# Patient Record
Sex: MALE | Race: WHITE | NOT HISPANIC OR LATINO | Employment: UNEMPLOYED | URBAN - METROPOLITAN AREA
[De-identification: names, ages, dates, MRNs, and addresses within clinical notes are randomized per-mention and may not be internally consistent; named-entity substitution may affect disease eponyms.]

---

## 2017-01-11 ENCOUNTER — TELEPHONE (OUTPATIENT)
Dept: PHARMACY | Facility: CLINIC | Age: 57
End: 2017-01-11

## 2017-01-17 ENCOUNTER — EPISODE CHANGES (OUTPATIENT)
Dept: HEPATOLOGY | Facility: CLINIC | Age: 57
End: 2017-01-17

## 2017-01-18 ENCOUNTER — TELEPHONE (OUTPATIENT)
Dept: HEPATOLOGY | Facility: CLINIC | Age: 57
End: 2017-01-18

## 2017-01-18 NOTE — TELEPHONE ENCOUNTER
Carlos Cifuentes was a no show for their 01/18/2017 appointment. Please attempt to reschedule visit. Let me know if unable to reschedule visit.     Thanks,   You

## 2017-01-18 NOTE — TELEPHONE ENCOUNTER
I spoke with patient.  He states that he will call us next week to reschedule f/u with PHILLIP Reilly.  Lab draw from today moved to 1/19/17 at [facility].

## 2017-01-19 ENCOUNTER — EPISODE CHANGES (OUTPATIENT)
Dept: HEPATOLOGY | Facility: CLINIC | Age: 57
End: 2017-01-19

## 2017-01-20 ENCOUNTER — EPISODE CHANGES (OUTPATIENT)
Dept: HEPATOLOGY | Facility: CLINIC | Age: 57
End: 2017-01-20

## 2017-01-20 ENCOUNTER — TELEPHONE (OUTPATIENT)
Dept: HEPATOLOGY | Facility: CLINIC | Age: 57
End: 2017-01-20

## 2017-01-20 ENCOUNTER — LAB VISIT (OUTPATIENT)
Dept: LAB | Facility: HOSPITAL | Age: 57
End: 2017-01-20
Attending: INTERNAL MEDICINE
Payer: MEDICAID

## 2017-01-20 DIAGNOSIS — K74.69 OTHER CIRRHOSIS OF LIVER: ICD-10-CM

## 2017-01-20 DIAGNOSIS — B18.2 CHRONIC HEPATITIS C WITHOUT HEPATIC COMA: ICD-10-CM

## 2017-01-20 LAB
ALBUMIN SERPL BCP-MCNC: 3.3 G/DL
ALP SERPL-CCNC: 89 U/L
ALT SERPL W/O P-5'-P-CCNC: 27 U/L
AST SERPL-CCNC: 20 U/L
BASOPHILS # BLD AUTO: 0.02 K/UL
BASOPHILS NFR BLD: 0.4 %
BILIRUB DIRECT SERPL-MCNC: 0.1 MG/DL
BILIRUB SERPL-MCNC: 0.2 MG/DL
DIFFERENTIAL METHOD: ABNORMAL
EOSINOPHIL # BLD AUTO: 0.1 K/UL
EOSINOPHIL NFR BLD: 2.1 %
ERYTHROCYTE [DISTWIDTH] IN BLOOD BY AUTOMATED COUNT: 14.2 %
HCT VFR BLD AUTO: 42.2 %
HGB BLD-MCNC: 13.7 G/DL
INR PPP: 1
LYMPHOCYTES # BLD AUTO: 1.3 K/UL
LYMPHOCYTES NFR BLD: 25.5 %
MCH RBC QN AUTO: 27.9 PG
MCHC RBC AUTO-ENTMCNC: 32.5 %
MCV RBC AUTO: 86 FL
MONOCYTES # BLD AUTO: 0.3 K/UL
MONOCYTES NFR BLD: 6.5 %
NEUTROPHILS # BLD AUTO: 3.4 K/UL
NEUTROPHILS NFR BLD: 65.5 %
PLATELET # BLD AUTO: 121 K/UL
PMV BLD AUTO: 11.1 FL
PROT SERPL-MCNC: 7.2 G/DL
PROTHROMBIN TIME: 10.7 SEC
RBC # BLD AUTO: 4.91 M/UL
WBC # BLD AUTO: 5.25 K/UL

## 2017-01-20 PROCEDURE — 36415 COLL VENOUS BLD VENIPUNCTURE: CPT

## 2017-01-20 PROCEDURE — 85025 COMPLETE CBC W/AUTO DIFF WBC: CPT

## 2017-01-20 PROCEDURE — 80076 HEPATIC FUNCTION PANEL: CPT

## 2017-01-20 PROCEDURE — 85610 PROTHROMBIN TIME: CPT

## 2017-01-20 NOTE — TELEPHONE ENCOUNTER
HCV LAB REVIEW  Week 8 of Harvoni, planning on 24 weeks treatment    Pertinent labs:  LFT: stable     pls call pt:  Labs look good. Liver enzymes are stable.   - Continue Harvoni - 1 pill daily - don't miss any doses.    Next labs due / pls schedule:  - LFT at week 12 - 02/14/17  - LFT at week 16 - 03/14/17  - LFT at week 20 - 04/11/17  - LFT , HCV RNA at week 24 - end of treatment - 05/09/17

## 2017-01-20 NOTE — TELEPHONE ENCOUNTER
Left message from provider. Given number to call with any concerns.  Lab dates have been adjusted as noted below. New reminders mailed.

## 2017-01-25 ENCOUNTER — TELEPHONE (OUTPATIENT)
Dept: PHARMACY | Facility: CLINIC | Age: 57
End: 2017-01-25

## 2017-01-27 ENCOUNTER — TELEPHONE (OUTPATIENT)
Dept: PHARMACY | Facility: CLINIC | Age: 57
End: 2017-01-27

## 2017-02-01 ENCOUNTER — TELEPHONE (OUTPATIENT)
Dept: PHARMACY | Facility: CLINIC | Age: 57
End: 2017-02-01

## 2017-02-02 ENCOUNTER — TELEPHONE (OUTPATIENT)
Dept: PHARMACY | Facility: CLINIC | Age: 57
End: 2017-02-02

## 2017-02-02 ENCOUNTER — TELEPHONE (OUTPATIENT)
Dept: HEPATOLOGY | Facility: CLINIC | Age: 57
End: 2017-02-02

## 2017-02-02 NOTE — TELEPHONE ENCOUNTER
Flynn confirmed refill readiness of Batool. Name/ confirmed. no missed doses; no new medication; no side effects noted; patient has enough medication through --he is advised he will need to  medication 2/3 or  as he has missed the shipment deadline and will exhaust supply. he acknwoledges understanding.    He was reeducated on our refill policies and the importance of remaining in contact w pharmacy.      Jose Moe, PharmD, [facility]  [facility]  [phone]

## 2017-02-02 NOTE — TELEPHONE ENCOUNTER
----- Message from Monica Sandy sent at 2/1/2017  4:11 PM CST -----  Contact: Adriana Brooks  [phone]  Called regarding filling Batool.

## 2017-02-02 NOTE — TELEPHONE ENCOUNTER
Attempt made to reach [facility], closed.  Patient should be requesting Harvoni refills from [facility].  Attempt made to reach patient.  LVM asking that he contact [facility] for shipment; contact info provided.

## 2017-02-14 ENCOUNTER — EPISODE CHANGES (OUTPATIENT)
Dept: HEPATOLOGY | Facility: CLINIC | Age: 57
End: 2017-02-14

## 2017-02-24 ENCOUNTER — TELEPHONE (OUTPATIENT)
Dept: PHARMACY | Facility: CLINIC | Age: 57
End: 2017-02-24

## 2017-02-27 ENCOUNTER — TELEPHONE (OUTPATIENT)
Dept: PHARMACY | Facility: CLINIC | Age: 57
End: 2017-02-27

## 2017-02-27 NOTE — TELEPHONE ENCOUNTER
"patient confirmed shipping of harvoni 3/1/17, he is taking it every morning between 8 and 10, i told him to try to get it as close to the same time everyday as possible and suggested to set a repeating alarm to remind him.   he does report heartburn and feels that he needs to take something, it is not tolerable, he says he feels like hes gonna "puke".  he would like to take tums, i told him that this is fine, it just needs to be taken at least 4 hours after the harvoni dose.  he understood, but was told to call back if he had any other questions regarding the timing of the tums.  he had no other side effects to report.  he was reminded to make his lab appointments and to call with any questions or concerns.  he was not home with the bottle to do an exact pill count, but says he has not missed any doses and harvoni will be shipped to him asap,  "

## 2017-03-14 ENCOUNTER — TELEPHONE (OUTPATIENT)
Dept: HEPATOLOGY | Facility: CLINIC | Age: 57
End: 2017-03-14

## 2017-03-14 NOTE — TELEPHONE ENCOUNTER
----- Message from You Reilly PA-C sent at 3/6/2017  9:10 AM CST -----  Please remind him of labs today.

## 2017-03-17 ENCOUNTER — TELEPHONE (OUTPATIENT)
Dept: HEPATOLOGY | Facility: CLINIC | Age: 57
End: 2017-03-17

## 2017-03-17 ENCOUNTER — LAB VISIT (OUTPATIENT)
Dept: LAB | Facility: HOSPITAL | Age: 57
End: 2017-03-17
Attending: PHYSICIAN ASSISTANT
Payer: MEDICAID

## 2017-03-17 DIAGNOSIS — B18.2 CHRONIC HEPATITIS C WITHOUT HEPATIC COMA: ICD-10-CM

## 2017-03-17 DIAGNOSIS — K74.69 OTHER CIRRHOSIS OF LIVER: ICD-10-CM

## 2017-03-17 LAB
ALBUMIN SERPL BCP-MCNC: 3.5 G/DL
ALP SERPL-CCNC: 81 U/L
ALT SERPL W/O P-5'-P-CCNC: 24 U/L
AST SERPL-CCNC: 15 U/L
BILIRUB DIRECT SERPL-MCNC: 0.1 MG/DL
BILIRUB SERPL-MCNC: 0.3 MG/DL
PROT SERPL-MCNC: 7.8 G/DL

## 2017-03-17 PROCEDURE — 80076 HEPATIC FUNCTION PANEL: CPT

## 2017-03-17 PROCEDURE — 36415 COLL VENOUS BLD VENIPUNCTURE: CPT

## 2017-03-17 NOTE — TELEPHONE ENCOUNTER
HCV LAB REVIEW  Week 16 of Harvoni, planning on 24 weeks treatment  Pt no show for week 12    Pertinent labs:  LFT: stable     pls call pt:  Labs look good. Liver enzymes are stable.   - Continue Harvoni - 1 pill daily - don't miss any doses.    Next labs due / pls schedule:  - LFT at week 20 - 04/11/17  - LFT , HCV RNA at week 24 - end of treatment - 05/09/17

## 2017-03-20 ENCOUNTER — EPISODE CHANGES (OUTPATIENT)
Dept: HEPATOLOGY | Facility: CLINIC | Age: 57
End: 2017-03-20

## 2017-03-29 NOTE — TELEPHONE ENCOUNTER
Attempted to call patient to see how may doses of medication he has in current bottle. Left message for call back

## 2017-03-30 ENCOUNTER — TELEPHONE (OUTPATIENT)
Dept: PHARMACY | Facility: CLINIC | Age: 57
End: 2017-03-30

## 2017-04-05 ENCOUNTER — HOSPITAL ENCOUNTER (OUTPATIENT)
Dept: RADIOLOGY | Facility: HOSPITAL | Age: 57
Discharge: HOME | End: 2017-04-05
Attending: INTERNAL MEDICINE
Payer: MEDICAID

## 2017-04-05 DIAGNOSIS — E11.621 DIABETIC ULCER OF TOE OF LEFT FOOT ASSOCIATED WITH TYPE 2 DIABETES MELLITUS, UNSPECIFIED ULCER STAGE: ICD-10-CM

## 2017-04-05 DIAGNOSIS — L97.529 DIABETIC ULCER OF TOE OF LEFT FOOT ASSOCIATED WITH TYPE 2 DIABETES MELLITUS, UNSPECIFIED ULCER STAGE: ICD-10-CM

## 2017-04-05 PROCEDURE — 73630 X-RAY EXAM OF FOOT: CPT | Mod: 26,RT,, | Performed by: RADIOLOGY

## 2017-04-05 PROCEDURE — 73630 X-RAY EXAM OF FOOT: CPT | Mod: TC,RT

## 2017-04-07 ENCOUNTER — HOSPITAL ENCOUNTER (INPATIENT)
Facility: HOSPITAL | Age: 57
LOS: 8 days | Discharge: HOME | DRG: 617 | End: 2017-04-15
Attending: FAMILY MEDICINE | Admitting: INTERNAL MEDICINE
Payer: MEDICAID

## 2017-04-07 DIAGNOSIS — M62.838 NECK MUSCLE SPASM: ICD-10-CM

## 2017-04-07 DIAGNOSIS — D63.8 ANEMIA OF OTHER CHRONIC DISEASE: ICD-10-CM

## 2017-04-07 DIAGNOSIS — I73.9 PERIPHERAL ARTERIAL DISEASE: ICD-10-CM

## 2017-04-07 DIAGNOSIS — E87.1 HYPONATREMIA: ICD-10-CM

## 2017-04-07 DIAGNOSIS — F51.01 PRIMARY INSOMNIA: ICD-10-CM

## 2017-04-07 DIAGNOSIS — L97.512 DIABETIC ULCER OF TOE OF RIGHT FOOT ASSOCIATED WITH TYPE 2 DIABETES MELLITUS, WITH FAT LAYER EXPOSED: Primary | ICD-10-CM

## 2017-04-07 DIAGNOSIS — M86.8X7 OTHER OSTEOMYELITIS OF RIGHT FOOT: ICD-10-CM

## 2017-04-07 DIAGNOSIS — B35.6 TINEA CRURIS: ICD-10-CM

## 2017-04-07 DIAGNOSIS — R60.0 BILATERAL LEG EDEMA: ICD-10-CM

## 2017-04-07 DIAGNOSIS — B18.2 CHRONIC HEPATITIS C WITHOUT HEPATIC COMA: ICD-10-CM

## 2017-04-07 DIAGNOSIS — E11.621 DIABETIC ULCER OF TOE OF RIGHT FOOT ASSOCIATED WITH TYPE 2 DIABETES MELLITUS, WITH FAT LAYER EXPOSED: Primary | ICD-10-CM

## 2017-04-07 DIAGNOSIS — M86.171 OTHER ACUTE OSTEOMYELITIS OF RIGHT FOOT: ICD-10-CM

## 2017-04-07 DIAGNOSIS — I65.23 BILATERAL CAROTID ARTERY STENOSIS: ICD-10-CM

## 2017-04-07 DIAGNOSIS — I73.9 PERIPHERAL VASCULAR DISEASE: ICD-10-CM

## 2017-04-07 DIAGNOSIS — R16.2 HEPATOSPLENOMEGALY: Chronic | ICD-10-CM

## 2017-04-07 DIAGNOSIS — L03.115 CELLULITIS OF RIGHT FOOT: ICD-10-CM

## 2017-04-07 DIAGNOSIS — M51.26 LUMBAR HERNIATED DISC: ICD-10-CM

## 2017-04-07 DIAGNOSIS — E11.42 DIABETIC POLYNEUROPATHY ASSOCIATED WITH TYPE 2 DIABETES MELLITUS: ICD-10-CM

## 2017-04-07 DIAGNOSIS — I99.8 ISCHEMIC TOE: ICD-10-CM

## 2017-04-07 DIAGNOSIS — G95.20 CORD COMPRESSION: ICD-10-CM

## 2017-04-07 DIAGNOSIS — F06.4 ANXIETY DISORDER DUE TO GENERAL MEDICAL CONDITION: ICD-10-CM

## 2017-04-07 DIAGNOSIS — L08.9 INFECTION OF SKIN DUE TO METHICILLIN RESISTANT STAPHYLOCOCCUS AUREUS (MRSA): ICD-10-CM

## 2017-04-07 DIAGNOSIS — G89.29 CHRONIC BILATERAL LOW BACK PAIN WITH RIGHT-SIDED SCIATICA: Chronic | ICD-10-CM

## 2017-04-07 DIAGNOSIS — M86.9 OSTEOMYELITIS OF RIGHT FOOT: ICD-10-CM

## 2017-04-07 DIAGNOSIS — M79.2 NEUROPATHIC PAIN: ICD-10-CM

## 2017-04-07 DIAGNOSIS — B95.62 INFECTION OF SKIN DUE TO METHICILLIN RESISTANT STAPHYLOCOCCUS AUREUS (MRSA): ICD-10-CM

## 2017-04-07 DIAGNOSIS — Z72.0 TOBACCO ABUSE: ICD-10-CM

## 2017-04-07 DIAGNOSIS — A49.01 MSSA (METHICILLIN SUSCEPTIBLE STAPHYLOCOCCUS AUREUS) INFECTION: ICD-10-CM

## 2017-04-07 DIAGNOSIS — M79.651 PAIN OF RIGHT THIGH: ICD-10-CM

## 2017-04-07 DIAGNOSIS — M54.41 CHRONIC BILATERAL LOW BACK PAIN WITH RIGHT-SIDED SCIATICA: Chronic | ICD-10-CM

## 2017-04-07 DIAGNOSIS — F41.1 GENERALIZED ANXIETY DISORDER: ICD-10-CM

## 2017-04-07 LAB
ALBUMIN SERPL BCP-MCNC: 2.9 G/DL
ALP SERPL-CCNC: 86 U/L
ALT SERPL W/O P-5'-P-CCNC: 17 U/L
ANION GAP SERPL CALC-SCNC: 10 MMOL/L
AST SERPL-CCNC: 15 U/L
BASOPHILS # BLD AUTO: 0.02 K/UL
BASOPHILS NFR BLD: 0.2 %
BILIRUB SERPL-MCNC: 0.4 MG/DL
BUN SERPL-MCNC: 27 MG/DL
CALCIUM SERPL-MCNC: 9.1 MG/DL
CHLORIDE SERPL-SCNC: 97 MMOL/L
CO2 SERPL-SCNC: 22 MMOL/L
CREAT SERPL-MCNC: 1.2 MG/DL
DIFFERENTIAL METHOD: ABNORMAL
EOSINOPHIL # BLD AUTO: 0.1 K/UL
EOSINOPHIL NFR BLD: 1.1 %
ERYTHROCYTE [DISTWIDTH] IN BLOOD BY AUTOMATED COUNT: 14.2 %
EST. GFR  (AFRICAN AMERICAN): >60 ML/MIN/1.73 M^2
EST. GFR  (NON AFRICAN AMERICAN): >60 ML/MIN/1.73 M^2
GLUCOSE SERPL-MCNC: 289 MG/DL
HCT VFR BLD AUTO: 36.8 %
HGB BLD-MCNC: 12.6 G/DL
INR PPP: 1
LACTATE SERPL-SCNC: 1.1 MMOL/L
LYMPHOCYTES # BLD AUTO: 1.3 K/UL
LYMPHOCYTES NFR BLD: 14.9 %
MCH RBC QN AUTO: 28.4 PG
MCHC RBC AUTO-ENTMCNC: 34.2 %
MCV RBC AUTO: 83 FL
MONOCYTES # BLD AUTO: 0.6 K/UL
MONOCYTES NFR BLD: 6.4 %
NEUTROPHILS # BLD AUTO: 6.7 K/UL
NEUTROPHILS NFR BLD: 77.1 %
PLATELET # BLD AUTO: 176 K/UL
PMV BLD AUTO: 10.6 FL
POTASSIUM SERPL-SCNC: 4.9 MMOL/L
PROT SERPL-MCNC: 7.7 G/DL
PROTHROMBIN TIME: 10.8 SEC
RBC # BLD AUTO: 4.43 M/UL
SODIUM SERPL-SCNC: 129 MMOL/L
WBC # BLD AUTO: 8.73 K/UL

## 2017-04-07 PROCEDURE — 96365 THER/PROPH/DIAG IV INF INIT: CPT

## 2017-04-07 PROCEDURE — 94761 N-INVAS EAR/PLS OXIMETRY MLT: CPT

## 2017-04-07 PROCEDURE — 80053 COMPREHEN METABOLIC PANEL: CPT | Mod: 91

## 2017-04-07 PROCEDURE — 87040 BLOOD CULTURE FOR BACTERIA: CPT

## 2017-04-07 PROCEDURE — 93005 ELECTROCARDIOGRAM TRACING: CPT

## 2017-04-07 PROCEDURE — 96366 THER/PROPH/DIAG IV INF ADDON: CPT

## 2017-04-07 PROCEDURE — 25000003 PHARM REV CODE 250: Performed by: FAMILY MEDICINE

## 2017-04-07 PROCEDURE — 83605 ASSAY OF LACTIC ACID: CPT

## 2017-04-07 PROCEDURE — 99285 EMERGENCY DEPT VISIT HI MDM: CPT | Mod: 25,27

## 2017-04-07 PROCEDURE — 85025 COMPLETE CBC W/AUTO DIFF WBC: CPT | Mod: 91

## 2017-04-07 PROCEDURE — 96361 HYDRATE IV INFUSION ADD-ON: CPT

## 2017-04-07 PROCEDURE — 85610 PROTHROMBIN TIME: CPT | Mod: 91

## 2017-04-07 PROCEDURE — 93010 ELECTROCARDIOGRAM REPORT: CPT | Mod: ,,, | Performed by: INTERNAL MEDICINE

## 2017-04-07 PROCEDURE — 12000002 HC ACUTE/MED SURGE SEMI-PRIVATE ROOM

## 2017-04-07 PROCEDURE — 99223 1ST HOSP IP/OBS HIGH 75: CPT | Mod: ,,, | Performed by: HOSPITALIST

## 2017-04-07 PROCEDURE — 99285 EMERGENCY DEPT VISIT HI MDM: CPT | Mod: ,,, | Performed by: EMERGENCY MEDICINE

## 2017-04-07 PROCEDURE — 63600175 PHARM REV CODE 636 W HCPCS: Performed by: FAMILY MEDICINE

## 2017-04-07 RX ORDER — OXYCODONE HCL 10 MG/1
10 TABLET, FILM COATED, EXTENDED RELEASE ORAL EVERY 12 HOURS PRN
Status: DISCONTINUED | OUTPATIENT
Start: 2017-04-08 | End: 2017-04-10

## 2017-04-07 RX ORDER — GABAPENTIN 300 MG/1
300 CAPSULE ORAL 3 TIMES DAILY
Status: DISCONTINUED | OUTPATIENT
Start: 2017-04-08 | End: 2017-04-15 | Stop reason: HOSPADM

## 2017-04-07 RX ORDER — GLUCAGON 1 MG
1 KIT INJECTION
Status: DISCONTINUED | OUTPATIENT
Start: 2017-04-08 | End: 2017-04-15 | Stop reason: HOSPADM

## 2017-04-07 RX ORDER — TRAZODONE HYDROCHLORIDE 50 MG/1
100 TABLET ORAL NIGHTLY
Status: DISCONTINUED | OUTPATIENT
Start: 2017-04-08 | End: 2017-04-15 | Stop reason: HOSPADM

## 2017-04-07 RX ORDER — IBUPROFEN 200 MG
16 TABLET ORAL
Status: DISCONTINUED | OUTPATIENT
Start: 2017-04-08 | End: 2017-04-15 | Stop reason: HOSPADM

## 2017-04-07 RX ORDER — IBUPROFEN 200 MG
24 TABLET ORAL
Status: DISCONTINUED | OUTPATIENT
Start: 2017-04-08 | End: 2017-04-15 | Stop reason: HOSPADM

## 2017-04-07 RX ORDER — SIMVASTATIN 20 MG/1
20 TABLET, FILM COATED ORAL NIGHTLY
Status: DISCONTINUED | OUTPATIENT
Start: 2017-04-08 | End: 2017-04-15 | Stop reason: HOSPADM

## 2017-04-07 RX ORDER — PROMETHAZINE HYDROCHLORIDE 12.5 MG/1
12.5 TABLET ORAL EVERY 4 HOURS PRN
Status: DISCONTINUED | OUTPATIENT
Start: 2017-04-08 | End: 2017-04-15 | Stop reason: HOSPADM

## 2017-04-07 RX ADMIN — VANCOMYCIN HYDROCHLORIDE 1750 MG: 1 INJECTION, POWDER, LYOPHILIZED, FOR SOLUTION INTRAVENOUS at 10:04

## 2017-04-07 RX ADMIN — SODIUM CHLORIDE 1000 ML: 0.9 INJECTION, SOLUTION INTRAVENOUS at 08:04

## 2017-04-07 NOTE — IP AVS SNAPSHOT
[facility]  [address]  [facility] [address]  Phone: [phone]           Patient Discharge Instructions   Our goal is to set you up for success. This packet includes information on your condition, medications, and your home care.  It will help you care for yourself to prevent having to return to the hospital.     Please ask your nurse if you have any questions.      There are many details to remember when preparing to leave the hospital. Here is what you will need to do:    1. Take your medicine. If you are prescribed medications, review your Medication List on the following pages. You may have new medications to  at the pharmacy and others that you'll need to stop taking. Review the instructions for how and when to take your medications. Talk with your doctor or nurses if you are unsure of what to do.     2. Go to your follow-up appointments. Specific follow-up information is listed in the following pages. Your may be contacted by a nurse or clinical provider about future appointments. Be sure we have all of the phone numbers to reach you. Please contact your provider's office if you are unable to make an appointment.     3. Watch for warning signs. Your doctor or nurse will give you detailed warning signs to watch for and when to call for assistance. These instructions may also include educational information about your condition. If you experience any of warning signs to your health, call your doctor.           [facility]  Unless otherwise directed by your provider, please   contact [facility], our nurse care line   that is available for 24/7 assistance.     [phone] (toll-free)     Registered nurses in the [facility]   provide: appointment scheduling, clinical advisement, health education, and other advisory services.                  ** Verify the list of medication(s) below is accurate and up to date. Carry this with you in case of  emergency. If your medications have changed, please notify your healthcare provider.             Medication List      START taking these medications        Additional Info    Begin Date AM Noon PM Bedtime    aspirin 81 MG EC tablet   Commonly known as:  ECOTRIN   Refills:  0   Dose:  81 mg    Last time this was given:  81 mg on 4/15/2017  9:27 AM   Instructions:  Take 1 tablet (81 mg total) by mouth once daily.         9 am 4/16                     atorvastatin 80 MG tablet   Commonly known as:  LIPITOR   Quantity:  90 tablet   Refills:  3   Dose:  80 mg   Notes to Patient:  Cholesterol    Instructions:  Take 1 tablet (80 mg total) by mouth once daily.         9 am 4/16                   ciprofloxacin HCl 500 MG tablet   Commonly known as:  CIPRO   Quantity:  10 tablet   Refills:  0   Dose:  500 mg   Indications:  Skin & soft tissue   Notes to Patient:  Antibiotic    Last time this was given:  500 mg on 4/15/2017  9:27 AM   Instructions:  Take 1 tablet (500 mg total) by mouth every 12 (twelve) hours.                 9 pm tonight           clopidogrel 75 mg tablet   Commonly known as:  PLAVIX   Quantity:  30 tablet   Refills:  3   Dose:  75 mg   Notes to Patient:  Blood thinner.     Last time this was given:  75 mg on 4/15/2017  9:27 AM   Instructions:  Take 1 tablet (75 mg total) by mouth once daily.         9 am 4/16                     dextrose 5 % SolP 500 mL with vancomycin 1,000 mg SolR 2,000 mg   Refills:  0   Dose:  2000 mg   Indications:  Bone/Joint   Notes to Patient:  IV antibiotic    Last time this was given:  2,000 mg on 4/15/2017  5:40 AM   Instructions:  Inject 2,000 mg into the vein every 12 (twelve) hours.                            glipiZIDE 5 MG tablet   Commonly known as:  GLUCOTROL   Quantity:  60 tablet   Refills:  1   Dose:  5 mg   Notes to Patient:  Oral anti diabetic    Instructions:  Take 1 tablet (5 mg total) by mouth 2 (two) times daily before meals.                 Before meals              CHANGE how you take these medications        Additional Info    Begin Date AM Noon PM Bedtime    blood sugar diagnostic Strp   Commonly known as:  BLOOD GLUCOSE TEST   Quantity:  100 each   Refills:  6   What changed:  additional instructions   Comments:  True Test    Instructions:  Twice daily blood sugar checks                            metformin 1000 MG tablet   Commonly known as:  GLUCOPHAGE   Quantity:  60 tablet   Refills:  2   Dose:  1500 mg   What changed:  See the new instructions.   Notes to Patient:  Anti diabetic    Instructions:  Take 1.5 tablets (1,500 mg total) by mouth 2 (two) times daily with meals.                 With meals           * oxycodone 10 mg Tab immediate release tablet   Commonly known as:  ROXICODONE   Quantity:  120 tablet   Refills:  0   What changed:  See the new instructions.    Last time this was given:  10 mg on 4/15/2017  2:02 PM   Instructions:  Take 1 tablet (10 mg total) by mouth every 6 (six) hours as needed.                            * oxycodone 10 mg 12 hr tablet   Commonly known as:  OXYCONTIN   Quantity:  14 tablet   Refills:  0   Dose:  10 mg   What changed:  You were already taking a medication with the same name, and this prescription was added. Make sure you understand how and when to take each.   Notes to Patient:  Pain     Last time this was given:  10 mg on 4/15/2017  9:27 AM   Instructions:  Take 1 tablet (10 mg total) by mouth every 12 (twelve) hours.                            promethazine 25 MG tablet   Commonly known as:  PHENERGAN   Quantity:  15 tablet   Refills:  0   Dose:  25 mg   What changed:    - when to take this  - reasons to take this   Notes to Patient:  nausea    Last time this was given:  12.5 mg on 4/11/2017  5:40 AM   Instructions:  Take 1 tablet (25 mg total) by mouth every 4 (four) hours.                            * Notice:  This list has 2 medication(s) that are the same as other medications prescribed for you. Read the directions  carefully, and ask your doctor or other care provider to review them with you.      CONTINUE taking these medications        Additional Info    Begin Date AM Noon PM Bedtime    diazePAM 2 MG tablet   Commonly known as:  VALIUM   Quantity:  60 tablet   Refills:  1    Instructions:  TAKE ONE TABLET BY MOUTH EVERY 8 HOURS AS NEEDED FOR ANXIETY **THANK YOU**                            docusate sodium 100 MG capsule   Commonly known as:  COLACE   Refills:  0   Dose:  100 mg    Instructions:  Take 100 mg by mouth as needed for Constipation.                            fentaNYL 50 mcg/hr   Commonly known as:  DURAGESIC   Quantity:  10 patch   Refills:  0    Instructions:  Place 1 patch onto the skin every 72 hours.                            gabapentin 300 MG capsule   Commonly known as:  NEURONTIN   Quantity:  180 capsule   Refills:  4    Last time this was given:  300 mg on 4/15/2017  2:49 PM   Instructions:  Take 1 capsule (300 mg total) by mouth 3 (three) times daily.                            insulin glargine 100 unit/mL (3 mL) Inpn pen   Commonly known as:  LANTUS SOLOSTAR   Quantity:  13.5 mL   Refills:  3   Dose:  15 Units    Instructions:  Inject 15 Units into the skin every evening.                            ledipasvir-sofosbuvir  mg Tab   Commonly known as:  HARVONI   Quantity:  28 tablet   Refills:  5   Dose:  1 tablet    Last time this was given:  1 tablet on 4/15/2017  9:34 AM   Instructions:  Take 1 tablet by mouth once daily.                            mupirocin calcium 2% 2 % cream   Commonly known as:  BACTROBAN   Quantity:  60 g   Refills:  1    Instructions:  Apply to affected area 3 times daily                            potassium chloride SA 10 MEQ tablet   Commonly known as:  K-DUR,KLOR-CON   Refills:  1    Instructions:  Take 1 tablet (10 mEq total) by mouth once daily as needed when experiencing leg/muscle cramps.                            silver sulfADIAZINE 1% 1 % cream   Commonly known  as:  SILVADENE   Quantity:  50 g   Refills:  2   Dose:  1 application    Instructions:  Apply 1 application topically once daily. Apply to affected area                            trazodone 100 MG tablet   Commonly known as:  DESYREL   Quantity:  90 tablet   Refills:  1   Comments:  This prescription was filled today. Any refills authorized will be placed on file.    Last time this was given:  100 mg on 4/14/2017  9:01 PM   Instructions:  Take 1 tablet (100 mg total) by mouth every evening.                            TRUE METRIX GLUCOSE METER Misc   Refills:  0   Generic drug:  blood-glucose meter    Instructions:  AS DIRECTED                              STOP taking these medications     pravastatin 20 MG tablet   Commonly known as:  PRAVACHOL       simvastatin 20 MG tablet   Commonly known as:  ZOCOR            Where to Get Your Medications      These medications were sent to [place] [address]  [address] [facility] [address]     Phone:  [phone]     atorvastatin 80 MG tablet    clopidogrel 75 mg tablet    glipiZIDE 5 MG tablet    metformin 1000 MG tablet         You can get these medications from any pharmacy     Bring a paper prescription for each of these medications     oxycodone 10 mg 12 hr tablet       You don't need a prescription for these medications     aspirin 81 MG EC tablet         Information about where to get these medications is not yet available     ! Ask your nurse or doctor about these medications     ciprofloxacin HCl 500 MG tablet    dextrose 5 % SolP 500 mL with vancomycin 1,000 mg SolR 2,000 mg                  Please bring to all follow up appointments:    1. A copy of your discharge instructions.  2. All medicines you are currently taking in their original bottles.  3. Identification and insurance card.    Please arrive 15 minutes ahead of scheduled appointment time.    Please call 24 hours in advance if you must reschedule your appointment  and/or time.        Your Scheduled Appointments     Apr 20, 2017  8:30 AM CDT   Established Patient Visit with PHILLIP Nair - Infectious Diseases ([facility] )    [address]  [facility] [address] [phone]            Apr 27, 2017  7:45 AM CDT   Post OP with SPRING Araujo - Podiatry ([facility] )    [address]  [facility] [address] [phone]            May 09, 2017  9:30 AM CDT   Non-Fasting Lab with [facility] ([facility])    [address] [facility] [address]   [phone]            Jun 06, 2017 11:00 AM CDT   Established Patient Endocrinology with Aviva Lux MD   [facility] ([facility])    [address]  [facility] [address] [phone]              Follow-up Information     Follow up with Miguel Lux MD. Schedule an appointment as soon as possible for a visit in 4 weeks.    Specialties:  Internal Medicine, Oncology, Hematology and Oncology    Why:  Primary care hospital follow-up    Contact information:    [address]  [address]  [place] [address] [phone]          Follow up with J[facility] - Infectious Diseases. Go in 5 days.    Specialty:  Infectious Diseases    Why:  Infectious diseases hospital follow-up    Contact information:    [address]  [facility] [id number] [phone]    Additional information:    1st Floor - [address]        Follow up with Emil Edmonds MD In 2 weeks.    Specialty:  Vascular Surgery    Why:  with ABIs and Vasc US of the [facility]     Contact information:    [address]  [facility] [id number] [phone]          Schedule an appointment as soon as possible for a visit with Jasmeet [facility] - Endo/Diab/Metab.    Specialty:  Endocrinology    Why:  for appointment if not called in 7-10 days    Contact information:    [address]  [facility]  [id number] [phone]    Additional information:    9th Floor      Referrals     Future Orders    Ambulatory consult to Infectious Disease     Ambulatory Referral to Cardiology     Ambulatory Referral to Endocrinology     Ambulatory Referral to [facility]     Ambulatory Referral to Podiatry     Ambulatory referral to Smoking Cessation Program         Discharge Instructions     Future Orders    Call MD for:  difficulty breathing or increased cough     Call MD for:  increased confusion or weakness     Call MD for:  persistent dizziness, light-headedness, or visual disturbances     Call MD for:  persistent nausea and vomiting or diarrhea     Call MD for:  redness, tenderness, or signs of infection (pain, swelling, redness, odor or green/yellow discharge around incision site)     Call MD for:  severe persistent headache     Call MD for:  severe uncontrolled pain     Call MD for:  temperature >100.4     Call MD for:  worsening rash     Diet general     Questions:    Total calories:      Fat restriction, if any:      Protein restriction, if any:      Na restriction, if any:      Fluid restriction:      Additional restrictions:      Leave dressing on - Keep it clean, dry, and intact until clinic visit     Weight bearing restrictions (specify)     WHEELCHAIR FOR HOME USE     Questions:    Hours in W/C per day:  8    Type of Wheelchair:  Standard    Size(Width):  18"(STD adult)    Leg Support:  Elevating leg rests    Arm Height:      Desired seat depth:      Back height:      Lower leg length:      Actual seat depth:      Lap Belt:  Velcro    Accessories:      Cushion:  Basic    Justification for cushion:      Height:  5' 9" (1.753 m)    Weight:  109.5 kg (241 lb 6.5 oz)    Does patient have medical equipment at home?:  cane, straight    Length of need (1-99 months):  99    Please check all that apply:  Patient's upper body strength is sufficient for propulsion.    The patient requires the use of a w/c for " "activities of daily living within the Home.    The patient has a cast, brace or muscloskeletal condition which prevents 90 degree flexion of the knee.        Primary Diagnosis     Your primary diagnosis was:  Bone Infection      Admission Information     Date & Time Provider Department CSN    4/7/2017  8:13 PM Alfreda Almanza MD [facility] [id number]      Care Providers     Provider Role Specialty Primary office phone    Alfreda Almanza MD Attending Provider Hospitalist [phone]    Alfreda Almanza MD Team Attending  Hospitalist [phone]    Alexia Li DPM Surgeon  Podiatry [phone]    [employer] Provider  Pharmacist [phone]    Han Veronica DPM Surgeon  Podiatry [phone]      Your Vitals Were     BP Pulse Temp Resp Height Weight    130/64 (BP Location: Left arm, Patient Position: Lying, BP Method: Automatic) 68 98.1 °F (36.7 °C) (Oral) 18 5' 9" (1.753 m) 109.5 kg (241 lb 6.5 oz)    SpO2 BMI             95% 35.65 kg/m2         Recent Lab Values        9/11/2008 9/13/2008 5/12/2011 3/13/2013 9/27/2014 1/25/2016 1/12/2017 4/4/2017      5:03 AM  5:25 AM 10:46 AM  8:49 AM  4:31 AM 10:00 AM  2:03 PM 10:53 AM    A1C 10.7 (H) 10.7 (H) 14.0 (H) 11.7 (H) 11.4 (H) 9.7 (H) 10.9 10.2 (H)    Comment for A1C at 10:53 AM on 4/4/2017:           Pre-diabetes: 5.7 - 6.4           Diabetes: >6.4           Glycemic control for adults with diabetes: <7.0        Pending Labs     Order Current Status    Culture, Anaerobe In process    Specimen to Pathology - Surgery In process    Specimen to Pathology - Surgery In process      Allergies as of 4/15/2017        Reactions    Codeine Rash    Other reaction(s): Unknown      Advance Directives     An advance directive is a document which, in the event you are no longer able to make decisions for yourself, tells your healthcare team what kind of treatment you do or do not want to receive, or who you would like to make those " decisions for you.  If you do not currently have an advance directive, [facility] encourages you to create one.  For more information call:  [phone]), [phone] ([phone]),  or log on to [email or web address].        Smoking Cessation     If you would like to quit smoking:   You may be eligible for free services if you are a [place] resident and started smoking cigarettes before September 1, 1988.  Call the Smoking Cessation Trust (SCT) toll free at (379) 683-8056 or (860) 629-0441.   Call -456-QUIT-NOW if you do not meet the above criteria.   Contact us via email: [email or web address]   View our website for more information: [email or web address]        Language Assistance Services     ATTENTION: Language assistance services are available, free of charge. Please call [phone].      ATENCIÓN: Si habla español, tiene a castellano disposición servicios gratuitos de asistencia lingüística. Llame al [phone].     CHÚ Ý: N?u b?n nói Ti?ng Vi?t, có các d?ch v? h? tr? ngôn ng? mi?n phí dành cho b?n. G?i s? [phone].        Stroke Education              Diabetes Discharge Instructions                                    [facility] complies with applicable Federal civil rights laws and does not discriminate on the basis of race, color, national origin, age, disability, or sex.

## 2017-04-08 ENCOUNTER — ANESTHESIA EVENT (OUTPATIENT)
Dept: SURGERY | Facility: HOSPITAL | Age: 57
DRG: 617 | End: 2017-04-08
Payer: MEDICAID

## 2017-04-08 PROBLEM — I73.9 PERIPHERAL ARTERIAL DISEASE: Status: ACTIVE | Noted: 2017-04-08

## 2017-04-08 PROBLEM — L03.115 CELLULITIS OF RIGHT FOOT: Status: ACTIVE | Noted: 2017-04-08

## 2017-04-08 PROBLEM — Z72.0 TOBACCO ABUSE: Status: ACTIVE | Noted: 2017-04-08

## 2017-04-08 PROBLEM — M86.271 SUBACUTE OSTEOMYELITIS OF RIGHT FOOT: Status: ACTIVE | Noted: 2017-04-08

## 2017-04-08 LAB
CHOLEST/HDLC SERPL: 4.3 {RATIO}
ERYTHROCYTE [SEDIMENTATION RATE] IN BLOOD BY WESTERGREN METHOD: 90 MM/HR
GRAM STN SPEC: NORMAL
HDL/CHOLESTEROL RATIO: 23.3 %
HDLC SERPL-MCNC: 116 MG/DL
HDLC SERPL-MCNC: 27 MG/DL
LDLC SERPL CALC-MCNC: 65.6 MG/DL
NONHDLC SERPL-MCNC: 89 MG/DL
POCT GLUCOSE: 186 MG/DL (ref 70–110)
POCT GLUCOSE: 211 MG/DL (ref 70–110)
POCT GLUCOSE: 217 MG/DL (ref 70–110)
POCT GLUCOSE: 239 MG/DL (ref 70–110)
TRIGL SERPL-MCNC: 117 MG/DL
VANCOMYCIN SERPL-MCNC: 6.7 UG/ML

## 2017-04-08 PROCEDURE — 87076 CULTURE ANAEROBE IDENT EACH: CPT

## 2017-04-08 PROCEDURE — 85651 RBC SED RATE NONAUTOMATED: CPT

## 2017-04-08 PROCEDURE — A9585 GADOBUTROL INJECTION: HCPCS | Performed by: HOSPITALIST

## 2017-04-08 PROCEDURE — 25500020 PHARM REV CODE 255: Performed by: HOSPITALIST

## 2017-04-08 PROCEDURE — 80202 ASSAY OF VANCOMYCIN: CPT

## 2017-04-08 PROCEDURE — 63600175 PHARM REV CODE 636 W HCPCS: Performed by: STUDENT IN AN ORGANIZED HEALTH CARE EDUCATION/TRAINING PROGRAM

## 2017-04-08 PROCEDURE — 99233 SBSQ HOSP IP/OBS HIGH 50: CPT | Mod: ,,, | Performed by: SURGERY

## 2017-04-08 PROCEDURE — 25000003 PHARM REV CODE 250: Performed by: STUDENT IN AN ORGANIZED HEALTH CARE EDUCATION/TRAINING PROGRAM

## 2017-04-08 PROCEDURE — 87077 CULTURE AEROBIC IDENTIFY: CPT

## 2017-04-08 PROCEDURE — 11000001 HC ACUTE MED/SURG PRIVATE ROOM

## 2017-04-08 PROCEDURE — 87070 CULTURE OTHR SPECIMN AEROBIC: CPT

## 2017-04-08 PROCEDURE — 87075 CULTR BACTERIA EXCEPT BLOOD: CPT

## 2017-04-08 PROCEDURE — 87205 SMEAR GRAM STAIN: CPT

## 2017-04-08 PROCEDURE — 99233 SBSQ HOSP IP/OBS HIGH 50: CPT | Mod: ,,, | Performed by: HOSPITALIST

## 2017-04-08 PROCEDURE — 63600175 PHARM REV CODE 636 W HCPCS: Performed by: HOSPITALIST

## 2017-04-08 PROCEDURE — 99233 SBSQ HOSP IP/OBS HIGH 50: CPT | Mod: ,,, | Performed by: PODIATRIST

## 2017-04-08 PROCEDURE — 87186 SC STD MICRODIL/AGAR DIL: CPT

## 2017-04-08 PROCEDURE — 25000003 PHARM REV CODE 250: Performed by: INTERNAL MEDICINE

## 2017-04-08 PROCEDURE — 80061 LIPID PANEL: CPT

## 2017-04-08 RX ORDER — OXYCODONE HYDROCHLORIDE 5 MG/1
10 TABLET ORAL EVERY 8 HOURS PRN
Status: DISCONTINUED | OUTPATIENT
Start: 2017-04-08 | End: 2017-04-10

## 2017-04-08 RX ORDER — ASPIRIN 81 MG/1
81 TABLET ORAL DAILY
Status: DISCONTINUED | OUTPATIENT
Start: 2017-04-08 | End: 2017-04-15 | Stop reason: HOSPADM

## 2017-04-08 RX ORDER — ENOXAPARIN SODIUM 100 MG/ML
40 INJECTION SUBCUTANEOUS
Status: DISCONTINUED | OUTPATIENT
Start: 2017-04-08 | End: 2017-04-08

## 2017-04-08 RX ORDER — GLUCAGON 1 MG
1 KIT INJECTION
Status: DISCONTINUED | OUTPATIENT
Start: 2017-04-08 | End: 2017-04-08

## 2017-04-08 RX ORDER — IBUPROFEN 200 MG
1 TABLET ORAL DAILY
Status: DISCONTINUED | OUTPATIENT
Start: 2017-04-08 | End: 2017-04-15 | Stop reason: HOSPADM

## 2017-04-08 RX ORDER — CIPROFLOXACIN 500 MG/1
500 TABLET ORAL EVERY 12 HOURS
Status: DISCONTINUED | OUTPATIENT
Start: 2017-04-08 | End: 2017-04-15 | Stop reason: HOSPADM

## 2017-04-08 RX ORDER — GADOBUTROL 604.72 MG/ML
10 INJECTION INTRAVENOUS
Status: COMPLETED | OUTPATIENT
Start: 2017-04-08 | End: 2017-04-08

## 2017-04-08 RX ORDER — INSULIN ASPART 100 [IU]/ML
0-5 INJECTION, SOLUTION INTRAVENOUS; SUBCUTANEOUS
Status: DISCONTINUED | OUTPATIENT
Start: 2017-04-08 | End: 2017-04-15 | Stop reason: HOSPADM

## 2017-04-08 RX ORDER — DOCUSATE SODIUM 100 MG/1
100 CAPSULE, LIQUID FILLED ORAL
COMMUNITY
End: 2018-03-06 | Stop reason: SDUPTHER

## 2017-04-08 RX ORDER — LEDIPASVIR AND SOFOSBUVIR 90; 400 MG/1; MG/1
1 TABLET, FILM COATED ORAL DAILY
Status: DISCONTINUED | OUTPATIENT
Start: 2017-04-08 | End: 2017-04-15 | Stop reason: HOSPADM

## 2017-04-08 RX ORDER — INSULIN ASPART 100 [IU]/ML
1-10 INJECTION, SOLUTION INTRAVENOUS; SUBCUTANEOUS
Status: DISCONTINUED | OUTPATIENT
Start: 2017-04-08 | End: 2017-04-08

## 2017-04-08 RX ADMIN — ENOXAPARIN SODIUM 40 MG: 100 INJECTION SUBCUTANEOUS at 11:04

## 2017-04-08 RX ADMIN — LEDIPASVIR AND SOFOSBUVIR 1 TABLET: 90; 400 TABLET, FILM COATED ORAL at 11:04

## 2017-04-08 RX ADMIN — GADOBUTROL 10 ML: 604.72 INJECTION INTRAVENOUS at 03:04

## 2017-04-08 RX ADMIN — CIPROFLOXACIN HYDROCHLORIDE 500 MG: 500 TABLET, FILM COATED ORAL at 11:04

## 2017-04-08 RX ADMIN — VANCOMYCIN HYDROCHLORIDE 1750 MG: 1 INJECTION, POWDER, LYOPHILIZED, FOR SOLUTION INTRAVENOUS at 10:04

## 2017-04-08 RX ADMIN — OXYCODONE HYDROCHLORIDE 10 MG: 10 TABLET, FILM COATED, EXTENDED RELEASE ORAL at 02:04

## 2017-04-08 RX ADMIN — INSULIN DETEMIR 17 UNITS: 100 INJECTION, SOLUTION SUBCUTANEOUS at 02:04

## 2017-04-08 RX ADMIN — GABAPENTIN 300 MG: 300 CAPSULE ORAL at 08:04

## 2017-04-08 RX ADMIN — ASPIRIN 81 MG: 81 TABLET, COATED ORAL at 10:04

## 2017-04-08 RX ADMIN — TRAZODONE HYDROCHLORIDE 100 MG: 50 TABLET ORAL at 08:04

## 2017-04-08 RX ADMIN — ASPIRIN 81 MG: 81 TABLET, COATED ORAL at 08:04

## 2017-04-08 RX ADMIN — CIPROFLOXACIN HYDROCHLORIDE 500 MG: 500 TABLET, FILM COATED ORAL at 08:04

## 2017-04-08 RX ADMIN — OXYCODONE HYDROCHLORIDE 10 MG: 5 TABLET ORAL at 01:04

## 2017-04-08 RX ADMIN — SIMVASTATIN 20 MG: 20 TABLET, FILM COATED ORAL at 09:04

## 2017-04-08 RX ADMIN — OXYCODONE HYDROCHLORIDE 10 MG: 10 TABLET, FILM COATED, EXTENDED RELEASE ORAL at 08:04

## 2017-04-08 RX ADMIN — GABAPENTIN 300 MG: 300 CAPSULE ORAL at 10:04

## 2017-04-08 RX ADMIN — GABAPENTIN 300 MG: 300 CAPSULE ORAL at 01:04

## 2017-04-08 NOTE — PHARMACY MED REC
[facility] Medication Reconciliation  Template    Patient was admitted on 4/7/2017 for <principal problem not specified>.      Patient's prior to admission medication regimen was as follows:  Prescriptions Prior to Admission   Medication Sig Dispense Refill Last Dose    blood sugar diagnostic (BLOOD GLUCOSE TEST) Strp Twice daily blood sugar checks (Patient taking differently: Test  blood sugar four times daily) 100 each 6 Taking    diazePAM (VALIUM) 2 MG tablet TAKE ONE TABLET BY MOUTH EVERY 8 HOURS AS NEEDED FOR ANXIETY **THANK YOU** 60 tablet 1 Taking    docusate sodium (COLACE) 100 MG capsule Take 100 mg by mouth as needed for Constipation.       fentaNYL (DURAGESIC) 50 mcg/hr Place 1 patch onto the skin every 72 hours. 10 patch 0 Taking    gabapentin (NEURONTIN) 300 MG capsule Take 1 capsule (300 mg total) by mouth 3 (three) times daily. 180 capsule 4 Taking    ledipasvir-sofosbuvir (HARVONI)  mg Tab Take 1 tablet by mouth once daily. 28 tablet 5 Taking    metformin (GLUCOPHAGE) 1000 MG tablet Take 1 tablet (1,000 mg total) by mouth 2 (two) times daily.  4 Taking    mupirocin calcium 2% (BACTROBAN) 2 % cream Apply to affected area 3 times daily 60 g 1 Taking    oxycodone (ROXICODONE) 10 mg Tab immediate release tablet Take 1 tablet (10 mg total) by mouth every 6 (six) hours as needed. (Patient taking differently: take one tablet every 3 hours as needed for pain) 120 tablet 0 Taking    potassium chloride SA (K-DUR,KLOR-CON) 10 MEQ tablet Take 1 tablet (10 mEq total) by mouth once daily as needed when experiencing leg/muscle cramps.  1 Taking    promethazine (PHENERGAN) 25 MG tablet Take 1 tablet (25 mg total) by mouth every 4 (four) hours. (Patient taking differently: Take 25 mg by mouth every 4 (four) hours as needed for Nausea. ) 15 tablet 0 Taking    silver sulfADIAZINE 1% (SILVADENE) 1 % cream Apply 1 application topically once daily. Apply to affected area 50 g 2 Taking    simvastatin  "(ZOCOR) 20 MG tablet Take 1 tablet (20 mg total) by mouth every evening. 90 tablet 3 Taking    trazodone (DESYREL) 100 MG tablet Take 1 tablet (100 mg total) by mouth every evening. 90 tablet 1 Taking    TRUE METRIX GLUCOSE METER Misc AS DIRECTED  0 Taking    insulin glargine (LANTUS SOLOSTAR) 100 unit/mL (3 mL) InPn pen Inject 15 Units into the skin every evening. 13.5 mL 3 Taking         Please add appropriate    SmartPhrase below:      Admission Medication Reconciliation - Pharmacy Consult Note    The home medication history was taken by manuela Cornejo tech.  Based on information gathered and subsequent review by the clinical pharmacist, the items below may need attention.    You may go to "Admission" then "Reconcile Home Medications" tabs to review and/or act upon these items.        No issues noted with the medication reconciliation.          Please address this information as you see fit.  Feel free to contact us if you have any questions or require assistance.    Tom Mcneal PharmAGUSTIN  [id number]  "

## 2017-04-08 NOTE — ASSESSMENT & PLAN NOTE
-Continue with detemir 17 U qhs  -Reduce detemir to 10 U for tonight 2/2 to NPO status for tomorrow's operation & prevent hypoglycemia while NPO  -Resume 17 U tomorrow night  -LD SSI  -POCT glucose checks qac & qhs

## 2017-04-08 NOTE — CONSULTS
Consult Note  Podiatry    Consult Requested By: Alfreda Almanza MD  Reason for Consult: right great toe foot ulcer; HBA1C 11.8%    SUBJECTIVE     History of Present Illness:  Carlos Cifuentes is a 56 y.o. male who  has a past medical history of Arthritis; Bulging lumbar disc; C. difficile diarrhea; Chronic back pain; Diabetes mellitus type II; DM (diabetes mellitus), type 2, uncontrolled; Hepatitis C; MSSA (methicillin susceptible Staphylococcus aureus) septicemia; Neuromuscular disorder; Neuropathy; and Staph aureus infection.     Patient was consulted to podiatry for a right great toe ulcer with ischemic changes to the toe. Patient reports the ulcer started about 2 weeks ago when he was filing a callus down and picked at some skin causing some bleeding. The ulcer progressively worsened and over the last 2-3 days he has noticed the toe changing colors. Denies f/c/n/v. There is moderate reported pain to the area especially with pressure.    Scheduled Meds:   aspirin  81 mg Oral Daily    ciprofloxacin HCl  500 mg Oral Q12H    enoxparin  40 mg Subcutaneous Q24H    gabapentin  300 mg Oral TID    insulin detemir  17 Units Subcutaneous QHS    nicotine  1 patch Transdermal Daily    simvastatin  20 mg Oral QHS    trazodone  100 mg Oral QHS    vancomycin (VANCOCIN) IVPB  15 mg/kg Intravenous Q12H     Continuous Infusions:   PRN Meds:dextrose 50%, dextrose 50%, glucagon (human recombinant), glucose, glucose, insulin aspart, oxycodone, promethazine    Review of patient's allergies indicates:   Allergen Reactions    Codeine      Other reaction(s): Unknown        Past Medical History:   Diagnosis Date    Arthritis     Bulging lumbar disc     C. difficile diarrhea     Chronic back pain 10/14/2014    Diabetes mellitus type II     DM (diabetes mellitus), type 2, uncontrolled 3/12/2013    Hepatitis C 7/3/2013    MSSA (methicillin susceptible Staphylococcus aureus) septicemia     Neuromuscular disorder      Neuropathy     Staph aureus infection      Past Surgical History:   Procedure Laterality Date    APPENDECTOMY      left leg surgery      broken bone    LEG SURGERY  right      Family History   Problem Relation Age of Onset    Arthritis Mother     Arthritis Sister     Diabetes Sister     Arthritis Brother      Social History   Substance Use Topics    Smoking status: Current Some Day Smoker     Packs/day: 1.00     Types: Cigarettes    Smokeless tobacco: Never Used    Alcohol use No       Review of Systems:  Constitutional: no fever or chills  Respiratory: no cough or shortness of breath  Cardiovascular: no chest pain or palpitations  Gastrointestinal: no nausea or vomiting, tolerating diet  Musculoskeletal: no arthralgias or myalgias  Neurological: history of numbness or paresthesias in the feet    OBJECTIVE     Vital Signs (Most Recent):  Temp: 98 °F (36.7 °C) (04/08/17 0908)  Pulse: 64 (04/08/17 0908)  Resp: 18 (04/08/17 0908)  BP: (!) 96/50 (04/08/17 0908)  SpO2: 95 % (04/08/17 0400)    Vital Signs Range (Last 24H):  Temp:  [97.7 °F (36.5 °C)-98.6 °F (37 °C)]   Pulse:  [64-96]   Resp:  [17-20]   BP: ()/(50-75)   SpO2:  [95 %-99 %]     Physical Exam:  General: Oriented to person, place, time, and situation. No acute distress.  Vascular: DP and PT pulses are 1+ bilaterally with biphasic signals on doppler. CRT<3 seconds to digits 2-5 right and 1-5 left, right hallux CRT absent. Moderate diffuse pedal edema to the right foot.  Musculoskeletal: Equinus noted b/l ankles with < 10 deg DF noted.   Neuro: Protective sensation absent bilateral feet.   Derm: No open lesions, lacerations or wounds noted left foot.     Right hallux is cyanotic to the MTPJ    Ulcer Location: right plantar hallux  Measurements: 1.0x0.6x0.3 with proximal tracking about 5-7 o'clock about 3 cm  Periwound: Intact  Drainage: serosanguinous.  Malodor: Present  Base:  Necrotic base, probes to bone  Signs of infection: Malodorous  drainage, necrosis, erythema, edema.                Laboratory:  CBC:   Recent Labs  Lab 04/07/17 2139   WBC 8.73   RBC 4.43*   HGB 12.6*   HCT 36.8*      MCV 83   MCH 28.4   MCHC 34.2     CMP:   Recent Labs  Lab 04/07/17 2139   *   CALCIUM 9.1   ALBUMIN 2.9*   PROT 7.7   *   K 4.9   CO2 22*   CL 97   BUN 27*   CREATININE 1.2   ALKPHOS 86   ALT 17   AST 15   BILITOT 0.4     ESR:   Recent Labs  Lab 04/04/17  1052   SEDRATE 33*     CRP: No results for input(s): CRP in the last 168 hours.  Microbiology Results (last 7 days)     Procedure Component Value Units Date/Time    Gram stain [[id number]] Collected:  04/08/17 0854    Order Status:  Sent Specimen:  Wound from Toe, Right Foot Updated:  04/08/17 0934    Aerobic culture [[id number]] Collected:  04/08/17 0854    Order Status:  Sent Specimen:  Wound from Toe, Right Foot Updated:  04/08/17 0934    Culture, Anaerobe [[id number]] Collected:  04/08/17 0854    Order Status:  Sent Specimen:  Wound from Toe, Right Foot Updated:  04/08/17 0933    Blood culture #1 **CANNOT BE ORDERED STAT** [[id number]] Collected:  04/07/17 2053    Order Status:  Completed Specimen:  Blood from Peripheral, Antecubital, Right Updated:  04/08/17 0715     Blood Culture, Routine No Growth to date    Blood culture #2 **CANNOT BE ORDERED STAT** [[id number]] Collected:  04/07/17 2116    Order Status:  Completed Specimen:  Blood from Peripheral, Antecubital, Left Updated:  04/08/17 0515     Blood Culture, Routine No Growth to date          Diagnostic Results:  X-Ray: reviewed  MRI: pending      ASSESSMENT/PLAN     Assessment:  -DM II with neuropathy  -Diabetic ulcer right foot with associated ischemia and cyanosis to the right hallux  -Cellulitis/abscess    Plan:  -Long discussion with patient about the need for a right first ray amputation for infection control and the hallux is cyanotic (ischemic appearing - early signs of gangrene) and likely not to recover. Upon reviewing the  risks/benefits,  the planned procedure, the surgical goal, and the postoperative course for the right first ray amputation, the written consent was signed, timed, and dated by the patient with a witness present.    -Vascular surgery on board to optimize circulation through intervention if needed, plan to run a few more vascular studies, appreciate their help.  -Cultures taken at bedside, more cultures will be taken intraoperatively.   -Case set for tomorrow 8:15 AM, he will need to be NPO at midnight.  Appreciate the consult, please call on call podiatry for questions or concerns.    Kanu Kevin DPM PGY-3  Pager [phone]      Resident Supervision:  I have personally taken the history and examined this patient and agree with the resident's note as stated above.   Alexia Li DPM     Plan for RIGHT FOOT partial 1st Ray amputation in the OR.   Will need Regional block.

## 2017-04-08 NOTE — PROVIDER PROGRESS NOTES - EMERGENCY DEPT.
Encounter Date: 4/7/2017    ED Physician Progress Notes        Physician Note:   Patient's 56-year-old male who presented to [facility] with an ischemic right great toe.  He is been evaluated with the x-rays, ultrasound and lab work.  Discussed with vascular surgery and sent to [facility] for IV antibiotics, vascular consultation and initial management by hospital medicine.  We will start we will draw his blood cultures, replace his IV and start his initial vancomycin final admission through the main emergency room.

## 2017-04-08 NOTE — PLAN OF CARE
PT AAOx4, remained calm and cooperative throughout shift. Received PRN pain meds as ordered. Remained free from falls, injury or distress. Bed in lowest position and locked, call light within reach.

## 2017-04-08 NOTE — ED PROVIDER NOTES
"Encounter Date: 4/7/2017    SCRIBE #1 NOTE: I, Rosalinda Jones, am scribing for, and in the presence of,  Dr. Dow. I have scribed the following portions of the note - the Resident attestation.       History     Chief Complaint   Patient presents with    Toe Pain     Pt sent from [facility] for evaluation of infection on big toe. Pt denies f/c.      Review of patient's allergies indicates:   Allergen Reactions    Codeine      Other reaction(s): Unknown     HPI Comments: 55 y/o M h/o hep C, DM2, poorly controlled neuropathy presents with right great toe pain. Patient seen earlier today at [facility] by Dr. Zazueta who recommended admission and surgical management. Patient left AMA because he "had to take care of his dogs," now presents for further management.  Briefly, patient has baseline numbness/paralysis of right foot, noticed callus 10d ago, picked off, had ulceration and great pain, followed by wound management with which he is not compliant.  Maintains toe was normal this morning, but took off shoe discovered discoloration, he blames compression and color of sock.  Washed foot, remained discolored.   Pain is currently well controlled, says numbness 2/2 "sugar getting high." Denies fever/chills, N/V, changes in appetite. Has fentanyl patch on bottom of foot "because it hurts."     The history is provided by the patient and a relative.     Past Medical History:   Diagnosis Date    Arthritis     Bulging lumbar disc     C. difficile diarrhea     Chronic back pain 10/14/2014    Diabetes mellitus type II     DM (diabetes mellitus), type 2, uncontrolled 3/12/2013    Hepatitis C 7/3/2013    MSSA (methicillin susceptible Staphylococcus aureus) septicemia     Neuromuscular disorder     Neuropathy     Staph aureus infection      Past Surgical History:   Procedure Laterality Date    APPENDECTOMY      left leg surgery      broken bone    LEG SURGERY  right      Family History   Problem " Relation Age of Onset    Arthritis Mother     Arthritis Sister     Diabetes Sister     Arthritis Brother      Social History   Substance Use Topics    Smoking status: Current Some Day Smoker     Packs/day: 1.00     Types: Cigarettes    Smokeless tobacco: Never Used    Alcohol use No     Review of Systems   Constitutional: Negative for activity change, appetite change, chills, fatigue and fever.   HENT: Negative for trouble swallowing and voice change.    Respiratory: Negative for shortness of breath.    Cardiovascular: Negative for chest pain, palpitations and leg swelling.   Gastrointestinal: Negative for diarrhea, nausea and vomiting.   Genitourinary: Negative for decreased urine volume.   Musculoskeletal: Positive for back pain, joint swelling and myalgias. Negative for arthralgias, gait problem and neck pain.   Skin: Positive for color change and wound. Negative for pallor and rash.   Neurological: Negative for dizziness, tremors, seizures, weakness and numbness.       Physical Exam   Initial Vitals   BP Pulse Resp Temp SpO2   04/07/17 1945 04/07/17 1945 04/07/17 1945 04/07/17 1945 04/07/17 1945   129/75 96 18 98.6 °F (37 °C) 96 %     Physical Exam    Nursing note and vitals reviewed.  Constitutional: He appears well-developed and well-nourished. He is not diaphoretic. No distress.   HENT:   Head: Normocephalic and atraumatic.   Eyes: EOM are normal. Pupils are equal, round, and reactive to light.   Neck: Normal range of motion. Neck supple.   Cardiovascular: Normal rate, regular rhythm and normal heart sounds. Exam reveals no gallop and no friction rub.    No murmur heard.  Pulmonary/Chest: Breath sounds normal. No stridor. No respiratory distress. He has no wheezes. He has no rhonchi. He has no rales. He exhibits no tenderness.   Abdominal: Soft. He exhibits no distension. Bowel sounds are increased. There is no tenderness. There is no rebound and no guarding.   Musculoskeletal:        Right ankle: He  exhibits laceration. He exhibits normal range of motion and no deformity. No tenderness.        Feet:    Great right toe: pale, ulcer on plantar surface apr 1cm x 1cm.  Doppler DP pulsus. Fentanyl patch noted on plantar surface. Swelling noted up to mid-calf.          ED Course   Procedures  Labs Reviewed   CBC W/ AUTO DIFFERENTIAL - Abnormal; Notable for the following:        Result Value    RBC 4.43 (*)     Hemoglobin 12.6 (*)     Hematocrit 36.8 (*)     Gran% 77.1 (*)     Lymph% 14.9 (*)     All other components within normal limits   COMPREHENSIVE METABOLIC PANEL - Abnormal; Notable for the following:     Sodium 129 (*)     CO2 22 (*)     Glucose 289 (*)     BUN, Bld 27 (*)     Albumin 2.9 (*)     All other components within normal limits   LACTIC ACID, PLASMA   PROTIME-INR   POCT GLUCOSE MONITORING CONTINUOUS             Medical Decision Making:   History:   Old Medical Records: I decided to obtain old medical records.  Initial Assessment:   57 y/o M presents with right toe pain, previously seen in  By Dr. Zazueta, consulted Dr. Edmonds who recommended admission to medicine for antibiotics with vasc consult, left AMA, presenting again for further management.  No systemtic symptoms. Toe ulcerated, discolored, pale, cool on exam. DP palpable.  Ordered basic labs + lactate, PT, likely admit.   Clinical Tests:   Lab Tests: Reviewed and Ordered  Medical Tests: Reviewed and Ordered            Scribe Attestation:   Scribe #1: I performed the above scribed service and the documentation accurately describes the services I performed. I attest to the accuracy of the note.    Attending Attestation:   Physician Attestation Statement for Resident:  As the supervising MD   Physician Attestation Statement: I have personally seen and examined this patient.   I agree with the above history. -: This is an emergent evaluation of a 56 y.o. male presenting with toe infection. Pt seen at  earlier with concern about infected  ulcer on his toe. Bilateral DP signals via doppler. No palpable pulse. Seems chronic PVD. Staff at  discussed with vascular surgery who recommended hospital medicine admission.    Vascular Surgery contacted, they evaluated patient in ED.  No need for emergent surgical intervention at this time.  Pt will be admitted to medicine     As the supervising MD I agree with the above PE.    As the supervising MD I agree with the above treatment, course, plan, and disposition.          Physician Attestation for Scribe:  Physician Attestation Statement for Scribe #1: I, Dr. Dow, reviewed documentation, as scribed by Rosalinda Jones in my presence, and it is both accurate and complete.                 ED Course     Clinical Impression:   The primary encounter diagnosis was Diabetic ulcer of toe of right foot associated with type 2 diabetes mellitus, with fat layer exposed. Diagnoses of Ischemic toe, Cellulitis of right foot, Chronic bilateral low back pain with right-sided sciatica, Chronic hepatitis C without hepatic coma, DM type 2, uncontrolled, with neuropathy, Hyponatremia, Tobacco abuse, and Peripheral vascular disease were also pertinent to this visit.                   Randell Longoria MD  Resident  04/08/17 0126       Giana Dow MD  04/10/17 0641

## 2017-04-08 NOTE — SUBJECTIVE & OBJECTIVE
Interval History:  No acute events since admission overnight.  Per podiatry consult, he will have an amputation of his R great toe 2/2 to osteomyelitis confirmed with MRI this afternoon - lovenox held & NPO at midnight.  Touched base with vacular surgery who is planning on an angiogram w/ possible intervention this upcoming Tuesday for PAD (LAILA = 1.57).  Also spoke with ID, so long as the margins of tomorrow's amputation are clear for infection with no SSTI, he will only need Abx coverage for 48 hours, otherwise it will be up to 14 days.    Review of Systems   Constitutional: Negative for chills and fever.   Respiratory: Negative for cough and shortness of breath.    Cardiovascular: Negative for chest pain and palpitations.   Musculoskeletal: Positive for back pain.     Objective:     Vital Signs (Most Recent):  Temp: 98.1 °F (36.7 °C) (04/08/17 1131)  Pulse: 67 (04/08/17 1131)  Resp: 18 (04/08/17 1131)  BP: 104/63 (04/08/17 1131)  SpO2: 96 % (04/08/17 1131) Vital Signs (24h Range):  Temp:  [98 °F (36.7 °C)-98.6 °F (37 °C)] 98.1 °F (36.7 °C)  Pulse:  [64-96] 67  Resp:  [18-20] 18  SpO2:  [95 %-97 %] 96 %  BP: ()/(50-75) 104/63     Weight: 109.5 kg (241 lb 6.5 oz)  Body mass index is 35.65 kg/(m^2).    Intake/Output Summary (Last 24 hours) at 04/08/17 1639  Last data filed at 04/08/17 0213   Gross per 24 hour   Intake               60 ml   Output                0 ml   Net               60 ml      Physical Exam   Constitutional: He is oriented to person, place, and time. No distress.   Cardiovascular: Normal rate and regular rhythm.  Exam reveals decreased pulses (Bilaterally). Exam reveals no gallop and no friction rub.    No murmur heard.  Pulmonary/Chest: Breath sounds normal. No respiratory distress. He has no wheezes. He has no rhonchi. He has no rales.   Abdominal: Soft. Bowel sounds are normal. He exhibits no distension. There is no tenderness.   Neurological: He is alert and oriented to person, place,  and time.       Significant Labs:   Blood Culture:   Recent Labs  Lab 04/07/17 2053 04/07/17 2116   LABBLOO No Growth to date No Growth to date     CBC:   Recent Labs  Lab 04/07/17 1258 04/07/17 2139   WBC 9.29 8.73   HGB 12.8* 12.6*   HCT 37.6* 36.8*    176     CMP:   Recent Labs  Lab 04/07/17 1258 04/07/17 2139   * 129*   K 5.3* 4.9   CL 99 97   CO2 21* 22*   * 289*   BUN 29* 27*   CREATININE 1.3 1.2   CALCIUM 9.1 9.1   PROT 7.5 7.7   ALBUMIN 3.2* 2.9*   BILITOT 0.4 0.4   ALKPHOS 89 86   AST 16 15   ALT 14 17   ANIONGAP 11 10   EGFRNONAA >60 >60.0     Lactic Acid:   Recent Labs  Lab 04/07/17  2201   LACTATE 1.1     POCT Glucose:   Recent Labs  Lab 04/08/17  0228 04/08/17  1036 04/08/17  1401   POCTGLUCOSE 239* 211* 217*       Significant Imaging: MRI shows evidence of osteomyelitis in R great toe

## 2017-04-08 NOTE — CONSULTS
"  Consult Note  Vascular Surgery    Consult Requested By: ED  Reason for Consult: right great toe ischemia    SUBJECTIVE:     History of Present Illness:  Patient is a 56 y.o. male with h/o DM2 (Hgb A1c 10.2), HTN, HLD, hep C, long term tobacco use (1 ppd x44 years) presents with c/o "blue right great toe."  Reports that he had a callus on his right great toe that he shaved with scissors afterwards had a nonhealing ulceration.  Today noticed his toe to be blue in color thus went to [facility] there he had arterial ultrasound showing R LAILA 1.57, L 1.70 with no evidence of occlusion, left AMA and presented to [facility] with same complaints.  He reports no other history of nonhealing ulcers.  Reports claudication in bilateral lower extremities at 100 feet.  Reports throbbing pain in his feet right worse than left that improves when lying in bed and worsens when feet are hanging.    Denies h/o MI, stroke.    Scheduled Meds:   [START ON 4/8/2017] gabapentin  300 mg Oral TID    [START ON 4/8/2017] simvastatin  20 mg Oral QHS    [START ON 4/8/2017] trazodone  100 mg Oral QHS    vancomycin (VANCOCIN) IVPB  15 mg/kg Intravenous ED 1 Time     Continuous Infusions:   PRN Meds:[START ON 4/8/2017] dextrose 50%, [START ON 4/8/2017] dextrose 50%, [START ON 4/8/2017] glucagon (human recombinant), [START ON 4/8/2017] glucose, [START ON 4/8/2017] glucose, [START ON 4/8/2017] oxycodone, [START ON 4/8/2017] promethazine    Review of patient's allergies indicates:   Allergen Reactions    Codeine      Other reaction(s): Unknown       Past Medical History:   Diagnosis Date    Arthritis     Bulging lumbar disc     C. difficile diarrhea     Chronic back pain 10/14/2014    Diabetes mellitus type II     DM (diabetes mellitus), type 2, uncontrolled 3/12/2013    Hepatitis C 7/3/2013    Leukocytosis     MSSA (methicillin susceptible Staphylococcus aureus) septicemia     Neuromuscular disorder     Neuropathy     " Staph aureus infection      Past Surgical History:   Procedure Laterality Date    APPENDECTOMY      left leg surgery      broken bone    LEG SURGERY  right      Family History   Problem Relation Age of Onset    Arthritis Mother     Arthritis Sister     Diabetes Sister     Arthritis Brother      Social History   Substance Use Topics    Smoking status: Current Some Day Smoker     Packs/day: 1.00     Types: Cigarettes    Smokeless tobacco: Never Used    Alcohol use No        Review of Systems:  Peripheral Vascular: Leg Claudication: bilateral: Location: bilateral calves , Frequency: 100  Constitutional: no fever or chills  Eyes: no visual changes  Respiratory: no cough or shortness of breath  Cardiovascular: no chest pain or palpitations  Gastrointestinal: no nausea or vomiting, tolerating diet  Genitourinary: no hematuria or dysuria  Musculoskeletal: positive for myalgias  Neurological: no seizures or tremors    OBJECTIVE:     Vital Signs (Most Recent)  Temp: 98.4 °F (36.9 °C) (04/07/17 2328)  Pulse: 69 (04/07/17 2328)  Resp: 20 (04/07/17 2328)  BP: (!) 109/59 (04/07/17 2328)  SpO2: 95 % (04/07/17 2328)    Vital Signs Range (Last 24H):  Temp:  [97.7 °F (36.5 °C)-98.6 °F (37 °C)]   Pulse:  [69-96]   Resp:  [17-20]   BP: (109-130)/(59-75)   SpO2:  [95 %-99 %]     Physical Exam:  General: well developed, well nourished, no distress  Head: normocephalic, atraumatic  Eyes:  conjunctivae/corneas clear.  Lungs:  clear to auscultation bilaterally and normal respiratory effort  Heart: regular rate and rhythm, S1, S2 normal, no murmur, rub or gallop  Abdomen: soft, non-tender non-distented; bowel sounds normal; no masses,  no organomegaly and well healed midline incision, aorta not palpable  Extremities: right foot with edema; right great toe blue to MCP, ulcer on plantar aspect of great toe, no purulence, no flucutance; cool to touch  Pulses: 2+ right femoral pulse, 1+ left femoral pulse; nonpalpable popliteal  pulses, biphasic signals; biphasic DP and PT bilaterally  Neurologic:  No focal numbness or weakness; intact motor right foot; intact sensation right foot    Laboratory:  CBC:   Recent Labs  Lab 04/07/17 2139   WBC 8.73   RBC 4.43*   HGB 12.6*   HCT 36.8*        CMP:   Recent Labs  Lab 04/07/17 2139   *   CALCIUM 9.1   ALBUMIN 2.9*   PROT 7.7   *   K 4.9   CO2 22*   CL 97   BUN 27*   CREATININE 1.2   ALKPHOS 86   ALT 17   AST 15   BILITOT 0.4     Coagulation:   Recent Labs  Lab 04/07/17 2139   INR 1.0       Diagnostic Results:  right lower extremity  right common femoral 94 cm/sec triphasic  right SFA (prox)        144 cm/sec triphasic   right SFA (mid)        97 cm/sec triphasic  right SFA (dist)        162 cm/sec triphasic  right popliteal 68 cm/sec triphasic  right posterior tibial 15 cm/sec biphasic  right dorsalis pedis 51 cm/sec biphasic  right anterior tibial 23 cm/sec biphasic  right peroneal art 142 cm/sec biphasic      left lower extremity  Left common femoral 111 cm/sec triphasic  Left SFA (prox)        98 cm/sec triphasic   Left SFA (mid)        99 cm/sec triphasic  Left SFA (dist)        65 cm/sec triphasic  Left popliteal 54 cm/sec biphasic  Left posterior tibial 30 cm/sec biphasic  Left dorsalis pedis 40 cm/sec biphasic  Left anterior tibial 80 cm/sec biphasic  Left peroneal art 43 cm/sec biphasic   Impression     #1. Right LAILA: 1.57. Left LAILA:1.70.  #2. Elevated ABIs bilaterally which could be secondary to calcified vessels in this diabetic patient.  #3. No doubling of velocities to suggest high-grade stenosis. No evidence for occlusion.     ASSESSMENT/PLAN:     Patient is a 56 y.o. male with h/o DM2 (Hgb A1c 10.2), HTN, HLD, hep C, long term tobacco use (1 ppd x44 years) presents with right great toe infected ulceration and ischemia of right great toe.    This does not present as acute limb ischemia, appears to be infected right great toe ulceration; likely has tibial disease  leading to nonhealing right great toe ulcer  Recommend ASA 81 mg po daily, high dose statin  Recommend local wound care  Recommend r/o osteomyelitis  Will obtain LAILA with PVR  Vascular surgery to follow, please call with questions or concerns    Anusha Vera, DO  PGY-6, Vascular fellow   [phone]    Vascular Attending  Agree with above  55 yo man with poorly-controlled DM, HTN, active tobacco use with R 1st toe plantar DM ulcer   Podiatry has evaluated R 1st toe plantar ulcer; will debride further  2+ R femoral pulse; non-palpable pedal pulses.  Will need evaluation to establish in-line flow to R foot  Start DAPT: ASA 81 / plavix 75 po qd in anticipation of R leg angio  Cont statin  IVFs staring on Monday prior to angio  IV Abx  Check PVRs and R leg arterial us in our vascular lab to further map the R leg arterial tree (no lesions noted on WB u/s but clearly there must be lesions as patient has a non-palpable pedal pulse)  Plan for R leg angio, L CFA approach on Tue April 11th    Emil Edmonds MD FACS

## 2017-04-08 NOTE — ASSESSMENT & PLAN NOTE
-LAILA positive at 1.57  -Currently holding lovenox in anticipation of amputation tomorrow, but will start DAPT after tomorrow's procedure  -Per vascuar surgery, planning on R leg angio w/ revascularization on Tuesday  -IVF w/ LR on Monday in anticipation of angiogram  -Continue asprin 81 mg & simvastatin 20 mg po qhs for the moment w/ plan to increase dose to 40 at discharge for risk factor modification

## 2017-04-08 NOTE — H&P
[facility] Hospital Medicine  History & Physical    Patient Name: Carlos Cifuentes  MRN: [id number]  Admission Date: 4/7/2017  Attending Physician: Alfreda Amlanza MD   Primary Care Provider: Miguel Lux MD    [facility] Medicine Team: [facility] HOSP MED 2 Arturo Nj MD     Patient information was obtained from patient and ER records.     Subjective:     Principal Problem:<principal problem not specified>    Chief Complaint:   Chief Complaint   Patient presents with    Toe Pain     Pt sent from [facility] POV for evaluation of infection on big toe. Pt denies f/c.         HPI:   Mr. Cifuentes is a 56 yr-old mal with hx of Hep C (currently on Harvoni), Uncontrolled DM2 with peripheral neuropathy, Tobacco Abuse (47 pack years), Hx of Right Leg MRSA Infection (2014) who presents to [facility] ED for evaluation of the R Great Toe. Pt was seen earlier in the day at [facility] for great toe discoloration and coldness. It was recommended the pt be admitted for vascular surgery evaluation but pt left AMA. He now returns to [facility] for further management. Right great toe was normal yesterday morning (4/7/17) but began to become discolored in the afternoon. Even after washing his foot, it remained discolored. Pt belives the discoloration is due to his shoes and dye from black socks. He has no hx of PVD or stents placed in his legs. He denies claudication. He reports peripheral neuropathy and poor sensation in his feet. He has an ulcer of his right great toe on the plantar surface that started one week ago after he tried to sand a callous.    In the ED, pt afebrile with no leukocytosis. LAILA 1.6 right and 1.7 left. Large arteries of the foot with good doppler flow. DP/PT pulses barely palpable. Xray negative. Great toe is cold to touch. Swelling and redness present in right shin. Fentanyl patch present     Past Medical History:   Diagnosis Date    Arthritis     Bulging lumbar disc     C. difficile  "diarrhea     Diabetes mellitus type II     DM (diabetes mellitus), type 2, uncontrolled 3/12/2013    Hepatitis C 7/3/2013    Leukocytosis     MSSA (methicillin susceptible Staphylococcus aureus) septicemia     Neuromuscular disorder     Neuropathy     Staph aureus infection        Past Surgical History:   Procedure Laterality Date    APPENDECTOMY      left leg surgery      broken bone    LEG SURGERY  right        Review of patient's allergies indicates:   Allergen Reactions    Codeine      Other reaction(s): Unknown       Current Facility-Administered Medications on File Prior to Encounter   Medication    [DISCONTINUED] sodium chloride 0.9% bolus 1,000 mL     Current Outpatient Prescriptions on File Prior to Encounter   Medication Sig    blood sugar diagnostic (BLOOD GLUCOSE TEST) Strp Twice daily blood sugar checks    diazePAM (VALIUM) 2 MG tablet TAKE ONE TABLET BY MOUTH EVERY 8 HOURS AS NEEDED FOR ANXIETY **THANK YOU**    fentaNYL (DURAGESIC) 50 mcg/hr Place 1 patch onto the skin every 72 hours.    gabapentin (NEURONTIN) 300 MG capsule Take 1 capsule (300 mg total) by mouth 3 (three) times daily.    insulin needles, disposable, (BD INSULIN PEN NEEDLE UF MINI) 31 x 3/16 " Ndle Inject 1 Units into the skin 3 (three) times daily with meals.    ledipasvir-sofosbuvir (HARVONI)  mg Tab Take 1 tablet by mouth once daily.    metformin (GLUCOPHAGE) 1000 MG tablet Take 1 tablet (1,000 mg total) by mouth 2 (two) times daily.    mupirocin calcium 2% (BACTROBAN) 2 % cream Apply to affected area 3 times daily    oxycodone (ROXICODONE) 10 mg Tab immediate release tablet Take 1 tablet (10 mg total) by mouth every 6 (six) hours as needed. (Patient taking differently: take one tablet every 3 hours as needed for pain)    potassium chloride SA (K-DUR,KLOR-CON) 10 MEQ tablet Take 1 tablet (10 mEq total) by mouth once daily as needed when experiencing leg/muscle cramps.    promethazine (PHENERGAN) 25 MG " tablet Take 1 tablet (25 mg total) by mouth every 4 (four) hours.    silver sulfADIAZINE 1% (SILVADENE) 1 % cream Apply 1 application topically once daily. Apply to affected area    simvastatin (ZOCOR) 20 MG tablet Take 1 tablet (20 mg total) by mouth every evening.    trazodone (DESYREL) 100 MG tablet Take 1 tablet (100 mg total) by mouth every evening.    TRUE METRIX GLUCOSE METER Misc AS DIRECTED    insulin glargine (LANTUS SOLOSTAR) 100 unit/mL (3 mL) InPn pen Inject 15 Units into the skin every evening.    [DISCONTINUED] lactulose (CHRONULAC) 10 gram/15 mL solution Take 30 mLs (20 g total) by mouth once daily.    [DISCONTINUED] pravastatin (PRAVACHOL) 20 MG tablet Take 1 tablet (20 mg total) by mouth every evening.     Family History     Problem Relation (Age of Onset)    Arthritis Mother, Sister, Brother    Diabetes Sister        Social History Main Topics    Smoking status: Current Some Day Smoker     Packs/day: 1.00     Types: Cigarettes    Smokeless tobacco: Never Used    Alcohol use No    Drug use: No    Sexual activity: Not on file     Review of Systems   Constitutional: Negative for chills, fatigue and fever.   HENT: Negative for facial swelling.    Respiratory: Negative for cough, shortness of breath and wheezing.    Cardiovascular: Positive for leg swelling. Negative for chest pain.   Gastrointestinal: Negative for abdominal pain.   Genitourinary: Negative for dysuria and urgency.   Musculoskeletal: Positive for back pain. Negative for joint swelling.   Skin: Positive for rash.        Rt great toe color change   Neurological: Positive for numbness.   Hematological: Negative for adenopathy. Does not bruise/bleed easily.   Psychiatric/Behavioral: Negative for agitation.     Objective:     Vital Signs (Most Recent):  Temp: 98.6 °F (37 °C) (04/07/17 1945)  Pulse: 96 (04/07/17 1945)  Resp: 18 (04/07/17 1945)  BP: 129/75 (04/07/17 1945)  SpO2: 97 % (04/07/17 2246) Vital Signs (24h Range):  Temp:   [97.7 °F (36.5 °C)-98.6 °F (37 °C)] 98.6 °F (37 °C)  Pulse:  [80-96] 96  Resp:  [17-18] 18  SpO2:  [96 %-99 %] 97 %  BP: (128-130)/(72-75) 129/75     Weight: 109.5 kg (241 lb 6.5 oz)  Body mass index is 35.65 kg/(m^2).    Physical Exam   Constitutional: He is oriented to person, place, and time. He appears well-developed and well-nourished.   HENT:   Head: Normocephalic and atraumatic.   Eyes: EOM are normal. Pupils are equal, round, and reactive to light.   Neck: Normal range of motion. Neck supple.   Cardiovascular: Normal rate and regular rhythm.    Pulmonary/Chest: Effort normal and breath sounds normal.   Abdominal: Soft. Bowel sounds are normal.   Musculoskeletal: Normal range of motion.   Neurological: He is alert and oriented to person, place, and time.   Decreased sensation feet bilaterally  DP/PT 1+   Skin: Skin is warm and dry.   Rt great toe color change  Skin breakdown plantar surface       Assessment/Plan:       Diabetic ulcer of toe of right foot associated with type 2 diabetes mellitus, with fat layer exposed  DM type 2, uncontrolled, with neuropathy  Neuropathic pain  Right shin cellulitis  -- Discoloration of right great toe that is cold to touch; Vascular Surg consult pending  -- 1 week right foot swelling and ulceration (from sanded callous of right great toe)  -- Calcification of arteries in right foot noted on Xray and ECHO; possible that small vessel calcification contributing to reduced blood flow.   -- DP/PT pulses barely palpable; picked up on doppler  -- LAILA 1.57 right, LAILA 1.70 left; calcification noted  -- Xray negative for underlying osteo, fracture  -- HBA1C 11.8; Lantus 15u QHS and Metformin; continue basal insulin QHS  -- Continue Gabapentin for diabetic neuropathic pain  -- Continue Vanc 15 mg/kg BID (for skin cellulitis and foot ulceration); Hx of right leg MRSA (2014)  -- Will need podiatry outpatient appt    Chronic back pain  -- Oxycodone 12hr for severe pain    Chronic  hepatitis C without hepatic coma  -- Continue Harvoni     Tobacco Abuse  -- Nicotine patch    Hyponatremia  -- Chronic; will follow-up on AM BMP  -- Could be related to meds (opiates)      VTE Risk Mitigation         Ordered     Medium Risk of VTE  Once      04/07/17 2228     Place DELVIS hose  Until discontinued      04/07/17 2228        Arturo Nj MD  Department of Hospital Medicine   [facility]

## 2017-04-08 NOTE — ANESTHESIA PREPROCEDURE EVALUATION
04/08/2017  Carlos Cifuentes is a 56 y.o., male who  has a past medical history of Arthritis; Bulging lumbar disc; C. difficile diarrhea; Chronic back pain; Diabetes mellitus type II; DM (diabetes mellitus), type 2, uncontrolled; Hepatitis C; MSSA (methicillin susceptible Staphylococcus aureus) septicemia; Neuromuscular disorder; Neuropathy; and Staph aureus infection who presents for:    Pre-operative evaluation for Procedure(s) (LRB):  AMPUTATION-TOE (Right)    Previous Anesthesia:  Placement Date: 12/18/14; Placement Time: 1216; Method of Intubation: Direct laryngoscopy; Inserted by: Anesthesia MD; Airway Device: Endotracheal Tube; Intubated: Postinduction; Blade: Moya #2; Airway Device Size: 7.5; Style: Cuffed; Cuff Inflation: Minimal occlusive pressure; Placement Verified By: Auscultation, Capnometry; Grade: Grade I; Complicating Factors: None; Intubation Findings: Positive EtCO2, Bilateral breath sounds, Atraumatic/Condition of teeth unchanged;  Depth of Insertion: 19; Securment: Lips; Complications: None; Insertion Attempts: 1; Removal Date: 12/18/14;  Removal Time: 1408    Diagnostic Studies:  4/7/17 Toe xray  Results:There is no significant change from prior.  There is continued scattered vascular calcifications.  No evidence for acute fracture line or dislocation right toes.  No focal bone erosion.  Clinical correlation and followup as warranted..    1/9/17 CXR  FINDINGS: AP portable view of the chest. Patient is slightly rotated.  Right-sided PICC line has been removed interval placement of left-sided PICC line with tip projected over the distal SVC.  Bibasal scattered linear opacities consistent with   subsegmental scarring versus atelectasis.  No large consolidation, new focal opacity or large pneumothorax.  Cardiomediastinal silhouette is stable.  No acute osseous process seen.    12/15/14  EKG:  Sinus tachycardia  Possible Left atrial enlargement  143BPM    12/18/14 2D Echo:  CONCLUSIONS     1 - Hyperdynamic left ventricular systolic function (EF 60-65%).     2 - Cannot fully r/o but no obvious vegetation    12/24/14 ADALBERTO  CONCLUSIONS     1 - Normal left ventricular systolic function (EF 55-60%).     2 - Trivial mitral regurgitation.     3 - Trivial to mild tricuspid regurgitation.     4 - No vegetations seen.     Access:         18G R AC       Patient Active Problem List   Diagnosis    Hepatosplenomegaly    Lumbar herniated disc - Large central disk extrusion at the L4-L5 level with significant central canal and bilateral neuroforaminal narrowing    Obesity    MSSA (methicillin susceptible Staphylococcus aureus) infection    DM type 2, uncontrolled, with neuropathy    Chronic back pain    Tinea cruris    Bilateral leg edema    Back pain    Cord compression    Pain of right thigh    Anemia of other chronic disease    Neuropathic pain    Primary insomnia    Generalized anxiety disorder    Anxiety disorder due to general medical condition    Diabetic polyneuropathy associated with type 2 diabetes mellitus    Infection of skin due to methicillin resistant Staphylococcus aureus (MRSA)    Neck muscle spasm    Cirrhosis of liver without ascites    Right knee pain    Chronic hepatitis C without hepatic coma    Diabetic ulcer of toe of right foot associated with type 2 diabetes mellitus, with fat layer exposed    Tobacco abuse    Cellulitis of right foot       Review of patient's allergies indicates:   Allergen Reactions    Codeine      Other reaction(s): Unknown        No current facility-administered medications on file prior to encounter.      Current Outpatient Prescriptions on File Prior to Encounter   Medication Sig Dispense Refill    blood sugar diagnostic (BLOOD GLUCOSE TEST) Strp Twice daily blood sugar checks 100 each 6    diazePAM (VALIUM) 2 MG tablet TAKE ONE TABLET BY  "MOUTH EVERY 8 HOURS AS NEEDED FOR ANXIETY **THANK YOU** 60 tablet 1    fentaNYL (DURAGESIC) 50 mcg/hr Place 1 patch onto the skin every 72 hours. 10 patch 0    gabapentin (NEURONTIN) 300 MG capsule Take 1 capsule (300 mg total) by mouth 3 (three) times daily. 180 capsule 4    insulin needles, disposable, (BD INSULIN PEN NEEDLE UF MINI) 31 x 3/16 " Ndle Inject 1 Units into the skin 3 (three) times daily with meals. 90 each 1    ledipasvir-sofosbuvir (HARVONI)  mg Tab Take 1 tablet by mouth once daily. 28 tablet 5    metformin (GLUCOPHAGE) 1000 MG tablet Take 1 tablet (1,000 mg total) by mouth 2 (two) times daily.  4    mupirocin calcium 2% (BACTROBAN) 2 % cream Apply to affected area 3 times daily 60 g 1    oxycodone (ROXICODONE) 10 mg Tab immediate release tablet Take 1 tablet (10 mg total) by mouth every 6 (six) hours as needed. (Patient taking differently: take one tablet every 3 hours as needed for pain) 120 tablet 0    potassium chloride SA (K-DUR,KLOR-CON) 10 MEQ tablet Take 1 tablet (10 mEq total) by mouth once daily as needed when experiencing leg/muscle cramps.  1    promethazine (PHENERGAN) 25 MG tablet Take 1 tablet (25 mg total) by mouth every 4 (four) hours. 15 tablet 0    silver sulfADIAZINE 1% (SILVADENE) 1 % cream Apply 1 application topically once daily. Apply to affected area 50 g 2    simvastatin (ZOCOR) 20 MG tablet Take 1 tablet (20 mg total) by mouth every evening. 90 tablet 3    trazodone (DESYREL) 100 MG tablet Take 1 tablet (100 mg total) by mouth every evening. 90 tablet 1    TRUE METRIX GLUCOSE METER Misc AS DIRECTED  0    insulin glargine (LANTUS SOLOSTAR) 100 unit/mL (3 mL) InPn pen Inject 15 Units into the skin every evening. 13.5 mL 3       Past Surgical History:   Procedure Laterality Date    APPENDECTOMY      left leg surgery      broken bone    LEG SURGERY  right        Social History     Social History    Marital status: Single     Spouse name: N/A    Number " of children: N/A    Years of education: N/A     Occupational History    Not on file.     Social History Main Topics    Smoking status: Current Some Day Smoker     Packs/day: 1.00     Types: Cigarettes    Smokeless tobacco: Never Used    Alcohol use No    Drug use: No    Sexual activity: Not on file     Other Topics Concern    Not on file     Social History Narrative         Vital Signs Range (Last 24H):  Temp:  [36.7 °C (98 °F)-37 °C (98.6 °F)]   Pulse:  [64-96]   Resp:  [18-20]   BP: ()/(50-75)   SpO2:  [95 %-97 %]       CBC:   Recent Labs      04/07/17   1258  04/07/17 2139   WBC  9.29  8.73   RBC  4.61  4.43*   HGB  12.8*  12.6*   HCT  37.6*  36.8*   PLT  215  176   MCV  82  83   MCH  27.8  28.4   MCHC  34.0  34.2       CMP:   Recent Labs      04/07/17 1258  04/07/17 2139   NA  131*  129*   K  5.3*  4.9   CL  99  97   CO2  21*  22*   BUN  29*  27*   CREATININE  1.3  1.2   GLU  295*  289*   CALCIUM  9.1  9.1   ALBUMIN  3.2*  2.9*   PROT  7.5  7.7   ALKPHOS  89  86   ALT  14  17   AST  16  15   BILITOT  0.4  0.4       INR  Recent Labs      04/07/17   1258  04/07/17 2139   INR  1.1  1.0           [facility] Anesthesia Evaluation    I have reviewed the Patient Summary Reports.     I have reviewed the Medications.     Review of Systems  Anesthesia Hx:  History of prior surgery of interest to airway management or planning:  Denies Personal Hx of Anesthesia complications.   Social:  Smoker, No Alcohol Use Chronic pain   Chronic opioids     Hematology/Oncology:     Oncology Normal    -- Anemia:   EENT/Dental:EENT/Dental Normal   Cardiovascular:   ECG has been reviewed.    Pulmonary:  Pulmonary Normal    Hepatic/GI:   Liver Disease, Hepatitis, C Hx of C. difficile diarrhea   Musculoskeletal:   Arthritis  Chronic pain back and thigh   Lumbar herniated disc - Large central disk extrusion at the L4-L5 level with significant central canal and bilateral neuroforaminal narrowing  Obesity  Cord  compression    Ischemic R great toe       Neurological:   Neuromuscular Disease,    Endocrine:   Diabetes, poorly controlled, type 2    Dermatological:   Hx of Staph aureus infection   Psych:  Psychiatric Normal           Physical Exam  General:  Well nourished    Airway/Jaw/Neck:  Airway Findings: Mouth Opening: Normal Tongue: Normal  General Airway Assessment: Adult  Mallampati: III  Improves to II with phonation.  TM Distance: Normal, at least 6 cm  Jaw/Neck Findings:  Neck ROM: Normal ROM      Dental:  Dental Findings: In tact, Periodontal disease, Mild    Chest/Lungs:  Chest/Lungs Findings: Normal Respiratory Rate     Heart/Vascular:  Heart Findings: Rate: Normal        Mental Status:  Mental Status Findings:  Cooperative, Alert and Oriented         Anesthesia Plan  Type of Anesthesia, risks & benefits discussed:  Anesthesia Type:  general  Patient's Preference:   Intra-op Monitoring Plan: standard ASA monitors  Intra-op Monitoring Plan Comments:   Post Op Pain Control Plan:   Post Op Pain Control Plan Comments:   Induction:   IV  Beta Blocker:  Patient is not currently on a Beta-Blocker (No further documentation required).       Informed Consent: Patient understands risks and agrees with Anesthesia plan.  Questions answered. Anesthesia consent signed with patient.  ASA Score: 4     Day of Surgery Review of History & Physical:    H&P update referred to the surgeon.     Anesthesia Plan Notes: Elevated K of 5.3        Ready For Surgery From Anesthesia Perspective.     Patient was seen and examined with the residents.  I agree with the assessment and plan outlined with the following changes/modifications    Anesthesia plan  Mac/ankle block     Darron Urbina II, MD  Department of anesthesiology   [facility]  Ext [phone]

## 2017-04-08 NOTE — ASSESSMENT & PLAN NOTE
MRI today shows evidence of early osteo in the R great toe.  -Amputation tomorrow per podiatry's assistance  -Gram stain from ulcer shows polymicrobial infection  -Continue vanc 15 mg/kg pending amputation w/ clear margins will continue for 48 hours postop  -Continue cipro 500 po BID  -BCx pending

## 2017-04-08 NOTE — SUBJECTIVE & OBJECTIVE
"Past Medical History:   Diagnosis Date    Arthritis     Bulging lumbar disc     C. difficile diarrhea     Diabetes mellitus type II     DM (diabetes mellitus), type 2, uncontrolled 3/12/2013    Hepatitis C 7/3/2013    Leukocytosis     MSSA (methicillin susceptible Staphylococcus aureus) septicemia     Neuromuscular disorder     Neuropathy     Staph aureus infection        Past Surgical History:   Procedure Laterality Date    APPENDECTOMY      left leg surgery      broken bone    LEG SURGERY  right        Review of patient's allergies indicates:   Allergen Reactions    Codeine      Other reaction(s): Unknown       Current Facility-Administered Medications on File Prior to Encounter   Medication    [DISCONTINUED] sodium chloride 0.9% bolus 1,000 mL     Current Outpatient Prescriptions on File Prior to Encounter   Medication Sig    blood sugar diagnostic (BLOOD GLUCOSE TEST) Strp Twice daily blood sugar checks    diazePAM (VALIUM) 2 MG tablet TAKE ONE TABLET BY MOUTH EVERY 8 HOURS AS NEEDED FOR ANXIETY **THANK YOU**    fentaNYL (DURAGESIC) 50 mcg/hr Place 1 patch onto the skin every 72 hours.    gabapentin (NEURONTIN) 300 MG capsule Take 1 capsule (300 mg total) by mouth 3 (three) times daily.    insulin needles, disposable, (BD INSULIN PEN NEEDLE UF MINI) 31 x 3/16 " Ndle Inject 1 Units into the skin 3 (three) times daily with meals.    ledipasvir-sofosbuvir (HARVONI)  mg Tab Take 1 tablet by mouth once daily.    metformin (GLUCOPHAGE) 1000 MG tablet Take 1 tablet (1,000 mg total) by mouth 2 (two) times daily.    mupirocin calcium 2% (BACTROBAN) 2 % cream Apply to affected area 3 times daily    oxycodone (ROXICODONE) 10 mg Tab immediate release tablet Take 1 tablet (10 mg total) by mouth every 6 (six) hours as needed. (Patient taking differently: take one tablet every 3 hours as needed for pain)    potassium chloride SA (K-DUR,KLOR-CON) 10 MEQ tablet Take 1 tablet (10 mEq total) by " mouth once daily as needed when experiencing leg/muscle cramps.    promethazine (PHENERGAN) 25 MG tablet Take 1 tablet (25 mg total) by mouth every 4 (four) hours.    silver sulfADIAZINE 1% (SILVADENE) 1 % cream Apply 1 application topically once daily. Apply to affected area    simvastatin (ZOCOR) 20 MG tablet Take 1 tablet (20 mg total) by mouth every evening.    trazodone (DESYREL) 100 MG tablet Take 1 tablet (100 mg total) by mouth every evening.    TRUE METRIX GLUCOSE METER Misc AS DIRECTED    insulin glargine (LANTUS SOLOSTAR) 100 unit/mL (3 mL) InPn pen Inject 15 Units into the skin every evening.    [DISCONTINUED] lactulose (CHRONULAC) 10 gram/15 mL solution Take 30 mLs (20 g total) by mouth once daily.    [DISCONTINUED] pravastatin (PRAVACHOL) 20 MG tablet Take 1 tablet (20 mg total) by mouth every evening.     Family History     Problem Relation (Age of Onset)    Arthritis Mother, Sister, Brother    Diabetes Sister        Social History Main Topics    Smoking status: Current Some Day Smoker     Packs/day: 1.00     Types: Cigarettes    Smokeless tobacco: Never Used    Alcohol use No    Drug use: No    Sexual activity: Not on file     Review of Systems   Constitutional: Negative for chills, fatigue and fever.   HENT: Negative for facial swelling.    Respiratory: Negative for cough, shortness of breath and wheezing.    Cardiovascular: Positive for leg swelling. Negative for chest pain.   Gastrointestinal: Negative for abdominal pain.   Genitourinary: Negative for dysuria and urgency.   Musculoskeletal: Positive for back pain. Negative for joint swelling.   Skin: Positive for rash.        Rt great toe color change   Neurological: Positive for numbness.   Hematological: Negative for adenopathy. Does not bruise/bleed easily.   Psychiatric/Behavioral: Negative for agitation.     Objective:     Vital Signs (Most Recent):  Temp: 98.6 °F (37 °C) (04/07/17 1945)  Pulse: 96 (04/07/17 1945)  Resp: 18  (04/07/17 1945)  BP: 129/75 (04/07/17 1945)  SpO2: 97 % (04/07/17 2246) Vital Signs (24h Range):  Temp:  [97.7 °F (36.5 °C)-98.6 °F (37 °C)] 98.6 °F (37 °C)  Pulse:  [80-96] 96  Resp:  [17-18] 18  SpO2:  [96 %-99 %] 97 %  BP: (128-130)/(72-75) 129/75     Weight: 109.5 kg (241 lb 6.5 oz)  Body mass index is 35.65 kg/(m^2).    Physical Exam   Constitutional: He is oriented to person, place, and time. He appears well-developed and well-nourished.   HENT:   Head: Normocephalic and atraumatic.   Eyes: EOM are normal. Pupils are equal, round, and reactive to light.   Neck: Normal range of motion. Neck supple.   Cardiovascular: Normal rate and regular rhythm.    Pulmonary/Chest: Effort normal and breath sounds normal.   Abdominal: Soft. Bowel sounds are normal.   Musculoskeletal: Normal range of motion.   Neurological: He is alert and oriented to person, place, and time.   Decreased sensation feet bilaterally  DP/PT 1+   Skin: Skin is warm and dry.   Rt great toe color change  Skin breakdown plantar surface

## 2017-04-08 NOTE — ED NOTES
LOC: The patient is awake, alert, and oriented to place, time, situation. Affect is appropriate.  Speech is appropriate and clear.     APPEARANCE: Patient resting comfortably in no acute distress.  Patient is clean and well groomed.    SKIN: The skin is warm and dry; color consistent with ethnicity.  Patient has normal skin turgor and moist mucus membranes.  Skin intact; no breakdown or bruising noted. Bluish discoloration with pallor, swelling noted to the left great toe.  Unable to obtain capillary refill; unable to obtain pedal pulse.  Wound noted to the palmar aspect of the right foot.    MUSCULOSKELETAL: Patient moving upper and lower extremities without difficulty.  Denies weakness.     RESPIRATORY: Airway is open and patent. Respirations spontaneous, even, easy, and non-labored.  Patient has a normal effort and rate.  No accessory muscle use noted. Denies cough.     CARDIAC:  Normal rhythm and rate noted.  No peripheral edema noted. No complaints of chest pain.      ABDOMEN: Soft and non tender to palpation.  No distention noted.     NEUROLOGIC: Eyes open spontaneously.  Behavior appropriate to situation.  Follows commands; facial expression symmetrical.  Purposeful motor response noted; normal sensation in all extremities.

## 2017-04-09 ENCOUNTER — ANESTHESIA (OUTPATIENT)
Dept: SURGERY | Facility: HOSPITAL | Age: 57
DRG: 617 | End: 2017-04-09
Payer: MEDICAID

## 2017-04-09 PROBLEM — M86.9 OSTEOMYELITIS OF RIGHT FOOT: Status: ACTIVE | Noted: 2017-04-08

## 2017-04-09 LAB
ANION GAP SERPL CALC-SCNC: 11 MMOL/L
BASOPHILS # BLD AUTO: 0.02 K/UL
BASOPHILS NFR BLD: 0.3 %
BUN SERPL-MCNC: 18 MG/DL
CALCIUM SERPL-MCNC: 8.7 MG/DL
CHLORIDE SERPL-SCNC: 98 MMOL/L
CO2 SERPL-SCNC: 20 MMOL/L
CREAT SERPL-MCNC: 1 MG/DL
DIFFERENTIAL METHOD: ABNORMAL
EOSINOPHIL # BLD AUTO: 0.1 K/UL
EOSINOPHIL NFR BLD: 1 %
ERYTHROCYTE [DISTWIDTH] IN BLOOD BY AUTOMATED COUNT: 14 %
EST. GFR  (AFRICAN AMERICAN): >60 ML/MIN/1.73 M^2
EST. GFR  (NON AFRICAN AMERICAN): >60 ML/MIN/1.73 M^2
GLUCOSE SERPL-MCNC: 248 MG/DL
HCT VFR BLD AUTO: 33.1 %
HGB BLD-MCNC: 11.3 G/DL
LYMPHOCYTES # BLD AUTO: 1.5 K/UL
LYMPHOCYTES NFR BLD: 19.5 %
MAGNESIUM SERPL-MCNC: 1.3 MG/DL
MCH RBC QN AUTO: 28.3 PG
MCHC RBC AUTO-ENTMCNC: 34.1 %
MCV RBC AUTO: 83 FL
MONOCYTES # BLD AUTO: 0.6 K/UL
MONOCYTES NFR BLD: 7.6 %
NEUTROPHILS # BLD AUTO: 5.6 K/UL
NEUTROPHILS NFR BLD: 71.3 %
PLATELET # BLD AUTO: 183 K/UL
PMV BLD AUTO: 10.8 FL
POCT GLUCOSE: 243 MG/DL (ref 70–110)
POCT GLUCOSE: 253 MG/DL (ref 70–110)
POCT GLUCOSE: 260 MG/DL (ref 70–110)
POCT GLUCOSE: 345 MG/DL (ref 70–110)
POCT GLUCOSE: 484 MG/DL (ref 70–110)
POTASSIUM SERPL-SCNC: 4.4 MMOL/L
RBC # BLD AUTO: 4 M/UL
SODIUM SERPL-SCNC: 129 MMOL/L
WBC # BLD AUTO: 7.8 K/UL

## 2017-04-09 PROCEDURE — 25000003 PHARM REV CODE 250: Performed by: STUDENT IN AN ORGANIZED HEALTH CARE EDUCATION/TRAINING PROGRAM

## 2017-04-09 PROCEDURE — 63600175 PHARM REV CODE 636 W HCPCS: Performed by: INTERNAL MEDICINE

## 2017-04-09 PROCEDURE — 25000003 PHARM REV CODE 250: Performed by: INTERNAL MEDICINE

## 2017-04-09 PROCEDURE — 99233 SBSQ HOSP IP/OBS HIGH 50: CPT | Mod: ,,, | Performed by: INTERNAL MEDICINE

## 2017-04-09 PROCEDURE — 11000001 HC ACUTE MED/SURG PRIVATE ROOM

## 2017-04-09 PROCEDURE — 80048 BASIC METABOLIC PNL TOTAL CA: CPT

## 2017-04-09 PROCEDURE — 99233 SBSQ HOSP IP/OBS HIGH 50: CPT | Mod: 57,,, | Performed by: PODIATRIST

## 2017-04-09 PROCEDURE — 99232 SBSQ HOSP IP/OBS MODERATE 35: CPT | Mod: ,,, | Performed by: HOSPITALIST

## 2017-04-09 PROCEDURE — 63600175 PHARM REV CODE 636 W HCPCS: Performed by: STUDENT IN AN ORGANIZED HEALTH CARE EDUCATION/TRAINING PROGRAM

## 2017-04-09 PROCEDURE — 85025 COMPLETE CBC W/AUTO DIFF WBC: CPT

## 2017-04-09 PROCEDURE — 83735 ASSAY OF MAGNESIUM: CPT

## 2017-04-09 PROCEDURE — 36415 COLL VENOUS BLD VENIPUNCTURE: CPT

## 2017-04-09 RX ORDER — CLOPIDOGREL BISULFATE 75 MG/1
75 TABLET ORAL DAILY
Status: DISCONTINUED | OUTPATIENT
Start: 2017-04-09 | End: 2017-04-15 | Stop reason: HOSPADM

## 2017-04-09 RX ORDER — MAGNESIUM SULFATE HEPTAHYDRATE 40 MG/ML
2 INJECTION, SOLUTION INTRAVENOUS ONCE
Status: COMPLETED | OUTPATIENT
Start: 2017-04-09 | End: 2017-04-09

## 2017-04-09 RX ADMIN — OXYCODONE HYDROCHLORIDE 10 MG: 10 TABLET, FILM COATED, EXTENDED RELEASE ORAL at 08:04

## 2017-04-09 RX ADMIN — SIMVASTATIN 20 MG: 20 TABLET, FILM COATED ORAL at 08:04

## 2017-04-09 RX ADMIN — INSULIN ASPART 1 UNITS: 100 INJECTION, SOLUTION INTRAVENOUS; SUBCUTANEOUS at 08:04

## 2017-04-09 RX ADMIN — TRAZODONE HYDROCHLORIDE 100 MG: 50 TABLET ORAL at 08:04

## 2017-04-09 RX ADMIN — VANCOMYCIN HYDROCHLORIDE 1750 MG: 1 INJECTION, POWDER, LYOPHILIZED, FOR SOLUTION INTRAVENOUS at 09:04

## 2017-04-09 RX ADMIN — LEDIPASVIR AND SOFOSBUVIR 1 TABLET: 90; 400 TABLET, FILM COATED ORAL at 09:04

## 2017-04-09 RX ADMIN — GABAPENTIN 300 MG: 300 CAPSULE ORAL at 08:04

## 2017-04-09 RX ADMIN — VANCOMYCIN HYDROCHLORIDE 1750 MG: 1 INJECTION, POWDER, LYOPHILIZED, FOR SOLUTION INTRAVENOUS at 11:04

## 2017-04-09 RX ADMIN — MAGNESIUM SULFATE IN WATER 2 G: 40 INJECTION, SOLUTION INTRAVENOUS at 01:04

## 2017-04-09 RX ADMIN — CIPROFLOXACIN HYDROCHLORIDE 500 MG: 500 TABLET, FILM COATED ORAL at 08:04

## 2017-04-09 RX ADMIN — INSULIN ASPART 2 UNITS: 100 INJECTION, SOLUTION INTRAVENOUS; SUBCUTANEOUS at 09:04

## 2017-04-09 RX ADMIN — CLOPIDOGREL 75 MG: 75 TABLET, FILM COATED ORAL at 03:04

## 2017-04-09 RX ADMIN — INSULIN ASPART 4 UNITS: 100 INJECTION, SOLUTION INTRAVENOUS; SUBCUTANEOUS at 06:04

## 2017-04-09 RX ADMIN — ASPIRIN 81 MG: 81 TABLET, COATED ORAL at 08:04

## 2017-04-09 RX ADMIN — GABAPENTIN 300 MG: 300 CAPSULE ORAL at 03:04

## 2017-04-09 RX ADMIN — OXYCODONE HYDROCHLORIDE 10 MG: 5 TABLET ORAL at 01:04

## 2017-04-09 RX ADMIN — GABAPENTIN 300 MG: 300 CAPSULE ORAL at 05:04

## 2017-04-09 RX ADMIN — OXYCODONE HYDROCHLORIDE 10 MG: 5 TABLET ORAL at 03:04

## 2017-04-09 RX ADMIN — INSULIN ASPART 3 UNITS: 100 INJECTION, SOLUTION INTRAVENOUS; SUBCUTANEOUS at 12:04

## 2017-04-09 NOTE — PROGRESS NOTES
Progress Note  Podiatry      SUBJECTIVE     History of Present Illness:  Carlos Cifuentes is a 56 y.o. male who  has a past medical history of Arthritis; Bulging lumbar disc; C. difficile diarrhea; Chronic back pain; Diabetes mellitus type II; DM (diabetes mellitus), type 2, uncontrolled; Hepatitis C; MSSA (methicillin susceptible Staphylococcus aureus) septicemia; Neuromuscular disorder; Neuropathy; and Staph aureus infection.     Patient was consulted to podiatry for a right great toe ulcer with ischemic changes to the toe. Patient reports the ulcer started about 2 weeks ago when he was filing a callus down and picked at some skin causing some bleeding. The ulcer progressively worsened and over the last 2-3 days he has noticed the toe changing colors. Denies f/c/n/v. There is moderate reported pain to the area especially with pressure.    Interval History  4/9/17: Patient was seen bedside in [facility]. Reports no pain the right foot. IV came out overnight so he has not gotten his IV antibiotics since last night. No other acute changes.  Also went outside to smoke.  States he was locked out of the hospital.      Scheduled Meds:   aspirin  81 mg Oral Daily    ciprofloxacin HCl  500 mg Oral Q12H    gabapentin  300 mg Oral TID    insulin detemir  10 Units Subcutaneous QHS    ledipasvir-sofosbuvir  1 tablet Oral Daily    magnesium sulfate IVPB  2 g Intravenous Once    nicotine  1 patch Transdermal Daily    simvastatin  20 mg Oral QHS    trazodone  100 mg Oral QHS    vancomycin (VANCOCIN) IVPB  15 mg/kg Intravenous Q12H     Continuous Infusions:   PRN Meds:dextrose 50%, dextrose 50%, glucagon (human recombinant), glucose, glucose, insulin aspart, oxycodone, oxycodone, promethazine    Review of patient's allergies indicates:   Allergen Reactions    Codeine      Other reaction(s): Unknown        Past Medical History:   Diagnosis Date    Arthritis     Bulging lumbar disc     C. difficile diarrhea     Chronic  back pain 10/14/2014    Diabetes mellitus type II     DM (diabetes mellitus), type 2, uncontrolled 3/12/2013    Hepatitis C 7/3/2013    MSSA (methicillin susceptible Staphylococcus aureus) septicemia     Neuromuscular disorder     Neuropathy     Staph aureus infection      Past Surgical History:   Procedure Laterality Date    APPENDECTOMY      left leg surgery      broken bone    LEG SURGERY  right      Family History   Problem Relation Age of Onset    Arthritis Mother     Arthritis Sister     Diabetes Sister     Arthritis Brother      Social History   Substance Use Topics    Smoking status: Current Some Day Smoker     Packs/day: 1.00     Types: Cigarettes    Smokeless tobacco: Never Used    Alcohol use No       Review of Systems:  Constitutional: no fever or chills  Respiratory: no cough or shortness of breath  Cardiovascular: no chest pain or palpitations  Gastrointestinal: no nausea or vomiting, tolerating diet  Musculoskeletal: no arthralgias or myalgias  Neurological: history of numbness or paresthesias in the feet    OBJECTIVE     Vital Signs (Most Recent):  Temp: 97.6 °F (36.4 °C) (04/09/17 0900)  Pulse: 65 (04/09/17 0900)  Resp: 16 (04/09/17 0900)  BP: (!) 114/57 (04/09/17 0900)  SpO2: 97 % (04/09/17 0900)    Vital Signs Range (Last 24H):  Temp:  [97.6 °F (36.4 °C)-98.1 °F (36.7 °C)]   Pulse:  [65-78]   Resp:  [16-18]   BP: (104-147)/(57-67)   SpO2:  [96 %-97 %]     Physical Exam:  General: Oriented to person, place, time, and situation. No acute distress.  Vascular: DP and PT pulses are 1+ bilaterally with biphasic signals on doppler. CRT<3 seconds to digits 2-5 right and 1-5 left, right hallux CRT absent. Moderate diffuse pedal edema to the right foot.  Musculoskeletal: Equinus noted b/l ankles with < 10 deg DF noted.   Neuro: Protective sensation absent bilateral feet.   Derm: No open lesions, lacerations or wounds noted left foot.     Right hallux is cyanotic to the MTPJ    Ulcer  Location: right plantar hallux  Measurements: 1.0x0.6x0.3 with proximal tracking about 5-7 o'clock about 3 cm  Periwound: Intact  Drainage: serosanguinous.  Malodor: Present  Base:  Necrotic base, probes to bone  Signs of infection: Malodorous drainage, necrosis, erythema, edema.                Laboratory:  CBC:     Recent Labs  Lab 04/09/17  0359   WBC 7.80   RBC 4.00*   HGB 11.3*   HCT 33.1*      MCV 83   MCH 28.3   MCHC 34.1     CMP:     Recent Labs  Lab 04/07/17  2139 04/09/17  0359   * 248*   CALCIUM 9.1 8.7   ALBUMIN 2.9*  --    PROT 7.7  --    * 129*   K 4.9 4.4   CO2 22* 20*   CL 97 98   BUN 27* 18   CREATININE 1.2 1.0   ALKPHOS 86  --    ALT 17  --    AST 15  --    BILITOT 0.4  --      ESR:     Recent Labs  Lab 04/08/17  0513   SEDRATE 90*     CRP: No results for input(s): CRP in the last 168 hours.  Microbiology Results (last 7 days)     Procedure Component Value Units Date/Time    Aerobic culture [[id number]] Collected:  04/08/17 0854    Order Status:  Completed Specimen:  Wound from Toe, Right Foot Updated:  04/09/17 0948     Aerobic Bacterial Culture --     GRAM NEGATIVE AHMET  Moderate  Identification and susceptibility pending       Aerobic Bacterial Culture --     DIPHTHEROIDS  Moderate      Blood culture #1 **CANNOT BE ORDERED STAT** [[id number]] Collected:  04/07/17 2053    Order Status:  Completed Specimen:  Blood from Peripheral, Antecubital, Right Updated:  04/09/17 0613     Blood Culture, Routine No Growth to date     Blood Culture, Routine No Growth to date    Blood culture #2 **CANNOT BE ORDERED STAT** [[id number]] Collected:  04/07/17 2116    Order Status:  Completed Specimen:  Blood from Peripheral, Antecubital, Left Updated:  04/08/17 2222     Blood Culture, Routine No Growth to date     Blood Culture, Routine No Growth to date    Gram stain [[id number]] Collected:  04/08/17 0854    Order Status:  Completed Specimen:  Wound from Toe, Right Foot Updated:  04/08/17 1107      Gram Stain Result No WBC's      Moderate Gram positive cocci      Few Gram negative rods    Culture, Anaerobe [[id number]] Collected:  04/08/17 0854    Order Status:  Sent Specimen:  Wound from Toe, Right Foot Updated:  04/08/17 0933          Diagnostic Results:  X-Ray: reviewed  MRI: pending      ASSESSMENT/PLAN     Assessment:  -DM II with neuropathy  -Diabetic ulcer right foot with associated ischemia and cyanosis to the right hallux  -Cellulitis/abscess    Plan:  -Plan was for amputation this morning, case was pushed back several times, we rescheduled the case for tomorrow morning as an add on as early as possible.   Patient also has no iv access.  Missed two doses of Vancomycin  -Consent in patient chart  -NPO at midnight   -Vascular surgery on board to optimize circulation through intervention if needed, plan for angiogram on Tuesday, appreciate their help.  -Cultures taken at bedside growing Diptheroids and gram negative amaya with susceptibility pending, more cultures will be taken intraoperatively. Appreciate ID recommendations, needs to get IV access again asap to continue IV antibiotics. ID to discuss this with primary team.  -Case set for tomorrow as add on as early as possible, he will need to be NPO at midnight.  Appreciate the consult, please call on call podiatry for questions or concerns.    Kanu Kevin DPM PGY-3  Pager [phone]    Resident Supervision:  I have personally taken the history and examined this patient and agree with the resident's note as stated above.   Alexia Li DPM

## 2017-04-09 NOTE — PROGRESS NOTES
Progress Note  Vascular Surgery    Admit Date: 4/7/2017   Hospital Day: 3    SUBJECTIVE:     Follow-up For:  Procedure(s) (LRB):  AMPUTATION-TOE (Right)    No acute issues overnight; surgery with podiatry postponed until tomorrow AM.    Scheduled Meds:   aspirin  81 mg Oral Daily    ciprofloxacin HCl  500 mg Oral Q12H    gabapentin  300 mg Oral TID    insulin detemir  10 Units Subcutaneous QHS    ledipasvir-sofosbuvir  1 tablet Oral Daily    magnesium sulfate IVPB  2 g Intravenous Once    nicotine  1 patch Transdermal Daily    simvastatin  20 mg Oral QHS    trazodone  100 mg Oral QHS    vancomycin (VANCOCIN) IVPB  15 mg/kg Intravenous Q12H     Continuous Infusions:   custom IV infusion builder       PRN Meds:dextrose 50%, dextrose 50%, glucagon (human recombinant), glucose, glucose, insulin aspart, oxycodone, oxycodone, promethazine    Review of patient's allergies indicates:   Allergen Reactions    Codeine      Other reaction(s): Unknown           OBJECTIVE:     Vital Signs (Most Recent)  Temp: 98.3 °F (36.8 °C) (04/09/17 1212)  Pulse: 88 (04/09/17 1212)  Resp: 18 (04/09/17 1212)  BP: 122/69 (04/09/17 1212)  SpO2: 95 % (04/09/17 1212)    Vital Signs Range (Last 24H):  Temp:  [97.6 °F (36.4 °C)-98.3 °F (36.8 °C)]   Pulse:  [65-88]   Resp:  [16-18]   BP: (114-147)/(57-69)   SpO2:  [95 %-97 %]     I & O (Last 24H):  Intake/Output Summary (Last 24 hours) at 04/09/17 1224  Last data filed at 04/08/17 1828   Gross per 24 hour   Intake              800 ml   Output                0 ml   Net              800 ml     Physical Exam:  General: well developed, no distress, morbidly obese  Head: normocephalic  Extremities: right great toe with ischemia; 1+ edema right lower extremity  Pulses: right femoral 1+ pulse; left femoral very weak 1+ pulse; nonpalpable pedal pulses bilaterally  Neurologic: Alert and oriented. Thought content appropriate  No focal numbness or weakness      Laboratory:  CBC:   Recent Labs  Lab  04/09/17  0359   WBC 7.80   RBC 4.00*   HGB 11.3*   HCT 33.1*      MCV 83   MCH 28.3   MCHC 34.1     CMP:   Recent Labs  Lab 04/07/17 2139 04/09/17 0359   * 248*   CALCIUM 9.1 8.7   ALBUMIN 2.9*  --    PROT 7.7  --    * 129*   K 4.9 4.4   CO2 22* 20*   CL 97 98   BUN 27* 18   CREATININE 1.2 1.0   ALKPHOS 86  --    ALT 17  --    AST 15  --    BILITOT 0.4  --      Coagulation:   Recent Labs  Lab 04/07/17 2139   INR 1.0     Labs within the past 24 hours have been reviewed.    Diagnostic Results:      ASSESSMENT/PLAN:     Assessment: Patient is a 56 y.o. male with h/o DM2 (Hgb A1c 10.2), HTN, HLD, hep C, long term tobacco use (1 ppd x44 years) presents with right great toe infected ulceration and ischemia of right great toe.    Plan:   Will obtain CTA abd/pelvis with run off to assess iliac system secondary to weakly palpable femoral pulses (left worse than right); sodium bicarb gtt ordered for renal protection creatinine 1.0 today  Recommend ECHO to assess cardiac function; ECHO from 2014 with normal EF  Will order US AAA, carotid and LAILA with PVR  Plan for RLE angiogram Tuesday; please continue ASA daily and start plavix 75 mg po daily prior to procedure  Continue diabetes optimization  Continue renal optimization  Vascular surgery to follow    Anusha Vera DO  PGY-6, Vascular fellow   [phone]    Vascular Attending  Agree with above  Has a weak L femoral pulse  Long d/w pt and family re- need for tobacco cessation, glycemic control, compliance w meds  Dietician consult  DAPT, statin rx  Fu CTA after ivfs  Echo  Emil Edmonds MD FACS

## 2017-04-09 NOTE — SUBJECTIVE & OBJECTIVE
Past Medical History:   Diagnosis Date    Arthritis     Bulging lumbar disc     C. difficile diarrhea     Chronic back pain 10/14/2014    Diabetes mellitus type II     DM (diabetes mellitus), type 2, uncontrolled 3/12/2013    Hepatitis C 7/3/2013    MSSA (methicillin susceptible Staphylococcus aureus) septicemia     Neuromuscular disorder     Neuropathy     Staph aureus infection        Past Surgical History:   Procedure Laterality Date    APPENDECTOMY      left leg surgery      broken bone    LEG SURGERY  right        Review of patient's allergies indicates:   Allergen Reactions    Codeine      Other reaction(s): Unknown       Medications:  Prescriptions Prior to Admission   Medication Sig    blood sugar diagnostic (BLOOD GLUCOSE TEST) Strp Twice daily blood sugar checks (Patient taking differently: Test  blood sugar four times daily)    diazePAM (VALIUM) 2 MG tablet TAKE ONE TABLET BY MOUTH EVERY 8 HOURS AS NEEDED FOR ANXIETY **THANK YOU**    docusate sodium (COLACE) 100 MG capsule Take 100 mg by mouth as needed for Constipation.    fentaNYL (DURAGESIC) 50 mcg/hr Place 1 patch onto the skin every 72 hours.    gabapentin (NEURONTIN) 300 MG capsule Take 1 capsule (300 mg total) by mouth 3 (three) times daily.    ledipasvir-sofosbuvir (HARVONI)  mg Tab Take 1 tablet by mouth once daily.    metformin (GLUCOPHAGE) 1000 MG tablet Take 1 tablet (1,000 mg total) by mouth 2 (two) times daily.    mupirocin calcium 2% (BACTROBAN) 2 % cream Apply to affected area 3 times daily    oxycodone (ROXICODONE) 10 mg Tab immediate release tablet Take 1 tablet (10 mg total) by mouth every 6 (six) hours as needed. (Patient taking differently: take one tablet every 3 hours as needed for pain)    potassium chloride SA (K-DUR,KLOR-CON) 10 MEQ tablet Take 1 tablet (10 mEq total) by mouth once daily as needed when experiencing leg/muscle cramps.    promethazine (PHENERGAN) 25 MG tablet Take 1 tablet (25 mg  total) by mouth every 4 (four) hours. (Patient taking differently: Take 25 mg by mouth every 4 (four) hours as needed for Nausea. )    silver sulfADIAZINE 1% (SILVADENE) 1 % cream Apply 1 application topically once daily. Apply to affected area    simvastatin (ZOCOR) 20 MG tablet Take 1 tablet (20 mg total) by mouth every evening.    trazodone (DESYREL) 100 MG tablet Take 1 tablet (100 mg total) by mouth every evening.    TRUE METRIX GLUCOSE METER Misc AS DIRECTED    insulin glargine (LANTUS SOLOSTAR) 100 unit/mL (3 mL) InPn pen Inject 15 Units into the skin every evening.     Antibiotics     Start     Stop Route Frequency Ordered    04/08/17 1000  vancomycin (VANCOCIN) 1,750 mg in dextrose 5 % 500 mL IVPB      -- IV Every 12 hours (non-standard times) 04/08/17 0126    04/08/17 1115  ciprofloxacin HCl tablet 500 mg      -- Oral Every 12 hours 04/08/17 1003        Antifungals     None        Antivirals         Stop Route Frequency     ledipasvir-sofosbuvir      -- Oral Daily           Immunization History   Administered Date(s) Administered    Pneumococcal Polysaccharide - 23 Valent 08/21/2013       Family History     Problem Relation (Age of Onset)    Arthritis Mother, Sister, Brother    Diabetes Sister        Social History     Social History    Marital status: Single     Spouse name: N/A    Number of children: N/A    Years of education: N/A     Social History Main Topics    Smoking status: Current Some Day Smoker     Packs/day: 1.00     Types: Cigarettes    Smokeless tobacco: Never Used    Alcohol use No    Drug use: No    Sexual activity: Not Asked     Other Topics Concern    None     Social History Narrative     Review of Systems   Constitutional: Negative for chills and fever.   Respiratory: Negative for cough and shortness of breath.    Cardiovascular: Positive for leg swelling. Negative for chest pain.   Gastrointestinal: Negative for abdominal pain, constipation, diarrhea, nausea and vomiting.    Genitourinary: Negative for dysuria, frequency and hematuria.   Musculoskeletal: Positive for arthralgias and joint swelling. Negative for back pain and myalgias.   Skin: Positive for color change and wound. Negative for rash.   Neurological: Negative for dizziness, weakness, light-headedness, numbness and headaches.   Psychiatric/Behavioral: Negative for agitation and behavioral problems. The patient is not nervous/anxious.      Objective:     Vital Signs (Most Recent):  Temp: 97.6 °F (36.4 °C) (04/09/17 0900)  Pulse: 65 (04/09/17 0900)  Resp: 16 (04/09/17 0900)  BP: (!) 114/57 (04/09/17 0900)  SpO2: 97 % (04/09/17 0900) Vital Signs (24h Range):  Temp:  [97.6 °F (36.4 °C)-97.9 °F (36.6 °C)] 97.6 °F (36.4 °C)  Pulse:  [65-78] 65  Resp:  [16-18] 16  SpO2:  [96 %-97 %] 97 %  BP: (114-147)/(57-67) 114/57     Weight: 109.5 kg (241 lb 6.5 oz)  Body mass index is 35.65 kg/(m^2).    Estimated Creatinine Clearance: 101.8 mL/min (based on Cr of 1).    Physical Exam   Constitutional: He is oriented to person, place, and time. He appears well-developed and well-nourished. No distress.   Cardiovascular: Normal rate and regular rhythm.    No murmur heard.  Pulmonary/Chest: Effort normal and breath sounds normal. No respiratory distress.   Abdominal: Soft. Bowel sounds are normal. He exhibits no distension.   Musculoskeletal: Normal range of motion. He exhibits no edema.   Neurological: He is alert and oriented to person, place, and time.   Skin: Skin is warm and dry.   Right foot wound dressed; erythema, swelling noted to the right lower extremity   Psychiatric: He has a normal mood and affect. His behavior is normal.       Significant Labs:   Blood Culture:   Recent Labs  Lab 04/07/17 2053 04/07/17  2116   LABBLOO No Growth to date  No Growth to date No Growth to date  No Growth to date     CBC:   Recent Labs  Lab 04/07/17  1258 04/07/17  2139 04/09/17  0359   WBC 9.29 8.73 7.80   HGB 12.8* 12.6* 11.3*   HCT 37.6* 36.8*  33.1*    176 183     CMP:   Recent Labs  Lab 04/07/17  1258 04/07/17  2139 04/09/17  0359   * 129* 129*   K 5.3* 4.9 4.4   CL 99 97 98   CO2 21* 22* 20*   * 289* 248*   BUN 29* 27* 18   CREATININE 1.3 1.2 1.0   CALCIUM 9.1 9.1 8.7   PROT 7.5 7.7  --    ALBUMIN 3.2* 2.9*  --    BILITOT 0.4 0.4  --    ALKPHOS 89 86  --    AST 16 15  --    ALT 14 17  --    ANIONGAP 11 10 11   EGFRNONAA >60 >60.0 >60.0       Significant Imaging: I have reviewed all pertinent imaging results/findings within the past 24 hours.

## 2017-04-09 NOTE — SUBJECTIVE & OBJECTIVE
Interval History:  OR w/ podiatry deferred today to tomorrow morning.  Touched base with vascular surgery who is still planning on LE angiogram w/ revascularization this Tuesday.  Spoke with the patient at some length this afternoon re: why revascualrization is necessary; he expressed strong reservations 2/2 to prior experience w/ surgery during prior hospitalization.  After our discussion, he stated he understood why he needed the operation and stated he had no further questions.    Review of Systems   Constitutional: Negative for chills and fever.   Respiratory: Negative for cough and shortness of breath.    Cardiovascular: Negative for chest pain and palpitations.   Gastrointestinal: Negative for constipation, diarrhea, nausea and vomiting.     Objective:     Vital Signs (Most Recent):  Temp: 99 °F (37.2 °C) (04/09/17 1548)  Pulse: 82 (04/09/17 1548)  Resp: 16 (04/09/17 1548)  BP: 134/67 (04/09/17 1548)  SpO2: 96 % (04/09/17 1548) Vital Signs (24h Range):  Temp:  [97.6 °F (36.4 °C)-99 °F (37.2 °C)] 99 °F (37.2 °C)  Pulse:  [65-88] 82  Resp:  [16-18] 16  SpO2:  [95 %-97 %] 96 %  BP: (114-147)/(57-69) 134/67     Weight: 109.5 kg (241 lb 6.5 oz)  Body mass index is 35.65 kg/(m^2).    Intake/Output Summary (Last 24 hours) at 04/09/17 1731  Last data filed at 04/08/17 1828   Gross per 24 hour   Intake              800 ml   Output                0 ml   Net              800 ml      Physical Exam   Constitutional: He is oriented to person, place, and time. No distress.   Obese M sitting up in bed in NAD   Cardiovascular: Normal rate, regular rhythm and intact distal pulses.  Exam reveals no gallop and no friction rub.    No murmur heard.  Pulmonary/Chest: Breath sounds normal. No respiratory distress. He has no wheezes. He has no rhonchi. He has no rales.   Abdominal: Soft. Bowel sounds are normal. He exhibits no distension. There is no tenderness.   Neurological: He is alert and oriented to person, place, and time.        Significant Labs:   CBC:   Recent Labs  Lab 04/07/17 2139 04/09/17  0359   WBC 8.73 7.80   HGB 12.6* 11.3*   HCT 36.8* 33.1*    183     CMP:   Recent Labs  Lab 04/07/17 2139 04/09/17 0359   * 129*   K 4.9 4.4   CL 97 98   CO2 22* 20*   * 248*   BUN 27* 18   CREATININE 1.2 1.0   CALCIUM 9.1 8.7   PROT 7.7  --    ALBUMIN 2.9*  --    BILITOT 0.4  --    ALKPHOS 86  --    AST 15  --    ALT 17  --    ANIONGAP 10 11   EGFRNONAA >60.0 >60.0     POCT Glucose:   Recent Labs  Lab 04/08/17  1915 04/09/17  0734 04/09/17  1219   POCTGLUCOSE 186* 243* 260*

## 2017-04-09 NOTE — ANESTHESIA PREPROCEDURE EVALUATION
04/08/2017  Pre-operative evaluation for Procedure(s) (LRB):  ANGIOGRAM-EXTREMITY-LOWER (Right)    Carlos Cifuentes is a 56 y.o. male hx of DMII (on insulin), PAD, being treated for subacute osteomyelitis of right foot (amputation pending), chronic hep c presenting for above procedure.      Airway: Direct laryngoscopy; Inserted by: CRNA; Airway Device: Endotracheal Tube; Mask Ventilation: Easy; Intubated: Postinduction; Blade: Moya #2; Airway Device Size: 8.0; Style: Cuffed; Cuff Inflation: Minimal occlusive pressure; Placement Verified By: Auscultation, Capnometry; Grade: Grade II; Complicating Factors: None; Intubation Findings: Positive EtCO2, Bilateral breath sounds, Atraumatic/Condition of teeth unchanged;  Depth of Insertion: 22; Securment: Lips; Complications: None;    Patient Active Problem List   Diagnosis    Hepatosplenomegaly    Lumbar herniated disc - Large central disk extrusion at the L4-L5 level with significant central canal and bilateral neuroforaminal narrowing    Obesity    MSSA (methicillin susceptible Staphylococcus aureus) infection    DM type 2, uncontrolled, with neuropathy    Chronic back pain    Tinea cruris    Bilateral leg edema    Back pain    Cord compression    Pain of right thigh    Anemia of other chronic disease    Neuropathic pain    Primary insomnia    Generalized anxiety disorder    Anxiety disorder due to general medical condition    Diabetic polyneuropathy associated with type 2 diabetes mellitus    Infection of skin due to methicillin resistant Staphylococcus aureus (MRSA)    Neck muscle spasm    Cirrhosis of liver without ascites    Right knee pain    Chronic hepatitis C without hepatic coma    Diabetic ulcer of toe of right foot associated with type 2 diabetes mellitus, with fat layer exposed    Tobacco abuse    Cellulitis of right foot     Subacute osteomyelitis of right foot 2/2 to DM    Peripheral arterial disease       Review of patient's allergies indicates:   Allergen Reactions    Codeine      Other reaction(s): Unknown       No current facility-administered medications on file prior to encounter.      Current Outpatient Prescriptions on File Prior to Encounter   Medication Sig Dispense Refill    blood sugar diagnostic (BLOOD GLUCOSE TEST) Strp Twice daily blood sugar checks (Patient taking differently: Test  blood sugar four times daily) 100 each 6    diazePAM (VALIUM) 2 MG tablet TAKE ONE TABLET BY MOUTH EVERY 8 HOURS AS NEEDED FOR ANXIETY **THANK YOU** 60 tablet 1    fentaNYL (DURAGESIC) 50 mcg/hr Place 1 patch onto the skin every 72 hours. 10 patch 0    gabapentin (NEURONTIN) 300 MG capsule Take 1 capsule (300 mg total) by mouth 3 (three) times daily. 180 capsule 4    ledipasvir-sofosbuvir (HARVONI)  mg Tab Take 1 tablet by mouth once daily. 28 tablet 5    metformin (GLUCOPHAGE) 1000 MG tablet Take 1 tablet (1,000 mg total) by mouth 2 (two) times daily.  4    mupirocin calcium 2% (BACTROBAN) 2 % cream Apply to affected area 3 times daily 60 g 1    oxycodone (ROXICODONE) 10 mg Tab immediate release tablet Take 1 tablet (10 mg total) by mouth every 6 (six) hours as needed. (Patient taking differently: take one tablet every 3 hours as needed for pain) 120 tablet 0    potassium chloride SA (K-DUR,KLOR-CON) 10 MEQ tablet Take 1 tablet (10 mEq total) by mouth once daily as needed when experiencing leg/muscle cramps.  1    promethazine (PHENERGAN) 25 MG tablet Take 1 tablet (25 mg total) by mouth every 4 (four) hours. (Patient taking differently: Take 25 mg by mouth every 4 (four) hours as needed for Nausea. ) 15 tablet 0    silver sulfADIAZINE 1% (SILVADENE) 1 % cream Apply 1 application topically once daily. Apply to affected area 50 g 2    simvastatin (ZOCOR) 20 MG tablet Take 1 tablet (20 mg total) by mouth every evening.  90 tablet 3    trazodone (DESYREL) 100 MG tablet Take 1 tablet (100 mg total) by mouth every evening. 90 tablet 1    TRUE METRIX GLUCOSE METER Misc AS DIRECTED  0    insulin glargine (LANTUS SOLOSTAR) 100 unit/mL (3 mL) InPn pen Inject 15 Units into the skin every evening. 13.5 mL 3       Past Surgical History:   Procedure Laterality Date    APPENDECTOMY      left leg surgery      broken bone    LEG SURGERY  right        Social History     Social History    Marital status: Single     Spouse name: N/A    Number of children: N/A    Years of education: N/A     Occupational History    Not on file.     Social History Main Topics    Smoking status: Current Some Day Smoker     Packs/day: 1.00     Types: Cigarettes    Smokeless tobacco: Never Used    Alcohol use No    Drug use: No    Sexual activity: Not on file     Other Topics Concern    Not on file     Social History Narrative         Vital Signs Range (Last 24H):  Temp:  [36.4 °C (97.6 °F)-36.9 °C (98.4 °F)]   Pulse:  [64-78]   Resp:  [16-20]   BP: ()/(50-73)   SpO2:  [95 %-97 %]       CBC:   Recent Labs      04/07/17   1258  04/07/17 2139   WBC  9.29  8.73   RBC  4.61  4.43*   HGB  12.8*  12.6*   HCT  37.6*  36.8*   PLT  215  176   MCV  82  83   MCH  27.8  28.4   MCHC  34.0  34.2       CMP:   Recent Labs      04/07/17   1258  04/07/17   2139   NA  131*  129*   K  5.3*  4.9   CL  99  97   CO2  21*  22*   BUN  29*  27*   CREATININE  1.3  1.2   GLU  295*  289*   CALCIUM  9.1  9.1   ALBUMIN  3.2*  2.9*   PROT  7.5  7.7   ALKPHOS  89  86   ALT  14  17   AST  16  15   BILITOT  0.4  0.4       INR  Recent Labs      04/07/17   1258  04/07/17   2139   INR  1.1  1.0     Diagnostic Studies:  EKG:  Normal sinus rhythm  Normal ECG  When compared with ECG of 15-DEC-2014 18:26,  Vent. rate has decreased BY  67 BPM  Confirmed by STEPHANIE SARMIENTO, KASEY ([id number]) on 4/8/2017 5:32:30 PM    2D Echo:   1 - Normal left ventricular systolic function (EF 55-60%).     2 -  Trivial mitral regurgitation.     3 - Trivial to mild tricuspid regurgitation.     4 - No vegetations seen      [facility] Anesthesia Evaluation    I have reviewed the Patient Summary Reports.    I have reviewed the Nursing Notes.   I have reviewed the Medications.     Review of Systems  Anesthesia Hx:  History of prior surgery of interest to airway management or planning:  Denies Personal Hx of Anesthesia complications.   Social:  Smoker, No Alcohol Use Chronic pain   Chronic opioids     Hematology/Oncology:     Oncology Normal    -- Anemia:   EENT/Dental:EENT/Dental Normal   Cardiovascular:   ECG has been reviewed.    Pulmonary:  Pulmonary Normal    Hepatic/GI:   Liver Disease, Hepatitis, C Hx of C. difficile diarrhea   Musculoskeletal:   Arthritis  Chronic pain back and thigh   Lumbar herniated disc - Large central disk extrusion at the L4-L5 level with significant central canal and bilateral neuroforaminal narrowing  Obesity  Cord compression    Ischemic R great toe       Neurological:   Neuromuscular Disease,    Endocrine:   Diabetes, poorly controlled, type 2    Dermatological:   Hx of Staph aureus infection   Psych:  Psychiatric Normal           Physical Exam  General:  Well nourished    Airway/Jaw/Neck:  Airway Findings: Mouth Opening: Normal Tongue: Normal  General Airway Assessment: Adult  Mallampati: III  Improves to II with phonation.  TM Distance: Normal, at least 6 cm  Jaw/Neck Findings:  Neck ROM: Normal ROM      Dental:  Dental Findings: In tact, Periodontal disease, Mild    Chest/Lungs:  Chest/Lungs Findings: Normal Respiratory Rate     Heart/Vascular:  Heart Findings: Rate: Normal        Mental Status:  Mental Status Findings:  Cooperative, Alert and Oriented         Anesthesia Plan  Type of Anesthesia, risks & benefits discussed:  Anesthesia Type:  general  Patient's Preference:   Intra-op Monitoring Plan: standard ASA monitors  Intra-op Monitoring Plan Comments:   Post Op Pain Control Plan:   Post Op  Pain Control Plan Comments:   Induction:   IV  Beta Blocker:  Patient is not currently on a Beta-Blocker (No further documentation required).       Informed Consent: Patient understands risks and agrees with Anesthesia plan.  Questions answered. Anesthesia consent signed with patient.  ASA Score: 3     Day of Surgery Review of History & Physical:    H&P update referred to the surgeon.         Ready For Surgery From Anesthesia Perspective.

## 2017-04-09 NOTE — ASSESSMENT & PLAN NOTE
- plans for partial first ray amputation tomorrow and revascularization on Tuesday  - blood cultures NGTD  - wound culture with GNR and diptheroids  - will await speciation and intraoperative cultures  - for now, continue IV vancomycin and oral cipro  - if unable to obtain IV access, would give oral doxy and cipro empirically  - will follow with you

## 2017-04-09 NOTE — PROGRESS NOTES
Pt loss IV access. Tried 3 times to get new IV access, but unsuccessful. Another nurse  tried twice and was unsuccessful. Called charge nurse who tried once and was unsuccessful. Patient refuses to be stuck again, saying he is a hard stick and this happens all the time.

## 2017-04-09 NOTE — PROGRESS NOTES
IM2, Dr. Moore notified about 7 attempts trying to get IV access and pt missing two doses of vancomycin. MD stated that ICU nurse will be consulted to try and attempt.

## 2017-04-09 NOTE — CONSULTS
"[facility]  Infectious Disease  Consult Note    Patient Name: Carlos Cifuentes  MRN: [id number]  Admission Date: 4/7/2017  Hospital Length of Stay: 2 days  Attending Physician: Alfreda Almanza MD  Primary Care Provider: Miguel Lux MD     Isolation Status: No active isolations    Patient information was obtained from patient and past medical records.      Inpatient consult to Infectious Diseases  Consult performed by: ANABEL ENG  Consult ordered by: POORNIMA MATOS AUGUST        Assessment/Plan:     * Osteomyelitis of right foot  - plans for partial first ray amputation tomorrow and revascularization on Tuesday  - blood cultures NGTD  - wound culture with GNR and diptheroids  - will await speciation and intraoperative cultures  - for now, continue IV vancomycin and oral cipro  - if unable to obtain IV access, would give oral doxy and cipro empirically  - will follow with you    Cellulitis of right foot  - continue vancomycin and cipro  - will determine length of therapy after amputation    Thank you for your consult. I will follow-up with patient. Please contact us if you have any additional questions.  PHILLIP Noble, pager: [phone]  Infectious Disease  [facility]    Subjective:     Principal Problem: Osteomyelitis of right foot    HPI: This is a 56 year old male with DM, HTN, HLD, Hep C, smoker who came in with complaints of a "blue right great toe." He had a callous to his toe that he shaved off and it developed a nonhealing ulceration. He now has erythema to the leg. Arterial ultrasound was done and Right LAILA was 1.57, left 1.70 and no occlusion. Patient denies fevers or chills. Podiatry and vascular have been consulted. Podiatry is planning a partial first ray amputation tomorrow and there are plans for revascularization Tuesday. Patient is currently on IV vancomycin and cipro, however, he lost IV access last night and has missed his vancomycin doses.     Past " Medical History:   Diagnosis Date    Arthritis     Bulging lumbar disc     C. difficile diarrhea     Chronic back pain 10/14/2014    Diabetes mellitus type II     DM (diabetes mellitus), type 2, uncontrolled 3/12/2013    Hepatitis C 7/3/2013    MSSA (methicillin susceptible Staphylococcus aureus) septicemia     Neuromuscular disorder     Neuropathy     Staph aureus infection        Past Surgical History:   Procedure Laterality Date    APPENDECTOMY      left leg surgery      broken bone    LEG SURGERY  right        Review of patient's allergies indicates:   Allergen Reactions    Codeine      Other reaction(s): Unknown       Medications:  Prescriptions Prior to Admission   Medication Sig    blood sugar diagnostic (BLOOD GLUCOSE TEST) Strp Twice daily blood sugar checks (Patient taking differently: Test  blood sugar four times daily)    diazePAM (VALIUM) 2 MG tablet TAKE ONE TABLET BY MOUTH EVERY 8 HOURS AS NEEDED FOR ANXIETY **THANK YOU**    docusate sodium (COLACE) 100 MG capsule Take 100 mg by mouth as needed for Constipation.    fentaNYL (DURAGESIC) 50 mcg/hr Place 1 patch onto the skin every 72 hours.    gabapentin (NEURONTIN) 300 MG capsule Take 1 capsule (300 mg total) by mouth 3 (three) times daily.    ledipasvir-sofosbuvir (HARVONI)  mg Tab Take 1 tablet by mouth once daily.    metformin (GLUCOPHAGE) 1000 MG tablet Take 1 tablet (1,000 mg total) by mouth 2 (two) times daily.    mupirocin calcium 2% (BACTROBAN) 2 % cream Apply to affected area 3 times daily    oxycodone (ROXICODONE) 10 mg Tab immediate release tablet Take 1 tablet (10 mg total) by mouth every 6 (six) hours as needed. (Patient taking differently: take one tablet every 3 hours as needed for pain)    potassium chloride SA (K-DUR,KLOR-CON) 10 MEQ tablet Take 1 tablet (10 mEq total) by mouth once daily as needed when experiencing leg/muscle cramps.    promethazine (PHENERGAN) 25 MG tablet Take 1 tablet (25 mg total)  by mouth every 4 (four) hours. (Patient taking differently: Take 25 mg by mouth every 4 (four) hours as needed for Nausea. )    silver sulfADIAZINE 1% (SILVADENE) 1 % cream Apply 1 application topically once daily. Apply to affected area    simvastatin (ZOCOR) 20 MG tablet Take 1 tablet (20 mg total) by mouth every evening.    trazodone (DESYREL) 100 MG tablet Take 1 tablet (100 mg total) by mouth every evening.    TRUE METRIX GLUCOSE METER Misc AS DIRECTED    insulin glargine (LANTUS SOLOSTAR) 100 unit/mL (3 mL) InPn pen Inject 15 Units into the skin every evening.     Antibiotics     Start     Stop Route Frequency Ordered    04/08/17 1000  vancomycin (VANCOCIN) 1,750 mg in dextrose 5 % 500 mL IVPB      -- IV Every 12 hours (non-standard times) 04/08/17 0126    04/08/17 1115  ciprofloxacin HCl tablet 500 mg      -- Oral Every 12 hours 04/08/17 1003        Antifungals     None        Antivirals         Stop Route Frequency     ledipasvir-sofosbuvir      -- Oral Daily           Immunization History   Administered Date(s) Administered    Pneumococcal Polysaccharide - 23 Valent 08/21/2013       Family History     Problem Relation (Age of Onset)    Arthritis Mother, Sister, Brother    Diabetes Sister        Social History     Social History    Marital status: Single     Spouse name: N/A    Number of children: N/A    Years of education: N/A     Social History Main Topics    Smoking status: Current Some Day Smoker     Packs/day: 1.00     Types: Cigarettes    Smokeless tobacco: Never Used    Alcohol use No    Drug use: No    Sexual activity: Not Asked     Other Topics Concern    None     Social History Narrative     Review of Systems   Constitutional: Negative for chills and fever.   Respiratory: Negative for cough and shortness of breath.    Cardiovascular: Positive for leg swelling. Negative for chest pain.   Gastrointestinal: Negative for abdominal pain, constipation, diarrhea, nausea and vomiting.    Genitourinary: Negative for dysuria, frequency and hematuria.   Musculoskeletal: Positive for arthralgias and joint swelling. Negative for back pain and myalgias.   Skin: Positive for color change and wound. Negative for rash.   Neurological: Negative for dizziness, weakness, light-headedness, numbness and headaches.   Psychiatric/Behavioral: Negative for agitation and behavioral problems. The patient is not nervous/anxious.      Objective:     Vital Signs (Most Recent):  Temp: 97.6 °F (36.4 °C) (04/09/17 0900)  Pulse: 65 (04/09/17 0900)  Resp: 16 (04/09/17 0900)  BP: (!) 114/57 (04/09/17 0900)  SpO2: 97 % (04/09/17 0900) Vital Signs (24h Range):  Temp:  [97.6 °F (36.4 °C)-97.9 °F (36.6 °C)] 97.6 °F (36.4 °C)  Pulse:  [65-78] 65  Resp:  [16-18] 16  SpO2:  [96 %-97 %] 97 %  BP: (114-147)/(57-67) 114/57     Weight: 109.5 kg (241 lb 6.5 oz)  Body mass index is 35.65 kg/(m^2).    Estimated Creatinine Clearance: 101.8 mL/min (based on Cr of 1).    Physical Exam   Constitutional: He is oriented to person, place, and time. He appears well-developed and well-nourished. No distress.   Cardiovascular: Normal rate and regular rhythm.    No murmur heard.  Pulmonary/Chest: Effort normal and breath sounds normal. No respiratory distress.   Abdominal: Soft. Bowel sounds are normal. He exhibits no distension.   Musculoskeletal: Normal range of motion. He exhibits no edema.   Neurological: He is alert and oriented to person, place, and time.   Skin: Skin is warm and dry.   Right foot wound dressed; erythema, swelling noted to the right lower extremity   Psychiatric: He has a normal mood and affect. His behavior is normal.       Significant Labs:   Blood Culture:   Recent Labs  Lab 04/07/17 2053 04/07/17  2116   LABBLOO No Growth to date  No Growth to date No Growth to date  No Growth to date     CBC:   Recent Labs  Lab 04/07/17  1258 04/07/17  2139 04/09/17  0359   WBC 9.29 8.73 7.80   HGB 12.8* 12.6* 11.3*   HCT 37.6* 36.8*  33.1*    176 183     CMP:   Recent Labs  Lab 04/07/17  1258 04/07/17  2139 04/09/17  0359   * 129* 129*   K 5.3* 4.9 4.4   CL 99 97 98   CO2 21* 22* 20*   * 289* 248*   BUN 29* 27* 18   CREATININE 1.3 1.2 1.0   CALCIUM 9.1 9.1 8.7   PROT 7.5 7.7  --    ALBUMIN 3.2* 2.9*  --    BILITOT 0.4 0.4  --    ALKPHOS 89 86  --    AST 16 15  --    ALT 14 17  --    ANIONGAP 11 10 11   EGFRNONAA >60 >60.0 >60.0       Significant Imaging: I have reviewed all pertinent imaging results/findings within the past 24 hours.

## 2017-04-10 ENCOUNTER — ANESTHESIA (OUTPATIENT)
Dept: SURGERY | Facility: HOSPITAL | Age: 57
DRG: 617 | End: 2017-04-10
Payer: MEDICAID

## 2017-04-10 ENCOUNTER — SURGERY (OUTPATIENT)
Age: 57
End: 2017-04-10

## 2017-04-10 LAB
ANION GAP SERPL CALC-SCNC: 9 MMOL/L
BACTERIA SPEC AEROBE CULT: NORMAL
BACTERIA SPEC AEROBE CULT: NORMAL
BASOPHILS # BLD AUTO: 0.01 K/UL
BASOPHILS NFR BLD: 0.1 %
BUN SERPL-MCNC: 17 MG/DL
CALCIUM SERPL-MCNC: 8.6 MG/DL
CHLORIDE SERPL-SCNC: 98 MMOL/L
CO2 SERPL-SCNC: 22 MMOL/L
CREAT SERPL-MCNC: 1.1 MG/DL
DIFFERENTIAL METHOD: ABNORMAL
EOSINOPHIL # BLD AUTO: 0.1 K/UL
EOSINOPHIL NFR BLD: 1.1 %
ERYTHROCYTE [DISTWIDTH] IN BLOOD BY AUTOMATED COUNT: 14 %
EST. GFR  (AFRICAN AMERICAN): >60 ML/MIN/1.73 M^2
EST. GFR  (NON AFRICAN AMERICAN): >60 ML/MIN/1.73 M^2
GLUCOSE SERPL-MCNC: 262 MG/DL
HCT VFR BLD AUTO: 32.1 %
HGB BLD-MCNC: 11.1 G/DL
LYMPHOCYTES # BLD AUTO: 1.7 K/UL
LYMPHOCYTES NFR BLD: 19.4 %
MAGNESIUM SERPL-MCNC: 1.6 MG/DL
MCH RBC QN AUTO: 28.6 PG
MCHC RBC AUTO-ENTMCNC: 34.6 %
MCV RBC AUTO: 83 FL
MONOCYTES # BLD AUTO: 0.5 K/UL
MONOCYTES NFR BLD: 6.1 %
NEUTROPHILS # BLD AUTO: 6.2 K/UL
NEUTROPHILS NFR BLD: 72.7 %
PLATELET # BLD AUTO: 181 K/UL
PMV BLD AUTO: 10.3 FL
POCT GLUCOSE: 214 MG/DL (ref 70–110)
POCT GLUCOSE: 223 MG/DL (ref 70–110)
POCT GLUCOSE: 224 MG/DL (ref 70–110)
POCT GLUCOSE: 273 MG/DL (ref 70–110)
POTASSIUM SERPL-SCNC: 4.2 MMOL/L
RBC # BLD AUTO: 3.88 M/UL
SODIUM SERPL-SCNC: 129 MMOL/L
VANCOMYCIN TROUGH SERPL-MCNC: 8.9 UG/ML
WBC # BLD AUTO: 8.56 K/UL

## 2017-04-10 PROCEDURE — 28820 AMPUTATION OF TOE: CPT | Mod: T5,,, | Performed by: PODIATRIST

## 2017-04-10 PROCEDURE — 71000044 HC DOSC ROUTINE RECOVERY FIRST HOUR: Performed by: PODIATRIST

## 2017-04-10 PROCEDURE — 99232 SBSQ HOSP IP/OBS MODERATE 35: CPT | Mod: ,,, | Performed by: HOSPITALIST

## 2017-04-10 PROCEDURE — 37000009 HC ANESTHESIA EA ADD 15 MINS: Performed by: PODIATRIST

## 2017-04-10 PROCEDURE — 25000003 PHARM REV CODE 250: Performed by: STUDENT IN AN ORGANIZED HEALTH CARE EDUCATION/TRAINING PROGRAM

## 2017-04-10 PROCEDURE — 83735 ASSAY OF MAGNESIUM: CPT

## 2017-04-10 PROCEDURE — 99233 SBSQ HOSP IP/OBS HIGH 50: CPT | Mod: ,,, | Performed by: PHYSICIAN ASSISTANT

## 2017-04-10 PROCEDURE — 36000707: Performed by: PODIATRIST

## 2017-04-10 PROCEDURE — 71000015 HC POSTOP RECOV 1ST HR: Performed by: PODIATRIST

## 2017-04-10 PROCEDURE — 85025 COMPLETE CBC W/AUTO DIFF WBC: CPT

## 2017-04-10 PROCEDURE — 88305 TISSUE EXAM BY PATHOLOGIST: CPT | Performed by: PATHOLOGY

## 2017-04-10 PROCEDURE — 25000003 PHARM REV CODE 250: Performed by: SURGERY

## 2017-04-10 PROCEDURE — 37000008 HC ANESTHESIA 1ST 15 MINUTES: Performed by: PODIATRIST

## 2017-04-10 PROCEDURE — 82962 GLUCOSE BLOOD TEST: CPT | Performed by: PODIATRIST

## 2017-04-10 PROCEDURE — 25000003 PHARM REV CODE 250: Performed by: PODIATRIST

## 2017-04-10 PROCEDURE — 63600175 PHARM REV CODE 636 W HCPCS: Performed by: NURSE ANESTHETIST, CERTIFIED REGISTERED

## 2017-04-10 PROCEDURE — 25000003 PHARM REV CODE 250: Performed by: PHYSICIAN ASSISTANT

## 2017-04-10 PROCEDURE — 88311 DECALCIFY TISSUE: CPT | Mod: 26,,, | Performed by: PATHOLOGY

## 2017-04-10 PROCEDURE — 88305 TISSUE EXAM BY PATHOLOGIST: CPT | Mod: 26,,, | Performed by: PATHOLOGY

## 2017-04-10 PROCEDURE — 36415 COLL VENOUS BLD VENIPUNCTURE: CPT

## 2017-04-10 PROCEDURE — 36000706: Performed by: PODIATRIST

## 2017-04-10 PROCEDURE — 80048 BASIC METABOLIC PNL TOTAL CA: CPT

## 2017-04-10 PROCEDURE — D9220A PRA ANESTHESIA: Mod: CRNA,,, | Performed by: NURSE ANESTHETIST, CERTIFIED REGISTERED

## 2017-04-10 PROCEDURE — 11000001 HC ACUTE MED/SURG PRIVATE ROOM

## 2017-04-10 PROCEDURE — 25000003 PHARM REV CODE 250: Performed by: NURSE ANESTHETIST, CERTIFIED REGISTERED

## 2017-04-10 PROCEDURE — 27201423 OPTIME MED/SURG SUP & DEVICES STERILE SUPPLY: Performed by: PODIATRIST

## 2017-04-10 PROCEDURE — 99232 SBSQ HOSP IP/OBS MODERATE 35: CPT | Mod: ,,, | Performed by: SURGERY

## 2017-04-10 PROCEDURE — 25000003 PHARM REV CODE 250: Performed by: INTERNAL MEDICINE

## 2017-04-10 PROCEDURE — 71000045 HC DOSC ROUTINE RECOVERY EA ADD'L HR: Performed by: PODIATRIST

## 2017-04-10 PROCEDURE — 0Y6P0Z0 DETACHMENT AT RIGHT 1ST TOE, COMPLETE, OPEN APPROACH: ICD-10-PCS | Performed by: PODIATRIST

## 2017-04-10 PROCEDURE — 80202 ASSAY OF VANCOMYCIN: CPT

## 2017-04-10 PROCEDURE — 63600175 PHARM REV CODE 636 W HCPCS: Performed by: PHYSICIAN ASSISTANT

## 2017-04-10 PROCEDURE — D9220A PRA ANESTHESIA: Mod: ANES,,, | Performed by: ANESTHESIOLOGY

## 2017-04-10 RX ORDER — OXYCODONE HCL 10 MG/1
10 TABLET, FILM COATED, EXTENDED RELEASE ORAL EVERY 12 HOURS
Status: DISCONTINUED | OUTPATIENT
Start: 2017-04-10 | End: 2017-04-15 | Stop reason: HOSPADM

## 2017-04-10 RX ORDER — PROPOFOL 10 MG/ML
VIAL (ML) INTRAVENOUS
Status: DISCONTINUED | OUTPATIENT
Start: 2017-04-10 | End: 2017-04-10

## 2017-04-10 RX ORDER — BUPIVACAINE HYDROCHLORIDE 2.5 MG/ML
INJECTION, SOLUTION EPIDURAL; INFILTRATION; INTRACAUDAL
Status: DISCONTINUED | OUTPATIENT
Start: 2017-04-10 | End: 2017-04-10 | Stop reason: HOSPADM

## 2017-04-10 RX ORDER — ACETAMINOPHEN 10 MG/ML
INJECTION, SOLUTION INTRAVENOUS
Status: DISCONTINUED | OUTPATIENT
Start: 2017-04-10 | End: 2017-04-10

## 2017-04-10 RX ORDER — LIDOCAINE HYDROCHLORIDE 10 MG/ML
1 INJECTION, SOLUTION EPIDURAL; INFILTRATION; INTRACAUDAL; PERINEURAL ONCE
Status: DISCONTINUED | OUTPATIENT
Start: 2017-04-10 | End: 2017-04-10 | Stop reason: HOSPADM

## 2017-04-10 RX ORDER — SODIUM CHLORIDE 9 MG/ML
INJECTION, SOLUTION INTRAVENOUS CONTINUOUS
Status: DISCONTINUED | OUTPATIENT
Start: 2017-04-10 | End: 2017-04-12

## 2017-04-10 RX ORDER — LIDOCAINE HCL/PF 100 MG/5ML
SYRINGE (ML) INTRAVENOUS
Status: DISCONTINUED | OUTPATIENT
Start: 2017-04-10 | End: 2017-04-10

## 2017-04-10 RX ORDER — MIDAZOLAM HYDROCHLORIDE 1 MG/ML
INJECTION, SOLUTION INTRAMUSCULAR; INTRAVENOUS
Status: DISCONTINUED | OUTPATIENT
Start: 2017-04-10 | End: 2017-04-10

## 2017-04-10 RX ORDER — PROPOFOL 10 MG/ML
VIAL (ML) INTRAVENOUS CONTINUOUS PRN
Status: DISCONTINUED | OUTPATIENT
Start: 2017-04-10 | End: 2017-04-10

## 2017-04-10 RX ORDER — OXYCODONE HYDROCHLORIDE 5 MG/1
10 TABLET ORAL EVERY 4 HOURS PRN
Status: DISCONTINUED | OUTPATIENT
Start: 2017-04-10 | End: 2017-04-15 | Stop reason: HOSPADM

## 2017-04-10 RX ORDER — FENTANYL CITRATE 50 UG/ML
INJECTION, SOLUTION INTRAMUSCULAR; INTRAVENOUS
Status: DISCONTINUED | OUTPATIENT
Start: 2017-04-10 | End: 2017-04-10

## 2017-04-10 RX ORDER — GLYCOPYRROLATE 0.2 MG/ML
INJECTION INTRAMUSCULAR; INTRAVENOUS
Status: DISCONTINUED | OUTPATIENT
Start: 2017-04-10 | End: 2017-04-10

## 2017-04-10 RX ORDER — FENTANYL CITRATE 50 UG/ML
25 INJECTION, SOLUTION INTRAMUSCULAR; INTRAVENOUS EVERY 5 MIN PRN
Status: CANCELLED | OUTPATIENT
Start: 2017-04-10

## 2017-04-10 RX ADMIN — PROPOFOL 100 MCG/KG/MIN: 10 INJECTION, EMULSION INTRAVENOUS at 10:04

## 2017-04-10 RX ADMIN — PROMETHAZINE HYDROCHLORIDE 12.5 MG: 12.5 TABLET ORAL at 10:04

## 2017-04-10 RX ADMIN — GABAPENTIN 300 MG: 300 CAPSULE ORAL at 03:04

## 2017-04-10 RX ADMIN — LIDOCAINE HYDROCHLORIDE 50 MG: 20 INJECTION, SOLUTION INTRAVENOUS at 10:04

## 2017-04-10 RX ADMIN — INSULIN ASPART 2 UNITS: 100 INJECTION, SOLUTION INTRAVENOUS; SUBCUTANEOUS at 08:04

## 2017-04-10 RX ADMIN — ASPIRIN 81 MG: 81 TABLET, COATED ORAL at 08:04

## 2017-04-10 RX ADMIN — LEDIPASVIR AND SOFOSBUVIR 1 TABLET: 90; 400 TABLET, FILM COATED ORAL at 08:04

## 2017-04-10 RX ADMIN — SODIUM BICARBONATE: 84 INJECTION, SOLUTION INTRAVENOUS at 06:04

## 2017-04-10 RX ADMIN — OXYCODONE HYDROCHLORIDE 10 MG: 10 TABLET, FILM COATED, EXTENDED RELEASE ORAL at 08:04

## 2017-04-10 RX ADMIN — FENTANYL CITRATE 25 MCG: 50 INJECTION, SOLUTION INTRAMUSCULAR; INTRAVENOUS at 10:04

## 2017-04-10 RX ADMIN — TRAZODONE HYDROCHLORIDE 100 MG: 50 TABLET ORAL at 08:04

## 2017-04-10 RX ADMIN — INSULIN ASPART 3 UNITS: 100 INJECTION, SOLUTION INTRAVENOUS; SUBCUTANEOUS at 09:04

## 2017-04-10 RX ADMIN — ACETAMINOPHEN 1000 MG: 10 INJECTION, SOLUTION INTRAVENOUS at 10:04

## 2017-04-10 RX ADMIN — VANCOMYCIN HYDROCHLORIDE 1750 MG: 1 INJECTION, POWDER, LYOPHILIZED, FOR SOLUTION INTRAVENOUS at 06:04

## 2017-04-10 RX ADMIN — SODIUM CHLORIDE: 0.9 INJECTION, SOLUTION INTRAVENOUS at 10:04

## 2017-04-10 RX ADMIN — GABAPENTIN 300 MG: 300 CAPSULE ORAL at 08:04

## 2017-04-10 RX ADMIN — OXYCODONE HYDROCHLORIDE 10 MG: 5 TABLET ORAL at 02:04

## 2017-04-10 RX ADMIN — SIMVASTATIN 20 MG: 20 TABLET, FILM COATED ORAL at 08:04

## 2017-04-10 RX ADMIN — PROPOFOL 75 MG: 10 INJECTION, EMULSION INTRAVENOUS at 10:04

## 2017-04-10 RX ADMIN — GABAPENTIN 300 MG: 300 CAPSULE ORAL at 05:04

## 2017-04-10 RX ADMIN — GLYCOPYRROLATE 0.2 MG: 0.2 INJECTION, SOLUTION INTRAMUSCULAR; INTRAVENOUS at 10:04

## 2017-04-10 RX ADMIN — OXYCODONE HYDROCHLORIDE 10 MG: 5 TABLET ORAL at 10:04

## 2017-04-10 RX ADMIN — BUPIVACAINE HYDROCHLORIDE 30 ML: 2.5 INJECTION, SOLUTION EPIDURAL; INFILTRATION; INTRACAUDAL; PERINEURAL at 11:04

## 2017-04-10 RX ADMIN — CIPROFLOXACIN HYDROCHLORIDE 500 MG: 500 TABLET, FILM COATED ORAL at 07:04

## 2017-04-10 RX ADMIN — NICOTINE 1 PATCH: 14 PATCH, EXTENDED RELEASE TRANSDERMAL at 08:04

## 2017-04-10 RX ADMIN — CIPROFLOXACIN HYDROCHLORIDE 500 MG: 500 TABLET, FILM COATED ORAL at 08:04

## 2017-04-10 RX ADMIN — OXYCODONE HYDROCHLORIDE 10 MG: 5 TABLET ORAL at 03:04

## 2017-04-10 RX ADMIN — MIDAZOLAM HYDROCHLORIDE 2 MG: 1 INJECTION, SOLUTION INTRAMUSCULAR; INTRAVENOUS at 10:04

## 2017-04-10 NOTE — ASSESSMENT & PLAN NOTE
- plans for partial first ray amputation today and revascularization on Tuesday  - blood cultures NGTD  - wound culture with Klebsiella Oxytoca (Cipro sensitive) and diptheroids  - will fu intraoperative cultures  - for now, continue IV vancomycin and oral cipro  - if unable to obtain IV access, would give oral doxy and cipro empirically  - will follow with you

## 2017-04-10 NOTE — PROGRESS NOTES
"[facility]  Infectious Disease  Progress Note    Patient Name: Carlos Cifuentes  MRN: [id number]  Admission Date: 4/7/2017  Length of Stay: 3 days  Attending Physician: Alfreda Almanza MD  Primary Care Provider: Miguel Lux MD    Isolation Status: No active isolations  Assessment/Plan:      * Osteomyelitis of right foot  - plans for partial first ray amputation today and revascularization on Tuesday  - blood cultures NGTD  - wound culture with Klebsiella Oxytoca (Cipro sensitive) and diptheroids  - will intraoperative cultures  - for now, continue IV vancomycin and oral cipro  - if unable to obtain IV access, would give oral doxy and cipro empirically  - will follow with you    Cellulitis of right foot  - continue vancomycin and cipro  - will determine length of therapy after amputation      Anticipated Disposition: tbd    Thank you for your consult. I will follow-up with patient. Please contact us if you have any additional questions.    PHILLIP Somers  Infectious Disease  [facility]    Subjective:     Principal Problem:Osteomyelitis of right foot    HPI: This is a 56 year old male with DM, HTN, HLD, Hep C, smoker who came in with complaints of a "blue right great toe." He had a callous to his toe that he shaved off and it developed a nonhealing ulceration. He now has erythema to the leg. Arterial ultrasound was done and Right LAILA was 1.57, left 1.70 and no occlusion. Patient denies fevers or chills. Podiatry and vascular have been consulted. Podiatry is planning a partial first ray amputation tomorrow and there are plans for revascularization Tuesday. Patient is currently on IV vancomycin and cipro, however, he lost IV access last night and has missed his vancomycin doses.   Interval History: No AEON.  Toe wound culture growing moderate diptheroids and Klebsiella Oxytoca sensitive to Cipro.  The patient denies any recent fever, chills, or sweats.      Review of " Systems   Constitutional: Negative for chills, diaphoresis and fever.   Respiratory: Negative for shortness of breath.    Cardiovascular: Negative for chest pain.   Gastrointestinal: Negative for abdominal pain, diarrhea, nausea and vomiting.   Genitourinary: Negative for dysuria and hematuria.     Objective:     Vital Signs (Most Recent):  Temp: 97.7 °F (36.5 °C) (04/10/17 0848)  Pulse: 79 (04/10/17 0848)  Resp: 18 (04/10/17 0848)  BP: 119/64 (04/10/17 0848)  SpO2: 97 % (04/10/17 0848) Vital Signs (24h Range):  Temp:  [97.7 °F (36.5 °C)-99 °F (37.2 °C)] 97.7 °F (36.5 °C)  Pulse:  [63-88] 79  Resp:  [16-18] 18  SpO2:  [95 %-98 %] 97 %  BP: (119-150)/(63-71) 119/64     Weight: 109.5 kg (241 lb 6.5 oz)  Body mass index is 35.65 kg/(m^2).    Estimated Creatinine Clearance: 92.5 mL/min (based on Cr of 1.1).    Physical Exam   Constitutional: He is oriented to person, place, and time. He appears well-developed and well-nourished. No distress.   Cardiovascular: Normal rate and regular rhythm.    No murmur heard.  Pulmonary/Chest: Effort normal and breath sounds normal. No respiratory distress.   Abdominal: Soft. Bowel sounds are normal. He exhibits no distension.   Musculoskeletal: Normal range of motion. He exhibits no edema.   Neurological: He is alert and oriented to person, place, and time.   Skin: Skin is warm and dry.   Right foot wound dressed; erythema, swelling noted to the right lower extremity   Psychiatric: He has a normal mood and affect. His behavior is normal.       Significant Labs:   Blood Culture:   Recent Labs  Lab 04/07/17 2053 04/07/17 2116   LABBLOO No Growth to date  No Growth to date  No Growth to date No Growth to date  No Growth to date  No Growth to date     CBC:   Recent Labs  Lab 04/09/17  0359 04/10/17  0429   WBC 7.80 8.56   HGB 11.3* 11.1*   HCT 33.1* 32.1*    181     CMP:   Recent Labs  Lab 04/09/17  0359 04/10/17  0429   * 129*   K 4.4 4.2   CL 98 98   CO2 20* 22*   GLU  248* 262*   BUN 18 17   CREATININE 1.0 1.1   CALCIUM 8.7 8.6*   ANIONGAP 11 9   EGFRNONAA >60.0 >60.0     Wound Culture:   Recent Labs  Lab 04/08/17  0854   LABAERO KLEBSIELLA OXYTOCAModerate  DIPHTHEROIDSModerate       Significant Imaging: I have reviewed all pertinent imaging results/findings within the past 24 hours.     MRI Lower Extremity Without Contrast Right [[id number]] Resulted: 04/08/17 1631       Order Status: Completed Updated: 04/08/17 1631       Narrative:         Technique: Multiplanar, multisequence MR imaging of the right forefoot with the use of intravenous gadolinium IV contrast (10 cc Gadovist).    Comparison: Plain film examination 4/7/17    Results:  Muscular edema identified, likely neural vascular mediated relating to patient's diabetes.  There is diffuse edema identified subcutaneous soft tissues predominantly dorsum of foot. The predominant finding is that of sandra ulceration plantar aspect of the distal distal interphalangeal joint of the RIGHT great toe.  At the site of ulceration, plantar aspect, there is some loss of cortical margin at the middle and distal phalanx.  Additionally, geographically marginated bone marrow edema is identified predominantly in the distal phalanx with a small amount of joint fluid.  Postcontrast images demonstrate minimal diffuse enhancement of the distal phalanx and distal aspect of the middle phalanx.  While nonspecific, these findings are suspicious for very early osteomyelitis distal phalanx, and possibly the distal aspect of the middle phalanx.       Impression:             Ulcer plantar aspect RIGHT great toe, level of the distal interphalangeal joint, with abnormal marrow/cortical signal distal phalanx greater than the distal aspect of the middle phalanx, suspicious for early osteomyelitis.      Electronically signed by: Dr. Kevin Sanchez MD  Date: 04/08/17  Time: 16:31

## 2017-04-10 NOTE — BRIEF OP NOTE
[facility]  Brief Operative Note    SUMMARY     Surgery Date: 4/10/2017     Surgeon(s) and Role:     * Alexia Li DPM - Primary     * Kanu Kevin DPM - Resident - Assisting    Pre-op Diagnosis:  Ischemic toe [I99.8]  Diabetic ulcer of toe of right foot associated with type 2 diabetes mellitus, with fat layer exposed [E11.621, L97.512]    Post-op Diagnosis:  Post-Op Diagnosis Codes:     * Ischemic toe [I99.8]     * Diabetic ulcer of toe of right foot associated with type 2 diabetes mellitus, with fat layer exposed [E11.621, L97.512]    Procedure(s) (LRB):  AMPUTATION-TOE (Right)    Anesthesia: Local MAC    Description of Procedure: Right hallux amputation at MTPJ left open and packed with plans to return for delayed primary closure at the end of the week.    Description of the findings of the procedure: Necrotic malodorous tissue present throughout. Excision of necrotic tissue to viable bleeding tissue.    Estimated Blood Loss: 5 mL, minimal bleeding         Specimens: Right Great toe    Kanu Kevin DPM PGY-3  Pager [phone]

## 2017-04-10 NOTE — NURSING TRANSFER
Nursing Transfer Note      4/10/2017     Transfer 1142    Transfer via stretcher    Transfer with PCT  Transported by PCT  Medicines sent: none  Chart send with patient: yes  Notified: report called to Sylvester Prater RN 4/10/2017 @134  Patient reassessed at: 4/10/2017 @ 1333  Upon arrival to floor:

## 2017-04-10 NOTE — ASSESSMENT & PLAN NOTE
-LAILA positive at 1.57  -Patient started on Plavix per vascular surgery's request - spoke with podiatry who gave their ascent as well  -Per vascuar surgery, still planning on R leg angio w/ revascularization on Tuesday  -Per vascular surgery, he with need a TTE w/ color flow, BL carotid US, US AAA screen, & CTA Abd, plevis, w/ LE runoff prior to Tuesday  -IVF per vascular surgery for renal protection prior to angiogram   -Continue asprin 81 mg & simvastatin 20 mg po qhs for the moment w/ plan to increase dose to 40 at discharge for risk factor modification

## 2017-04-10 NOTE — ASSESSMENT & PLAN NOTE
-Continue home gabapentin 300 TID po   - if patient continues to report shooting leg pain, will consider increasing this dose

## 2017-04-10 NOTE — TRANSFER OF CARE
"Anesthesia Transfer of Care Note    Patient: Carlos Cifuentes    Procedure(s) Performed: Procedure(s) (LRB):  AMPUTATION-TOE (Right)    Patient location: PACU    Anesthesia Type: general    Transport from OR: Transported from OR on 6-10 L/min O2 by face mask with adequate spontaneous ventilation    Post pain: adequate analgesia    Post assessment: no apparent anesthetic complications    Post vital signs: stable    Level of consciousness: responds to stimulation    Nausea/Vomiting: no nausea/vomiting    Complications: none          Last vitals:   Visit Vitals    /69 (BP Location: Left arm, Patient Position: Lying, BP Method: Automatic)    Pulse (!) 53    Temp 37 °C (98.6 °F) (Oral)    Resp 18    Ht 5' 9" (1.753 m)    Wt 109.5 kg (241 lb 6.5 oz)    SpO2 99%    BMI 35.65 kg/m2     "

## 2017-04-10 NOTE — ASSESSMENT & PLAN NOTE
-pt takes 10 mg lortab q3 hours at home   - we started oxycontin 10 mg q12h PRN here, switch long acting pain medication to scheduled for now, given pending surgery  -Continue IR Oxy 10 po, increase frequency to q4h PRN   - continue to monitor symptoms

## 2017-04-10 NOTE — ASSESSMENT & PLAN NOTE
-Continue w/ detemir 10 U again tonight 2/2 to am NPO status & adjust pending procedure  -Will probably need to be kept lower than home dose tomorrow as well 2/2 to patient being NPO again tomorrow night  -LD SSI  -POCT glucose checks qac & qhs

## 2017-04-10 NOTE — ANESTHESIA RELEASE NOTE
Post Anesthetic Evaluation    Patient: Carlos Cifuentes    Procedure(s) Performed: Procedure(s) (LRB):  AMPUTATION-TOE (Right)    Anesthesia type: GA    Patient location: PACU    Post pain: Adequate analgesia    Post assessment: no apparent anesthetic complications    Last Vitals:   Vitals:    04/10/17 1315   BP: (!) 95/53   Pulse: (!) 56   Resp: 17   Temp:        Post vital signs: stable    Level of consciousness: awake    Complications: none    Follow-up Needed: No

## 2017-04-10 NOTE — ASSESSMENT & PLAN NOTE
MRI today shows evidence of early osteo in the R great toe.  -Amputation tomorrow per podiatry's assistance  -Gram stain from ulcer shows polymicrobial infection  -Continue vanc 15 mg/kg pending amputation w/ clear margins will continue for 48 hours postop  -Continue cipro 500 po BID  -BCx with diphtheroid sp. And klebsiella   - continue abx. F/u operative cultures too   - f/u ID recs for any further adjustments

## 2017-04-10 NOTE — ASSESSMENT & PLAN NOTE
-pt started on detemir 10 U qHS   - glucose consistently high   - increasing to 13 units qHS, continue LDSSI   - continue to monitor glucose, adjust dose as needed

## 2017-04-10 NOTE — PROGRESS NOTES
Progress Note  Vascular Surgery    Admit Date: 4/7/2017   Hospital Day: 4    SUBJECTIVE:     Follow-up For:  Procedure(s) (LRB):  AMPUTATION-TOE (Right)    No acute issues overnight; surgery with podiatry today.    Scheduled Meds:   aspirin  81 mg Oral Daily    ciprofloxacin HCl  500 mg Oral Q12H    clopidogrel  75 mg Oral Daily    gabapentin  300 mg Oral TID    insulin detemir  10 Units Subcutaneous QHS    ledipasvir-sofosbuvir  1 tablet Oral Daily    nicotine  1 patch Transdermal Daily    simvastatin  20 mg Oral QHS    trazodone  100 mg Oral QHS    vancomycin (VANCOCIN) IVPB  15 mg/kg Intravenous Q12H     Continuous Infusions:   custom IV infusion builder Stopped (04/09/17 1315)     PRN Meds:dextrose 50%, dextrose 50%, glucagon (human recombinant), glucose, glucose, insulin aspart, oxycodone, oxycodone, promethazine    Review of patient's allergies indicates:   Allergen Reactions    Codeine      Other reaction(s): Unknown           OBJECTIVE:     Vital Signs (Most Recent)  Temp: 98.3 °F (36.8 °C) (04/10/17 0323)  Pulse: 63 (04/10/17 0323)  Resp: 16 (04/10/17 0323)  BP: 121/63 (04/10/17 0323)  SpO2: 98 % (04/10/17 0323)    Vital Signs Range (Last 24H):  Temp:  [97.6 °F (36.4 °C)-99 °F (37.2 °C)]   Pulse:  [63-88]   Resp:  [16-18]   BP: (114-150)/(57-71)   SpO2:  [95 %-98 %]     I & O (Last 24H):    Intake/Output Summary (Last 24 hours) at 04/10/17 0810  Last data filed at 04/09/17 1941   Gross per 24 hour   Intake              750 ml   Output                2 ml   Net              748 ml     Physical Exam:  General: well developed, no distress, morbidly obese  Head: normocephalic  Extremities: right great toe with ischemia; 1+ edema right lower extremity  Pulses: right femoral 1+ pulse; left femoral very weak 1+ pulse; nonpalpable pedal pulses bilaterally  Neurologic: Alert and oriented. Thought content appropriate  No focal numbness or weakness      Laboratory:  CBC:     Recent Labs  Lab 04/10/17  4917    WBC 8.56   RBC 3.88*   HGB 11.1*   HCT 32.1*      MCV 83   MCH 28.6   MCHC 34.6     CMP:   Recent Labs  Lab 04/07/17  2139  04/10/17  0429   *  < > 262*   CALCIUM 9.1  < > 8.6*   ALBUMIN 2.9*  --   --    PROT 7.7  --   --    *  < > 129*   K 4.9  < > 4.2   CO2 22*  < > 22*   CL 97  < > 98   BUN 27*  < > 17   CREATININE 1.2  < > 1.1   ALKPHOS 86  --   --    ALT 17  --   --    AST 15  --   --    BILITOT 0.4  --   --    < > = values in this interval not displayed.  Coagulation:     Recent Labs  Lab 04/07/17 2139   INR 1.0     Labs within the past 24 hours have been reviewed.    Diagnostic Results:      ASSESSMENT/PLAN:     Assessment: Patient is a 56 y.o. male with h/o DM2 (Hgb A1c 10.2), HTN, HLD, hep C, long term tobacco use (1 ppd x44 years) presents with right great toe infected ulceration and ischemia of right great toe.    Plan:   Will obtain CTA abd/pelvis with run off to assess iliac system secondary to weakly palpable femoral pulses (left worse than right); sodium bicarb gtt ordered for renal protection   Plan for RLE angiogram Tuesday; please continue ASA daily and start plavix 75 mg po daily prior to procedure  Recommend ECHO to assess cardiac function; ECHO from 2014 with normal EF  Will order US, carotid and LAILA with PVR  Continue diabetes optimization  Continue renal optimization  Vascular surgery to follow    Elton Paul MD  General Surgery, PGY-3  [phone]

## 2017-04-10 NOTE — SUBJECTIVE & OBJECTIVE
Interval History:  NAEON. This morning, pt reports chronic back pain, for which he usually takes 10 of lortab q3 hours, is still hurting him today. As well as some right leg pain. Requesting increased pain medication frequency. Otherwise, no fevers/chills, no CP/SOB, no nausea/vomit. Pt does report feeling thirsty, 2/2 NPO for procedure with podiatry this morning. Otherwise no other new complaints.    Review of Systems   Constitutional: Negative for chills and fever.   Respiratory: Negative for cough and shortness of breath.    Cardiovascular: Negative for chest pain and palpitations.   Gastrointestinal: Negative for constipation, diarrhea, nausea and vomiting.   Musculoskeletal: Positive for back pain (chronic).        Right foot and back pain.     Objective:     Vital Signs (Most Recent):  Temp: 98.4 °F (36.9 °C) (04/10/17 1420)  Pulse: (!) 55 (04/10/17 1420)  Resp: 16 (04/10/17 1420)  BP: (!) 109/56 (04/10/17 1420)  SpO2: 97 % (04/10/17 1420) Vital Signs (24h Range):  Temp:  [97.4 °F (36.3 °C)-99 °F (37.2 °C)] 98.4 °F (36.9 °C)  Pulse:  [53-82] 55  Resp:  [15-18] 16  SpO2:  [95 %-100 %] 97 %  BP: ()/(53-77) 109/56     Weight: 109.5 kg (241 lb 6.5 oz)  Body mass index is 35.65 kg/(m^2).    Intake/Output Summary (Last 24 hours) at 04/10/17 1508  Last data filed at 04/10/17 1126   Gross per 24 hour   Intake             1050 ml   Output                2 ml   Net             1048 ml      Physical Exam   Constitutional: He is oriented to person, place, and time. No distress.   Obese M sitting up in bed in NAD   Cardiovascular: Normal rate, regular rhythm and intact distal pulses.  Exam reveals no gallop and no friction rub.    No murmur heard.  Pulmonary/Chest: Breath sounds normal. No respiratory distress. He has no wheezes. He has no rhonchi. He has no rales.   Abdominal: Soft. Bowel sounds are normal. He exhibits no distension. There is no tenderness.   Neurological: He is alert and oriented to person, place,  and time.       Significant Labs:   CBC:     Recent Labs  Lab 04/09/17  0359 04/10/17  0429   WBC 7.80 8.56   HGB 11.3* 11.1*   HCT 33.1* 32.1*    181     CMP:     Recent Labs  Lab 04/09/17  0359 04/10/17  0429   * 129*   K 4.4 4.2   CL 98 98   CO2 20* 22*   * 262*   BUN 18 17   CREATININE 1.0 1.1   CALCIUM 8.7 8.6*   ANIONGAP 11 9   EGFRNONAA >60.0 >60.0     POCT Glucose:     Recent Labs  Lab 04/10/17  0756 04/10/17  1008 04/10/17  1151   POCTGLUCOSE 273* 214* 223*

## 2017-04-10 NOTE — ASSESSMENT & PLAN NOTE
- continue vancomycin and cipro  - dose missed this am  - will check trough tomorrow prior to dose  - will determine length of therapy after amputation

## 2017-04-10 NOTE — ASSESSMENT & PLAN NOTE
MRI today shows evidence of early osteo in the R great toe.  -Amputation tomorrow per podiatry's assistance  -Gram stain from ulcer shows polymicrobial infection  -Continue vanc 15 mg/kg pending amputation w/ clear margins will continue for 48 hours postop  -Continue cipro 500 po BID  -BCx today shows diphtheroid sp. & GNR's - if WBC trend up or patient becomes febrile, broaden abx coverage

## 2017-04-10 NOTE — PROGRESS NOTES
[facility] Hospital Medicine  Progress Note    Patient Name: Carlos Cifuentes  MRN: [id number]  Patient Class: IP- Inpatient   Admission Date: 4/7/2017  Length of Stay: 2 days  Attending Physician: Alfreda Almanza MD  Primary Care Provider: Miguel Lux MD    [facility] Medicine Team: [facility] MED 2 Memo Castillo MD    Subjective:     Interval History:  OR w/ podiatry deferred today to tomorrow morning.  Touched base with vascular surgery who is still planning on LE angiogram w/ revascularization this Tuesday.  Spoke with the patient at some length this afternoon re: why revascualrization is necessary; he expressed strong reservations 2/2 to prior experience w/ surgery during prior hospitalization.  After our discussion, he stated he understood why he needed the operation and stated he had no further questions.    Review of Systems   Constitutional: Negative for chills and fever.   Respiratory: Negative for cough and shortness of breath.    Cardiovascular: Negative for chest pain and palpitations.   Gastrointestinal: Negative for constipation, diarrhea, nausea and vomiting.     Objective:     Vital Signs (Most Recent):  Temp: 99 °F (37.2 °C) (04/09/17 1548)  Pulse: 82 (04/09/17 1548)  Resp: 16 (04/09/17 1548)  BP: 134/67 (04/09/17 1548)  SpO2: 96 % (04/09/17 1548) Vital Signs (24h Range):  Temp:  [97.6 °F (36.4 °C)-99 °F (37.2 °C)] 99 °F (37.2 °C)  Pulse:  [65-88] 82  Resp:  [16-18] 16  SpO2:  [95 %-97 %] 96 %  BP: (114-147)/(57-69) 134/67     Weight: 109.5 kg (241 lb 6.5 oz)  Body mass index is 35.65 kg/(m^2).    Intake/Output Summary (Last 24 hours) at 04/09/17 1731  Last data filed at 04/08/17 1828   Gross per 24 hour   Intake              800 ml   Output                0 ml   Net              800 ml      Physical Exam   Constitutional: He is oriented to person, place, and time. No distress.   Obese M sitting up in bed in NAD   Cardiovascular: Normal rate, regular rhythm and intact distal  pulses.  Exam reveals no gallop and no friction rub.    No murmur heard.  Pulmonary/Chest: Breath sounds normal. No respiratory distress. He has no wheezes. He has no rhonchi. He has no rales.   Abdominal: Soft. Bowel sounds are normal. He exhibits no distension. There is no tenderness.   Neurological: He is alert and oriented to person, place, and time.       Significant Labs:   CBC:   Recent Labs  Lab 04/07/17 2139 04/09/17  0359   WBC 8.73 7.80   HGB 12.6* 11.3*   HCT 36.8* 33.1*    183     CMP:   Recent Labs  Lab 04/07/17 2139 04/09/17  0359   * 129*   K 4.9 4.4   CL 97 98   CO2 22* 20*   * 248*   BUN 27* 18   CREATININE 1.2 1.0   CALCIUM 9.1 8.7   PROT 7.7  --    ALBUMIN 2.9*  --    BILITOT 0.4  --    ALKPHOS 86  --    AST 15  --    ALT 17  --    ANIONGAP 10 11   EGFRNONAA >60.0 >60.0     POCT Glucose:   Recent Labs  Lab 04/08/17  1915 04/09/17  0734 04/09/17  1219   POCTGLUCOSE 186* 243* 260*     Assessment/Plan:      * Osteomyelitis of right foot  MRI today shows evidence of early osteo in the R great toe.  -Amputation tomorrow per podiatry's assistance  -Gram stain from ulcer shows polymicrobial infection  -Continue vanc 15 mg/kg pending amputation w/ clear margins will continue for 48 hours postop  -Continue cipro 500 po BID  -BCx today shows diphtheroid sp. & GNR's - if WBC trend up or patient becomes febrile, broaden abx coverage    DM type 2, uncontrolled, with neuropathy  -Continue w/ detemir 10 U again tonight 2/2 to am NPO status & adjust pending procedure  -Will probably need to be kept lower than home dose tomorrow as well 2/2 to patient being NPO again tomorrow night  -LD SSI  -POCT glucose checks qac & qhs      Chronic back pain  -Continue oxycontin 10 mg q12h PRN  -Continue IR Oxy 10 po q8h PRN    Neuropathic pain  -Continue home gabapentin 300 TID po    Chronic hepatitis C without hepatic coma  -Continue home ledipasvir-sofosbuvir  mg 1 tab po qd    Tobacco  abuse  -Continue nicotine 14 mg/24h patch  -Referral to smoking cessation at discharge    Peripheral arterial disease  -LAILA positive at 1.57  -Patient started on Plavix per vascular surgery's request - spoke with podiatry who gave their ascent as well  -Per vascuar surgery, still planning on R leg angio w/ revascularization on Tuesday  -Per vascular surgery, he with need a TTE w/ color flow, BL carotid US, US AAA screen, & CTA Abd, plevis, w/ LE runoff prior to Tuesday  -IVF per vascular surgery for renal protection prior to angiogram   -Continue asprin 81 mg & simvastatin 20 mg po qhs for the moment w/ plan to increase dose to 40 at discharge for risk factor modification    VTE Risk Mitigation         Ordered     Medium Risk of VTE  Once      04/07/17 9422          Memo Castillo MD  Department of Hospital Medicine   [facility]

## 2017-04-10 NOTE — PROGRESS NOTES
[facility] Hospital Medicine  Progress Note    Patient Name: Carlos Cifuentes  MRN: [id number]  Patient Class: IP- Inpatient   Admission Date: 4/7/2017  Length of Stay: 3 days  Attending Physician: Alfreda Almanza MD  Primary Care Provider: Miguel Lux MD    [facility] Medicine Team: [facility] MED 2 Jason Moore MD    Subjective:     Principal Problem:Osteomyelitis of right foot    HPI:  Mr. Cifuentes is a 56 yr-old mal with hx of Hep C (currently on Harvoni), Uncontrolled DM2 with peripheral neuropathy, Tobacco Abuse (47 pack years), Hx of Right Leg MRSA Infection (2014) who presents to [facility] ED for evaluation of the R Great Toe. Pt was seen earlier in the day at [facility] for great toe discoloration and coldness. It was recommended the pt be admitted for vascular surgery evaluation but pt left AMA. He now returns to [facility] for further management. Right great toe was normal yesterday morning (4/7/17) but began to become discolored in the afternoon. Even after washing his foot, it remained discolored. Pt belives the discoloration is due to his shoes and dye from black socks. He has no hx of PVD or stents placed in his legs. He denies claudication. He reports peripheral neuropathy and poor sensation in his feet. He has an ulcer of his right great toe on the plantar surface that started one week ago after he tried to sand a callous.     In the ED, pt afebrile with no leukocytosis. LAILA 1.6 right and 1.7 left. Large arteries of the foot with good doppler flow. DP/PT pulses barely palpable. Xray negative. Great toe is cold to touch. Swelling and redness present in right shin. Fentanyl patch present       Hospital Course:  Patient was admitted to medicine. Podiatry, vascular surgery, and infectious diseases were consulted. Wound cultures were obtained, growing gram negative rods so far. MRI was obtained showing osteo in the toe. ID recommended IV abx for now, and for a couple of days after surgery so  long as clean margins were obtained. Podiatry amputated the affected to on 4/10. Vascular surgery is planning for an angiogram of the LE on 4/11.     Interval History:  NAEON. This morning, pt reports chronic back pain, for which he usually takes 10 of lortab q3 hours, is still hurting him today. As well as some right leg pain. Requesting increased pain medication frequency. Otherwise, no fevers/chills, no CP/SOB, no nausea/vomit. Pt does report feeling thirsty, 2/2 NPO for procedure with podiatry this morning. Otherwise no other new complaints.    Review of Systems   Constitutional: Negative for chills and fever.   Respiratory: Negative for cough and shortness of breath.    Cardiovascular: Negative for chest pain and palpitations.   Gastrointestinal: Negative for constipation, diarrhea, nausea and vomiting.   Musculoskeletal: Positive for back pain (chronic).        Right foot and back pain.     Objective:     Vital Signs (Most Recent):  Temp: 98.4 °F (36.9 °C) (04/10/17 1420)  Pulse: (!) 55 (04/10/17 1420)  Resp: 16 (04/10/17 1420)  BP: (!) 109/56 (04/10/17 1420)  SpO2: 97 % (04/10/17 1420) Vital Signs (24h Range):  Temp:  [97.4 °F (36.3 °C)-99 °F (37.2 °C)] 98.4 °F (36.9 °C)  Pulse:  [53-82] 55  Resp:  [15-18] 16  SpO2:  [95 %-100 %] 97 %  BP: ()/(53-77) 109/56     Weight: 109.5 kg (241 lb 6.5 oz)  Body mass index is 35.65 kg/(m^2).    Intake/Output Summary (Last 24 hours) at 04/10/17 1508  Last data filed at 04/10/17 1126   Gross per 24 hour   Intake             1050 ml   Output                2 ml   Net             1048 ml      Physical Exam   Constitutional: He is oriented to person, place, and time. No distress.   Obese M sitting up in bed in NAD   Cardiovascular: Normal rate, regular rhythm and intact distal pulses.  Exam reveals no gallop and no friction rub.    No murmur heard.  Pulmonary/Chest: Breath sounds normal. No respiratory distress. He has no wheezes. He has no rhonchi. He has no rales.    Abdominal: Soft. Bowel sounds are normal. He exhibits no distension. There is no tenderness.   Neurological: He is alert and oriented to person, place, and time.       Significant Labs:   CBC:     Recent Labs  Lab 04/09/17  0359 04/10/17  0429   WBC 7.80 8.56   HGB 11.3* 11.1*   HCT 33.1* 32.1*    181     CMP:     Recent Labs  Lab 04/09/17  0359 04/10/17  0429   * 129*   K 4.4 4.2   CL 98 98   CO2 20* 22*   * 262*   BUN 18 17   CREATININE 1.0 1.1   CALCIUM 8.7 8.6*   ANIONGAP 11 9   EGFRNONAA >60.0 >60.0     POCT Glucose:     Recent Labs  Lab 04/10/17  0756 04/10/17  1008 04/10/17  1151   POCTGLUCOSE 273* 214* 223*     Assessment/Plan:      * Osteomyelitis of right foot  MRI today shows evidence of early osteo in the R great toe.  -Amputation tomorrow per podiatry's assistance  -Gram stain from ulcer shows polymicrobial infection  -Continue vanc 15 mg/kg pending amputation w/ clear margins will continue for 48 hours postop  -Continue cipro 500 po BID  -BCx with diphtheroid sp. And klebsiella   - continue abx. F/u operative cultures too   - f/u ID recs for any further adjustments      DM type 2, uncontrolled, with neuropathy  -pt started on detemir 10 U qHS   - glucose consistently high   - increasing to 13 units qHS, continue LDSSI   - continue to monitor glucose, adjust dose as needed    Chronic back pain  -pt takes 10 mg lortab q3 hours at home   - we started oxycontin 10 mg q12h PRN here, switch long acting pain medication to scheduled for now, given pending surgery  -Continue IR Oxy 10 po, increase frequency to q4h PRN   - continue to monitor symptoms    Anemia of other chronic disease  Hemoglobin stable   - continue to monitor    Neuropathic pain  -Continue home gabapentin 300 TID po   - if patient continues to report shooting leg pain, will consider increasing this dose    Chronic hepatitis C without hepatic coma  -Continue home ledipasvir-sofosbuvir  mg 1 tab po qd    Tobacco  abuse  -Continue nicotine 14 mg/24h patch  -Referral to smoking cessation at discharge    Peripheral arterial disease  -LAILA positive at 1.57  -Patient started on Plavix per vascular surgery's request - spoke with podiatry who gave their ascent as well  -Per vascuar surgery, still planning on R leg angio w/ revascularization on Tuesday  -Per vascular surgery, he with need a TTE w/ color flow, BL carotid US, US AAA screen, & CTA Abd, plevis, w/ LE runoff prior to Tuesday's procedure. All ordered, still pending.  -IVF per vascular surgery for renal protection prior to angiogram   -Continue asprin 81 mg & simvastatin 20 mg po qhs for the moment w/ plan to increase dose to 40 at discharge for risk factor modification    VTE Risk Mitigation         Ordered     Medium Risk of VTE  Once      04/07/17 1136          Jason Moore MD  Department of Hospital Medicine   [facility]

## 2017-04-10 NOTE — ASSESSMENT & PLAN NOTE
-LAILA positive at 1.57  -Patient started on Plavix per vascular surgery's request - spoke with podiatry who gave their ascent as well  -Per vascuar surgery, still planning on R leg angio w/ revascularization on Tuesday  -Per vascular surgery, he with need a TTE w/ color flow, BL carotid US, US AAA screen, & CTA Abd, plevis, w/ LE runoff prior to Tuesday's procedure. All ordered, still pending.  -IVF per vascular surgery for renal protection prior to angiogram   -Continue asprin 81 mg & simvastatin 20 mg po qhs for the moment w/ plan to increase dose to 40 at discharge for risk factor modification

## 2017-04-10 NOTE — SUBJECTIVE & OBJECTIVE
Interval History: No AEON.  Toe wound culture growing moderate diptheroids and Klebsiella Oxytoca sensitive to Cipro.  Went to OR this AM for right great toe resection.  Pain controlled.  The patient denies any recent fever, chills, or sweats.      Review of Systems   Constitutional: Negative for chills, diaphoresis and fever.   Respiratory: Negative for shortness of breath.    Cardiovascular: Negative for chest pain.   Gastrointestinal: Negative for abdominal pain, diarrhea, nausea and vomiting.   Genitourinary: Negative for dysuria and hematuria.     Objective:     Vital Signs (Most Recent):  Temp: 97.7 °F (36.5 °C) (04/10/17 0848)  Pulse: 79 (04/10/17 0848)  Resp: 18 (04/10/17 0848)  BP: 119/64 (04/10/17 0848)  SpO2: 97 % (04/10/17 0848) Vital Signs (24h Range):  Temp:  [97.7 °F (36.5 °C)-99 °F (37.2 °C)] 97.7 °F (36.5 °C)  Pulse:  [63-88] 79  Resp:  [16-18] 18  SpO2:  [95 %-98 %] 97 %  BP: (119-150)/(63-71) 119/64     Weight: 109.5 kg (241 lb 6.5 oz)  Body mass index is 35.65 kg/(m^2).    Estimated Creatinine Clearance: 92.5 mL/min (based on Cr of 1.1).    Physical Exam   Constitutional: He is oriented to person, place, and time. He appears well-developed and well-nourished. No distress.   Cardiovascular: Normal rate and regular rhythm.    No murmur heard.  Pulmonary/Chest: Effort normal and breath sounds normal. No respiratory distress.   Abdominal: Soft. Bowel sounds are normal. He exhibits no distension.   Musculoskeletal: Normal range of motion. He exhibits no edema.   Neurological: He is alert and oriented to person, place, and time.   Skin: Skin is warm and dry.   Right foot wound dressed; mild erythema and warmth, swelling noted to the right lower extremity   Psychiatric: He has a normal mood and affect. His behavior is normal.       Significant Labs:   Blood Culture:   Recent Labs  Lab 04/07/17 2053 04/07/17 2116   LABBLOO No Growth to date  No Growth to date  No Growth to date No Growth to date  No  Growth to date  No Growth to date     CBC:   Recent Labs  Lab 04/09/17  0359 04/10/17  0429   WBC 7.80 8.56   HGB 11.3* 11.1*   HCT 33.1* 32.1*    181     CMP:   Recent Labs  Lab 04/09/17  0359 04/10/17  0429   * 129*   K 4.4 4.2   CL 98 98   CO2 20* 22*   * 262*   BUN 18 17   CREATININE 1.0 1.1   CALCIUM 8.7 8.6*   ANIONGAP 11 9   EGFRNONAA >60.0 >60.0     Wound Culture:   Recent Labs  Lab 04/08/17  0854   LABAERO KLEBSIELLA OXYTOCAModerate  DIPHTHEROIDSModerate       Significant Imaging: I have reviewed all pertinent imaging results/findings within the past 24 hours.     MRI Lower Extremity Without Contrast Right [[id number]] Resulted: 04/08/17 1631       Order Status: Completed Updated: 04/08/17 1631       Narrative:         Technique: Multiplanar, multisequence MR imaging of the right forefoot with the use of intravenous gadolinium IV contrast (10 cc Gadovist).    Comparison: Plain film examination 4/7/17    Results:  Muscular edema identified, likely neural vascular mediated relating to patient's diabetes.  There is diffuse edema identified subcutaneous soft tissues predominantly dorsum of foot. The predominant finding is that of sandra ulceration plantar aspect of the distal distal interphalangeal joint of the RIGHT great toe.  At the site of ulceration, plantar aspect, there is some loss of cortical margin at the middle and distal phalanx.  Additionally, geographically marginated bone marrow edema is identified predominantly in the distal phalanx with a small amount of joint fluid.  Postcontrast images demonstrate minimal diffuse enhancement of the distal phalanx and distal aspect of the middle phalanx.  While nonspecific, these findings are suspicious for very early osteomyelitis distal phalanx, and possibly the distal aspect of the middle phalanx.       Impression:             Ulcer plantar aspect RIGHT great toe, level of the distal interphalangeal joint, with abnormal marrow/cortical  signal distal phalanx greater than the distal aspect of the middle phalanx, suspicious for early osteomyelitis.      Electronically signed by: Dr. Kevin Sanchez MD  Date: 04/08/17  Time: 16:31

## 2017-04-10 NOTE — PLAN OF CARE
Problem: Patient Care Overview  Goal: Plan of care Review  Outcome: Ongoing (interventions implemented as appropriate)  Pt AAOx4. Remained calm and cooperative throughout shift. IV access obtained after several attempts last night. Pt remained free from injury or distress. Afebrile. Educated pt about factors contributing to elevated blood sugar. Pt verbalized understanding. Pt received PRN pain meds. Bed in lowset position and locked, call light within reach.

## 2017-04-10 NOTE — ANESTHESIA POSTPROCEDURE EVALUATION
"Anesthesia Post Evaluation    Patient: Carlos Cifuentes    Procedure(s) Performed: Procedure(s) (LRB):  AMPUTATION-TOE (Right)    Final Anesthesia Type: general  Patient location during evaluation: PACU  Patient participation: Yes- Able to Participate  Level of consciousness: awake and alert  Post-procedure vital signs: reviewed and stable  Pain management: adequate  Airway patency: patent  PONV status at discharge: No PONV  Anesthetic complications: no      Cardiovascular status: blood pressure returned to baseline  Respiratory status: spontaneous ventilation and room air  Hydration status: euvolemic  Follow-up not needed.        Visit Vitals    BP (!) 95/53 (BP Location: Left arm, Patient Position: Lying, BP Method: Automatic)    Pulse (!) 56    Temp 36.3 °C (97.4 °F) (Oral)    Resp 17    Ht 5' 9" (1.753 m)    Wt 109.5 kg (241 lb 6.5 oz)    SpO2 95%    BMI 35.65 kg/m2       Pain/Ojse E Score: Pain Assessment Performed: Yes (4/10/2017  1:33 PM)  Presence of Pain: denies (4/10/2017  1:33 PM)  Pain Rating Prior to Med Admin: 9 (4/10/2017  8:56 AM)  Jose E Score: 9 (4/10/2017 12:14 PM)      "

## 2017-04-11 ENCOUNTER — SURGERY (OUTPATIENT)
Age: 57
End: 2017-04-11

## 2017-04-11 LAB
ANION GAP SERPL CALC-SCNC: 8 MMOL/L
BASOPHILS # BLD AUTO: 0.03 K/UL
BASOPHILS NFR BLD: 0.4 %
BUN SERPL-MCNC: 16 MG/DL
CALCIUM SERPL-MCNC: 8.9 MG/DL
CHLORIDE SERPL-SCNC: 98 MMOL/L
CO2 SERPL-SCNC: 27 MMOL/L
CREAT SERPL-MCNC: 1.1 MG/DL
DIFFERENTIAL METHOD: ABNORMAL
EOSINOPHIL # BLD AUTO: 0.2 K/UL
EOSINOPHIL NFR BLD: 1.9 %
ERYTHROCYTE [DISTWIDTH] IN BLOOD BY AUTOMATED COUNT: 13.8 %
EST. GFR  (AFRICAN AMERICAN): >60 ML/MIN/1.73 M^2
EST. GFR  (NON AFRICAN AMERICAN): >60 ML/MIN/1.73 M^2
GLUCOSE SERPL-MCNC: 166 MG/DL
HCT VFR BLD AUTO: 33.6 %
HGB BLD-MCNC: 11.1 G/DL
LYMPHOCYTES # BLD AUTO: 1.3 K/UL
LYMPHOCYTES NFR BLD: 15.5 %
MAGNESIUM SERPL-MCNC: 1.6 MG/DL
MCH RBC QN AUTO: 28 PG
MCHC RBC AUTO-ENTMCNC: 33 %
MCV RBC AUTO: 85 FL
MONOCYTES # BLD AUTO: 0.7 K/UL
MONOCYTES NFR BLD: 8 %
NEUTROPHILS # BLD AUTO: 6.2 K/UL
NEUTROPHILS NFR BLD: 73.7 %
PLATELET # BLD AUTO: 181 K/UL
PMV BLD AUTO: 10.5 FL
POCT GLUCOSE: 147 MG/DL (ref 70–110)
POCT GLUCOSE: 174 MG/DL (ref 70–110)
POCT GLUCOSE: 181 MG/DL (ref 70–110)
POCT GLUCOSE: 238 MG/DL (ref 70–110)
POCT GLUCOSE: 314 MG/DL (ref 70–110)
POTASSIUM SERPL-SCNC: 4.7 MMOL/L
RBC # BLD AUTO: 3.97 M/UL
SODIUM SERPL-SCNC: 133 MMOL/L
WBC # BLD AUTO: 8.41 K/UL

## 2017-04-11 PROCEDURE — 71000016 HC POSTOP RECOV ADDL HR: Performed by: SURGERY

## 2017-04-11 PROCEDURE — 99232 SBSQ HOSP IP/OBS MODERATE 35: CPT | Mod: ,,, | Performed by: HOSPITALIST

## 2017-04-11 PROCEDURE — 63600175 PHARM REV CODE 636 W HCPCS: Performed by: INTERNAL MEDICINE

## 2017-04-11 PROCEDURE — C1760 CLOSURE DEV, VASC: HCPCS | Performed by: SURGERY

## 2017-04-11 PROCEDURE — 71000015 HC POSTOP RECOV 1ST HR: Performed by: SURGERY

## 2017-04-11 PROCEDURE — 25000003 PHARM REV CODE 250: Performed by: SURGERY

## 2017-04-11 PROCEDURE — C1894 INTRO/SHEATH, NON-LASER: HCPCS | Performed by: SURGERY

## 2017-04-11 PROCEDURE — 25500020 PHARM REV CODE 255: Performed by: SURGERY

## 2017-04-11 PROCEDURE — D9220A PRA ANESTHESIA: Mod: CRNA,,, | Performed by: NURSE ANESTHETIST, CERTIFIED REGISTERED

## 2017-04-11 PROCEDURE — 37000009 HC ANESTHESIA EA ADD 15 MINS: Performed by: SURGERY

## 2017-04-11 PROCEDURE — 11000001 HC ACUTE MED/SURG PRIVATE ROOM

## 2017-04-11 PROCEDURE — 80048 BASIC METABOLIC PNL TOTAL CA: CPT

## 2017-04-11 PROCEDURE — 82962 GLUCOSE BLOOD TEST: CPT | Performed by: SURGERY

## 2017-04-11 PROCEDURE — 99900035 HC TECH TIME PER 15 MIN (STAT)

## 2017-04-11 PROCEDURE — 25000003 PHARM REV CODE 250: Performed by: PHYSICIAN ASSISTANT

## 2017-04-11 PROCEDURE — 99499 UNLISTED E&M SERVICE: CPT | Mod: ,,, | Performed by: PHYSICIAN ASSISTANT

## 2017-04-11 PROCEDURE — 63600175 PHARM REV CODE 636 W HCPCS: Performed by: PHYSICIAN ASSISTANT

## 2017-04-11 PROCEDURE — 25000003 PHARM REV CODE 250: Performed by: NURSE ANESTHETIST, CERTIFIED REGISTERED

## 2017-04-11 PROCEDURE — C1887 CATHETER, GUIDING: HCPCS | Performed by: SURGERY

## 2017-04-11 PROCEDURE — 71000045 HC DOSC ROUTINE RECOVERY EA ADD'L HR: Performed by: SURGERY

## 2017-04-11 PROCEDURE — 71000044 HC DOSC ROUTINE RECOVERY FIRST HOUR: Performed by: SURGERY

## 2017-04-11 PROCEDURE — 99406 BEHAV CHNG SMOKING 3-10 MIN: CPT

## 2017-04-11 PROCEDURE — 37226 PR FEM/POPL REVASC W/STENT: CPT | Mod: 51,RT,, | Performed by: SURGERY

## 2017-04-11 PROCEDURE — 63600175 PHARM REV CODE 636 W HCPCS: Performed by: NURSE ANESTHETIST, CERTIFIED REGISTERED

## 2017-04-11 PROCEDURE — 25000003 PHARM REV CODE 250: Performed by: ANESTHESIOLOGY

## 2017-04-11 PROCEDURE — 36000707: Performed by: SURGERY

## 2017-04-11 PROCEDURE — 25000003 PHARM REV CODE 250: Performed by: INTERNAL MEDICINE

## 2017-04-11 PROCEDURE — 37228 PR TIB/PER REVASC W/TLA: CPT | Mod: RT,,, | Performed by: SURGERY

## 2017-04-11 PROCEDURE — 37000008 HC ANESTHESIA 1ST 15 MINUTES: Performed by: SURGERY

## 2017-04-11 PROCEDURE — 63600175 PHARM REV CODE 636 W HCPCS: Performed by: ANESTHESIOLOGY

## 2017-04-11 PROCEDURE — 27201423 OPTIME MED/SURG SUP & DEVICES STERILE SUPPLY: Performed by: SURGERY

## 2017-04-11 PROCEDURE — C1769 GUIDE WIRE: HCPCS | Performed by: SURGERY

## 2017-04-11 PROCEDURE — C1876 STENT, NON-COA/NON-COV W/DEL: HCPCS | Performed by: SURGERY

## 2017-04-11 PROCEDURE — 047K3DZ DILATION OF RIGHT FEMORAL ARTERY WITH INTRALUMINAL DEVICE, PERCUTANEOUS APPROACH: ICD-10-PCS | Performed by: SURGERY

## 2017-04-11 PROCEDURE — 36415 COLL VENOUS BLD VENIPUNCTURE: CPT

## 2017-04-11 PROCEDURE — 36000706: Performed by: SURGERY

## 2017-04-11 PROCEDURE — C1725 CATH, TRANSLUMIN NON-LASER: HCPCS | Performed by: SURGERY

## 2017-04-11 PROCEDURE — 83735 ASSAY OF MAGNESIUM: CPT

## 2017-04-11 PROCEDURE — 85025 COMPLETE CBC W/AUTO DIFF WBC: CPT

## 2017-04-11 PROCEDURE — B41F1ZZ FLUOROSCOPY OF RIGHT LOWER EXTREMITY ARTERIES USING LOW OSMOLAR CONTRAST: ICD-10-PCS | Performed by: SURGERY

## 2017-04-11 PROCEDURE — 25000003 PHARM REV CODE 250: Performed by: STUDENT IN AN ORGANIZED HEALTH CARE EDUCATION/TRAINING PROGRAM

## 2017-04-11 PROCEDURE — D9220A PRA ANESTHESIA: Mod: ANES,,, | Performed by: ANESTHESIOLOGY

## 2017-04-11 PROCEDURE — 75710 ARTERY X-RAYS ARM/LEG: CPT | Mod: 26,59,, | Performed by: SURGERY

## 2017-04-11 DEVICE — IMPLANTABLE DEVICE: Type: IMPLANTABLE DEVICE | Site: ARTERIAL | Status: FUNCTIONAL

## 2017-04-11 RX ORDER — NITROGLYCERIN 5 MG/ML
INJECTION, SOLUTION INTRAVENOUS
Status: DISCONTINUED | OUTPATIENT
Start: 2017-04-11 | End: 2017-04-11 | Stop reason: HOSPADM

## 2017-04-11 RX ORDER — IODIXANOL 320 MG/ML
INJECTION, SOLUTION INTRAVASCULAR
Status: DISCONTINUED | OUTPATIENT
Start: 2017-04-11 | End: 2017-04-11 | Stop reason: HOSPADM

## 2017-04-11 RX ORDER — SODIUM CHLORIDE 0.9 % (FLUSH) 0.9 %
3 SYRINGE (ML) INJECTION EVERY 8 HOURS
Status: DISCONTINUED | OUTPATIENT
Start: 2017-04-11 | End: 2017-04-11 | Stop reason: HOSPADM

## 2017-04-11 RX ORDER — FENTANYL CITRATE 50 UG/ML
25 INJECTION, SOLUTION INTRAMUSCULAR; INTRAVENOUS EVERY 5 MIN PRN
Status: COMPLETED | OUTPATIENT
Start: 2017-04-11 | End: 2017-04-11

## 2017-04-11 RX ORDER — FENTANYL CITRATE 50 UG/ML
INJECTION, SOLUTION INTRAMUSCULAR; INTRAVENOUS
Status: DISCONTINUED | OUTPATIENT
Start: 2017-04-11 | End: 2017-04-11

## 2017-04-11 RX ORDER — MAGNESIUM SULFATE HEPTAHYDRATE 40 MG/ML
2 INJECTION, SOLUTION INTRAVENOUS ONCE
Status: COMPLETED | OUTPATIENT
Start: 2017-04-11 | End: 2017-04-11

## 2017-04-11 RX ORDER — HEPARIN SODIUM 1000 [USP'U]/ML
INJECTION, SOLUTION INTRAVENOUS; SUBCUTANEOUS
Status: DISCONTINUED | OUTPATIENT
Start: 2017-04-11 | End: 2017-04-11 | Stop reason: HOSPADM

## 2017-04-11 RX ORDER — LIDOCAINE HYDROCHLORIDE 10 MG/ML
INJECTION, SOLUTION EPIDURAL; INFILTRATION; INTRACAUDAL; PERINEURAL
Status: DISCONTINUED | OUTPATIENT
Start: 2017-04-11 | End: 2017-04-11 | Stop reason: HOSPADM

## 2017-04-11 RX ORDER — PROTAMINE SULFATE 10 MG/ML
INJECTION, SOLUTION INTRAVENOUS
Status: DISCONTINUED | OUTPATIENT
Start: 2017-04-11 | End: 2017-04-11

## 2017-04-11 RX ORDER — SODIUM CHLORIDE 0.9 % (FLUSH) 0.9 %
3 SYRINGE (ML) INJECTION
Status: DISCONTINUED | OUTPATIENT
Start: 2017-04-11 | End: 2017-04-11 | Stop reason: HOSPADM

## 2017-04-11 RX ORDER — PROPOFOL 10 MG/ML
VIAL (ML) INTRAVENOUS CONTINUOUS PRN
Status: DISCONTINUED | OUTPATIENT
Start: 2017-04-11 | End: 2017-04-11

## 2017-04-11 RX ORDER — MIDAZOLAM HYDROCHLORIDE 1 MG/ML
INJECTION, SOLUTION INTRAMUSCULAR; INTRAVENOUS
Status: DISCONTINUED | OUTPATIENT
Start: 2017-04-11 | End: 2017-04-11

## 2017-04-11 RX ORDER — HEPARIN SODIUM 1000 [USP'U]/ML
INJECTION, SOLUTION INTRAVENOUS; SUBCUTANEOUS
Status: DISCONTINUED | OUTPATIENT
Start: 2017-04-11 | End: 2017-04-11

## 2017-04-11 RX ORDER — PROPOFOL 10 MG/ML
VIAL (ML) INTRAVENOUS
Status: DISCONTINUED | OUTPATIENT
Start: 2017-04-11 | End: 2017-04-11

## 2017-04-11 RX ORDER — ONDANSETRON 2 MG/ML
INJECTION INTRAMUSCULAR; INTRAVENOUS
Status: DISCONTINUED | OUTPATIENT
Start: 2017-04-11 | End: 2017-04-11

## 2017-04-11 RX ADMIN — FENTANYL CITRATE 25 MCG: 50 INJECTION, SOLUTION INTRAMUSCULAR; INTRAVENOUS at 09:04

## 2017-04-11 RX ADMIN — PROTAMINE SULFATE 5 MG: 10 INJECTION, SOLUTION INTRAVENOUS at 11:04

## 2017-04-11 RX ADMIN — PROPOFOL 30 MCG/KG/MIN: 10 INJECTION, EMULSION INTRAVENOUS at 10:04

## 2017-04-11 RX ADMIN — SIMVASTATIN 20 MG: 20 TABLET, FILM COATED ORAL at 08:04

## 2017-04-11 RX ADMIN — TRAZODONE HYDROCHLORIDE 100 MG: 50 TABLET ORAL at 08:04

## 2017-04-11 RX ADMIN — MIDAZOLAM HYDROCHLORIDE 1 MG: 1 INJECTION, SOLUTION INTRAMUSCULAR; INTRAVENOUS at 09:04

## 2017-04-11 RX ADMIN — FENTANYL CITRATE 25 MCG: 50 INJECTION INTRAMUSCULAR; INTRAVENOUS at 12:04

## 2017-04-11 RX ADMIN — HEPARIN SODIUM 10000 UNITS: 1000 INJECTION, SOLUTION INTRAVENOUS; SUBCUTANEOUS at 09:04

## 2017-04-11 RX ADMIN — LEDIPASVIR AND SOFOSBUVIR 1 TABLET: 90; 400 TABLET, FILM COATED ORAL at 08:04

## 2017-04-11 RX ADMIN — NICOTINE 1 PATCH: 14 PATCH, EXTENDED RELEASE TRANSDERMAL at 08:04

## 2017-04-11 RX ADMIN — HEPARIN SODIUM 10000 UNITS: 1000 INJECTION, SOLUTION INTRAVENOUS; SUBCUTANEOUS at 10:04

## 2017-04-11 RX ADMIN — OXYCODONE HYDROCHLORIDE 10 MG: 10 TABLET, FILM COATED, EXTENDED RELEASE ORAL at 08:04

## 2017-04-11 RX ADMIN — FENTANYL CITRATE 25 MCG: 50 INJECTION, SOLUTION INTRAMUSCULAR; INTRAVENOUS at 10:04

## 2017-04-11 RX ADMIN — IODIXANOL 100 ML: 320 INJECTION, SOLUTION INTRAVASCULAR at 10:04

## 2017-04-11 RX ADMIN — LIDOCAINE HYDROCHLORIDE 20 ML: 10 INJECTION, SOLUTION EPIDURAL; INFILTRATION; INTRACAUDAL; PERINEURAL at 11:04

## 2017-04-11 RX ADMIN — GABAPENTIN 300 MG: 300 CAPSULE ORAL at 08:04

## 2017-04-11 RX ADMIN — SODIUM CHLORIDE, PRESERVATIVE FREE 3 ML: 5 INJECTION INTRAVENOUS at 04:04

## 2017-04-11 RX ADMIN — GABAPENTIN 300 MG: 300 CAPSULE ORAL at 04:04

## 2017-04-11 RX ADMIN — SODIUM BICARBONATE: 84 INJECTION, SOLUTION INTRAVENOUS at 04:04

## 2017-04-11 RX ADMIN — NITROGLYCERIN 150 MCG: 5 INJECTION, SOLUTION INTRAVENOUS at 11:04

## 2017-04-11 RX ADMIN — DEXTROSE 2 G: 50 INJECTION, SOLUTION INTRAVENOUS at 10:04

## 2017-04-11 RX ADMIN — PROMETHAZINE HYDROCHLORIDE 12.5 MG: 12.5 TABLET ORAL at 05:04

## 2017-04-11 RX ADMIN — CIPROFLOXACIN HYDROCHLORIDE 500 MG: 500 TABLET, FILM COATED ORAL at 08:04

## 2017-04-11 RX ADMIN — VANCOMYCIN HYDROCHLORIDE 1750 MG: 1 INJECTION, POWDER, LYOPHILIZED, FOR SOLUTION INTRAVENOUS at 04:04

## 2017-04-11 RX ADMIN — HEPARIN SODIUM 1000 UNITS: 1000 INJECTION, SOLUTION INTRAVENOUS; SUBCUTANEOUS at 10:04

## 2017-04-11 RX ADMIN — CLOPIDOGREL 75 MG: 75 TABLET, FILM COATED ORAL at 08:04

## 2017-04-11 RX ADMIN — OXYCODONE HYDROCHLORIDE 10 MG: 5 TABLET ORAL at 02:04

## 2017-04-11 RX ADMIN — MAGNESIUM SULFATE IN WATER 2 G: 40 INJECTION, SOLUTION INTRAVENOUS at 08:04

## 2017-04-11 RX ADMIN — OXYCODONE HYDROCHLORIDE 10 MG: 5 TABLET ORAL at 03:04

## 2017-04-11 RX ADMIN — VANCOMYCIN HYDROCHLORIDE 2000 MG: 1 INJECTION, POWDER, LYOPHILIZED, FOR SOLUTION INTRAVENOUS at 04:04

## 2017-04-11 RX ADMIN — ONDANSETRON 4 MG: 2 INJECTION INTRAMUSCULAR; INTRAVENOUS at 10:04

## 2017-04-11 RX ADMIN — INSULIN ASPART 1 UNITS: 100 INJECTION, SOLUTION INTRAVENOUS; SUBCUTANEOUS at 08:04

## 2017-04-11 RX ADMIN — ASPIRIN 81 MG: 81 TABLET, COATED ORAL at 08:04

## 2017-04-11 RX ADMIN — PROPOFOL 30 MG: 10 INJECTION, EMULSION INTRAVENOUS at 10:04

## 2017-04-11 NOTE — ASSESSMENT & PLAN NOTE
-Continue home gabapentin 300 TID po  -If patient continues to report shooting leg pain, will consider increasing this dose

## 2017-04-11 NOTE — PROGRESS NOTES
Pt upset regarding needing to NPO for CT scan. Explained to pt that it was necessary and after the CT he would be able to eat/drink. Pt verbalized that if they dont take him before 6am he isnt going because he likes food.

## 2017-04-11 NOTE — PROGRESS NOTES
Patient Consulted by CTTS:     The following was discussed by the Tobacco Treatment Specialist:  ? Relevance of Quitting  ? Risk to Health  ? Long Term Risk  ? Risk for Others  ? Rewards of Quitting  ? Motivation Intervention to Quit        Patient referred to outpatient clinic

## 2017-04-11 NOTE — ASSESSMENT & PLAN NOTE
-Continue Levemir 13 U qhs, will probably need to go up tomorrow 2/2 to resumption of po intake  -Continue LDSSI  -Continue to monitor glucose, adjust dose as needed

## 2017-04-11 NOTE — PROGRESS NOTES
[facility] Hospital Medicine  Progress Note    Patient Name: Carlos Cifuentes  MRN: [id number]  Patient Class: IP- Inpatient   Admission Date: 4/7/2017  Length of Stay: 4 days  Attending Physician: Alfreda Almanza MD  Primary Care Provider: Miguel Lux MD    Hospital Medicine Team: [facility] HOSP MED 2 Memo Castillo MD    Subjective:     Interval History:   Yesterday Mr. Cifuentes went to the OR w/ podiatry for a ray amputation that he tolerated well.  Per op note he was debrided back to healthy bleeding tissue and will go back to the OR this upcoming Friday for closure.   Late last night he was scheduled to have a CTA abd, pelvis 2/ BL LE runoff, but per patient he was unable to tolerate either attempt 2/2 to claustrophobia.  Today he went to the OR with vascular surgery who placed a stent in the R dSFA (~50% stenosis) & balloon dilation to the R AT (<50% stenosis)    Review of Systems   Constitutional: Negative for chills and fever.   Respiratory: Negative for cough and shortness of breath.    Cardiovascular: Negative for chest pain and palpitations.   Gastrointestinal: Positive for abdominal distention. Negative for abdominal pain, blood in stool, constipation, diarrhea, nausea, rectal pain and vomiting.   Musculoskeletal: Positive for back pain.        RLE pain 2/2 to yesterday's operation     Objective:     Vital Signs (Most Recent):  Temp: 98.2 °F (36.8 °C) (04/11/17 1145)  Pulse: (!) 58 (04/11/17 1345)  Resp: 18 (04/11/17 1345)  BP: 120/63 (04/11/17 1330)  SpO2: 100 % (04/11/17 1345) Vital Signs (24h Range):  Temp:  [97.5 °F (36.4 °C)-99 °F (37.2 °C)] 98.2 °F (36.8 °C)  Pulse:  [52-75] 58  Resp:  [16-18] 18  SpO2:  [94 %-100 %] 100 %  BP: (102-125)/(40-83) 120/63     Weight: 109.5 kg (241 lb 6.5 oz)  Body mass index is 35.65 kg/(m^2).    Intake/Output Summary (Last 24 hours) at 04/11/17 1401  Last data filed at 04/11/17 1141   Gross per 24 hour   Intake             1290 ml   Output                20 ml   Net             1270 ml      Physical Exam   Constitutional: He is oriented to person, place, and time. No distress.   Obese M sitting up in bed in NAD   Cardiovascular: Normal rate and regular rhythm.  Exam reveals no gallop and no friction rub.    No murmur heard.  Pulmonary/Chest: Breath sounds normal. No respiratory distress. He has no wheezes. He has no rhonchi. He has no rales.   Abdominal: Soft. Bowel sounds are normal. He exhibits no distension. There is no tenderness.   Musculoskeletal:   RLE post operative changes w/ swelling noted in the LLE>RLE   Neurological: He is alert and oriented to person, place, and time.       Significant Labs:   CBC:   Recent Labs  Lab 04/10/17  0429 04/11/17  0358   WBC 8.56 8.41   HGB 11.1* 11.1*   HCT 32.1* 33.6*    181     CMP:   Recent Labs  Lab 04/10/17  0429 04/11/17  0358   * 133*   K 4.2 4.7   CL 98 98   CO2 22* 27   * 166*   BUN 17 16   CREATININE 1.1 1.1   CALCIUM 8.6* 8.9   ANIONGAP 9 8   EGFRNONAA >60.0 >60.0     POCT Glucose:   Recent Labs  Lab 04/11/17  0800 04/11/17  0919 04/11/17  1244   POCTGLUCOSE 181* 174* 147*       Significant Imaging: I have reviewed all pertinent imaging results/findings within the past 24 hours.    Assessment/Plan:      * Osteomyelitis of right foot  -Gram stain from ulcer shows polymicrobial infection  -Continue vanc 20 mg  -Continue cipro 500 po BID  -BCx with diphtheroid sp. And klebsiella   -Continue abx. Per ID w/ plan to keep on abx at least a total of 10 d for cellulitis associated with osto & longer duration Abx pending results from Cx taken during closure in OR Friday w/ podiatry    DM type 2, uncontrolled, with neuropathy  -Continue Levemir 13 U qhs, will probably need to go up tomorrow 2/2 to resumption of po intake  -Continue LDSSI  -Continue to monitor glucose, adjust dose as needed    Chronic back pain  -Pt takes 10 mg lortab q3 hours at home  -Continue IR Oxy 10 po q4h PRN    Anemia of  other chronic disease  Hemoglobin stable  -Continue to monitor    Neuropathic pain  -Continue home gabapentin 300 TID po  -If patient continues to report shooting leg pain, will consider increasing this dose    Chronic hepatitis C without hepatic coma  -Continue home ledipasvir-sofosbuvir  mg 1 tab po qd    Tobacco abuse  -Continue nicotine 14 mg/24h patch  -Referral to smoking cessation at discharge    Cellulitis of right foot  -Per ID, continue on Abx for at least 10 days    Peripheral arterial disease  -LAILA positive at 1.57  -Patient had revascularization today in OR w/ vascular surgery - stent to R SFA & PTA of R AT that went well per Op note - vascular surgery's assistance is appreciated  -Continue asprin 81 mg & simvastatin 20 mg po qhs for the moment w/ plan to increase dose to 40 at discharge for risk factor modification  -Continue plavix 75 mg po qd    VTE Risk Mitigation         Ordered     Medium Risk of VTE  Once      04/07/17 2142          Memo Castillo MD  Department of Hospital Medicine   [facility]

## 2017-04-11 NOTE — ASSESSMENT & PLAN NOTE
-Gram stain from ulcer shows polymicrobial infection  -Continue vanc 20 mg  -Continue cipro 500 po BID  -BCx with diphtheroid sp. And klebsiella   -Continue abx. Per ID w/ plan to keep on abx at least a total of 10 d for cellulitis associated with osto & longer duration Abx pending results from Cx taken during closure in OR Friday w/ podiatry

## 2017-04-11 NOTE — NURSING TRANSFER
Nursing Transfer Note      4/11/2017     Transfer To: 1142 From: [facility]    Transfer via bed    Transfer with na    Transported by escort    Medicines sent: none    Chart send with patient: Yes    Notified: AndiRN (11th floor)/ family updated by MD after procedure    Patient reassessed at: 4/11/17 0692 (date, time)    Upon arrival to floor: patient oriented to room, call bell in reach and bed in lowest position

## 2017-04-11 NOTE — SUBJECTIVE & OBJECTIVE
Interval History:   Yesterday Mr. Cifuentes went to the OR w/ podiatry for a ray amputation that he tolerated well.  Per op note he was debrided back to healthy bleeding tissue and will go back to the OR this upcoming Friday for closure.   Late last night he was scheduled to have a CTA abd, pelvis 2/ BL LE runoff, but per patient he was unable to tolerate either attempt 2/2 to claustrophobia.  Today he went to the OR with vascular surgery who placed a stent in the R dSFA (~50% stenosis) & balloon dilation to the R AT (<50% stenosis)    Review of Systems   Constitutional: Negative for chills and fever.   Respiratory: Negative for cough and shortness of breath.    Cardiovascular: Negative for chest pain and palpitations.   Gastrointestinal: Positive for abdominal distention. Negative for abdominal pain, blood in stool, constipation, diarrhea, nausea, rectal pain and vomiting.   Musculoskeletal: Positive for back pain.        RLE pain 2/2 to yesterday's operation     Objective:     Vital Signs (Most Recent):  Temp: 98.2 °F (36.8 °C) (04/11/17 1145)  Pulse: (!) 58 (04/11/17 1345)  Resp: 18 (04/11/17 1345)  BP: 120/63 (04/11/17 1330)  SpO2: 100 % (04/11/17 1345) Vital Signs (24h Range):  Temp:  [97.5 °F (36.4 °C)-99 °F (37.2 °C)] 98.2 °F (36.8 °C)  Pulse:  [52-75] 58  Resp:  [16-18] 18  SpO2:  [94 %-100 %] 100 %  BP: (102-125)/(40-83) 120/63     Weight: 109.5 kg (241 lb 6.5 oz)  Body mass index is 35.65 kg/(m^2).    Intake/Output Summary (Last 24 hours) at 04/11/17 1401  Last data filed at 04/11/17 1141   Gross per 24 hour   Intake             1290 ml   Output               20 ml   Net             1270 ml      Physical Exam   Constitutional: He is oriented to person, place, and time. No distress.   Obese M sitting up in bed in NAD   Cardiovascular: Normal rate and regular rhythm.  Exam reveals no gallop and no friction rub.    No murmur heard.  Pulmonary/Chest: Breath sounds normal. No respiratory distress. He has no  wheezes. He has no rhonchi. He has no rales.   Abdominal: Soft. Bowel sounds are normal. He exhibits no distension. There is no tenderness.   Musculoskeletal:   RLE post operative changes w/ swelling noted in the LLE>RLE   Neurological: He is alert and oriented to person, place, and time.       Significant Labs:   CBC:   Recent Labs  Lab 04/10/17  0429 04/11/17  0358   WBC 8.56 8.41   HGB 11.1* 11.1*   HCT 32.1* 33.6*    181     CMP:   Recent Labs  Lab 04/10/17  0429 04/11/17  0358   * 133*   K 4.2 4.7   CL 98 98   CO2 22* 27   * 166*   BUN 17 16   CREATININE 1.1 1.1   CALCIUM 8.6* 8.9   ANIONGAP 9 8   EGFRNONAA >60.0 >60.0     POCT Glucose:   Recent Labs  Lab 04/11/17  0800 04/11/17  0919 04/11/17  1244   POCTGLUCOSE 181* 174* 147*       Significant Imaging: I have reviewed all pertinent imaging results/findings within the past 24 hours.

## 2017-04-11 NOTE — OP NOTE
Date: 04/11/2017    Surgeon: Emil Edmonds MD FACS    Assistant: FERNANDO Ferrer DO    Pre-op Diagnosis: Severe peripheral arterial disease; Salas chronic limb ischemia class; *Occlusion of the R tibial (AT) vessels; *Ulcer of R 1st toe - plantar aspect foot with necrosis/fat exposed    Post-op Diagnosis: Same    Procedures:   1. Ultrasound-guided access to the L common femoral artery.   2. R lower extremity angiogram with selection of R AT / 3rd order vessel; *Of note, no pre-operative angiogram / CTA was available.  3. Stent placement in R distal SFA w 6x30 mm Cook Zenith stent, post-dilated w 6 mm balloon  4. PTA R AT with a 2.0 x 80 mm balloon  5. 5-fr Mynx closure of L CFA     Anesthesia: Local MAC    EBL: Minimal    Anesthesia: Local    Findings:   Successful revascularization / resolution of stenosis; R DP pulse  Contrast used: 39 ml  Fluoro time: 20.8mins  Radiation: 420.5 mGy    Complications:  None; patient tolerated the procedure well.    Disposition: Recoery- hemodynamically stable in good condition    Attending Attestation: I was present and scrubbed for the entire procedure.  After an informed consent was obtained the patient was brought to the interventional suite and placed in the supine position. Both the patient and procedure were confirmed and identified during timeout process. The patient received perioperative antibiotics. The patient's bilateral groins were prepped and draped in usual sterile fashion. Using ultrasound guidance, the L common femoral artery vessel patency was confirmed. Then direct ultrasound guidance the L CFA was entered with a 21-G needle, Mandril wire and 4-Fr micropuncture needle. Then under fluoroscopic guidance, an 0.035-in wire was placed into the distal aorta. The micropuncture dilator was then exchanged over the wire for a 5-Filipino sheath. Sheathogram demonstrated access in the CFA. Next a VCF-catheter was placed over the wire and into the distal aorta under  fluoroscopy and an aortogram R leg was performed which demonstrated:  Aorto-iliac vessels: patent - some small caliber in L distal EIAs  R Common femoral, profunda femoral arteries: patent  R Superficial femoral artery: moderate ~50% stenosis distally  R Popliteal artery: patent  Tibials: R AT has a mild stenosis proximally (<50%) and distally has a focal occlusion that reconstitutes R DP    Based on the images from this diagnostic angio, a decision was made to intervene. We then systemically heparinized the patient with 1100u of heparin.   Next, we placed a up-and-over sheath and used a 0.14-inch ChoICE PT ES wire to selected the R DP artery. Treatment: Stent placement in R distal SFA w 6x30 mm Lookwider Zenith stent, post-dilated w 6 mm balloon; PTA R AT with a 2.0 x 80 mm balloon A follow-up angiogram showed resolution of the lesions. Heparin was reversed with protamine. We then deployed a 5-Yoruba Mynx closure device without issue. At the conclusion of the case the patient was noted to have no evidence of a groin hematoma. All instrument and sponge counts were correct at the end of the case. The patient tolerated the procedure well and was transferred to the pacu for further recovery.     Emil Edmonds MD FACS  Vascular/Endovascular Surgery

## 2017-04-11 NOTE — TRANSFER OF CARE
"Anesthesia Transfer of Care Note    Patient: Carlos Cifuentes    Procedure(s) Performed: Procedure(s) (LRB):  ANGIOGRAM-EXTREMITY-LOWER (Right)  ANGIOPLASTY WITH STENT PLACEMENT to right SFA (Right)    Patient location: [facility]    Anesthesia Type: general    Transport from OR: Transported from OR on room air with adequate spontaneous ventilation    Post pain: adequate analgesia    Post assessment: no apparent anesthetic complications and tolerated procedure well    Post vital signs: stable    Level of consciousness: sedated    Nausea/Vomiting: no nausea/vomiting    Complications: none          Last vitals:   Visit Vitals    /60 (BP Location: Right arm, Patient Position: Lying, BP Method: Automatic)    Pulse 65    Temp 36.7 °C (98.1 °F) (Oral)    Resp 18    Ht 5' 9" (1.753 m)    Wt 109.5 kg (241 lb 6.5 oz)    SpO2 96%    BMI 35.65 kg/m2     "

## 2017-04-11 NOTE — PROGRESS NOTES
Dr. Edmonds at bedside discussing surgery with patients family. MD redressed wound to right foot using gauze and tegaderm to be able to assess right DP pulse- palpable DP pulse to right foot noted.

## 2017-04-11 NOTE — PLAN OF CARE
Miguel Lux MD   [address] [facility] [address] / [place] [address]       \[facility]\"" Pharmacy - [facility] [place] - [address]  [address]  [place] [address]  Phone: [phone] Fax: [phone]    Payor: MEDICAID / Plan: [employer] [facility] [employer] / Product Type: Managed Medicaid /      Patient was given the Discharge Information and Education packet.       04/11/17 0834   Discharge Assessment   Assessment Type Discharge Planning Assessment   Confirmed/corrected address and phone number on facesheet? Yes   Assessment information obtained from? Patient   Expected Length of Stay (days) 3   Communicated expected length of stay with patient/caregiver yes   Prior to hospitilization cognitive status: Alert/Oriented   Prior to hospitalization functional status: Assistive Equipment   Current cognitive status: Alert/Oriented   Current Functional Status: Assistive Equipment   Arrived From home or self-care   Lives With alone   Able to Return to Prior Arrangements yes   Is patient able to care for self after discharge? Yes   Who are your caregiver(s) and their phone number(s)? Elvia Verdin (sister) [phone]   Patient's perception of discharge disposition home or selfcare   Readmission Within The Last 30 Days no previous admission in last 30 days   Patient currently being followed by outpatient case management? No   Patient currently receives home health services? No   Does the patient currently use HME? Yes   Patient currently receives private duty nursing? No   Patient currently receives any other outside agency services? No   Equipment Currently Used at Home cane, straight   Do you have any problems affording any of your prescribed medications? No   Is the patient taking medications as prescribed? yes   Do you have any financial concerns preventing you from receiving the healthcare you need? No   Does the patient have transportation to healthcare appointments? Yes   Transportation Available family or friend  will provide   On Dialysis? No   Does the patient receive services at the Coumadin Clinic? No   Discharge Plan A Home   Discharge Plan B Home Health   Patient/Family In Agreement With Plan yes

## 2017-04-11 NOTE — ASSESSMENT & PLAN NOTE
-LAILA positive at 1.57  -Patient had revascularization today in OR w/ vascular surgery - stent to R SFA & PTA of R AT that went well per Op note - vascular surgery's assistance is appreciated  -Continue asprin 81 mg & simvastatin 20 mg po qhs for the moment w/ plan to increase dose to 40 at discharge for risk factor modification  -Continue plavix 75 mg po qd

## 2017-04-11 NOTE — PLAN OF CARE
Patient in the OR so unable to be seen today.  Continue cipro 500 mg PO BID. Continue vancomycin- will increase vanc to 2 gram IV q 12 hours with repeat trough prior to the 4th dose.   Podiatry has plans to take cultures/pathology of clean margins when they close patient later in the week. Can determine if patient needs long term antibiotics once those results are finalized. Needs at least 10 days of antibiotics for cellulitis. Will follow with you.

## 2017-04-11 NOTE — PLAN OF CARE
Problem: Patient Care Overview  Goal: Plan of care Review  Outcome: Ongoing (interventions implemented as appropriate)  Pt AAOx4. Remained calm and cooperative throughout shift. Pt remained free from injury or distress. Right foot covered with 4x4 , betadine packing, and ace bandage. No signs of infection noted. Afebrile. Pt verbalized understanding. Pt received PRN pain meds. Family at bedside. Bed in lowset position and locked, call light within reach.

## 2017-04-11 NOTE — OP NOTE
Date: 4/10/17    Surgeon: Dr. Alexia Li, DPM Primary; Dr. Herberth DPM PGY-3 First assist    Preoperative Diagnosis:    Ischemic toe [I99.8]  Diabetic ulcer of toe of right foot associated with type 2 diabetes mellitus, with fat layer exposed [E11.621, L97.512]    Postoperative Diagnosis: Same    Procedure Performed:  AMPUTATION-TOE (Right)    Anaesthesia: Monitored anesthesia care with local     Hemostasis:  None    Estimated Blood Loss: 5 mL    Specimen: Right great toe to pathology    Culture: None, will take culture of clean margin at return surgery with attempted closure.    Complications: None    Condition: Stable    PRE-PROCEDURE:   The patient was brought into the operating room and placed on the operating table in the supine position. Following IV sedation, local anesthesia was obtained utilizing 18 cc's of  0.5% Marcaine plain. The foot and leg was then scrubbed, prepped, and draped in the usual aseptic manner.     PROCEDURE:   Attention was directed to the right hallux where a fishmouth-type incision was made with a #15 blade and deepened down to bone. Soft tissue was carefully lifted from the base of the proximal phalanx until the metatarsophalangeal joint was visualized. The metatarsophalangeal joint was disarticulated at this time and the hallux was removed and sent to pathology. There was malodorous serous drainage and necrotic tissue throughout the great toe. The remaining flexor and extensor tendons were cut as proximally as possible. The tibial and fibular sesamoids were identified and removed. The surrounding plantar skin and subcutaneous tissue was noted to be ischemic and necrotic, all necrotic tissue was excised. The remaining skin was noted to be viable however with the extent of the infection and quality of remaining skin it was decided to stage this procedure, leaving it open for the time being and packing it. We will allow the infection to resolve and vascular surgery will optimize  blood circulation to the foot. The area was copiously flushed with sterile saline. The area was packed with betadine soaked packing strips, covered with a sterile compressive dressing consisting of 4 X 4s and kerlix. An ACE and post-op shoe was then applied. The patient tolerated the procedure and anesthesia well. The patient was transported to recovery with vital signs stable and vascular status intact. Plan is to return later in the week for delayed primary closure if possible.    Kanu Kevin DPM PGY-3  Pager [phone]

## 2017-04-11 NOTE — ANESTHESIA POSTPROCEDURE EVALUATION
"Anesthesia Post Evaluation    Patient: Carlos Cifuentes    Procedure(s) Performed: Procedure(s) (LRB):  ANGIOGRAM-EXTREMITY-LOWER (Right)  ANGIOPLASTY WITH STENT PLACEMENT to right SFA (Right)    Final Anesthesia Type: MAC  Patient location during evaluation: PACU  Patient participation: Yes- Able to Participate  Level of consciousness: awake and alert  Post-procedure vital signs: reviewed and stable  Pain management: adequate  Airway patency: patent  PONV status at discharge: No PONV  Anesthetic complications: no      Cardiovascular status: blood pressure returned to baseline  Respiratory status: unassisted and spontaneous ventilation  Hydration status: euvolemic  Follow-up not needed.        Visit Vitals    BP (!) 102/40    Pulse (!) 52    Temp 36.8 °C (98.2 °F) (Skin)    Resp 18    Ht 5' 9" (1.753 m)    Wt 109.5 kg (241 lb 6.5 oz)    SpO2 98%    BMI 35.65 kg/m2       Pain/Jose E Score: Pain Assessment Performed: Yes (4/11/2017 12:45 PM)  Presence of Pain: complains of pain/discomfort (multiple sites) (4/11/2017 12:45 PM)  Pain Rating Prior to Med Admin: 9 (4/11/2017 12:59 PM)  Jose E Score: 10 (4/11/2017 12:45 PM)      "

## 2017-04-11 NOTE — ANESTHESIA RELEASE NOTE
"Anesthesia Release from PACU Note    Patient: Carlos Cifuentes    Procedure(s) Performed: Procedure(s) (LRB):  ANGIOGRAM-EXTREMITY-LOWER (Right)  ANGIOPLASTY WITH STENT PLACEMENT to right SFA (Right)    Anesthesia type: general    Post pain: Adequate analgesia    Post assessment: no apparent anesthetic complications    Last Vitals:   Visit Vitals    BP (!) 102/40    Pulse (!) 52    Temp 36.8 °C (98.2 °F) (Skin)    Resp 18    Ht 5' 9" (1.753 m)    Wt 109.5 kg (241 lb 6.5 oz)    SpO2 98%    BMI 35.65 kg/m2       Post vital signs: stable    Level of consciousness: awake, alert  and oriented    Nausea/Vomiting: no nausea/no vomiting    Complications: none    Airway Patency: patent    Respiratory: unassisted    Cardiovascular: stable and blood pressure at baseline    Hydration: euvolemic  "

## 2017-04-11 NOTE — PROGRESS NOTES
Progress Note  Vascular Surgery    Admit Date: 4/7/2017   Hospital Day: 5    SUBJECTIVE:     Follow-up For:  Procedure(s) (LRB):  AMPUTATION-TOE (Right)    No acute issues overnight. Pt taken down for CTA this AM, but refused 2/2 claustrophobia. Tolerated right toe amp by podiatry yesterday; plan for return to OR at end of week for delayed primary closure.     Scheduled Meds:   aspirin  81 mg Oral Daily    ciprofloxacin HCl  500 mg Oral Q12H    clopidogrel  75 mg Oral Daily    gabapentin  300 mg Oral TID    insulin detemir  13 Units Subcutaneous QHS    ledipasvir-sofosbuvir  1 tablet Oral Daily    magnesium sulfate IVPB  2 g Intravenous Once    nicotine  1 patch Transdermal Daily    oxycodone  10 mg Oral Q12H    simvastatin  20 mg Oral QHS    trazodone  100 mg Oral QHS    vancomycin (VANCOCIN) IVPB  15 mg/kg Intravenous Q12H     Continuous Infusions:   sodium chloride 0.9% 10 mL/hr at 04/10/17 1025    custom IV infusion builder 300 mL/hr at 04/10/17 1843     PRN Meds:dextrose 50%, dextrose 50%, glucagon (human recombinant), glucose, glucose, insulin aspart, oxycodone, promethazine    Review of patient's allergies indicates:   Allergen Reactions    Codeine      Other reaction(s): Unknown           OBJECTIVE:     Vital Signs (Most Recent)  Temp: 99 °F (37.2 °C) (04/11/17 0305)  Pulse: 75 (04/11/17 0305)  Resp: 18 (04/11/17 0305)  BP: (!) 113/58 (04/11/17 0305)  SpO2: 98 % (04/11/17 0305)    Vital Signs Range (Last 24H):  Temp:  [97.4 °F (36.3 °C)-99 °F (37.2 °C)]   Pulse:  [53-79]   Resp:  [15-18]   BP: ()/(53-77)   SpO2:  [94 %-100 %]     I & O (Last 24H):    Intake/Output Summary (Last 24 hours) at 04/11/17 0779  Last data filed at 04/10/17 1946   Gross per 24 hour   Intake              840 ml   Output                0 ml   Net              840 ml     Physical Exam:  General: well developed, no distress, morbidly obese  Head: normocephalic  Extremities: 1+ edema right lower extremity  Pulses:  right femoral 1+ pulse; left femoral very weak 1+ pulse; nonpalpable pedal pulses bilaterally  Neurologic: Alert and oriented. Thought content appropriate  No focal numbness or weakness      Laboratory:  CBC:     Recent Labs  Lab 04/11/17 0358   WBC 8.41   RBC 3.97*   HGB 11.1*   HCT 33.6*      MCV 85   MCH 28.0   MCHC 33.0     CMP:   Recent Labs  Lab 04/07/17 2139 04/11/17 0358   *  < > 166*   CALCIUM 9.1  < > 8.9   ALBUMIN 2.9*  --   --    PROT 7.7  --   --    *  < > 133*   K 4.9  < > 4.7   CO2 22*  < > 27   CL 97  < > 98   BUN 27*  < > 16   CREATININE 1.2  < > 1.1   ALKPHOS 86  --   --    ALT 17  --   --    AST 15  --   --    BILITOT 0.4  --   --    < > = values in this interval not displayed.  Coagulation:     Recent Labs  Lab 04/07/17 2139   INR 1.0     Labs within the past 24 hours have been reviewed.    Diagnostic Results:      ASSESSMENT/PLAN:     Assessment: Patient is a 56 y.o. male with h/o DM2 (Hgb A1c 10.2), HTN, HLD, hep C, long term tobacco use (1 ppd x44 years) presents with right great toe infected ulceration and ischemia of right great toe. Now s/p right great toe amputation on 4/10.     Plan:   Unable to obtain CTA abd/pelvis with run off to assess iliac system 2/2 patient refusal  Plan for RLE angiogram Today in the OR.  Please continue ASA daily and start plavix 75 mg po daily prior to procedure  ECHO pending.   US, carotid and LAILA with PVR pending.   Continue diabetes optimization  Continue renal optimization  Vascular surgery to follow    Jony Larios MD  General Surgery PGY1  Pager: [phone]

## 2017-04-12 ENCOUNTER — ANESTHESIA EVENT (OUTPATIENT)
Dept: SURGERY | Facility: HOSPITAL | Age: 57
DRG: 617 | End: 2017-04-12
Payer: MEDICAID

## 2017-04-12 PROBLEM — I65.23 BILATERAL CAROTID ARTERY STENOSIS: Status: ACTIVE | Noted: 2017-04-12

## 2017-04-12 LAB
ANION GAP SERPL CALC-SCNC: 8 MMOL/L
BACTERIA BLD CULT: NORMAL
BASOPHILS # BLD AUTO: 0.02 K/UL
BASOPHILS NFR BLD: 0.3 %
BUN SERPL-MCNC: 14 MG/DL
CALCIUM SERPL-MCNC: 8.5 MG/DL
CHLORIDE SERPL-SCNC: 98 MMOL/L
CO2 SERPL-SCNC: 26 MMOL/L
CREAT SERPL-MCNC: 1 MG/DL
DIFFERENTIAL METHOD: ABNORMAL
EOSINOPHIL # BLD AUTO: 0.1 K/UL
EOSINOPHIL NFR BLD: 1.5 %
ERYTHROCYTE [DISTWIDTH] IN BLOOD BY AUTOMATED COUNT: 13.8 %
EST. GFR  (AFRICAN AMERICAN): >60 ML/MIN/1.73 M^2
EST. GFR  (NON AFRICAN AMERICAN): >60 ML/MIN/1.73 M^2
GLUCOSE SERPL-MCNC: 174 MG/DL
HCT VFR BLD AUTO: 32.9 %
HGB BLD-MCNC: 10.8 G/DL
LYMPHOCYTES # BLD AUTO: 1.4 K/UL
LYMPHOCYTES NFR BLD: 17.6 %
MAGNESIUM SERPL-MCNC: 1.7 MG/DL
MCH RBC QN AUTO: 27.9 PG
MCHC RBC AUTO-ENTMCNC: 32.8 %
MCV RBC AUTO: 85 FL
MONOCYTES # BLD AUTO: 0.7 K/UL
MONOCYTES NFR BLD: 8.7 %
NEUTROPHILS # BLD AUTO: 5.6 K/UL
NEUTROPHILS NFR BLD: 71.4 %
PLATELET # BLD AUTO: 191 K/UL
PMV BLD AUTO: 10.3 FL
POCT GLUCOSE: 157 MG/DL (ref 70–110)
POCT GLUCOSE: 188 MG/DL (ref 70–110)
POCT GLUCOSE: 195 MG/DL (ref 70–110)
POCT GLUCOSE: 213 MG/DL (ref 70–110)
POTASSIUM SERPL-SCNC: 4.7 MMOL/L
RBC # BLD AUTO: 3.87 M/UL
SODIUM SERPL-SCNC: 132 MMOL/L
WBC # BLD AUTO: 7.79 K/UL

## 2017-04-12 PROCEDURE — 25000003 PHARM REV CODE 250: Performed by: PHYSICIAN ASSISTANT

## 2017-04-12 PROCEDURE — 76937 US GUIDE VASCULAR ACCESS: CPT

## 2017-04-12 PROCEDURE — 36415 COLL VENOUS BLD VENIPUNCTURE: CPT

## 2017-04-12 PROCEDURE — 63600175 PHARM REV CODE 636 W HCPCS: Performed by: INTERNAL MEDICINE

## 2017-04-12 PROCEDURE — 36569 INSJ PICC 5 YR+ W/O IMAGING: CPT

## 2017-04-12 PROCEDURE — 63600175 PHARM REV CODE 636 W HCPCS: Performed by: PHYSICIAN ASSISTANT

## 2017-04-12 PROCEDURE — 93880 EXTRACRANIAL BILAT STUDY: CPT | Performed by: SURGERY

## 2017-04-12 PROCEDURE — 25000003 PHARM REV CODE 250: Performed by: INTERNAL MEDICINE

## 2017-04-12 PROCEDURE — 05H933Z INSERTION OF INFUSION DEVICE INTO RIGHT BRACHIAL VEIN, PERCUTANEOUS APPROACH: ICD-10-PCS | Performed by: HOSPITALIST

## 2017-04-12 PROCEDURE — 99233 SBSQ HOSP IP/OBS HIGH 50: CPT | Mod: ,,, | Performed by: PHYSICIAN ASSISTANT

## 2017-04-12 PROCEDURE — 11000001 HC ACUTE MED/SURG PRIVATE ROOM

## 2017-04-12 PROCEDURE — 85025 COMPLETE CBC W/AUTO DIFF WBC: CPT

## 2017-04-12 PROCEDURE — 99232 SBSQ HOSP IP/OBS MODERATE 35: CPT | Mod: ,,, | Performed by: HOSPITALIST

## 2017-04-12 PROCEDURE — 25000003 PHARM REV CODE 250: Performed by: STUDENT IN AN ORGANIZED HEALTH CARE EDUCATION/TRAINING PROGRAM

## 2017-04-12 PROCEDURE — 83735 ASSAY OF MAGNESIUM: CPT

## 2017-04-12 PROCEDURE — 80048 BASIC METABOLIC PNL TOTAL CA: CPT

## 2017-04-12 PROCEDURE — 25000003 PHARM REV CODE 250: Performed by: HOSPITALIST

## 2017-04-12 PROCEDURE — C1751 CATH, INF, PER/CENT/MIDLINE: HCPCS

## 2017-04-12 RX ORDER — CIPROFLOXACIN 500 MG/1
500 TABLET ORAL EVERY 12 HOURS
Qty: 10 TABLET | Refills: 0
Start: 2017-04-12 | End: 2017-04-17

## 2017-04-12 RX ORDER — SODIUM CHLORIDE 0.9 % (FLUSH) 0.9 %
10 SYRINGE (ML) INJECTION EVERY 6 HOURS
Status: DISCONTINUED | OUTPATIENT
Start: 2017-04-12 | End: 2017-04-15 | Stop reason: HOSPADM

## 2017-04-12 RX ORDER — CLOPIDOGREL BISULFATE 75 MG/1
75 TABLET ORAL DAILY
Qty: 30 TABLET | Refills: 3 | Status: SHIPPED | OUTPATIENT
Start: 2017-04-12 | End: 2018-03-06 | Stop reason: SDUPTHER

## 2017-04-12 RX ORDER — HYDROCODONE BITARTRATE AND ACETAMINOPHEN 10; 325 MG/1; MG/1
1 TABLET ORAL ONCE
Status: COMPLETED | OUTPATIENT
Start: 2017-04-12 | End: 2017-04-12

## 2017-04-12 RX ORDER — ENOXAPARIN SODIUM 100 MG/ML
40 INJECTION SUBCUTANEOUS ONCE
Status: COMPLETED | OUTPATIENT
Start: 2017-04-12 | End: 2017-04-12

## 2017-04-12 RX ORDER — GABAPENTIN 300 MG/1
300 CAPSULE ORAL ONCE
Status: COMPLETED | OUTPATIENT
Start: 2017-04-12 | End: 2017-04-12

## 2017-04-12 RX ORDER — SODIUM CHLORIDE 0.9 % (FLUSH) 0.9 %
10 SYRINGE (ML) INJECTION
Status: DISCONTINUED | OUTPATIENT
Start: 2017-04-12 | End: 2017-04-15 | Stop reason: HOSPADM

## 2017-04-12 RX ORDER — ASPIRIN 81 MG/1
81 TABLET ORAL DAILY
Refills: 0 | COMMUNITY
Start: 2017-04-12 | End: 2018-06-12

## 2017-04-12 RX ADMIN — Medication 10 ML: at 05:04

## 2017-04-12 RX ADMIN — GABAPENTIN 300 MG: 300 CAPSULE ORAL at 01:04

## 2017-04-12 RX ADMIN — GABAPENTIN 300 MG: 300 CAPSULE ORAL at 10:04

## 2017-04-12 RX ADMIN — LEDIPASVIR AND SOFOSBUVIR 1 TABLET: 90; 400 TABLET, FILM COATED ORAL at 10:04

## 2017-04-12 RX ADMIN — OXYCODONE HYDROCHLORIDE 10 MG: 5 TABLET ORAL at 10:04

## 2017-04-12 RX ADMIN — OXYCODONE HYDROCHLORIDE 10 MG: 10 TABLET, FILM COATED, EXTENDED RELEASE ORAL at 08:04

## 2017-04-12 RX ADMIN — SIMVASTATIN 20 MG: 20 TABLET, FILM COATED ORAL at 10:04

## 2017-04-12 RX ADMIN — CLOPIDOGREL 75 MG: 75 TABLET, FILM COATED ORAL at 08:04

## 2017-04-12 RX ADMIN — OXYCODONE HYDROCHLORIDE 10 MG: 10 TABLET, FILM COATED, EXTENDED RELEASE ORAL at 10:04

## 2017-04-12 RX ADMIN — HYDROCODONE BITARTRATE AND ACETAMINOPHEN 1 TABLET: 10; 325 TABLET ORAL at 08:04

## 2017-04-12 RX ADMIN — INSULIN ASPART 2 UNITS: 100 INJECTION, SOLUTION INTRAVENOUS; SUBCUTANEOUS at 05:04

## 2017-04-12 RX ADMIN — OXYCODONE HYDROCHLORIDE 10 MG: 5 TABLET ORAL at 03:04

## 2017-04-12 RX ADMIN — CIPROFLOXACIN HYDROCHLORIDE 500 MG: 500 TABLET, FILM COATED ORAL at 08:04

## 2017-04-12 RX ADMIN — OXYCODONE HYDROCHLORIDE 10 MG: 5 TABLET ORAL at 01:04

## 2017-04-12 RX ADMIN — NICOTINE 1 PATCH: 14 PATCH, EXTENDED RELEASE TRANSDERMAL at 08:04

## 2017-04-12 RX ADMIN — ASPIRIN 81 MG: 81 TABLET, COATED ORAL at 08:04

## 2017-04-12 RX ADMIN — GABAPENTIN 300 MG: 300 CAPSULE ORAL at 06:04

## 2017-04-12 RX ADMIN — OXYCODONE HYDROCHLORIDE 10 MG: 5 TABLET ORAL at 06:04

## 2017-04-12 RX ADMIN — VANCOMYCIN HYDROCHLORIDE 2000 MG: 1 INJECTION, POWDER, LYOPHILIZED, FOR SOLUTION INTRAVENOUS at 04:04

## 2017-04-12 RX ADMIN — TRAZODONE HYDROCHLORIDE 100 MG: 50 TABLET ORAL at 10:04

## 2017-04-12 RX ADMIN — ENOXAPARIN SODIUM 40 MG: 100 INJECTION SUBCUTANEOUS at 11:04

## 2017-04-12 RX ADMIN — CIPROFLOXACIN HYDROCHLORIDE 500 MG: 500 TABLET, FILM COATED ORAL at 10:04

## 2017-04-12 RX ADMIN — VANCOMYCIN HYDROCHLORIDE 2000 MG: 1 INJECTION, POWDER, LYOPHILIZED, FOR SOLUTION INTRAVENOUS at 05:04

## 2017-04-12 NOTE — SUBJECTIVE & OBJECTIVE
Interval History: s/p amputation and revascularization. Afebrile. Cellulitis much improved.     Review of Systems   Constitutional: Negative for chills and fever.   Respiratory: Negative for cough and shortness of breath.    Cardiovascular: Positive for leg swelling. Negative for chest pain.   Gastrointestinal: Negative for abdominal pain, constipation, diarrhea, nausea and vomiting.   Genitourinary: Negative for dysuria, frequency and hematuria.   Musculoskeletal: Positive for arthralgias and joint swelling. Negative for back pain and myalgias.   Skin: Positive for color change and wound. Negative for rash.   Neurological: Negative for dizziness, weakness, light-headedness, numbness and headaches.   Psychiatric/Behavioral: Negative for agitation and behavioral problems. The patient is not nervous/anxious.      Objective:     Vital Signs (Most Recent):  Temp: 97.8 °F (36.6 °C) (04/12/17 0816)  Pulse: 76 (04/12/17 0816)  Resp: 16 (04/12/17 0816)  BP: 127/72 (04/12/17 0816)  SpO2: 96 % (04/12/17 0816) Vital Signs (24h Range):  Temp:  [97.8 °F (36.6 °C)-98.3 °F (36.8 °C)] 97.8 °F (36.6 °C)  Pulse:  [52-76] 76  Resp:  [16-18] 16  SpO2:  [94 %-100 %] 96 %  BP: (102-127)/(40-83) 127/72     Weight: 109.5 kg (241 lb 6.5 oz)  Body mass index is 35.65 kg/(m^2).    Estimated Creatinine Clearance: 101.8 mL/min (based on Cr of 1).    Physical Exam   Constitutional: He is oriented to person, place, and time. He appears well-developed and well-nourished. No distress.   Cardiovascular: Normal rate and regular rhythm.    No murmur heard.  Pulmonary/Chest: Effort normal and breath sounds normal. No respiratory distress.   Abdominal: Soft. Bowel sounds are normal. He exhibits no distension.   Musculoskeletal: Normal range of motion. He exhibits no edema.   Neurological: He is alert and oriented to person, place, and time.   Skin: Skin is warm and dry.   Right foot wound dressed; erythema and swelling RLE improved   Psychiatric: He has  a normal mood and affect. His behavior is normal.       Significant Labs:   Blood Culture:   Recent Labs  Lab 04/07/17 2053 04/07/17 2116   LABBLOO No Growth to date  No Growth to date  No Growth to date  No Growth to date  No Growth to date No Growth to date  No Growth to date  No Growth to date  No Growth to date  No Growth to date     CBC:   Recent Labs  Lab 04/11/17 0358 04/12/17 0346   WBC 8.41 7.79   HGB 11.1* 10.8*   HCT 33.6* 32.9*    191     CMP:   Recent Labs  Lab 04/11/17 0358 04/12/17 0346   * 132*   K 4.7 4.7   CL 98 98   CO2 27 26   * 174*   BUN 16 14   CREATININE 1.1 1.0   CALCIUM 8.9 8.5*   ANIONGAP 8 8   EGFRNONAA >60.0 >60.0       Significant Imaging: I have reviewed all pertinent imaging results/findings within the past 24 hours.

## 2017-04-12 NOTE — PROGRESS NOTES
Progress Note  Vascular Surgery    Admit Date: 4/7/2017   Hospital Day: 6    SUBJECTIVE:     Follow-up For:  Procedure(s) (LRB):  ANGIOGRAM-EXTREMITY-LOWER (Right)  ANGIOPLASTY WITH STENT PLACEMENT to right SFA (Right)    No acute issues overnight. Angio yesterday with PTA of R AT and PTA and stent of R SFA lesion.    Scheduled Meds:   aspirin  81 mg Oral Daily    ciprofloxacin HCl  500 mg Oral Q12H    clopidogrel  75 mg Oral Daily    enoxaparin  40 mg Subcutaneous Once    gabapentin  300 mg Oral TID    insulin detemir  13 Units Subcutaneous QHS    ledipasvir-sofosbuvir  1 tablet Oral Daily    nicotine  1 patch Transdermal Daily    oxycodone  10 mg Oral Q12H    simvastatin  20 mg Oral QHS    trazodone  100 mg Oral QHS    vancomycin (VANCOCIN) IVPB  2,000 mg Intravenous Q12H     Continuous Infusions:     PRN Meds:dextrose 50%, dextrose 50%, glucagon (human recombinant), glucose, glucose, insulin aspart, oxycodone, promethazine    Review of patient's allergies indicates:   Allergen Reactions    Codeine      Other reaction(s): Unknown           OBJECTIVE:     Vital Signs (Most Recent)  Temp: 97.8 °F (36.6 °C) (04/12/17 0816)  Pulse: 76 (04/12/17 0816)  Resp: 16 (04/12/17 0816)  BP: 127/72 (04/12/17 0816)  SpO2: 96 % (04/12/17 0816)    Vital Signs Range (Last 24H):  Temp:  [97.8 °F (36.6 °C)-98.3 °F (36.8 °C)]   Pulse:  [52-76]   Resp:  [16-18]   BP: (102-127)/(40-83)   SpO2:  [94 %-100 %]     I & O (Last 24H):    Intake/Output Summary (Last 24 hours) at 04/12/17 1019  Last data filed at 04/11/17 1141   Gross per 24 hour   Intake              650 ml   Output                0 ml   Net              650 ml     Physical Exam:  General: well developed, no distress, morbidly obese  Head: normocephalic  Extremities: 1+ edema right lower extremity  Pulses: right femoral 2+ pulse; R DP 2+ pulse   Neurologic: Alert and oriented. Thought content appropriate  No focal numbness or weakness      Laboratory:  CBC:      Recent Labs  Lab 04/12/17  0346   WBC 7.79   RBC 3.87*   HGB 10.8*   HCT 32.9*      MCV 85   MCH 27.9   MCHC 32.8     CMP:   Recent Labs  Lab 04/07/17 2139 04/12/17  0346   *  < > 174*   CALCIUM 9.1  < > 8.5*   ALBUMIN 2.9*  --   --    PROT 7.7  --   --    *  < > 132*   K 4.9  < > 4.7   CO2 22*  < > 26   CL 97  < > 98   BUN 27*  < > 14   CREATININE 1.2  < > 1.0   ALKPHOS 86  --   --    ALT 17  --   --    AST 15  --   --    BILITOT 0.4  --   --    < > = values in this interval not displayed.  Coagulation:     Recent Labs  Lab 04/07/17 2139   INR 1.0     Labs within the past 24 hours have been reviewed.    Diagnostic Results:      ASSESSMENT/PLAN:     Assessment: Patient is a 56 y.o. male with h/o DM2 (Hgb A1c 10.2), HTN, HLD, hep C, long term tobacco use (1 ppd x44 years) presents with right great toe infected ulceration and ischemia of right great toe. Now s/p right great toe amputation on 4/10.     S/p: 4/11 Stent placement in R distal SFA w 6x30 mm Cook Zenith stent, post-dilated w 6 mm balloon                  PTA R AT with a 2.0 x 80 mm balloon    Plan:   Continue ASA and Plavix  Smoking cessation of the utmost importance  Counseled on weight loss  Continue diabetes optimization  Continue renal optimization  Podiatry will take for washout and attempted closure tomorrow.  Agree with this as he is unlikely to heal by secondary intention.    Elton Paul MD  General Surgery, PGY-3  [phone]        Vascular Attending  Agree with above  S/p R AT / DP PTA and R SFA PTAS - has in-line flow to R foot now  Please proceed with R 1st toe washout and attempt at primary closure  Cont DAPT, statin rx    Emil Edmonds MD FACS

## 2017-04-12 NOTE — SUBJECTIVE & OBJECTIVE
Interval History:  No acute events overnight.  This morning Mr. Cifuentes reports persistent RLE swelling and pain.  The current treatment plan to go to the OR tomorrow w/ podiatry and possible DC the day after w/ IV Abx per HH that he will continue until he is seen in ID clinic follow up appointment.    Review of Systems   Constitutional: Negative for chills and fever.   Respiratory: Negative for cough and shortness of breath.    Cardiovascular: Negative for chest pain and palpitations.   Gastrointestinal: Negative for constipation, diarrhea, nausea and vomiting.     Objective:     Vital Signs (Most Recent):  Temp: 98.1 °F (36.7 °C) (04/12/17 1147)  Pulse: 61 (04/12/17 1147)  Resp: 16 (04/12/17 1147)  BP: 121/67 (04/12/17 1147)  SpO2: 96 % (04/12/17 1147) Vital Signs (24h Range):  Temp:  [97.8 °F (36.6 °C)-98.3 °F (36.8 °C)] 98.1 °F (36.7 °C)  Pulse:  [61-76] 61  Resp:  [16-18] 16  SpO2:  [96 %-98 %] 96 %  BP: (121-127)/(60-72) 121/67     Weight: 109.5 kg (241 lb 6.5 oz)  Body mass index is 35.65 kg/(m^2).  No intake or output data in the 24 hours ending 04/12/17 1535     Physical Exam   Constitutional: He is oriented to person, place, and time. No distress.   Obese M sitting up in bed in NAD   Cardiovascular: Normal rate and regular rhythm.  Exam reveals no gallop and no friction rub.    No murmur heard.  Pulmonary/Chest: Breath sounds normal. No respiratory distress. He has no wheezes. He has no rhonchi. He has no rales.   Abdominal: Soft. Bowel sounds are normal. He exhibits no distension. There is no tenderness.   Musculoskeletal:   RLE post operative changes w/ swelling & erythema noted   Neurological: He is alert and oriented to person, place, and time.       Significant Labs:   CBC:   Recent Labs  Lab 04/11/17  0358 04/12/17  0346   WBC 8.41 7.79   HGB 11.1* 10.8*   HCT 33.6* 32.9*    191     CMP:   Recent Labs  Lab 04/11/17  0358 04/12/17  0346   * 132*   K 4.7 4.7   CL 98 98   CO2 27 26   GLU  166* 174*   BUN 16 14   CREATININE 1.1 1.0   CALCIUM 8.9 8.5*   ANIONGAP 8 8   EGFRNONAA >60.0 >60.0       Significant Imaging: U/S: I have reviewed all pertinent results/findings within the past 24 hours and my personal findings are:  Bilateral carotid artery stenosis <40%

## 2017-04-12 NOTE — ANESTHESIA PREPROCEDURE EVALUATION
04/12/2017  Carlos Cifuentes is a 56 y.o., male.    Pre-operative evaluation for Procedure(s) (LRB):  DEBRIDEMENT-FOOT (Right)    Carlos Cifuentes is a 56 y.o. male hx of DMII (on insulin), PAD, being treated for subacute osteomyelitis of right foot (amputation pending), chronic hep c s/p LE angio and now being evaluated for the above procedure.       LDA:   22g R wrist  20g LUE  R brachial DL PICC    Prev airway: Airway: Direct laryngoscopy; Inserted by: CRNA; Airway Device: Endotracheal Tube; Mask Ventilation: Easy; Intubated: Postinduction; Blade: Moya #2; Airway Device Size: 8.0; Style: Cuffed; Cuff Inflation: Minimal occlusive pressure; Placement Verified By: Auscultation, Capnometry; Grade: Grade II; Complicating Factors: None; Intubation Findings: Positive EtCO2, Bilateral breath sounds, Atraumatic/Condition of teeth unchanged;  Depth of Insertion: 22; Securment: Lips; Complications: None    Drips: none currently    Patient Active Problem List   Diagnosis    Hepatosplenomegaly    Lumbar herniated disc - Large central disk extrusion at the L4-L5 level with significant central canal and bilateral neuroforaminal narrowing    Obesity    MSSA (methicillin susceptible Staphylococcus aureus) infection    DM type 2, uncontrolled, with neuropathy    Chronic back pain    Tinea cruris    Bilateral leg edema    Back pain    Cord compression    Pain of right thigh    Anemia of other chronic disease    Neuropathic pain    Primary insomnia    Generalized anxiety disorder    Anxiety disorder due to general medical condition    Diabetic polyneuropathy associated with type 2 diabetes mellitus    Infection of skin due to methicillin resistant Staphylococcus aureus (MRSA)    Neck muscle spasm    Cirrhosis of liver without ascites    Right knee pain    Chronic hepatitis C without hepatic coma     Diabetic ulcer of toe of right foot associated with type 2 diabetes mellitus, with fat layer exposed    Tobacco abuse    Cellulitis of right foot    Osteomyelitis of right foot    Peripheral arterial disease    Bilateral carotid artery stenosis       Review of patient's allergies indicates:   Allergen Reactions    Codeine      Other reaction(s): Unknown        No current facility-administered medications on file prior to encounter.      Current Outpatient Prescriptions on File Prior to Encounter   Medication Sig Dispense Refill    blood sugar diagnostic (BLOOD GLUCOSE TEST) Strp Twice daily blood sugar checks (Patient taking differently: Test  blood sugar four times daily) 100 each 6    diazePAM (VALIUM) 2 MG tablet TAKE ONE TABLET BY MOUTH EVERY 8 HOURS AS NEEDED FOR ANXIETY **THANK YOU** 60 tablet 1    fentaNYL (DURAGESIC) 50 mcg/hr Place 1 patch onto the skin every 72 hours. 10 patch 0    gabapentin (NEURONTIN) 300 MG capsule Take 1 capsule (300 mg total) by mouth 3 (three) times daily. 180 capsule 4    ledipasvir-sofosbuvir (HARVONI)  mg Tab Take 1 tablet by mouth once daily. 28 tablet 5    metformin (GLUCOPHAGE) 1000 MG tablet Take 1 tablet (1,000 mg total) by mouth 2 (two) times daily.  4    mupirocin calcium 2% (BACTROBAN) 2 % cream Apply to affected area 3 times daily 60 g 1    oxycodone (ROXICODONE) 10 mg Tab immediate release tablet Take 1 tablet (10 mg total) by mouth every 6 (six) hours as needed. (Patient taking differently: take one tablet every 3 hours as needed for pain) 120 tablet 0    potassium chloride SA (K-DUR,KLOR-CON) 10 MEQ tablet Take 1 tablet (10 mEq total) by mouth once daily as needed when experiencing leg/muscle cramps.  1    promethazine (PHENERGAN) 25 MG tablet Take 1 tablet (25 mg total) by mouth every 4 (four) hours. (Patient taking differently: Take 25 mg by mouth every 4 (four) hours as needed for Nausea. ) 15 tablet 0    silver sulfADIAZINE 1%  (SILVADENE) 1 % cream Apply 1 application topically once daily. Apply to affected area 50 g 2    simvastatin (ZOCOR) 20 MG tablet Take 1 tablet (20 mg total) by mouth every evening. 90 tablet 3    trazodone (DESYREL) 100 MG tablet Take 1 tablet (100 mg total) by mouth every evening. 90 tablet 1    TRUE METRIX GLUCOSE METER Misc AS DIRECTED  0    insulin glargine (LANTUS SOLOSTAR) 100 unit/mL (3 mL) InPn pen Inject 15 Units into the skin every evening. 13.5 mL 3       Past Surgical History:   Procedure Laterality Date    APPENDECTOMY      left leg surgery      broken bone    LEG SURGERY  right        Social History     Social History    Marital status: Single     Spouse name: N/A    Number of children: N/A    Years of education: N/A     Occupational History    Not on file.     Social History Main Topics    Smoking status: Current Some Day Smoker     Packs/day: 1.00     Types: Cigarettes    Smokeless tobacco: Never Used    Alcohol use No    Drug use: No    Sexual activity: Not on file     Other Topics Concern    Not on file     Social History Narrative         Vital Signs Range (Last 24H):  Temp:  [36.6 °C (97.8 °F)-36.8 °C (98.3 °F)]   Pulse:  [61-76]   Resp:  [16-18]   BP: (121-127)/(60-72)   SpO2:  [96 %-98 %]       CBC:   Recent Labs      04/11/17   0358  04/12/17   0346   WBC  8.41  7.79   RBC  3.97*  3.87*   HGB  11.1*  10.8*   HCT  33.6*  32.9*   PLT  181  191   MCV  85  85   MCH  28.0  27.9   MCHC  33.0  32.8       CMP:   Recent Labs      04/11/17 0358  04/12/17   0346   NA  133*  132*   K  4.7  4.7   CL  98  98   CO2  27  26   BUN  16  14   CREATININE  1.1  1.0   GLU  166*  174*   MG  1.6  1.7   CALCIUM  8.9  8.5*         Diagnostic Studies:      EKG:  Vent. Rate : 076 BPM     Atrial Rate : 076 BPM     P-R Int : 196 ms          QRS Dur : 074 ms      QT Int : 352 ms       P-R-T Axes : 045 054 021 degrees     QTc Int : 396 ms    Normal sinus rhythm  Normal ECG  When compared with ECG of  15-DEC-2014 18:26,  Vent. rate has decreased BY  67 BPM  Confirmed by KASEY BROWN MD ([id number]) on 4/8/2017 5:32:30 PM      2D Echo:  CONCLUSIONS     1 - Hyperdynamic left ventricular systolic function (EF 60-65%).     2 - Cannot fully r/o but no obvious vegetation    This document has been electronically    SIGNED BY: Modesto Marcus MD On: 12/18/2014 15:07        [facility] Anesthesia Evaluation    I have reviewed the Patient Summary Reports.    I have reviewed the Nursing Notes.   I have reviewed the Medications.     Review of Systems  Anesthesia Hx:  History of prior surgery of interest to airway management or planning:  Denies Personal Hx of Anesthesia complications.   Social:  Smoker, No Alcohol Use Chronic pain   Chronic opioids     Hematology/Oncology:     Oncology Normal    -- Anemia:   EENT/Dental:EENT/Dental Normal   Cardiovascular:   ECG has been reviewed.    Pulmonary:  Pulmonary Normal    Hepatic/GI:   Liver Disease, Hepatitis, C Hx of C. difficile diarrhea   Musculoskeletal:   Arthritis  Chronic pain back and thigh   Lumbar herniated disc - Large central disk extrusion at the L4-L5 level with significant central canal and bilateral neuroforaminal narrowing  Obesity  Cord compression    Ischemic R great toe       Neurological:   Neuromuscular Disease,    Endocrine:   Diabetes, poorly controlled, type 2    Dermatological:   Hx of Staph aureus infection   Psych:  Psychiatric Normal           Physical Exam  General:  Well nourished    Airway/Jaw/Neck:  Airway Findings: Mouth Opening: Normal Tongue: Normal  General Airway Assessment: Adult  Mallampati: III  Improves to II with phonation.  TM Distance: Normal, at least 6 cm  Jaw/Neck Findings:  Neck ROM: Normal ROM      Dental:  Dental Findings: In tact, Periodontal disease, Mild    Chest/Lungs:  Chest/Lungs Findings: Normal Respiratory Rate     Heart/Vascular:  Heart Findings: Rate: Normal        Mental Status:  Mental Status Findings:  Cooperative, Alert and  Oriented         Anesthesia Plan  Type of Anesthesia, risks & benefits discussed:  Anesthesia Type:  MAC, general  Patient's Preference:   Intra-op Monitoring Plan: standard ASA monitors  Intra-op Monitoring Plan Comments:   Post Op Pain Control Plan:   Post Op Pain Control Plan Comments:   Induction:   IV  Beta Blocker:  Patient is not currently on a Beta-Blocker (No further documentation required).       Informed Consent: Patient understands risks and agrees with Anesthesia plan.  Questions answered. Anesthesia consent signed with patient.  ASA Score: 3     Day of Surgery Review of History & Physical:            Ready For Surgery From Anesthesia Perspective.

## 2017-04-12 NOTE — CONSULTS
Double lumen PICC placed in right brachialvein, 40cm in length, 0cm exposed with arm circumference 33.5 cm.  Lot# [id number]

## 2017-04-12 NOTE — PROGRESS NOTES
[facility] Hospital Medicine  Progress Note    Patient Name: Carlos Cifuetnes  MRN: [id number]  Patient Class: IP- Inpatient   Admission Date: 4/7/2017  Length of Stay: 5 days  Attending Physician: Alfreda Almanza MD  Primary Care Provider: Miguel Lux MD    [facility] Medicine Team: [facility] HOSP MED 2 Memo Castillo MD    Subjective:     Interval History:  No acute events overnight.  This morning Mr. Cifuentes reports persistent RLE swelling and pain.  The current treatment plan to go to the OR tomorrow w/ podiatry and possible DC the day after w/ IV Abx per HH that he will continue until he is seen in ID clinic follow up appointment.    Review of Systems   Constitutional: Negative for chills and fever.   Respiratory: Negative for cough and shortness of breath.    Cardiovascular: Negative for chest pain and palpitations.   Gastrointestinal: Negative for constipation, diarrhea, nausea and vomiting.     Objective:     Vital Signs (Most Recent):  Temp: 98.1 °F (36.7 °C) (04/12/17 1147)  Pulse: 61 (04/12/17 1147)  Resp: 16 (04/12/17 1147)  BP: 121/67 (04/12/17 1147)  SpO2: 96 % (04/12/17 1147) Vital Signs (24h Range):  Temp:  [97.8 °F (36.6 °C)-98.3 °F (36.8 °C)] 98.1 °F (36.7 °C)  Pulse:  [61-76] 61  Resp:  [16-18] 16  SpO2:  [96 %-98 %] 96 %  BP: (121-127)/(60-72) 121/67     Weight: 109.5 kg (241 lb 6.5 oz)  Body mass index is 35.65 kg/(m^2).  No intake or output data in the 24 hours ending 04/12/17 1535     Physical Exam   Constitutional: He is oriented to person, place, and time. No distress.   Obese M sitting up in bed in NAD   Cardiovascular: Normal rate and regular rhythm.  Exam reveals no gallop and no friction rub.    No murmur heard.  Pulmonary/Chest: Breath sounds normal. No respiratory distress. He has no wheezes. He has no rhonchi. He has no rales.   Abdominal: Soft. Bowel sounds are normal. He exhibits no distension. There is no tenderness.   Musculoskeletal:   RLE post operative changes  w/ swelling & erythema noted   Neurological: He is alert and oriented to person, place, and time.       Significant Labs:   CBC:   Recent Labs  Lab 04/11/17 0358 04/12/17  0346   WBC 8.41 7.79   HGB 11.1* 10.8*   HCT 33.6* 32.9*    191     CMP:   Recent Labs  Lab 04/11/17 0358 04/12/17  0346   * 132*   K 4.7 4.7   CL 98 98   CO2 27 26   * 174*   BUN 16 14   CREATININE 1.1 1.0   CALCIUM 8.9 8.5*   ANIONGAP 8 8   EGFRNONAA >60.0 >60.0       Significant Imaging: U/S: I have reviewed all pertinent results/findings within the past 24 hours and my personal findings are:  Bilateral carotid artery stenosis <40%    Assessment/Plan:      * Osteomyelitis of right foot  -Gram stain from ulcer shows polymicrobial infection  -Vanc 2000 mg   IV qd-Continue cipro 500 po BID  -Aerobic wound Cx with diphtheroid sp. and klebsiella   -Anaerobic wound Cx positive for prevotella  -Continue abx. Per ID w/ plan to keep on abx at least a total of 10 d for cellulitis associated with osto & longer duration Abx pending results from Cx taken during closure in OR Friday w/ podiatry  -PICC line placed today in anticipation of DC home Friday w/ HH for IV Abx  -After discharge he will need to follow up with ID after deep tissue cultures to be taken tomorrow have resulted    DM type 2, uncontrolled, with neuropathy  -Continue Levemir 13 U qhs  -Continue LD SSI  -Continue to monitor glucose qac & qhs, adjust dose as needed    Chronic back pain  -Pt takes 10 mg lortab q3 hours at home  -Continue IR Oxy 10 po q4h PRN  -Continue oxycontin 10 mg po BID    Neuropathic pain  -Continue home gabapentin 300 TID po    Chronic hepatitis C without hepatic coma  -Continue home ledipasvir-sofosbuvir  mg 1 tab po qd    Tobacco abuse  -Continue nicotine 14 mg/24h patch  -Referral to smoking cessation at discharge    Cellulitis of right foot  -Per ID, continue on Abx for at least 10 days    Peripheral arterial disease  -LAILA positive at  1.57  -POD 1 s/p  R SFA & PTA of R AT  -Continue asprin 81 mg & simvastatin 20 mg po qhs  -Continue plavix 75 mg po qd  -Continue aspirin 81 mg po qd    VTE Risk Mitigation         Ordered     Medium Risk of VTE  Once      04/12/17 0827          Memo Castillo MD  Department of Hospital Medicine   [facility]

## 2017-04-12 NOTE — PROGRESS NOTES
Progress Note  Podiatry      SUBJECTIVE     History of Present Illness:  Carlos Cifuentes is a 56 y.o. male who  has a past medical history of Arthritis; Bulging lumbar disc; C. difficile diarrhea; Chronic back pain; Diabetes mellitus type II; DM (diabetes mellitus), type 2, uncontrolled; Hepatitis C; MSSA (methicillin susceptible Staphylococcus aureus) septicemia; Neuromuscular disorder; Neuropathy; and Staph aureus infection.     Patient was consulted to podiatry for a right great toe ulcer with ischemic changes to the toe. Patient reports the ulcer started about 2 weeks ago when he was filing a callus down and picked at some skin causing some bleeding. The ulcer progressively worsened and over the last 2-3 days he has noticed the toe changing colors. Denies f/c/n/v. There is moderate reported pain to the area especially with pressure.    Interval History  4/12/17: Patient was seen bedside in [facility] s/p day 2 right foot hallux amputation left open and packed. Reports minimal pain the right foot except with pressure. Vascular intervention yesterday.      Scheduled Meds:   aspirin  81 mg Oral Daily    ciprofloxacin HCl  500 mg Oral Q12H    clopidogrel  75 mg Oral Daily    gabapentin  300 mg Oral TID    insulin detemir  13 Units Subcutaneous QHS    ledipasvir-sofosbuvir  1 tablet Oral Daily    nicotine  1 patch Transdermal Daily    oxycodone  10 mg Oral Q12H    simvastatin  20 mg Oral QHS    trazodone  100 mg Oral QHS    vancomycin (VANCOCIN) IVPB  2,000 mg Intravenous Q12H     Continuous Infusions:   PRN Meds:dextrose 50%, dextrose 50%, glucagon (human recombinant), glucose, glucose, insulin aspart, oxycodone, promethazine    Review of patient's allergies indicates:   Allergen Reactions    Codeine      Other reaction(s): Unknown        Past Medical History:   Diagnosis Date    Arthritis     Bulging lumbar disc     C. difficile diarrhea     Chronic back pain 10/14/2014    Diabetes mellitus type II      DM (diabetes mellitus), type 2, uncontrolled 3/12/2013    Hepatitis C 7/3/2013    MSSA (methicillin susceptible Staphylococcus aureus) septicemia     Neuromuscular disorder     Neuropathy     Staph aureus infection      Past Surgical History:   Procedure Laterality Date    APPENDECTOMY      left leg surgery      broken bone    LEG SURGERY  right      Family History   Problem Relation Age of Onset    Arthritis Mother     Arthritis Sister     Diabetes Sister     Arthritis Brother      Social History   Substance Use Topics    Smoking status: Current Some Day Smoker     Packs/day: 1.00     Types: Cigarettes    Smokeless tobacco: Never Used    Alcohol use No       Review of Systems:  Constitutional: no fever or chills  Respiratory: no cough or shortness of breath  Cardiovascular: no chest pain or palpitations  Gastrointestinal: no nausea or vomiting, tolerating diet  Musculoskeletal: no arthralgias or myalgias  Neurological: history of numbness or paresthesias in the feet    OBJECTIVE     Vital Signs (Most Recent):  Temp: 97.8 °F (36.6 °C) (04/12/17 0816)  Pulse: 76 (04/12/17 0816)  Resp: 16 (04/12/17 0816)  BP: 127/72 (04/12/17 0816)  SpO2: 96 % (04/12/17 0816)    Vital Signs Range (Last 24H):  Temp:  [97.8 °F (36.6 °C)-98.3 °F (36.8 °C)]   Pulse:  [52-76]   Resp:  [16-18]   BP: (102-127)/(40-83)   SpO2:  [94 %-100 %]     Physical Exam:  General: Oriented to person, place, time, and situation. No acute distress.  Vascular: DP and PT pulses are 1+ bilaterally with biphasic signals on doppler. CRT<3 seconds to digits 2-5 right and 1-5 left, right hallux CRT absent. Moderate diffuse pedal edema to the right foot.  Musculoskeletal: Equinus noted b/l ankles with < 10 deg DF noted.   Neuro: Protective sensation absent bilateral feet.   Derm: No open lesions, lacerations or wounds noted left foot.     Right foot at hallux amputation site open with exposed first metatarsal head. Skin edges ischemic. There is  mild serous drainage with malodor present.       Laboratory:  CBC:     Recent Labs  Lab 04/12/17  0346   WBC 7.79   RBC 3.87*   HGB 10.8*   HCT 32.9*      MCV 85   MCH 27.9   MCHC 32.8     CMP:     Recent Labs  Lab 04/07/17  2139  04/12/17  0346   *  < > 174*   CALCIUM 9.1  < > 8.5*   ALBUMIN 2.9*  --   --    PROT 7.7  --   --    *  < > 132*   K 4.9  < > 4.7   CO2 22*  < > 26   CL 97  < > 98   BUN 27*  < > 14   CREATININE 1.2  < > 1.0   ALKPHOS 86  --   --    ALT 17  --   --    AST 15  --   --    BILITOT 0.4  --   --    < > = values in this interval not displayed.  ESR:     Recent Labs  Lab 04/08/17  0513   SEDRATE 90*     CRP: No results for input(s): CRP in the last 168 hours.  Microbiology Results (last 7 days)     Procedure Component Value Units Date/Time    Blood culture #1 **CANNOT BE ORDERED STAT** [[id number]] Collected:  04/07/17 2053    Order Status:  Completed Specimen:  Blood from Peripheral, Antecubital, Right Updated:  04/12/17 0612     Blood Culture, Routine No Growth to date     Blood Culture, Routine No Growth to date     Blood Culture, Routine No Growth to date     Blood Culture, Routine No Growth to date     Blood Culture, Routine No Growth to date    Blood culture #2 **CANNOT BE ORDERED STAT** [[id number]] Collected:  04/07/17 2116    Order Status:  Completed Specimen:  Blood from Peripheral, Antecubital, Left Updated:  04/11/17 2222     Blood Culture, Routine No Growth to date     Blood Culture, Routine No Growth to date     Blood Culture, Routine No Growth to date     Blood Culture, Routine No Growth to date     Blood Culture, Routine No Growth to date    Culture, Anaerobe [[id number]] Collected:  04/08/17 0854    Order Status:  Completed Specimen:  Wound from Toe, Right Foot Updated:  04/11/17 1355     Anaerobic Culture --     PREVOTELLA (B.) MELANINOGENICA  Many      Aerobic culture [[id number]]  (Susceptibility) Collected:  04/08/17 0854    Order Status:  Completed Specimen:   Wound from Toe, Right Foot Updated:  04/10/17 1101     Aerobic Bacterial Culture --     KLEBSIELLA OXYTOCA  Moderate       Aerobic Bacterial Culture --     DIPHTHEROIDS  Moderate  No other significant isolate      Gram stain [[id number]] Collected:  04/08/17 0854    Order Status:  Completed Specimen:  Wound from Toe, Right Foot Updated:  04/08/17 1107     Gram Stain Result No WBC's      Moderate Gram positive cocci      Few Gram negative rods          Diagnostic Results:  X-Ray: reviewed  MRI: pending      ASSESSMENT/PLAN     Assessment:  -DM II with neuropathy  -s/p day 2 right hallux amputation open  -Cellulitis/abscess    Plan:  -Vascular intervention yesterday was successful to the DP artery, PT is occluded  -Cultures taken at bedside growing Diptheroids and klebsiella oxytoca, and anaerobic cultures with prevotella melaninogenica, clean margin cultures will be taken intraoperatively. Appreciate ID recommendations.  -Long discussion over need to stop smoking immediately, non weight bearing right foot  -Case set for tomorrow at noon to debride necrotic tissue and metatarsal head and attempt primary closure, he will need to be NPO at midnight.  Appreciate the consult, please call on call podiatry for questions or concerns.    Kanu Kevin DPM PGY-3  Pager [phone]

## 2017-04-12 NOTE — PROGRESS NOTES
"[facility]  Infectious Disease  Progress Note    Patient Name: Carlos Cifuentes  MRN: [id number]  Admission Date: 4/7/2017  Length of Stay: 5 days  Attending Physician: Alfreda Almanza MD  Primary Care Provider: Miguel Lux MD    Isolation Status: No active isolations  Assessment/Plan:      * Osteomyelitis of right foot  - now s/p right first ray amputation 4/10 with revascularization 4/11  - plans for closure with podiatry tomorrow  - wound culture with Klebsiella Oxytoca (Cipro sensitive), diptheroids, and prevotella  - no intraoperative cultures have been taken yet  - recommend please send remaining bone (clean margin) for culture (aerobic, anaerobic, AFB, and fungal) as well as pathology tomorrow   - can send patient out on empiric vancomycin and PO cipro; vanc trough due tomorrow morning  - will f/u in outpatient setting next week (if cultures and pathology negative, can d/c antibiotics and pull PICC as outpatient); if cultures/path positive, will tailor antibiotic therapy according to culture data  - discussed with primary team.     Cellulitis of right foot  - RLE cellulitis much improved  - continue antibiotics     Peripheral arterial disease  - s/p revascularization with Dr. Edmonds      Anticipated Disposition: home with IV antibiotics  Thank you for your consult. I will follow-up with patient. Please contact us if you have any additional questions.  PHILLIP Noble, pager: [phone]  Infectious Disease  [facility]    Subjective:     Principal Problem:Osteomyelitis of right foot    HPI: This is a 56 year old male with DM, HTN, HLD, Hep C, smoker who came in with complaints of a "blue right great toe." He had a callous to his toe that he shaved off and it developed a nonhealing ulceration. He now has erythema to the leg. Arterial ultrasound was done and Right LAILA was 1.57, left 1.70 and no occlusion. Patient denies fevers or chills. Podiatry and vascular have been " consulted. Podiatry is planning a partial first ray amputation tomorrow and there are plans for revascularization Tuesday. Patient is currently on IV vancomycin and cipro, however, he lost IV access last night and has missed his vancomycin doses.   Interval History: s/p amputation and revascularization. Afebrile. Cellulitis much improved.     Review of Systems   Constitutional: Negative for chills and fever.   Respiratory: Negative for cough and shortness of breath.    Cardiovascular: Positive for leg swelling. Negative for chest pain.   Gastrointestinal: Negative for abdominal pain, constipation, diarrhea, nausea and vomiting.   Genitourinary: Negative for dysuria, frequency and hematuria.   Musculoskeletal: Positive for arthralgias and joint swelling. Negative for back pain and myalgias.   Skin: Positive for color change and wound. Negative for rash.   Neurological: Negative for dizziness, weakness, light-headedness, numbness and headaches.   Psychiatric/Behavioral: Negative for agitation and behavioral problems. The patient is not nervous/anxious.      Objective:     Vital Signs (Most Recent):  Temp: 97.8 °F (36.6 °C) (04/12/17 0816)  Pulse: 76 (04/12/17 0816)  Resp: 16 (04/12/17 0816)  BP: 127/72 (04/12/17 0816)  SpO2: 96 % (04/12/17 0816) Vital Signs (24h Range):  Temp:  [97.8 °F (36.6 °C)-98.3 °F (36.8 °C)] 97.8 °F (36.6 °C)  Pulse:  [52-76] 76  Resp:  [16-18] 16  SpO2:  [94 %-100 %] 96 %  BP: (102-127)/(40-83) 127/72     Weight: 109.5 kg (241 lb 6.5 oz)  Body mass index is 35.65 kg/(m^2).    Estimated Creatinine Clearance: 101.8 mL/min (based on Cr of 1).    Physical Exam   Constitutional: He is oriented to person, place, and time. He appears well-developed and well-nourished. No distress.   Cardiovascular: Normal rate and regular rhythm.    No murmur heard.  Pulmonary/Chest: Effort normal and breath sounds normal. No respiratory distress.   Abdominal: Soft. Bowel sounds are normal. He exhibits no distension.    Musculoskeletal: Normal range of motion. He exhibits no edema.   Neurological: He is alert and oriented to person, place, and time.   Skin: Skin is warm and dry.   Right foot wound dressed; erythema and swelling RLE improved   Psychiatric: He has a normal mood and affect. His behavior is normal.       Significant Labs:   Blood Culture:   Recent Labs  Lab 04/07/17 2053 04/07/17 2116   LABBLOO No Growth to date  No Growth to date  No Growth to date  No Growth to date  No Growth to date No Growth to date  No Growth to date  No Growth to date  No Growth to date  No Growth to date     CBC:   Recent Labs  Lab 04/11/17 0358 04/12/17 0346   WBC 8.41 7.79   HGB 11.1* 10.8*   HCT 33.6* 32.9*    191     CMP:   Recent Labs  Lab 04/11/17 0358 04/12/17  0346   * 132*   K 4.7 4.7   CL 98 98   CO2 27 26   * 174*   BUN 16 14   CREATININE 1.1 1.0   CALCIUM 8.9 8.5*   ANIONGAP 8 8   EGFRNONAA >60.0 >60.0       Significant Imaging: I have reviewed all pertinent imaging results/findings within the past 24 hours.

## 2017-04-12 NOTE — ASSESSMENT & PLAN NOTE
-Pt takes 10 mg lortab q3 hours at home  -Continue IR Oxy 10 po q4h PRN  -Continue oxycontin 10 mg po BID

## 2017-04-12 NOTE — PROCEDURES
"Carlos Cifuentes is a 56 y.o. male patient.    Temp: 98.1 °F (36.7 °C) (04/12/17 1147)  Pulse: 61 (04/12/17 1147)  Resp: 16 (04/12/17 1147)  BP: 121/67 (04/12/17 1147)  SpO2: 96 % (04/12/17 1147)  Weight: 109.5 kg (241 lb 6.5 oz) (04/10/17 1004)  Height: 5' 9" (175.3 cm) (04/10/17 1004)    PICC  Date/Time: 4/12/2017 1:31 PM  Performed by: NEIL RICHARDSON  Consent Done: Yes  Time out: Immediately prior to procedure a time out was called to verify the correct patient, procedure, equipment, support staff and site/side marked as required  Indications: med administration and vascular access  Anesthesia: local infiltration  Local anesthetic: lidocaine 1% without epinephrine  Anesthetic Total (mL): 2  Preparation: skin prepped with ChloraPrep  Skin prep agent dried: skin prep agent completely dried prior to procedure  Sterile barriers: all five maximum sterile barriers used - cap, mask, sterile gown, sterile gloves, and large sterile sheet  Hand hygiene: hand hygiene performed prior to central venous catheter insertion  Location details: right brachial  Catheter type: double lumen  Catheter size: 5 Fr  Catheter Length: 40cm    Ultrasound guidance: yes  Vessel Caliber: medium and patent, compressibility normal  Needle advanced into vessel with real time Ultrasound guidance.  Guidewire confirmed in vessel.  Image recorded and saved.  Sterile sheath used.  Number of attempts: 1  Post-procedure: blood return through all ports, chlorhexidine patch and sterile dressing applied  Technical procedures used: 3CG  Specimens: No  Implants: No  Assessment: placement verified by x-ray  Complications: none        Afsaneh Vo  4/12/2017    "

## 2017-04-12 NOTE — ASSESSMENT & PLAN NOTE
- now s/p right first ray amputation 4/10 with revascularization 4/11  - plans for closure with podiatry tomorrow  - wound culture with Klebsiella Oxytoca (Cipro sensitive), diptheroids, and prevotella  - no intraoperative cultures have been taken yet  - recommend please send remaining bone (clean margin) for culture (aerobic, anaerobic, AFB, and fungal) as well as pathology tomorrow   - can send patient out on empiric vancomycin and PO cipro; vanc trough due tomorrow morning  - will f/u in outpatient setting next week (if cultures and pathology negative, can d/c antibiotics and pull PICC as outpatient); if cultures/path positive, will tailor antibiotic therapy according to culture data  - discussed with primary team.

## 2017-04-12 NOTE — ASSESSMENT & PLAN NOTE
-Gram stain from ulcer shows polymicrobial infection  -Vanc 2000 mg   IV qd-Continue cipro 500 po BID  -Aerobic wound Cx with diphtheroid sp. and klebsiella   -Anaerobic wound Cx positive for prevotella  -Continue abx. Per ID w/ plan to keep on abx at least a total of 10 d for cellulitis associated with osto & longer duration Abx pending results from Cx taken during closure in OR Friday w/ podiatry  -PICC line placed today in anticipation of DC home Friday w/ HH for IV Abx  -After discharge he will need to follow up with ID after deep tissue cultures to be taken tomorrow have resulted

## 2017-04-12 NOTE — ASSESSMENT & PLAN NOTE
-LAILA positive at 1.57  -POD 1 s/p  R SFA & PTA of R AT  -Continue asprin 81 mg & simvastatin 20 mg po qhs  -Continue plavix 75 mg po qd  -Continue aspirin 81 mg po qd

## 2017-04-12 NOTE — PLAN OF CARE
[facility]    HOME HEALTH ORDERS  FACE TO FACE ENCOUNTER    Patient Name: Carlos Cifuentes  YOB: 1960    PCP: Miguel Lux MD   PCP Address: [address] / [place] [address]  PCP Phone Number: [phone]  PCP Fax: [phone]    Encounter Date: 04/12/2017    Admit to Home Health    Diagnoses:  Active Hospital Problems    Diagnosis  POA    *Osteomyelitis of right foot [M86.9]  Yes    Bilateral carotid artery stenosis [I65.23]  Yes    Tobacco abuse [Z72.0]  Yes    Cellulitis of right foot [L03.115]  Yes    Peripheral arterial disease [I73.9]  Yes    Diabetic ulcer of toe of right foot associated with type 2 diabetes mellitus, with fat layer exposed [E11.621, L97.512]  Yes    Chronic hepatitis C without hepatic coma [B18.2]  Yes    Neuropathic pain [M79.2]  Yes    Anemia of other chronic disease [D63.8]  Yes    Bilateral leg edema [R60.0]  Yes    Chronic back pain [M54.9, G89.29]  Yes    DM type 2, uncontrolled, with neuropathy [E11.40, E11.65]  Yes      Resolved Hospital Problems    Diagnosis Date Resolved POA    Hyponatremia [E87.1] 10/09/2014 Yes       Future Appointments  Date Time Provider Department Center   4/20/2017 8:30 AM PHILLIP Nair [facility] ID [facility]   4/27/2017 7:45 AM Han Veronica DPM [facility] POD [facility]   5/9/2017 9:30 AM LAB, [facility] LAB [facility]   6/6/2017 11:00 AM Aviva Lux MD [facility]     Follow-up Information     Follow up with Miguel Lux MD.     Specialties:  Internal Medicine, Oncology, Hematology and Oncology    Why:  Primary care hospital follow-up    Contact information:    [address]  [place] [address]  [phone]          Follow up with Jasmeet Payne - Infectious Diseases.     Specialty:  Infectious Diseases    Why:  Infectious diseases hospital follow-up    Contact information:    [address]  [facility] [address] [phone]    Additional  information:    1st Floor - [address]            I have seen and examined this patient face to face today. My clinical findings that support the need for the home health skilled services and home bound status are the following:  Weakness/numbness causing balance and gait disturbance due to Infection and Weakness/Debility making it taxing to leave home.    Allergies:  Review of patient's allergies indicates:   Allergen Reactions    Codeine      Other reaction(s): Unknown       Diet: diabetic diet: 2000 calorie    Activities: activity as tolerated and non-weight baring right foot per podiatry recs    Nursing:   SN to complete comprehensive assessment including routine vital signs. Instruct on disease process and s/s of complications to report to MD. Review/verify medication list sent home with the patient at time of discharge  and instruct patient/caregiver as needed. Frequency may be adjusted depending on start of care date.    Notify MD if SBP > 160 or < 90; DBP > 90 or < 50; HR > 120 or < 50; Temp > 101    MISCELLANEOUS CARE:  Home Infusion Therapy:   SN to perform Infusion Therapy/Central Line Care.  Review Central Line Care & Central Line Flush with patient.    Administer (drug and dose): vancomycin 2,000 mg IV q12 hours    Last dose given: 5am                         Home dose due: 5 pm  STOP DATE: 4/20/17    Scrub the Hub: Prior to accessing the line, always perform a 30 second alcohol scrub  Each lumen of the central line is to be flushed at least daily with 10 mL Normal Saline and 3 mL Heparin flush (100 units/mL)  Skilled Nurse (SN) may draw blood from IV access  Blood Draw Procedure:   - Aspirate at least 5 mL of blood   - Discard   - Obtain specimen   - Change posiflow cap   - Flush with 20 mL Normal Saline followed by a                 3-5 mL Heparin flush (100 units/mL)  Central :   - Sterile dressing changes are done weekly and as needed.   - Use chlor-hexadine scrub to cleanse site, apply  Biopatch to insertion site,       apply securement device dressing   - Posi-flow caps are changed weekly and after EVERY lab draw.   - If sterile gauze is under dressing to control oozing,                 dressing change must be performed every 24 hours until gauze is not needed.    WOUND CARE  yes:  Wound Vac:   Location:  R LE           Dressing changes every Monday, Wednesday and Friday.         Clean with SNS or Wound Cleanser every change, Measure Weekly        Maintain vac at 100 mmHg continuous suction    Medications: Review discharge medications with patient and family and provide education.      Current Discharge Medication List      START taking these medications    Details   aspirin (ECOTRIN) 81 MG EC tablet Take 1 tablet (81 mg total) by mouth once daily.  Refills: 0      ciprofloxacin HCl (CIPRO) 500 MG tablet Take 1 tablet (500 mg total) by mouth every 12 (twelve) hours.  Qty: 10 tablet, Refills: 0      clopidogrel (PLAVIX) 75 mg tablet Take 1 tablet (75 mg total) by mouth once daily.  Qty: 30 tablet, Refills: 3      dextrose 5 % SolP 500 mL with vancomycin 1,000 mg SolR 2,000 mg Inject 2,000 mg into the vein every 12 (twelve) hours.  Refills: 0         CONTINUE these medications which have NOT CHANGED    Details   blood sugar diagnostic (BLOOD GLUCOSE TEST) Strp Twice daily blood sugar checks  Qty: 100 each, Refills: 6    Comments: True Test  Associated Diagnoses: DM type 2, uncontrolled, with neuropathy      diazePAM (VALIUM) 2 MG tablet TAKE ONE TABLET BY MOUTH EVERY 8 HOURS AS NEEDED FOR ANXIETY **THANK YOU**  Qty: 60 tablet, Refills: 1    Associated Diagnoses: Neck muscle spasm      docusate sodium (COLACE) 100 MG capsule Take 100 mg by mouth as needed for Constipation.      fentaNYL (DURAGESIC) 50 mcg/hr Place 1 patch onto the skin every 72 hours.  Qty: 10 patch, Refills: 0    Associated Diagnoses: Chronic bilateral low back pain without sciatica      gabapentin (NEURONTIN) 300 MG capsule Take 1  capsule (300 mg total) by mouth 3 (three) times daily.  Qty: 180 capsule, Refills: 4    Associated Diagnoses: DM type 2, uncontrolled, with neuropathy      ledipasvir-sofosbuvir (HARVONI)  mg Tab Take 1 tablet by mouth once daily.  Qty: 28 tablet, Refills: 5      metformin (GLUCOPHAGE) 1000 MG tablet Take 1 tablet (1,000 mg total) by mouth 2 (two) times daily.  Refills: 4      mupirocin calcium 2% (BACTROBAN) 2 % cream Apply to affected area 3 times daily  Qty: 60 g, Refills: 1    Associated Diagnoses: Infection of skin due to methicillin resistant Staphylococcus aureus (MRSA)      oxycodone (ROXICODONE) 10 mg Tab immediate release tablet Take 1 tablet (10 mg total) by mouth every 6 (six) hours as needed.  Qty: 120 tablet, Refills: 0    Associated Diagnoses: Diabetic polyneuropathy associated with type 2 diabetes mellitus      potassium chloride SA (K-DUR,KLOR-CON) 10 MEQ tablet Take 1 tablet (10 mEq total) by mouth once daily as needed when experiencing leg/muscle cramps.  Refills: 1      promethazine (PHENERGAN) 25 MG tablet Take 1 tablet (25 mg total) by mouth every 4 (four) hours.  Qty: 15 tablet, Refills: 0    Associated Diagnoses: Nausea      silver sulfADIAZINE 1% (SILVADENE) 1 % cream Apply 1 application topically once daily. Apply to affected area  Qty: 50 g, Refills: 2    Associated Diagnoses: Superficial burn of little finger of left hand      simvastatin (ZOCOR) 20 MG tablet Take 1 tablet (20 mg total) by mouth every evening.  Qty: 90 tablet, Refills: 3    Associated Diagnoses: Hyperlipidemia LDL goal <70      trazodone (DESYREL) 100 MG tablet Take 1 tablet (100 mg total) by mouth every evening.  Qty: 90 tablet, Refills: 1    Comments: This prescription was filled today. Any refills authorized will be placed on file.  Associated Diagnoses: Primary insomnia      TRUE METRIX GLUCOSE METER Misc AS DIRECTED  Refills: 0      insulin glargine (LANTUS SOLOSTAR) 100 unit/mL (3 mL) InPn pen Inject 15 Units  into the skin every evening.  Qty: 13.5 mL, Refills: 3    Associated Diagnoses: DM type 2, uncontrolled, with neuropathy         STOP taking these medications       pravastatin (PRAVACHOL) 20 MG tablet Comments:   Reason for Stopping:               I certify that this patient is confined to his home and needs intermittent skilled nursing care.

## 2017-04-12 NOTE — ASSESSMENT & PLAN NOTE
-Continue Levemir 13 U qhs  -Continue LD SSI  -Continue to monitor glucose qac & qhs, adjust dose as needed

## 2017-04-13 ENCOUNTER — SURGERY (OUTPATIENT)
Age: 57
End: 2017-04-13

## 2017-04-13 ENCOUNTER — ANESTHESIA (OUTPATIENT)
Dept: SURGERY | Facility: HOSPITAL | Age: 57
DRG: 617 | End: 2017-04-13
Payer: MEDICAID

## 2017-04-13 LAB
ANION GAP SERPL CALC-SCNC: 7 MMOL/L
BACTERIA BLD CULT: NORMAL
BACTERIA SPEC ANAEROBE CULT: NORMAL
BACTERIA SPEC ANAEROBE CULT: NORMAL
BASOPHILS # BLD AUTO: 0.03 K/UL
BASOPHILS NFR BLD: 0.4 %
BUN SERPL-MCNC: 13 MG/DL
CALCIUM SERPL-MCNC: 8.7 MG/DL
CHLORIDE SERPL-SCNC: 98 MMOL/L
CO2 SERPL-SCNC: 25 MMOL/L
CREAT SERPL-MCNC: 1 MG/DL
DIFFERENTIAL METHOD: ABNORMAL
EOSINOPHIL # BLD AUTO: 0.1 K/UL
EOSINOPHIL NFR BLD: 1.9 %
ERYTHROCYTE [DISTWIDTH] IN BLOOD BY AUTOMATED COUNT: 13.7 %
EST. GFR  (AFRICAN AMERICAN): >60 ML/MIN/1.73 M^2
EST. GFR  (NON AFRICAN AMERICAN): >60 ML/MIN/1.73 M^2
GLUCOSE SERPL-MCNC: 158 MG/DL
HCT VFR BLD AUTO: 32.5 %
HGB BLD-MCNC: 10.7 G/DL
LYMPHOCYTES # BLD AUTO: 1.7 K/UL
LYMPHOCYTES NFR BLD: 23.3 %
MAGNESIUM SERPL-MCNC: 1.6 MG/DL
MCH RBC QN AUTO: 28.1 PG
MCHC RBC AUTO-ENTMCNC: 32.9 %
MCV RBC AUTO: 85 FL
MONOCYTES # BLD AUTO: 0.6 K/UL
MONOCYTES NFR BLD: 8.4 %
NEUTROPHILS # BLD AUTO: 4.9 K/UL
NEUTROPHILS NFR BLD: 65.3 %
PLATELET # BLD AUTO: 205 K/UL
PMV BLD AUTO: 10 FL
POCT GLUCOSE: 172 MG/DL (ref 70–110)
POCT GLUCOSE: 188 MG/DL (ref 70–110)
POCT GLUCOSE: 217 MG/DL (ref 70–110)
POCT GLUCOSE: 220 MG/DL (ref 70–110)
POCT GLUCOSE: 254 MG/DL (ref 70–110)
POTASSIUM SERPL-SCNC: 4.4 MMOL/L
RBC # BLD AUTO: 3.81 M/UL
SODIUM SERPL-SCNC: 130 MMOL/L
VANCOMYCIN TROUGH SERPL-MCNC: 17.8 UG/ML
WBC # BLD AUTO: 7.46 K/UL

## 2017-04-13 PROCEDURE — 27201423 OPTIME MED/SURG SUP & DEVICES STERILE SUPPLY: Performed by: PODIATRIST

## 2017-04-13 PROCEDURE — 25000003 PHARM REV CODE 250: Performed by: ANESTHESIOLOGY

## 2017-04-13 PROCEDURE — 88305 TISSUE EXAM BY PATHOLOGIST: CPT | Mod: 26,,, | Performed by: PATHOLOGY

## 2017-04-13 PROCEDURE — 71000015 HC POSTOP RECOV 1ST HR: Performed by: PODIATRIST

## 2017-04-13 PROCEDURE — D9220A PRA ANESTHESIA: Mod: ANES,,, | Performed by: ANESTHESIOLOGY

## 2017-04-13 PROCEDURE — 11000001 HC ACUTE MED/SURG PRIVATE ROOM

## 2017-04-13 PROCEDURE — 99231 SBSQ HOSP IP/OBS SF/LOW 25: CPT | Mod: ,,, | Performed by: HOSPITALIST

## 2017-04-13 PROCEDURE — 36000707: Performed by: PODIATRIST

## 2017-04-13 PROCEDURE — 25000003 PHARM REV CODE 250: Performed by: PHYSICIAN ASSISTANT

## 2017-04-13 PROCEDURE — 87076 CULTURE ANAEROBE IDENT EACH: CPT | Mod: 59

## 2017-04-13 PROCEDURE — 87070 CULTURE OTHR SPECIMN AEROBIC: CPT

## 2017-04-13 PROCEDURE — 25000003 PHARM REV CODE 250: Performed by: NURSE ANESTHETIST, CERTIFIED REGISTERED

## 2017-04-13 PROCEDURE — 71000033 HC RECOVERY, INTIAL HOUR: Performed by: PODIATRIST

## 2017-04-13 PROCEDURE — 88311 DECALCIFY TISSUE: CPT | Mod: 26,,, | Performed by: PATHOLOGY

## 2017-04-13 PROCEDURE — 25000003 PHARM REV CODE 250: Performed by: STUDENT IN AN ORGANIZED HEALTH CARE EDUCATION/TRAINING PROGRAM

## 2017-04-13 PROCEDURE — 83735 ASSAY OF MAGNESIUM: CPT

## 2017-04-13 PROCEDURE — 80202 ASSAY OF VANCOMYCIN: CPT

## 2017-04-13 PROCEDURE — 63600175 PHARM REV CODE 636 W HCPCS: Performed by: PHYSICIAN ASSISTANT

## 2017-04-13 PROCEDURE — 25000003 PHARM REV CODE 250: Performed by: PODIATRIST

## 2017-04-13 PROCEDURE — 25000003 PHARM REV CODE 250: Performed by: HOSPITALIST

## 2017-04-13 PROCEDURE — 99233 SBSQ HOSP IP/OBS HIGH 50: CPT | Mod: ,,, | Performed by: PHYSICIAN ASSISTANT

## 2017-04-13 PROCEDURE — 63600175 PHARM REV CODE 636 W HCPCS: Performed by: NURSE ANESTHETIST, CERTIFIED REGISTERED

## 2017-04-13 PROCEDURE — 37000008 HC ANESTHESIA 1ST 15 MINUTES: Performed by: PODIATRIST

## 2017-04-13 PROCEDURE — 0JBQ0ZZ EXCISION OF RIGHT FOOT SUBCUTANEOUS TISSUE AND FASCIA, OPEN APPROACH: ICD-10-PCS | Performed by: PODIATRIST

## 2017-04-13 PROCEDURE — 28810 AMPUTATION TOE & METATARSAL: CPT | Mod: 58,T5,, | Performed by: PODIATRIST

## 2017-04-13 PROCEDURE — 36000706: Performed by: PODIATRIST

## 2017-04-13 PROCEDURE — 80048 BASIC METABOLIC PNL TOTAL CA: CPT

## 2017-04-13 PROCEDURE — 0Y6M0Z9 DETACHMENT AT RIGHT FOOT, PARTIAL 1ST RAY, OPEN APPROACH: ICD-10-PCS | Performed by: PODIATRIST

## 2017-04-13 PROCEDURE — 88305 TISSUE EXAM BY PATHOLOGIST: CPT | Performed by: PATHOLOGY

## 2017-04-13 PROCEDURE — 37000009 HC ANESTHESIA EA ADD 15 MINS: Performed by: PODIATRIST

## 2017-04-13 PROCEDURE — 87186 SC STD MICRODIL/AGAR DIL: CPT

## 2017-04-13 PROCEDURE — 87075 CULTR BACTERIA EXCEPT BLOOD: CPT

## 2017-04-13 PROCEDURE — 25000003 PHARM REV CODE 250: Performed by: INTERNAL MEDICINE

## 2017-04-13 PROCEDURE — D9220A PRA ANESTHESIA: Mod: CRNA,,, | Performed by: NURSE ANESTHETIST, CERTIFIED REGISTERED

## 2017-04-13 PROCEDURE — 87077 CULTURE AEROBIC IDENTIFY: CPT

## 2017-04-13 PROCEDURE — 99231 SBSQ HOSP IP/OBS SF/LOW 25: CPT | Mod: ,,, | Performed by: SURGERY

## 2017-04-13 PROCEDURE — 85025 COMPLETE CBC W/AUTO DIFF WBC: CPT

## 2017-04-13 RX ORDER — BUPIVACAINE HYDROCHLORIDE 5 MG/ML
INJECTION, SOLUTION EPIDURAL; INTRACAUDAL
Status: DISCONTINUED | OUTPATIENT
Start: 2017-04-13 | End: 2017-04-13 | Stop reason: HOSPADM

## 2017-04-13 RX ORDER — SODIUM CHLORIDE 9 MG/ML
INJECTION, SOLUTION INTRAVENOUS CONTINUOUS
Status: DISCONTINUED | OUTPATIENT
Start: 2017-04-13 | End: 2017-04-15 | Stop reason: HOSPADM

## 2017-04-13 RX ORDER — LIDOCAINE HYDROCHLORIDE 10 MG/ML
INJECTION INFILTRATION; PERINEURAL
Status: DISCONTINUED
Start: 2017-04-13 | End: 2017-04-13 | Stop reason: WASHOUT

## 2017-04-13 RX ORDER — PROPOFOL 10 MG/ML
VIAL (ML) INTRAVENOUS
Status: DISCONTINUED | OUTPATIENT
Start: 2017-04-13 | End: 2017-04-13

## 2017-04-13 RX ORDER — LIDOCAINE HCL/PF 100 MG/5ML
SYRINGE (ML) INTRAVENOUS
Status: DISCONTINUED | OUTPATIENT
Start: 2017-04-13 | End: 2017-04-13

## 2017-04-13 RX ORDER — ENOXAPARIN SODIUM 100 MG/ML
40 INJECTION SUBCUTANEOUS EVERY 24 HOURS
Status: DISCONTINUED | OUTPATIENT
Start: 2017-04-14 | End: 2017-04-15 | Stop reason: HOSPADM

## 2017-04-13 RX ORDER — PROPOFOL 10 MG/ML
VIAL (ML) INTRAVENOUS CONTINUOUS PRN
Status: DISCONTINUED | OUTPATIENT
Start: 2017-04-13 | End: 2017-04-13

## 2017-04-13 RX ORDER — BUPIVACAINE HYDROCHLORIDE 5 MG/ML
INJECTION, SOLUTION EPIDURAL; INTRACAUDAL
Status: DISPENSED
Start: 2017-04-13 | End: 2017-04-13

## 2017-04-13 RX ORDER — FENTANYL CITRATE 50 UG/ML
INJECTION, SOLUTION INTRAMUSCULAR; INTRAVENOUS
Status: DISCONTINUED | OUTPATIENT
Start: 2017-04-13 | End: 2017-04-13

## 2017-04-13 RX ORDER — RAMELTEON 8 MG/1
8 TABLET ORAL NIGHTLY PRN
Status: DISCONTINUED | OUTPATIENT
Start: 2017-04-13 | End: 2017-04-15 | Stop reason: HOSPADM

## 2017-04-13 RX ORDER — MIDAZOLAM HYDROCHLORIDE 1 MG/ML
INJECTION, SOLUTION INTRAMUSCULAR; INTRAVENOUS
Status: DISCONTINUED | OUTPATIENT
Start: 2017-04-13 | End: 2017-04-13

## 2017-04-13 RX ADMIN — VANCOMYCIN HYDROCHLORIDE 2000 MG: 1 INJECTION, POWDER, LYOPHILIZED, FOR SOLUTION INTRAVENOUS at 04:04

## 2017-04-13 RX ADMIN — Medication 10 ML: at 12:04

## 2017-04-13 RX ADMIN — GABAPENTIN 300 MG: 300 CAPSULE ORAL at 08:04

## 2017-04-13 RX ADMIN — CIPROFLOXACIN HYDROCHLORIDE 500 MG: 500 TABLET, FILM COATED ORAL at 08:04

## 2017-04-13 RX ADMIN — LIDOCAINE HYDROCHLORIDE 50 MG: 20 INJECTION, SOLUTION INTRAVENOUS at 12:04

## 2017-04-13 RX ADMIN — FENTANYL CITRATE 25 MCG: 50 INJECTION, SOLUTION INTRAMUSCULAR; INTRAVENOUS at 12:04

## 2017-04-13 RX ADMIN — GABAPENTIN 300 MG: 300 CAPSULE ORAL at 04:04

## 2017-04-13 RX ADMIN — PROPOFOL 30 MG: 10 INJECTION, EMULSION INTRAVENOUS at 12:04

## 2017-04-13 RX ADMIN — Medication 10 ML: at 06:04

## 2017-04-13 RX ADMIN — OXYCODONE HYDROCHLORIDE 10 MG: 5 TABLET ORAL at 03:04

## 2017-04-13 RX ADMIN — SODIUM CHLORIDE: 0.9 INJECTION, SOLUTION INTRAVENOUS at 03:04

## 2017-04-13 RX ADMIN — INSULIN ASPART 1 UNITS: 100 INJECTION, SOLUTION INTRAVENOUS; SUBCUTANEOUS at 08:04

## 2017-04-13 RX ADMIN — SODIUM CHLORIDE: 0.9 INJECTION, SOLUTION INTRAVENOUS at 11:04

## 2017-04-13 RX ADMIN — NICOTINE 1 PATCH: 14 PATCH, EXTENDED RELEASE TRANSDERMAL at 08:04

## 2017-04-13 RX ADMIN — VANCOMYCIN HYDROCHLORIDE 2000 MG: 1 INJECTION, POWDER, LYOPHILIZED, FOR SOLUTION INTRAVENOUS at 06:04

## 2017-04-13 RX ADMIN — PROPOFOL 25 MCG/KG/MIN: 10 INJECTION, EMULSION INTRAVENOUS at 12:04

## 2017-04-13 RX ADMIN — OXYCODONE HYDROCHLORIDE 10 MG: 5 TABLET ORAL at 04:04

## 2017-04-13 RX ADMIN — OXYCODONE HYDROCHLORIDE 10 MG: 10 TABLET, FILM COATED, EXTENDED RELEASE ORAL at 08:04

## 2017-04-13 RX ADMIN — TRAZODONE HYDROCHLORIDE 100 MG: 50 TABLET ORAL at 08:04

## 2017-04-13 RX ADMIN — GABAPENTIN 300 MG: 300 CAPSULE ORAL at 03:04

## 2017-04-13 RX ADMIN — Medication 10 ML: at 04:04

## 2017-04-13 RX ADMIN — Medication 2 EACH: at 03:04

## 2017-04-13 RX ADMIN — LEDIPASVIR AND SOFOSBUVIR 1 TABLET: 90; 400 TABLET, FILM COATED ORAL at 09:04

## 2017-04-13 RX ADMIN — SIMVASTATIN 20 MG: 20 TABLET, FILM COATED ORAL at 08:04

## 2017-04-13 RX ADMIN — BUPIVACAINE HYDROCHLORIDE 20 ML: 5 INJECTION, SOLUTION EPIDURAL; INTRACAUDAL; PERINEURAL at 12:04

## 2017-04-13 RX ADMIN — MIDAZOLAM HYDROCHLORIDE 2 MG: 1 INJECTION, SOLUTION INTRAMUSCULAR; INTRAVENOUS at 11:04

## 2017-04-13 RX ADMIN — CLOPIDOGREL 75 MG: 75 TABLET, FILM COATED ORAL at 08:04

## 2017-04-13 RX ADMIN — ASPIRIN 81 MG: 81 TABLET, COATED ORAL at 08:04

## 2017-04-13 RX ADMIN — INSULIN ASPART 3 UNITS: 100 INJECTION, SOLUTION INTRAVENOUS; SUBCUTANEOUS at 05:04

## 2017-04-13 NOTE — PROGRESS NOTES
"[facility]  Infectious Disease  Progress Note    Patient Name: Carlos Cifuentes  MRN: [id number]  Admission Date: 4/7/2017  Length of Stay: 6 days  Attending Physician: Alfreda Almanza MD  Primary Care Provider: Miguel Lux MD    Isolation Status: No active isolations  Assessment/Plan:      * Osteomyelitis of right foot  - s/p right first ray amputation 4/10 with revascularization 4/11  - plans for closure today  - wound culture with Klebsiella Oxytoca (Cipro sensitive), diptheroids, and prevotella  - no intraoperative cultures have been taken yet  - recommend please send remaining bone (clean margin) for culture (aerobic, anaerobic, AFB, and fungal) as well as pathology tomorrow   - can send patient out on empiric vancomycin and PO cipro; vanc trough therapeutic at 17  - will f/u in outpatient setting next week (if cultures and pathology negative, can d/c antibiotics and pull PICC as outpatient); if cultures/path positive, will tailor antibiotic therapy according to culture data  - weekly CBC, CMP, ESR, CRP and vanc trough faxed to ID clinic at [phone]  - will schedule outpatient f/u in ID clinic  - ID will sign off      Thank you for your consult. I will sign off. Please contact us if you have any additional questions.  PHILLIP Noble, pager: [phone]  Infectious Disease  [facility]    Subjective:     Principal Problem:Osteomyelitis of right foot    HPI: This is a 56 year old male with DM, HTN, HLD, Hep C, smoker who came in with complaints of a "blue right great toe." He had a callous to his toe that he shaved off and it developed a nonhealing ulceration. He now has erythema to the leg. Arterial ultrasound was done and Right LAILA was 1.57, left 1.70 and no occlusion. Patient denies fevers or chills. Podiatry and vascular have been consulted. Podiatry is planning a partial first ray amputation tomorrow and there are plans for revascularization Tuesday. Patient is " currently on IV vancomycin and cipro, however, he lost IV access last night and has missed his vancomycin doses.   Interval History: plan for closure today; afebrile. Tearful today and states needs some personal time.    Review of Systems   Constitutional: Negative for chills and fever.   Respiratory: Negative for cough and shortness of breath.    Cardiovascular: Positive for leg swelling. Negative for chest pain.   Gastrointestinal: Negative for abdominal pain, constipation, diarrhea, nausea and vomiting.   Genitourinary: Negative for dysuria, frequency and hematuria.   Musculoskeletal: Positive for arthralgias and joint swelling. Negative for back pain and myalgias.   Skin: Positive for color change and wound. Negative for rash.   Neurological: Negative for dizziness, weakness, light-headedness, numbness and headaches.   Psychiatric/Behavioral: Negative for agitation and behavioral problems. The patient is not nervous/anxious.      Objective:     Vital Signs (Most Recent):  Temp: 98.5 °F (36.9 °C) (04/13/17 0753)  Pulse: 69 (04/13/17 0753)  Resp: 18 (04/13/17 0753)  BP: 124/63 (04/13/17 0753)  SpO2: 96 % (04/13/17 0753) Vital Signs (24h Range):  Temp:  [97.9 °F (36.6 °C)-98.6 °F (37 °C)] 98.5 °F (36.9 °C)  Pulse:  [60-75] 69  Resp:  [16-18] 18  SpO2:  [96 %-97 %] 96 %  BP: (109-143)/(53-84) 124/63     Weight: 109.5 kg (241 lb 6.5 oz)  Body mass index is 35.65 kg/(m^2).    Estimated Creatinine Clearance: 101.8 mL/min (based on Cr of 1).    Physical Exam   Constitutional: He is oriented to person, place, and time. He appears well-developed and well-nourished. No distress.   Cardiovascular: Normal rate and regular rhythm.    No murmur heard.  Pulmonary/Chest: Effort normal and breath sounds normal. No respiratory distress.   Abdominal: Soft. Bowel sounds are normal. He exhibits no distension.   Musculoskeletal: Normal range of motion. He exhibits no edema.   Neurological: He is alert and oriented to person, place,  and time.   Skin: Skin is warm and dry.   Right foot wound dressed; erythema and swelling RLE present, but improved   Psychiatric: He has a normal mood and affect. His behavior is normal.       Significant Labs:   Blood Culture:   Recent Labs  Lab 04/07/17 2053 04/07/17 2116   LABBLOO No growth after 5 days. No growth after 5 days.     CBC:   Recent Labs  Lab 04/12/17 0346 04/13/17  0433   WBC 7.79 7.46   HGB 10.8* 10.7*   HCT 32.9* 32.5*    205     CMP:   Recent Labs  Lab 04/12/17 0346 04/13/17  0433   * 130*   K 4.7 4.4   CL 98 98   CO2 26 25   * 158*   BUN 14 13   CREATININE 1.0 1.0   CALCIUM 8.5* 8.7   ANIONGAP 8 7*   EGFRNONAA >60.0 >60.0     Wound Culture:   Recent Labs  Lab 04/08/17  0854   LABAERO KLEBSIELLA OXYTOCAModerate  DIPHTHEROIDSModerateNo other significant isolate       Significant Imaging: I have reviewed all pertinent imaging results/findings within the past 24 hours.

## 2017-04-13 NOTE — PLAN OF CARE
Brennon spoke with Tiarra at [facility] at [phone], working on Pt's referral now and Brennon ambrose to follow.

## 2017-04-13 NOTE — ANESTHESIA POSTPROCEDURE EVALUATION
"Anesthesia Post Evaluation    Patient: Carlos Cifuentes    Procedure(s) Performed: Procedure(s) (LRB):  DEBRIDEMENT-FOOT 1st ray amputation  (Right)    Final Anesthesia Type: MAC  Patient location during evaluation: PACU  Patient participation: Yes- Able to Participate  Level of consciousness: awake and alert  Post-procedure vital signs: reviewed and stable  Pain management: adequate  Airway patency: patent  PONV status at discharge: No PONV  Anesthetic complications: no      Cardiovascular status: blood pressure returned to baseline  Respiratory status: unassisted, room air and spontaneous ventilation  Hydration status: euvolemic  Follow-up not needed.        Visit Vitals    BP (!) 136/59    Pulse (!) 59    Temp 36.7 °C (98.1 °F) (Temporal)    Resp 13    Ht 5' 9" (1.753 m)    Wt 109.5 kg (241 lb 6.5 oz)    SpO2 97%    BMI 35.65 kg/m2       Pain/Jose E Score: Pain Assessment Performed: Yes (4/13/2017  1:17 PM)  Presence of Pain: denies (4/13/2017  1:17 PM)  Pain Assessment Performed: Yes (4/13/2017 11:12 AM)  Presence of Pain: complains of pain/discomfort (4/13/2017 11:12 AM)  Pain Rating Prior to Med Admin: 9 (4/13/2017  8:32 AM)  Pain Rating Post Med Admin: 8 (4/12/2017  6:30 PM)  Jose E Score: 10 (4/13/2017  1:17 PM)      "

## 2017-04-13 NOTE — PLAN OF CARE
Sw informed Pt will need a wound vac at d/c, Sw had podiatry complete form but due to time of fax, unsure if insurance will be able to approve for the weekend d/c. Brennon did fax all clinical information including face sheet, h & p, vascular note and podiatry notes with [employer] form to . Pt setup with CPP but needs information on dressing changes per Tiarra and updates on vac and wound care.

## 2017-04-13 NOTE — ASSESSMENT & PLAN NOTE
-LAILA positive at 1.57  -POD 2 s/p  R SFA & PTA of R AT  -Continue asprin 81 mg & simvastatin 20 mg po qhs  -Continue plavix 75 mg po qd  -Continue aspirin 81 mg po qd

## 2017-04-13 NOTE — OP NOTE
Date: 4/13/17    Surgeon: Dr. Veronica, [facility]; Dr. Herberth DPM PGY-3 First assist    Preoperative Diagnosis:    Cellulitis of right foot [L03.115]  Ischemic toe [I99.8]  Diabetic ulcer of toe of right foot associated with type 2 diabetes mellitus, with fat layer exposed [E11.621, L97.512]    Postoperative Diagnosis: Same    Procedure Performed:    1st ray amputation (Right)  Excisional Wound Debridement Right foot    Anaesthesia: Monitored anesthesia care with local     Hemostasis:  None    Estimated Blood Loss: 15 mL    Specimen:   Specimen (12h ago through future)    Start       Ordered     04/13/17 1259   Specimen to Pathology - Surgery Once    Comments: Jar 1 - clean margin - right 1 st metatarsal   Jar 2 - dirty margin - right 1st metatarsal     04/13/17 1258     Culture: Aerobic, Anaerobic, Acid Fast and Fungal.    Complications: None    Condition: Stable    PRE-PROCEDURE:   The patient was brought into the operating room and placed on the operating table in the supine position. Following IV sedation, local anesthesia was obtained utilizing 20 cc's of 0.5% Marcaine plain. The foot and leg was then scrubbed, prepped, and draped in the usual aseptic manner.     PROCEDURE:    Attention was directed to the right foot where a previous open hallux amputation had been done at the [facility]. The skin edges at the incision were noted to be significantly necrosed. All non viable skin and subcutaneous tissue was excised. To allow for a tension free closure, an incision was carried out from either side of the open, necrotic wound while excising the entire wound in a 3:1 fashion. Once this was done, an elevator was used to clear off the distal half of the first metatarsal of all soft tissue attachments. A sagittal saw was used to remove the distal half of the first metatarsal, this was handed off to be sent to pathology. A clean margin was also taken to be sent to pathology. The area was copiously irrigated with saline. No  tracks or pockets of purulence were discovered during the procedure. Despite extending the incision, it was noted that there was not enough viable skin to primarily close the incision without excessive tension. Next, the dorsal aspect of the incision was closed with 3-0 nylon and the open area measuring 5.0x3.0x2.5 cm was then covered with a wound vac foam dressing and the vac was set to 100 mmHg continuous. The area was covered with a sterile compressive dressing consisting of 4 X 4s and kerlix. The patient tolerated the procedure and anesthesia well. The patient was transported to recovery with vital signs stable and vascular status intact.     Kanu Kevin DPM PGY-3  Pager [phone]

## 2017-04-13 NOTE — ANESTHESIA RELEASE NOTE
Anesthesia Release from PACU Note    Patient name: Carlos Cifuentes    Procedure(s): Procedure(s) (LRB):  DEBRIDEMENT-FOOT 1st ray amputation  (Right)    Anesthesia type: MAC    Post pain: adequate analgesia    Post assessment: no apparent complications    Last vitals:   Vitals:    04/13/17 1345   BP: (!) 136/59   Pulse: (!) 59   Resp: 13   Temp:        Post vital signs: stable    Level of consciousness: alert     Nausea/Vomiting: no nausea/no vomiting    Complications: none    Airway Patency:  patent    Respiratory: unassisted    Cardiovascular: stable and blood pressure at baseline    Hydration: euvolemic

## 2017-04-13 NOTE — SUBJECTIVE & OBJECTIVE
Interval History: plan for closure today; afebrile. Tearful today and states needs some personal time.    Review of Systems   Constitutional: Negative for chills and fever.   Respiratory: Negative for cough and shortness of breath.    Cardiovascular: Positive for leg swelling. Negative for chest pain.   Gastrointestinal: Negative for abdominal pain, constipation, diarrhea, nausea and vomiting.   Genitourinary: Negative for dysuria, frequency and hematuria.   Musculoskeletal: Positive for arthralgias and joint swelling. Negative for back pain and myalgias.   Skin: Positive for color change and wound. Negative for rash.   Neurological: Negative for dizziness, weakness, light-headedness, numbness and headaches.   Psychiatric/Behavioral: Negative for agitation and behavioral problems. The patient is not nervous/anxious.      Objective:     Vital Signs (Most Recent):  Temp: 98.5 °F (36.9 °C) (04/13/17 0753)  Pulse: 69 (04/13/17 0753)  Resp: 18 (04/13/17 0753)  BP: 124/63 (04/13/17 0753)  SpO2: 96 % (04/13/17 0753) Vital Signs (24h Range):  Temp:  [97.9 °F (36.6 °C)-98.6 °F (37 °C)] 98.5 °F (36.9 °C)  Pulse:  [60-75] 69  Resp:  [16-18] 18  SpO2:  [96 %-97 %] 96 %  BP: (109-143)/(53-84) 124/63     Weight: 109.5 kg (241 lb 6.5 oz)  Body mass index is 35.65 kg/(m^2).    Estimated Creatinine Clearance: 101.8 mL/min (based on Cr of 1).    Physical Exam   Constitutional: He is oriented to person, place, and time. He appears well-developed and well-nourished. No distress.   Cardiovascular: Normal rate and regular rhythm.    No murmur heard.  Pulmonary/Chest: Effort normal and breath sounds normal. No respiratory distress.   Abdominal: Soft. Bowel sounds are normal. He exhibits no distension.   Musculoskeletal: Normal range of motion. He exhibits no edema.   Neurological: He is alert and oriented to person, place, and time.   Skin: Skin is warm and dry.   Right foot wound dressed; erythema and swelling RLE present, but improved    Psychiatric: He has a normal mood and affect. His behavior is normal.       Significant Labs:   Blood Culture:   Recent Labs  Lab 04/07/17 2053 04/07/17 2116   LABBLOO No growth after 5 days. No growth after 5 days.     CBC:   Recent Labs  Lab 04/12/17 0346 04/13/17  0433   WBC 7.79 7.46   HGB 10.8* 10.7*   HCT 32.9* 32.5*    205     CMP:   Recent Labs  Lab 04/12/17 0346 04/13/17  0433   * 130*   K 4.7 4.4   CL 98 98   CO2 26 25   * 158*   BUN 14 13   CREATININE 1.0 1.0   CALCIUM 8.5* 8.7   ANIONGAP 8 7*   EGFRNONAA >60.0 >60.0     Wound Culture:   Recent Labs  Lab 04/08/17  0854   LABAERO KLEBSIELLA OXYTOCAModerate  DIPHTHEROIDSModerateNo other significant isolate       Significant Imaging: I have reviewed all pertinent imaging results/findings within the past 24 hours.

## 2017-04-13 NOTE — PROGRESS NOTES
Xray completed, moderate serosanguineous drainage noted to dressing, wound vac continues to alarm, Dr. Kevin called and notified of more visible drainage and vac alarming, states he will come assess drainage soon, no further orders received, MD states ok to return pt to in patient room and will see him there, floor nurse updated on pt status, verbalized understanding.

## 2017-04-13 NOTE — SUBJECTIVE & OBJECTIVE
Interval History:  No acute events overnight.  Patient was taken to the OR today w/ podiatry for wound closure.  On initial evaluation this morning prior to going to the OR, he reported no acute complaints. Unfortunately there was insufficient viable skin for primary closure and he will need a wound vac.  After speaking with SW, he will most likely have to stay here over the weekend 2/2 to insurance being closed tomorrow on account of holiday. Spoke with patient re: change in plan and he is understanding.    Review of Systems   Constitutional: Negative for chills and fever.   Respiratory: Negative for cough and shortness of breath.    Cardiovascular: Negative for chest pain and palpitations.   Gastrointestinal: Negative for constipation, diarrhea, nausea and vomiting.     Objective:     Vital Signs (Most Recent):  Temp: 97.7 °F (36.5 °C) (04/13/17 1556)  Pulse: 85 (04/13/17 1556)  Resp: 17 (04/13/17 1556)  BP: 127/64 (04/13/17 1556)  SpO2: 97 % (04/13/17 1556) Vital Signs (24h Range):  Temp:  [97.7 °F (36.5 °C)-98.6 °F (37 °C)] 97.7 °F (36.5 °C)  Pulse:  [57-85] 85  Resp:  [13-20] 17  SpO2:  [95 %-97 %] 97 %  BP: (109-147)/(53-96) 127/64     Weight: 109.5 kg (241 lb 6.5 oz)  Body mass index is 35.65 kg/(m^2).    Intake/Output Summary (Last 24 hours) at 04/13/17 1600  Last data filed at 04/13/17 1249   Gross per 24 hour   Intake              800 ml   Output             1550 ml   Net             -750 ml      Physical Exam   Constitutional: He is oriented to person, place, and time. No distress.   Obese M sitting up in bed in NAD   Cardiovascular: Normal rate and regular rhythm.  Exam reveals no gallop and no friction rub.    No murmur heard.  Pulmonary/Chest: Breath sounds normal. No respiratory distress. He has no wheezes. He has no rhonchi. He has no rales.   Abdominal: Soft. Bowel sounds are normal. He exhibits no distension. There is no tenderness.   Musculoskeletal:   RLE post operative changes w/ swelling &  erythema noted   Neurological: He is alert and oriented to person, place, and time.       Significant Labs:   CBC:   Recent Labs  Lab 04/12/17  0346 04/13/17  0433   WBC 7.79 7.46   HGB 10.8* 10.7*   HCT 32.9* 32.5*    205     CMP:   Recent Labs  Lab 04/12/17  0346 04/13/17  0433   * 130*   K 4.7 4.4   CL 98 98   CO2 26 25   * 158*   BUN 14 13   CREATININE 1.0 1.0   CALCIUM 8.5* 8.7   ANIONGAP 8 7*   EGFRNONAA >60.0 >60.0       Significant Imaging: I have reviewed all pertinent imaging results/findings within the past 24 hours.

## 2017-04-13 NOTE — PLAN OF CARE
Problem: Patient Care Overview  Goal: Plan of Care Review  Outcome: Ongoing (interventions implemented as appropriate)  Pt free of falls this shift. Cbg monitored and insulin administered per MAR ordered. No s/s of infection noted. Pain mildly controlled with prn pain meds. AAOx4. Afebrile. No acute distress noted. Vss. Will monitor.

## 2017-04-13 NOTE — TRANSFER OF CARE
"Anesthesia Transfer of Care Note    Patient: Carlos Cifuentes    Procedure(s) Performed: Procedure(s) (LRB):  DEBRIDEMENT-FOOT 1st ray amputation  (Right)    Patient location: [facility]    Anesthesia Type: MAC    Transport from OR: Transported from OR on room air with adequate spontaneous ventilation    Post pain: adequate analgesia    Post assessment: no apparent anesthetic complications    Post vital signs: stable    Level of consciousness: awake and alert    Nausea/Vomiting: no nausea/vomiting    Complications: none          Last vitals:   Visit Vitals    /70 (BP Location: Left arm, Patient Position: Lying, BP Method: Automatic)    Pulse 70    Temp 36.9 °C (98.4 °F) (Oral)    Resp 20    Ht 5' 9" (1.753 m)    Wt 109.5 kg (241 lb 6.5 oz)    SpO2 96%    BMI 35.65 kg/m2     "

## 2017-04-13 NOTE — ASSESSMENT & PLAN NOTE
- s/p right first ray amputation 4/10 with revascularization 4/11  - plans for closure today  - wound culture with Klebsiella Oxytoca (Cipro sensitive), diptheroids, and prevotella  - no intraoperative cultures have been taken yet  - recommend please send remaining bone (clean margin) for culture (aerobic, anaerobic, AFB, and fungal) as well as pathology tomorrow   - can send patient out on empiric vancomycin and PO cipro; vanc trough therapeutic at 17  - will f/u in outpatient setting next week (if cultures and pathology negative, can d/c antibiotics and pull PICC as outpatient); if cultures/path positive, will tailor antibiotic therapy according to culture data  - weekly CBC, CMP, ESR, CRP and vanc trough faxed to ID clinic at [phone]  - will schedule outpatient f/u in ID clinic  - ID will sign off

## 2017-04-13 NOTE — ASSESSMENT & PLAN NOTE
-Continue Levemir 13 U qhs  -Continue LD SSI  -Continue to monitor glucose qac & qhs, adjust dose as needed  -Resume diabetic 2000 Carlos diet after OR

## 2017-04-13 NOTE — BRIEF OP NOTE
[facility]  Brief Operative Note    SUMMARY     Surgery Date: 4/13/2017     Surgeon(s) and Role:     * Han Veronica DPM - Primary     * Kanu Kevin DPM - Resident - Assisting    Pre-op Diagnosis:  Cellulitis of right foot [L03.115]  Ischemic toe [I99.8]  Diabetic ulcer of toe of right foot associated with type 2 diabetes mellitus, with fat layer exposed [E11.621, L97.512]    Post-op Diagnosis:  Post-Op Diagnosis Codes:     * Cellulitis of right foot [L03.115]     * Ischemic toe [I99.8]     * Diabetic ulcer of toe of right foot associated with type 2 diabetes mellitus, with fat layer exposed [E11.621, L97.512]    Procedure(s) (LRB):  DEBRIDEMENT-FOOT 1st ray amputation  (Right)    Anesthesia: Local MAC    Description of Procedure: Right first metatarsal resection with debridement. Partial closure with wound vac applied to open ulceration measuring 5.0x3.0x2.5 cm.     Description of the findings of the procedure: There was significant skin loss noted due to ischemia inhibiting our ability to primarily close the wound. Adequate bleeding noted to remaining tissue.     Estimated Blood Loss: 15 mL         Specimens:   Specimen (12h ago through future)    Start     Ordered    04/13/17 1259  Specimen to Pathology - Surgery  Once     Comments:  Jar 1 - clean margin - right 1 st metatarsal   Jar 2 - dirty margin - right 1st metatarsal    04/13/17 1258        Kanu Kevin DPM PGY-3  Pager [phone]

## 2017-04-13 NOTE — PLAN OF CARE
Sw did upload [employer] referral to [facility] for IV abx at d/c, Pt lives alone in [place] with Medicaid. Will need auth and teaching prior to d/c, Sw to follow.

## 2017-04-13 NOTE — NURSING TRANSFER
Nursing Transfer Note      4/13/2017     Transfer from  22 to 1142    Transfer via stretcher    Transfer with wound vac, therapy interrupted, saline locked picc line, room air, no meds, chart at bedside    Transported by TRACE [facility]    Medicines sent: n/a    Chart send with patient: YES    Notified: Domitila RN (floor nurse) also notified Dr. Kevin pt dressing status and pt transferring to room; verbalized understanding    Patient reassessed at: 4/13/17 @ 14:44    Upon arrival to floor: Receiving nurse and floor staff notified of pt arrival to room, nad, no complaints, nerve block still in effect, pt aaxo4, back in bed, bed in lowest position, wheels locked, family at bedside, call light in reach.

## 2017-04-13 NOTE — PROGRESS NOTES
Progress Note  Podiatry      SUBJECTIVE     History of Present Illness:  Carlos Cifuentes is a 56 y.o. male who  has a past medical history of Arthritis; Bulging lumbar disc; C. difficile diarrhea; Chronic back pain; Diabetes mellitus type II; DM (diabetes mellitus), type 2, uncontrolled; Hepatitis C; MSSA (methicillin susceptible Staphylococcus aureus) septicemia; Neuromuscular disorder; Neuropathy; and Staph aureus infection.     Patient was consulted to podiatry for a right great toe ulcer with ischemic changes to the toe. Patient reports the ulcer started about 2 weeks ago when he was filing a callus down and picked at some skin causing some bleeding. The ulcer progressively worsened and over the last 2-3 days he has noticed the toe changing colors. Denies f/c/n/v. There is moderate reported pain to the area especially with pressure.    Interval History  4/13/17: Patient was seen bedside in [facility] s/p day 3 right foot hallux amputation left open and packed. Reports minimal pain the right foot except with pressure. Vascular intervention on Tuesday. Plan for debridement with delayed primary closure. NPO since midnight      Scheduled Meds:   aspirin  81 mg Oral Daily    ciprofloxacin HCl  500 mg Oral Q12H    clopidogrel  75 mg Oral Daily    gabapentin  300 mg Oral TID    insulin detemir  13 Units Subcutaneous QHS    ledipasvir-sofosbuvir  1 tablet Oral Daily    nicotine  1 patch Transdermal Daily    oxycodone  10 mg Oral Q12H    simvastatin  20 mg Oral QHS    sodium chloride 0.9%  10 mL Intravenous Q6H    trazodone  100 mg Oral QHS    vancomycin (VANCOCIN) IVPB  2,000 mg Intravenous Q12H     Continuous Infusions:   PRN Meds:dextrose 50%, dextrose 50%, glucagon (human recombinant), glucose, glucose, insulin aspart, oxycodone, promethazine, Flushing PICC Protocol **AND** sodium chloride 0.9% **AND** sodium chloride 0.9%    Review of patient's allergies indicates:   Allergen Reactions    Codeine      Other  reaction(s): Unknown        Past Medical History:   Diagnosis Date    Arthritis     Bulging lumbar disc     C. difficile diarrhea     Chronic back pain 10/14/2014    Diabetes mellitus type II     DM (diabetes mellitus), type 2, uncontrolled 3/12/2013    Hepatitis C 7/3/2013    MSSA (methicillin susceptible Staphylococcus aureus) septicemia     Neuromuscular disorder     Neuropathy     Staph aureus infection      Past Surgical History:   Procedure Laterality Date    APPENDECTOMY      left leg surgery      broken bone    LEG SURGERY  right      Family History   Problem Relation Age of Onset    Arthritis Mother     Arthritis Sister     Diabetes Sister     Arthritis Brother      Social History   Substance Use Topics    Smoking status: Current Some Day Smoker     Packs/day: 1.00     Types: Cigarettes    Smokeless tobacco: Never Used    Alcohol use No       Review of Systems:  Constitutional: no fever or chills  Respiratory: no cough or shortness of breath  Cardiovascular: no chest pain or palpitations  Gastrointestinal: no nausea or vomiting, tolerating diet  Musculoskeletal: no arthralgias or myalgias  Neurological: history of numbness or paresthesias in the feet    OBJECTIVE     Vital Signs (Most Recent):  Temp: 98.5 °F (36.9 °C) (04/13/17 0753)  Pulse: 69 (04/13/17 0753)  Resp: 18 (04/13/17 0753)  BP: 124/63 (04/13/17 0753)  SpO2: 96 % (04/13/17 0753)    Vital Signs Range (Last 24H):  Temp:  [97.9 °F (36.6 °C)-98.6 °F (37 °C)]   Pulse:  [60-75]   Resp:  [16-18]   BP: (109-143)/(53-84)   SpO2:  [96 %-97 %]     Physical Exam:  General: Oriented to person, place, time, and situation. No acute distress.  Vascular: DP and PT pulses are 1+ bilaterally with biphasic signals on doppler. CRT<3 seconds to digits 2-5 right and 1-5 left, right hallux CRT absent. Moderate diffuse pedal edema to the right foot.  Musculoskeletal: Equinus noted b/l ankles with < 10 deg DF noted.   Neuro: Protective sensation  absent bilateral feet.   Derm: No open lesions, lacerations or wounds noted left foot.     Right foot at hallux amputation site open with exposed first metatarsal head. Skin edges ischemic. There is mild serous drainage with malodor present.       Laboratory:  CBC:     Recent Labs  Lab 04/13/17  0433   WBC 7.46   RBC 3.81*   HGB 10.7*   HCT 32.5*      MCV 85   MCH 28.1   MCHC 32.9     CMP:     Recent Labs  Lab 04/07/17  2139  04/13/17  0433   *  < > 158*   CALCIUM 9.1  < > 8.7   ALBUMIN 2.9*  --   --    PROT 7.7  --   --    *  < > 130*   K 4.9  < > 4.4   CO2 22*  < > 25   CL 97  < > 98   BUN 27*  < > 13   CREATININE 1.2  < > 1.0   ALKPHOS 86  --   --    ALT 17  --   --    AST 15  --   --    BILITOT 0.4  --   --    < > = values in this interval not displayed.  ESR:     Recent Labs  Lab 04/08/17  0513   SEDRATE 90*     CRP: No results for input(s): CRP in the last 168 hours.  Microbiology Results (last 7 days)     Procedure Component Value Units Date/Time    Blood culture #1 **CANNOT BE ORDERED STAT** [[id number]] Collected:  04/07/17 2053    Order Status:  Completed Specimen:  Blood from Peripheral, Antecubital, Right Updated:  04/13/17 0612     Blood Culture, Routine No growth after 5 days.    Blood culture #2 **CANNOT BE ORDERED STAT** [[id number]] Collected:  04/07/17 2116    Order Status:  Completed Specimen:  Blood from Peripheral, Antecubital, Left Updated:  04/12/17 2222     Blood Culture, Routine No growth after 5 days.    Culture, Anaerobe [[id number]] Collected:  04/08/17 0854    Order Status:  Completed Specimen:  Wound from Toe, Right Foot Updated:  04/11/17 1355     Anaerobic Culture --     PREVOTELLA (B.) MELANINOGENICA  Many      Aerobic culture [[id number]]  (Susceptibility) Collected:  04/08/17 0854    Order Status:  Completed Specimen:  Wound from Toe, Right Foot Updated:  04/10/17 1101     Aerobic Bacterial Culture --     KLEBSIELLA OXYTOCA  Moderate       Aerobic Bacterial Culture  --     DIPHTHEROIDS  Moderate  No other significant isolate      Gram stain [[id number]] Collected:  04/08/17 0854    Order Status:  Completed Specimen:  Wound from Toe, Right Foot Updated:  04/08/17 1107     Gram Stain Result No WBC's      Moderate Gram positive cocci      Few Gram negative rods          Diagnostic Results:  X-Ray: reviewed  MRI: pending      ASSESSMENT/PLAN     Assessment:  -DM II with neuropathy  -s/p day 2 right hallux amputation open  -Cellulitis/abscess    Plan:  -Vascular intervention Tuesday was successful to the DP artery, PT is occluded  -Cultures taken at bedside growing Diptheroids and klebsiella oxytoca, and anaerobic cultures with prevotella melaninogenica, clean margin cultures will be taken intraoperatively. Appreciate ID recommendations.  -Long discussion over need to stop smoking immediately, non weight bearing right foot  -Case set for today at noon to debride necrotic tissue and metatarsal head and attempt primary closure, long discussion over him being very high risk for amputation with occluded PT, must stop smoking and will need to be completely non weight bearing.  -Upon reviewing the risks/benefits,  the planned procedure, the surgical goal, and the postoperative course for the right foot first metatarsal resection with delayed primary closure, the written consent was signed, timed, and dated by the patient with a witness present.      Appreciate the consult, please call on call podiatry for questions or concerns.    Kanu Kevin DPM PGY-3  Pager [phone]

## 2017-04-13 NOTE — ASSESSMENT & PLAN NOTE
-Vanc 2000 mg IV qd  -Cipro 500 po BID  -Aerobic wound Cx with diphtheroid sp. and klebsiella   -Anaerobic wound Cx positive for prevotella  -Continue abx. Per ID w/ plan to keep on abx at least a total of 10 d for cellulitis associated with osto  -Pending results from today's cultures, he may need longer duration of Abx than 10 days  -PICC line placed 4/12 in anticipation of need for outpatient Abx  -After discharge he will need to follow up with ID after deep tissue cultures & pathology have resulted

## 2017-04-13 NOTE — PROGRESS NOTES
Pt wound vac alarming, alarm note states possible negative pressure/therapy interrupted, mild serosanguineous drainage noted to top of dressing, near toes, Dr. Kevin called and notified, verbalized understanding, states he will come see assess dressing as soon as he can, states ok for pt to return to in patient room, no orders received at this time.

## 2017-04-14 LAB
ANION GAP SERPL CALC-SCNC: 7 MMOL/L
BASOPHILS # BLD AUTO: 0.02 K/UL
BASOPHILS NFR BLD: 0.3 %
BUN SERPL-MCNC: 12 MG/DL
CALCIUM SERPL-MCNC: 8.1 MG/DL
CHLORIDE SERPL-SCNC: 97 MMOL/L
CO2 SERPL-SCNC: 24 MMOL/L
CREAT SERPL-MCNC: 1.1 MG/DL
DIFFERENTIAL METHOD: ABNORMAL
EOSINOPHIL # BLD AUTO: 0.1 K/UL
EOSINOPHIL NFR BLD: 1.7 %
ERYTHROCYTE [DISTWIDTH] IN BLOOD BY AUTOMATED COUNT: 13.5 %
EST. GFR  (AFRICAN AMERICAN): >60 ML/MIN/1.73 M^2
EST. GFR  (NON AFRICAN AMERICAN): >60 ML/MIN/1.73 M^2
GLUCOSE SERPL-MCNC: 235 MG/DL
HCT VFR BLD AUTO: 29.9 %
HGB BLD-MCNC: 10.3 G/DL
LYMPHOCYTES # BLD AUTO: 1.2 K/UL
LYMPHOCYTES NFR BLD: 16.2 %
MAGNESIUM SERPL-MCNC: 1.3 MG/DL
MCH RBC QN AUTO: 28.3 PG
MCHC RBC AUTO-ENTMCNC: 34.4 %
MCV RBC AUTO: 82 FL
MONOCYTES # BLD AUTO: 0.6 K/UL
MONOCYTES NFR BLD: 7.7 %
NEUTROPHILS # BLD AUTO: 5.5 K/UL
NEUTROPHILS NFR BLD: 73.3 %
PLATELET # BLD AUTO: 190 K/UL
PMV BLD AUTO: 9.6 FL
POCT GLUCOSE: 249 MG/DL (ref 70–110)
POCT GLUCOSE: 277 MG/DL (ref 70–110)
POCT GLUCOSE: 279 MG/DL (ref 70–110)
POCT GLUCOSE: 300 MG/DL (ref 70–110)
POTASSIUM SERPL-SCNC: 4.6 MMOL/L
RBC # BLD AUTO: 3.64 M/UL
SODIUM SERPL-SCNC: 128 MMOL/L
WBC # BLD AUTO: 7.49 K/UL

## 2017-04-14 PROCEDURE — 25000003 PHARM REV CODE 250: Performed by: INTERNAL MEDICINE

## 2017-04-14 PROCEDURE — 83735 ASSAY OF MAGNESIUM: CPT

## 2017-04-14 PROCEDURE — 25000003 PHARM REV CODE 250: Performed by: HOSPITALIST

## 2017-04-14 PROCEDURE — 63600175 PHARM REV CODE 636 W HCPCS: Performed by: INTERNAL MEDICINE

## 2017-04-14 PROCEDURE — 85025 COMPLETE CBC W/AUTO DIFF WBC: CPT

## 2017-04-14 PROCEDURE — 11000001 HC ACUTE MED/SURG PRIVATE ROOM

## 2017-04-14 PROCEDURE — 63600175 PHARM REV CODE 636 W HCPCS: Performed by: PHYSICIAN ASSISTANT

## 2017-04-14 PROCEDURE — 99024 POSTOP FOLLOW-UP VISIT: CPT | Mod: ,,, | Performed by: PODIATRIST

## 2017-04-14 PROCEDURE — 99232 SBSQ HOSP IP/OBS MODERATE 35: CPT | Mod: ,,, | Performed by: HOSPITALIST

## 2017-04-14 PROCEDURE — 25000003 PHARM REV CODE 250: Performed by: PHYSICIAN ASSISTANT

## 2017-04-14 PROCEDURE — 80048 BASIC METABOLIC PNL TOTAL CA: CPT

## 2017-04-14 PROCEDURE — 25000003 PHARM REV CODE 250: Performed by: STUDENT IN AN ORGANIZED HEALTH CARE EDUCATION/TRAINING PROGRAM

## 2017-04-14 RX ORDER — OXYCODONE HCL 10 MG/1
10 TABLET, FILM COATED, EXTENDED RELEASE ORAL EVERY 12 HOURS
Qty: 14 TABLET | Refills: 0 | Status: SHIPPED | OUTPATIENT
Start: 2017-04-14 | End: 2017-04-26

## 2017-04-14 RX ORDER — OXYCODONE HCL 10 MG/1
10 TABLET, FILM COATED, EXTENDED RELEASE ORAL EVERY 12 HOURS
Qty: 14 TABLET | Refills: 0 | Status: SHIPPED | OUTPATIENT
Start: 2017-04-14 | End: 2017-04-14

## 2017-04-14 RX ORDER — MAGNESIUM SULFATE HEPTAHYDRATE 40 MG/ML
2 INJECTION, SOLUTION INTRAVENOUS ONCE
Status: COMPLETED | OUTPATIENT
Start: 2017-04-14 | End: 2017-04-14

## 2017-04-14 RX ORDER — CALCIUM CARBONATE 500(1250)
1500 TABLET ORAL ONCE
Status: COMPLETED | OUTPATIENT
Start: 2017-04-14 | End: 2017-04-14

## 2017-04-14 RX ADMIN — OXYCODONE HYDROCHLORIDE 10 MG: 5 TABLET ORAL at 01:04

## 2017-04-14 RX ADMIN — SIMVASTATIN 20 MG: 20 TABLET, FILM COATED ORAL at 09:04

## 2017-04-14 RX ADMIN — CIPROFLOXACIN HYDROCHLORIDE 500 MG: 500 TABLET, FILM COATED ORAL at 09:04

## 2017-04-14 RX ADMIN — INSULIN ASPART 3 UNITS: 100 INJECTION, SOLUTION INTRAVENOUS; SUBCUTANEOUS at 08:04

## 2017-04-14 RX ADMIN — INSULIN ASPART 3 UNITS: 100 INJECTION, SOLUTION INTRAVENOUS; SUBCUTANEOUS at 01:04

## 2017-04-14 RX ADMIN — ASPIRIN 81 MG: 81 TABLET, COATED ORAL at 08:04

## 2017-04-14 RX ADMIN — CLOPIDOGREL 75 MG: 75 TABLET, FILM COATED ORAL at 08:04

## 2017-04-14 RX ADMIN — CALCIUM 1500 MG: 500 TABLET ORAL at 08:04

## 2017-04-14 RX ADMIN — GABAPENTIN 300 MG: 300 CAPSULE ORAL at 01:04

## 2017-04-14 RX ADMIN — Medication 10 ML: at 05:04

## 2017-04-14 RX ADMIN — Medication 10 ML: at 01:04

## 2017-04-14 RX ADMIN — GABAPENTIN 300 MG: 300 CAPSULE ORAL at 09:04

## 2017-04-14 RX ADMIN — OXYCODONE HYDROCHLORIDE 10 MG: 5 TABLET ORAL at 03:04

## 2017-04-14 RX ADMIN — CIPROFLOXACIN HYDROCHLORIDE 500 MG: 500 TABLET, FILM COATED ORAL at 08:04

## 2017-04-14 RX ADMIN — VANCOMYCIN HYDROCHLORIDE 2000 MG: 1 INJECTION, POWDER, LYOPHILIZED, FOR SOLUTION INTRAVENOUS at 05:04

## 2017-04-14 RX ADMIN — LEDIPASVIR AND SOFOSBUVIR 1 TABLET: 90; 400 TABLET, FILM COATED ORAL at 08:04

## 2017-04-14 RX ADMIN — INSULIN ASPART 1 UNITS: 100 INJECTION, SOLUTION INTRAVENOUS; SUBCUTANEOUS at 09:04

## 2017-04-14 RX ADMIN — OXYCODONE HYDROCHLORIDE 10 MG: 10 TABLET, FILM COATED, EXTENDED RELEASE ORAL at 08:04

## 2017-04-14 RX ADMIN — TRAZODONE HYDROCHLORIDE 100 MG: 50 TABLET ORAL at 09:04

## 2017-04-14 RX ADMIN — GABAPENTIN 300 MG: 300 CAPSULE ORAL at 05:04

## 2017-04-14 RX ADMIN — ENOXAPARIN SODIUM 40 MG: 100 INJECTION SUBCUTANEOUS at 05:04

## 2017-04-14 RX ADMIN — Medication 10 ML: at 12:04

## 2017-04-14 RX ADMIN — OXYCODONE HYDROCHLORIDE 10 MG: 5 TABLET ORAL at 05:04

## 2017-04-14 RX ADMIN — MAGNESIUM SULFATE IN WATER 2 G: 40 INJECTION, SOLUTION INTRAVENOUS at 08:04

## 2017-04-14 RX ADMIN — NICOTINE 1 PATCH: 14 PATCH, EXTENDED RELEASE TRANSDERMAL at 08:04

## 2017-04-14 RX ADMIN — INSULIN ASPART 2 UNITS: 100 INJECTION, SOLUTION INTRAVENOUS; SUBCUTANEOUS at 05:04

## 2017-04-14 RX ADMIN — OXYCODONE HYDROCHLORIDE 10 MG: 10 TABLET, FILM COATED, EXTENDED RELEASE ORAL at 09:04

## 2017-04-14 NOTE — SUBJECTIVE & OBJECTIVE
Interval History:  S/p surgery yesterdya. No acute issues. Awaiting securing of wound vac (CM assisting) and final podiatry recs    Review of Systems   Constitutional: Negative for chills and fever.   Respiratory: Negative for cough and shortness of breath.    Cardiovascular: Negative for chest pain and palpitations.   Gastrointestinal: Negative for constipation, diarrhea, nausea and vomiting.   Musculoskeletal:        Reports R LE pain improving     Objective:     Vital Signs (Most Recent):  Temp: 97.3 °F (36.3 °C) (04/14/17 0533)  Pulse: 71 (04/14/17 0655)  Resp: 17 (04/14/17 0533)  BP: (!) 108/50 (04/14/17 0533)  SpO2: 98 % (04/14/17 0533) Vital Signs (24h Range):  Temp:  [97.3 °F (36.3 °C)-99.1 °F (37.3 °C)] 97.3 °F (36.3 °C)  Pulse:  [57-85] 71  Resp:  [13-20] 17  SpO2:  [95 %-98 %] 98 %  BP: (108-147)/(50-96) 108/50     Weight: 109.5 kg (241 lb 6.5 oz)  Body mass index is 35.65 kg/(m^2).    Intake/Output Summary (Last 24 hours) at 04/14/17 0807  Last data filed at 04/14/17 0300   Gross per 24 hour   Intake              500 ml   Output              900 ml   Net             -400 ml      Physical Exam   Constitutional: He is oriented to person, place, and time. No distress.   Obese M sitting up in bed in NAD   Cardiovascular: Normal rate and regular rhythm.  Exam reveals no gallop and no friction rub.    No murmur heard.  Pulmonary/Chest: Breath sounds normal. No respiratory distress. He has no wheezes. He has no rhonchi. He has no rales.   Abdominal: Soft. Bowel sounds are normal. He exhibits no distension. There is no tenderness.   Musculoskeletal:   RLE post operative changes w/ swelling & erythema noted now w/ 50 cc grossly bloody fluid noted in wound vac canister   Neurological: He is alert and oriented to person, place, and time.       Significant Labs:   CBC:     Recent Labs  Lab 04/13/17  0433 04/14/17  0546   WBC 7.46 7.49   HGB 10.7* 10.3*   HCT 32.5* 29.9*    190     CMP:     Recent Labs  Lab  04/13/17  0433 04/14/17  0546   * 128*   K 4.4 4.6   CL 98 97   CO2 25 24   * 235*   BUN 13 12   CREATININE 1.0 1.1   CALCIUM 8.7 8.1*   ANIONGAP 7* 7*   EGFRNONAA >60.0 >60.0       Significant Imaging: I have reviewed all pertinent imaging results/findings within the past 24 hours.

## 2017-04-14 NOTE — PLAN OF CARE
CM met with patient and sister at bedside to review discharge plans. Patient's sister is a nurse (works at [employer], [employer]) and is willing to assist as able.  Patient will be going home with [facility] to assist with wound vac and IV abx. Heron with [employer] is following patient and is concerned as to patient's ability to use a dial a flow IV set up; this CM and patient's sister in agreement. Will write letter of medical necessity to see if insurance will approve Eclipse pump for infusion. Patient's wound vac was approved but has not yet arrived at the hospital. Plan for discharge home tomorrow, probably afternoon discharge. Patient will also need wheelchair for home use; will order tomorrow. CM will follow.

## 2017-04-14 NOTE — PLAN OF CARE
The oncall SW assisting the CM sent the H&P, fs, and orders to [facility].  They agreed to accept the pt and the CM will call them tomorrow to notify them of the dc.  Pt will dc tomorrow with IV abx and wound vac.    Jaqui Burris, [facility] x [phone]

## 2017-04-14 NOTE — PLAN OF CARE
FARRAH called I to check on status of wound vac order. FARRAH spoke with Vince yHde who stated that he needs staff signature to get vac. Per CM request, Vince reviewed notes and stated that he would approve vac if CM can get signature.  FARRAH obtained Dr Alfreda Almanza's signature and faxed back to Vince/[employer] at [phone]. Vac to be delivered today.     FARRAH spoke with Heron with [employer]. He is having some difficulty teaching patient, primarily because patient is obsessed with his wound and stating that it needs to be closed. Dr Castillo has spoken with patient and plan is to discharge home tomorrow with home health for wound vac care and IV abx. Will follow.

## 2017-04-14 NOTE — PROGRESS NOTES
Progress Note  Podiatry      SUBJECTIVE     History of Present Illness:  Carlos Cifuentes is a 56 y.o. male who  has a past medical history of Arthritis; Bulging lumbar disc; C. difficile diarrhea; Chronic back pain; Diabetes mellitus type II; DM (diabetes mellitus), type 2, uncontrolled; Hepatitis C; MSSA (methicillin susceptible Staphylococcus aureus) septicemia; Neuromuscular disorder; Neuropathy; and Staph aureus infection.     Patient was consulted to podiatry for a right great toe ulcer with ischemic changes to the toe. Patient reports the ulcer started about 2 weeks ago when he was filing a callus down and picked at some skin causing some bleeding. The ulcer progressively worsened and over the last 2-3 days he has noticed the toe changing colors. Denies f/c/n/v. There is moderate reported pain to the area especially with pressure.    Interval History  4/13/17: Patient was seen bedside in [facility] s/p day 1 of right foot hallux amputation with 1st met head resection and wash out with wound vac in place. Denies pain. No complaints at this time.     Scheduled Meds:   aspirin  81 mg Oral Daily    ciprofloxacin HCl  500 mg Oral Q12H    clopidogrel  75 mg Oral Daily    enoxaparin  40 mg Subcutaneous Daily    gabapentin  300 mg Oral TID    insulin detemir  17 Units Subcutaneous QHS    ledipasvir-sofosbuvir  1 tablet Oral Daily    nicotine  1 patch Transdermal Daily    oxycodone  10 mg Oral Q12H    simvastatin  20 mg Oral QHS    sodium chloride 0.9%  10 mL Intravenous Q6H    trazodone  100 mg Oral QHS    vancomycin (VANCOCIN) IVPB  2,000 mg Intravenous Q12H     Continuous Infusions:   sodium chloride 0.9% 10 mL/hr at 04/13/17 1500     PRN Meds:dextrose 50%, dextrose 50%, glucagon (human recombinant), glucose, glucose, insulin aspart, oxycodone, promethazine, ramelteon, Fl[place] PICC Protocol **AND** sodium chloride 0.9% **AND** sodium chloride 0.9%    Review of patient's allergies indicates:   Allergen  Reactions    Codeine      Other reaction(s): Unknown        Past Medical History:   Diagnosis Date    Arthritis     Bulging lumbar disc     C. difficile diarrhea     Chronic back pain 10/14/2014    Diabetes mellitus type II     DM (diabetes mellitus), type 2, uncontrolled 3/12/2013    Hepatitis C 7/3/2013    MSSA (methicillin susceptible Staphylococcus aureus) septicemia     Neuromuscular disorder     Neuropathy     Staph aureus infection      Past Surgical History:   Procedure Laterality Date    APPENDECTOMY      left knee surgery      left leg surgery      broken bone    LEG SURGERY  right     Right arm boil      Right great toe amputation       Family History   Problem Relation Age of Onset    Arthritis Mother     Arthritis Sister     Diabetes Sister     Arthritis Brother      Social History   Substance Use Topics    Smoking status: Current Some Day Smoker     Packs/day: 1.00     Types: Cigarettes    Smokeless tobacco: Never Used    Alcohol use No       Review of Systems:  Constitutional: no fever or chills  Respiratory: no cough or shortness of breath  Cardiovascular: no chest pain or palpitations  Gastrointestinal: no nausea or vomiting, tolerating diet  Musculoskeletal: no arthralgias or myalgias  Neurological: history of numbness or paresthesias in the feet    OBJECTIVE     Vital Signs (Most Recent):  Temp: 98.2 °F (36.8 °C) (04/14/17 1130)  Pulse: 86 (04/14/17 1500)  Resp: 18 (04/14/17 1130)  BP: 119/75 (04/14/17 1130)  SpO2: 99 % (04/14/17 1130)    Vital Signs Range (Last 24H):  Temp:  [97.3 °F (36.3 °C)-99.1 °F (37.3 °C)]   Pulse:  [71-86]   Resp:  [17-18]   BP: (108-136)/(50-75)   SpO2:  [97 %-99 %]     Physical Exam:  General: Oriented to person, place, time, and situation. No acute distress.  Vascular: DP and PT pulses are 1+ bilaterally with biphasic signals on doppler. CRT<3 seconds to digits 2-5 right and 1-5 left, right hallux CRT absent. Moderate diffuse pedal edema to the  right foot.  Musculoskeletal: Equinus noted b/l ankles with < 10 deg DF noted.   Neuro: Protective sensation absent bilateral feet.   Derm: No open lesions, lacerations or wounds noted left foot.     Right foot at hallux amputation site with wound vac intact.     Laboratory:  CBC:     Recent Labs  Lab 04/14/17  0546   WBC 7.49   RBC 3.64*   HGB 10.3*   HCT 29.9*      MCV 82   MCH 28.3   MCHC 34.4     CMP:     Recent Labs  Lab 04/07/17  2139  04/14/17  0546   *  < > 235*   CALCIUM 9.1  < > 8.1*   ALBUMIN 2.9*  --   --    PROT 7.7  --   --    *  < > 128*   K 4.9  < > 4.6   CO2 22*  < > 24   CL 97  < > 97   BUN 27*  < > 12   CREATININE 1.2  < > 1.1   ALKPHOS 86  --   --    ALT 17  --   --    AST 15  --   --    BILITOT 0.4  --   --    < > = values in this interval not displayed.  ESR:     Recent Labs  Lab 04/08/17  0513   SEDRATE 90*     CRP: No results for input(s): CRP in the last 168 hours.  Microbiology Results (last 7 days)     Procedure Component Value Units Date/Time    Aerobic culture [[id number]] Collected:  04/13/17 1608    Order Status:  Completed Specimen:  Wound from Foot, Right Updated:  04/14/17 1004     Aerobic Bacterial Culture --     GRAM NEGATIVE AHMET  Moderate  Identification and susceptibility pending      Culture, Anaerobe [[id number]] Collected:  04/13/17 1608    Order Status:  Sent Specimen:  Wound from Foot, Right Updated:  04/13/17 1645    Culture, Anaerobe [[id number]] Collected:  04/08/17 0854    Order Status:  Completed Specimen:  Wound from Toe, Right Foot Updated:  04/13/17 1358     Anaerobic Culture --     PREVOTELLA (B.) MELANINOGENICA  Many       Anaerobic Culture --     PREVOTELLA BERGENSIS  Moderate      Blood culture #1 **CANNOT BE ORDERED STAT** [[id number]] Collected:  04/07/17 2053    Order Status:  Completed Specimen:  Blood from Peripheral, Antecubital, Right Updated:  04/13/17 0612     Blood Culture, Routine No growth after 5 days.    Blood culture #2 **CANNOT BE  ORDERED STAT** [[id number]] Collected:  04/07/17 2116    Order Status:  Completed Specimen:  Blood from Peripheral, Antecubital, Left Updated:  04/12/17 2222     Blood Culture, Routine No growth after 5 days.    Aerobic culture [[id number]]  (Susceptibility) Collected:  04/08/17 0854    Order Status:  Completed Specimen:  Wound from Toe, Right Foot Updated:  04/10/17 1101     Aerobic Bacterial Culture --     KLEBSIELLA OXYTOCA  Moderate       Aerobic Bacterial Culture --     DIPHTHEROIDS  Moderate  No other significant isolate      Gram stain [[id number]] Collected:  04/08/17 0854    Order Status:  Completed Specimen:  Wound from Toe, Right Foot Updated:  04/08/17 1107     Gram Stain Result No WBC's      Moderate Gram positive cocci      Few Gram negative rods          Diagnostic Results:  Reviewed       ASSESSMENT/PLAN     Assessment:  -S/p day 1 right hallux amputation with 1st met head resection and wash out with wound vac intact  -Cellulitis/abscess  -DM2 with neuropathy    Plan:  -Sx site with wound vac intact, no further surgical intervention indicated at this time.   -Upon D/C, HH to change wound vac every 3 days and pt will follow up at clinic once a week. Pt to f/u with Dr. Veronica in one week.   -Previous cultures taken at bedside growing Diptheroids and klebsiella oxytoca, and anaerobic cultures with prevotella melaninogenica, waiting on intraoperative cultures. Appreciate ID recommendations.  -Vascular intervention Tuesday was successful to the DP artery, PT is occluded  -Long discussion with pt over being very high risk for further amputation with occluded PT, must stop smoking and will need to be complely non weight bearing.  -Appreciate the consult, podiatry will follow  -Please contact the on-call podiatry resident with questions or concerns    Thank you,     Ysabel Mares  DPM PGY-1  [phone]        Resident Supervision:  I have personally taken the history and examined this patient and agree  with the resident's note as stated above.   Alexia Li DPM

## 2017-04-15 VITALS
HEART RATE: 68 BPM | HEIGHT: 69 IN | RESPIRATION RATE: 18 BRPM | TEMPERATURE: 98 F | DIASTOLIC BLOOD PRESSURE: 64 MMHG | BODY MASS INDEX: 35.75 KG/M2 | OXYGEN SATURATION: 95 % | WEIGHT: 241.38 LBS | SYSTOLIC BLOOD PRESSURE: 130 MMHG

## 2017-04-15 LAB
ANION GAP SERPL CALC-SCNC: 6 MMOL/L
BACTERIA SPEC AEROBE CULT: NORMAL
BASOPHILS # BLD AUTO: 0.01 K/UL
BASOPHILS NFR BLD: 0.1 %
BUN SERPL-MCNC: 12 MG/DL
CALCIUM SERPL-MCNC: 9.2 MG/DL
CHLORIDE SERPL-SCNC: 98 MMOL/L
CO2 SERPL-SCNC: 28 MMOL/L
CREAT SERPL-MCNC: 1 MG/DL
DIFFERENTIAL METHOD: ABNORMAL
EOSINOPHIL # BLD AUTO: 0.1 K/UL
EOSINOPHIL NFR BLD: 2 %
ERYTHROCYTE [DISTWIDTH] IN BLOOD BY AUTOMATED COUNT: 13.7 %
EST. GFR  (AFRICAN AMERICAN): >60 ML/MIN/1.73 M^2
EST. GFR  (NON AFRICAN AMERICAN): >60 ML/MIN/1.73 M^2
GLUCOSE SERPL-MCNC: 173 MG/DL
HCT VFR BLD AUTO: 29.7 %
HGB BLD-MCNC: 10.1 G/DL
LYMPHOCYTES # BLD AUTO: 1.1 K/UL
LYMPHOCYTES NFR BLD: 16.4 %
MAGNESIUM SERPL-MCNC: 1.5 MG/DL
MCH RBC QN AUTO: 28.2 PG
MCHC RBC AUTO-ENTMCNC: 34 %
MCV RBC AUTO: 83 FL
MONOCYTES # BLD AUTO: 0.7 K/UL
MONOCYTES NFR BLD: 9.3 %
NEUTROPHILS # BLD AUTO: 5 K/UL
NEUTROPHILS NFR BLD: 72.2 %
PLATELET # BLD AUTO: 179 K/UL
PMV BLD AUTO: 9.6 FL
POCT GLUCOSE: 209 MG/DL (ref 70–110)
POCT GLUCOSE: 255 MG/DL (ref 70–110)
POTASSIUM SERPL-SCNC: 4.4 MMOL/L
RBC # BLD AUTO: 3.58 M/UL
SODIUM SERPL-SCNC: 132 MMOL/L
WBC # BLD AUTO: 6.96 K/UL

## 2017-04-15 PROCEDURE — 63600175 PHARM REV CODE 636 W HCPCS: Performed by: PHYSICIAN ASSISTANT

## 2017-04-15 PROCEDURE — 85025 COMPLETE CBC W/AUTO DIFF WBC: CPT

## 2017-04-15 PROCEDURE — 25000003 PHARM REV CODE 250: Performed by: INTERNAL MEDICINE

## 2017-04-15 PROCEDURE — 25000003 PHARM REV CODE 250: Performed by: HOSPITALIST

## 2017-04-15 PROCEDURE — 83735 ASSAY OF MAGNESIUM: CPT

## 2017-04-15 PROCEDURE — 25000003 PHARM REV CODE 250: Performed by: STUDENT IN AN ORGANIZED HEALTH CARE EDUCATION/TRAINING PROGRAM

## 2017-04-15 PROCEDURE — 63600175 PHARM REV CODE 636 W HCPCS: Performed by: STUDENT IN AN ORGANIZED HEALTH CARE EDUCATION/TRAINING PROGRAM

## 2017-04-15 PROCEDURE — 25000003 PHARM REV CODE 250: Performed by: PHYSICIAN ASSISTANT

## 2017-04-15 PROCEDURE — 80048 BASIC METABOLIC PNL TOTAL CA: CPT

## 2017-04-15 PROCEDURE — 99238 HOSP IP/OBS DSCHRG MGMT 30/<: CPT | Mod: ,,, | Performed by: HOSPITALIST

## 2017-04-15 RX ORDER — METFORMIN HYDROCHLORIDE 1000 MG/1
1500 TABLET ORAL 2 TIMES DAILY WITH MEALS
Qty: 60 TABLET | Refills: 2 | Status: SHIPPED | OUTPATIENT
Start: 2017-04-15 | End: 2017-05-31 | Stop reason: SDUPTHER

## 2017-04-15 RX ORDER — INSULIN ASPART 100 [IU]/ML
3 INJECTION, SOLUTION INTRAVENOUS; SUBCUTANEOUS
Status: DISCONTINUED | OUTPATIENT
Start: 2017-04-15 | End: 2017-04-15 | Stop reason: HOSPADM

## 2017-04-15 RX ORDER — MAGNESIUM SULFATE HEPTAHYDRATE 40 MG/ML
2 INJECTION, SOLUTION INTRAVENOUS
Status: DISPENSED | OUTPATIENT
Start: 2017-04-15 | End: 2017-04-15

## 2017-04-15 RX ORDER — GLIPIZIDE 5 MG/1
5 TABLET ORAL
Qty: 60 TABLET | Refills: 1 | Status: SHIPPED | OUTPATIENT
Start: 2017-04-15 | End: 2017-05-10

## 2017-04-15 RX ORDER — ATORVASTATIN CALCIUM 80 MG/1
80 TABLET, FILM COATED ORAL DAILY
Qty: 90 TABLET | Refills: 3 | Status: SHIPPED | OUTPATIENT
Start: 2017-04-15 | End: 2018-03-06 | Stop reason: SDUPTHER

## 2017-04-15 RX ADMIN — INSULIN ASPART 3 UNITS: 100 INJECTION, SOLUTION INTRAVENOUS; SUBCUTANEOUS at 08:04

## 2017-04-15 RX ADMIN — OXYCODONE HYDROCHLORIDE 10 MG: 5 TABLET ORAL at 05:04

## 2017-04-15 RX ADMIN — MAGNESIUM SULFATE IN WATER 2 G: 40 INJECTION, SOLUTION INTRAVENOUS at 09:04

## 2017-04-15 RX ADMIN — VANCOMYCIN HYDROCHLORIDE 2000 MG: 1 INJECTION, POWDER, LYOPHILIZED, FOR SOLUTION INTRAVENOUS at 05:04

## 2017-04-15 RX ADMIN — Medication 10 ML: at 05:04

## 2017-04-15 RX ADMIN — MAGNESIUM SULFATE HEPTAHYDRATE 1 G: 500 INJECTION, SOLUTION INTRAMUSCULAR; INTRAVENOUS at 11:04

## 2017-04-15 RX ADMIN — INSULIN ASPART 2 UNITS: 100 INJECTION, SOLUTION INTRAVENOUS; SUBCUTANEOUS at 08:04

## 2017-04-15 RX ADMIN — LEDIPASVIR AND SOFOSBUVIR 1 TABLET: 90; 400 TABLET, FILM COATED ORAL at 09:04

## 2017-04-15 RX ADMIN — Medication 10 ML: at 02:04

## 2017-04-15 RX ADMIN — CIPROFLOXACIN HYDROCHLORIDE 500 MG: 500 TABLET, FILM COATED ORAL at 09:04

## 2017-04-15 RX ADMIN — NICOTINE 1 PATCH: 14 PATCH, EXTENDED RELEASE TRANSDERMAL at 09:04

## 2017-04-15 RX ADMIN — ASPIRIN 81 MG: 81 TABLET, COATED ORAL at 09:04

## 2017-04-15 RX ADMIN — CLOPIDOGREL 75 MG: 75 TABLET, FILM COATED ORAL at 09:04

## 2017-04-15 RX ADMIN — GABAPENTIN 300 MG: 300 CAPSULE ORAL at 05:04

## 2017-04-15 RX ADMIN — OXYCODONE HYDROCHLORIDE 10 MG: 10 TABLET, FILM COATED, EXTENDED RELEASE ORAL at 09:04

## 2017-04-15 RX ADMIN — INSULIN ASPART 2 UNITS: 100 INJECTION, SOLUTION INTRAVENOUS; SUBCUTANEOUS at 01:04

## 2017-04-15 RX ADMIN — GABAPENTIN 300 MG: 300 CAPSULE ORAL at 02:04

## 2017-04-15 RX ADMIN — INSULIN ASPART 3 UNITS: 100 INJECTION, SOLUTION INTRAVENOUS; SUBCUTANEOUS at 01:04

## 2017-04-15 RX ADMIN — OXYCODONE HYDROCHLORIDE 10 MG: 5 TABLET ORAL at 02:04

## 2017-04-15 RX ADMIN — Medication 10 ML: at 12:04

## 2017-04-15 RX ADMIN — MAGNESIUM SULFATE HEPTAHYDRATE 1 G: 500 INJECTION, SOLUTION INTRAMUSCULAR; INTRAVENOUS at 02:04

## 2017-04-15 NOTE — PROGRESS NOTES
Pt unhappy about pain regimen prescription. Home wound vac connected. No signs of distress. Verbalizes discharge instructions regarding medications and appointments.

## 2017-04-15 NOTE — PROGRESS NOTES
[facility] Hospital Medicine  Progress Note    Patient Name: Carlos Cifuentes  MRN: [id number]  Patient Class: IP- Inpatient   Admission Date: 4/7/2017  Length of Stay: 7 days  Attending Physician: Alfreda Almanza MD  Primary Care Provider: Miguel Lux MD    [facility]: [facility] 2 Alfreda Almanza MD    Subjective:     Principal Problem:Osteomyelitis of right foot    HPI:  Mr. Cifuentes is a 56 yr-old mal with hx of Hep C (currently on Harvoni), Uncontrolled DM2 with peripheral neuropathy, Tobacco Abuse (47 pack years), Hx of Right Leg MRSA Infection (2014) who presents to [facility] ED for evaluation of the R Great Toe. Pt was seen earlier in the day at [facility] for great toe discoloration and coldness. It was recommended the pt be admitted for vascular surgery evaluation but pt left AMA. He now returns to [facility] for further management. Right great toe was normal yesterday morning (4/7/17) but began to become discolored in the afternoon. Even after washing his foot, it remained discolored. Pt belives the discoloration is due to his shoes and dye from black socks. He has no hx of PVD or stents placed in his legs. He denies claudication. He reports peripheral neuropathy and poor sensation in his feet. He has an ulcer of his right great toe on the plantar surface that started one week ago after he tried to sand a callous.     In the ED, pt afebrile with no leukocytosis. LAILA 1.6 right and 1.7 left. Large arteries of the foot with good doppler flow. DP/PT pulses barely palpable. Xray negative. Great toe is cold to touch. Swelling and redness present in right shin. Fentanyl patch present       Hospital Course:  Patient was admitted to medicine. Podiatry, vascular surgery, and infectious diseases were consulted. Wound cultures were obtained, growing gram negative rods so far. MRI was obtained showing osteo in the toe. ID recommended IV abx for now, and for a couple of days after surgery  so long as clean margins were obtained. Podiatry amputated the affected to on 4/10. Vascular surgery is planning for an angiogram of the LE on 4/11.     4/12: No acute events overnight.  This morning patient reports his RLE pain is improving, but significantly worsens when he lets is hang to gravity.   Per CM, patient's wound vac has been set up, but HH still pending wound care rec's from podiatry.            Interval History:  S/p surgery yesterday. No acute issues. Awaiting securing of wound vac (CM assisting) and final podiatry recs    Review of Systems   Constitutional: Negative for chills and fever.   Respiratory: Negative for cough and shortness of breath.    Cardiovascular: Negative for chest pain and palpitations.   Gastrointestinal: Negative for constipation, diarrhea, nausea and vomiting.   Musculoskeletal:        Reports R LE pain improving     Objective:     Vital Signs (Most Recent):  Temp: 97.3 °F (36.3 °C) (04/14/17 0533)  Pulse: 71 (04/14/17 0655)  Resp: 17 (04/14/17 0533)  BP: (!) 108/50 (04/14/17 0533)  SpO2: 98 % (04/14/17 0533) Vital Signs (24h Range):  Temp:  [97.3 °F (36.3 °C)-99.1 °F (37.3 °C)] 97.3 °F (36.3 °C)  Pulse:  [57-85] 71  Resp:  [13-20] 17  SpO2:  [95 %-98 %] 98 %  BP: (108-147)/(50-96) 108/50     Weight: 109.5 kg (241 lb 6.5 oz)  Body mass index is 35.65 kg/(m^2).    Intake/Output Summary (Last 24 hours) at 04/14/17 0807  Last data filed at 04/14/17 0300   Gross per 24 hour   Intake              500 ml   Output              900 ml   Net             -400 ml      Physical Exam   Constitutional: He is oriented to person, place, and time. No distress.   Obese M sitting up in bed in NAD   Cardiovascular: Normal rate and regular rhythm.  Exam reveals no gallop and no friction rub.    No murmur heard.  Pulmonary/Chest: Breath sounds normal. No respiratory distress. He has no wheezes. He has no rhonchi. He has no rales.   Abdominal: Soft. Bowel sounds are normal. He exhibits no distension.  "There is no tenderness.   Musculoskeletal:   RLE post operative changes w/ swelling & erythema noted now w/ 50 cc grossly bloody fluid noted in wound vac canister   Neurological: He is alert and oriented to person, place, and time.       Significant Labs:   CBC:     Recent Labs  Lab 04/13/17 0433 04/14/17  0546   WBC 7.46 7.49   HGB 10.7* 10.3*   HCT 32.5* 29.9*    190     CMP:     Recent Labs  Lab 04/13/17 0433 04/14/17  0546   * 128*   K 4.4 4.6   CL 98 97   CO2 25 24   * 235*   BUN 13 12   CREATININE 1.0 1.1   CALCIUM 8.7 8.1*   ANIONGAP 7* 7*   EGFRNONAA >60.0 >60.0       Significant Imaging: I have reviewed all pertinent imaging results/findings within the past 24 hours.    Assessment/Plan:      * Osteomyelitis of right foot  -Vanc 2000 mg IV qd  -Cipro 500 po BID  -Aerobic wound Cx with diphtheroid sp. and klebsiella   -Anaerobic wound Cx positive for prevotella  -Continue abx. Per ID w/ plan to keep on abx at least a total of 10 d for cellulitis associated with osto  -Pending results from today's cultures, he may need longer duration of Abx than 10 days  -PICC line placed 4/12 in anticipation of need for outpatient Abx  -After discharge he will need to follow up with ID after deep tissue cultures & pathology have resulted  Per podiatry today, "-Sx site with wound vac intact, no further surgical intervention indicated at this time.   -Upon D/C, HH to change wound vac every 3 days and pt will follow up at clinic once a week. Pt to f/u with Dr. Veronica in one week. "    DM type 2, uncontrolled, with neuropathy  -Continue Levemir 13 U qhs  -Continue LD SSI  -Continue to monitor glucose qac & qhs, adjust dose as needed  -Resume diabetic 2000 Carlos diet after OR    Chronic back pain  -Pt takes 10 mg lortab q3 hours at home  -Continue IR Oxy 10 po q4h PRN  -Continue oxycontin 10 mg po BID    Neuropathic pain  -Continue home gabapentin 300 TID po    Diabetic ulcer of toe of right foot associated " with type 2 diabetes mellitus, with fat layer exposed  See note      Tobacco abuse  -Continue nicotine 14 mg/24h patch  -Referral to smoking cessation at discharge    Cellulitis of right foot  -Per ID, continue on Abx for at least 10 days    Peripheral arterial disease  -LAILA positive at 1.57  -POD 2 s/p  R SFA & PTA of R AT  -Continue asprin 81 mg & simvastatin 20 mg po qhs  -Continue plavix 75 mg po qd  -Continue aspirin 81 mg po qd    VTE Risk Mitigation         Ordered     enoxaparin injection 40 mg  Daily     Route:  Subcutaneous        04/13/17 1608     Medium Risk of VTE  Once      04/12/17 0827          Alfreda Almanza MD  Department of Hospital Medicine   [facility]

## 2017-04-15 NOTE — PROGRESS NOTES
[facility] Hospital Medicine  Progress Note    Patient Name: Carlos Cifuentes  MRN: [id number]  Patient Class: IP- Inpatient   Admission Date: 4/7/2017  Length of Stay: 8 days  Attending Physician: Alfreda Almanza MD  Primary Care Provider: Miguel Lux MD    [facility] Medicine Team: [facility] 2 Zita Barcenas MD    Subjective:     Principal Problem:Osteomyelitis of right foot    HPI:  Mr. Cifuentes is a 56 yr-old mal with hx of Hep C (currently on Harvoni), Uncontrolled DM2 with peripheral neuropathy, Tobacco Abuse (47 pack years), Hx of Right Leg MRSA Infection (2014) who presents to [facility] ED for evaluation of the R Great Toe. Pt was seen earlier in the day at [facility] for great toe discoloration and coldness. It was recommended the pt be admitted for vascular surgery evaluation but pt left AMA. He now returns to [facility] for further management. Right great toe was normal yesterday morning (4/7/17) but began to become discolored in the afternoon. Even after washing his foot, it remained discolored. Pt belives the discoloration is due to his shoes and dye from black socks. He has no hx of PVD or stents placed in his legs. He denies claudication. He reports peripheral neuropathy and poor sensation in his feet. He has an ulcer of his right great toe on the plantar surface that started one week ago after he tried to sand a callous.     In the ED, pt afebrile with no leukocytosis. LAILA 1.6 right and 1.7 left. Large arteries of the foot with good doppler flow. DP/PT pulses barely palpable. Xray negative. Great toe is cold to touch. Swelling and redness present in right shin. Fentanyl patch present     Hospital Course:  Patient was admitted to medicine. Podiatry, vascular surgery, and infectious diseases were consulted. Wound cultures were obtained, growing gram negative rods so far. MRI was obtained showing osteo in the toe. ID recommended IV abx for now, and for a couple of days after surgery so  long as clean margins were obtained. Podiatry amputated the affected to on 4/10. Vascular surgery is planning for an angiogram of the LE on 4/11.     4/12: No acute events overnight.  This morning patient reports his RLE pain is improving, but significantly worsens when he lets is hang to gravity.   Per CM, patient's wound vac has been set up, but HH still pending wound care rec's from podiatry.    4/15: waiting for  Home wound vanc & eclipse pump prior to discharging    Interval History: NAEON. VS wnl. RLE foot looks clean and dry     Review of Systems   Constitutional: Negative for chills and fever.   HENT: Negative for trouble swallowing.    Eyes: Negative for visual disturbance.   Respiratory: Negative for cough and shortness of breath.    Cardiovascular: Negative for chest pain and palpitations.   Gastrointestinal: Positive for abdominal distention. Negative for abdominal pain, constipation, diarrhea, nausea and vomiting.   Genitourinary: Negative for difficulty urinating.   Musculoskeletal: Positive for gait problem. Negative for arthralgias.   Neurological: Negative for dizziness, weakness and light-headedness.   Psychiatric/Behavioral: Negative for confusion.     Objective:     Vital Signs (Most Recent):  Temp: 98.1 °F (36.7 °C) (04/15/17 1124)  Pulse: 68 (04/15/17 1124)  Resp: 18 (04/15/17 1124)  BP: 130/64 (04/15/17 1124)  SpO2: 95 % (04/15/17 1124) Vital Signs (24h Range):  Temp:  [98.1 °F (36.7 °C)-98.7 °F (37.1 °C)] 98.1 °F (36.7 °C)  Pulse:  [67-80] 68  Resp:  [17-20] 18  SpO2:  [94 %-98 %] 95 %  BP: (112-130)/(58-79) 130/64     Weight: 109.5 kg (241 lb 6.5 oz)  Body mass index is 35.65 kg/(m^2).    Intake/Output Summary (Last 24 hours) at 04/15/17 1554  Last data filed at 04/14/17 2106   Gross per 24 hour   Intake              240 ml   Output              950 ml   Net             -710 ml      Physical Exam   Constitutional: He is oriented to person, place, and time. No distress.   Obese M sitting up  "in bed in NAD   Cardiovascular: Normal rate and regular rhythm.  Exam reveals no gallop and no friction rub.    No murmur heard.  Pulmonary/Chest: Breath sounds normal. No respiratory distress. He has no wheezes. He has no rhonchi. He has no rales.   Abdominal: Soft. Bowel sounds are normal. He exhibits no distension. There is no tenderness.   Musculoskeletal:   RLE post operative changes w/ swelling & erythema noted now w/ 50 cc grossly bloody fluid noted in wound vac canister   Neurological: He is alert and oriented to person, place, and time.   Skin: Skin is warm and dry.   Psychiatric: He has a normal mood and affect. His behavior is normal. Judgment and thought content normal.     Significant Labs:   CBC:   Recent Labs  Lab 04/14/17  0546 04/15/17  0553   WBC 7.49 6.96   HGB 10.3* 10.1*   HCT 29.9* 29.7*    179     CMP:   Recent Labs  Lab 04/14/17  0546 04/15/17  0553   * 132*   K 4.6 4.4   CL 97 98   CO2 24 28   * 173*   BUN 12 12   CREATININE 1.1 1.0   CALCIUM 8.1* 9.2   ANIONGAP 7* 6*   EGFRNONAA >60.0 >60.0     Significant Imaging: I have reviewed and interpreted all pertinent imaging results/findings within the past 24 hours.    Assessment/Plan:      * Osteomyelitis of right foot  -Vanc 2000 mg IV qd  -Cipro 500 po BID  -Aerobic wound Cx with diphtheroid sp. and klebsiella   -Anaerobic wound Cx positive for prevotella  -Continue abx. Per ID w/ plan to keep on abx at least a total of 10 d for cellulitis associated with osto  -Pending results from today's cultures, he may need longer duration of Abx than 10 days  -PICC line placed 4/12 in anticipation of need for outpatient Abx  -After discharge he will need to follow up with ID after deep tissue cultures & pathology have resulted  Per podiatry today, "-Sx site with wound vac intact, no further surgical intervention indicated at this time.   -Upon D/C, HH to change wound vac every 3 days and pt will follow up at clinic once a week. Pt to " "f/u with Dr. Veronica in one week. "    Cellulitis of right foot  -Per ID, continue on Abx for at least 10 days    Peripheral arterial disease  -LAILA positive at 1.57  -POD 2 s/p  R SFA & PTA of R AT  -Continue plavix 75 mg po qd & aspirin 81 mg po qd    Bilateral carotid artery stenosis  Coronary arterial disease  - ASCVD score 22.9% validating use of HI statin  - will change simvastatin 20mg to atorvastatin 80mg on discharge  - follow up with cards  - follow up with outpatient PET stress  - encouraged lifestyle mods: weight loss, smoking cessation, better control of HTN & T2DM    DM type 2, uncontrolled, with neuropathy  Diabetic ulcer of toe of right foot associated with type 2 diabetes mellitus, with fat layer exposed   Neuropathic pain  - while in hospital: levemir 13 U qN + aspart 3 U TIWML + LD SSI  - at home: metformin 1000 BID  - HbA1c: 10.9 on admission indicative of uncontrolled diabetes  - diabetic diet 2000 caloriees  - POCT AC & HS; adjust accordingly goal 140-180  - on discharge, will increased dose metformin 1500mg BID + glipizide + Endocrine referral as patient will likely benefit from diabetic education + insulin  - for diabetic pain - continue home gabapetin 300mg TID    Anemia of other chronic disease  Hemoglobin stable  -Continue to monitor; transfuse to keep Hgb > 7     Chronic hepatitis C without hepatic coma  -Continue home ledipasvir-sofosbuvir  mg 1 tab po qd    Chronic back pain  -Pt takes 10 mg lortab q3 hours at home  -Continue IR Oxy 10 po q4h PRN  -Continue oxycontin 10 mg po BID    Tobacco abuse  -Continue nicotine 14 mg/24h patch  -ideally, referral to smoking cessation at discharge but patient not interested in assistance. Claims he can "quit whenever I want"    VTE Risk Mitigation         Ordered     enoxaparin injection 40 mg  Daily     Route:  Subcutaneous        04/13/17 1608     Medium Risk of VTE  Once      04/12/17 0827      Diet: diabetic 2000 calories  Full code    Dispo: " to be discharged. Home health and follow up arranged    Plan of care discussed with staff    Zita Barcenas MD  Department of Hospital Medicine   [facility]

## 2017-04-15 NOTE — ASSESSMENT & PLAN NOTE
"-Vanc 2000 mg IV qd  -Cipro 500 po BID  -Aerobic wound Cx with diphtheroid sp. and klebsiella   -Anaerobic wound Cx positive for prevotella  -Continue abx. Per ID w/ plan to keep on abx at least a total of 10 d for cellulitis associated with osto  -Pending results from today's cultures, he may need longer duration of Abx than 10 days  -PICC line placed 4/12 in anticipation of need for outpatient Abx  -After discharge he will need to follow up with ID after deep tissue cultures & pathology have resulted  Per podiatry today, "-Sx site with wound vac intact, no further surgical intervention indicated at this time.   -Upon D/C, HH to change wound vac every 3 days and pt will follow up at clinic once a week. Pt to f/u with Dr. Veronica in one week. "  "

## 2017-04-15 NOTE — PLAN OF CARE
FARRAH notified Shanita OTERO with [employer] that plan is for patient to discharge home today. [employer] will be using Eclipse balls as patient unable to learn to manage dial a flow set up. [employer] wound vac and supplies have been delivered to bedside. Will follow.

## 2017-04-15 NOTE — PLAN OF CARE
Rodrigo with [facility] notified of need for wheelchair with elevating leg rests.

## 2017-04-15 NOTE — SUBJECTIVE & OBJECTIVE
Interval History: NAEON. VS wnl. RLE foot looks clean and dry     Review of Systems   Constitutional: Negative for chills and fever.   HENT: Negative for trouble swallowing.    Eyes: Negative for visual disturbance.   Respiratory: Negative for cough and shortness of breath.    Cardiovascular: Negative for chest pain and palpitations.   Gastrointestinal: Positive for abdominal distention. Negative for abdominal pain, constipation, diarrhea, nausea and vomiting.   Genitourinary: Negative for difficulty urinating.   Musculoskeletal: Positive for gait problem. Negative for arthralgias.   Neurological: Negative for dizziness, weakness and light-headedness.   Psychiatric/Behavioral: Negative for confusion.     Objective:     Vital Signs (Most Recent):  Temp: 98.1 °F (36.7 °C) (04/15/17 1124)  Pulse: 68 (04/15/17 1124)  Resp: 18 (04/15/17 1124)  BP: 130/64 (04/15/17 1124)  SpO2: 95 % (04/15/17 1124) Vital Signs (24h Range):  Temp:  [98.1 °F (36.7 °C)-98.7 °F (37.1 °C)] 98.1 °F (36.7 °C)  Pulse:  [67-80] 68  Resp:  [17-20] 18  SpO2:  [94 %-98 %] 95 %  BP: (112-130)/(58-79) 130/64     Weight: 109.5 kg (241 lb 6.5 oz)  Body mass index is 35.65 kg/(m^2).    Intake/Output Summary (Last 24 hours) at 04/15/17 1554  Last data filed at 04/14/17 2106   Gross per 24 hour   Intake              240 ml   Output              950 ml   Net             -710 ml      Physical Exam   Constitutional: He is oriented to person, place, and time. No distress.   Obese M sitting up in bed in NAD   Cardiovascular: Normal rate and regular rhythm.  Exam reveals no gallop and no friction rub.    No murmur heard.  Pulmonary/Chest: Breath sounds normal. No respiratory distress. He has no wheezes. He has no rhonchi. He has no rales.   Abdominal: Soft. Bowel sounds are normal. He exhibits no distension. There is no tenderness.   Musculoskeletal:   RLE post operative changes w/ swelling & erythema noted now w/ 50 cc grossly bloody fluid noted in wound vac  canister   Neurological: He is alert and oriented to person, place, and time.   Skin: Skin is warm and dry.   Psychiatric: He has a normal mood and affect. His behavior is normal. Judgment and thought content normal.     Significant Labs:   CBC:   Recent Labs  Lab 04/14/17  0546 04/15/17  0553   WBC 7.49 6.96   HGB 10.3* 10.1*   HCT 29.9* 29.7*    179     CMP:   Recent Labs  Lab 04/14/17 0546 04/15/17  0553   * 132*   K 4.6 4.4   CL 97 98   CO2 24 28   * 173*   BUN 12 12   CREATININE 1.1 1.0   CALCIUM 8.1* 9.2   ANIONGAP 7* 6*   EGFRNONAA >60.0 >60.0     Significant Imaging: I have reviewed and interpreted all pertinent imaging results/findings within the past 24 hours.

## 2017-04-15 NOTE — PLAN OF CARE
Patient's wound vac has been changed to home vac, [employer] has completed IV administration teaching, patient's wheelchair has been delivered to bedside, [facility]  has been notified of pending discharge. Patient is ready for discharge. Sisters at bedside to transport home.

## 2017-04-17 LAB
BACTERIA SPEC ANAEROBE CULT: NORMAL
BACTERIA SPEC ANAEROBE CULT: NORMAL

## 2017-04-17 NOTE — PLAN OF CARE
4/17/17 1410: CM spoke with patient's sister. All meds were picked up from [facility] but there was no Rx for Cipro. FARRAH confirmed with Dr Almanza that the patient was supposed to get RX; called in Cipro 500 mg, one BID to [phone]. Sister notified.

## 2017-04-18 ENCOUNTER — TELEPHONE (OUTPATIENT)
Dept: PODIATRY | Facility: CLINIC | Age: 57
End: 2017-04-18

## 2017-04-18 NOTE — DISCHARGE SUMMARY
[facility] Hospital Medicine  Discharge Summary      Patient Name: Carlos Cifunetes  MRN: [id number]  Admission Date: 4/7/2017  Hospital Length of Stay: 8 days  Discharge Date and Time: 4/15/2017  5:30 PM  Attending Physician: Alfreda Almanza MD  Discharging Provider: Zita Barcenas MD  Primary Care Provider: Miguel Lux MD    [facility] Medicine Team: [facility] 2 Zita Barcenas MD    HPI:   Mr. Cifuentes is a 56 yr-old M with Hep C (currently on Harvoni), uncontrolled DM2 with peripheral neuropathy, Tobacco Abuse (47 pack years), Hx of Right Leg MRSA Infection (2014) who presents to [facility] ED for evaluation of the R Great Toe. Pt was seen earlier in the day at [facility] for great toe discoloration and coldness. It was recommended the pt be admitted for vascular surgery evaluation but pt left AMA. He now returns to [facility] for further management. Right great toe was normal yesterday morning (4/7/17) but began to become discolored in the afternoon. Even after washing his foot, it remained discolored. Pt belives the discoloration is due to his shoes and dye from black socks. He has no hx of PVD or stents placed in his legs. He denies claudication. He reports peripheral neuropathy and poor sensation in his feet. He has an ulcer of his right great toe on the plantar surface that started one week ago after he tried to sand a callous.      In the ED, pt afebrile with no leukocytosis. LAILA 1.6 right and 1.7 left. Large arteries of the foot with good doppler flow. DP/PT pulses barely palpable. Xray negative. Great toe is cold to touch. Swelling and redness present in right shin. Fentanyl patch present     Hospital Course:   Patient was admitted to medicine. Podiatry, vascular surgery, and infectious diseases were consulted. Empiric antibiotics started and cultures from right foot sent. MRI suggestive off early osteomyelitis of middle phalanx. Cultures grew variety including diptheroid, klebsiella and  prevotella. He underwent amputation of toe on 4/10 by Podiatry and revascularization 4/11 by Vascular Surgery. The wound was closed on 4/14 by Podiatry. PICC line placed 4/12 for prolonged antibiotics. Home health set up to change wound vanc q3 days and administration of antibiotics. He will follow up with Podiatry (Dr. Veronica) in 1 week and ID on 4/20/17.     Patient has significant atherosclerotic disease and vascular disease due to dyslipidemia, uncontrolled diabetes and active tobacco use. To minimize further complications, changed simvastatin 20mg to atorvastatin 80mg (high intensity statin from ASCVD score of 22.9%). For diabetes, increased dose from 1000mg BID to 1500mg BID and added glipizide 5mg daily until he can follow up outpatient priority care vs. Endocrinology doctor. He will likely need to be transitioned to insulin + get education for that. Meanwhile, encouraged lifestyle mods: weight loss, smoking cessation, better control of HTN & T2DM.     For chronic co-morbidities:   Chronic hep c - continuing harvon 90-400mg qD  Tobacco abuse - nicotine patch, recommend smoking cessation but patient asserts he can quit whenever he wants    Procedure(s) (LRB):  DEBRIDEMENT-FOOT 1st ray amputation  (Right)      Indwelling Lines/Drains at time of discharge:   Lines/Drains/Airways     Peripherally Inserted Central Catheter Line                 PICC Double Lumen 04/12/17 1325 right brachial 5 days               Consults         Status Ordering Provider     Inpatient consult to Infectious Diseases  Once     Provider:  (Not yet assigned)    Completed POORNIMA MATOS     Inpatient consult to Midline team  Once     Provider:  (Not yet assigned)    Completed ANGI TAN     Inpatient consult to PICC team ([facility])  Once     Provider:  (Not yet assigned)    Completed POORNIMA MATOS     Inpatient consult to Podiatry  Once     Provider:  (Not yet assigned)    Completed SHANNA MARIE     Inpatient  consult to Vascular Surgery  Once     Provider:  (Not yet assigned)    Completed ECTOR TUTTLE        Significant Diagnostic Studies: Labs: CMP No results for input(s): NA, K, CL, CO2, GLU, BUN, CREATININE, CALCIUM, PROT, ALBUMIN, BILITOT, ALKPHOS, AST, ALT, ANIONGAP, ESTGFRAFRICA, EGFRNONAA in the last 48 hours. and CBC No results for input(s): WBC, HGB, HCT, PLT in the last 48 hours.    Pending Diagnostic Studies:     Procedure Component Value Units Date/Time    2D echo with color flow doppler [[id number]]     Order Status:  Sent Lab Status:  No result         Final Active Diagnoses:    Diagnosis Date Noted POA    PRINCIPAL PROBLEM:  Osteomyelitis of right foot [M86.9] 04/08/2017 Yes    Cellulitis of right foot [L03.115] 04/08/2017 Yes    Bilateral carotid artery stenosis [I65.23] 04/12/2017 Yes    Peripheral arterial disease [I73.9] 04/08/2017 Yes    Diabetic ulcer of toe of right foot associated with type 2 diabetes mellitus, with fat layer exposed [E11.621, L97.512] 04/07/2017 Yes    Neuropathic pain [M79.2] 05/05/2015 Yes    DM type 2, uncontrolled, with neuropathy [E11.40, E11.65] 10/09/2014 Yes    Chronic hepatitis C without hepatic coma [B18.2] 11/18/2016 Yes    Anemia of other chronic disease [D63.8] 05/05/2015 Yes    Bilateral leg edema [R60.0] 10/16/2014 Yes    Chronic back pain [M54.9, G89.29] 10/14/2014 Yes    Tobacco abuse [Z72.0] 04/08/2017 Yes      Problems Resolved During this Admission:    Diagnosis Date Noted Date Resolved POA    Hyponatremia [E87.1] 09/27/2014 10/09/2014 Yes      Discharged Condition: good    Disposition: Home or Self Care    Follow Up:  Follow-up Information     Follow up with Miguel Lux MD. Schedule an appointment as soon as possible for a visit in 4 weeks.    Specialties:  Internal Medicine, Oncology, Hematology and Oncology    Why:  Primary care hospital follow-up    Contact information:    [address] [facility]  [address]  [facility]  "[id number]  [phone]          Follow up with Jasmeet Payne - Infectious Diseases. Go in 5 days.    Specialty:  Infectious Diseases    Why:  Infectious diseases hospital follow-up    Contact information:    [address]  [facility] [address] [phone]    Additional information:    1st Floor - [address]        Follow up with Emil Edmonds MD In 2 weeks.    Specialty:  Vascular Surgery    Why:  with ABIs and Vasc US of the RLE     Contact information:    [address] PA[address]NE  [facility] [id number] [phone]          Schedule an appointment as soon as possible for a visit with Jasmeet Payne - Endo/Diab/Metab.    Specialty:  Endocrinology    Why:  for appointment if not called in 7-10 days    Contact information:    [address]  [facility] [address] [phone]    Additional information:    9th Floor        Patient Instructions:     WHEELCHAIR FOR HOME USE   Order Specific Question Answer Comments   Hours in W/C per day: 8    Type of Wheelchair: Standard    Size(Width): 18"(STD adult)    Leg Support: Elevating leg rests    Lap Belt: Velcro    Cushion: Basic    Height: 5' 9" (1.753 m)    Weight: 109.5 kg (241 lb 6.5 oz)    Does patient have medical equipment at home? cane, straight    Length of need (1-99 months): 99    Please check all that apply: Patient's upper body strength is sufficient for propulsion.    Please check all that apply: The patient requires the use of a w/c for activities of daily living within the Home.    Please check all that apply: The patient has a cast, brace or muscloskeletal condition which prevents 90 degree flexion of the knee.    Vendor: [facility]   Expected Date of Delivery: 4/17/2017      Ambulatory referral to Smoking Cessation Program   Referral Priority: Routine Referral Type: Consultation   Referral Reason: Specialty Services Required    Requested Specialty: CTTS    Number of Visits Requested: 1      Ambulatory Referral to Podiatry   Referral " Priority: Urgent Referral Type: Consultation   Referral Reason: Specialty Services Required    Requested Specialty: Podiatry    Number of Visits Requested: 1      Ambulatory Referral to Cardiology   Referral Priority: Routine Referral Type: Consultation   Referral Reason: Specialty Services Required    Requested Specialty: Cardiology    Number of Visits Requested: 1      Ambulatory Referral to Endocrinology   Referral Priority: Routine Referral Type: Consultation   Requested Specialty: Endocrinology    Number of Visits Requested: 1      Ambulatory consult to Infectious Disease   Referral Priority: Routine Referral Type: Consultation   Referral Reason: Specialty Services Required    Requested Specialty: Infectious Diseases    Number of Visits Requested: 1      Ambulatory Referral to  Priority Clinic   Referral Priority: Routine Referral Type: Consultation   Referral Reason: Specialty Services Required    Number of Visits Requested: 1      Diet general     Call MD for:  temperature >100.4     Call MD for:  severe uncontrolled pain     Call MD for:  redness, tenderness, or signs of infection (pain, swelling, redness, odor or green/yellow discharge around incision site)     Call MD for:  persistent nausea and vomiting or diarrhea     Call MD for:  difficulty breathing or increased cough     Call MD for:  persistent dizziness, light-headedness, or visual disturbances     Call MD for:  worsening rash     Call MD for:  severe persistent headache     Call MD for:  increased confusion or weakness     Weight bearing restrictions (specify)     Leave dressing on - Keep it clean, dry, and intact until clinic visit       Medications:  Reconciled Home Medications:   Discharge Medication List as of 4/15/2017  4:06 PM      START taking these medications    Details   aspirin (ECOTRIN) 81 MG EC tablet Take 1 tablet (81 mg total) by mouth once daily., Starting 4/12/2017, Until Thu 4/12/18, OTC      atorvastatin (LIPITOR) 80 MG tablet  Take 1 tablet (80 mg total) by mouth once daily., Starting 4/15/2017, Until Sun 4/15/18, Normal      ciprofloxacin HCl (CIPRO) 500 MG tablet Take 1 tablet (500 mg total) by mouth every 12 (twelve) hours., Starting 4/12/2017, Until Mon 4/17/17, No Print      clopidogrel (PLAVIX) 75 mg tablet Take 1 tablet (75 mg total) by mouth once daily., Starting 4/12/2017, Until Thu 4/12/18, Normal      dextrose 5 % SolP 500 mL with vancomycin 1,000 mg SolR 2,000 mg Inject 2,000 mg into the vein every 12 (twelve) hours., Starting 4/12/2017, Until Discontinued, No Print      glipiZIDE (GLUCOTROL) 5 MG tablet Take 1 tablet (5 mg total) by mouth 2 (two) times daily before meals., Starting 4/15/2017, Until Sun 4/15/18, Normal         CONTINUE these medications which have CHANGED    Details   metformin (GLUCOPHAGE) 1000 MG tablet Take 1.5 tablets (1,500 mg total) by mouth 2 (two) times daily with meals., Starting 4/15/2017, Until Discontinued, Normal      oxycodone (OXYCONTIN) 10 mg 12 hr tablet Take 1 tablet (10 mg total) by mouth every 12 (twelve) hours., Starting 4/14/2017, Until Discontinued, Print         CONTINUE these medications which have NOT CHANGED    Details   blood sugar diagnostic (BLOOD GLUCOSE TEST) Strp Twice daily blood sugar checks, Normal      diazePAM (VALIUM) 2 MG tablet TAKE ONE TABLET BY MOUTH EVERY 8 HOURS AS NEEDED FOR ANXIETY **THANK YOU**, Normal      docusate sodium (COLACE) 100 MG capsule Take 100 mg by mouth as needed for Constipation., Until Discontinued, Historical Med      fentaNYL (DURAGESIC) 50 mcg/hr Place 1 patch onto the skin every 72 hours., Normal      gabapentin (NEURONTIN) 300 MG capsule Take 1 capsule (300 mg total) by mouth 3 (three) times daily., Normal      ledipasvir-sofosbuvir (HARVONI)  mg Tab Take 1 tablet by mouth once daily., Starting 10/26/2016, Until Discontinued, Normal      mupirocin calcium 2% (BACTROBAN) 2 % cream Apply to affected area 3 times daily, Normal       oxycodone (ROXICODONE) 10 mg Tab immediate release tablet Take 1 tablet (10 mg total) by mouth every 6 (six) hours as needed., Normal      potassium chloride SA (K-DUR,KLOR-CON) 10 MEQ tablet Take 1 tablet (10 mEq total) by mouth once daily as needed when experiencing leg/muscle cramps., Historical Med      promethazine (PHENERGAN) 25 MG tablet Take 1 tablet (25 mg total) by mouth every 4 (four) hours., Starting 1/3/2017, Until Discontinued, Normal      silver sulfADIAZINE 1% (SILVADENE) 1 % cream Apply 1 application topically once daily. Apply to affected area, Starting 1/3/2017, Until Discontinued, Normal      trazodone (DESYREL) 100 MG tablet Take 1 tablet (100 mg total) by mouth every evening., Normal      TRUE METRIX GLUCOSE METER Mis AS DIRECTED, Historical Med      insulin glargine (LANTUS SOLOSTAR) 100 unit/mL (3 mL) InPn pen Inject 15 Units into the skin every evening., Starting 11/28/2016, Until Tue 4/4/17, Normal         STOP taking these medications       simvastatin (ZOCOR) 20 MG tablet Comments:   Reason for Stopping:         pravastatin (PRAVACHOL) 20 MG tablet Comments:   Reason for Stopping:             Time spent on the discharge of patient: 45 minutes    Zita Barcenas MD  Department of Hospital Medicine  [facility]

## 2017-04-18 NOTE — TELEPHONE ENCOUNTER
----- Message from Ysabel Mares MD sent at 4/17/2017  6:22 PM CDT -----  Regarding: f/u appointment  Pt needs a f/u appointment within one week of D/C with dr. melgar as he is a post-op pt. Thanks!       Patient hs appointment on 04/27/2017 at 7:45

## 2017-04-20 ENCOUNTER — INFUSION (OUTPATIENT)
Dept: INFECTIOUS DISEASES | Facility: HOSPITAL | Age: 57
End: 2017-04-20
Attending: INTERNAL MEDICINE
Payer: MEDICAID

## 2017-04-20 ENCOUNTER — OFFICE VISIT (OUTPATIENT)
Dept: INFECTIOUS DISEASES | Facility: CLINIC | Age: 57
End: 2017-04-20
Payer: MEDICAID

## 2017-04-20 ENCOUNTER — EPISODE CHANGES (OUTPATIENT)
Dept: HEPATOLOGY | Facility: CLINIC | Age: 57
End: 2017-04-20

## 2017-04-20 VITALS
WEIGHT: 241 LBS | HEART RATE: 77 BPM | SYSTOLIC BLOOD PRESSURE: 126 MMHG | BODY MASS INDEX: 35.7 KG/M2 | WEIGHT: 241 LBS | HEIGHT: 69 IN | DIASTOLIC BLOOD PRESSURE: 73 MMHG | BODY MASS INDEX: 35.59 KG/M2

## 2017-04-20 DIAGNOSIS — M86.8X7 OTHER OSTEOMYELITIS OF RIGHT FOOT: ICD-10-CM

## 2017-04-20 DIAGNOSIS — Z51.81 THERAPEUTIC DRUG MONITORING: ICD-10-CM

## 2017-04-20 DIAGNOSIS — M86.8X7 OTHER OSTEOMYELITIS OF RIGHT FOOT: Primary | ICD-10-CM

## 2017-04-20 PROCEDURE — 63600175 PHARM REV CODE 636 W HCPCS: Performed by: PHYSICIAN ASSISTANT

## 2017-04-20 PROCEDURE — 99213 OFFICE O/P EST LOW 20 MIN: CPT | Mod: S$PBB,,, | Performed by: PHYSICIAN ASSISTANT

## 2017-04-20 PROCEDURE — 96365 THER/PROPH/DIAG IV INF INIT: CPT

## 2017-04-20 PROCEDURE — 99999 PR PBB SHADOW E&M-EST. PATIENT-LVL IV: CPT | Mod: PBBFAC,,, | Performed by: PHYSICIAN ASSISTANT

## 2017-04-20 RX ORDER — SODIUM CHLORIDE 0.9 % (FLUSH) 0.9 %
10 SYRINGE (ML) INJECTION
Status: CANCELLED | OUTPATIENT
Start: 2017-04-20

## 2017-04-20 RX ORDER — HEPARIN 100 UNIT/ML
SYRINGE INTRAVENOUS
Status: ON HOLD | COMMUNITY
Start: 2017-04-19 | End: 2017-06-30 | Stop reason: HOSPADM

## 2017-04-20 RX ORDER — SODIUM CHLORIDE 0.9 % (FLUSH) 0.9 %
SYRINGE (ML) INJECTION
COMMUNITY
Start: 2017-04-19 | End: 2018-03-19

## 2017-04-20 RX ORDER — HEPARIN SODIUM (PORCINE) LOCK FLUSH IV SOLN 100 UNIT/ML 100 UNIT/ML
100 SOLUTION INTRAVENOUS
Status: CANCELLED | OUTPATIENT
Start: 2017-04-20

## 2017-04-20 RX ORDER — HYDROCODONE BITARTRATE AND ACETAMINOPHEN 10; 325 MG/1; MG/1
1 TABLET ORAL EVERY 6 HOURS PRN
Qty: 30 TABLET | Refills: 0 | Status: SHIPPED | OUTPATIENT
Start: 2017-04-20 | End: 2017-04-25 | Stop reason: SDUPTHER

## 2017-04-20 RX ORDER — VANCOMYCIN HYDROCHLORIDE 1 G/20ML
INJECTION, POWDER, LYOPHILIZED, FOR SOLUTION INTRAVENOUS
COMMUNITY
Start: 2017-04-19 | End: 2017-04-20

## 2017-04-20 RX ORDER — VANCOMYCIN HYDROCHLORIDE 10 G/1
INJECTION, POWDER, LYOPHILIZED, FOR SOLUTION INTRAVENOUS
COMMUNITY
Start: 2017-04-15 | End: 2017-04-20

## 2017-04-20 RX ADMIN — ERTAPENEM SODIUM 1 G: 1 INJECTION, POWDER, LYOPHILIZED, FOR SOLUTION INTRAMUSCULAR; INTRAVENOUS at 10:04

## 2017-04-20 NOTE — PROGRESS NOTES
Patient came into clinic without appointment stating that he is running out of pain medication and his current oxycodone 10mg is not resolving his pain. He states he takes oxycodone 10 mg already for back problems and he was prescribed another 14 pills of same medication upon discharge after his partial 1st ray amp. He states that he has been taking one pill every 3 hours despite it being prescribed for every 6 hours. He states this is a pharmacy error. He also states that the pharmacy will not fill the new prescription for oxycodone 10mg due to it being the same exact medication. He is requesting a different pain medication because he is about to run out of his current ones.     I advised him that he needs to speak with his back specialist for pain medications relating to his back pain. I have prescribed him Norco 10mg for 1 week and I advised him that I do not treat chronic pain. I will not be able to refill this medication in the future due to him having other sources for his pain medication. I also advised him that the pharmacy may not fill this prescription as well as it is too soon from his last prescription. He verbalized understanding.     He will follow up in clinic in 1 week.

## 2017-04-20 NOTE — PROGRESS NOTES
"Subjective:      Patient ID: Carlos Cifuentes is a 56 y.o. male.    Chief Complaint:Follow-up      History of Present Illness  This is a 56 year old male with DM, HTN, HLD, Hep C, smoker who came in with complaints of a "blue right great toe." He had a callous to his toe that he shaved off and it developed a nonhealing ulceration. He developed gangrene of the toe and cellulitis of his RLE. Arterial ultrasound was done and Right LAILA was 1.57, left 1.70 and no occlusion. He is s/p 1st ray amputation on 4/10 with closure on 4/13. He underwent revascularization on 4/11 with vascular surgery.  Cultures were pending prior to discharge and patient was sent out on empiric vanc and cipro with cultures and pathology to be followed as an outpatient.  He follows up today. He has a wound vac on his foot. He denies pain, fevers, or chills. He has a PICC to [facility]. He is taking his antibiotics as prescribed.     Culture results reviewed: ESBL E. Coli, fusobacterium, and prevotella grew from cultures from OR on 4/13; tissue cultures from 4/8 with klebsiella and anaerobes; pathology of clean margin still pending.     Review of Systems   Constitution: Negative for chills, decreased appetite, fever, weakness, malaise/fatigue, night sweats, weight gain and weight loss.   HENT: Negative for congestion, ear pain, headaches, hearing loss, hoarse voice, sore throat and tinnitus.    Eyes: Negative for blurred vision, redness and visual disturbance.   Cardiovascular: Negative for chest pain, leg swelling and palpitations.   Respiratory: Negative for cough, hemoptysis, shortness of breath and sputum production.    Hematologic/Lymphatic: Negative for adenopathy. Does not bruise/bleed easily.   Skin: Negative for dry skin, itching, rash and suspicious lesions.   Musculoskeletal: Negative for back pain, joint pain, myalgias and neck pain.   Gastrointestinal: Negative for abdominal pain, constipation, diarrhea, heartburn, nausea and vomiting. "   Genitourinary: Negative for dysuria, flank pain, frequency, hematuria, hesitancy and urgency.   Neurological: Negative for dizziness, numbness and paresthesias.   Psychiatric/Behavioral: Negative for depression and memory loss. The patient does not have insomnia and is not nervous/anxious.      Objective:   Physical Exam   Constitutional: He is oriented to person, place, and time. He appears well-developed and well-nourished. No distress.   Cardiovascular: Normal rate and regular rhythm.    No murmur heard.  Pulmonary/Chest: Effort normal and breath sounds normal. No respiratory distress.   Abdominal: Soft. Bowel sounds are normal. He exhibits no distension.   Musculoskeletal: Normal range of motion. He exhibits no edema.   Right foot with wound vac in place; no erythema surrounding.   Neurological: He is alert and oriented to person, place, and time.   Skin: Skin is warm and dry.        Psychiatric: He has a normal mood and affect. His behavior is normal.     Assessment:       1. Other osteomyelitis of right foot        56 year old with osteomyelitis of right foot s/p first ray amputation and revascularization. Follows up today to f/u on culture results and path. Path pending. Cultures with ESBL E Coli and anaerobes. Currently on IV vanc and cipro. PICC in place to RUE- c/d/i. No systemic signs of infection.   Plan:       1. D/c vancomycin and ertapenem   2. Start ertapenem 1 gram IV q 24 hours which will cover all organisms isolated  3. Awaiting pathology results from clean margins, however, given that wound still with bone exposure, anticipate 6 weeks of IV ertapenem  4. First dose of ertapenem today- estimated end date: 6/1/17  5. Patient will need weekly CBC, CMP, ESR, CRP while on IV therapy faxed to ID clinic  6. Orders sent to [facility]  7. Continue home health dressing changes to PICC site.  8. Will follow up with podiatry

## 2017-04-21 ENCOUNTER — TELEPHONE (OUTPATIENT)
Dept: PHARMACY | Facility: CLINIC | Age: 57
End: 2017-04-21

## 2017-04-24 ENCOUNTER — EPISODE CHANGES (OUTPATIENT)
Dept: HEPATOLOGY | Facility: CLINIC | Age: 57
End: 2017-04-24

## 2017-04-25 ENCOUNTER — OFFICE VISIT (OUTPATIENT)
Dept: PODIATRY | Facility: CLINIC | Age: 57
End: 2017-04-25
Payer: MEDICAID

## 2017-04-25 VITALS
HEART RATE: 89 BPM | SYSTOLIC BLOOD PRESSURE: 115 MMHG | WEIGHT: 241 LBS | BODY MASS INDEX: 35.59 KG/M2 | DIASTOLIC BLOOD PRESSURE: 75 MMHG

## 2017-04-25 DIAGNOSIS — Z98.890 POST-OPERATIVE STATE: Primary | ICD-10-CM

## 2017-04-25 DIAGNOSIS — S91.301D OPEN WOUND OF RIGHT FOOT, SUBSEQUENT ENCOUNTER: ICD-10-CM

## 2017-04-25 PROCEDURE — 99999 PR PBB SHADOW E&M-EST. PATIENT-LVL III: CPT | Mod: PBBFAC,,, | Performed by: PODIATRIST

## 2017-04-25 PROCEDURE — 99213 OFFICE O/P EST LOW 20 MIN: CPT | Mod: PBBFAC | Performed by: PODIATRIST

## 2017-04-25 PROCEDURE — 99024 POSTOP FOLLOW-UP VISIT: CPT | Mod: ,,, | Performed by: PODIATRIST

## 2017-04-25 RX ORDER — HYDROCODONE BITARTRATE AND ACETAMINOPHEN 10; 325 MG/1; MG/1
1 TABLET ORAL EVERY 6 HOURS PRN
Qty: 20 TABLET | Refills: 0 | Status: SHIPPED | OUTPATIENT
Start: 2017-04-25 | End: 2017-04-26

## 2017-04-25 NOTE — MR AVS SNAPSHOT
[facility]  [address]  [facility] [address]  Phone: [phone]                  Carlos Poseygreta   2017 8:30 AM   Office Visit    Description:  Male : 1960   Provider:  Han Veronica DPM   Department:  [facility]           Reason for Visit     woundcare           Diagnoses this Visit        Comments    Post-operative state    -  Primary     Open wound of right foot, subsequent encounter                To Do List           Future Appointments        Provider Department Dept Phone    2017 7:45 AM [facility] - Podiatry [phone]    2017 9:00 AM Modesto Marcus MD [facility] - Cardiology [phone]    2017 9:40 AM Nora Braden DPM [facility] [phone]    2017 9:30 AM LAB, [facility] [phone]      Goals (5 Years of Data)     None       These Medications        Disp Refills Start End    hydrocodone-acetaminophen 10-325mg (NORCO)  mg Tab 20 tablet 0 2017     Take 1 tablet by mouth every 6 (six) hours as needed for Pain. - Oral    Pharmacy: [facility]  MANI Sanchez [address] #: [phone]         [facility]     [facility] Nurse Care Line -  Assistance  Unless otherwise directed by your provider, please contact [facility], our nurse care line that is available for  assistance.     Registered nurses in the [facility] provide: appointment scheduling, clinical advisement, health education, and other advisory services.  Call: [phone] (toll free)               Medications           Message regarding Medications     Verify the changes and/or additions to your medication regime listed below are the same as discussed with your clinician today.  If any of these changes or additions are incorrect, please notify your healthcare provider.             Verify that the below list of medications is an  accurate representation of the medications you are currently taking.  If none reported, the list may be blank. If incorrect, please contact your healthcare provider. Carry this list with you in case of emergency.           Current Medications     aspirin (ECOTRIN) 81 MG EC tablet Take 1 tablet (81 mg total) by mouth once daily.    atorvastatin (LIPITOR) 80 MG tablet Take 1 tablet (80 mg total) by mouth once daily.    blood sugar diagnostic (BLOOD GLUCOSE TEST) Strp Twice daily blood sugar checks    clopidogrel (PLAVIX) 75 mg tablet Take 1 tablet (75 mg total) by mouth once daily.    dextrose 5 % SolP 500 mL with vancomycin 1,000 mg SolR 2,000 mg Inject 2,000 mg into the vein every 12 (twelve) hours.    diazePAM (VALIUM) 2 MG tablet TAKE ONE TABLET BY MOUTH EVERY 8 HOURS AS NEEDED FOR ANXIETY **THANK YOU**    docusate sodium (COLACE) 100 MG capsule Take 100 mg by mouth as needed for Constipation.    fentaNYL (DURAGESIC) 50 mcg/hr Place 1 patch onto the skin every 72 hours.    gabapentin (NEURONTIN) 300 MG capsule Take 1 capsule (300 mg total) by mouth 3 (three) times daily.    glipiZIDE (GLUCOTROL) 5 MG tablet Take 1 tablet (5 mg total) by mouth 2 (two) times daily before meals.    heparin, porcine, PF, (HEPARIN FLUSH 100 UNITS/ML) 100 unit/mL Syrg     hydrocodone-acetaminophen 10-325mg (NORCO)  mg Tab Take 1 tablet by mouth every 6 (six) hours as needed for Pain.    insulin glargine (LANTUS SOLOSTAR) 100 unit/mL (3 mL) InPn pen Inject 15 Units into the skin every evening.    ledipasvir-sofosbuvir (HARVONI)  mg Tab Take 1 tablet by mouth once daily.    metformin (GLUCOPHAGE) 1000 MG tablet Take 1.5 tablets (1,500 mg total) by mouth 2 (two) times daily with meals.    mupirocin calcium 2% (BACTROBAN) 2 % cream Apply to affected area 3 times daily    NORMAL SALINE FLUSH 0.9 % injection     oxycodone (OXYCONTIN) 10 mg 12 hr tablet Take 1 tablet (10 mg total) by mouth every 12 (twelve) hours.    potassium  chloride SA (K-DUR,KLOR-CON) 10 MEQ tablet Take 1 tablet (10 mEq total) by mouth once daily as needed when experiencing leg/muscle cramps.    promethazine (PHENERGAN) 25 MG tablet Take 1 tablet (25 mg total) by mouth every 4 (four) hours.    silver sulfADIAZINE 1% (SILVADENE) 1 % cream Apply 1 application topically once daily. Apply to affected area    trazodone (DESYREL) 100 MG tablet Take 1 tablet (100 mg total) by mouth every evening.    TRUE METRIX GLUCOSE METER Misc AS DIRECTED           Clinical Reference Information           Your Vitals Were     BP Pulse Weight BMI       115/75 89 109.3 kg (241 lb) 35.59 kg/m2       Blood Pressure          Most Recent Value    BP  115/75      Allergies as of 4/25/2017     Codeine      Immunizations Administered on Date of Encounter - 4/25/2017     None      Smoking Cessation     If you would like to quit smoking:   You may be eligible for free services if you are a [place] resident and started smoking cigarettes before September 1, 1988.  Call the Smoking Cessation Trust ([facility]) toll free at ([phone] 686-2563 or ([phone] 636-6874.   Call 0-800-QUIT-NOW if you do not meet the above criteria.   Contact us via email: [email or web address]   View our website for more information: [email or web address]        Language Assistance Services     ATTENTION: Language assistance services are available, free of charge. Please call [phone].      ATENCIÓN: Si habla español, tiene a castellano disposición servicios gratuitos de asistencia lingüística. Llame al [phone].     CHÚ Ý: N?u b?n nói Ti?ng Vi?t, có các d?ch v? h? tr? ngôn ng? mi?n phí dành cho b?n. G?i s? [phone].         [facility] complies with applicable Federal civil rights laws and does not discriminate on the basis of race, color, national origin, age, disability, or sex.

## 2017-04-26 NOTE — PROGRESS NOTES
SUBJECTIVE: Patient returns to the clinic one week status post R partial 1st ray which was left open with application of wound VAC.  Relates significant pain in the R foot not covered with current pain medication however he takes 10mg of oxycodone for back problems previously. Now supplementing with additional 5mg of Norco which does not seem to be helping. Has had VAC on with HH changing VAC every 3 days since discharge on 4/20. No other complaints. HH nurse thought wound looked bad and suggested he come in to clinic one day earlier.     Physical examination: General: Pt. is in no acute distress, alert and oriented x 3.    Podiatric examination: Vascular:Capillary refill time is within normal limits.  Dermatologic: Surgical site with significant wound overlying the 1st ray surgery site. Wound measures approximately 8.0 x 7.0 x 0.5cm down to level of exposed bone. There is overlying fibrous slough and moderate serous drainage. There is small tracking noted to central portion of wound extending towards the plantar lateral 2nd metatarsal. There is + purulence expressed from this area. Unable to express all of drainage due to patient pain. After application of lidocaine gel for 5 minutes, I was able to excisionally debride nonviable tissue and slough from the wound bed. There was 20% necrotic tissue around the lateral portion of the wound which was excisionally debrided. Wound bed does not appear fully viable and there is continued infection in the wound.     IMPRESSION: 1 week postop partial 1st ray amputation with wound VAC application.     PLAN: after application of Lidocaine gel, excisional debridement was preformed removing 50% of non viable tissue. Patient did not allow further debridement or probing of tracks due to severe pain. I advised him of the risks of inadequate debridement and flushing of wound including worsening infection, sepsis, death and/or loss of limb. He verbalized understanding and would not  allow further debridement. I discussed the possibility of another surgical debridement and washout of the wound to further clean up infection. He stated that he is not up for another surgery at this time and will consider if wound becomes worse. With help of Ольга Romero, the wound vac was reapplied to the wound in sterile fashion and set to 100mmHg continuous suction. He is to take antibiotics per ID reccs.     He will follow up in clinic in 1 week, sooner PRN. Advised to watch for signs of infection including redness, streaking, worsening purulence, drainage, pain, fever, chills and report to ED immediately if any of these symptoms arise. This patient is high risk for further amputation of affected foot (TMA etc).     Carlos was seen today for woundcare.    Diagnoses and all orders for this visit:    Post-operative state    Open wound of right foot, subsequent encounter    DM type 2, uncontrolled, with neuropathy    Other orders  -     Discontinue: hydrocodone-acetaminophen 10-325mg (NORCO)  mg Tab; Take 1 tablet by mouth every 6 (six) hours as needed for Pain.

## 2017-04-26 NOTE — TELEPHONE ENCOUNTER
Reach out to patient regarding specialty medication refill for Batool: Patient stated he only have 1 tablet left for today 2017 and will need the medication tomorrow. Inform patient the prior authorization on file  with insurance company and it required another one. Inform patient another prior authorization was sent over to insurance company and will now it is in a peer to peer request because insurance claim patient received 6 bottles/24 weeks.

## 2017-04-28 ENCOUNTER — OFFICE VISIT (OUTPATIENT)
Dept: CARDIOLOGY | Facility: CLINIC | Age: 57
End: 2017-04-28
Payer: MEDICAID

## 2017-04-28 ENCOUNTER — TELEPHONE (OUTPATIENT)
Dept: PHARMACY | Facility: CLINIC | Age: 57
End: 2017-04-28

## 2017-04-28 VITALS
HEIGHT: 69 IN | BODY MASS INDEX: 33.96 KG/M2 | WEIGHT: 229.31 LBS | SYSTOLIC BLOOD PRESSURE: 122 MMHG | HEART RATE: 99 BPM | OXYGEN SATURATION: 96 % | DIASTOLIC BLOOD PRESSURE: 82 MMHG

## 2017-04-28 DIAGNOSIS — Z72.0 TOBACCO ABUSE: ICD-10-CM

## 2017-04-28 DIAGNOSIS — E78.5 DYSLIPIDEMIA: ICD-10-CM

## 2017-04-28 DIAGNOSIS — I73.9 PVD (PERIPHERAL VASCULAR DISEASE): ICD-10-CM

## 2017-04-28 DIAGNOSIS — I10 ESSENTIAL HYPERTENSION: ICD-10-CM

## 2017-04-28 DIAGNOSIS — R94.31 ABNORMAL EKG: ICD-10-CM

## 2017-04-28 DIAGNOSIS — R07.9 CHEST PAIN, UNSPECIFIED TYPE: Primary | ICD-10-CM

## 2017-04-28 DIAGNOSIS — R06.02 SHORTNESS OF BREATH: ICD-10-CM

## 2017-04-28 PROCEDURE — 99213 OFFICE O/P EST LOW 20 MIN: CPT | Mod: PBBFAC | Performed by: INTERNAL MEDICINE

## 2017-04-28 PROCEDURE — 99999 PR PBB SHADOW E&M-EST. PATIENT-LVL III: CPT | Mod: PBBFAC,,, | Performed by: INTERNAL MEDICINE

## 2017-04-28 PROCEDURE — 99204 OFFICE O/P NEW MOD 45 MIN: CPT | Mod: S$PBB,,, | Performed by: INTERNAL MEDICINE

## 2017-04-28 NOTE — LETTER
April 28, 2017      Zita Barcenas MD  [address]  [facility] [address]           [facility] [place] - Cardiology  [address] [facility] [address] [place] [address]  Phone: [phone]          Patient: Carlos Cifuentes   MR Number: [id number]   YOB: 1960   Date of Visit: 4/28/2017       Dear Dr. Zita Barcenas:    Thank you for referring Carlos Cifuentes to me for evaluation. Attached you will find relevant portions of my assessment and plan of care.    If you have questions, please do not hesitate to call me. I look forward to following Carlos Cifuentes along with you.    Sincerely,    Modesto Marcus MD    Enclosure  CC:  No Recipients    If you would like to receive this communication electronically, please contact [email or web address] or [phone] to request more information on [employer] access.    For providers and/or their staff who would like to refer a patient to [facility], please contact us through our one-stop-shop provider referral line, [facility], at [phone].    If you feel you have received this communication in error or would no longer like to receive these types of communications, please e-mail [email or web address]

## 2017-04-28 NOTE — PROGRESS NOTES
Subjective:    Patient ID:  Carlos Cifuentes is a 56 y.o. male who presents for evaluation of Hospital Follow Up      HPI  Patient is here for cardiac evaluation.  He has history of peripheral vascular disease with recent toe amputation and attempted revascularization by [facility].  He was remotely seen by her service in 2014 for sepsis and transesophageal echocardiogram was performed at that time to rule out endocarditis.  He's had several admissions for this in the past.  He says he had a remote cardiac workup several years ago [facility] which was negative per him.  He has had some issues with intermittent chest pain shortness of breath mostly on exertion but relieved with rest.  He's currently limited by back pain is recent toe amputation.  He denies any palpitations.  He's expands no PND, orthopnea or lower edema.  He denies any dizziness to the point of presyncope or syncope.    Review of Systems   Constitution: Negative.   HENT: Negative.    Eyes: Negative.    Cardiovascular: Positive for chest pain, dyspnea on exertion and leg swelling. Negative for irregular heartbeat, near-syncope, orthopnea, palpitations, paroxysmal nocturnal dyspnea and syncope.   Respiratory: Positive for shortness of breath.    Skin: Negative.    Musculoskeletal: Positive for back pain.   Gastrointestinal: Negative for abdominal pain, constipation and diarrhea.   Genitourinary: Negative for dysuria.   Neurological: Negative for dizziness.   Psychiatric/Behavioral: Negative.      Past Medical History:   Diagnosis Date    Arthritis     Bulging lumbar disc     C. difficile diarrhea     Chronic back pain 10/14/2014    Diabetes mellitus type II     DM (diabetes mellitus), type 2, uncontrolled 3/12/2013    Hepatitis C 7/3/2013    MSSA (methicillin susceptible Staphylococcus aureus) septicemia     Neuromuscular disorder     Neuropathy     Staph aureus infection      Past Surgical History:   Procedure  Laterality Date    APPENDECTOMY      left knee surgery      left leg surgery      broken bone    LEG SURGERY  right     Right arm boil      Right great toe amputation       Social History   Substance Use Topics    Smoking status: Current Some Day Smoker     Packs/day: 1.00     Types: Cigarettes    Smokeless tobacco: Never Used    Alcohol use No     Family History   Problem Relation Age of Onset    Arthritis Mother     Arthritis Sister     Diabetes Sister     Arthritis Brother         Objective:    Physical Exam   Constitutional: He is oriented to person, place, and time. He appears well-developed and well-nourished. No distress.   HENT:   Head: Normocephalic and atraumatic.   Eyes: Conjunctivae and EOM are normal. Pupils are equal, round, and reactive to light.   Neck: Normal range of motion. Neck supple. No thyromegaly present.   Cardiovascular: Normal rate, regular rhythm and normal heart sounds.    No murmur heard.  Pulmonary/Chest: Effort normal and breath sounds normal. No respiratory distress. He has no wheezes. He has no rales. He exhibits no tenderness.   Abdominal: Soft. Bowel sounds are normal.   Musculoskeletal: He exhibits no edema.   Neurological: He is alert and oriented to person, place, and time.   Skin: Skin is warm and dry.   Psychiatric: He has a normal mood and affect. His behavior is normal.       ekg nsr    Assessment:       1. Chest pain, unspecified type    2. Shortness of breath    3. Abnormal EKG    4. Essential hypertension    5. DM type 2, uncontrolled, with neuropathy    6. Dyslipidemia    7. PVD (peripheral vascular disease)    8. Tobacco abuse         Plan:       -Multiple risk factors and current symptoms, previous troponin elevation the hospital  *Cannot exercise secondary to recent amputation  -Counseled on tobacco cessation    Return to clinic in one month with testing ASAP

## 2017-04-28 NOTE — TELEPHONE ENCOUNTER
Harvoni FINAL (6th - 24 weeks) refill arrangement. Confirmed 2 patient identifiers - name and . He/she has 0 doses remaining. He understands to restart Harvoni as soon as received on 17. He missed 17 and 17 doses. No side effects or missed doses reported. He/she confirms no changes to insurance, medication list or health - toes amputated recently- and has no further questions at this time. Patientasked to have medication shipped on 17 to arrive at patient's home on 17. $3 copay - INVOICE. Address confirmed.  All questions answered and addressed to patients satisfaction. [facility] will continue to reach out to patient monthly to arrange refills.

## 2017-04-28 NOTE — MR AVS SNAPSHOT
[facility] - Cardiology  [address] [facility] [address] [place] [address]  Phone: [phone]                  Carlos Cifuentes   2017 9:00 AM   Office Visit    Description:  Male : 1960   Provider:  Modesto Marcus MD   Department:  [facility] - Cardiology           Reason for Visit     Hospital Follow Up                To Do List           Future Appointments        Provider Department Dept Phone    2017 9:45 AM Han Veronica DPM [facility] - Podiatry [phone]    2017 9:30 AM LAB, [facility] [phone]      Goals (5 Years of Data)     None      [facility] On [facility]     [facility] Nurse Care Line -  Assistance  Unless otherwise directed by your provider, please contact [facility], our nurse care line that is available for  assistance.     Registered nurses in the [facility] provide: appointment scheduling, clinical advisement, health education, and other advisory services.  Call: [phone] (toll free)               Medications           Message regarding Medications     Verify the changes and/or additions to your medication regime listed below are the same as discussed with your clinician today.  If any of these changes or additions are incorrect, please notify your healthcare provider.             Verify that the below list of medications is an accurate representation of the medications you are currently taking.  If none reported, the list may be blank. If incorrect, please contact your healthcare provider. Carry this list with you in case of emergency.           Current Medications     aspirin (ECOTRIN) 81 MG EC tablet Take 1 tablet (81 mg total) by mouth once daily.    atorvastatin (LIPITOR) 80 MG tablet Take 1 tablet (80 mg total) by mouth once daily.    blood sugar diagnostic (BLOOD GLUCOSE TEST) Strp Twice daily blood sugar checks    clopidogrel (PLAVIX) 75 mg tablet Take 1 tablet (75 mg total) by mouth once daily.     dextrose 5 % SolP 500 mL with vancomycin 1,000 mg SolR 2,000 mg Inject 2,000 mg into the vein every 12 (twelve) hours.    diazePAM (VALIUM) 2 MG tablet TAKE ONE TABLET BY MOUTH EVERY 8 HOURS AS NEEDED FOR ANXIETY **THANK YOU**    docusate sodium (COLACE) 100 MG capsule Take 100 mg by mouth as needed for Constipation.    fentaNYL (DURAGESIC) 50 mcg/hr Place 1 patch onto the skin every 72 hours.    gabapentin (NEURONTIN) 300 MG capsule Take 1 capsule (300 mg total) by mouth 3 (three) times daily.    glipiZIDE (GLUCOTROL) 5 MG tablet Take 1 tablet (5 mg total) by mouth 2 (two) times daily before meals.    heparin, porcine, PF, (HEPARIN FLUSH 100 UNITS/ML) 100 unit/mL Syrg     insulin glargine (LANTUS SOLOSTAR) 100 unit/mL (3 mL) InPn pen Inject 15 Units into the skin every evening.    INVANZ 1 gram injection     ledipasvir-sofosbuvir (HARVONI)  mg Tab Take 1 tablet by mouth once daily.    metformin (GLUCOPHAGE) 1000 MG tablet Take 1.5 tablets (1,500 mg total) by mouth 2 (two) times daily with meals.    mupirocin calcium 2% (BACTROBAN) 2 % cream Apply to affected area 3 times daily    NORMAL SALINE FLUSH 0.9 % injection     oxycodone (ROXICODONE) 10 mg Tab immediate release tablet Take 1 tablet (10 mg total) by mouth every 6 (six) hours as needed for Pain.    potassium chloride SA (K-DUR,KLOR-CON) 10 MEQ tablet Take 1 tablet (10 mEq total) by mouth once daily as needed when experiencing leg/muscle cramps.    promethazine (PHENERGAN) 25 MG tablet Take 1 tablet (25 mg total) by mouth every 4 (four) hours.    silver sulfADIAZINE 1% (SILVADENE) 1 % cream Apply 1 application topically once daily. Apply to affected area    trazodone (DESYREL) 100 MG tablet Take 1 tablet (100 mg total) by mouth every evening.    TRUE METRIX GLUCOSE METER Misc AS DIRECTED           Clinical Reference Information           Your Vitals Were     BP Pulse Height Weight SpO2 BMI    122/82 (BP Location: Left arm, Patient Position: Sitting, BP  "Method: Automatic) 99 5' 9" (1.753 m) 104 kg (229 lb 4.8 oz) 96% 33.86 kg/m2      Blood Pressure          Most Recent Value    BP  122/82      Allergies as of 4/28/2017     Codeine      Immunizations Administered on Date of Encounter - 4/28/2017     None      Smoking Cessation     If you would like to quit smoking:   You may be eligible for free services if you are a [place] resident and started smoking cigarettes before September 1, 1988.  Call the Smoking Cessation Trust ([facility]) toll free at (629) [phone] or [phone] [phone].   Call 2-682-QUIT-NOW if you do not meet the above criteria.   Contact us via email: [email or web address] [employer]   View our website for more information: [email or web address]        Language Assistance Services     ATTENTION: Language assistance services are available, free of charge. Please call [phone].      ATENCIÓN: Si habla español, tiene a castellano disposición servicios gratuitos de asistencia lingüística. Llame al [phone].     CHÚ Ý: N?u b?n nói Ti?ng Vi?t, có các d?ch v? h? tr? ngôn ng? mi?n phí dành cho b?n. G?i s? [phone].         [facility] complies with applicable Federal civil rights laws and does not discriminate on the basis of race, color, national origin, age, disability, or sex.        "

## 2017-04-28 NOTE — TELEPHONE ENCOUNTER
Unable to reach out to patient LVM regarding specialty medication refill for Batool. Need to inform patient the medication approved from insurance and copay $3.00. Need to see if patient can come to [facility] to  medication. Will follow up.

## 2017-05-02 ENCOUNTER — TELEPHONE (OUTPATIENT)
Dept: HEPATOLOGY | Facility: CLINIC | Age: 57
End: 2017-05-02

## 2017-05-02 ENCOUNTER — EPISODE CHANGES (OUTPATIENT)
Dept: HEPATOLOGY | Facility: CLINIC | Age: 57
End: 2017-05-02

## 2017-05-02 NOTE — TELEPHONE ENCOUNTER
Pt states he will just keep the lab appt on May 9th even though he missed his week 20 labs he will go on the EOT labs date please advise thanks.

## 2017-05-02 NOTE — TELEPHONE ENCOUNTER
----- Message from Diane Brown sent at 5/1/2017  3:24 PM CDT -----  Contact: patient   Calling to speak with someone about scheduling his blood work. Please call

## 2017-05-03 ENCOUNTER — TELEPHONE (OUTPATIENT)
Dept: PODIATRY | Facility: CLINIC | Age: 57
End: 2017-05-03

## 2017-05-03 NOTE — TELEPHONE ENCOUNTER
----- Message from Yoon Brown sent at 5/2/2017  7:04 PM CDT -----  Contact: pt   Pt would like to be called back regarding rescheduling on an appt       Pt can be reached at [phone].

## 2017-05-03 NOTE — TELEPHONE ENCOUNTER
----- Message from Rosalinda Viera sent at 5/2/2017  9:32 AM CDT -----  Regarding: Patient Referral  Good Morning,    We have briefly spoken on the phone in regards to Mr. Cifuentes and scheduling him to be seen by Dr. Braden in our wound care center. Dr. Veronica needs to put in an order (EMV194) in order for me to be able to schedule the patient. I believe we are trying to schedule him for 05/12/2017.    Thanks in advance for your help,   Rosalinda Viera

## 2017-05-04 ENCOUNTER — TELEPHONE (OUTPATIENT)
Dept: PODIATRY | Facility: CLINIC | Age: 57
End: 2017-05-04

## 2017-05-04 NOTE — TELEPHONE ENCOUNTER
----- Message from Roberta Gutierrez sent at 5/3/2017  6:13 PM CDT -----  Contact: Self  Pt missed a phone call from the nurse and would like a call back at her earliest convenience         Pt can be contacted at [phone]

## 2017-05-05 ENCOUNTER — OFFICE VISIT (OUTPATIENT)
Dept: PODIATRY | Facility: CLINIC | Age: 57
End: 2017-05-05
Payer: MEDICAID

## 2017-05-05 VITALS — HEIGHT: 69 IN | WEIGHT: 229 LBS | BODY MASS INDEX: 33.92 KG/M2

## 2017-05-05 DIAGNOSIS — Z89.419 HISTORY OF AMPUTATION OF HALLUX: ICD-10-CM

## 2017-05-05 DIAGNOSIS — Z98.890 POST-OPERATIVE STATE: Primary | ICD-10-CM

## 2017-05-05 DIAGNOSIS — I73.9 PERIPHERAL ARTERIAL DISEASE: ICD-10-CM

## 2017-05-05 PROCEDURE — 99024 POSTOP FOLLOW-UP VISIT: CPT | Mod: ,,, | Performed by: PODIATRIST

## 2017-05-05 PROCEDURE — 99999 PR PBB SHADOW E&M-EST. PATIENT-LVL II: CPT | Mod: PBBFAC,,, | Performed by: PODIATRIST

## 2017-05-05 PROCEDURE — 99212 OFFICE O/P EST SF 10 MIN: CPT | Mod: PBBFAC,PO | Performed by: PODIATRIST

## 2017-05-05 NOTE — PROGRESS NOTES
SUBJECTIVE: Patient returns to the clinic 3 weeks status post R partial 1st ray which was left open with application of wound VAC.  Relates taking VAC off because it ran out of battery. States he removed it only 1 hour ago, otherwise has been keeping it on per instructions. Pain improved however still taking routine pain medication.     Physical examination: General: Pt. is in no acute distress, alert and oriented x 3.    Podiatric examination: Vascular:Capillary refill time is within normal limits.  Dermatologic: Surgical site with significant wound overlying the 1st ray surgery site. Wound measures approximately 7.5 x 7.0 x 0.5cm down to level of deep subcutaneous tissue. There is overlying fibrous slough with intermixed newly formed granular budding with moderate serous drainage. There is a hematoma formation of sanguinous drainage within floor of wound. No further purulence noted. Wound stabilizing.     IMPRESSION: 3 weeks postop partial 1st ray amputation with wound VAC application.     PLAN: D/c VAC. Removed loose fibrous slough from wound bed. Cleansed with saline. Applied Iodosorb to entire wound bed. Covered with gauze, wound foam, football dressing.  orders placed for resumption of VAC tomorrow or day after.     He is to follow up in Wound clinic with Dr. Braden in 1 week.  Advised to watch for signs of infection including redness, streaking, worsening purulence, drainage, pain, fever, chills and report to ED immediately if any of these symptoms arise. This patient is high risk for further amputation of affected foot (TMA etc).     Carlos was seen today for wound care.    Diagnoses and all orders for this visit:    Post-operative state    History of amputation of hallux - Right Foot    DM type 2, uncontrolled, with neuropathy    Peripheral arterial disease

## 2017-05-05 NOTE — MR AVS SNAPSHOT
[facility]  [address] [facility]  [place] [address]  Phone: [phone]                  Carlos Cifuentes   2017 1:15 PM   Office Visit    Description:  Male : 1960   Provider:  Han Veronica DPM   Department:  [facility]           Reason for Visit     Wound Care                To Do List           Future Appointments        Provider Department Dept Phone    2017 9:30 AM [facility] [phone]      Goals (5 Years of Data)     None      [facility]     [facility] Nurse Care Line -  Assistance  Unless otherwise directed by your provider, please contact [facility], our nurse care line that is available for  assistance.     Registered nurses in the [facility] provide: appointment scheduling, clinical advisement, health education, and other advisory services.  Call: [phone] (toll free)               Medications           Message regarding Medications     Verify the changes and/or additions to your medication regime listed below are the same as discussed with your clinician today.  If any of these changes or additions are incorrect, please notify your healthcare provider.             Verify that the below list of medications is an accurate representation of the medications you are currently taking.  If none reported, the list may be blank. If incorrect, please contact your healthcare provider. Carry this list with you in case of emergency.           Current Medications     aspirin (ECOTRIN) 81 MG EC tablet Take 1 tablet (81 mg total) by mouth once daily.    atorvastatin (LIPITOR) 80 MG tablet Take 1 tablet (80 mg total) by mouth once daily.    blood sugar diagnostic (BLOOD GLUCOSE TEST) Strp Twice daily blood sugar checks    clopidogrel (PLAVIX) 75 mg tablet Take 1 tablet (75 mg total) by mouth once daily.    dextrose 5 % SolP 500 mL with vancomycin 1,000 mg SolR 2,000 mg Inject 2,000 mg into the vein every  "12 (twelve) hours.    diazePAM (VALIUM) 2 MG tablet TAKE ONE TABLET BY MOUTH EVERY 8 HOURS AS NEEDED FOR ANXIETY **THANK YOU**    docusate sodium (COLACE) 100 MG capsule Take 100 mg by mouth as needed for Constipation.    fentaNYL (DURAGESIC) 50 mcg/hr Place 1 patch onto the skin every 72 hours.    gabapentin (NEURONTIN) 300 MG capsule Take 1 capsule (300 mg total) by mouth 3 (three) times daily.    glipiZIDE (GLUCOTROL) 5 MG tablet Take 1 tablet (5 mg total) by mouth 2 (two) times daily before meals.    heparin, porcine, PF, (HEPARIN FLUSH 100 UNITS/ML) 100 unit/mL Syrg     insulin glargine (LANTUS SOLOSTAR) 100 unit/mL (3 mL) InPn pen Inject 15 Units into the skin every evening.    INVANZ 1 gram injection     ledipasvir-sofosbuvir (HARVONI)  mg Tab Take 1 tablet by mouth once daily.    metformin (GLUCOPHAGE) 1000 MG tablet Take 1.5 tablets (1,500 mg total) by mouth 2 (two) times daily with meals.    mupirocin calcium 2% (BACTROBAN) 2 % cream Apply to affected area 3 times daily    NORMAL SALINE FLUSH 0.9 % injection     oxycodone (ROXICODONE) 10 mg Tab immediate release tablet Take 1 tablet (10 mg total) by mouth every 6 (six) hours as needed for Pain.    potassium chloride SA (K-DUR,KLOR-CON) 10 MEQ tablet Take 1 tablet (10 mEq total) by mouth once daily as needed when experiencing leg/muscle cramps.    promethazine (PHENERGAN) 25 MG tablet Take 1 tablet (25 mg total) by mouth every 4 (four) hours.    silver sulfADIAZINE 1% (SILVADENE) 1 % cream Apply 1 application topically once daily. Apply to affected area    trazodone (DESYREL) 100 MG tablet Take 1 tablet (100 mg total) by mouth every evening.    TRUE METRIX GLUCOSE METER Misc AS DIRECTED           Clinical Reference Information           Your Vitals Were     Height Weight BMI          5' 9" (1.753 m) 103.9 kg (229 lb) 33.82 kg/m2        Allergies as of 5/5/2017     Codeine      Immunizations Administered on Date of Encounter - 5/5/2017     None    "   Smoking Cessation     If you would like to quit smoking:   You may be eligible for free services if you are a [place] resident and started smoking cigarettes before September 1, 1988.  Call the Smoking Cessation Trust (SCT) toll free at (143) 446-5088 or (184) 684-8970.   Call 1-800-QUIT-NOW if you do not meet the above criteria.   Contact us via email: [email or web address]   View our website for more information: [email or web address]        Language Assistance Services     ATTENTION: Language assistance services are available, free of charge. Please call [phone].      ATENCIÓN: Si habla español, tiene a castellano disposición servicios gratuitos de asistencia lingüística. Llame al [phone].     CHÚ Ý: N?u b?n nói Ti?ng Vi?t, có các d?ch v? h? tr? ngôn ng? mi?n phí dành cho b?n. G?i s? [phone].         [facility] complies with applicable Federal civil rights laws and does not discriminate on the basis of race, color, national origin, age, disability, or sex.

## 2017-05-09 ENCOUNTER — LAB VISIT (OUTPATIENT)
Dept: LAB | Facility: HOSPITAL | Age: 57
End: 2017-05-09
Attending: INTERNAL MEDICINE
Payer: MEDICAID

## 2017-05-09 DIAGNOSIS — B18.2 CHRONIC HEPATITIS C WITHOUT HEPATIC COMA: ICD-10-CM

## 2017-05-09 DIAGNOSIS — K74.60 UNSPECIFIED CIRRHOSIS OF LIVER: ICD-10-CM

## 2017-05-09 LAB
ALBUMIN SERPL BCP-MCNC: 3 G/DL
ALP SERPL-CCNC: 108 U/L
ALT SERPL W/O P-5'-P-CCNC: 21 U/L
AST SERPL-CCNC: 14 U/L
BILIRUB DIRECT SERPL-MCNC: 0.1 MG/DL
BILIRUB SERPL-MCNC: 0.3 MG/DL
PROT SERPL-MCNC: 7.8 G/DL

## 2017-05-09 PROCEDURE — 36415 COLL VENOUS BLD VENIPUNCTURE: CPT

## 2017-05-09 PROCEDURE — 87522 HEPATITIS C REVRS TRNSCRPJ: CPT

## 2017-05-09 PROCEDURE — 80076 HEPATIC FUNCTION PANEL: CPT

## 2017-05-10 ENCOUNTER — TELEPHONE (OUTPATIENT)
Dept: INFECTIOUS DISEASES | Facility: CLINIC | Age: 57
End: 2017-05-10

## 2017-05-10 ENCOUNTER — TELEPHONE (OUTPATIENT)
Dept: PODIATRY | Facility: CLINIC | Age: 57
End: 2017-05-10

## 2017-05-10 DIAGNOSIS — I73.9 PVD (PERIPHERAL VASCULAR DISEASE): ICD-10-CM

## 2017-05-10 DIAGNOSIS — M86.9 OSTEOMYELITIS, UNSPECIFIED SITE, UNSPECIFIED TYPE: Primary | ICD-10-CM

## 2017-05-10 DIAGNOSIS — Z98.890 POST-OPERATIVE STATE: Primary | ICD-10-CM

## 2017-05-10 DIAGNOSIS — Z89.419 HISTORY OF AMPUTATION OF HALLUX: ICD-10-CM

## 2017-05-10 DIAGNOSIS — E11.610: ICD-10-CM

## 2017-05-10 LAB
HCV LOG: <1.08 LOG (10) IU/ML
HCV RNA QUANT PCR: <12 IU/ML
HCV, QUALITATIVE: NOT DETECTED IU/ML

## 2017-05-10 NOTE — TELEPHONE ENCOUNTER
Infectious Disease - Lab follow up     Dx: osteomyelitis right foot  Antibiotics: ertapenem  Estimated End Date: 6/1/17    WBC - 7.4  H/H - 12.1/36.5  Platelets - 14.9  Creatinine - 1.0  ESR - 82  CRP - 23.4  AST/ALT - 18/22

## 2017-05-10 NOTE — TELEPHONE ENCOUNTER
----- Message from Shanita Hanley sent at 5/10/2017  8:07 AM CDT -----  Contact: self   Pt stated that his IV came out yesterday and he was told to go to the ER but he'd rather have someone call him to setup a good time for the dr to see him to put the IV back in. please call patient at [phone]

## 2017-05-11 ENCOUNTER — TELEPHONE (OUTPATIENT)
Dept: PODIATRY | Facility: CLINIC | Age: 57
End: 2017-05-11

## 2017-05-11 ENCOUNTER — EPISODE CHANGES (OUTPATIENT)
Dept: HEPATOLOGY | Facility: CLINIC | Age: 57
End: 2017-05-11

## 2017-05-11 ENCOUNTER — TELEPHONE (OUTPATIENT)
Dept: HEPATOLOGY | Facility: CLINIC | Age: 57
End: 2017-05-11

## 2017-05-11 DIAGNOSIS — B18.2 CHRONIC HEPATITIS C WITHOUT HEPATIC COMA: Primary | ICD-10-CM

## 2017-05-11 DIAGNOSIS — M86.9 OSTEOMYELITIS, UNSPECIFIED SITE, UNSPECIFIED TYPE: Primary | ICD-10-CM

## 2017-05-11 DIAGNOSIS — K74.60 CIRRHOSIS OF LIVER WITHOUT ASCITES, UNSPECIFIED HEPATIC CIRRHOSIS TYPE: ICD-10-CM

## 2017-05-11 RX ORDER — AMOXICILLIN AND CLAVULANATE POTASSIUM 500; 125 MG/1; MG/1
1 TABLET, FILM COATED ORAL 3 TIMES DAILY
Qty: 21 TABLET | Refills: 0 | Status: SHIPPED | OUTPATIENT
Start: 2017-05-11 | End: 2017-05-18

## 2017-05-11 NOTE — TELEPHONE ENCOUNTER
HCV LAB REVIEW  Week 24 of [place], planning on 24 weeks treatment  Pt no show for multiple labs and has missed several doses.     Pertinent labs:  LFT: stable   HCV RNA: <12 not detected    Called patient to see how many pills were left in his last bottle. He reports 23 pills left in bottle. He denies missing any doses. He should have completed treatment on 05/09.       Next labs due / pls schedule:  - LFT , HCV RNA in 23 days - end of treatment - 06/05/17

## 2017-05-11 NOTE — PROGRESS NOTES
Patient's PICC is out and cannot be replaced until next Tuesday. Will call in augmentin 500-125 mg PO TID to take in the meantime.

## 2017-05-11 NOTE — TELEPHONE ENCOUNTER
----- Message from Gely Peng sent at 5/11/2017  4:22 PM CDT -----  Contact: self@home  Patient states he was told to come at 9:45 Friday which is tomorrow for wound care but patient appointment is not in the system.

## 2017-05-12 ENCOUNTER — HOSPITAL ENCOUNTER (OUTPATIENT)
Dept: WOUND CARE | Facility: HOSPITAL | Age: 57
Discharge: HOME | End: 2017-05-12
Attending: PODIATRIST
Payer: MEDICAID

## 2017-05-12 DIAGNOSIS — E11.49 TYPE II DIABETES MELLITUS WITH NEUROLOGICAL MANIFESTATIONS: ICD-10-CM

## 2017-05-12 DIAGNOSIS — Z89.411 STATUS POST AMPUTATION OF RIGHT GREAT TOE: Primary | ICD-10-CM

## 2017-05-12 DIAGNOSIS — L97.512 DIABETIC ULCER OF TOE OF RIGHT FOOT ASSOCIATED WITH TYPE 2 DIABETES MELLITUS, WITH FAT LAYER EXPOSED: ICD-10-CM

## 2017-05-12 DIAGNOSIS — E11.621 DIABETIC ULCER OF TOE OF RIGHT FOOT ASSOCIATED WITH TYPE 2 DIABETES MELLITUS, WITH FAT LAYER EXPOSED: ICD-10-CM

## 2017-05-12 PROCEDURE — 25000003 PHARM REV CODE 250

## 2017-05-12 PROCEDURE — 99213 OFFICE O/P EST LOW 20 MIN: CPT | Performed by: PODIATRIST

## 2017-05-12 PROCEDURE — 97605 NEG PRS WND THER DME<=50SQCM: CPT | Performed by: PODIATRIST

## 2017-05-12 PROCEDURE — 99024 POSTOP FOLLOW-UP VISIT: CPT | Mod: ,,, | Performed by: PODIATRIST

## 2017-05-15 ENCOUNTER — TELEPHONE (OUTPATIENT)
Dept: INTERVENTIONAL RADIOLOGY/VASCULAR | Facility: HOSPITAL | Age: 57
End: 2017-05-15

## 2017-05-16 ENCOUNTER — HOSPITAL ENCOUNTER (OUTPATIENT)
Dept: INTERVENTIONAL RADIOLOGY/VASCULAR | Facility: HOSPITAL | Age: 57
Discharge: HOME | End: 2017-05-16
Attending: INTERNAL MEDICINE
Payer: MEDICAID

## 2017-05-16 VITALS
HEART RATE: 79 BPM | DIASTOLIC BLOOD PRESSURE: 71 MMHG | SYSTOLIC BLOOD PRESSURE: 130 MMHG | RESPIRATION RATE: 20 BRPM | OXYGEN SATURATION: 97 %

## 2017-05-16 DIAGNOSIS — M86.9 OSTEOMYELITIS, UNSPECIFIED SITE, UNSPECIFIED TYPE: ICD-10-CM

## 2017-05-16 PROCEDURE — 76937 US GUIDE VASCULAR ACCESS: CPT | Mod: 26,,, | Performed by: RADIOLOGY

## 2017-05-16 PROCEDURE — 77001 FLUOROGUIDE FOR VEIN DEVICE: CPT | Mod: 26,,, | Performed by: RADIOLOGY

## 2017-05-16 PROCEDURE — 76937 US GUIDE VASCULAR ACCESS: CPT | Mod: TC

## 2017-05-16 PROCEDURE — 36569 INSJ PICC 5 YR+ W/O IMAGING: CPT | Mod: ,,, | Performed by: RADIOLOGY

## 2017-05-16 PROCEDURE — 77001 FLUOROGUIDE FOR VEIN DEVICE: CPT | Mod: TC

## 2017-05-16 NOTE — IP AVS SNAPSHOT
[facility]  [address]  [facility] [address]  Phone: [phone]           Patient Discharge Instructions   Our goal is to set you up for success. This packet includes information on your condition, medications, and your home care.  It will help you care for yourself to prevent having to return to the hospital.     Please ask your nurse if you have any questions.      There are many details to remember when preparing to leave the hospital. Here is what you will need to do:    1. Take your medicine. If you are prescribed medications, review your Medication List on the following pages. You may have new medications to  at the pharmacy and others that you'll need to stop taking. Review the instructions for how and when to take your medications. Talk with your doctor or nurses if you are unsure of what to do.     2. Go to your follow-up appointments. Specific follow-up information is listed in the following pages. Your may be contacted by a nurse or clinical provider about future appointments. Be sure we have all of the phone numbers to reach you. Please contact your provider's office if you are unable to make an appointment.     3. Watch for warning signs. Your doctor or nurse will give you detailed warning signs to watch for and when to call for assistance. These instructions may also include educational information about your condition. If you experience any of warning signs to your health, call your doctor.           [facility]  Unless otherwise directed by your provider, please   contact [facility], our nurse care line   that is available for 24/7 assistance.     [phone] (toll-free)     Registered nurses in the [facility]   provide: appointment scheduling, clinical advisement, health education, and other advisory services.                  ** Verify the list of medication(s) below is accurate and up to date. Carry this with you in case of  emergency. If your medications have changed, please notify your healthcare provider.             Medication List      TAKE these medications        Additional Info                      amoxicillin-clavulanate 500-125mg 500-125 mg Tab   Commonly known as:  AUGMENTIN   Quantity:  21 tablet   Refills:  0   Dose:  1 tablet    Instructions:  Take 1 tablet (500 mg total) by mouth 3 (three) times daily.     Begin Date    AM    Noon    PM    Bedtime       aspirin 81 MG EC tablet   Commonly known as:  ECOTRIN   Refills:  0   Dose:  81 mg    Instructions:  Take 1 tablet (81 mg total) by mouth once daily.     Begin Date    AM    Noon    PM    Bedtime       atorvastatin 80 MG tablet   Commonly known as:  LIPITOR   Quantity:  90 tablet   Refills:  3   Dose:  80 mg    Instructions:  Take 1 tablet (80 mg total) by mouth once daily.     Begin Date    AM    Noon    PM    Bedtime       blood sugar diagnostic Strp   Commonly known as:  BLOOD GLUCOSE TEST   Quantity:  100 each   Refills:  6   Comments:  True Test    Instructions:  Twice daily blood sugar checks     Begin Date    AM    Noon    PM    Bedtime       clopidogrel 75 mg tablet   Commonly known as:  PLAVIX   Quantity:  30 tablet   Refills:  3   Dose:  75 mg    Instructions:  Take 1 tablet (75 mg total) by mouth once daily.     Begin Date    AM    Noon    PM    Bedtime       dextrose 5 % SolP 500 mL with vancomycin 1,000 mg SolR 2,000 mg   Refills:  0   Dose:  2000 mg   Indications:  Bone/Joint    Instructions:  Inject 2,000 mg into the vein every 12 (twelve) hours.     Begin Date    AM    Noon    PM    Bedtime       diazePAM 2 MG tablet   Commonly known as:  VALIUM   Quantity:  60 tablet   Refills:  1    Instructions:  TAKE ONE TABLET BY MOUTH EVERY 8 HOURS AS NEEDED FOR ANXIETY **THANK YOU**     Begin Date    AM    Noon    PM    Bedtime       docusate sodium 100 MG capsule   Commonly known as:  COLACE   Refills:  0   Dose:  100 mg    Instructions:  Take 100 mg by mouth as  needed for Constipation.     Begin Date    AM    Noon    PM    Bedtime       fentaNYL 50 mcg/hr   Commonly known as:  DURAGESIC   Quantity:  10 patch   Refills:  0    Instructions:  Place 1 patch onto the skin every 72 hours.     Begin Date    AM    Noon    PM    Bedtime       gabapentin 300 MG capsule   Commonly known as:  NEURONTIN   Quantity:  180 capsule   Refills:  4    Instructions:  Take 1 capsule (300 mg total) by mouth 3 (three) times daily.     Begin Date    AM    Noon    PM    Bedtime       heparin, porcine (PF) 100 unit/mL Syrg   Commonly known as:  heparin flush 100 units/mL   Refills:  0      Begin Date    AM    Noon    PM    Bedtime       insulin glargine 100 unit/mL (3 mL) Inpn pen   Commonly known as:  LANTUS SOLOSTAR   Quantity:  13.5 mL   Refills:  3   Dose:  15 Units    Instructions:  Inject 15 Units into the skin every evening.     Begin Date    AM    Noon    PM    Bedtime       INVANZ 1 gram injection   Refills:  0   Generic drug:  ertapenem      Begin Date    AM    Noon    PM    Bedtime       ledipasvir-sofosbuvir  mg Tab   Commonly known as:  HARVONI   Quantity:  28 tablet   Refills:  5   Dose:  1 tablet    Instructions:  Take 1 tablet by mouth once daily.     Begin Date    AM    Noon    PM    Bedtime       metformin 1000 MG tablet   Commonly known as:  GLUCOPHAGE   Quantity:  60 tablet   Refills:  2   Dose:  1500 mg    Instructions:  Take 1.5 tablets (1,500 mg total) by mouth 2 (two) times daily with meals.     Begin Date    AM    Noon    PM    Bedtime       mupirocin calcium 2% 2 % cream   Commonly known as:  BACTROBAN   Quantity:  60 g   Refills:  1    Instructions:  Apply to affected area 3 times daily     Begin Date    AM    Noon    PM    Bedtime       NORMAL SALINE FLUSH 0.9 % injection   Refills:  0   Generic drug:  sodium chloride 0.9%      Begin Date    AM    Noon    PM    Bedtime       oxycodone 10 mg Tab immediate release tablet   Commonly known as:  ROXICODONE   Quantity:   120 tablet   Refills:  0   Dose:  10 mg    Instructions:  Take 1 tablet (10 mg total) by mouth every 6 (six) hours as needed for Pain.     Begin Date    AM    Noon    PM    Bedtime       potassium chloride SA 10 MEQ tablet   Commonly known as:  K-DUR,KLOR-CON   Refills:  1    Instructions:  Take 1 tablet (10 mEq total) by mouth once daily as needed when experiencing leg/muscle cramps.     Begin Date    AM    Noon    PM    Bedtime       promethazine 25 MG tablet   Commonly known as:  PHENERGAN   Quantity:  15 tablet   Refills:  0   Dose:  25 mg    Instructions:  Take 1 tablet (25 mg total) by mouth every 4 (four) hours.     Begin Date    AM    Noon    PM    Bedtime       silver sulfADIAZINE 1% 1 % cream   Commonly known as:  SILVADENE   Quantity:  50 g   Refills:  2   Dose:  1 application    Instructions:  Apply 1 application topically once daily. Apply to affected area     Begin Date    AM    Noon    PM    Bedtime       trazodone 100 MG tablet   Commonly known as:  DESYREL   Quantity:  90 tablet   Refills:  1   Comments:  This prescription was filled today. Any refills authorized will be placed on file.    Instructions:  Take 1 tablet (100 mg total) by mouth every evening.     Begin Date    AM    Noon    PM    Bedtime       TRUE METRIX GLUCOSE METER Misc   Refills:  0   Generic drug:  blood-glucose meter    Instructions:  AS DIRECTED     Begin Date    AM    Noon    PM    Bedtime                  Please bring to all follow up appointments:    1. A copy of your discharge instructions.  2. All medicines you are currently taking in their original bottles.  3. Identification and insurance card.    Please arrive 15 minutes ahead of scheduled appointment time.    Please call 24 hours in advance if you must reschedule your appointment and/or time.        Your Scheduled Appointments     May 19, 2017  8:10 AM CDT   Wound Check with Nora Braden DPM   [facility]    [address]  [place] [address]   [phone]            May 31, 2017 10:00 AM CDT   Established Patient - Internal Medicine with [facility] - INF CHAIR 2   [facility] ([facility])    [address], [address]  [place] [address]   [phone]            Jun 05, 2017 11:05 AM CDT   Non-Fasting Lab with [facility] ([facility])    [address] [place] [address]   [phone]            Jun 06, 2017 11:00 AM CDT   Established Patient Endocrinology with Aviva Lux MD   [facility] ([facility])    [address], [address]  [place] [address]   [phone]                  Discharge Instructions       Interventional Radiology [phone]  Mon-Fri  8A-4P  24hr Radiology Resident on call [phone]      Discharge References/Attachments     PICC, PERIPHERALLY INSERTED CENTRAL CATHETER (ENGLISH)        Admission Information     Date & Time Provider Department CSN    5/16/2017  8:30 AM Rocael Rodas MD [facility] [id number]      Care Providers     Provider Role Specialty Primary office phone    Rocael Rodas MD Attending Provider Infectious Diseases [phone]      Your Vitals Were     BP Pulse Resp SpO2          130/71 (BP Location: Right arm, Patient Position: Sitting, BP Method: Automatic) 79 20 97%        Recent Lab Values        9/11/2008 9/13/2008 5/12/2011 3/13/2013 9/27/2014 1/25/2016 1/12/2017 4/4/2017      5:03 AM  5:25 AM 10:46 AM  8:49 AM  4:31 AM 10:00 AM  2:03 PM 10:53 AM    A1C 10.7 (H) 10.7 (H) 14.0 (H) 11.7 (H) 11.4 (H) 9.7 (H) 10.9 10.2 (H)    Comment for A1C at 10:53 AM on 4/4/2017:           Pre-diabetes: 5.7 - 6.4           Diabetes: >6.4           Glycemic control for adults with diabetes: <7.0        Allergies as of 5/16/2017        Reactions    Codeine Rash    Other reaction(s): Unknown      Advance Directives     An advance directive is a document which, in the event  you are no longer able to make decisions for yourself, tells your healthcare team what kind of treatment you do or do not want to receive, or who you would like to make those decisions for you.  If you do not currently have an advance directive, [facility] encourages you to create one.  For more information call:  [phone]), [phone] ([phone]),  or log on to [email or web address].        Smoking Cessation     If you would like to quit smoking:   You may be eligible for free services if you are a [place] resident and started smoking cigarettes before September 1, 1988.  Call the Smoking Cessation Trust (SCT) toll free at (346) 643-3846 or (412) 032-6584.   Call 8-733-QUIT-NOW if you do not meet the above criteria.   Contact us via email: [email or web address]   View our website for more information: [email or web address]        Language Assistance Services     ATTENTION: Language assistance services are available, free of charge. Please call [phone].      ATENCIÓN: Si habla español, tiene a castellano disposición servicios gratuitos de asistencia lingüística. Llame al [phone].     CHÚ Ý: N?u b?n nói Ti?ng Vi?t, có các d?ch v? h? tr? ngôn ng? mi?n phí dành cho b?n. G?i s? [phone].        Stroke Education              Diabetes Discharge Instructions                                    [facility] complies with applicable Federal civil rights laws and does not discriminate on the basis of race, color, national origin, age, disability, or sex.

## 2017-05-16 NOTE — DISCHARGE INSTRUCTIONS
Interventional Radiology [phone]  Mon-Fri  8A-4P  24hr Radiology Resident on call [phone]

## 2017-05-16 NOTE — PROGRESS NOTES
Double Lumen PICC line placed in the RUE. PICC length of 39 cm to the SVC with the use of ultrasound and Xray guidance.  PICC line flushed and ready for use.

## 2017-05-16 NOTE — PROCEDURES
Radiology Post-Procedure Note    Pre Op Diagnosis: Osteomyelitis    Post Op Diagnosis: Same    Procedure: PICC placement    Procedure performed by: Phan Guzman MD and Kristine Pedraza    Written Informed Consent Obtained: Yes    Specimen Removed: No    Estimated Blood Loss: Minimal    Findings:   Using realtime U/S guidance a 5 Fr PICC catheter was placed into the right Basilic Vein with tip of the catheter in the SVC.    PICC is ready for use.     Kristine Pedraza MD  PGY-3  Department of Radiology  Pager: [phone]

## 2017-05-16 NOTE — H&P
Radiology History & Physical      SUBJECTIVE:     Chief Complaint: Osteomyelitis    History of Present Illness:  Carlos Cifuentes is a 56 y.o. male with history of osteomyelitis and recent amputation of right toe who presents for PICC line placement  Past Medical History:   Diagnosis Date    Arthritis     Bulging lumbar disc     C. difficile diarrhea     Chronic back pain 10/14/2014    Diabetes mellitus type II     DM (diabetes mellitus), type 2, uncontrolled 3/12/2013    Hepatitis C 7/3/2013    MSSA (methicillin susceptible Staphylococcus aureus) septicemia     Neuromuscular disorder     Neuropathy     Staph aureus infection      Past Surgical History:   Procedure Laterality Date    APPENDECTOMY      left knee surgery      left leg surgery      broken bone    LEG SURGERY  right     Right arm boil      Right great toe amputation         Home Meds:   Prior to Admission medications    Medication Sig Start Date End Date Taking? Authorizing Provider   amoxicillin-clavulanate 500-125mg (AUGMENTIN) 500-125 mg Tab Take 1 tablet (500 mg total) by mouth 3 (three) times daily. 5/11/17 5/18/17  PHILLIP Nair   aspirin (ECOTRIN) 81 MG EC tablet Take 1 tablet (81 mg total) by mouth once daily. 4/12/17 4/12/18  Jason Moore MD   atorvastatin (LIPITOR) 80 MG tablet Take 1 tablet (80 mg total) by mouth once daily. 4/15/17 4/15/18  Zita Barcenas MD   blood sugar diagnostic (BLOOD GLUCOSE TEST) Strp Twice daily blood sugar checks  Patient taking differently: Test  blood sugar four times daily 1/3/17   Miguel Lux MD   clopidogrel (PLAVIX) 75 mg tablet Take 1 tablet (75 mg total) by mouth once daily. 4/12/17 4/12/18  Jason Moore MD   dextrose 5 % SolP 500 mL with vancomycin 1,000 mg SolR 2,000 mg Inject 2,000 mg into the vein every 12 (twelve) hours. 4/12/17   Jason Moore MD   diazePAM (VALIUM) 2 MG tablet TAKE ONE TABLET BY MOUTH EVERY 8 HOURS AS NEEDED FOR ANXIETY **THANK YOU** 4/26/17    Miguel Lux MD   docusate sodium (COLACE) 100 MG capsule Take 100 mg by mouth as needed for Constipation.    Historical Provider, MD   fentaNYL (DURAGESIC) 50 mcg/hr Place 1 patch onto the skin every 72 hours. 4/26/17   Miguel Lux MD   gabapentin (NEURONTIN) 300 MG capsule Take 1 capsule (300 mg total) by mouth 3 (three) times daily. 11/28/16   Miguel Lux MD   heparin, porcine, PF, (HEPARIN FLUSH 100 UNITS/ML) 100 unit/mL Syrg  4/19/17   Historical Provider, MD   insulin glargine (LANTUS SOLOSTAR) 100 unit/mL (3 mL) InPn pen Inject 15 Units into the skin every evening. 11/28/16 4/28/17  Miguel Lux MD   INVANZ 1 gram injection  4/20/17   Historical Provider, MD   ledipasvir-sofosbuvir (HARVONI)  mg Tab Take 1 tablet by mouth once daily. 10/26/16   You Reilly PA-C   metformin (GLUCOPHAGE) 1000 MG tablet Take 1.5 tablets (1,500 mg total) by mouth 2 (two) times daily with meals. 4/15/17   Zita Barcenas MD   mupirocin calcium 2% (BACTROBAN) 2 % cream Apply to affected area 3 times daily 1/3/17 1/3/18  Miguel Lux MD   NORMAL SALINE FLUSH 0.9 % injection  4/19/17   Historical Provider, MD   oxycodone (ROXICODONE) 10 mg Tab immediate release tablet Take 1 tablet (10 mg total) by mouth every 6 (six) hours as needed for Pain. 4/26/17   Miguel Lux MD   potassium chloride SA (K-DUR,KLOR-CON) 10 MEQ tablet Take 1 tablet (10 mEq total) by mouth once daily as needed when experiencing leg/muscle cramps. 9/15/16   Historical Provider, MD   promethazine (PHENERGAN) 25 MG tablet Take 1 tablet (25 mg total) by mouth every 4 (four) hours.  Patient taking differently: Take 25 mg by mouth every 4 (four) hours as needed for Nausea.  1/3/17   Miguel Lux MD   silver sulfADIAZINE 1% (SILVADENE) 1 % cream Apply 1 application topically once daily. Apply to affected area 1/3/17   Miguel Lux MD   trazodone (DESYREL) 100 MG tablet Take 1 tablet (100 mg total) by mouth every evening.  1/3/17   Miguel Lux MD   TRUE METRIX GLUCOSE METER Misc AS DIRECTED 2/9/17   Historical Provider, MD     Anticoagulants/Antiplatelets: aspirin    Allergies:   Review of patient's allergies indicates:   Allergen Reactions    Codeine Rash     Other reaction(s): Unknown     Sedation History:  no adverse reactions    Review of Systems:   Hematological: no known coagulopathies  Respiratory: no shortness of breath  Cardiovascular: no chest pain  Gastrointestinal: no abdominal pain  Genito-Urinary: no dysuria  Musculoskeletal: negative  Neurological: no TIA or stroke symptoms         OBJECTIVE:     Vital Signs (Most Recent)       Physical Exam:    General: no acute distress  Mental Status: alert and oriented to person, place and time  HEENT: normocephalic, atraumatic  Chest: unlabored breathing  Heart: regular heart rate  Abdomen: nondistended  Extremity: moves all extremities    Laboratory  Lab Results   Component Value Date    INR 1.0 04/07/2017       Lab Results   Component Value Date    WBC 6.96 04/15/2017    HGB 10.1 (L) 04/15/2017    HCT 29.7 (L) 04/15/2017    MCV 83 04/15/2017     04/15/2017      Lab Results   Component Value Date     (H) 04/15/2017     (L) 04/15/2017    K 4.4 04/15/2017    CL 98 04/15/2017    CO2 28 04/15/2017    BUN 12 04/15/2017    CREATININE 1.0 04/15/2017    CALCIUM 9.2 04/15/2017    MG 1.5 (L) 04/15/2017    ALT 21 05/09/2017    AST 14 05/09/2017    ALBUMIN 3.0 (L) 05/09/2017    BILITOT 0.3 05/09/2017    BILIDIR 0.1 05/09/2017       ASSESSMENT/PLAN:     Sedation Plan: local  Patient will undergo PICC line placement.    Kristine Pedraza MD  PGY-3  Department of Radiology  Pager: [phone]

## 2017-05-19 ENCOUNTER — HOSPITAL ENCOUNTER (OUTPATIENT)
Dept: WOUND CARE | Facility: HOSPITAL | Age: 57
Discharge: HOME | End: 2017-05-19
Attending: PODIATRIST
Payer: MEDICAID

## 2017-05-19 DIAGNOSIS — E11.49 TYPE II DIABETES MELLITUS WITH NEUROLOGICAL MANIFESTATIONS: ICD-10-CM

## 2017-05-19 DIAGNOSIS — L97.512 DIABETIC ULCER OF TOE OF RIGHT FOOT ASSOCIATED WITH TYPE 2 DIABETES MELLITUS, WITH FAT LAYER EXPOSED: ICD-10-CM

## 2017-05-19 DIAGNOSIS — E11.621 DIABETIC ULCER OF TOE OF RIGHT FOOT ASSOCIATED WITH TYPE 2 DIABETES MELLITUS, WITH FAT LAYER EXPOSED: ICD-10-CM

## 2017-05-19 DIAGNOSIS — Z89.411 STATUS POST AMPUTATION OF RIGHT GREAT TOE: Primary | ICD-10-CM

## 2017-05-19 PROCEDURE — 97605 NEG PRS WND THER DME<=50SQCM: CPT | Performed by: PODIATRIST

## 2017-05-19 PROCEDURE — 99024 POSTOP FOLLOW-UP VISIT: CPT | Mod: ,,, | Performed by: PODIATRIST

## 2017-05-23 ENCOUNTER — HOSPITAL ENCOUNTER (OUTPATIENT)
Dept: WOUND CARE | Facility: HOSPITAL | Age: 57
Discharge: HOME | End: 2017-05-23
Attending: PODIATRIST
Payer: MEDICAID

## 2017-05-23 PROCEDURE — 97605 NEG PRS WND THER DME<=50SQCM: CPT

## 2017-05-25 ENCOUNTER — HOSPITAL ENCOUNTER (OUTPATIENT)
Dept: WOUND CARE | Facility: HOSPITAL | Age: 57
Discharge: HOME | End: 2017-05-25
Attending: INTERNAL MEDICINE
Payer: MEDICAID

## 2017-05-25 DIAGNOSIS — M86.8X7 OTHER OSTEOMYELITIS OF RIGHT FOOT: ICD-10-CM

## 2017-05-25 DIAGNOSIS — E11.621 DIABETIC ULCER OF TOE OF RIGHT FOOT ASSOCIATED WITH TYPE 2 DIABETES MELLITUS, WITH FAT LAYER EXPOSED: Primary | ICD-10-CM

## 2017-05-25 DIAGNOSIS — L97.512 DIABETIC ULCER OF TOE OF RIGHT FOOT ASSOCIATED WITH TYPE 2 DIABETES MELLITUS, WITH FAT LAYER EXPOSED: Primary | ICD-10-CM

## 2017-05-25 PROCEDURE — 11042 DBRDMT SUBQ TIS 1ST 20SQCM/<: CPT | Mod: ,,, | Performed by: INTERNAL MEDICINE

## 2017-05-25 PROCEDURE — 99213 OFFICE O/P EST LOW 20 MIN: CPT | Mod: 25,,, | Performed by: INTERNAL MEDICINE

## 2017-05-25 PROCEDURE — 11042 DBRDMT SUBQ TIS 1ST 20SQCM/<: CPT | Performed by: INTERNAL MEDICINE

## 2017-05-25 PROCEDURE — 97605 NEG PRS WND THER DME<=50SQCM: CPT | Performed by: INTERNAL MEDICINE

## 2017-05-25 PROCEDURE — 25000003 PHARM REV CODE 250

## 2017-05-26 ENCOUNTER — TELEPHONE (OUTPATIENT)
Dept: INFECTIOUS DISEASES | Facility: HOSPITAL | Age: 57
End: 2017-05-26

## 2017-05-26 NOTE — TELEPHONE ENCOUNTER
Infectious Disease - Lab follow up     Dx: osteomyelitis  Antibiotics: ertapenem  Estimated End Date: 6/1/17    WBC - 6.9  H/H - 12/35.9  Platelets - 190  Creatinine - 1  ESR - pending  CRP - 32.3  AST/ALT - 13/18

## 2017-05-29 ENCOUNTER — HOSPITAL ENCOUNTER (OUTPATIENT)
Dept: WOUND CARE | Facility: HOSPITAL | Age: 57
Discharge: HOME | End: 2017-05-29
Attending: FAMILY MEDICINE
Payer: MEDICAID

## 2017-05-29 PROCEDURE — 97605 NEG PRS WND THER DME<=50SQCM: CPT

## 2017-06-01 ENCOUNTER — HOSPITAL ENCOUNTER (OUTPATIENT)
Dept: WOUND CARE | Facility: HOSPITAL | Age: 57
Discharge: HOME | End: 2017-06-01
Attending: INTERNAL MEDICINE
Payer: MEDICAID

## 2017-06-01 DIAGNOSIS — L97.512 DIABETIC ULCER OF TOE OF RIGHT FOOT ASSOCIATED WITH TYPE 2 DIABETES MELLITUS, WITH FAT LAYER EXPOSED: Primary | ICD-10-CM

## 2017-06-01 DIAGNOSIS — E11.621 DIABETIC ULCER OF TOE OF RIGHT FOOT ASSOCIATED WITH TYPE 2 DIABETES MELLITUS, WITH FAT LAYER EXPOSED: Primary | ICD-10-CM

## 2017-06-01 DIAGNOSIS — M86.671 OTHER CHRONIC OSTEOMYELITIS OF RIGHT FOOT: ICD-10-CM

## 2017-06-01 PROCEDURE — 11042 DBRDMT SUBQ TIS 1ST 20SQCM/<: CPT | Performed by: INTERNAL MEDICINE

## 2017-06-01 PROCEDURE — 25000003 PHARM REV CODE 250

## 2017-06-01 PROCEDURE — 11045 DBRDMT SUBQ TISS EACH ADDL: CPT | Performed by: INTERNAL MEDICINE

## 2017-06-01 PROCEDURE — 97605 NEG PRS WND THER DME<=50SQCM: CPT | Performed by: INTERNAL MEDICINE

## 2017-06-01 PROCEDURE — 99214 OFFICE O/P EST MOD 30 MIN: CPT | Mod: 25,,, | Performed by: INTERNAL MEDICINE

## 2017-06-01 PROCEDURE — 11042 DBRDMT SUBQ TIS 1ST 20SQCM/<: CPT | Mod: ,,, | Performed by: INTERNAL MEDICINE

## 2017-06-02 ENCOUNTER — TELEPHONE (OUTPATIENT)
Dept: HEPATOLOGY | Facility: CLINIC | Age: 57
End: 2017-06-02

## 2017-06-02 NOTE — TELEPHONE ENCOUNTER
I spoke with patient.  He reports having 1 Harvoni tab left. Patient told that additional refills were not required and that once he took last tab treatment was complete.  He was reminded of next lab draw.  EOT labs already scheduled 6/5/17.

## 2017-06-02 NOTE — TELEPHONE ENCOUNTER
----- Message from Ashok Cope sent at 6/2/2017  1:02 PM CDT -----  Contact: patient   Need to know if need to stay on harvoni and if he need labs please call

## 2017-06-05 ENCOUNTER — TELEPHONE (OUTPATIENT)
Dept: INFECTIOUS DISEASES | Facility: HOSPITAL | Age: 57
End: 2017-06-05

## 2017-06-05 ENCOUNTER — HOSPITAL ENCOUNTER (OUTPATIENT)
Dept: WOUND CARE | Facility: HOSPITAL | Age: 57
Discharge: HOME | End: 2017-06-05
Attending: FAMILY MEDICINE
Payer: MEDICAID

## 2017-06-05 ENCOUNTER — EPISODE CHANGES (OUTPATIENT)
Dept: HEPATOLOGY | Facility: CLINIC | Age: 57
End: 2017-06-05

## 2017-06-05 PROCEDURE — 97605 NEG PRS WND THER DME<=50SQCM: CPT

## 2017-06-05 NOTE — TELEPHONE ENCOUNTER
Infectious Disease - Lab follow up     Dx:osteomyelitis  Antibiotics:ertapenem  Estimated End Date: 6/1/17- extended until seen in clinic    WBC - 6.6  H/H - 12.1/36.2  Platelets - 192  Creatinine - 0.9  ESR - 51  CRP - 47.3  AST/ALT - 15/18    F/u tomorrow in clinic. Due to end his antibiotics. May need to place on orals until inflammatory markers are normalized.

## 2017-06-06 ENCOUNTER — TELEPHONE (OUTPATIENT)
Dept: INFECTIOUS DISEASES | Facility: CLINIC | Age: 57
End: 2017-06-06

## 2017-06-07 ENCOUNTER — INFUSION (OUTPATIENT)
Dept: INFECTIOUS DISEASES | Facility: HOSPITAL | Age: 57
End: 2017-06-07
Attending: INTERNAL MEDICINE
Payer: MEDICAID

## 2017-06-07 ENCOUNTER — OFFICE VISIT (OUTPATIENT)
Dept: INFECTIOUS DISEASES | Facility: CLINIC | Age: 57
End: 2017-06-07
Payer: MEDICAID

## 2017-06-07 VITALS
WEIGHT: 235 LBS | HEIGHT: 69 IN | DIASTOLIC BLOOD PRESSURE: 61 MMHG | SYSTOLIC BLOOD PRESSURE: 110 MMHG | BODY MASS INDEX: 34.8 KG/M2 | TEMPERATURE: 98 F | HEART RATE: 104 BPM

## 2017-06-07 DIAGNOSIS — M86.9 OSTEOMYELITIS, UNSPECIFIED SITE, UNSPECIFIED TYPE: Primary | ICD-10-CM

## 2017-06-07 PROCEDURE — 99213 OFFICE O/P EST LOW 20 MIN: CPT | Mod: S$PBB,,, | Performed by: PHYSICIAN ASSISTANT

## 2017-06-07 PROCEDURE — 99999 PR PBB SHADOW E&M-EST. PATIENT-LVL IV: CPT | Mod: PBBFAC,,, | Performed by: PHYSICIAN ASSISTANT

## 2017-06-07 RX ORDER — OXYCODONE HCL 10 MG/1
TABLET, FILM COATED, EXTENDED RELEASE ORAL
Refills: 0 | COMMUNITY
Start: 2017-04-28 | End: 2017-06-07

## 2017-06-07 RX ORDER — SIMVASTATIN 20 MG/1
TABLET, FILM COATED ORAL
Refills: 3 | COMMUNITY
Start: 2017-05-01 | End: 2017-06-07

## 2017-06-07 RX ORDER — GLIPIZIDE 5 MG/1
TABLET ORAL
Status: ON HOLD | COMMUNITY
Start: 2017-05-30 | End: 2017-06-30 | Stop reason: HOSPADM

## 2017-06-07 RX ORDER — INSULIN GLARGINE 100 [IU]/ML
INJECTION, SOLUTION SUBCUTANEOUS
COMMUNITY
Start: 2017-05-01 | End: 2017-06-07

## 2017-06-07 RX ORDER — INSULIN GLARGINE 100 [IU]/ML
INJECTION, SOLUTION SUBCUTANEOUS
Refills: 3 | Status: ON HOLD | COMMUNITY
Start: 2017-05-01 | End: 2017-06-30

## 2017-06-07 RX ORDER — AMOXICILLIN AND CLAVULANATE POTASSIUM 500; 125 MG/1; MG/1
1 TABLET, FILM COATED ORAL 3 TIMES DAILY
Qty: 42 TABLET | Refills: 0 | Status: SHIPPED | OUTPATIENT
Start: 2017-06-07 | End: 2017-06-21

## 2017-06-07 NOTE — PROGRESS NOTES
PICC LINE REMOVED.  PT TOLERATED WELL.  INSTRUCTED PT ON SITE CARE OVER THE NEXT COUPLE OF DAYS.  PT VERBALIZED UNDERSTANDING AND LEFT IN NAD.

## 2017-06-07 NOTE — PROGRESS NOTES
"Subjective:      Patient ID: Carlos Cifuentes is a 56 y.o. male.    Chief Complaint:Follow-up      History of Present Illness  This is a 56 year old male with DM, HTN, HLD, Hep C, smoker who came in with complaints of a "blue right great toe." He had a callous to his toe that he shaved off and it developed a nonhealing ulceration. He developed gangrene of the toe and cellulitis of his RLE. Arterial ultrasound was done and Right LAILA was 1.57, left 1.70 and no occlusion. He is s/p 1st ray amputation on 4/10 with closure on 4/13. He underwent revascularization on 4/11 with vascular surgery.  Cultures were pending prior to discharge and patient was sent out on empiric vanc and cipro with cultures and pathology to be followed as an outpatient.   Culture results reviewed: ESBL E. Coli, fusobacterium, and prevotella grew from cultures from OR on 4/13; tissue cultures from 4/8 with klebsiella and anaerobes. Pathology of clean margin came back negative, however, it was decided to go ahead and treat x 6 weeks given continued bone exposure.   Patient was switched to ertapenem on last visit and has now completed 6 weeks of therapy (with one interruption when patient's PICC line came out in which he took augmentin during that time.) patient reports he is doing well. He has a wound vac in place and states the wound is healing nicely. He denies fevers or chills but states he felt hot a few days ago. He denies diarrhea.     Review of Systems   Constitution: Negative for chills, decreased appetite, fever, weakness, malaise/fatigue, night sweats, weight gain and weight loss.   HENT: Negative for congestion, ear pain, headaches, hearing loss, hoarse voice, sore throat and tinnitus.    Eyes: Negative for blurred vision, redness and visual disturbance.   Cardiovascular: Negative for chest pain, leg swelling and palpitations.   Respiratory: Negative for cough, hemoptysis, shortness of breath and sputum production.  "   Hematologic/Lymphatic: Negative for adenopathy. Does not bruise/bleed easily.   Skin: Negative for dry skin, itching, rash and suspicious lesions.   Musculoskeletal: Positive for back pain, joint pain and neck pain. Negative for myalgias.   Gastrointestinal: Negative for abdominal pain, constipation, diarrhea, heartburn, nausea and vomiting.   Genitourinary: Negative for dysuria, flank pain, frequency, hematuria, hesitancy and urgency.   Neurological: Negative for dizziness, numbness and paresthesias.   Psychiatric/Behavioral: Negative for depression and memory loss. The patient has insomnia and is nervous/anxious.      Objective:   Physical Exam   Constitutional: He is oriented to person, place, and time. He appears well-developed and well-nourished. No distress.   Cardiovascular: Normal rate and regular rhythm.    No murmur heard.  Pulmonary/Chest: Effort normal and breath sounds normal. No respiratory distress.   Abdominal: Soft. Bowel sounds are normal. He exhibits no distension.   Musculoskeletal: Normal range of motion. He exhibits no edema.   Right foot with wound vac and dressing in place    Neurological: He is alert and oriented to person, place, and time.   Skin: Skin is warm and dry.        Psychiatric: He has a normal mood and affect. His behavior is normal.     Assessment:       1. Osteomyelitis, unspecified site, unspecified type        Patient has completed 6 weeks of IV ertapenem for osteomyelitis of right great toe. He is tolerating antibiotics without side effects. He has a PICC in place to RUE and denies issues. He has a wound vac in place and wound unable to be viewed but reports that wound is much smaller. Inflammatory markers have trended down overall but were slightly elevated last week.     Plan:       1. D/c IV ertapenem today  2. Pull PICC in infusion center  3. Will transition to oral augmentin 500-125 mg PO TID x 14 days (given elevated inflammatory markers)  4. Will f/u with patient in  1 month to re-assess or sooner for issues or concerns.

## 2017-06-08 ENCOUNTER — TELEPHONE (OUTPATIENT)
Dept: HEPATOLOGY | Facility: CLINIC | Age: 57
End: 2017-06-08

## 2017-06-08 DIAGNOSIS — B18.2 CHRONIC HEPATITIS C WITHOUT HEPATIC COMA: ICD-10-CM

## 2017-06-08 DIAGNOSIS — K74.60 CIRRHOSIS OF LIVER WITHOUT ASCITES, UNSPECIFIED HEPATIC CIRRHOSIS TYPE: Primary | ICD-10-CM

## 2017-06-08 NOTE — TELEPHONE ENCOUNTER
HCV LAB REVIEW  EOT- pt missed several doses, EOT should have been 05/09    Pertinent labs:  LFT: stable   HCV RNA: <12 not detected    Please call patient:  HCV was not detected. We will check virus in 6 weeks and 12 weeks to determine a cure. There's still a small chance his virus could return. FINGERS CROSSED!    Next labs due / pls schedule:  HCV RNA in 6 weeks: 07/17/17  HCV RNA in 12 weeks-SVR 12: 08/28/17    Overdue for HCC screening, please schedule at next available for patient

## 2017-06-09 ENCOUNTER — EPISODE CHANGES (OUTPATIENT)
Dept: HEPATOLOGY | Facility: CLINIC | Age: 57
End: 2017-06-09

## 2017-06-09 ENCOUNTER — HOSPITAL ENCOUNTER (OUTPATIENT)
Dept: WOUND CARE | Facility: HOSPITAL | Age: 57
Discharge: HOME | End: 2017-06-09
Attending: PODIATRIST
Payer: MEDICAID

## 2017-06-09 DIAGNOSIS — E11.621 DIABETIC ULCER OF TOE OF RIGHT FOOT ASSOCIATED WITH TYPE 2 DIABETES MELLITUS, WITH FAT LAYER EXPOSED: Primary | ICD-10-CM

## 2017-06-09 DIAGNOSIS — Z89.411 STATUS POST AMPUTATION OF RIGHT GREAT TOE: ICD-10-CM

## 2017-06-09 DIAGNOSIS — M86.671 OTHER CHRONIC OSTEOMYELITIS OF RIGHT FOOT: ICD-10-CM

## 2017-06-09 DIAGNOSIS — L97.512 DIABETIC ULCER OF TOE OF RIGHT FOOT ASSOCIATED WITH TYPE 2 DIABETES MELLITUS, WITH FAT LAYER EXPOSED: Primary | ICD-10-CM

## 2017-06-09 PROCEDURE — 11042 DBRDMT SUBQ TIS 1ST 20SQCM/<: CPT | Performed by: PODIATRIST

## 2017-06-09 PROCEDURE — 11042 DBRDMT SUBQ TIS 1ST 20SQCM/<: CPT | Mod: ,,, | Performed by: PODIATRIST

## 2017-06-09 PROCEDURE — 11045 DBRDMT SUBQ TISS EACH ADDL: CPT | Performed by: PODIATRIST

## 2017-06-09 PROCEDURE — 11045 DBRDMT SUBQ TISS EACH ADDL: CPT | Mod: ,,, | Performed by: PODIATRIST

## 2017-06-09 PROCEDURE — 99499 UNLISTED E&M SERVICE: CPT | Mod: ,,, | Performed by: PODIATRIST

## 2017-06-09 PROCEDURE — 97605 NEG PRS WND THER DME<=50SQCM: CPT | Performed by: PODIATRIST

## 2017-06-09 NOTE — TELEPHONE ENCOUNTER
Attempt made to reach patient.  Message from PHILLIP Reilly left on his VM and mailed to him.  Testing scheduled as ordered; reminder notices mailed.

## 2017-06-12 ENCOUNTER — HOSPITAL ENCOUNTER (OUTPATIENT)
Dept: WOUND CARE | Facility: HOSPITAL | Age: 57
Discharge: HOME | End: 2017-06-12
Attending: FAMILY MEDICINE
Payer: MEDICAID

## 2017-06-12 PROCEDURE — 97605 NEG PRS WND THER DME<=50SQCM: CPT

## 2017-06-16 ENCOUNTER — HOSPITAL ENCOUNTER (OUTPATIENT)
Dept: WOUND CARE | Facility: HOSPITAL | Age: 57
Discharge: HOME | End: 2017-06-16
Attending: PODIATRIST
Payer: MEDICAID

## 2017-06-16 ENCOUNTER — HOSPITAL ENCOUNTER (OUTPATIENT)
Dept: RADIOLOGY | Facility: HOSPITAL | Age: 57
Discharge: HOME | End: 2017-06-16
Attending: PODIATRIST
Payer: MEDICAID

## 2017-06-16 ENCOUNTER — HOSPITAL ENCOUNTER (OUTPATIENT)
Dept: RADIOLOGY | Facility: HOSPITAL | Age: 57
Discharge: HOME | End: 2017-06-16
Attending: INTERNAL MEDICINE
Payer: MEDICAID

## 2017-06-16 DIAGNOSIS — E11.621 DIABETIC ULCER OF TOE OF RIGHT FOOT ASSOCIATED WITH TYPE 2 DIABETES MELLITUS, WITH FAT LAYER EXPOSED: ICD-10-CM

## 2017-06-16 DIAGNOSIS — L97.512 DIABETIC ULCER OF TOE OF RIGHT FOOT ASSOCIATED WITH TYPE 2 DIABETES MELLITUS, WITH FAT LAYER EXPOSED: ICD-10-CM

## 2017-06-16 DIAGNOSIS — M86.671 OTHER CHRONIC OSTEOMYELITIS OF RIGHT FOOT: ICD-10-CM

## 2017-06-16 DIAGNOSIS — K74.60 CIRRHOSIS OF LIVER WITHOUT ASCITES, UNSPECIFIED HEPATIC CIRRHOSIS TYPE: ICD-10-CM

## 2017-06-16 DIAGNOSIS — E11.621 DIABETIC ULCER OF TOE OF RIGHT FOOT ASSOCIATED WITH TYPE 2 DIABETES MELLITUS, WITH FAT LAYER EXPOSED: Primary | ICD-10-CM

## 2017-06-16 DIAGNOSIS — B18.2 CHRONIC HEPATITIS C WITHOUT HEPATIC COMA: ICD-10-CM

## 2017-06-16 DIAGNOSIS — L97.512 DIABETIC ULCER OF TOE OF RIGHT FOOT ASSOCIATED WITH TYPE 2 DIABETES MELLITUS, WITH FAT LAYER EXPOSED: Primary | ICD-10-CM

## 2017-06-16 DIAGNOSIS — Z89.411 STATUS POST AMPUTATION OF RIGHT GREAT TOE: ICD-10-CM

## 2017-06-16 PROCEDURE — 99024 POSTOP FOLLOW-UP VISIT: CPT | Mod: ,,, | Performed by: PODIATRIST

## 2017-06-16 PROCEDURE — 97605 NEG PRS WND THER DME<=50SQCM: CPT | Performed by: PODIATRIST

## 2017-06-16 PROCEDURE — 76705 ECHO EXAM OF ABDOMEN: CPT | Mod: 26,,, | Performed by: RADIOLOGY

## 2017-06-16 PROCEDURE — 73630 X-RAY EXAM OF FOOT: CPT | Mod: 26,RT,, | Performed by: RADIOLOGY

## 2017-06-19 ENCOUNTER — HOSPITAL ENCOUNTER (OUTPATIENT)
Dept: WOUND CARE | Facility: HOSPITAL | Age: 57
Discharge: HOME | End: 2017-06-19
Attending: FAMILY MEDICINE
Payer: MEDICAID

## 2017-06-19 ENCOUNTER — TELEPHONE (OUTPATIENT)
Dept: HEPATOLOGY | Facility: CLINIC | Age: 57
End: 2017-06-19

## 2017-06-19 DIAGNOSIS — K74.60 CIRRHOSIS OF LIVER WITHOUT ASCITES, UNSPECIFIED HEPATIC CIRRHOSIS TYPE: Primary | ICD-10-CM

## 2017-06-19 PROCEDURE — 97605 NEG PRS WND THER DME<=50SQCM: CPT

## 2017-06-19 NOTE — TELEPHONE ENCOUNTER
Message from PHILLIP Reilly mailed to patient.  HCC f/u with testing info placed in recall system.

## 2017-06-19 NOTE — TELEPHONE ENCOUNTER
----- Message from You Reilly PA-C sent at 6/19/2017  1:40 PM CDT -----  Please let him know that labs and U/S are stable. He will be due for repeat in 6 months with clinic follow up visit with me. Please enter recall.

## 2017-06-23 ENCOUNTER — HOSPITAL ENCOUNTER (OUTPATIENT)
Dept: WOUND CARE | Facility: HOSPITAL | Age: 57
Discharge: HOME | End: 2017-06-23
Attending: PODIATRIST
Payer: MEDICAID

## 2017-06-23 ENCOUNTER — HOSPITAL ENCOUNTER (INPATIENT)
Facility: HOSPITAL | Age: 57
LOS: 8 days | Discharge: HOME HEALTH | DRG: 623 | End: 2017-07-01
Attending: EMERGENCY MEDICINE | Admitting: INTERNAL MEDICINE
Payer: MEDICAID

## 2017-06-23 DIAGNOSIS — M86.171 OTHER ACUTE OSTEOMYELITIS OF RIGHT FOOT: Primary | ICD-10-CM

## 2017-06-23 DIAGNOSIS — E11.621 DIABETIC ULCER OF TOE OF RIGHT FOOT ASSOCIATED WITH TYPE 2 DIABETES MELLITUS, WITH FAT LAYER EXPOSED: ICD-10-CM

## 2017-06-23 DIAGNOSIS — M86.9 TOE OSTEOMYELITIS, RIGHT: ICD-10-CM

## 2017-06-23 DIAGNOSIS — Z72.0 TOBACCO ABUSE: ICD-10-CM

## 2017-06-23 DIAGNOSIS — M86.9 OSTEOMYELITIS OF RIGHT FOOT: ICD-10-CM

## 2017-06-23 DIAGNOSIS — L97.512 DIABETIC ULCER OF TOE OF RIGHT FOOT ASSOCIATED WITH TYPE 2 DIABETES MELLITUS, WITH FAT LAYER EXPOSED: Primary | ICD-10-CM

## 2017-06-23 DIAGNOSIS — E11.621 DIABETIC ULCER OF TOE OF RIGHT FOOT ASSOCIATED WITH TYPE 2 DIABETES MELLITUS, WITH FAT LAYER EXPOSED: Primary | ICD-10-CM

## 2017-06-23 DIAGNOSIS — L97.512 DIABETIC ULCER OF TOE OF RIGHT FOOT ASSOCIATED WITH TYPE 2 DIABETES MELLITUS, WITH FAT LAYER EXPOSED: ICD-10-CM

## 2017-06-23 DIAGNOSIS — I73.9 PERIPHERAL ARTERIAL DISEASE: ICD-10-CM

## 2017-06-23 DIAGNOSIS — Z89.411 AMPUTEE, GREAT TOE, RIGHT: ICD-10-CM

## 2017-06-23 DIAGNOSIS — E11.42 DIABETIC POLYNEUROPATHY ASSOCIATED WITH TYPE 2 DIABETES MELLITUS: ICD-10-CM

## 2017-06-23 DIAGNOSIS — B18.2 CHRONIC HEPATITIS C WITHOUT HEPATIC COMA: ICD-10-CM

## 2017-06-23 LAB
ALBUMIN SERPL BCP-MCNC: 3.2 G/DL
ALP SERPL-CCNC: 71 U/L
ALT SERPL W/O P-5'-P-CCNC: 21 U/L
ANION GAP SERPL CALC-SCNC: 11 MMOL/L
AST SERPL-CCNC: 20 U/L
BASOPHILS # BLD AUTO: 0.04 K/UL
BASOPHILS NFR BLD: 0.6 %
BILIRUB SERPL-MCNC: 0.3 MG/DL
BUN SERPL-MCNC: 30 MG/DL
CALCIUM SERPL-MCNC: 9.9 MG/DL
CHLORIDE SERPL-SCNC: 99 MMOL/L
CO2 SERPL-SCNC: 23 MMOL/L
CREAT SERPL-MCNC: 1.3 MG/DL
DIFFERENTIAL METHOD: ABNORMAL
EOSINOPHIL # BLD AUTO: 0.1 K/UL
EOSINOPHIL NFR BLD: 1.9 %
ERYTHROCYTE [DISTWIDTH] IN BLOOD BY AUTOMATED COUNT: 15.2 %
EST. GFR  (AFRICAN AMERICAN): >60 ML/MIN/1.73 M^2
EST. GFR  (NON AFRICAN AMERICAN): >60 ML/MIN/1.73 M^2
GLUCOSE SERPL-MCNC: 244 MG/DL
HCT VFR BLD AUTO: 35.2 %
HGB BLD-MCNC: 11.7 G/DL
LYMPHOCYTES # BLD AUTO: 1.8 K/UL
LYMPHOCYTES NFR BLD: 26.9 %
MCH RBC QN AUTO: 27.6 PG
MCHC RBC AUTO-ENTMCNC: 33.2 %
MCV RBC AUTO: 83 FL
MONOCYTES # BLD AUTO: 0.4 K/UL
MONOCYTES NFR BLD: 5.6 %
NEUTROPHILS # BLD AUTO: 4.4 K/UL
NEUTROPHILS NFR BLD: 64.7 %
PLATELET # BLD AUTO: 193 K/UL
PMV BLD AUTO: 10.9 FL
POCT GLUCOSE: 242 MG/DL (ref 70–110)
POCT GLUCOSE: 282 MG/DL (ref 70–110)
POTASSIUM SERPL-SCNC: 4.9 MMOL/L
PROT SERPL-MCNC: 8.4 G/DL
RBC # BLD AUTO: 4.24 M/UL
SODIUM SERPL-SCNC: 133 MMOL/L
WBC # BLD AUTO: 6.76 K/UL

## 2017-06-23 PROCEDURE — 83036 HEMOGLOBIN GLYCOSYLATED A1C: CPT

## 2017-06-23 PROCEDURE — 36415 COLL VENOUS BLD VENIPUNCTURE: CPT

## 2017-06-23 PROCEDURE — 12000002 HC ACUTE/MED SURGE SEMI-PRIVATE ROOM

## 2017-06-23 PROCEDURE — 25000003 PHARM REV CODE 250: Performed by: EMERGENCY MEDICINE

## 2017-06-23 PROCEDURE — 96365 THER/PROPH/DIAG IV INF INIT: CPT

## 2017-06-23 PROCEDURE — 99024 POSTOP FOLLOW-UP VISIT: CPT | Mod: ,,, | Performed by: PODIATRIST

## 2017-06-23 PROCEDURE — 99284 EMERGENCY DEPT VISIT MOD MDM: CPT | Mod: 25

## 2017-06-23 PROCEDURE — 85025 COMPLETE CBC W/AUTO DIFF WBC: CPT

## 2017-06-23 PROCEDURE — 87040 BLOOD CULTURE FOR BACTERIA: CPT

## 2017-06-23 PROCEDURE — 25000003 PHARM REV CODE 250

## 2017-06-23 PROCEDURE — 99213 OFFICE O/P EST LOW 20 MIN: CPT | Performed by: PODIATRIST

## 2017-06-23 PROCEDURE — 80053 COMPREHEN METABOLIC PANEL: CPT

## 2017-06-23 PROCEDURE — 63600175 PHARM REV CODE 636 W HCPCS: Performed by: EMERGENCY MEDICINE

## 2017-06-23 PROCEDURE — 94761 N-INVAS EAR/PLS OXIMETRY MLT: CPT

## 2017-06-23 RX ORDER — FENTANYL 50 UG/1
1 PATCH TRANSDERMAL
Status: DISCONTINUED | OUTPATIENT
Start: 2017-06-23 | End: 2017-07-01 | Stop reason: HOSPADM

## 2017-06-23 RX ORDER — ONDANSETRON 2 MG/ML
4 INJECTION INTRAMUSCULAR; INTRAVENOUS EVERY 12 HOURS PRN
Status: DISCONTINUED | OUTPATIENT
Start: 2017-06-23 | End: 2017-07-01 | Stop reason: HOSPADM

## 2017-06-23 RX ORDER — IBUPROFEN 200 MG
1 TABLET ORAL DAILY
Status: DISCONTINUED | OUTPATIENT
Start: 2017-06-23 | End: 2017-07-01 | Stop reason: HOSPADM

## 2017-06-23 RX ORDER — OXYCODONE HYDROCHLORIDE 5 MG/1
10 TABLET ORAL EVERY 6 HOURS PRN
Status: DISCONTINUED | OUTPATIENT
Start: 2017-06-23 | End: 2017-07-01 | Stop reason: HOSPADM

## 2017-06-23 RX ORDER — INSULIN ASPART 100 [IU]/ML
0-5 INJECTION, SOLUTION INTRAVENOUS; SUBCUTANEOUS EVERY 6 HOURS PRN
Status: DISCONTINUED | OUTPATIENT
Start: 2017-06-23 | End: 2017-06-24

## 2017-06-23 RX ORDER — AMOXICILLIN AND CLAVULANATE POTASSIUM 500; 125 MG/1; MG/1
1 TABLET, FILM COATED ORAL
Status: ON HOLD | COMMUNITY
End: 2017-06-30 | Stop reason: HOSPADM

## 2017-06-23 RX ORDER — SODIUM CHLORIDE 0.9 % (FLUSH) 0.9 %
3 SYRINGE (ML) INJECTION EVERY 8 HOURS
Status: DISCONTINUED | OUTPATIENT
Start: 2017-06-23 | End: 2017-07-01 | Stop reason: HOSPADM

## 2017-06-23 RX ORDER — DIAZEPAM 2 MG/1
2 TABLET ORAL EVERY 8 HOURS PRN
Status: DISCONTINUED | OUTPATIENT
Start: 2017-06-23 | End: 2017-07-01 | Stop reason: HOSPADM

## 2017-06-23 RX ORDER — AMOXICILLIN 250 MG
1 CAPSULE ORAL 2 TIMES DAILY
Status: DISCONTINUED | OUTPATIENT
Start: 2017-06-23 | End: 2017-07-01 | Stop reason: HOSPADM

## 2017-06-23 RX ORDER — PROMETHAZINE HYDROCHLORIDE 25 MG/1
25 TABLET ORAL EVERY 6 HOURS PRN
Status: DISCONTINUED | OUTPATIENT
Start: 2017-06-23 | End: 2017-07-01 | Stop reason: HOSPADM

## 2017-06-23 RX ORDER — ASPIRIN 81 MG/1
81 TABLET ORAL DAILY
Status: DISCONTINUED | OUTPATIENT
Start: 2017-06-24 | End: 2017-07-01 | Stop reason: HOSPADM

## 2017-06-23 RX ORDER — HEPARIN SODIUM 5000 [USP'U]/ML
5000 INJECTION, SOLUTION INTRAVENOUS; SUBCUTANEOUS EVERY 8 HOURS
Status: DISCONTINUED | OUTPATIENT
Start: 2017-06-23 | End: 2017-07-01 | Stop reason: HOSPADM

## 2017-06-23 RX ORDER — GLUCAGON 1 MG
1 KIT INJECTION
Status: DISCONTINUED | OUTPATIENT
Start: 2017-06-23 | End: 2017-06-24

## 2017-06-23 RX ORDER — HYDROMORPHONE HYDROCHLORIDE 2 MG/ML
1 INJECTION, SOLUTION INTRAMUSCULAR; INTRAVENOUS; SUBCUTANEOUS EVERY 4 HOURS PRN
Status: DISCONTINUED | OUTPATIENT
Start: 2017-06-23 | End: 2017-07-01 | Stop reason: HOSPADM

## 2017-06-23 RX ORDER — FAMOTIDINE 20 MG/1
20 TABLET, FILM COATED ORAL DAILY
Status: DISCONTINUED | OUTPATIENT
Start: 2017-06-24 | End: 2017-07-01 | Stop reason: HOSPADM

## 2017-06-23 RX ADMIN — VANCOMYCIN HYDROCHLORIDE 1500 MG: 1 INJECTION, POWDER, LYOPHILIZED, FOR SOLUTION INTRAVENOUS at 03:06

## 2017-06-23 RX ADMIN — INSULIN ASPART 1 UNITS: 100 INJECTION, SOLUTION INTRAVENOUS; SUBCUTANEOUS at 11:06

## 2017-06-23 RX ADMIN — FENTANYL 1 PATCH: 50 PATCH, EXTENDED RELEASE TRANSDERMAL at 05:06

## 2017-06-23 RX ADMIN — VANCOMYCIN HYDROCHLORIDE 1250 MG: 1 INJECTION, POWDER, LYOPHILIZED, FOR SOLUTION INTRAVENOUS at 11:06

## 2017-06-23 RX ADMIN — OXYCODONE HYDROCHLORIDE 10 MG: 5 TABLET ORAL at 06:06

## 2017-06-23 RX ADMIN — Medication 3 ML: at 10:06

## 2017-06-23 RX ADMIN — NICOTINE 1 PATCH: 14 PATCH, EXTENDED RELEASE TRANSDERMAL at 11:06

## 2017-06-23 RX ADMIN — OXYCODONE HYDROCHLORIDE 10 MG: 5 TABLET ORAL at 11:06

## 2017-06-23 NOTE — PROGRESS NOTES
Much of the documentation for this visit was completed in the [facility] system. Please see the attached documentation for further details about this patient's care.     ~30 minutes spent discussing imaging and discussing pathological fracture secondary to OM and options. Three options were discussed with possible side effects of each. Amputation of Infected toes vs IV abx for six weeks with aggressive wound care for several months with no guarantee of wound resolution vs PO zyvox for six weeks with aggressive wound care for several months with no guarantee of wound resolution (discussed cytopenia and regular lab work involved with this option).     Patient is distraught and blames previous IV abx and previous surgeons for all his woes. He does not understand why his foot is infected nor does he understand the role noncompliance has played in his current situation.  I strongly encouraged he present to ED because he now has a blow out lesion and significant edema to the 2nd digit    Nora Braden DPM

## 2017-06-23 NOTE — ED TRIAGE NOTES
Pt presents to ED after being referred here after his wound care visit.  States he needs antibiotics to treat his wounds on his right foot.  Denies any chest pains, sob, ha, nvd.

## 2017-06-23 NOTE — ED PROVIDER NOTES
"Encounter Date: 6/23/2017    SCRIBE #1 NOTE: I, Fouzia Gomez, am scribing for, and in the presence of,  You Jolley MD. I have scribed the following portions of the note - Other sections scribed: HPI, ROS, PE, MDM.       History     Chief Complaint   Patient presents with    Wound Check     reports being sent to ED by wound care "for ABX treatment"     CC: Wound Check     HPI: This 56 year old male smoker with arthritis, bulging lumbar discs, diabetes mellitus type II, and neuropathy presents to the ED from wound care for IV antibiotics secondary to a right great toe amputation. Pt reports Dr. Braden told him the "infection spread to the 2nd toe." Pt was told he could come in for IV antibiotics. Pt reports he took off the pump for "6-7 hours." Pt reports a slight right leg paralysis secondary to his bulging lumbar discs. No other symptoms reported.       The history is provided by the patient. No  was used.     Review of patient's allergies indicates:   Allergen Reactions    Codeine Rash     Other reaction(s): Unknown     Past Medical History:   Diagnosis Date    Arthritis     Bulging lumbar disc     C. difficile diarrhea     Chronic back pain 10/14/2014    Diabetes mellitus type II     DM (diabetes mellitus), type 2, uncontrolled 3/12/2013    Hepatitis C 7/3/2013    MSSA (methicillin susceptible Staphylococcus aureus) septicemia     Neuromuscular disorder     Neuropathy     Staph aureus infection      Past Surgical History:   Procedure Laterality Date    APPENDECTOMY      left knee surgery      left leg surgery      broken bone    LEG SURGERY  right     Right arm boil      Right great toe amputation       Family History   Problem Relation Age of Onset    Arthritis Mother     Arthritis Sister     Diabetes Sister     Arthritis Brother      Social History   Substance Use Topics    Smoking status: Current Some Day Smoker     Packs/day: 1.00     Types: Cigarettes    " Smokeless tobacco: Never Used    Alcohol use No     Review of Systems   Constitutional: Negative for chills and fever.   HENT: Negative for rhinorrhea and sore throat.    Eyes: Negative for redness.   Respiratory: Negative for cough.    Cardiovascular: Negative for chest pain.   Gastrointestinal: Negative for abdominal pain, diarrhea, nausea and vomiting.   Genitourinary: Negative for dysuria.   Musculoskeletal: Negative for back pain.   Skin: Negative for color change.        (+) swelling, erythema to right 2nd toe  (+) amputation of right great toe   Neurological: Negative for syncope and weakness.   Psychiatric/Behavioral: The patient is not nervous/anxious.        Physical Exam     Initial Vitals [06/23/17 1420]   BP Pulse Resp Temp SpO2   129/65 90 20 98.1 °F (36.7 °C) 96 %      MAP       86.33         Physical Exam    Nursing note and vitals reviewed.  Constitutional: Vital signs are normal. He appears well-developed and well-nourished. He is active.  Non-toxic appearance. No distress.   HENT:   Head: Normocephalic and atraumatic.   Eyes: EOM are normal.   Neck: Trachea normal. Neck supple.   Cardiovascular: Normal rate and regular rhythm.   Pulmonary/Chest: Breath sounds normal. No respiratory distress.   Abdominal: Soft. Normal appearance and bowel sounds are normal. He exhibits no distension. There is no tenderness.   Musculoskeletal: Normal range of motion. He exhibits no edema.   Post operative changes to right foot  Amputation of right great toe  Malodorous      Neurological: He is alert.   Skin: Skin is warm, dry and intact.        Swelling, redness and warmth of soft tissues to dorsum of right foot and 2nd toe   Psychiatric: He has a normal mood and affect.         ED Course   Procedures  Labs Reviewed   CBC W/ AUTO DIFFERENTIAL - Abnormal; Notable for the following:        Result Value    RBC 4.24 (*)     Hemoglobin 11.7 (*)     Hematocrit 35.2 (*)     RDW 15.2 (*)     All other components within  normal limits   CULTURE, BLOOD   CULTURE, BLOOD   COMPREHENSIVE METABOLIC PANEL   VANCOMYCIN, TROUGH   POCT GLUCOSE MONITORING CONTINUOUS             Medical Decision Making:   History:   Old Medical Records: I decided to obtain old medical records.  Clinical Tests:   Lab Tests: Ordered and Reviewed  ED Management:  Per xray, Osteomyelitis of 2nd right toe--admit to internal medicine of IV abx.    Start vanc and zosyn    Consult Dr. Braden and Dr. Cope.            Scribe Attestation:   Scribe #1: I performed the above scribed service and the documentation accurately describes the services I performed. I attest to the accuracy of the note.    Attending Attestation:           Physician Attestation for Scribe:  Physician Attestation Statement for Scribe #1: I, You Jolley MD, reviewed documentation, as scribed by Fouzia Gomez in my presence, and it is both accurate and complete.                 ED Course     Clinical Impression:   The encounter diagnosis was Other acute osteomyelitis of right foot.    Disposition:   Disposition: Admitted  Condition: Stable                        You Jolley MD  06/23/17 1640

## 2017-06-24 LAB
ALBUMIN SERPL BCP-MCNC: 2.8 G/DL
ALP SERPL-CCNC: 65 U/L
ALT SERPL W/O P-5'-P-CCNC: 17 U/L
ANION GAP SERPL CALC-SCNC: 8 MMOL/L
AST SERPL-CCNC: 15 U/L
BASOPHILS # BLD AUTO: 0.05 K/UL
BASOPHILS NFR BLD: 1 %
BILIRUB SERPL-MCNC: 0.3 MG/DL
BUN SERPL-MCNC: 29 MG/DL
CALCIUM SERPL-MCNC: 9.1 MG/DL
CHLORIDE SERPL-SCNC: 100 MMOL/L
CO2 SERPL-SCNC: 24 MMOL/L
CREAT SERPL-MCNC: 1.1 MG/DL
DIFFERENTIAL METHOD: ABNORMAL
EOSINOPHIL # BLD AUTO: 0.1 K/UL
EOSINOPHIL NFR BLD: 2.8 %
ERYTHROCYTE [DISTWIDTH] IN BLOOD BY AUTOMATED COUNT: 15 %
EST. GFR  (AFRICAN AMERICAN): >60 ML/MIN/1.73 M^2
EST. GFR  (NON AFRICAN AMERICAN): >60 ML/MIN/1.73 M^2
GLUCOSE SERPL-MCNC: 301 MG/DL
HCT VFR BLD AUTO: 35.5 %
HGB BLD-MCNC: 11.5 G/DL
LYMPHOCYTES # BLD AUTO: 1.6 K/UL
LYMPHOCYTES NFR BLD: 31.1 %
MCH RBC QN AUTO: 27.3 PG
MCHC RBC AUTO-ENTMCNC: 32.4 %
MCV RBC AUTO: 84 FL
MONOCYTES # BLD AUTO: 0.4 K/UL
MONOCYTES NFR BLD: 8.3 %
NEUTROPHILS # BLD AUTO: 2.9 K/UL
NEUTROPHILS NFR BLD: 56.8 %
PLATELET # BLD AUTO: 154 K/UL
PMV BLD AUTO: 10.6 FL
POCT GLUCOSE: 259 MG/DL (ref 70–110)
POCT GLUCOSE: 285 MG/DL (ref 70–110)
POCT GLUCOSE: 287 MG/DL (ref 70–110)
POCT GLUCOSE: 303 MG/DL (ref 70–110)
POCT GLUCOSE: 312 MG/DL (ref 70–110)
POTASSIUM SERPL-SCNC: 4.7 MMOL/L
PROT SERPL-MCNC: 7.4 G/DL
RBC # BLD AUTO: 4.21 M/UL
SODIUM SERPL-SCNC: 132 MMOL/L
VANCOMYCIN SERPL-MCNC: 18 UG/ML
VANCOMYCIN TROUGH SERPL-MCNC: 19 UG/ML
WBC # BLD AUTO: 5.05 K/UL

## 2017-06-24 PROCEDURE — 25000003 PHARM REV CODE 250: Performed by: EMERGENCY MEDICINE

## 2017-06-24 PROCEDURE — 36415 COLL VENOUS BLD VENIPUNCTURE: CPT

## 2017-06-24 PROCEDURE — 63600175 PHARM REV CODE 636 W HCPCS

## 2017-06-24 PROCEDURE — 94761 N-INVAS EAR/PLS OXIMETRY MLT: CPT

## 2017-06-24 PROCEDURE — 0JBQ0ZZ EXCISION OF RIGHT FOOT SUBCUTANEOUS TISSUE AND FASCIA, OPEN APPROACH: ICD-10-PCS | Performed by: PODIATRIST

## 2017-06-24 PROCEDURE — 85025 COMPLETE CBC W/AUTO DIFF WBC: CPT

## 2017-06-24 PROCEDURE — 80202 ASSAY OF VANCOMYCIN: CPT

## 2017-06-24 PROCEDURE — 25000003 PHARM REV CODE 250

## 2017-06-24 PROCEDURE — 80202 ASSAY OF VANCOMYCIN: CPT | Mod: 91

## 2017-06-24 PROCEDURE — 63600175 PHARM REV CODE 636 W HCPCS: Performed by: EMERGENCY MEDICINE

## 2017-06-24 PROCEDURE — 80053 COMPREHEN METABOLIC PANEL: CPT

## 2017-06-24 PROCEDURE — 12000002 HC ACUTE/MED SURGE SEMI-PRIVATE ROOM

## 2017-06-24 RX ORDER — INSULIN ASPART 100 [IU]/ML
1-10 INJECTION, SOLUTION INTRAVENOUS; SUBCUTANEOUS
Status: DISCONTINUED | OUTPATIENT
Start: 2017-06-24 | End: 2017-07-01 | Stop reason: HOSPADM

## 2017-06-24 RX ORDER — GLUCAGON 1 MG
1 KIT INJECTION
Status: DISCONTINUED | OUTPATIENT
Start: 2017-06-24 | End: 2017-07-01 | Stop reason: HOSPADM

## 2017-06-24 RX ORDER — IBUPROFEN 200 MG
24 TABLET ORAL
Status: DISCONTINUED | OUTPATIENT
Start: 2017-06-24 | End: 2017-07-01 | Stop reason: HOSPADM

## 2017-06-24 RX ORDER — IBUPROFEN 200 MG
16 TABLET ORAL
Status: DISCONTINUED | OUTPATIENT
Start: 2017-06-24 | End: 2017-06-24

## 2017-06-24 RX ORDER — GLUCAGON 1 MG
1 KIT INJECTION
Status: DISCONTINUED | OUTPATIENT
Start: 2017-06-24 | End: 2017-06-24

## 2017-06-24 RX ORDER — IBUPROFEN 200 MG
16 TABLET ORAL
Status: DISCONTINUED | OUTPATIENT
Start: 2017-06-24 | End: 2017-07-01 | Stop reason: HOSPADM

## 2017-06-24 RX ORDER — IBUPROFEN 200 MG
24 TABLET ORAL
Status: DISCONTINUED | OUTPATIENT
Start: 2017-06-24 | End: 2017-06-24

## 2017-06-24 RX ADMIN — VANCOMYCIN HYDROCHLORIDE 1250 MG: 1 INJECTION, POWDER, LYOPHILIZED, FOR SOLUTION INTRAVENOUS at 11:06

## 2017-06-24 RX ADMIN — ASPIRIN 81 MG: 81 TABLET, COATED ORAL at 08:06

## 2017-06-24 RX ADMIN — PIPERACILLIN AND TAZOBACTAM 4.5 G: 4; .5 INJECTION, POWDER, LYOPHILIZED, FOR SOLUTION INTRAVENOUS; PARENTERAL at 04:06

## 2017-06-24 RX ADMIN — PIPERACILLIN AND TAZOBACTAM 4.5 G: 4; .5 INJECTION, POWDER, LYOPHILIZED, FOR SOLUTION INTRAVENOUS; PARENTERAL at 08:06

## 2017-06-24 RX ADMIN — DOCUSATE SODIUM AND SENNOSIDES 1 TABLET: 8.6; 5 TABLET, FILM COATED ORAL at 09:06

## 2017-06-24 RX ADMIN — OXYCODONE HYDROCHLORIDE 10 MG: 5 TABLET ORAL at 05:06

## 2017-06-24 RX ADMIN — VANCOMYCIN HYDROCHLORIDE 1250 MG: 1 INJECTION, POWDER, LYOPHILIZED, FOR SOLUTION INTRAVENOUS at 04:06

## 2017-06-24 RX ADMIN — PIPERACILLIN AND TAZOBACTAM 4.5 G: 4; .5 INJECTION, POWDER, LYOPHILIZED, FOR SOLUTION INTRAVENOUS; PARENTERAL at 02:06

## 2017-06-24 RX ADMIN — HEPARIN SODIUM 5000 UNITS: 5000 INJECTION, SOLUTION INTRAVENOUS; SUBCUTANEOUS at 02:06

## 2017-06-24 RX ADMIN — FAMOTIDINE 20 MG: 20 TABLET, FILM COATED ORAL at 08:06

## 2017-06-24 RX ADMIN — HEPARIN SODIUM 5000 UNITS: 5000 INJECTION, SOLUTION INTRAVENOUS; SUBCUTANEOUS at 09:06

## 2017-06-24 RX ADMIN — INSULIN ASPART 4 UNITS: 100 INJECTION, SOLUTION INTRAVENOUS; SUBCUTANEOUS at 12:06

## 2017-06-24 RX ADMIN — Medication 3 ML: at 06:06

## 2017-06-24 RX ADMIN — DOCUSATE SODIUM AND SENNOSIDES 1 TABLET: 8.6; 5 TABLET, FILM COATED ORAL at 08:06

## 2017-06-24 RX ADMIN — OXYCODONE HYDROCHLORIDE 10 MG: 5 TABLET ORAL at 06:06

## 2017-06-24 RX ADMIN — INSULIN ASPART 3 UNITS: 100 INJECTION, SOLUTION INTRAVENOUS; SUBCUTANEOUS at 09:06

## 2017-06-24 RX ADMIN — INSULIN ASPART 3 UNITS: 100 INJECTION, SOLUTION INTRAVENOUS; SUBCUTANEOUS at 05:06

## 2017-06-24 RX ADMIN — Medication 3 ML: at 04:06

## 2017-06-24 RX ADMIN — VANCOMYCIN HYDROCHLORIDE 1250 MG: 1 INJECTION, POWDER, LYOPHILIZED, FOR SOLUTION INTRAVENOUS at 08:06

## 2017-06-24 RX ADMIN — NICOTINE 1 PATCH: 14 PATCH, EXTENDED RELEASE TRANSDERMAL at 08:06

## 2017-06-24 RX ADMIN — INSULIN ASPART 4 UNITS: 100 INJECTION, SOLUTION INTRAVENOUS; SUBCUTANEOUS at 05:06

## 2017-06-24 NOTE — CONSULTS
.ID CONSULT     FULL CONSULT DICTATED#166338    IMPRESSION;   1) Second right toe osteo.   2) Right great toe amputation secondary to osteo without clean margins.   3) DM  4) Chronic HepC- treated with Harvoni?  5) Tobacco abuse.    RECOMMENDATIONS;   1) Pt is on vanc/pip-tazo.  2) Can the podiatrists please contact me directly at [phone] to come up with a plan.  3) If I am reading the notes correctly, he never had an intra operative culture and was treated with IV anbx for 6 weeks then followed by a month of orals? And he is not improved?  4) If this is correct, he will need a biopsy and before he gets that, he will need to be off of anbx for a few days.  5) Check an HA1C.  6) An HIV was checked a few months ago.  7) We will cont to follow.

## 2017-06-24 NOTE — PLAN OF CARE
Problem: Patient Care Overview  Goal: Plan of Care Review   06/24/17 0338   Coping/Psychosocial   Plan Of Care Reviewed With patient     Pt free from injury this evening. Pain controlled. wet to dry drsg noted to R great toe area. Antibiotics administered per MD orders. POC continued.

## 2017-06-24 NOTE — CONSULTS
Inpatient consult to Podiatry  Consult performed by: KENDAL CORONEL  Consult ordered by: GENARO MOTT  Reason for consult: R foot ulcer  Assessment/Recommendations: Consult Note  Podiatry    Consult Requested By: Miguel Lux MD  Reason for Consult: diabetic foot ulcer R foot S/P surgical amputation R hallux.     SUBJECTIVE    History of Present Illness:  Carlos Cifuentes is a 56 y.o. male who presents today with the chief complaint of  foot infection of the medial forefoot with extension into the R 2nd toe and osteomyelitis of the second toe with a fracture of the toe as well. He is S/P R hallux amp and was on a wound VAC. He was seen yesterday by my partner, Dr. Braden who [facility].     Scheduled Meds:aspirin, 81 mg, Oral, Daily  famotidine, 20 mg, Oral, Daily  fentaNYL, 1 patch, Transdermal, Q72H  heparin (porcine), 5,000 Units, Subcutaneous, Q8H  nicotine, 1 patch, Transdermal, Daily  piperacillin-tazobactam 4.5 g in dextrose 5 % 100 mL IVPB (ready to mix system), 4.5 g, Intravenous, Q8H  senna-docusate 8.6-50 mg, 1 tablet, Oral, BID  sodium chloride 0.9%, 3 mL, Intravenous, Q8H  vancomycin (VANCOCIN) IVPB, 1,250 mg, Intravenous, Q8H      Continuous Infusions:   PRN Meds:dextrose 50%, diazePAM, glucagon (human recombinant), HYDROmorphone, insulin aspart, ondansetron, oxycodone, promethazine    Review of patient's allergies indicates:   -- Codeine -- Rash    --  Other reaction(s): Unknown     Past Medical History:  No date: Arthritis  No date: Bulging lumbar disc  No date: C. difficile diarrhea  10/14/2014: Chronic back pain  No date: Diabetes mellitus type II  3/12/2013: DM (diabetes mellitus), type 2, uncontrolled  7/3/2013: Hepatitis C  No date: MSSA (methicillin susceptible Staphylococcus a*  No date: Neuromuscular disorder  No date: Neuropathy  No date: Staph aureus infection  Past Surgical History:  No date: APPENDECTOMY  No date: JOINT REPLACEMENT  No date: left  knee surgery  No date: left leg surgery      Comment: broken bone  right : LEG SURGERY  No date: Right arm boil  No date: Right great toe amputation  Review of patient's family history indicates:    Arthritis                      Mother                    Arthritis                      Sister                    Diabetes                       Sister                    Arthritis                      Brother                   Smoking status: Current Some Day Smoker                                                    Packs/day: 1.00      Years: 0.00         Types: Cigarettes  Smokeless tobacco: Never Used                      Alcohol use: No                Review of Systems:  Constitutional: no fever or chills  Cardiovascular: no chest pain or palpitations. Had revasc procedure in groin a long time ago. Was told to see cardiologist 3 months ago and did not go.   Musculoskeletal: no arthralgias or myalgias, positive for back pain. R hallux amputation.   Neurological: history of numbness and paresthesias in the feet.    OBJECTIVE    Vital Signs (Most Recent):  Temp: 97.5 °F (36.4 °C) (06/24/17 0815)  Pulse: 77 (06/24/17 0815)  Resp: 18 (06/24/17 0815)  BP: 113/61 (06/24/17 0815)  SpO2: 97 % (06/23/17 1730)    Vital Signs Range (Last 24H):  Temp:  (97.5 °F (36.4 °C)-98.4 °F (36.9 °C))   Pulse:  (74-90)   Resp:  (18-20)   BP: (102-129)/(55-66)   SpO2:  (94 %-97 %)     Physical Exam:  GENERAL: alert, cooperative, no distress  BODY HABITUS: moderately obese  VASCULAR: Dorsalis Pedis: Left: 1+ (weak) Right: 1+ (weak)       Posterior Tibialis: Left: 1+ (weak) Right: 1+ (weak)  NEURO: Sharp/Dull: Left: severe deficit Right: severe deficit  ORTHO: Absent R hallux. Hammertoes 2-5 R foot. L toes without significant pathology.   Laboratory:  CBC: Lab             06/24/17                       0529          WBC          5.05          RBC          4.21*         HGB          11.5*         HCT          35.5*         PLT          154            MCV          84            MCH          27.3          MCHC         32.4          CMP: Lab             06/24/17                       0529          GLU          301*          CALCIUM      9.1           ALBUMIN      2.8*          PROT         7.4           NA           132*          K            4.7           CO2          24            CL           100           BUN          29*           CREATININE   1.1           ALKPHOS      65            ALT          17            AST          15            BILITOT      0.3           ESR: No results for input(s): SEDRATE in the last 168 hours.  CRP: No results for input(s): CRP in the last 168 hours.    Diagnostic Results:  X-Ray: reviewed    Clinical Findings:  There was an ulceration 5.8 x 2.6 x 0.4 cm at the R medial forefoot and a tunnel extending through the plantar aspect of the 2nd toe R foot to a tiny incision laterally on the toe (0.5 cm in length) with mild edema and no erythema and a tiny amount of pus in the lateral wound 2nd toe wound.     ASSESSMENT/PLAN    Assessment:  Foot wound secondary to: ulceration  Infection of foot: localized and osteomyelitis  Secondary complication(s): DM Type II, peripheral vascular disease and diabetic peripheral neuropathy    Plan:  Procedure(s): see op report for bedside procedure  Dressing(s): dressing change performed at bedside and daily dressing ordered  Offloading: surgical shoe and bedrest  Infection Management: Infectious disease consult and IV antibiotics  Circulation: arterial Doppler ordered.     Thank you for consulting our service. Please page the on call podiatrist by calling the  at [facility] if you need anything.       Reviewed Arterial Doppler from 4/2017 and no evidence of stenosis.

## 2017-06-24 NOTE — PROCEDURES
"Wound Debridement  Date/Time: 6/24/2017 10:01 AM  Performed by: KENDAL CORONEL  Authorized by: KENDAL CORONEL     Time out: Immediately prior to procedure a "time out" was called to verify the correct patient, procedure, equipment, support staff and site/side marked as required.    Consent Done?:  Yes (Verbal)    Preparation: Patient was prepped and draped in usual sterile fashion    Local anesthesia used?: No (Loss of protective Sensation)      Wound Details:    Location:  Right foot    Location:  Right 1st Metatarsal Head (amputation site)    Type of Debridement:  Excisional       Length (cm):  5.9       Area (sq cm):  15.34       Width (cm):  2.6       Percent Debrided (%):  100       Depth (cm):  0.5       Total Area Debrided (sq cm):  15.34    Depth of debridement:  Subcutaneous tissue    Tissue debrided:  Dermis, Epidermis, Subcutaneous and Adipose    Devitalized tissue debrided:  Fibrin    Instruments:  Blade, Forceps and Scissors    Bleeding:  Minimal  Patient tolerance:  Patient tolerated the procedure well with no immediate complications     Bleeding was well-controlled with direct pressure and Melgisorb AG. Dressed with Mepilex Foam and Conform, Kerlex, paper tape to secure.         "

## 2017-06-24 NOTE — SUBJECTIVE & OBJECTIVE
Past Medical History:   Diagnosis Date    Arthritis     Bulging lumbar disc     C. difficile diarrhea     Chronic back pain 10/14/2014    Diabetes mellitus type II     DM (diabetes mellitus), type 2, uncontrolled 3/12/2013    Hepatitis C 7/3/2013    MSSA (methicillin susceptible Staphylococcus aureus) septicemia     Neuromuscular disorder     Neuropathy     Staph aureus infection        Past Surgical History:   Procedure Laterality Date    APPENDECTOMY      JOINT REPLACEMENT      left knee surgery      left leg surgery      broken bone    LEG SURGERY  right     Right arm boil      Right great toe amputation         Review of patient's allergies indicates:   Allergen Reactions    Codeine Rash     Other reaction(s): Unknown       No current facility-administered medications on file prior to encounter.      Current Outpatient Prescriptions on File Prior to Encounter   Medication Sig    aspirin (ECOTRIN) 81 MG EC tablet Take 1 tablet (81 mg total) by mouth once daily.    atorvastatin (LIPITOR) 80 MG tablet Take 1 tablet (80 mg total) by mouth once daily.    diazePAM (VALIUM) 2 MG tablet TAKE ONE TABLET BY MOUTH EVERY 8 HOURS AS NEEDED FOR ANXIETY **THANK YOU**    fentaNYL (DURAGESIC) 50 mcg/hr Place 1 patch onto the skin every 72 hours.    gabapentin (NEURONTIN) 300 MG capsule Take 1 capsule (300 mg total) by mouth 3 (three) times daily.    LANTUS SOLOSTAR 100 unit/mL (3 mL) InPn pen INJECT FIFTEEN UNITS SUBCUTANEOUSLY IN THE EVENING must last to 7/22/17    metformin (GLUCOPHAGE) 1000 MG tablet Take 1 tablet (1,000 mg total) by mouth 2 (two) times daily with meals.    oxycodone (ROXICODONE) 10 mg Tab immediate release tablet Take 1 tablet (10 mg total) by mouth every 6 (six) hours as needed for Pain.    potassium chloride SA (K-DUR,KLOR-CON) 10 MEQ tablet Take 1 tablet (10 mEq total) by mouth once daily as needed when experiencing leg/muscle cramps.    silver sulfADIAZINE 1% (SILVADENE) 1 %  cream Apply 1 application topically once daily. Apply to affected area    trazodone (DESYREL) 100 MG tablet Take 1 tablet (100 mg total) by mouth every evening.    TRUE METRIX GLUCOSE METER Misc AS DIRECTED    blood sugar diagnostic (BLOOD GLUCOSE TEST) Strp Twice daily blood sugar checks (Patient taking differently: Test  blood sugar four times daily)    clopidogrel (PLAVIX) 75 mg tablet Take 1 tablet (75 mg total) by mouth once daily.    dextrose 5 % SolP 500 mL with vancomycin 1,000 mg SolR 2,000 mg Inject 2,000 mg into the vein every 12 (twelve) hours.    docusate sodium (COLACE) 100 MG capsule Take 100 mg by mouth as needed for Constipation.    glipiZIDE (GLUCOTROL) 5 MG tablet     heparin, porcine, PF, (HEPARIN FLUSH 100 UNITS/ML) 100 unit/mL Syrg     ledipasvir-sofosbuvir (HARVONI)  mg Tab Take 1 tablet by mouth once daily.    mupirocin calcium 2% (BACTROBAN) 2 % cream Apply to affected area 3 times daily    NORMAL SALINE FLUSH 0.9 % injection     promethazine (PHENERGAN) 25 MG tablet Take 1 tablet (25 mg total) by mouth every 4 (four) hours. (Patient taking differently: Take 25 mg by mouth every 4 (four) hours as needed for Nausea. )     Family History     Problem Relation (Age of Onset)    Arthritis Mother, Sister, Brother    Diabetes Sister        Social History Main Topics    Smoking status: Current Some Day Smoker     Packs/day: 1.00     Types: Cigarettes    Smokeless tobacco: Never Used    Alcohol use No    Drug use: No    Sexual activity: Not on file     Review of Systems   Says foot is better after wound vac removed    Objective:     Vital Signs (Most Recent):  Temp: 97.5 °F (36.4 °C) (06/24/17 0815)  Pulse: 77 (06/24/17 0815)  Resp: 18 (06/24/17 0815)  BP: 113/61 (06/24/17 0815)  SpO2: 97 % (06/23/17 1730) Vital Signs (24h Range):  Temp:  [97.5 °F (36.4 °C)-98.4 °F (36.9 °C)] 97.5 °F (36.4 °C)  Pulse:  [74-90] 77  Resp:  [18-20] 18  SpO2:  [94 %-97 %] 97 %  BP:  (102-129)/(55-66) 113/61     Weight: 104.3 kg (230 lb)  Body mass index is 33.97 kg/m².    Physical Exam   awake, alert in bed  No distress  No carotid bruits heard  Lungs clear - moving air well  Heart rrr  abd obese, soft, BS+  Mild discoloration of legs  R foot wrapped

## 2017-06-24 NOTE — ASSESSMENT & PLAN NOTE
Glucose 285  Obvious contribution to his foot condition  On Lantus 15, metformin 1000 bid at home  a1c was 11.8 3 years ago - recheck pending  On nickie for neuropathy

## 2017-06-24 NOTE — HPI
" He is a 56 year old male pt of Dr. Lux.  He is a smoker with arthritis, bulging lumbar discs, diabetes mellitus type II, and neuropathy who  presented to the ED from wound care for IV antibiotics secondary to a right great toe amputation. Pt reports Dr. Braden (podiatry) told him the "infection spread to the 2nd toe." Pt was told he could come in for IV antibiotics. Pt reports he took off the pump for "6-7 hours." Pt reports a slight right leg paralysis secondary to his bulging lumbar discs.  Dr. Braden's note:  ~30 minutes spent discussing imaging and discussing pathological fracture secondary to OM and options. Three options were discussed with possible side effects of each. Amputation of Infected toes vs IV abx for six weeks with aggressive wound care for several months with no guarantee of wound resolution vs PO zyvox for six weeks with aggressive wound care for several months with no guarantee of wound resolution (discussed cytopenia and regular lab work involved with this option).      Patient is distraught and blames previous IV abx and previous surgeons for all his woes. He does not understand why his foot is infected nor does he understand the role noncompliance has played in his current situation.  I strongly encouraged he present to ED because he now has a blow out lesion and significant edema to the 2nd digit  "

## 2017-06-24 NOTE — H&P
"[facility] Medicine  History & Physical    Patient Name: Carlos Cifuentes  MRN: [id number]  Admission Date: 6/23/2017  Admitting Physician: Phan Singleton  Primary Care Provider: Miguel Lux MD              Subjective:     Principal Problem:Osteomyelitis of right foot    Chief Complaint:   Chief Complaint   Patient presents with    Wound Check     reports being sent to ED by wound care "for ABX treatment"        HPI:  He is a 56 year old male pt of Dr. Lux.  He is a smoker with arthritis, bulging lumbar discs, diabetes mellitus type II, and neuropathy who  presented to the ED from wound care for IV antibiotics secondary to a right great toe amputation. Pt reports Dr. Braden (podiatry) told him the "infection spread to the 2nd toe." Pt was told he could come in for IV antibiotics. Pt reports he took off the pump for "6-7 hours." Pt reports a slight right leg paralysis secondary to his bulging lumbar discs.  Dr. Braden's note:  ~30 minutes spent discussing imaging and discussing pathological fracture secondary to OM and options. Three options were discussed with possible side effects of each. Amputation of Infected toes vs IV abx for six weeks with aggressive wound care for several months with no guarantee of wound resolution vs PO zyvox for six weeks with aggressive wound care for several months with no guarantee of wound resolution (discussed cytopenia and regular lab work involved with this option).      Patient is distraught and blames previous IV abx and previous surgeons for all his woes. He does not understand why his foot is infected nor does he understand the role noncompliance has played in his current situation.  I strongly encouraged he present to ED because he now has a blow out lesion and significant edema to the 2nd digit    Past Medical History:   Diagnosis Date    Arthritis     Bulging lumbar disc     C. difficile diarrhea     Chronic back pain 10/14/2014    " Diabetes mellitus type II     DM (diabetes mellitus), type 2, uncontrolled 3/12/2013    Hepatitis C 7/3/2013    MSSA (methicillin susceptible Staphylococcus aureus) septicemia     Neuromuscular disorder     Neuropathy     Staph aureus infection        Past Surgical History:   Procedure Laterality Date    APPENDECTOMY      JOINT REPLACEMENT      left knee surgery      left leg surgery      broken bone    LEG SURGERY  right     Right arm boil      Right great toe amputation         Review of patient's allergies indicates:   Allergen Reactions    Codeine Rash     Other reaction(s): Unknown       No current facility-administered medications on file prior to encounter.      Current Outpatient Prescriptions on File Prior to Encounter   Medication Sig    aspirin (ECOTRIN) 81 MG EC tablet Take 1 tablet (81 mg total) by mouth once daily.    atorvastatin (LIPITOR) 80 MG tablet Take 1 tablet (80 mg total) by mouth once daily.    diazePAM (VALIUM) 2 MG tablet TAKE ONE TABLET BY MOUTH EVERY 8 HOURS AS NEEDED FOR ANXIETY **THANK YOU**    fentaNYL (DURAGESIC) 50 mcg/hr Place 1 patch onto the skin every 72 hours.    gabapentin (NEURONTIN) 300 MG capsule Take 1 capsule (300 mg total) by mouth 3 (three) times daily.    LANTUS SOLOSTAR 100 unit/mL (3 mL) InPn pen INJECT FIFTEEN UNITS SUBCUTANEOUSLY IN THE EVENING must last to 7/22/17    metformin (GLUCOPHAGE) 1000 MG tablet Take 1 tablet (1,000 mg total) by mouth 2 (two) times daily with meals.    oxycodone (ROXICODONE) 10 mg Tab immediate release tablet Take 1 tablet (10 mg total) by mouth every 6 (six) hours as needed for Pain.    potassium chloride SA (K-DUR,KLOR-CON) 10 MEQ tablet Take 1 tablet (10 mEq total) by mouth once daily as needed when experiencing leg/muscle cramps.    silver sulfADIAZINE 1% (SILVADENE) 1 % cream Apply 1 application topically once daily. Apply to affected area    trazodone (DESYREL) 100 MG tablet Take 1 tablet (100 mg total) by  mouth every evening.    TRUE METRIX GLUCOSE METER Misc AS DIRECTED    blood sugar diagnostic (BLOOD GLUCOSE TEST) Strp Twice daily blood sugar checks (Patient taking differently: Test  blood sugar four times daily)    clopidogrel (PLAVIX) 75 mg tablet Take 1 tablet (75 mg total) by mouth once daily.    dextrose 5 % SolP 500 mL with vancomycin 1,000 mg SolR 2,000 mg Inject 2,000 mg into the vein every 12 (twelve) hours.    docusate sodium (COLACE) 100 MG capsule Take 100 mg by mouth as needed for Constipation.    glipiZIDE (GLUCOTROL) 5 MG tablet     heparin, porcine, PF, (HEPARIN FLUSH 100 UNITS/ML) 100 unit/mL Syrg     ledipasvir-sofosbuvir (HARVONI)  mg Tab Take 1 tablet by mouth once daily.    mupirocin calcium 2% (BACTROBAN) 2 % cream Apply to affected area 3 times daily    NORMAL SALINE FLUSH 0.9 % injection     promethazine (PHENERGAN) 25 MG tablet Take 1 tablet (25 mg total) by mouth every 4 (four) hours. (Patient taking differently: Take 25 mg by mouth every 4 (four) hours as needed for Nausea. )     Family History     Problem Relation (Age of Onset)    Arthritis Mother, Sister, Brother    Diabetes Sister        Social History Main Topics    Smoking status: Current Some Day Smoker     Packs/day: 1.00     Types: Cigarettes    Smokeless tobacco: Never Used    Alcohol use No    Drug use: No    Sexual activity: Not on file     Review of Systems   Says foot is better after wound vac removed    Objective:     Vital Signs (Most Recent):  Temp: 97.5 °F (36.4 °C) (06/24/17 0815)  Pulse: 77 (06/24/17 0815)  Resp: 18 (06/24/17 0815)  BP: 113/61 (06/24/17 0815)  SpO2: 97 % (06/23/17 1730) Vital Signs (24h Range):  Temp:  [97.5 °F (36.4 °C)-98.4 °F (36.9 °C)] 97.5 °F (36.4 °C)  Pulse:  [74-90] 77  Resp:  [18-20] 18  SpO2:  [94 %-97 %] 97 %  BP: (102-129)/(55-66) 113/61     Weight: 104.3 kg (230 lb)  Body mass index is 33.97 kg/m².    Physical Exam   awake, alert in bed  No distress  No carotid  bruits heard  Lungs clear - moving air well  Heart rrr  abd obese, soft, BS+  Mild discoloration of legs  R foot wrapped      Assessment/Plan:     Chronic hepatitis C without hepatic coma    On Harvoni treatment          DM type 2, uncontrolled, with neuropathy    Glucose 285  Obvious contribution to his foot condition  On Lantus 15, metformin 1000 bid at home  a1c was 11.8 3 years ago - recheck pending  On nickie for neuropathy            * Osteomyelitis of right foot    In both first toe stump and second toe base;  Due to PAD from Diabetes (uncontrolled) - now with pathological fracture of 2nd toe  Being treated with zosyn and vanco by Dr. Braden  As above, pt is in denial or blaming extraneous factors for his condition.  Amputation would appear to be best course.  ID is consulted for further recs.  XR R foot:  AP, lateral, and oblique radiographs of the right foot were obtained. The patient is status post amputation of the distal aspect of the right first metatarsal bone and the great toe. There is bone destruction involving the distal stump of the first metatarsal bone suggestive of underlying osteomyelitis. There is also a fracture involving the base of the proximal phalanx of the second toe and this could represent a pathologic fracture due to bone destruction/osteomyelitis of the proximal phalanx of the second toe. There appears to be soft tissue air near the base of the left second toe. The remainder of the bones appear intact. There is no evidence for dislocation. No radiopaque soft tissue foreign bodies are identified.  He is telling me that his osteo is not due to his DM, but to the fact that he jammed his foot into a shoe  He feels that the wound vac since surgery was making things worse; it has been removed and he says it is improved  He is hoping to have PICC placed with resumption of home IV abx          VTE Risk Mitigation         Ordered     heparin (porcine) injection 5,000 Units  Every 8 hours      Route:  Subcutaneous        06/23/17 1711     Medium Risk of VTE  Once      06/23/17 1711        Phan Singleton MD  Department of Hospital Medicine   [facility]

## 2017-06-24 NOTE — ASSESSMENT & PLAN NOTE
In both first toe stump and second toe base;  Due to PAD from Diabetes (uncontrolled) - now with pathological fracture of 2nd toe  Being treated with zosyn and vanco by Dr. Braden  As above, pt is in denial or blaming extraneous factors for his condition.  Amputation would appear to be best course.  ID is consulted for further recs.  XR R foot:  AP, lateral, and oblique radiographs of the right foot were obtained. The patient is status post amputation of the distal aspect of the right first metatarsal bone and the great toe. There is bone destruction involving the distal stump of the first metatarsal bone suggestive of underlying osteomyelitis. There is also a fracture involving the base of the proximal phalanx of the second toe and this could represent a pathologic fracture due to bone destruction/osteomyelitis of the proximal phalanx of the second toe. There appears to be soft tissue air near the base of the left second toe. The remainder of the bones appear intact. There is no evidence for dislocation. No radiopaque soft tissue foreign bodies are identified.  He is telling me that his osteo is not due to his DM, but to the fact that he jammed his foot into a shoe  He feels that the wound vac since surgery was making things worse; it has been removed and he says it is improved  He is hoping to have PICC placed with resumption of home IV abx

## 2017-06-24 NOTE — PLAN OF CARE
Problem: Patient Care Overview  Goal: Plan of Care Review  Outcome: Ongoing (interventions implemented as appropriate)  Pt being followed by ID & Podiatry, debridement per podiatry, bg monitoring and SSI, IV abx per order, HgbA1c pending, able to address needs, safety maintained, bed low locked and in position, will cont plan of care

## 2017-06-25 LAB
ALBUMIN SERPL BCP-MCNC: 2.8 G/DL
ALP SERPL-CCNC: 67 U/L
ALT SERPL W/O P-5'-P-CCNC: 20 U/L
ANION GAP SERPL CALC-SCNC: 6 MMOL/L
AST SERPL-CCNC: 20 U/L
BASOPHILS # BLD AUTO: 0.04 K/UL
BASOPHILS NFR BLD: 0.9 %
BILIRUB SERPL-MCNC: 0.2 MG/DL
BUN SERPL-MCNC: 21 MG/DL
CALCIUM SERPL-MCNC: 8.7 MG/DL
CHLORIDE SERPL-SCNC: 100 MMOL/L
CO2 SERPL-SCNC: 25 MMOL/L
CREAT SERPL-MCNC: 1.2 MG/DL
DIFFERENTIAL METHOD: ABNORMAL
EOSINOPHIL # BLD AUTO: 0.1 K/UL
EOSINOPHIL NFR BLD: 2.6 %
ERYTHROCYTE [DISTWIDTH] IN BLOOD BY AUTOMATED COUNT: 14.8 %
EST. GFR  (AFRICAN AMERICAN): >60 ML/MIN/1.73 M^2
EST. GFR  (NON AFRICAN AMERICAN): >60 ML/MIN/1.73 M^2
ESTIMATED AVG GLUCOSE: 217 MG/DL
ESTIMATED AVG GLUCOSE: 217 MG/DL
GLUCOSE SERPL-MCNC: 278 MG/DL
HBA1C MFR BLD HPLC: 9.2 %
HBA1C MFR BLD HPLC: 9.2 %
HCT VFR BLD AUTO: 34.1 %
HGB BLD-MCNC: 11.3 G/DL
LYMPHOCYTES # BLD AUTO: 1.4 K/UL
LYMPHOCYTES NFR BLD: 30.9 %
MCH RBC QN AUTO: 27.5 PG
MCHC RBC AUTO-ENTMCNC: 33.1 %
MCV RBC AUTO: 83 FL
MONOCYTES # BLD AUTO: 0.4 K/UL
MONOCYTES NFR BLD: 8.1 %
NEUTROPHILS # BLD AUTO: 2.6 K/UL
NEUTROPHILS NFR BLD: 57.3 %
PLATELET # BLD AUTO: 147 K/UL
PMV BLD AUTO: 11.3 FL
POCT GLUCOSE: 242 MG/DL (ref 70–110)
POCT GLUCOSE: 296 MG/DL (ref 70–110)
POCT GLUCOSE: 299 MG/DL (ref 70–110)
POCT GLUCOSE: 312 MG/DL (ref 70–110)
POCT GLUCOSE: 321 MG/DL (ref 70–110)
POTASSIUM SERPL-SCNC: 4.7 MMOL/L
PROT SERPL-MCNC: 7.3 G/DL
RBC # BLD AUTO: 4.11 M/UL
SODIUM SERPL-SCNC: 131 MMOL/L
WBC # BLD AUTO: 4.56 K/UL

## 2017-06-25 PROCEDURE — 85025 COMPLETE CBC W/AUTO DIFF WBC: CPT

## 2017-06-25 PROCEDURE — 99024 POSTOP FOLLOW-UP VISIT: CPT | Mod: ,,, | Performed by: PODIATRIST

## 2017-06-25 PROCEDURE — 25000003 PHARM REV CODE 250: Performed by: EMERGENCY MEDICINE

## 2017-06-25 PROCEDURE — 36415 COLL VENOUS BLD VENIPUNCTURE: CPT

## 2017-06-25 PROCEDURE — 63600175 PHARM REV CODE 636 W HCPCS: Performed by: EMERGENCY MEDICINE

## 2017-06-25 PROCEDURE — 12000002 HC ACUTE/MED SURGE SEMI-PRIVATE ROOM

## 2017-06-25 PROCEDURE — 83036 HEMOGLOBIN GLYCOSYLATED A1C: CPT

## 2017-06-25 PROCEDURE — 63600175 PHARM REV CODE 636 W HCPCS

## 2017-06-25 PROCEDURE — 80053 COMPREHEN METABOLIC PANEL: CPT

## 2017-06-25 PROCEDURE — 25000003 PHARM REV CODE 250

## 2017-06-25 RX ADMIN — VANCOMYCIN HYDROCHLORIDE 1250 MG: 1 INJECTION, POWDER, LYOPHILIZED, FOR SOLUTION INTRAVENOUS at 08:06

## 2017-06-25 RX ADMIN — OXYCODONE HYDROCHLORIDE 10 MG: 5 TABLET ORAL at 01:06

## 2017-06-25 RX ADMIN — HEPARIN SODIUM 5000 UNITS: 5000 INJECTION, SOLUTION INTRAVENOUS; SUBCUTANEOUS at 10:06

## 2017-06-25 RX ADMIN — INSULIN ASPART 4 UNITS: 100 INJECTION, SOLUTION INTRAVENOUS; SUBCUTANEOUS at 05:06

## 2017-06-25 RX ADMIN — NICOTINE 1 PATCH: 14 PATCH, EXTENDED RELEASE TRANSDERMAL at 08:06

## 2017-06-25 RX ADMIN — DOCUSATE SODIUM AND SENNOSIDES 1 TABLET: 8.6; 5 TABLET, FILM COATED ORAL at 09:06

## 2017-06-25 RX ADMIN — PIPERACILLIN AND TAZOBACTAM 4.5 G: 4; .5 INJECTION, POWDER, LYOPHILIZED, FOR SOLUTION INTRAVENOUS; PARENTERAL at 08:06

## 2017-06-25 RX ADMIN — INSULIN ASPART 6 UNITS: 100 INJECTION, SOLUTION INTRAVENOUS; SUBCUTANEOUS at 08:06

## 2017-06-25 RX ADMIN — FAMOTIDINE 20 MG: 20 TABLET, FILM COATED ORAL at 08:06

## 2017-06-25 RX ADMIN — INSULIN ASPART 8 UNITS: 100 INJECTION, SOLUTION INTRAVENOUS; SUBCUTANEOUS at 01:06

## 2017-06-25 RX ADMIN — DOCUSATE SODIUM AND SENNOSIDES 1 TABLET: 8.6; 5 TABLET, FILM COATED ORAL at 08:06

## 2017-06-25 RX ADMIN — OXYCODONE HYDROCHLORIDE 10 MG: 5 TABLET ORAL at 08:06

## 2017-06-25 RX ADMIN — ASPIRIN 81 MG: 81 TABLET, COATED ORAL at 08:06

## 2017-06-25 RX ADMIN — HEPARIN SODIUM 5000 UNITS: 5000 INJECTION, SOLUTION INTRAVENOUS; SUBCUTANEOUS at 01:06

## 2017-06-25 RX ADMIN — HEPARIN SODIUM 5000 UNITS: 5000 INJECTION, SOLUTION INTRAVENOUS; SUBCUTANEOUS at 06:06

## 2017-06-25 RX ADMIN — OXYCODONE HYDROCHLORIDE 10 MG: 5 TABLET ORAL at 09:06

## 2017-06-25 RX ADMIN — INSULIN DETEMIR 15 UNITS: 100 INJECTION, SOLUTION SUBCUTANEOUS at 09:06

## 2017-06-25 RX ADMIN — PIPERACILLIN AND TAZOBACTAM 4.5 G: 4; .5 INJECTION, POWDER, LYOPHILIZED, FOR SOLUTION INTRAVENOUS; PARENTERAL at 01:06

## 2017-06-25 RX ADMIN — Medication 3 ML: at 05:06

## 2017-06-25 RX ADMIN — INSULIN ASPART 3 UNITS: 100 INJECTION, SOLUTION INTRAVENOUS; SUBCUTANEOUS at 09:06

## 2017-06-25 NOTE — ASSESSMENT & PLAN NOTE
Glucose 296  Obvious contribution to his foot condition  On Lantus 15, metformin 1000 bid at home  a1c was 11.8 3 years ago - recheck  Is 9.2  On nickie for neuropathy  As long as he is not NPO for procedures, will resume lantus (levemir on formulary here)  He is asking for larger meals - explained to him that he needs to be on calorie-restricted diet ( he is overweight with uncontrolled DM)  But he does go from 6 pm to morning without food - will give nighttime snack

## 2017-06-25 NOTE — CONSULTS
REQUESTING PHYSICIAN:  Dr. Lux:    REASON FOR CONSULTATION:  Second right toe osteo.    HISTORY OF PRESENT ILLNESS:  This is a 56-year-old man who is a poor historian.    He tells me he has difficulties with both reading and writing and consequently   has a very difficult time with giving me a history.  What I was able to pull   together from the chart was, this 56-year-old male with diabetes, had a right   toe amputation secondary to osteomyelitis.  Unfortunately, did not have   intraoperative cultures at that time; however, cultures that he did have   obtained which I think were essentially swabs showed an E. coli as well as   Prevotella organisms as well as Klebsiella.  The patient also was noted to have   two specimens and pathology, one with clean margins, one without clean margins.    The patient was sent home with a PICC line and IV antibiotics.  He is unable to   tell me what the antibiotics were.  In one of the notes, it looked like he went   home as per ID at the [facility] on vancomycin and p.o. Cipro.  Apparently,   the patient was on IV antibiotics for 6 weeks.  The patient then took another 4   weeks of p.o. antibiotics and apparently, the patient was seen the day prior to   admission at Dr. Braden's office where it was noted that the second right toe was   swollen.  There was a pathological fracture secondary to osteo.  He was   referring to imaging, I think it was obtained on 06/16/2017 that showed   postoperative changes related to amputation of the great toe and distal aspect   of the first metatarsal, bone destruction involving the distal stump of the   first metatarsal, worrisome for an underlying osteo.  Fracture at the base of   the proximal phalanx of the second toe that may represent a pathological   fracture due to an underlying osteo, small amount of soft tissue area adjacent   to the base of the second toe.  The patient was admitted to the hospital for   antibiotics.  The patient was  started on vancomycin and piperacillin and   tazobactam and we have been asked to see this patient for further antibiotics   recommendations.  At this time, the patient says he is doing well.    REVIEW OF SYSTEMS:  No fevers, chills, nausea, vomiting or diarrhea.  No   headaches, visual changes, auditory changes, neck stiffness, night sweats,   weight loss or weight gain.  There is no chest pain, PND, orthopnea or decreased   exercise tolerance.  There is no cough, wheeze, hemoptysis or shortness of   breath.  There is no abdominal pain, melena or hematochezia.  There is no   urinary incontinence, frequency, urgency or dysuria.  There is no arthralgia or   myalgia.    PAST MEDICAL HISTORY:  Arthritis, bulging lumbar disks, C. diff diarrhea,   chronic back pain, diabetes and hepatitis C that I think he may have had   treated, MSSA bacteremia, neuromuscular disorder and neuropathy.    PAST SURGICAL HISTORY:  Appendectomy, joint replacement, left knee surgery,   right great toe amputation.    ALLERGIES:  CODEINE.    FAMILY HISTORY:  Diabetes in the sister.    SOCIAL HISTORY:  The patient smokes a pack a day.  There are no drugs or alcohol   use according to the chart.    MEDICATIONS:  Aspirin, famotidine, senna and fentanyl.    ANTIMICROBIALS:  The patient is on piperacillin and tazobactam and vancomycin.    PHYSICAL EXAMINATION:  GENERAL:  This is a pleasant male, appears a bit older than his stated age.  VITAL SIGNS:  Last set of vital signs show temperature 36.4, pulse 77,   respiratory rate 18, blood pressure 113/61.  HEENT:  Oral mucosa is moist.  No thrush or leukoplakia.  NECK:  Supple.  No lymphadenopathy.  LUNGS:  Clear to auscultation bilaterally.  HEART:  Regular.  Positive S1, S2.  ABDOMEN:  Soft, tender.  EXTREMITIES:  Without edema.  CARDIOVASCULAR:  2+ radial pulses bilaterally.  Capillary refill less than 2   seconds.  No carotid bruits.  NEUROLOGIC:  He is alert and attempts x3.  Move all extremities  spontaneously.  SKIN:  Evaluation of the right foot shows amputation of the right great toe with   a surgical wound that appears to be healing nicely.  The right toe did appear   to be somewhat erythematous as well as somewhat edematous.  My evaluation of   this patient was after the patient had a procedure done at the bedside earlier   this morning.    LABORATORY DATA:  White blood cell count of 5, hemoglobin of 11.5, platelets of   154, BUN 29, creatinine 1.1, bicarbonate of 24.  He had a random vancomycin of   18.    IMAGING:  There is no imaging on this visit.    IMPRESSION:  This is a 56-year-old male with a second right osteomyelitis   presumed as per x-ray from a couple of days ago who also has a history of a   right great toe amputation secondary to osteomyelitis without clear margins,   again also in the setting of a diabetic patient.  The patient does have chronic   hepatitis C, that appears to have been treated with Harvoni and the patient does   have a history of tobacco abuse.    RECOMMENDATIONS:  At this time, the patient is remaining on vancomycin and   piperacillin and tazobactam; however, I would like the podiatrist, please   contact me at [phone] to come up with a plan as if I am reading the notes   correctly that he has been treated with antibiotics without any improvement.  If   this is the case, then he needs to have the antibiotics ceased and then a   biopsy needs to be obtained.  In the meanwhile, we will check a hemoglobin A1c.    The patient did have an HIV checked a few months ago.    Thank you very kindly for allowing me to participate in the care of this   patient.      DEANGELO  dd: 06/24/2017 15:27:17 (CDT)  td: 06/24/2017 20:59:19 (CDT)  Doc ID   #[id number]  Job ID #[id number]    CC: Aye NIÑO M.D.

## 2017-06-25 NOTE — PLAN OF CARE
06/25/17 1610   Discharge Assessment   Assessment Type Discharge Planning Assessment   Confirmed/corrected address and phone number on facesheet? Yes   Assessment information obtained from? Patient   Communicated expected length of stay with patient/caregiver no   Type of Healthcare Directive Received (N/A)   Prior to hospitilization cognitive status: Alert/Oriented   Prior to hospitalization functional status: Assistive Equipment   Current cognitive status: Alert/Oriented   Current Functional Status: Assistive Equipment   Arrived From home or self-care;home health   Lives With alone   Able to Return to Prior Arrangements yes   Is patient able to care for self after discharge? Yes   Who are your caregiver(s) and their phone number(s)? N/A   Patient's perception of discharge disposition home health   Readmission Within The Last 30 Days no previous admission in last 30 days   Patient currently being followed by outpatient case management? No   Patient currently receives home health services? Yes   Patient previously received home health services and would like to resume services if necessary? Yes   If yes, name of home health provider: Patient cannot recall name of agency   Does the patient currently use HME? Yes   Patient currently receives private duty nursing? No   Patient currently receives any other outside agency services? No   Equipment Currently Used at Home cane, quad;wound care supplies;wheelchair   Do you have any problems affording any of your prescribed medications? No   Is the patient taking medications as prescribed? yes   Do you have any financial concerns preventing you from receiving the healthcare you need? No   Does the patient have transportation to healthcare appointments? Yes   Transportation Available family or friend will provide   On Dialysis? No   Does the patient receive services at the [facility]? No   Are there any open cases? No   Discharge Plan A Home Health   Discharge Plan B  Home Health   Patient/Family In Agreement With Plan yes     Pharmacy:   [facility] [place] - [address]  [address]  [place] [address]  Phone: [phone] Fax: [phone]    Appointment Preferences:  Gets wound care on Mon & Friday  Prefers appointments after 9:00am

## 2017-06-25 NOTE — PLAN OF CARE
Problem: Patient Care Overview  Goal: Plan of Care Review  Outcome: Ongoing (interventions implemented as appropriate)  Pt being followed by ID & Podiatry, 2000 ADA diet initiated, wound care per podiatry, bg monitoring and SSI, IV abx per order, able to address needs, safety maintained, bed low locked and in position, will cont plan of care

## 2017-06-25 NOTE — PLAN OF CARE
06/24/17 1617   Discharge Assessment   Assessment Type Discharge Planning Assessment   Patient/Family In Agreement With Plan other (see comments)  (Attemtped d/c planning assessment)

## 2017-06-25 NOTE — PROGRESS NOTES
Progress Note  General Surgery    Admit Date: 6/23/2017  Post-operative Day:    Hospital Day: 3    SUBJECTIVE:     Follow-up For: R foot infection. Seen at bedside. No acute distress family member at bedside. No foot pain. No fever, chills, sweats.     Clarification regarding question of cultures: Cultures were taken on both original surgery dates in April, the original I and D as well as the follow-up debridement 2 days later.   Please note under the Micro tab.     Scheduled Meds:   aspirin  81 mg Oral Daily    famotidine  20 mg Oral Daily    fentaNYL  1 patch Transdermal Q72H    heparin (porcine)  5,000 Units Subcutaneous Q8H    insulin detemir  15 Units Subcutaneous QHS    nicotine  1 patch Transdermal Daily    piperacillin-tazobactam 4.5 g in dextrose 5 % 100 mL IVPB (ready to mix system)  4.5 g Intravenous Q8H    senna-docusate 8.6-50 mg  1 tablet Oral BID    sodium chloride 0.9%  3 mL Intravenous Q8H    vancomycin (VANCOCIN) IVPB  1,250 mg Intravenous Q8H     Continuous Infusions:   PRN Meds:dextrose 50%, dextrose 50%, diazePAM, glucagon (human recombinant), glucose, glucose, HYDROmorphone, insulin aspart, ondansetron, oxycodone, promethazine    Review of patient's allergies indicates:   Allergen Reactions    Codeine Rash     Other reaction(s): Unknown       Review of Systems:  Constitutional: no fever or chills, pain well controlled  Cardiovascular: positive for history of revasc in LE's  Musculoskeletal: positive for back pain and R great toe amputation  Neurological: positive for neuropathy in feet    OBJECTIVE:     Vital Signs (Most Recent):  Temp: 98 °F (36.7 °C) (06/25/17 0841)  Pulse: 74 (06/25/17 0841)  Resp: 17 (06/25/17 0841)  BP: 128/64 (06/25/17 0841)  SpO2: 95 % (06/25/17 0841)    Vital Signs Range (Last 24H):  Temp:  [97.5 °F (36.4 °C)-98.5 °F (36.9 °C)]   Pulse:  [67-81]   Resp:  [17-18]   BP: (125-138)/(64-75)   SpO2:  [95 %-97 %]     Physical Exam:  GENERAL: alert, cooperative, no  distress  BODY HABITUS: moderately obese  VASCULAR: Dorsalis Pedis: Left: 1+ (weak) Right: 1+ (weak)       Posterior Tibialis: Left: 1+ (weak) Right: 1+ (weak)  NEURO: Sharp/Dull: Left: severe deficit Right: severe deficit  ORTHO: General Alignment: Left: No gross abnormalities Right: R 1st ray amp and hammertoes 2-5  DERM: Hair: Left: no change Right: no change       R medial foot unchanged in size. Healthy in appearance. Tiny amount of pus exuded at lateral second toe. No malodor noted.     Laboratory:  CBC:   Recent Labs  Lab 06/25/17  0531   WBC 4.56   RBC 4.11*   HGB 11.3*   HCT 34.1*   *   MCV 83   MCH 27.5   MCHC 33.1     BMP:   Recent Labs  Lab 06/25/17  0531   *   *   K 4.7      CO2 25   BUN 21*   CREATININE 1.2   CALCIUM 8.7       ASSESSMENT/PLAN:     Assessment:  Diagnosis: diabetic foot ulcer and osteomyelitis  Wound: improving  Infection: resolving    Plan:  dressing change at bedside, continue local care and Discussed plan with ID and will discuss with Dr. Braden as well   ID recommends holding Abx for 2 days and then reculturing bone and tissue, in OR. Discussed with patient and family member.

## 2017-06-25 NOTE — PLAN OF CARE
Problem: Patient Care Overview  Goal: Plan of Care Review  Outcome: Ongoing (interventions implemented as appropriate)  IV antibiotics administered as ordered. Blood sugar remains elevated; insulin management ongoing. Dressing to right foot clean, dry, and intact. Remains free from falls.

## 2017-06-25 NOTE — ASSESSMENT & PLAN NOTE
In both first toe stump and second toe base;  Due to PAD from Diabetes (uncontrolled) - now with pathological fracture of 2nd toe  Being treated with zosyn and vanco by Dr. Braden  Afebrile, WBC normal  As above, pt is in denial or blaming extraneous factors for his condition.  Amputation would appear to be best course.  ID is consulted for further recs.  XR R foot:  AP, lateral, and oblique radiographs of the right foot were obtained. The patient is status post amputation of the distal aspect of the right first metatarsal bone and the great toe. There is bone destruction involving the distal stump of the first metatarsal bone suggestive of underlying osteomyelitis. There is also a fracture involving the base of the proximal phalanx of the second toe and this could represent a pathologic fracture due to bone destruction/osteomyelitis of the proximal phalanx of the second toe. There appears to be soft tissue air near the base of the left second toe. The remainder of the bones appear intact. There is no evidence for dislocation. No radiopaque soft tissue foreign bodies are identified.  He is telling me that his osteo is not due to his DM, but to the fact that he jammed his foot into a shoe  He feels that the wound vac since surgery was making things worse; it has been removed and he says it is improved  He is hoping to have PICC placed with resumption of home IV abx  Dr. Cedeno following for ID - will speak with Dr. Braden to clarify issues - may need biopsy; if so, will have to be off abx first

## 2017-06-25 NOTE — PROGRESS NOTES
.  Progress Note  Infectious Disease    Admit Date: 6/23/2017   LOS: 2 days     SUBJECTIVE:     Follow-up For:  Second right toe osteo.    Antibiotics     None              OBJECTIVE:     Vital Signs (Most Recent)  Temp: 98.2 °F (36.8 °C) (06/25/17 1628)  Pulse: 71 (06/25/17 1628)  Resp: 17 (06/25/17 1628)  BP: 117/73 (06/25/17 1628)  SpO2: 98 % (06/25/17 1628)    Temperature Range Min/Max (Last 24H):  Temp:  [98 °F (36.7 °C)-98.5 °F (36.9 °C)]     I & O (Last 24H):  Intake/Output Summary (Last 24 hours) at 06/25/17 1704  Last data filed at 06/25/17 1600   Gross per 24 hour   Intake              600 ml   Output             2600 ml   Net            -2000 ml         Lines/Drains:       Peripheral IV - Single Lumen 06/23/17 1551 Right Wrist (Active)   Site Assessment Clean;Dry;Intact 6/25/2017  8:26 AM   Line Status Infusing 6/25/2017  8:26 AM   Dressing Status Clean;Dry;Intact 6/25/2017  8:26 AM   Dressing Change Due 06/27/17 6/24/2017  7:39 PM   Site Change Due 06/27/17 6/24/2017  7:39 PM   Reason Not Rotated Not due 6/24/2017  7:39 PM       Laboratory:  Recent Results (from the past 24 hour(s))   POCT glucose    Collection Time: 06/24/17  5:11 PM   Result Value Ref Range    POCT Glucose 312 (H) 70 - 110 mg/dL   POCT glucose    Collection Time: 06/24/17  9:20 PM   Result Value Ref Range    POCT Glucose 259 (H) 70 - 110 mg/dL   POCT glucose    Collection Time: 06/25/17  1:35 AM   Result Value Ref Range    POCT Glucose 299 (H) 70 - 110 mg/dL   CBC auto differential    Collection Time: 06/25/17  5:31 AM   Result Value Ref Range    WBC 4.56 3.90 - 12.70 K/uL    RBC 4.11 (L) 4.60 - 6.20 M/uL    Hemoglobin 11.3 (L) 14.0 - 18.0 g/dL    Hematocrit 34.1 (L) 40.0 - 54.0 %    MCV 83 82 - 98 fL    MCH 27.5 27.0 - 31.0 pg    MCHC 33.1 32.0 - 36.0 %    RDW 14.8 (H) 11.5 - 14.5 %    Platelets 147 (L) 150 - 350 K/uL    MPV 11.3 9.2 - 12.9 fL    Gran # 2.6 1.8 - 7.7 K/uL    Lymph # 1.4 1.0 - 4.8 K/uL    Mono # 0.4 0.3 - 1.0 K/uL     Eos # 0.1 0.0 - 0.5 K/uL    Baso # 0.04 0.00 - 0.20 K/uL    Gran% 57.3 38.0 - 73.0 %    Lymph% 30.9 18.0 - 48.0 %    Mono% 8.1 4.0 - 15.0 %    Eosinophil% 2.6 0.0 - 8.0 %    Basophil% 0.9 0.0 - 1.9 %    Differential Method Automated    Comprehensive metabolic panel    Collection Time: 06/25/17  5:31 AM   Result Value Ref Range    Sodium 131 (L) 136 - 145 mmol/L    Potassium 4.7 3.5 - 5.1 mmol/L    Chloride 100 95 - 110 mmol/L    CO2 25 23 - 29 mmol/L    Glucose 278 (H) 70 - 110 mg/dL    BUN, Bld 21 (H) 6 - 20 mg/dL    Creatinine 1.2 0.5 - 1.4 mg/dL    Calcium 8.7 8.7 - 10.5 mg/dL    Total Protein 7.3 6.0 - 8.4 g/dL    Albumin 2.8 (L) 3.5 - 5.2 g/dL    Total Bilirubin 0.2 0.1 - 1.0 mg/dL    Alkaline Phosphatase 67 55 - 135 U/L    AST 20 10 - 40 U/L    ALT 20 10 - 44 U/L    Anion Gap 6 (L) 8 - 16 mmol/L    eGFR if African American >60 >60 mL/min/1.73 m^2    eGFR if non African American >60 >60 mL/min/1.73 m^2   Hemoglobin A1c    Collection Time: 06/25/17  5:31 AM   Result Value Ref Range    Hemoglobin A1C 9.2 (H) 4.0 - 5.6 %    Estimated Avg Glucose 217 (H) 68 - 131 mg/dL   POCT glucose    Collection Time: 06/25/17  7:43 AM   Result Value Ref Range    POCT Glucose 296 (H) 70 - 110 mg/dL   POCT glucose    Collection Time: 06/25/17 11:54 AM   Result Value Ref Range    POCT Glucose 321 (H) 70 - 110 mg/dL   POCT glucose    Collection Time: 06/25/17  4:40 PM   Result Value Ref Range    POCT Glucose 242 (H) 70 - 110 mg/dL       Micro:  Microbiology Results (last 7 days)     Procedure Component Value Units Date/Time    Blood Culture #1 **CANNOT BE ORDERED STAT** [[id number]] Collected:  06/23/17 1510    Order Status:  Completed Specimen:  Blood from Peripheral, Antecubital, Right Updated:  06/24/17 1903     Blood Culture, Routine No Growth to date     Blood Culture, Routine No Growth to date    Blood Culture #2 **CANNOT BE ORDERED STAT** [[id number]] Collected:  06/23/17 1525    Order Status:  Completed Specimen:  Blood from  Peripheral, Forearm, Right Updated:  06/24/17 1903     Blood Culture, Routine No Growth to date     Blood Culture, Routine No Growth to date              ASSESSMENT/PLAN:     Active Hospital Problems    Diagnosis  POA    *Osteomyelitis of right foot [M86.9]  Yes    Chronic hepatitis C without hepatic coma [B18.2]  Yes    DM type 2, uncontrolled, with neuropathy [E11.40, E11.65]  Yes      Resolved Hospital Problems    Diagnosis Date Resolved POA   No resolved problems to display.       Physical Exam:  Gen: NAD  Pul: CTA   Cardio:  +S1+S2  Abd: Soft, NT  Ext: No edema  Skin: dressing not taken down      IMPRESSION;   1) Second right toe osteo.   2) Right great toe amputation secondary to osteo without clean margins.   3) DM  4) Chronic HepC- treated with Harvoni?  5) Tobacco abuse.     RECOMMENDATIONS;   1) D/W Dr. Chao- boris are holding on anbx at this time.  2) If pt was treated for his previous osteo, this is then a new osteo and will need a new round of biopsies- anbx.  3) Anbx have been d/c'd.  4) Will need a biopsy after being off of anbx for a few days.  5) HA1C; 9.2... Can do with better DM control... Is trying to quit smoking.  6) We will cont to follow.     ADDENDUM;  - ID note from 4/24/17 indicates that the pt was switched from vanc/cipro to ertapenem for ESBLs and anaerobes- stop date should have been 6/1/17.

## 2017-06-25 NOTE — SUBJECTIVE & OBJECTIVE
Interval History:  Appears stable    Review of Systems   Hungry - feels he is not getting enough food    Objective:     Vital Signs (Most Recent):  Temp: 98 °F (36.7 °C) (06/25/17 0841)  Pulse: 74 (06/25/17 0841)  Resp: 17 (06/25/17 0841)  BP: 128/64 (06/25/17 0841)  SpO2: 95 % (06/25/17 0841) Vital Signs (24h Range):  Temp:  [97.5 °F (36.4 °C)-98.5 °F (36.9 °C)] 98 °F (36.7 °C)  Pulse:  [67-81] 74  Resp:  [17-18] 17  SpO2:  [95 %-97 %] 95 %  BP: (125-138)/(64-75) 128/64     Weight: 104.3 kg (230 lb)  Body mass index is 33.97 kg/m².    Intake/Output Summary (Last 24 hours) at 06/25/17 1304  Last data filed at 06/25/17 0816   Gross per 24 hour   Intake              480 ml   Output             2350 ml   Net            -1870 ml      Physical Exam  Awake, alert  Lungs clear  Heart rrr  Abd obese, soft, BS+  Says he had foot wound cleaned today; noted slight pus at 2nd toe

## 2017-06-25 NOTE — PROGRESS NOTES
"[facility] [place] [facility]  Progress Note    Patient Name: Carlos Cifuentes  MRN: [id number]  Patient Class: IP- Inpatient   Admission Date: 6/23/2017  Length of Stay: 2 days  Attending Physician: Miguel Lux MD  Primary Care Provider: Miguel Lux MD        Subjective:     Principal Problem:Osteomyelitis of right foot    HPI:   He is a 56 year old male pt of Dr. Lux.  He is a smoker with arthritis, bulging lumbar discs, diabetes mellitus type II, and neuropathy who  presented to the ED from wound care for IV antibiotics secondary to a right great toe amputation. Pt reports Dr. Braden (podiatry) told him the "infection spread to the 2nd toe." Pt was told he could come in for IV antibiotics. Pt reports he took off the pump for "6-7 hours." Pt reports a slight right leg paralysis secondary to his bulging lumbar discs.  Dr. Braden's note:  ~30 minutes spent discussing imaging and discussing pathological fracture secondary to OM and options. Three options were discussed with possible side effects of each. Amputation of Infected toes vs IV abx for six weeks with aggressive wound care for several months with no guarantee of wound resolution vs PO zyvox for six weeks with aggressive wound care for several months with no guarantee of wound resolution (discussed cytopenia and regular lab work involved with this option).      Patient is distraught and blames previous IV abx and previous surgeons for all his woes. He does not understand why his foot is infected nor does he understand the role noncompliance has played in his current situation.  I strongly encouraged he present to ED because he now has a blow out lesion and significant edema to the 2nd digit    Hospital Course:  No notes on file    Interval History:  Appears stable    Review of Systems   Hungry - feels he is not getting enough food    Objective:     Vital Signs (Most Recent):  Temp: 98 °F (36.7 °C) (06/25/17 0841)  Pulse: 74 " (06/25/17 0841)  Resp: 17 (06/25/17 0841)  BP: 128/64 (06/25/17 0841)  SpO2: 95 % (06/25/17 0841) Vital Signs (24h Range):  Temp:  [97.5 °F (36.4 °C)-98.5 °F (36.9 °C)] 98 °F (36.7 °C)  Pulse:  [67-81] 74  Resp:  [17-18] 17  SpO2:  [95 %-97 %] 95 %  BP: (125-138)/(64-75) 128/64     Weight: 104.3 kg (230 lb)  Body mass index is 33.97 kg/m².    Intake/Output Summary (Last 24 hours) at 06/25/17 1304  Last data filed at 06/25/17 0816   Gross per 24 hour   Intake              480 ml   Output             2350 ml   Net            -1870 ml      Physical Exam  Awake, alert  Lungs clear  Heart rrr  Abd obese, soft, BS+  Says he had foot wound cleaned today; noted slight pus at 2nd toe      Assessment/Plan:      Chronic hepatitis C without hepatic coma    On Harvoni treatment          DM type 2, uncontrolled, with neuropathy    Glucose 296  Obvious contribution to his foot condition  On Lantus 15, metformin 1000 bid at home  a1c was 11.8 3 years ago - recheck  Is 9.2  On nickie for neuropathy  As long as he is not NPO for procedures, will resume lantus (levemir on formulary here)  He is asking for larger meals - explained to him that he needs to be on calorie-restricted diet ( he is overweight with uncontrolled DM)  But he does go from 6 pm to morning without food - will give nighttime snack            * Osteomyelitis of right foot    In both first toe stump and second toe base;  Due to PAD from Diabetes (uncontrolled) - now with pathological fracture of 2nd toe  Being treated with zosyn and vanco by Dr. Braden  Afebrile, WBC normal  As above, pt is in denial or blaming extraneous factors for his condition.  Amputation would appear to be best course.  ID is consulted for further recs.  XR R foot:  AP, lateral, and oblique radiographs of the right foot were obtained. The patient is status post amputation of the distal aspect of the right first metatarsal bone and the great toe. There is bone destruction involving the distal stump  of the first metatarsal bone suggestive of underlying osteomyelitis. There is also a fracture involving the base of the proximal phalanx of the second toe and this could represent a pathologic fracture due to bone destruction/osteomyelitis of the proximal phalanx of the second toe. There appears to be soft tissue air near the base of the left second toe. The remainder of the bones appear intact. There is no evidence for dislocation. No radiopaque soft tissue foreign bodies are identified.  He is telling me that his osteo is not due to his DM, but to the fact that he jammed his foot into a shoe  He feels that the wound vac since surgery was making things worse; it has been removed and he says it is improved  He is hoping to have PICC placed with resumption of home IV abx  Dr. Cedeno following for ID - will speak with Dr. Braden to clarify issues - may need biopsy; if so, will have to be off abx first          VTE Risk Mitigation         Ordered     heparin (porcine) injection 5,000 Units  Every 8 hours     Route:  Subcutaneous        06/23/17 1711     Medium Risk of VTE  Once      06/23/17 1711          Phan Singleton MD  Department of Hospital Medicine   [facility]

## 2017-06-26 LAB
ALBUMIN SERPL BCP-MCNC: 3.1 G/DL
ALP SERPL-CCNC: 73 U/L
ALT SERPL W/O P-5'-P-CCNC: 21 U/L
ANION GAP SERPL CALC-SCNC: 4 MMOL/L
AST SERPL-CCNC: 17 U/L
BASOPHILS # BLD AUTO: 0.02 K/UL
BASOPHILS NFR BLD: 0.4 %
BILIRUB SERPL-MCNC: 0.2 MG/DL
BUN SERPL-MCNC: 18 MG/DL
CALCIUM SERPL-MCNC: 9.3 MG/DL
CHLORIDE SERPL-SCNC: 100 MMOL/L
CO2 SERPL-SCNC: 30 MMOL/L
CREAT SERPL-MCNC: 1.1 MG/DL
DIFFERENTIAL METHOD: ABNORMAL
EOSINOPHIL # BLD AUTO: 0.1 K/UL
EOSINOPHIL NFR BLD: 1.3 %
ERYTHROCYTE [DISTWIDTH] IN BLOOD BY AUTOMATED COUNT: 15 %
EST. GFR  (AFRICAN AMERICAN): >60 ML/MIN/1.73 M^2
EST. GFR  (NON AFRICAN AMERICAN): >60 ML/MIN/1.73 M^2
GLUCOSE SERPL-MCNC: 295 MG/DL
HCT VFR BLD AUTO: 37.7 %
HGB BLD-MCNC: 12.4 G/DL
LYMPHOCYTES # BLD AUTO: 1.1 K/UL
LYMPHOCYTES NFR BLD: 24.2 %
MCH RBC QN AUTO: 27.7 PG
MCHC RBC AUTO-ENTMCNC: 32.9 %
MCV RBC AUTO: 84 FL
MONOCYTES # BLD AUTO: 0.4 K/UL
MONOCYTES NFR BLD: 9.4 %
NEUTROPHILS # BLD AUTO: 3 K/UL
NEUTROPHILS NFR BLD: 64.7 %
PLATELET # BLD AUTO: 157 K/UL
PMV BLD AUTO: 10.6 FL
POCT GLUCOSE: 261 MG/DL (ref 70–110)
POCT GLUCOSE: 290 MG/DL (ref 70–110)
POCT GLUCOSE: 310 MG/DL (ref 70–110)
POCT GLUCOSE: 314 MG/DL (ref 70–110)
POTASSIUM SERPL-SCNC: 4.9 MMOL/L
PROT SERPL-MCNC: 8.3 G/DL
RBC # BLD AUTO: 4.48 M/UL
SODIUM SERPL-SCNC: 134 MMOL/L
WBC # BLD AUTO: 4.59 K/UL

## 2017-06-26 PROCEDURE — 85025 COMPLETE CBC W/AUTO DIFF WBC: CPT

## 2017-06-26 PROCEDURE — 99024 POSTOP FOLLOW-UP VISIT: CPT | Mod: ,,, | Performed by: PODIATRIST

## 2017-06-26 PROCEDURE — 63600175 PHARM REV CODE 636 W HCPCS: Performed by: EMERGENCY MEDICINE

## 2017-06-26 PROCEDURE — 25000003 PHARM REV CODE 250: Performed by: EMERGENCY MEDICINE

## 2017-06-26 PROCEDURE — 36415 COLL VENOUS BLD VENIPUNCTURE: CPT

## 2017-06-26 PROCEDURE — 94761 N-INVAS EAR/PLS OXIMETRY MLT: CPT

## 2017-06-26 PROCEDURE — 80053 COMPREHEN METABOLIC PANEL: CPT

## 2017-06-26 PROCEDURE — 25000003 PHARM REV CODE 250

## 2017-06-26 PROCEDURE — 12000002 HC ACUTE/MED SURGE SEMI-PRIVATE ROOM

## 2017-06-26 RX ORDER — INSULIN ASPART 100 [IU]/ML
5 INJECTION, SOLUTION INTRAVENOUS; SUBCUTANEOUS
Status: DISCONTINUED | OUTPATIENT
Start: 2017-06-27 | End: 2017-06-28

## 2017-06-26 RX ADMIN — INSULIN ASPART 8 UNITS: 100 INJECTION, SOLUTION INTRAVENOUS; SUBCUTANEOUS at 08:06

## 2017-06-26 RX ADMIN — HEPARIN SODIUM 5000 UNITS: 5000 INJECTION, SOLUTION INTRAVENOUS; SUBCUTANEOUS at 06:06

## 2017-06-26 RX ADMIN — Medication 3 ML: at 06:06

## 2017-06-26 RX ADMIN — OXYCODONE HYDROCHLORIDE 10 MG: 5 TABLET ORAL at 04:06

## 2017-06-26 RX ADMIN — HYDROMORPHONE HYDROCHLORIDE 1 MG: 2 INJECTION INTRAMUSCULAR; INTRAVENOUS; SUBCUTANEOUS at 08:06

## 2017-06-26 RX ADMIN — DOCUSATE SODIUM AND SENNOSIDES 1 TABLET: 8.6; 5 TABLET, FILM COATED ORAL at 08:06

## 2017-06-26 RX ADMIN — INSULIN DETEMIR 15 UNITS: 100 INJECTION, SOLUTION SUBCUTANEOUS at 09:06

## 2017-06-26 RX ADMIN — HEPARIN SODIUM 5000 UNITS: 5000 INJECTION, SOLUTION INTRAVENOUS; SUBCUTANEOUS at 09:06

## 2017-06-26 RX ADMIN — INSULIN ASPART 2 UNITS: 100 INJECTION, SOLUTION INTRAVENOUS; SUBCUTANEOUS at 02:06

## 2017-06-26 RX ADMIN — ASPIRIN 81 MG: 81 TABLET, COATED ORAL at 08:06

## 2017-06-26 RX ADMIN — Medication 3 ML: at 02:06

## 2017-06-26 RX ADMIN — INSULIN ASPART 8 UNITS: 100 INJECTION, SOLUTION INTRAVENOUS; SUBCUTANEOUS at 06:06

## 2017-06-26 RX ADMIN — HYDROMORPHONE HYDROCHLORIDE 1 MG: 2 INJECTION INTRAMUSCULAR; INTRAVENOUS; SUBCUTANEOUS at 11:06

## 2017-06-26 RX ADMIN — FAMOTIDINE 20 MG: 20 TABLET, FILM COATED ORAL at 08:06

## 2017-06-26 RX ADMIN — INSULIN ASPART 3 UNITS: 100 INJECTION, SOLUTION INTRAVENOUS; SUBCUTANEOUS at 09:06

## 2017-06-26 RX ADMIN — FENTANYL 1 PATCH: 50 PATCH, EXTENDED RELEASE TRANSDERMAL at 04:06

## 2017-06-26 RX ADMIN — NICOTINE 1 PATCH: 14 PATCH, EXTENDED RELEASE TRANSDERMAL at 08:06

## 2017-06-26 RX ADMIN — Medication 3 ML: at 09:06

## 2017-06-26 RX ADMIN — HEPARIN SODIUM 5000 UNITS: 5000 INJECTION, SOLUTION INTRAVENOUS; SUBCUTANEOUS at 02:06

## 2017-06-26 RX ADMIN — DIAZEPAM 2 MG: 2 TABLET ORAL at 09:06

## 2017-06-26 NOTE — PROGRESS NOTES
Progress Note  Podiatry    Admit Date: 6/23/2017  Post-operative Day:    Hospital Day: 4    SUBJECTIVE:     Follow-up For: R foot infection. Seen at bedside. No acute distress. No family member at bedside. No foot pain. No fever, chills, sweats. Resting comfortably in bed. Has some questions regarding his toe infection.     Scheduled Meds:   aspirin  81 mg Oral Daily    famotidine  20 mg Oral Daily    fentaNYL  1 patch Transdermal Q72H    heparin (porcine)  5,000 Units Subcutaneous Q8H    insulin detemir  15 Units Subcutaneous QHS    nicotine  1 patch Transdermal Daily    senna-docusate 8.6-50 mg  1 tablet Oral BID    sodium chloride 0.9%  3 mL Intravenous Q8H     Continuous Infusions:   PRN Meds:dextrose 50%, dextrose 50%, diazePAM, glucagon (human recombinant), glucose, glucose, HYDROmorphone, insulin aspart, ondansetron, oxycodone, promethazine    Review of patient's allergies indicates:   Allergen Reactions    Codeine Rash     Other reaction(s): Unknown       Review of Systems:  Constitutional: no fever or chills, pain well controlled  Cardiovascular: positive for history of revasc in LE's  Musculoskeletal: positive for back pain and R great toe amputation  Neurological: positive for neuropathy in feet    OBJECTIVE:     Vital Signs (Most Recent):  Temp: 97.7 °F (36.5 °C) (06/26/17 0730)  Pulse: 67 (06/26/17 0730)  Resp: 18 (06/26/17 0730)  BP: 121/77 (06/26/17 0730)  SpO2: 96 % (06/26/17 0800)    Vital Signs Range (Last 24H):  Temp:  [97.7 °F (36.5 °C)-98.4 °F (36.9 °C)]   Pulse:  [65-74]   Resp:  [17-18]   BP: (117-131)/(59-77)   SpO2:  [95 %-98 %]     Physical Exam:  GENERAL: alert, cooperative, no distress  BODY HABITUS: moderately obese  VASCULAR: Dorsalis Pedis: Left: 1+ (weak) Right: 1+ (weak)       Posterior Tibialis: Left: 1+ (weak) Right: 1+ (weak)  NEURO: Sharp/Dull: Left: severe deficit Right: severe deficit  ORTHO: General Alignment: Left: No gross abnormalities Right: R 1st ray amp and  hammertoes 2-5  DERM: Hair: Left: no change Right: no change      6/25/17 6/26/17       Right foot wound measures 6.5 x 3.5 x 1.0 cm down to level of 2nd toe proximal phalanx bone. No further purulence expressed from the wound. Majority of wound is granular with fibrous slough embedded along distal aspect of wound. Mild edema and erythema surrounding wound. See clinical image above.     Laboratory:  CBC:     Recent Labs  Lab 06/26/17  0505   WBC 4.59   RBC 4.48*   HGB 12.4*   HCT 37.7*      MCV 84   MCH 27.7   MCHC 32.9     BMP:     Recent Labs  Lab 06/26/17  0505   *   *   K 4.9      CO2 30*   BUN 18   CREATININE 1.1   CALCIUM 9.3       ASSESSMENT/PLAN:     Assessment:  Uncontrolled DM II with peripheral neuropathy  Foot ulcer right with necrosis of bone  Osteomyelitis toe R  History of hallux amputation R.     Plan:  Wound assessed and examined. Wound stable at this time with clinical osteomyelitis of 2nd digit, proximal phalanx.     Wound cleased with saline soaked gauze. Packed and covered with Melgisorb Ag, gauze, kerlix and ACE. Continue daily dressing changes.     Recommend follow up MRI to assess extent of infection.   Hold Abx until after procedure.     Patient is not amenable to primary amputation at this time. Discussed pros and cons as well as risks of long term IV abx and primary amputation. He has failed 6 weeks of IV abx in the past. He is aware of risks, complications and alternative options.     Podiatry will continue to follow. Will plan for excisional wound debridement and biopsy of bone in OR either Tuesday or Wednesday. Will likely need additional 6 weeks of IV abx via PICC line. Appreciate ID assistance.

## 2017-06-26 NOTE — PLAN OF CARE
Problem: Patient Care Overview  Goal: Plan of Care Review  Outcome: Ongoing (interventions implemented as appropriate)  Dressing to right foot clean, dry, and intact. Blood sugar remains elevated; insulin management ongoing. Noncompliant with diet. Remains free from falls.

## 2017-06-26 NOTE — NURSING
Pt refused the offered bedtime snack (milk and kushal crackers). Currently eating pistachios. Also noted at the bedside: a jar of green olives, Goldfish crackers, Lunchable, ramen noodles, and a large cup of coffee.

## 2017-06-26 NOTE — NURSING
Up in bed eating [employer] fried chicken meal (chicken, biscuit, mashed potatoes). Dietary education attempted. Needs reinforcement.

## 2017-06-26 NOTE — PROGRESS NOTES
Progress Note  Infectious Disease    Admit Date: 6/23/2017   LOS: 3 days     SUBJECTIVE:     Follow-up For:  Rt foot osteomyelitis    Antibiotics     None          Review of Systems:  Constitutional: no fever or chills  Eyes: no visual changes  ENT: no nasal congestion or sore throat  Respiratory: no cough or shortness of breath  Cardiovascular: no chest pain or palpitations  Gastrointestinal: no nausea or vomiting, no abdominal pain or change in bowel habits  Genitourinary: no hematuria or dysuria  Hematologic/Lymphatic: no easy bruising or lymphadenopathy  Musculoskeletal: no arthralgias or myalgias  Neurological: no seizures or tremors    OBJECTIVE:     Vital Signs (Most Recent)  Temp: 97.7 °F (36.5 °C) (06/26/17 0730)  Pulse: 67 (06/26/17 0730)  Resp: 18 (06/26/17 0730)  BP: 121/77 (06/26/17 0730)  SpO2: 96 % (06/26/17 0800)    Temperature Range Min/Max (Last 24H):  Temp:  [97.7 °F (36.5 °C)-98.4 °F (36.9 °C)]     I & O (Last 24H):  Intake/Output Summary (Last 24 hours) at 06/26/17 1156  Last data filed at 06/26/17 0600   Gross per 24 hour   Intake             1560 ml   Output             2550 ml   Net             -990 ml       Physical Exam:  General: well developed, well nourished  HENT: Head:normocephalic, atraumatic. Ears:not examined. Nose: Nares normal. Septum midline. Mucosa normal. No drainage or sinus tenderness., no discharge. Throat: lips, mucosa, and tongue normal; teeth and gums normal and no throat erythema.  Eyes: conjunctivae/corneas clear. PERRL.   Neck: supple, symmetrical, trachea midline, no JVD and thyroid not enlarged, symmetric, no tenderness/mass/nodules  Lungs:  clear to auscultation bilaterally and normal respiratory effort  Cardiovascular: Heart: regular rate and rhythm, S1, S2 normal, no murmur, click, rub or gallop. Chest Wall: no tenderness. Extremities: no cyanosis or edema, or clubbing. Pulses: rt foot with bounding pulse. left foot impalpable foot pulse.  Abdomen/Rectal: Abdomen:  soft, non-tender non-distented; bowel sounds normal; no masses,  no organomegaly. Rectal: not examined  Skin: rt hallux open amp site with 70% granulation and 30% slough. rt 2nd toe is deformed swollen and with tiny opening rt lateral aspect of toe.  Musculoskeletal:no clubbing, cyanosis  Lymph Nodes: No cervical or supraclavicular adenopathy    Lines/Drains:       Peripheral IV - Single Lumen 06/23/17 1551 Right Wrist (Active)   Site Assessment Clean;Dry;Intact 6/25/2017  7:15 PM   Line Status Infusing 6/26/2017  7:45 AM   Dressing Status Clean;Dry;Intact 6/25/2017  7:15 PM   Dressing Change Due 06/27/17 6/25/2017  7:15 PM   Site Change Due 06/27/17 6/25/2017  7:15 PM   Reason Not Rotated Not due 6/25/2017  7:15 PM       Laboratory:  CBC    Recent Labs  Lab 06/26/17  0505   WBC 4.59   RBC 4.48*   HGB 12.4*   HCT 37.7*      MCV 84   MCH 27.7   MCHC 32.9     BMP    Recent Labs  Lab 06/26/17  0505   *   K 4.9      CO2 30*   BUN 18   CREATININE 1.1   CALCIUM 9.3     Microbiology Results (last 7 days)     Procedure Component Value Units Date/Time    Blood Culture #1 **CANNOT BE ORDERED STAT** [[id number]] Collected:  06/23/17 1510    Order Status:  Completed Specimen:  Blood from Peripheral, Antecubital, Right Updated:  06/25/17 1903     Blood Culture, Routine No Growth to date     Blood Culture, Routine No Growth to date     Blood Culture, Routine No Growth to date    Blood Culture #2 **CANNOT BE ORDERED STAT** [[id number]] Collected:  06/23/17 1525    Order Status:  Completed Specimen:  Blood from Peripheral, Forearm, Right Updated:  06/25/17 1903     Blood Culture, Routine No Growth to date     Blood Culture, Routine No Growth to date     Blood Culture, Routine No Growth to date        No results for input(s): COLORU, CLARITYU, SPECGRAV, PHUR, PROTEINUA, GLUCOSEU, BILIRUBINCON, BLOODU, WBCU, RBCU, BACTERIA, MUCUS, NITRITE, LEUKOCYTESUR, UROBILINOGEN, HYALINECASTS in the last 168 hours.    Diagnostic  Results:  Labs: Reviewed  X-Ray: Reviewed  MRI: Reviewed    ASSESSMENT/PLAN:     Active Hospital Problems    Diagnosis  POA    *Osteomyelitis of right foot [M86.9]  Yes    Chronic hepatitis C without hepatic coma [B18.2]  Yes    DM type 2, uncontrolled, with neuropathy [E11.40, E11.65]  Yes      Resolved Hospital Problems    Diagnosis Date Resolved POA   No resolved problems to display.       1. Concern for osteomyelitis, chronic of rt remnant metatarsal head and 2nd mtp joint  Repeat mri foot.  Consider repeat biopsy of foot and 2nd mtp joint to better guide rx  He is aware that if he has failed 6 weeks of iv abx the first time round, killianley that further iv abx without resection of infected remnant bone is unlikely to work  2. Hep c in remission  3. Poorly controlled diabetes

## 2017-06-27 ENCOUNTER — ANESTHESIA (OUTPATIENT)
Dept: SURGERY | Facility: HOSPITAL | Age: 57
DRG: 623 | End: 2017-06-27
Payer: MEDICAID

## 2017-06-27 ENCOUNTER — ANESTHESIA EVENT (OUTPATIENT)
Dept: SURGERY | Facility: HOSPITAL | Age: 57
DRG: 623 | End: 2017-06-27
Payer: MEDICAID

## 2017-06-27 LAB
ALBUMIN SERPL BCP-MCNC: 2.9 G/DL
ALP SERPL-CCNC: 61 U/L
ALT SERPL W/O P-5'-P-CCNC: 20 U/L
ANION GAP SERPL CALC-SCNC: 7 MMOL/L
AST SERPL-CCNC: 14 U/L
BASOPHILS # BLD AUTO: 0.03 K/UL
BASOPHILS NFR BLD: 0.6 %
BILIRUB SERPL-MCNC: 0.3 MG/DL
BUN SERPL-MCNC: 19 MG/DL
CALCIUM SERPL-MCNC: 9.4 MG/DL
CHLORIDE SERPL-SCNC: 101 MMOL/L
CO2 SERPL-SCNC: 27 MMOL/L
CREAT SERPL-MCNC: 1.1 MG/DL
CRP SERPL-MCNC: 37.1 MG/L
DIFFERENTIAL METHOD: ABNORMAL
EOSINOPHIL # BLD AUTO: 0.1 K/UL
EOSINOPHIL NFR BLD: 1.8 %
ERYTHROCYTE [DISTWIDTH] IN BLOOD BY AUTOMATED COUNT: 14.9 %
ERYTHROCYTE [SEDIMENTATION RATE] IN BLOOD BY WESTERGREN METHOD: 70 MM/HR
EST. GFR  (AFRICAN AMERICAN): >60 ML/MIN/1.73 M^2
EST. GFR  (NON AFRICAN AMERICAN): >60 ML/MIN/1.73 M^2
GLUCOSE SERPL-MCNC: 269 MG/DL
HCT VFR BLD AUTO: 33.7 %
HGB BLD-MCNC: 11.2 G/DL
LYMPHOCYTES # BLD AUTO: 1.5 K/UL
LYMPHOCYTES NFR BLD: 29.9 %
MCH RBC QN AUTO: 28 PG
MCHC RBC AUTO-ENTMCNC: 33.2 %
MCV RBC AUTO: 84 FL
MONOCYTES # BLD AUTO: 0.6 K/UL
MONOCYTES NFR BLD: 11.9 %
NEUTROPHILS # BLD AUTO: 2.7 K/UL
NEUTROPHILS NFR BLD: 55.4 %
PLATELET # BLD AUTO: 147 K/UL
PMV BLD AUTO: 10.9 FL
POCT GLUCOSE: 165 MG/DL (ref 70–110)
POCT GLUCOSE: 186 MG/DL (ref 70–110)
POCT GLUCOSE: 241 MG/DL (ref 70–110)
POCT GLUCOSE: 348 MG/DL (ref 70–110)
POTASSIUM SERPL-SCNC: 4.9 MMOL/L
PROT SERPL-MCNC: 7.7 G/DL
RBC # BLD AUTO: 4 M/UL
SODIUM SERPL-SCNC: 135 MMOL/L
WBC # BLD AUTO: 4.88 K/UL

## 2017-06-27 PROCEDURE — 63600175 PHARM REV CODE 636 W HCPCS: Performed by: INTERNAL MEDICINE

## 2017-06-27 PROCEDURE — 85651 RBC SED RATE NONAUTOMATED: CPT

## 2017-06-27 PROCEDURE — 87186 SC STD MICRODIL/AGAR DIL: CPT

## 2017-06-27 PROCEDURE — D9220A PRA ANESTHESIA: Mod: ANES,,, | Performed by: ANESTHESIOLOGY

## 2017-06-27 PROCEDURE — 37000008 HC ANESTHESIA 1ST 15 MINUTES: Performed by: PODIATRIST

## 2017-06-27 PROCEDURE — 36000706: Performed by: PODIATRIST

## 2017-06-27 PROCEDURE — 0HRMXK3 REPLACEMENT OF RIGHT FOOT SKIN WITH NONAUTOLOGOUS TISSUE SUBSTITUTE, FULL THICKNESS, EXTERNAL APPROACH: ICD-10-PCS | Performed by: PODIATRIST

## 2017-06-27 PROCEDURE — 87070 CULTURE OTHR SPECIMN AEROBIC: CPT

## 2017-06-27 PROCEDURE — 63600175 PHARM REV CODE 636 W HCPCS: Performed by: REGISTERED NURSE

## 2017-06-27 PROCEDURE — 12000002 HC ACUTE/MED SURGE SEMI-PRIVATE ROOM

## 2017-06-27 PROCEDURE — 80053 COMPREHEN METABOLIC PANEL: CPT

## 2017-06-27 PROCEDURE — 85025 COMPLETE CBC W/AUTO DIFF WBC: CPT

## 2017-06-27 PROCEDURE — 71000033 HC RECOVERY, INTIAL HOUR: Performed by: PODIATRIST

## 2017-06-27 PROCEDURE — 25000003 PHARM REV CODE 250: Performed by: REGISTERED NURSE

## 2017-06-27 PROCEDURE — 0QBQ0ZX EXCISION OF RIGHT TOE PHALANX, OPEN APPROACH, DIAGNOSTIC: ICD-10-PCS | Performed by: PODIATRIST

## 2017-06-27 PROCEDURE — 25000003 PHARM REV CODE 250: Performed by: EMERGENCY MEDICINE

## 2017-06-27 PROCEDURE — 88305 TISSUE EXAM BY PATHOLOGIST: CPT | Mod: 26,,, | Performed by: PATHOLOGY

## 2017-06-27 PROCEDURE — 88305 TISSUE EXAM BY PATHOLOGIST: CPT | Performed by: PATHOLOGY

## 2017-06-27 PROCEDURE — 36415 COLL VENOUS BLD VENIPUNCTURE: CPT

## 2017-06-27 PROCEDURE — 99232 SBSQ HOSP IP/OBS MODERATE 35: CPT | Mod: 25,,, | Performed by: PODIATRIST

## 2017-06-27 PROCEDURE — 87077 CULTURE AEROBIC IDENTIFY: CPT

## 2017-06-27 PROCEDURE — 87205 SMEAR GRAM STAIN: CPT

## 2017-06-27 PROCEDURE — 88311 DECALCIFY TISSUE: CPT | Mod: 26,,, | Performed by: PATHOLOGY

## 2017-06-27 PROCEDURE — 15004 WOUND PREP F/N/HF/G: CPT | Mod: ,,, | Performed by: PODIATRIST

## 2017-06-27 PROCEDURE — 63600175 PHARM REV CODE 636 W HCPCS: Performed by: EMERGENCY MEDICINE

## 2017-06-27 PROCEDURE — 37000009 HC ANESTHESIA EA ADD 15 MINS: Performed by: PODIATRIST

## 2017-06-27 PROCEDURE — 25000003 PHARM REV CODE 250: Performed by: PODIATRIST

## 2017-06-27 PROCEDURE — 36000707: Performed by: PODIATRIST

## 2017-06-27 PROCEDURE — 71000039 HC RECOVERY, EACH ADD'L HOUR: Performed by: PODIATRIST

## 2017-06-27 PROCEDURE — 87075 CULTR BACTERIA EXCEPT BLOOD: CPT

## 2017-06-27 PROCEDURE — 15275 SKIN SUB GRAFT FACE/NK/HF/G: CPT | Mod: ,,, | Performed by: PODIATRIST

## 2017-06-27 PROCEDURE — 86140 C-REACTIVE PROTEIN: CPT

## 2017-06-27 PROCEDURE — D9220A PRA ANESTHESIA: Mod: CRNA,,, | Performed by: REGISTERED NURSE

## 2017-06-27 PROCEDURE — 0QBQ0ZZ EXCISION OF RIGHT TOE PHALANX, OPEN APPROACH: ICD-10-PCS | Performed by: PODIATRIST

## 2017-06-27 DEVICE — IMPLANTABLE DEVICE: Type: IMPLANTABLE DEVICE | Site: FOOT | Status: FUNCTIONAL

## 2017-06-27 RX ORDER — FENTANYL CITRATE 50 UG/ML
25 INJECTION, SOLUTION INTRAMUSCULAR; INTRAVENOUS EVERY 5 MIN PRN
Status: DISCONTINUED | OUTPATIENT
Start: 2017-06-27 | End: 2017-06-27

## 2017-06-27 RX ORDER — BUPIVACAINE HYDROCHLORIDE 5 MG/ML
INJECTION, SOLUTION PERINEURAL
Status: DISCONTINUED | OUTPATIENT
Start: 2017-06-27 | End: 2017-06-27 | Stop reason: HOSPADM

## 2017-06-27 RX ORDER — SODIUM CHLORIDE, SODIUM LACTATE, POTASSIUM CHLORIDE, CALCIUM CHLORIDE 600; 310; 30; 20 MG/100ML; MG/100ML; MG/100ML; MG/100ML
INJECTION, SOLUTION INTRAVENOUS CONTINUOUS PRN
Status: DISCONTINUED | OUTPATIENT
Start: 2017-06-27 | End: 2017-06-27

## 2017-06-27 RX ORDER — LIDOCAINE HYDROCHLORIDE 20 MG/ML
INJECTION, SOLUTION INFILTRATION; PERINEURAL
Status: DISCONTINUED | OUTPATIENT
Start: 2017-06-27 | End: 2017-06-27 | Stop reason: HOSPADM

## 2017-06-27 RX ORDER — PROPOFOL 10 MG/ML
VIAL (ML) INTRAVENOUS
Status: DISCONTINUED | OUTPATIENT
Start: 2017-06-27 | End: 2017-06-27

## 2017-06-27 RX ORDER — PROPOFOL 10 MG/ML
VIAL (ML) INTRAVENOUS CONTINUOUS PRN
Status: DISCONTINUED | OUTPATIENT
Start: 2017-06-27 | End: 2017-06-27

## 2017-06-27 RX ORDER — SODIUM CHLORIDE 0.9 % (FLUSH) 0.9 %
3 SYRINGE (ML) INJECTION
Status: DISCONTINUED | OUTPATIENT
Start: 2017-06-27 | End: 2017-07-01 | Stop reason: HOSPADM

## 2017-06-27 RX ORDER — ONDANSETRON 2 MG/ML
4 INJECTION INTRAMUSCULAR; INTRAVENOUS EVERY 8 HOURS PRN
Status: DISCONTINUED | OUTPATIENT
Start: 2017-06-27 | End: 2017-06-27

## 2017-06-27 RX ORDER — LIDOCAINE HCL/PF 100 MG/5ML
SYRINGE (ML) INTRAVENOUS
Status: DISCONTINUED | OUTPATIENT
Start: 2017-06-27 | End: 2017-06-27

## 2017-06-27 RX ADMIN — PROPOFOL 25 MCG/KG/MIN: 10 INJECTION, EMULSION INTRAVENOUS at 12:06

## 2017-06-27 RX ADMIN — INSULIN ASPART 2 UNITS: 100 INJECTION, SOLUTION INTRAVENOUS; SUBCUTANEOUS at 05:06

## 2017-06-27 RX ADMIN — LIDOCAINE HYDROCHLORIDE 50 MG: 20 INJECTION, SOLUTION INTRAVENOUS at 12:06

## 2017-06-27 RX ADMIN — HYDROMORPHONE HYDROCHLORIDE 1 MG: 2 INJECTION INTRAMUSCULAR; INTRAVENOUS; SUBCUTANEOUS at 06:06

## 2017-06-27 RX ADMIN — FENTANYL 1 PATCH: 50 PATCH, EXTENDED RELEASE TRANSDERMAL at 04:06

## 2017-06-27 RX ADMIN — HEPARIN SODIUM 5000 UNITS: 5000 INJECTION, SOLUTION INTRAVENOUS; SUBCUTANEOUS at 06:06

## 2017-06-27 RX ADMIN — INSULIN ASPART 1 UNITS: 100 INJECTION, SOLUTION INTRAVENOUS; SUBCUTANEOUS at 09:06

## 2017-06-27 RX ADMIN — HEPARIN SODIUM 5000 UNITS: 5000 INJECTION, SOLUTION INTRAVENOUS; SUBCUTANEOUS at 09:06

## 2017-06-27 RX ADMIN — DIAZEPAM 2 MG: 2 TABLET ORAL at 09:06

## 2017-06-27 RX ADMIN — HYDROMORPHONE HYDROCHLORIDE 1 MG: 2 INJECTION INTRAMUSCULAR; INTRAVENOUS; SUBCUTANEOUS at 10:06

## 2017-06-27 RX ADMIN — Medication 3 ML: at 06:06

## 2017-06-27 RX ADMIN — HYDROMORPHONE HYDROCHLORIDE 1 MG: 2 INJECTION INTRAMUSCULAR; INTRAVENOUS; SUBCUTANEOUS at 09:06

## 2017-06-27 RX ADMIN — HYDROMORPHONE HYDROCHLORIDE 1 MG: 2 INJECTION INTRAMUSCULAR; INTRAVENOUS; SUBCUTANEOUS at 04:06

## 2017-06-27 RX ADMIN — PROPOFOL 25 MG: 10 INJECTION, EMULSION INTRAVENOUS at 12:06

## 2017-06-27 RX ADMIN — HYDROMORPHONE HYDROCHLORIDE 1 MG: 2 INJECTION INTRAMUSCULAR; INTRAVENOUS; SUBCUTANEOUS at 01:06

## 2017-06-27 RX ADMIN — INSULIN ASPART 8 UNITS: 100 INJECTION, SOLUTION INTRAVENOUS; SUBCUTANEOUS at 10:06

## 2017-06-27 RX ADMIN — Medication 3 ML: at 09:06

## 2017-06-27 RX ADMIN — INSULIN ASPART 5 UNITS: 100 INJECTION, SOLUTION INTRAVENOUS; SUBCUTANEOUS at 05:06

## 2017-06-27 RX ADMIN — SODIUM CHLORIDE, SODIUM LACTATE, POTASSIUM CHLORIDE, AND CALCIUM CHLORIDE: .6; .31; .03; .02 INJECTION, SOLUTION INTRAVENOUS at 12:06

## 2017-06-27 RX ADMIN — Medication 3 ML: at 04:06

## 2017-06-27 NOTE — PROGRESS NOTES
Progress Note    Admit Date: 6/23/2017   LOS: 4 days     SUBJECTIVE:     Follow-up For:  Osteomyelitis of right foot    06/27/2017: underwent MRI yesterday. Awaiting for possible bone bx and debridement today   06/26/2017: denies fever or chills.       Scheduled Meds:   aspirin  81 mg Oral Daily    famotidine  20 mg Oral Daily    fentaNYL  1 patch Transdermal Q72H    heparin (porcine)  5,000 Units Subcutaneous Q8H    insulin aspart  5 Units Subcutaneous TIDWM    insulin detemir  20 Units Subcutaneous QHS    nicotine  1 patch Transdermal Daily    senna-docusate 8.6-50 mg  1 tablet Oral BID    sodium chloride 0.9%  3 mL Intravenous Q8H     Continuous Infusions:   PRN Meds:dextrose 50%, dextrose 50%, diazePAM, glucagon (human recombinant), glucose, glucose, HYDROmorphone, insulin aspart, ondansetron, oxycodone, promethazine    Review of patient's allergies indicates:   Allergen Reactions    Codeine Rash     Other reaction(s): Unknown       Review of Systems    Constitutional: Denies fever or chills  Eyes: no visual changes   ENT: no nasal congestion. Denies sore throat with odynophagia   Respiratory: No cough, SOB, exertional dyspnea or  hemoptysis   Cardiovascular: no chest pain or palpitations   Gastrointestinal: no nausea, vomiting or abdominal pain. Normal bowl habits   Hematologic/Lymphatic: no bleeding   Musculoskeletal: see HPI  Neurological: no seizures or tremors, gait or balance prblems  Skin: see HPI  Neuro: Denies any weakness. Chronic back pain  Psych: Denies any anxiety, depression or insomnia      OBJECTIVE:     Vital Signs (Most Recent)  Temp: 97.5 °F (36.4 °C) (06/27/17 0007)  Pulse: 64 (06/27/17 0007)  Resp: 18 (06/27/17 0007)  BP: 119/71 (06/27/17 0007)  SpO2: 96 % (06/27/17 0007)    Vital Signs Range (Last 24H):  Temp:  [97.5 °F (36.4 °C)-98.2 °F (36.8 °C)]   Pulse:  [64-84]   Resp:  [18]   BP: (114-130)/(62-71)   SpO2:  [95 %-98 %]     I & O (Last 24H):    Intake/Output Summary (Last 24  hours) at 06/27/17 0741  Last data filed at 06/27/17 0606   Gross per 24 hour   Intake              960 ml   Output             1600 ml   Net             -640 ml     Physical Exam:    General: NAD, sitting up in bed .    Head: normocephalic, atraumatic   Eyes: conjunctivae pink, anicteric sclera. PERRL.   Nose: Mucosa normal. No drainage or sinus tenderness, no discharge   Throat: No erythema or post nasal discharge   Neck: supple, no LAD   Lungs: CTAB   Heart: S1, S2, RRR   Abdomen: + BS x 4 quadrants, soft, non tender. No hepatosplenomegaly.   Extremities: no cyanosis or edema.   Skin: right foot in dressing   MS: per DPM: right foot wound to the level of 2nd phalanx bone. Covered with dressing   Neuro: A&O x 3  Psy: pleasant, affect is congruent to mood       Laboratory:  CBC:     Recent Labs  Lab 06/27/17 0426   WBC 4.88   RBC 4.00*   HGB 11.2*   HCT 33.7*   *   MCV 84   MCH 28.0   MCHC 33.2     CMP:     Recent Labs  Lab 06/27/17 0426   *   CALCIUM 9.4   ALBUMIN 2.9*   PROT 7.7   *   K 4.9   CO2 27      BUN 19   CREATININE 1.1   ALKPHOS 61   ALT 20   AST 14   BILITOT 0.3     Coagulation: No results for input(s): LABPROT, INR, APTT in the last 168 hours.  Microbiology Results (last 7 days)     Procedure Component Value Units Date/Time    Blood Culture #1 **CANNOT BE ORDERED STAT** [[id number]] Collected:  06/23/17 1510    Order Status:  Completed Specimen:  Blood from Peripheral, Antecubital, Right Updated:  06/26/17 1903     Blood Culture, Routine No Growth to date     Blood Culture, Routine No Growth to date     Blood Culture, Routine No Growth to date     Blood Culture, Routine No Growth to date    Blood Culture #2 **CANNOT BE ORDERED STAT** [[id number]] Collected:  06/23/17 1525    Order Status:  Completed Specimen:  Blood from Peripheral, Forearm, Right Updated:  06/26/17 1903     Blood Culture, Routine No Growth to date     Blood Culture, Routine No Growth to date     Blood  Culture, Routine No Growth to date     Blood Culture, Routine No Growth to date        Specimen (12h ago through future)    None          Diagnostic Results:    X-ray foot:     Postoperative changes related to amputation of the great toe and distal aspect of the first metatarsal bone.  Bone destruction involving the distal stump of the first metatarsal bone worrisome for underlying osteomyelitis.  Fracture of the base of the proximal phalanx of the second toe. This may represent a pathologic fracture due to underlying osteomyelitis. There is associated soft tissue swelling and a small amount of soft tissue air adjacent to the base of the second toe.    MRI foot: 06/26/2017      1. Postoperative change of prior right great toe partial resection. There is abnormal marrow edema and geographic T1 marrow replacement involving the residual great toe metatarsal base concerning for osteomyelitis.    2. Abnormal marrow edema and geographic T1 marrow replacement of the proximal phalanx of the second toe with associated fracture concerning for osteomyelitis with pathologic fracture. Small second toe MTP joint effusion is noted with mild edema of the second metatarsal head suspicious for possible early osteomyelitis.    3. Diffuse edema of the plantar foot musculature suggestive of neuropathic myositis in this patient with a reported history of diabetes.    This report has been flagged in the [facility] medical record.    Current Facility-Administered Medications   Medication Dose Route Frequency Provider Last Rate Last Dose    aspirin EC tablet 81 mg  81 mg Oral Daily You Jolley MD   81 mg at 06/26/17 0816    dextrose 50% injection 12.5 g  12.5 g Intravenous PRN Phan Singleton MD        dextrose 50% injection 25 g  25 g Intravenous PRN Phan Singleton MD        diazePAM tablet 2 mg  2 mg Oral Q8H PRN You Jolley MD   2 mg at 06/26/17 2115    famotidine tablet 20 mg  20 mg Oral Daily You Jolley MD   20 mg at  06/26/17 0816    fentaNYL 50 mcg/hr 1 patch  1 patch Transdermal Q72H You Jolley MD   1 patch at 06/26/17 1655    glucagon (human recombinant) injection 1 mg  1 mg Intramuscular PRN Phan Singleton MD        glucose chewable tablet 16 g  16 g Oral PRN Phan Singleton MD        glucose chewable tablet 24 g  24 g Oral PRN Phan Singleton MD        heparin (porcine) injection 5,000 Units  5,000 Units Subcutaneous Q8H You Jolley MD   5,000 Units at 06/27/17 0616    hydromorphone (PF) injection 1 mg  1 mg Intravenous Q4H PRN You Jolley MD   1 mg at 06/27/17 0616    insulin aspart pen 1-10 Units  1-10 Units Subcutaneous QID (AC + HS) PRN Phan Singleton MD   3 Units at 06/26/17 2116    insulin aspart pen 5 Units  5 Units Subcutaneous TIDWM Miguel Lux MD        insulin detemir pen 20 Units  20 Units Subcutaneous QHS Miguel Lux MD        nicotine 14 mg/24 hr 1 patch  1 patch Transdermal Daily Phan Singleton MD   1 patch at 06/26/17 0816    ondansetron injection 4 mg  4 mg Intravenous Q12H PRN You Jolley MD        oxycodone immediate release tablet 10 mg  10 mg Oral Q6H PRN You Jolley MD   10 mg at 06/26/17 0447    promethazine tablet 25 mg  25 mg Oral Q6H PRN You Jolley MD        senna-docusate 8.6-50 mg per tablet 1 tablet  1 tablet Oral BID You Jolley MD   1 tablet at 06/26/17 0816    sodium chloride 0.9% flush 3 mL  3 mL Intravenous Q8H You Jolley MD   3 mL at 06/27/17 0617       ASSESSMENT/PLAN:     Active Hospital Problems    Diagnosis  POA    *Osteomyelitis of right foot [M86.9]    -? pt failed 6 weeks of IV ABX and I am not very hopeful that further ABX will add any meaningful benefit for his osteomyelitis  - await MRI of foot    - ID and DPM following pt  - Elevated CRP and Sed rate  - MRI foot showing:proximal phalanx of the second toe with associated fracture concerning for osteomyelitis with pathologic fracture. Small second toe MTP joint effusion is  noted with mild edema of the second metatarsal head suspicious for possible early osteomyelitis.    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]    - has been on chronic benzo      Yes    Diabetic polyneuropathy associated with type 2 diabetes mellitus [E11.42]  Yes    Anemia of other chronic disease [D63.8]  - stable    Yes    Chronic back pain [M54.9, G89.29]- on chronic opiate tx      Yes    DM type 2, uncontrolled, with neuropathy [E11.40, E11.65]    - poor insight into pt's health  - he does not care much about his DM and missed many appointments with endocrinology f/u    - continue basal and prandial insulin  - adjusted dose   - blood sugar improving       Yes    Lumbar herniated disc - Large central disk extrusion at the L4-L5 level with significant central canal and bilateral neuroforaminal narrowing [M51.26]  Yes      Resolved Hospital Problems    Diagnosis Date Resolved POA   No resolved problems to display.     Miguel Lux M.D  Internal Medicine & Geriatric Medicine  Hematology & Oncology  Palliative Medicine    [address], [address]  [place] [address]  [phone] (Office)  [phone] (Fax)

## 2017-06-27 NOTE — PLAN OF CARE
Problem: Diabetes, Type 2 (Adult)  Goal: Signs and Symptoms of Listed Potential Problems Will be Absent, Minimized or Managed (Diabetes, Type 2)  Signs and symptoms of listed potential problems will be absent, minimized or managed by discharge/transition of care (reference Diabetes, Type 2 (Adult) CPG).   Outcome: Ongoing (interventions implemented as appropriate)   06/27/17 0724   Diabetes, Type 2   Problems Assessed (Type 2 Diabetes) all   Problems Present (Type 2 Diabetes) hyperglycemia       Comments: Results for CLARE BYRNE (MRN [id number])    Ref. Range 6/26/2017 20:24   POCT Glucose Latest Ref Range: 70 - 110 mg/dL 261 (H)

## 2017-06-27 NOTE — PROGRESS NOTES
Patient to Return to floor with orders to ice and elevate surgical limb for the next 24 hours.     He will be primarily nonweightbearing to the right foot and use walker or crutch assistance. When patient does ambulate or transfer alejandrina should have darco shoe or CAM boot to limb with pressure concentrated to the heel.    Podiatry will round on him POD 2 to change dressing. Absolutely no tampering with surgical foot dressing.  GRAFT in place to site.    Dressings are to be left clean, dry, and intact until POD 2    May resume Abx per ID

## 2017-06-27 NOTE — PROGRESS NOTES
Progress Note  Infectious Disease    Admit Date: 6/23/2017   LOS: 4 days     SUBJECTIVE:     Follow-up For:  Rt foot osteomyelitis    Antibiotics     None          Review of Systems:  Constitutional: no fever or chills  Eyes: no visual changes  ENT: no nasal congestion or sore throat  Respiratory: no cough or shortness of breath  Cardiovascular: no chest pain or palpitations  Gastrointestinal: no nausea or vomiting, no abdominal pain or change in bowel habits  Genitourinary: no hematuria or dysuria  Hematologic/Lymphatic: no easy bruising or lymphadenopathy  Musculoskeletal: no arthralgias or myalgias  Neurological: no seizures or tremors    OBJECTIVE:     Vital Signs (Most Recent)  Temp: 97.9 °F (36.6 °C) (06/27/17 0748)  Pulse: 72 (06/27/17 0748)  Resp: 18 (06/27/17 0748)  BP: 112/76 (06/27/17 0748)  SpO2: 96 % (06/27/17 0748)    Temperature Range Min/Max (Last 24H):  Temp:  [97.5 °F (36.4 °C)-98.2 °F (36.8 °C)]     I & O (Last 24H):    Intake/Output Summary (Last 24 hours) at 06/27/17 1105  Last data filed at 06/27/17 0900   Gross per 24 hour   Intake              480 ml   Output             1600 ml   Net            -1120 ml       Physical Exam:  General: well developed, well nourished  HENT: Head:normocephalic, atraumatic. Ears:not examined. Nose: Nares normal. Septum midline. Mucosa normal. No drainage or sinus tenderness., no discharge. Throat: lips, mucosa, and tongue normal; teeth and gums normal and no throat erythema.  Eyes: conjunctivae/corneas clear. PERRL.   Neck: supple, symmetrical, trachea midline, no JVD and thyroid not enlarged, symmetric, no tenderness/mass/nodules  Lungs:  clear to auscultation bilaterally and normal respiratory effort  Cardiovascular: Heart: regular rate and rhythm, S1, S2 normal, no murmur, click, rub or gallop. Chest Wall: no tenderness. Extremities: no cyanosis or edema, or clubbing. Pulses: rt foot with bounding pulse. left foot impalpable foot pulse.  Abdomen/Rectal:  Abdomen: soft, non-tender non-distented; bowel sounds normal; no masses,  no organomegaly. Rectal: not examined  Skin: rt hallux open amp site with 70% granulation and 30% slough. rt 2nd toe is deformed swollen and with tiny opening rt lateral aspect of toe.  Musculoskeletal:no clubbing, cyanosis  Lymph Nodes: No cervical or supraclavicular adenopathy    Lines/Drains:       Peripheral IV - Single Lumen 06/23/17 1551 Right Wrist (Active)   Site Assessment Clean;Dry;Intact 6/25/2017  7:15 PM   Line Status Infusing 6/26/2017  7:45 AM   Dressing Status Clean;Dry;Intact 6/25/2017  7:15 PM   Dressing Change Due 06/27/17 6/25/2017  7:15 PM   Site Change Due 06/27/17 6/25/2017  7:15 PM   Reason Not Rotated Not due 6/25/2017  7:15 PM       Laboratory:  CBC    Recent Labs  Lab 06/27/17  0426   WBC 4.88   RBC 4.00*   HGB 11.2*   HCT 33.7*   *   MCV 84   MCH 28.0   MCHC 33.2     BMP    Recent Labs  Lab 06/27/17  0426   *   K 4.9      CO2 27   BUN 19   CREATININE 1.1   CALCIUM 9.4     Microbiology Results (last 7 days)     Procedure Component Value Units Date/Time    Blood Culture #1 **CANNOT BE ORDERED STAT** [[id number]] Collected:  06/23/17 1510    Order Status:  Completed Specimen:  Blood from Peripheral, Antecubital, Right Updated:  06/26/17 1903     Blood Culture, Routine No Growth to date     Blood Culture, Routine No Growth to date     Blood Culture, Routine No Growth to date     Blood Culture, Routine No Growth to date    Blood Culture #2 **CANNOT BE ORDERED STAT** [[id number]] Collected:  06/23/17 1525    Order Status:  Completed Specimen:  Blood from Peripheral, Forearm, Right Updated:  06/26/17 1903     Blood Culture, Routine No Growth to date     Blood Culture, Routine No Growth to date     Blood Culture, Routine No Growth to date     Blood Culture, Routine No Growth to date        No results for input(s): COLORU, CLARITYU, SPECGRAV, PHUR, PROTEINUA, GLUCOSEU, BILIRUBINCON, BLOODU, WBCU, RBCU,  BACTERIA, MUCUS, NITRITE, LEUKOCYTESUR, UROBILINOGEN, HYALINECASTS in the last 168 hours.    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       1. Concern for osteomyelitis, chronic of rt remnant metatarsal head and 2nd mtp joint  Repeat mri foot confirms osteomyelitis of 1st remnant metatarsal head and also proximal 2nd phalanx.  Consider repeat biopsy of foot and 2nd mtp joint to better guide rx  He is aware that if he has failed 6 weeks of iv abx the first time round, unlikley that further iv abx without resection of infected remnant bone is unlikely to work  2. Hep c in remission  3. Poorly controlled diabetes  Will start iv abx after biopsy done today

## 2017-06-27 NOTE — NURSING
Discussed fall related to high risk med and right foot wound.Pain management reviewed. Infection control also discussed

## 2017-06-27 NOTE — TRANSFER OF CARE
"Anesthesia Transfer of Care Note    Patient: Carlos Cifuentes    Procedure(s) Performed: Procedure(s) (LRB):  DEBRIDEMENT-FOOT Bone Biopsy 2nd toe (Right)    Patient location: PACU    Anesthesia Type: MAC    Transport from OR: Transported from OR on room air with adequate spontaneous ventilation    Post pain: adequate analgesia    Post assessment: no apparent anesthetic complications and tolerated procedure well    Post vital signs: stable    Level of consciousness: awake, alert and oriented    Nausea/Vomiting: no nausea/vomiting    Complications: none    Transfer of care protocol was followedComments: Moved self over to bed      Last vitals:   Visit Vitals  BP (!) 158/87   Pulse 72   Temp 36.5 °C (97.7 °F) (Oral)   Resp 14   Ht 5' 9" (1.753 m)   Wt 104.3 kg (230 lb)   SpO2 96%   BMI 33.97 kg/m²     "

## 2017-06-27 NOTE — BRIEF OP NOTE
[facility]  Brief Operative Note    SUMMARY     Surgery Date: 6/27/2017     Surgeon(s) and Role:     * Nora Braden DPM - Primary    Assisting Surgeon: None    Pre-op Diagnosis:  Toe osteomyelitis, right [M86.9]  Other acute osteomyelitis of right foot [M86.171]    Post-op Diagnosis:  Post-Op Diagnosis Codes:     * Toe osteomyelitis, right [M86.9]     * Other acute osteomyelitis of right foot [M86.171]    Procedure(s) (LRB):  DEBRIDEMENT-FOOT Bone Biopsy 2nd toe (Right)    Anesthesia: Local MAC    Description of Procedure: excisional debridement and bone biopsy of 2nd digit of the right foor    Description of the findings of the procedure:  Soft fragmented proximal phalanx of the 2nd digit of the right foot    Estimated Blood Loss: less than 10mL         Specimens:   Specimen (12h ago through future)    Start     Ordered    06/27/17 1318  Specimen to Pathology - Surgery  Once     Comments:  Bone- right foot-2nd toe for Routine SHARED SPECIMEN!      06/27/17 0474

## 2017-06-27 NOTE — OP NOTE
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 male pmhx of DMII uncontrolled and peripheral neuropathy with an open first ray amputation of the right foot of ~ 2 months treated with wound VAC, IV Abx, PO abx. However patient admits to behaviors contrary to postoperative and wound care instruction which resulted in OM to the 2nd digit. The patient was given the option of aggressive debridement of soft tissue and bone biopsy to assist in treatment of osteomyelitis. The risks versus benefits of the procedure were discussed with the patient in detail by Dr. Braden and Dr Veronica. The consent is available on the chart for review.    Procedure Details     After obtaining consent and Time out performed to verify patient and site, local anesthesia achieved as a 2nd digit block using 5 cc of a 1:1 mixture of 2% lidocaine plain and 0.5% marcaine plain. The surgical site was then prepped and draped in the usual aseptic fashion.    Attention was directed to the Open 1st ray amputation site which probed plantar sub 2nd MTPJ. A #15 blade was utilized to debride the nonviable tissue at the wound base. Debridement was full-thickness through skin, subcutaneous tissue and bone of the proximal phalanx of the 2nd digit to excise viable and non-viable tissue outside the wound margins. Tissue removed included skin, subcutaneous tissue, callus, bone, and fibrin. Bone specimens were sent for culture and for pathological diagnosis. Post-debridement, the wound was 100% beefy red bone and granulation tissue, mildly bleeding, without callus nor fibrin noted.  Bleeding was well-controlled with direct pressure. 3-0 prolene was used to aid in approximation of the skin at the base of the proximal phalanx.      There was no purulent drainage noted. Next, copious amounts of sterile normal saline were instilled into the wound.      Post debridement measurements: 5.5x3x2.7 cm, for a total square area debrided of: 16.5 sq cm.       A #3-0 prolene was utilized to place NEOX graft over to open wound.     The patient tolerated the above procedure without apparent complications. A standard postoperative dressing was applied consisting of adaptic, 4x4s, Kerlix, cast paddingand Coban. Plan is to reinitiate the antibiotics until further ID recommendations.    - ID to evaluatie of biopsy/bone culture results.   - Offloading in CAM boot or darco shoe at all times  - Podiatry will round on him POD 2 to change dressing. Absolutely no tampering with surgical foot dressing.  GRAFT in place to site.    Short-term goals include maintaining good offloading and minimizing bioburden, promoting granulation and epithelialization to healing.   Long-term goals include keeping the wound healed by good offloading and medical management under the direction of internist.       Findings: Soft fragmented bone              Specimens: bone and soft tissue    Complications:  None; patient tolerated the procedure well.                  Condition: stable

## 2017-06-27 NOTE — PROGRESS NOTES
Progress Note    Admit Date: 6/23/2017   LOS: 3 days     SUBJECTIVE:     Follow-up For:  Osteomyelitis of right foot    06/26/2017: denies fever or chills.       Scheduled Meds:   aspirin  81 mg Oral Daily    famotidine  20 mg Oral Daily    fentaNYL  1 patch Transdermal Q72H    heparin (porcine)  5,000 Units Subcutaneous Q8H    insulin detemir  15 Units Subcutaneous QHS    nicotine  1 patch Transdermal Daily    senna-docusate 8.6-50 mg  1 tablet Oral BID    sodium chloride 0.9%  3 mL Intravenous Q8H     Continuous Infusions:   PRN Meds:dextrose 50%, dextrose 50%, diazePAM, glucagon (human recombinant), glucose, glucose, HYDROmorphone, insulin aspart, ondansetron, oxycodone, promethazine    Review of patient's allergies indicates:   Allergen Reactions    Codeine Rash     Other reaction(s): Unknown       Review of Systems    Constitutional: Denies fever or chills  Eyes: no visual changes   ENT: no nasal congestion. Denies sore throat with odynophagia   Respiratory: No cough, SOB, exertional dyspnea or  hemoptysis   Cardiovascular: no chest pain or palpitations   Gastrointestinal: no nausea, vomiting or abdominal pain. Normal bowl habits   Hematologic/Lymphatic: no bleeding   Musculoskeletal: see HPI  Neurological: no seizures or tremors, gait or balance prblems  Skin: see HPI  Neuro: Denies any weakness. Chronic back pain  Psych: Denies any anxiety, depression or insomnia      OBJECTIVE:     Vital Signs (Most Recent)  Temp: 98.1 °F (36.7 °C) (06/26/17 2004)  Pulse: 77 (06/26/17 2004)  Resp: 18 (06/26/17 2004)  BP: 114/62 (06/26/17 2004)  SpO2: 95 % (06/26/17 2004)    Vital Signs Range (Last 24H):  Temp:  [97.7 °F (36.5 °C)-98.3 °F (36.8 °C)]   Pulse:  [65-84]   Resp:  [18]   BP: (114-131)/(62-77)   SpO2:  [95 %-98 %]     I & O (Last 24H):  Intake/Output Summary (Last 24 hours) at 06/26/17 7489  Last data filed at 06/26/17 1600   Gross per 24 hour   Intake             1680 ml   Output             2250 ml   Net              -570 ml     Physical Exam:    General: NAD, sitting up in bed and having coffee.    Head: normocephalic, atraumatic   Eyes: conjunctivae pink, anicteric sclera. PERRL.   Nose: Mucosa normal. No drainage or sinus tenderness, no discharge   Throat: No erythema or post nasal discharge   Neck: supple, no LAD   Lungs: CTAB   Heart: S1, S2, RRR   Abdomen: + BS x 4 quadrants, soft, non tender. No hepatosplenomegaly.   Extremities: no cyanosis or edema.   Skin: right foot in dressing   MS: per DPM: right foot wound to the level of 2nd phalanx bone  Neuro: A&O x 3  Psy: pleasant, affect is congruent to mood       Laboratory:  CBC:   Recent Labs  Lab 06/26/17  0505   WBC 4.59   RBC 4.48*   HGB 12.4*   HCT 37.7*      MCV 84   MCH 27.7   MCHC 32.9     CMP:   Recent Labs  Lab 06/26/17  0505   *   CALCIUM 9.3   ALBUMIN 3.1*   PROT 8.3   *   K 4.9   CO2 30*      BUN 18   CREATININE 1.1   ALKPHOS 73   ALT 21   AST 17   BILITOT 0.2     Coagulation: No results for input(s): LABPROT, INR, APTT in the last 168 hours.  Microbiology Results (last 7 days)     Procedure Component Value Units Date/Time    Blood Culture #1 **CANNOT BE ORDERED STAT** [[id number]] Collected:  06/23/17 1510    Order Status:  Completed Specimen:  Blood from Peripheral, Antecubital, Right Updated:  06/26/17 1903     Blood Culture, Routine No Growth to date     Blood Culture, Routine No Growth to date     Blood Culture, Routine No Growth to date     Blood Culture, Routine No Growth to date    Blood Culture #2 **CANNOT BE ORDERED STAT** [[id number]] Collected:  06/23/17 1525    Order Status:  Completed Specimen:  Blood from Peripheral, Forearm, Right Updated:  06/26/17 1903     Blood Culture, Routine No Growth to date     Blood Culture, Routine No Growth to date     Blood Culture, Routine No Growth to date     Blood Culture, Routine No Growth to date        Specimen (12h ago through future)    None          Diagnostic  Results:    X-ray foot:     Postoperative changes related to amputation of the great toe and distal aspect of the first metatarsal bone.  Bone destruction involving the distal stump of the first metatarsal bone worrisome for underlying osteomyelitis.  Fracture of the base of the proximal phalanx of the second toe. This may represent a pathologic fracture due to underlying osteomyelitis. There is associated soft tissue swelling and a small amount of soft tissue air adjacent to the base of the second toe.        Current Facility-Administered Medications   Medication Dose Route Frequency Provider Last Rate Last Dose    aspirin EC tablet 81 mg  81 mg Oral Daily You Jolley MD   81 mg at 06/26/17 0816    dextrose 50% injection 12.5 g  12.5 g Intravenous PRN Phan Singleton MD        dextrose 50% injection 25 g  25 g Intravenous PRN Phan Singleton MD        diazePAM tablet 2 mg  2 mg Oral Q8H PRN Yuo Jolley MD   2 mg at 06/26/17 2115    famotidine tablet 20 mg  20 mg Oral Daily You Jolley MD   20 mg at 06/26/17 0816    fentaNYL 50 mcg/hr 1 patch  1 patch Transdermal Q72H You Jolley MD   1 patch at 06/26/17 1655    glucagon (human recombinant) injection 1 mg  1 mg Intramuscular PRN Phan Singleton MD        glucose chewable tablet 16 g  16 g Oral PRN Phan Singleton MD        glucose chewable tablet 24 g  24 g Oral PRN Phan Singleton MD        heparin (porcine) injection 5,000 Units  5,000 Units Subcutaneous Q8H You Jolley MD   5,000 Units at 06/26/17 2115    hydromorphone (PF) injection 1 mg  1 mg Intravenous Q4H PRN You Jolley MD   1 mg at 06/26/17 2014    insulin aspart pen 1-10 Units  1-10 Units Subcutaneous QID (AC + HS) PRN Phan Singleton MD   3 Units at 06/26/17 2116    insulin detemir pen 15 Units  15 Units Subcutaneous QHS Phan Singleton MD   15 Units at 06/26/17 2116    nicotine 14 mg/24 hr 1 patch  1 patch Transdermal Daily Phan Singleton MD   1 patch at  06/26/17 0816    ondansetron injection 4 mg  4 mg Intravenous Q12H PRN You Jolley MD        oxycodone immediate release tablet 10 mg  10 mg Oral Q6H PRN You Jolley MD   10 mg at 06/26/17 0447    promethazine tablet 25 mg  25 mg Oral Q6H PRN You Jolley MD        senna-docusate 8.6-50 mg per tablet 1 tablet  1 tablet Oral BID You Jolley MD   1 tablet at 06/26/17 0816    sodium chloride 0.9% flush 3 mL  3 mL Intravenous Q8H You Jolley MD   3 mL at 06/26/17 2116       ASSESSMENT/PLAN:     Active Hospital Problems    Diagnosis  POA    *Osteomyelitis of right foot [M86.9]    -? 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]    - has been on chronic benzo      Yes    Diabetic polyneuropathy associated with type 2 diabetes mellitus [E11.42]  Yes    Anemia of other chronic disease [D63.8]  - stable    Yes    Chronic back pain [M54.9, G89.29]- on chronic opiate tx      Yes    DM type 2, uncontrolled, with neuropathy [E11.40, E11.65]    - poor insight into pt's health  - he does not care much about his DM and missed many appointments with endocrinology f/u    - continue basal and prandial insulin  - adjust dose       Yes    Lumbar herniated disc - Large central disk extrusion at the L4-L5 level with significant central canal and bilateral neuroforaminal narrowing [M51.26]  Yes      Resolved Hospital Problems    Diagnosis Date Resolved POA   No resolved problems to display.     Miguel Lux M.D  Internal Medicine & Geriatric Medicine  Hematology & Oncology  Palliative Medicine    [address], [address]  [place] [address]  [phone] (Office)  [phone] (Fax)

## 2017-06-27 NOTE — PROGRESS NOTES
Progress Note  Podiatry    Admit Date: 6/23/2017  Post-operative Day:    Hospital Day: 5    SUBJECTIVE:     Follow-up For: R foot infection. Seen at bedside. No acute distress. No family member at bedside. No foot pain. No fever, chills, sweats. Resting comfortably in bed. Wishes to discuss MRI results and surgical plan    Scheduled Meds:   aspirin  81 mg Oral Daily    famotidine  20 mg Oral Daily    fentaNYL  1 patch Transdermal Q72H    heparin (porcine)  5,000 Units Subcutaneous Q8H    insulin aspart  5 Units Subcutaneous TIDWM    insulin detemir  20 Units Subcutaneous QHS    nicotine  1 patch Transdermal Daily    senna-docusate 8.6-50 mg  1 tablet Oral BID    sodium chloride 0.9%  3 mL Intravenous Q8H     Continuous Infusions:   PRN Meds:dextrose 50%, dextrose 50%, diazePAM, glucagon (human recombinant), glucose, glucose, HYDROmorphone, insulin aspart, ondansetron, oxycodone, promethazine    Review of patient's allergies indicates:   Allergen Reactions    Codeine Rash     Other reaction(s): Unknown       Review of Systems:  Constitutional: no fever or chills, pain well controlled  Cardiovascular: positive for history of revasc in LE's  Musculoskeletal: positive for back pain and R great toe amputation  Neurological: positive for neuropathy in feet    OBJECTIVE:     Vital Signs (Most Recent):  Temp: 97.5 °F (36.4 °C) (06/27/17 0007)  Pulse: 64 (06/27/17 0007)  Resp: 18 (06/27/17 0007)  BP: 119/71 (06/27/17 0007)  SpO2: 96 % (06/27/17 0007)    Vital Signs Range (Last 24H):  Temp:  [97.5 °F (36.4 °C)-98.2 °F (36.8 °C)]   Pulse:  [64-84]   Resp:  [18]   BP: (114-130)/(62-71)   SpO2:  [95 %-98 %]     Physical Exam:  GENERAL: alert, cooperative, no distress  BODY HABITUS: moderately obese  VASCULAR: Dorsalis Pedis: Left: 1+ (weak) Right: 1+ (weak)       Posterior Tibialis: Left: 1+ (weak) Right: 1+ (weak)  NEURO: Sharp/Dull: Left: severe deficit Right: severe deficit  ORTHO: General Alignment: Left: No gross  abnormalities Right: R 1st ray amp and hammertoes 2-5  DERM: Hair: Left: no change Right: no change          Right foot wound measures 6.5 x 3.5 x 1.0 cm down to level of 2nd toe proximal phalanx bone. No further purulence expressed from the wound. Majority of wound is granular with fibrous slough embedded along distal aspect of wound. Mild edema and erythema surrounding wound.     Laboratory:  CBC:     Recent Labs  Lab 06/27/17  0426   WBC 4.88   RBC 4.00*   HGB 11.2*   HCT 33.7*   *   MCV 84   MCH 28.0   MCHC 33.2     BMP:     Recent Labs  Lab 06/27/17  0426   *   *   K 4.9      CO2 27   BUN 19   CREATININE 1.1   CALCIUM 9.4       ASSESSMENT/PLAN:     Assessment:  Uncontrolled DM II with peripheral neuropathy  Foot ulcer right with necrosis of bone  Osteomyelitis toe R  History of hallux amputation R.     Plan:  Wound assessed and examined. Wound stable at this time with clinical osteomyelitis of 2nd digit, proximal phalanx.     Plan for excisional wound debridement/bone biopsy in OR at noon    Hold Abx until after procedure.     Patient is not amenable to primary amputation at this time. Discussed pros and cons as well as risks of long term IV abx and primary amputation. He has failed 6 weeks of IV abx in the past. He is aware of risks, complications and alternative options.     Will likely need additional 6 weeks of IV abx via PICC line. Appreciate ID assistance.

## 2017-06-27 NOTE — ANESTHESIA PREPROCEDURE EVALUATION
06/27/2017  Carlos Cifuentes is a 56 y.o., male.    Pre-operative evaluation for Procedure(s) (LRB):  DEBRIDEMENT-FOOT (Right)    Carlos Cifuentes is a 56 y.o. male hx of DMII (on insulin), PAD, being treated for subacute osteomyelitis of right foot (amputation pending), chronic hep c s/p LE angio and now being evaluated for the above procedure.         Prev airway: Airway: Direct laryngoscopy; Inserted by: CRNA; Airway Device: Endotracheal Tube; Mask Ventilation: Easy; Intubated: Postinduction; Blade: Moya #2; Airway Device Size: 8.0; Style: Cuffed; Cuff Inflation: Minimal occlusive pressure; Placement Verified By: Auscultation, Capnometry; Grade: Grade II; Complicating Factors: None; Intubation Findings: Positive EtCO2, Bilateral breath sounds, Atraumatic/Condition of teeth unchanged;  Depth of Insertion: 22; Securment: Lips; Complications: None    Drips: none currently    Patient Active Problem List   Diagnosis    Hepatosplenomegaly    Lumbar herniated disc - Large central disk extrusion at the L4-L5 level with significant central canal and bilateral neuroforaminal narrowing    Obesity    DM type 2, uncontrolled, with neuropathy    Chronic back pain    Tinea cruris    Bilateral leg edema    Back pain    Cord compression    Pain of right thigh    Anemia of other chronic disease    Neuropathic pain    Primary insomnia    Generalized anxiety disorder    Anxiety disorder due to general medical condition    Diabetic polyneuropathy associated with type 2 diabetes mellitus    Neck muscle spasm    Cirrhosis of liver without ascites    Right knee pain    Chronic hepatitis C without hepatic coma    Diabetic ulcer of toe of right foot associated with type 2 diabetes mellitus, with fat layer exposed    Tobacco abuse    Cellulitis of right foot    Toe osteomyelitis, right    Peripheral  arterial disease    Bilateral carotid artery stenosis    Osteomyelitis of right foot       Review of patient's allergies indicates:   Allergen Reactions    Codeine      Other reaction(s): Unknown        Current Facility-Administered Medications on File Prior to Visit   Medication Dose Route Frequency Provider Last Rate Last Dose    aspirin EC tablet 81 mg  81 mg Oral Daily You oJlley MD   81 mg at 06/26/17 0816    dextrose 50% injection 12.5 g  12.5 g Intravenous PRN Phan Singleton MD        dextrose 50% injection 25 g  25 g Intravenous PRN Phan Singleton MD        diazePAM tablet 2 mg  2 mg Oral Q8H PRN You Jolley MD   2 mg at 06/26/17 2115    famotidine tablet 20 mg  20 mg Oral Daily You Jolley MD   20 mg at 06/26/17 0816    fentaNYL 50 mcg/hr 1 patch  1 patch Transdermal Q72H You Jolley MD   1 patch at 06/26/17 1655    glucagon (human recombinant) injection 1 mg  1 mg Intramuscular PRN Phan Singleton MD        glucose chewable tablet 16 g  16 g Oral PRN Phan Singleton MD        glucose chewable tablet 24 g  24 g Oral PRN Phan Singleton MD        heparin (porcine) injection 5,000 Units  5,000 Units Subcutaneous Q8H You Jolley MD   5,000 Units at 06/27/17 0616    hydromorphone (PF) injection 1 mg  1 mg Intravenous Q4H PRN You Jolley MD   1 mg at 06/27/17 1003    insulin aspart pen 1-10 Units  1-10 Units Subcutaneous QID (AC + HS) PRN Phan Singleton MD   8 Units at 06/27/17 1003    insulin aspart pen 5 Units  5 Units Subcutaneous TIDWM Miguel Lux MD        insulin detemir pen 20 Units  20 Units Subcutaneous QHS Miguel Lux MD        nicotine 14 mg/24 hr 1 patch  1 patch Transdermal Daily Phan Singleton MD   1 patch at 06/26/17 0816    ondansetron injection 4 mg  4 mg Intravenous Q12H PRN You Jolley MD        oxycodone immediate release tablet 10 mg  10 mg Oral Q6H PRN You Jolley MD   10 mg at 06/26/17 0447    promethazine tablet 25 mg  25  mg Oral Q6H PRN You Jolley MD        senna-docusate 8.6-50 mg per tablet 1 tablet  1 tablet Oral BID You Jolley MD   1 tablet at 06/26/17 0816    sodium chloride 0.9% flush 3 mL  3 mL Intravenous Q8H You Jolley MD   3 mL at 06/27/17 0617     Current Outpatient Prescriptions on File Prior to Visit   Medication Sig Dispense Refill    amoxicillin-clavulanate 500-125mg (AUGMENTIN) 500-125 mg Tab Take 1 tablet by mouth every 12 (twelve) hours.      aspirin (ECOTRIN) 81 MG EC tablet Take 1 tablet (81 mg total) by mouth once daily.  0    atorvastatin (LIPITOR) 80 MG tablet Take 1 tablet (80 mg total) by mouth once daily. 90 tablet 3    blood sugar diagnostic (BLOOD GLUCOSE TEST) Strp Twice daily blood sugar checks (Patient taking differently: Test  blood sugar four times daily) 100 each 6    clopidogrel (PLAVIX) 75 mg tablet Take 1 tablet (75 mg total) by mouth once daily. 30 tablet 3    dextrose 5 % SolP 500 mL with vancomycin 1,000 mg SolR 2,000 mg Inject 2,000 mg into the vein every 12 (twelve) hours.  0    diazePAM (VALIUM) 2 MG tablet TAKE ONE TABLET BY MOUTH EVERY 8 HOURS AS NEEDED FOR ANXIETY **THANK YOU** 60 tablet 1    docusate sodium (COLACE) 100 MG capsule Take 100 mg by mouth as needed for Constipation.      fentaNYL (DURAGESIC) 50 mcg/hr Place 1 patch onto the skin every 72 hours. 10 patch 0    gabapentin (NEURONTIN) 300 MG capsule Take 1 capsule (300 mg total) by mouth 3 (three) times daily. 180 capsule 4    glipiZIDE (GLUCOTROL) 5 MG tablet       heparin, porcine, PF, (HEPARIN FLUSH 100 UNITS/ML) 100 unit/mL Syrg       LANTUS SOLOSTAR 100 unit/mL (3 mL) InPn pen INJECT FIFTEEN UNITS SUBCUTANEOUSLY IN THE EVENING must last to 7/22/17  3    ledipasvir-sofosbuvir (HARVONI)  mg Tab Take 1 tablet by mouth once daily. 28 tablet 5    metformin (GLUCOPHAGE) 1000 MG tablet Take 1 tablet (1,000 mg total) by mouth 2 (two) times daily with meals. 60 tablet 11    mupirocin calcium 2%  (BACTROBAN) 2 % cream Apply to affected area 3 times daily 60 g 1    NORMAL SALINE FLUSH 0.9 % injection       oxycodone (ROXICODONE) 10 mg Tab immediate release tablet Take 1 tablet (10 mg total) by mouth every 6 (six) hours as needed for Pain. 120 tablet 0    potassium chloride SA (K-DUR,KLOR-CON) 10 MEQ tablet Take 1 tablet (10 mEq total) by mouth once daily as needed when experiencing leg/muscle cramps.  1    promethazine (PHENERGAN) 25 MG tablet Take 1 tablet (25 mg total) by mouth every 4 (four) hours. (Patient taking differently: Take 25 mg by mouth every 4 (four) hours as needed for Nausea. ) 15 tablet 0    silver sulfADIAZINE 1% (SILVADENE) 1 % cream Apply 1 application topically once daily. Apply to affected area 50 g 2    trazodone (DESYREL) 100 MG tablet Take 1 tablet (100 mg total) by mouth every evening. 90 tablet 1    TRUE METRIX GLUCOSE METER Misc AS DIRECTED  0       Past Surgical History:   Procedure Laterality Date    APPENDECTOMY      JOINT REPLACEMENT      left knee surgery      left leg surgery      broken bone    LEG SURGERY  right     Right arm boil      Right great toe amputation         Social History     Social History    Marital status: Single     Spouse name: N/A    Number of children: N/A    Years of education: N/A     Occupational History    Not on file.     Social History Main Topics    Smoking status: Current Some Day Smoker     Packs/day: 1.00     Types: Cigarettes    Smokeless tobacco: Never Used    Alcohol use No    Drug use: No    Sexual activity: Not on file     Other Topics Concern    Not on file     Social History Narrative    No narrative on file         Vital Signs Range (Last 24H):  Temp:  [36.4 °C (97.5 °F)-36.8 °C (98.2 °F)]   Pulse:  [64-84]   Resp:  [14-18]   BP: (112-158)/(62-87)   SpO2:  [95 %-96 %]       CBC:   Recent Labs      06/26/17   0505  06/27/17   0426   WBC  4.59  4.88   RBC  4.48*  4.00*   HGB  12.4*  11.2*   HCT  37.7*  33.7*   PLT   157  147*   MCV  84  84   MCH  27.7  28.0   MCHC  32.9  33.2       CMP:   Recent Labs      06/26/17   0505  06/27/17   0426   NA  134*  135*   K  4.9  4.9   CL  100  101   CO2  30*  27   BUN  18  19   CREATININE  1.1  1.1   GLU  295*  269*   CALCIUM  9.3  9.4   ALBUMIN  3.1*  2.9*   PROT  8.3  7.7   ALKPHOS  73  61   ALT  21  20   AST  17  14   BILITOT  0.2  0.3         Diagnostic Studies:      EKG:  Vent. Rate : 076 BPM     Atrial Rate : 076 BPM     P-R Int : 196 ms          QRS Dur : 074 ms      QT Int : 352 ms       P-R-T Axes : 045 054 021 degrees     QTc Int : 396 ms    Normal sinus rhythm  Normal ECG  When compared with ECG of 15-DEC-2014 18:26,  Vent. rate has decreased BY  67 BPM  Confirmed by STEPHANIE SARMIENTO, KASEY ([id number]) on 4/8/2017 5:32:30 PM      2D Echo:  CONCLUSIONS     1 - Hyperdynamic left ventricular systolic function (EF 60-65%).     2 - Cannot fully r/o but no obvious vegetation    This document has been electronically    SIGNED BY: Modesto Marcus MD On: 12/18/2014 15:07        Pre-op Assessment    I have reviewed the Patient Summary Reports.      I have reviewed the Medications.     Review of Systems  Anesthesia Hx:  History of prior surgery of interest to airway management or planning:  Denies Personal Hx of Anesthesia complications.   Social:  Smoker, No Alcohol Use Chronic pain   Chronic opioids     Hematology/Oncology:     Oncology Normal    -- Anemia:   EENT/Dental:EENT/Dental Normal   Cardiovascular:   ECG has been reviewed.    Pulmonary:  Pulmonary Normal    Hepatic/GI:   Liver Disease, Hepatitis, C Hx of C. difficile diarrhea   Musculoskeletal:   Arthritis  Chronic pain back and thigh   Lumbar herniated disc - Large central disk extrusion at the L4-L5 level with significant central canal and bilateral neuroforaminal narrowing  Obesity  Cord compression    Ischemic R great toe       Neurological:   Neuromuscular Disease,    Endocrine:   Diabetes, poorly controlled, type 2    Dermatological:    Hx of Staph aureus infection   Psych:   anxiety          Physical Exam  General:  Well nourished    Airway/Jaw/Neck:  Airway Findings: Mouth Opening: Normal Tongue: Normal  General Airway Assessment: Adult  Mallampati: III  Improves to II with phonation.  TM Distance: Normal, at least 6 cm  Jaw/Neck Findings:  Neck ROM: Normal ROM      Dental:  Dental Findings: In tact, Periodontal disease, Mild    Chest/Lungs:  Chest/Lungs Findings: Normal Respiratory Rate     Heart/Vascular:  Heart Findings: Rate: Normal        Mental Status:  Mental Status Findings:  Cooperative, Alert and Oriented         Anesthesia Plan  Type of Anesthesia, risks & benefits discussed:  Anesthesia Type:  MAC  Patient's Preference:   Intra-op Monitoring Plan: standard ASA monitors  Intra-op Monitoring Plan Comments:   Post Op Pain Control Plan: multimodal analgesia and IV/PO Opioids PRN  Post Op Pain Control Plan Comments:   Induction:   IV  Beta Blocker:  Patient is not currently on a Beta-Blocker (No further documentation required).       Informed Consent: Patient understands risks and agrees with Anesthesia plan.  Questions answered. Anesthesia consent signed with patient.  ASA Score: 3     Day of Surgery Review of History & Physical:    H&P update referred to the surgeon.         Ready For Surgery From Anesthesia Perspective.

## 2017-06-28 LAB
ALBUMIN SERPL BCP-MCNC: 2.9 G/DL
ALP SERPL-CCNC: 54 U/L
ALT SERPL W/O P-5'-P-CCNC: 20 U/L
ANION GAP SERPL CALC-SCNC: 5 MMOL/L
AST SERPL-CCNC: 17 U/L
BACTERIA BLD CULT: NORMAL
BACTERIA BLD CULT: NORMAL
BASOPHILS # BLD AUTO: 0.03 K/UL
BASOPHILS NFR BLD: 0.5 %
BILIRUB SERPL-MCNC: 0.3 MG/DL
BUN SERPL-MCNC: 18 MG/DL
CALCIUM SERPL-MCNC: 9.2 MG/DL
CHLORIDE SERPL-SCNC: 100 MMOL/L
CO2 SERPL-SCNC: 29 MMOL/L
CREAT SERPL-MCNC: 1.1 MG/DL
DIFFERENTIAL METHOD: ABNORMAL
EOSINOPHIL # BLD AUTO: 0.1 K/UL
EOSINOPHIL NFR BLD: 1.7 %
ERYTHROCYTE [DISTWIDTH] IN BLOOD BY AUTOMATED COUNT: 15.3 %
EST. GFR  (AFRICAN AMERICAN): >60 ML/MIN/1.73 M^2
EST. GFR  (NON AFRICAN AMERICAN): >60 ML/MIN/1.73 M^2
GLUCOSE SERPL-MCNC: 207 MG/DL
GRAM STN SPEC: NORMAL
GRAM STN SPEC: NORMAL
HCT VFR BLD AUTO: 34.8 %
HGB BLD-MCNC: 11.3 G/DL
LYMPHOCYTES # BLD AUTO: 1.6 K/UL
LYMPHOCYTES NFR BLD: 27.3 %
MCH RBC QN AUTO: 27.5 PG
MCHC RBC AUTO-ENTMCNC: 32.5 %
MCV RBC AUTO: 85 FL
MONOCYTES # BLD AUTO: 0.5 K/UL
MONOCYTES NFR BLD: 9.4 %
NEUTROPHILS # BLD AUTO: 3.5 K/UL
NEUTROPHILS NFR BLD: 61.1 %
PLATELET # BLD AUTO: 145 K/UL
PMV BLD AUTO: 10.4 FL
POCT GLUCOSE: 220 MG/DL (ref 70–110)
POCT GLUCOSE: 230 MG/DL (ref 70–110)
POCT GLUCOSE: 244 MG/DL (ref 70–110)
POCT GLUCOSE: 245 MG/DL (ref 70–110)
POTASSIUM SERPL-SCNC: 4.4 MMOL/L
PROT SERPL-MCNC: 7.4 G/DL
RBC # BLD AUTO: 4.11 M/UL
SODIUM SERPL-SCNC: 134 MMOL/L
WBC # BLD AUTO: 5.76 K/UL

## 2017-06-28 PROCEDURE — 25000003 PHARM REV CODE 250: Performed by: EMERGENCY MEDICINE

## 2017-06-28 PROCEDURE — 63600175 PHARM REV CODE 636 W HCPCS: Performed by: INTERNAL MEDICINE

## 2017-06-28 PROCEDURE — 25000003 PHARM REV CODE 250

## 2017-06-28 PROCEDURE — 80053 COMPREHEN METABOLIC PANEL: CPT

## 2017-06-28 PROCEDURE — 12000002 HC ACUTE/MED SURGE SEMI-PRIVATE ROOM

## 2017-06-28 PROCEDURE — 25000003 PHARM REV CODE 250: Performed by: INTERNAL MEDICINE

## 2017-06-28 PROCEDURE — 85025 COMPLETE CBC W/AUTO DIFF WBC: CPT

## 2017-06-28 PROCEDURE — 99024 POSTOP FOLLOW-UP VISIT: CPT | Mod: ,,, | Performed by: PODIATRIST

## 2017-06-28 PROCEDURE — 36415 COLL VENOUS BLD VENIPUNCTURE: CPT

## 2017-06-28 PROCEDURE — 63600175 PHARM REV CODE 636 W HCPCS: Performed by: EMERGENCY MEDICINE

## 2017-06-28 RX ORDER — INSULIN ASPART 100 [IU]/ML
8 INJECTION, SOLUTION INTRAVENOUS; SUBCUTANEOUS
Status: DISCONTINUED | OUTPATIENT
Start: 2017-06-29 | End: 2017-07-01 | Stop reason: HOSPADM

## 2017-06-28 RX ORDER — CLOPIDOGREL BISULFATE 75 MG/1
75 TABLET ORAL DAILY
Qty: 30 TABLET | Status: CANCELLED | OUTPATIENT
Start: 2017-06-28

## 2017-06-28 RX ADMIN — HYDROMORPHONE HYDROCHLORIDE 1 MG: 2 INJECTION INTRAMUSCULAR; INTRAVENOUS; SUBCUTANEOUS at 08:06

## 2017-06-28 RX ADMIN — HYDROMORPHONE HYDROCHLORIDE 1 MG: 2 INJECTION INTRAMUSCULAR; INTRAVENOUS; SUBCUTANEOUS at 06:06

## 2017-06-28 RX ADMIN — ASPIRIN 81 MG: 81 TABLET, COATED ORAL at 08:06

## 2017-06-28 RX ADMIN — FAMOTIDINE 20 MG: 20 TABLET, FILM COATED ORAL at 08:06

## 2017-06-28 RX ADMIN — HYDROMORPHONE HYDROCHLORIDE 1 MG: 2 INJECTION INTRAMUSCULAR; INTRAVENOUS; SUBCUTANEOUS at 11:06

## 2017-06-28 RX ADMIN — Medication 3 ML: at 04:06

## 2017-06-28 RX ADMIN — HEPARIN SODIUM 5000 UNITS: 5000 INJECTION, SOLUTION INTRAVENOUS; SUBCUTANEOUS at 10:06

## 2017-06-28 RX ADMIN — INSULIN ASPART 4 UNITS: 100 INJECTION, SOLUTION INTRAVENOUS; SUBCUTANEOUS at 12:06

## 2017-06-28 RX ADMIN — INSULIN ASPART 3 UNITS: 100 INJECTION, SOLUTION INTRAVENOUS; SUBCUTANEOUS at 10:06

## 2017-06-28 RX ADMIN — DOCUSATE SODIUM AND SENNOSIDES 1 TABLET: 8.6; 5 TABLET, FILM COATED ORAL at 08:06

## 2017-06-28 RX ADMIN — HEPARIN SODIUM 5000 UNITS: 5000 INJECTION, SOLUTION INTRAVENOUS; SUBCUTANEOUS at 03:06

## 2017-06-28 RX ADMIN — Medication 3 ML: at 10:06

## 2017-06-28 RX ADMIN — HEPARIN SODIUM 5000 UNITS: 5000 INJECTION, SOLUTION INTRAVENOUS; SUBCUTANEOUS at 06:06

## 2017-06-28 RX ADMIN — PROMETHAZINE HYDROCHLORIDE 25 MG: 25 TABLET ORAL at 10:06

## 2017-06-28 RX ADMIN — INSULIN ASPART 5 UNITS: 100 INJECTION, SOLUTION INTRAVENOUS; SUBCUTANEOUS at 08:06

## 2017-06-28 RX ADMIN — DIAZEPAM 2 MG: 2 TABLET ORAL at 11:06

## 2017-06-28 RX ADMIN — HYDROMORPHONE HYDROCHLORIDE 1 MG: 2 INJECTION INTRAMUSCULAR; INTRAVENOUS; SUBCUTANEOUS at 04:06

## 2017-06-28 RX ADMIN — INSULIN ASPART 4 UNITS: 100 INJECTION, SOLUTION INTRAVENOUS; SUBCUTANEOUS at 06:06

## 2017-06-28 RX ADMIN — DOCUSATE SODIUM AND SENNOSIDES 1 TABLET: 8.6; 5 TABLET, FILM COATED ORAL at 10:06

## 2017-06-28 RX ADMIN — INSULIN ASPART 4 UNITS: 100 INJECTION, SOLUTION INTRAVENOUS; SUBCUTANEOUS at 08:06

## 2017-06-28 RX ADMIN — NICOTINE 1 PATCH: 14 PATCH, EXTENDED RELEASE TRANSDERMAL at 08:06

## 2017-06-28 RX ADMIN — OXYCODONE HYDROCHLORIDE 10 MG: 5 TABLET ORAL at 10:06

## 2017-06-28 RX ADMIN — INSULIN ASPART 5 UNITS: 100 INJECTION, SOLUTION INTRAVENOUS; SUBCUTANEOUS at 12:06

## 2017-06-28 RX ADMIN — Medication 3 ML: at 06:06

## 2017-06-28 RX ADMIN — DIAZEPAM 2 MG: 2 TABLET ORAL at 04:06

## 2017-06-28 RX ADMIN — VANCOMYCIN HYDROCHLORIDE 1000 MG: 1 INJECTION, POWDER, LYOPHILIZED, FOR SOLUTION INTRAVENOUS at 06:06

## 2017-06-28 RX ADMIN — HYDROMORPHONE HYDROCHLORIDE 1 MG: 2 INJECTION INTRAMUSCULAR; INTRAVENOUS; SUBCUTANEOUS at 03:06

## 2017-06-28 RX ADMIN — INSULIN ASPART 5 UNITS: 100 INJECTION, SOLUTION INTRAVENOUS; SUBCUTANEOUS at 06:06

## 2017-06-28 NOTE — PROGRESS NOTES
SW met with pt in regards to possible SNF option instead of Home Health. Pt stated that he does not want to go to a Skilled nursing Facility but wants home health and wound care clinic. Pt asked if he could have an extra day with wound care instead of two days a week. SW voiced that she would discuss with MD. Pt voiced understanding but stated that he will not go to SNF. SW voiced understanding. PCF signed for Home Health.

## 2017-06-28 NOTE — ANESTHESIA POSTPROCEDURE EVALUATION
"Anesthesia Post Evaluation    Patient: Carlos Cifuentes    Procedure(s) Performed: Procedure(s) (LRB):  DEBRIDEMENT-FOOT Bone Biopsy 2nd toe (Right)    Final Anesthesia Type: MAC  Patient location during evaluation: PACU  Patient participation: Yes- Able to Participate  Level of consciousness: awake and alert and oriented  Post-procedure vital signs: reviewed and stable  Pain management: adequate  Airway patency: patent  PONV status at discharge: No PONV  Anesthetic complications: no      Cardiovascular status: blood pressure returned to baseline and hemodynamically stable  Respiratory status: unassisted, spontaneous ventilation and room air  Hydration status: euvolemic  Follow-up not needed.        Visit Vitals  /71   Pulse 77   Temp 36.7 °C (98.1 °F) (Oral)   Resp 20   Ht 5' 9" (1.753 m)   Wt 104.3 kg (230 lb)   SpO2 95%   BMI 33.97 kg/m²       Pain/Jose E Score: Pain Assessment Performed: Yes (6/28/2017  3:03 AM)  Presence of Pain: denies (6/28/2017  3:30 AM)  Pain Rating Prior to Med Admin: 9 (6/28/2017  6:31 AM)  Pain Rating Post Med Admin: 0 (6/28/2017  3:30 AM)  Jose E Score: 10 (6/27/2017  2:45 PM)      "

## 2017-06-28 NOTE — PROGRESS NOTES
Progress Note    Admit Date: 6/23/2017   LOS: 5 days     SUBJECTIVE:     Follow-up For:  Osteomyelitis of right foot    06/28/2017:  Underwent debridement and biopsy of right foot. Denies fever or chills   06/27/2017: underwent MRI yesterday. Awaiting for possible bone bx and debridement today   06/26/2017: denies fever or chills.       Scheduled Meds:   aspirin  81 mg Oral Daily    famotidine  20 mg Oral Daily    fentaNYL  1 patch Transdermal Q72H    heparin (porcine)  5,000 Units Subcutaneous Q8H    insulin aspart  5 Units Subcutaneous TIDWM    insulin detemir  20 Units Subcutaneous QHS    nicotine  1 patch Transdermal Daily    senna-docusate 8.6-50 mg  1 tablet Oral BID    sodium chloride 0.9%  3 mL Intravenous Q8H     Continuous Infusions:   PRN Meds:dextrose 50%, dextrose 50%, diazePAM, glucagon (human recombinant), glucose, glucose, HYDROmorphone, insulin aspart, ondansetron, oxycodone, promethazine, sodium chloride 0.9%    Review of patient's allergies indicates:   Allergen Reactions    Codeine Rash     Other reaction(s): Unknown       Review of Systems    Constitutional: Denies fever or chills  Eyes: no visual changes   ENT: no nasal congestion. Denies sore throat with odynophagia   Respiratory: No cough, SOB, exertional dyspnea or  hemoptysis   Cardiovascular: no chest pain or palpitations   Gastrointestinal: no nausea, vomiting or abdominal pain. Normal bowl habits   Hematologic/Lymphatic: no bleeding   Musculoskeletal: see HPI  Neurological: no seizures or tremors, gait or balance prblems  Skin: see HPI  Neuro: Denies any weakness. Chronic back pain  Psych: Denies any anxiety, depression or insomnia      OBJECTIVE:     Vital Signs (Most Recent)  Temp: 98.1 °F (36.7 °C) (06/28/17 0345)  Pulse: 77 (06/28/17 0345)  Resp: 20 (06/28/17 0345)  BP: 133/71 (06/28/17 0345)  SpO2: 95 % (06/28/17 0345)    Vital Signs Range (Last 24H):  Temp:  [97.3 °F (36.3 °C)-98.2 °F (36.8 °C)]   Pulse:  [64-88]   Resp:   [12-20]   BP: (112-158)/(63-87)   SpO2:  [95 %-99 %]     I & O (Last 24H):    Intake/Output Summary (Last 24 hours) at 06/28/17 0729  Last data filed at 06/27/17 1750   Gross per 24 hour   Intake           772.25 ml   Output              350 ml   Net           422.25 ml     Physical Exam:    General: NAD, sitting up in bed .    Head: normocephalic, atraumatic   Eyes: conjunctivae pink, anicteric sclera.   Nose: Mucosa normal. No drainage or sinus tenderness, no discharge   Throat: No erythema or post nasal discharge   Neck: supple, no LAD   Lungs: CTAB   Heart: S1, S2, RRR   Abdomen: + BS x 4 quadrants, soft, non tender. No hepatosplenomegaly.   Extremities: no cyanosis or edema.   Skin: right foot in dressing   MS: per DPM: right foot wound to the level of 2nd phalanx bone. Covered with dressing   Neuro: A&O x 3  Psy: pleasant, affect is congruent to mood       Laboratory:  CBC:     Recent Labs  Lab 06/28/17  0334   WBC 5.76   RBC 4.11*   HGB 11.3*   HCT 34.8*   *   MCV 85   MCH 27.5   MCHC 32.5     CMP:     Recent Labs  Lab 06/28/17  0334   *   CALCIUM 9.2   ALBUMIN 2.9*   PROT 7.4   *   K 4.4   CO2 29      BUN 18   CREATININE 1.1   ALKPHOS 54*   ALT 20   AST 17   BILITOT 0.3     Coagulation: No results for input(s): LABPROT, INR, APTT in the last 168 hours.  Microbiology Results (last 7 days)     Procedure Component Value Units Date/Time    Blood Culture #1 **CANNOT BE ORDERED STAT** [[id number]] Collected:  06/23/17 1510    Order Status:  Completed Specimen:  Blood from Peripheral, Antecubital, Right Updated:  06/27/17 1903     Blood Culture, Routine No Growth to date     Blood Culture, Routine No Growth to date     Blood Culture, Routine No Growth to date     Blood Culture, Routine No Growth to date     Blood Culture, Routine No Growth to date    Blood Culture #2 **CANNOT BE ORDERED STAT** [[id number]] Collected:  06/23/17 1525    Order Status:  Completed Specimen:  Blood from  Peripheral, Forearm, Right Updated:  06/27/17 1903     Blood Culture, Routine No Growth to date     Blood Culture, Routine No Growth to date     Blood Culture, Routine No Growth to date     Blood Culture, Routine No Growth to date     Blood Culture, Routine No Growth to date    Gram stain [[id number]] Collected:  06/27/17 1327    Order Status:  Sent Specimen:  Bone from Foot, Right Updated:  06/27/17 1502    Culture, Anaerobe [[id number]] Collected:  06/27/17 1327    Order Status:  Sent Specimen:  Bone from Foot, Right Updated:  06/27/17 1501    Aerobic culture [[id number]] Collected:  06/27/17 1327    Order Status:  Sent Specimen:  Bone from Foot, Right Updated:  06/27/17 1501        Specimen (12h ago through future)    None          Diagnostic Results:    X-ray foot:     Postoperative changes related to amputation of the great toe and distal aspect of the first metatarsal bone.  Bone destruction involving the distal stump of the first metatarsal bone worrisome for underlying osteomyelitis.  Fracture of the base of the proximal phalanx of the second toe. This may represent a pathologic fracture due to underlying osteomyelitis. There is associated soft tissue swelling and a small amount of soft tissue air adjacent to the base of the second toe.    MRI foot: 06/26/2017      1. Postoperative change of prior right great toe partial resection. There is abnormal marrow edema and geographic T1 marrow replacement involving the residual great toe metatarsal base concerning for osteomyelitis.    2. Abnormal marrow edema and geographic T1 marrow replacement of the proximal phalanx of the second toe with associated fracture concerning for osteomyelitis with pathologic fracture. Small second toe MTP joint effusion is noted with mild edema of the second metatarsal head suspicious for possible early osteomyelitis.    3. Diffuse edema of the plantar foot musculature suggestive of neuropathic myositis in this patient with a reported  history of diabetes.    This report has been flagged in the [facility] medical record.    Current Facility-Administered Medications   Medication Dose Route Frequency Provider Last Rate Last Dose    aspirin EC tablet 81 mg  81 mg Oral Daily You Jolley MD   81 mg at 06/26/17 0816    dextrose 50% injection 12.5 g  12.5 g Intravenous PRN Phan Singleton MD        dextrose 50% injection 25 g  25 g Intravenous PRN Phan Singleton MD        diazePAM tablet 2 mg  2 mg Oral Q8H PRN You Jolley MD   2 mg at 06/27/17 2143    famotidine tablet 20 mg  20 mg Oral Daily You Jolley MD   20 mg at 06/26/17 0816    fentaNYL 50 mcg/hr 1 patch  1 patch Transdermal Q72H You Jolley MD   1 patch at 06/27/17 1619    glucagon (human recombinant) injection 1 mg  1 mg Intramuscular PRN Phan Singleton MD        glucose chewable tablet 16 g  16 g Oral PRN Phan Singleton MD        glucose chewable tablet 24 g  24 g Oral PRN Phan Singleton MD        heparin (porcine) injection 5,000 Units  5,000 Units Subcutaneous Q8H You Jolley MD   5,000 Units at 06/28/17 0630    hydromorphone (PF) injection 1 mg  1 mg Intravenous Q4H PRN You Jolley MD   1 mg at 06/28/17 0631    insulin aspart pen 1-10 Units  1-10 Units Subcutaneous QID (AC + HS) PRN Phan Singleton MD   1 Units at 06/27/17 2144    insulin aspart pen 5 Units  5 Units Subcutaneous TIDWM Miguel Lux MD   5 Units at 06/27/17 1747    insulin detemir pen 20 Units  20 Units Subcutaneous QHS Miguel Lux MD   20 Units at 06/27/17 2143    nicotine 14 mg/24 hr 1 patch  1 patch Transdermal Daily Phan Singleton MD   1 patch at 06/26/17 0816    ondansetron injection 4 mg  4 mg Intravenous Q12H PRN You Jolley MD        oxycodone immediate release tablet 10 mg  10 mg Oral Q6H PRN You Jolley MD   10 mg at 06/26/17 0447    promethazine tablet 25 mg  25 mg Oral Q6H PRN You Jolley MD        senna-docusate 8.6-50 mg per tablet 1 tablet  1 tablet  Oral BID You Jolley MD   1 tablet at 06/26/17 0816    sodium chloride 0.9% flush 3 mL  3 mL Intravenous Q8H You Jolley MD   3 mL at 06/28/17 0630    sodium chloride 0.9% flush 3 mL  3 mL Intravenous PRN Karen Ortiz MD           ASSESSMENT/PLAN:     Active Hospital Problems    Diagnosis  POA    *Osteomyelitis of right foot [M86.9]    -? pt failed 6 weeks of IV ABX and I am not very hopeful that further ABX will add any meaningful benefit for his osteomyelitis  - await MRI of foot    - ID and DPM following pt  - Elevated CRP and Sed rate  - MRI foot showing:proximal phalanx of the second toe with associated fracture concerning for osteomyelitis with pathologic fracture. Small second toe MTP joint effusion is noted with mild edema of the second metatarsal head suspicious for possible early osteomyelitis.    - s/p debridement and bx by podiatry- await cultures   - ID reccs       Yes    Chronic hepatitis C without hepatic coma [B18.2]    - tx     Yes    Anxiety disorder due to general medical condition [F06.4]    - has been on chronic benzo      Yes    Diabetic polyneuropathy associated with type 2 diabetes mellitus [E11.42]  Yes    Anemia of other chronic disease [D63.8]  - stable    Yes    Chronic back pain [M54.9, G89.29]- on chronic opiate tx      Yes    DM type 2, uncontrolled, with neuropathy [E11.40, E11.65]    - poor insight into pt's health  - he does not care much about his DM and missed many appointments with endocrinology f/u    - continue basal and prandial insulin  - increase insulin dose       Yes    Lumbar herniated disc - Large central disk extrusion at the L4-L5 level with significant central canal and bilateral neuroforaminal narrowing [M51.26]  Yes      Resolved Hospital Problems    Diagnosis Date Resolved POA   No resolved problems to display.     Miguel Lux M.D  Internal Medicine & Geriatric Medicine  Hematology & Oncology  Palliative Medicine    [address], [address]  [place] [address]  [phone] (Office)  [phone] (Fax)

## 2017-06-28 NOTE — PROGRESS NOTES
Progress Note  Infectious Disease    Admit Date: 6/23/2017   LOS: 5 days     SUBJECTIVE:     Follow-up For:  Rt foot osteomyelitis    Antibiotics     None          Review of Systems:  Constitutional: no fever or chills  Eyes: no visual changes  ENT: no nasal congestion or sore throat  Respiratory: no cough or shortness of breath  Cardiovascular: no chest pain or palpitations  Gastrointestinal: no nausea or vomiting, no abdominal pain or change in bowel habits  Genitourinary: no hematuria or dysuria  Hematologic/Lymphatic: no easy bruising or lymphadenopathy  Musculoskeletal: no arthralgias or myalgias  Neurological: no seizures or tremors    OBJECTIVE:     Vital Signs (Most Recent)  Temp: 99.1 °F (37.3 °C) (06/28/17 0750)  Pulse: 81 (06/28/17 0750)  Resp: 20 (06/28/17 0750)  BP: 132/85 (06/28/17 0750)  SpO2: 96 % (06/28/17 0750)    Temperature Range Min/Max (Last 24H):  Temp:  [98 °F (36.7 °C)-99.1 °F (37.3 °C)]     I & O (Last 24H):    Intake/Output Summary (Last 24 hours) at 06/28/17 1659  Last data filed at 06/28/17 1145   Gross per 24 hour   Intake           732.25 ml   Output              775 ml   Net           -42.75 ml       Physical Exam:  General: well developed, well nourished  HENT: Head:normocephalic, atraumatic. Ears:not examined. Nose: Nares normal. Septum midline. Mucosa normal. No drainage or sinus tenderness., no discharge. Throat: lips, mucosa, and tongue normal; teeth and gums normal and no throat erythema.  Eyes: conjunctivae/corneas clear. PERRL.   Neck: supple, symmetrical, trachea midline, no JVD and thyroid not enlarged, symmetric, no tenderness/mass/nodules  Lungs:  clear to auscultation bilaterally and normal respiratory effort  Cardiovascular: Heart: regular rate and rhythm, S1, S2 normal, no murmur, click, rub or gallop. Chest Wall: no tenderness. Extremities: no cyanosis or edema, or clubbing. Pulses: rt foot with bounding pulse. left foot impalpable foot pulse.  Abdomen/Rectal: Abdomen:  soft, non-tender non-distented; bowel sounds normal; no masses,  no organomegaly. Rectal: not examined  Skin: rt hallux open amp site with 70% granulation and 30% slough. rt 2nd toe is deformed swollen and with tiny opening rt lateral aspect of toe.  Musculoskeletal:no clubbing, cyanosis  Lymph Nodes: No cervical or supraclavicular adenopathy    Lines/Drains:       Peripheral IV - Single Lumen 06/23/17 1551 Right Wrist (Active)   Site Assessment Clean;Dry;Intact 6/25/2017  7:15 PM   Line Status Infusing 6/26/2017  7:45 AM   Dressing Status Clean;Dry;Intact 6/25/2017  7:15 PM   Dressing Change Due 06/27/17 6/25/2017  7:15 PM   Site Change Due 06/27/17 6/25/2017  7:15 PM   Reason Not Rotated Not due 6/25/2017  7:15 PM       Laboratory:  CBC    Recent Labs  Lab 06/28/17  0334   WBC 5.76   RBC 4.11*   HGB 11.3*   HCT 34.8*   *   MCV 85   MCH 27.5   MCHC 32.5     BMP    Recent Labs  Lab 06/28/17  0334   *   K 4.4      CO2 29   BUN 18   CREATININE 1.1   CALCIUM 9.2     Microbiology Results (last 7 days)     Procedure Component Value Units Date/Time    Aerobic culture [[id number]] Collected:  06/27/17 1327    Order Status:  Completed Specimen:  Bone from Foot, Right Updated:  06/28/17 1113     Aerobic Bacterial Culture --     GRAM POSITIVE COCCI  Identification and susceptibility pending      Gram stain [[id number]] Collected:  06/27/17 1327    Order Status:  Completed Specimen:  Bone from Foot, Right Updated:  06/28/17 0733     Gram Stain Result Rare Gram positive cocci      Few WBC's    Blood Culture #1 **CANNOT BE ORDERED STAT** [[id number]] Collected:  06/23/17 1510    Order Status:  Completed Specimen:  Blood from Peripheral, Antecubital, Right Updated:  06/27/17 1903     Blood Culture, Routine No Growth to date     Blood Culture, Routine No Growth to date     Blood Culture, Routine No Growth to date     Blood Culture, Routine No Growth to date     Blood Culture, Routine No Growth to date    Blood  Culture #2 **CANNOT BE ORDERED STAT** [[id number]] Collected:  06/23/17 1525    Order Status:  Completed Specimen:  Blood from Peripheral, Forearm, Right Updated:  06/27/17 1903     Blood Culture, Routine No Growth to date     Blood Culture, Routine No Growth to date     Blood Culture, Routine No Growth to date     Blood Culture, Routine No Growth to date     Blood Culture, Routine No Growth to date    Culture, Anaerobe [[id number]] Collected:  06/27/17 1327    Order Status:  Sent Specimen:  Bone from Foot, Right Updated:  06/27/17 1501        No results for input(s): COLORU, CLARITYU, SPECGRAV, PHUR, PROTEINUA, GLUCOSEU, BILIRUBINCON, BLOODU, WBCU, RBCU, BACTERIA, MUCUS, NITRITE, LEUKOCYTESUR, UROBILINOGEN, HYALINECASTS in the last 168 hours.    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       1. Concern for osteomyelitis, chronic of rt remnant metatarsal head and 2nd mtp joint  Repeat mri foot confirms osteomyelitis of 1st remnant metatarsal head and also proximal 2nd phalanx.  biopsy of foot and 2nd mtp joint to better guide rx pending  He is aware that if he has failed 6 weeks of iv abx the first time round, killianley that further iv abx without resection of infected  remnant bone is unlikely to work  2. Hep c in remission  3. Poorly controlled diabetes  Will start iv abx - vancomycin as gpc on gram stain

## 2017-06-28 NOTE — PROGRESS NOTES
Progress Note  Podiatry    Admit Date: 6/23/2017  Post-operative Day: 1 Day Post-Op  Hospital Day: 6    SUBJECTIVE:     Follow-up For: R foot infection. POD #1 s/p excisional debridement and bone biopsy of proximal phalanx of the 2nd digit.   Seen at bedside. No acute distress. No family member at bedside. No foot pain. No fever, chills, sweats. Resting comfortably in bed. Wishes to discuss dispo    Scheduled Meds:   aspirin  81 mg Oral Daily    famotidine  20 mg Oral Daily    fentaNYL  1 patch Transdermal Q72H    heparin (porcine)  5,000 Units Subcutaneous Q8H    insulin aspart  5 Units Subcutaneous TIDWM    insulin detemir  20 Units Subcutaneous QHS    nicotine  1 patch Transdermal Daily    senna-docusate 8.6-50 mg  1 tablet Oral BID    sodium chloride 0.9%  3 mL Intravenous Q8H     Continuous Infusions:   PRN Meds:dextrose 50%, dextrose 50%, diazePAM, glucagon (human recombinant), glucose, glucose, HYDROmorphone, insulin aspart, ondansetron, oxycodone, promethazine, sodium chloride 0.9%    Review of patient's allergies indicates:   Allergen Reactions    Codeine Rash     Other reaction(s): Unknown       Review of Systems:  Constitutional: no fever or chills, pain well controlled  Cardiovascular: positive for history of revasc in LE's  Musculoskeletal: positive for back pain and R great toe amputation  Neurological: positive for neuropathy in feet    OBJECTIVE:     Vital Signs (Most Recent):  Temp: 99.1 °F (37.3 °C) (06/28/17 0750)  Pulse: 81 (06/28/17 0750)  Resp: 20 (06/28/17 0750)  BP: 132/85 (06/28/17 0750)  SpO2: 96 % (06/28/17 0750)    Vital Signs Range (Last 24H):  Temp:  [98 °F (36.7 °C)-99.1 °F (37.3 °C)]   Pulse:  [77-88]   Resp:  [18-20]   BP: (120-135)/(66-85)   SpO2:  [95 %-98 %]     Physical Exam:  General: Pt. is well-developed, well-nourished, appears stated age, in no acute distress, alert and oriented x 3. No evidence of depression, anxiety, or agitation. Calm, cooperative, and  communicative. Appropriate interactions and affect.    Surgical Site  Dressing clean, dry, intact. Toes pink. No pallor or cyanosis noted    Laboratory:  CBC:     Recent Labs  Lab 06/28/17  0334   WBC 5.76   RBC 4.11*   HGB 11.3*   HCT 34.8*   *   MCV 85   MCH 27.5   MCHC 32.5     BMP:     Recent Labs  Lab 06/28/17  0334   *   *   K 4.4      CO2 29   BUN 18   CREATININE 1.1   CALCIUM 9.2       ASSESSMENT/PLAN:     Assessment:  Uncontrolled DM II with peripheral neuropathy  Foot ulcer right with necrosis of bone  Osteomyelitis toe R  History of hallux amputation R.     Plan:  Greater than 15 minutes spent discussing patients wish to be discharged home on abx as opposed to SNF placement    Again, discussed pros and cons as well as risks of long term IV abx and primary amputation. He has failed 6 weeks of IV abx in the past. He is aware of risks, complications and alternative options.     Will likely need additional 6 weeks of IV abx via PICC line. Appreciate ID assistance.     If patient is still inpatient tomorrow I may change outer dressing; however with graft in place I prefer to not have dressing disturbed.  If he is discharged tomorrow he must follow up with me in wound care center on Friday.    Will continue to follow.

## 2017-06-29 LAB
ALBUMIN SERPL BCP-MCNC: 2.9 G/DL
ALP SERPL-CCNC: 61 U/L
ALT SERPL W/O P-5'-P-CCNC: 23 U/L
ANION GAP SERPL CALC-SCNC: 7 MMOL/L
ANION GAP SERPL CALC-SCNC: 7 MMOL/L
AST SERPL-CCNC: 17 U/L
BASOPHILS # BLD AUTO: 0.02 K/UL
BASOPHILS NFR BLD: 0.5 %
BILIRUB SERPL-MCNC: 0.4 MG/DL
BUN SERPL-MCNC: 18 MG/DL
BUN SERPL-MCNC: 18 MG/DL
CALCIUM SERPL-MCNC: 9.3 MG/DL
CALCIUM SERPL-MCNC: 9.3 MG/DL
CHLORIDE SERPL-SCNC: 99 MMOL/L
CHLORIDE SERPL-SCNC: 99 MMOL/L
CO2 SERPL-SCNC: 28 MMOL/L
CO2 SERPL-SCNC: 28 MMOL/L
CREAT SERPL-MCNC: 1 MG/DL
CREAT SERPL-MCNC: 1 MG/DL
DIFFERENTIAL METHOD: ABNORMAL
EOSINOPHIL # BLD AUTO: 0.1 K/UL
EOSINOPHIL NFR BLD: 1.8 %
ERYTHROCYTE [DISTWIDTH] IN BLOOD BY AUTOMATED COUNT: 15.2 %
EST. GFR  (AFRICAN AMERICAN): >60 ML/MIN/1.73 M^2
EST. GFR  (AFRICAN AMERICAN): >60 ML/MIN/1.73 M^2
EST. GFR  (NON AFRICAN AMERICAN): >60 ML/MIN/1.73 M^2
EST. GFR  (NON AFRICAN AMERICAN): >60 ML/MIN/1.73 M^2
GLUCOSE SERPL-MCNC: 227 MG/DL
GLUCOSE SERPL-MCNC: 227 MG/DL
HCT VFR BLD AUTO: 34.2 %
HGB BLD-MCNC: 11 G/DL
LYMPHOCYTES # BLD AUTO: 1.4 K/UL
LYMPHOCYTES NFR BLD: 32.7 %
MCH RBC QN AUTO: 27.4 PG
MCHC RBC AUTO-ENTMCNC: 32.2 %
MCV RBC AUTO: 85 FL
MONOCYTES # BLD AUTO: 0.4 K/UL
MONOCYTES NFR BLD: 8.6 %
NEUTROPHILS # BLD AUTO: 2.5 K/UL
NEUTROPHILS NFR BLD: 56.4 %
PLATELET # BLD AUTO: 141 K/UL
PMV BLD AUTO: 10.8 FL
POCT GLUCOSE: 157 MG/DL (ref 70–110)
POCT GLUCOSE: 161 MG/DL (ref 70–110)
POCT GLUCOSE: 222 MG/DL (ref 70–110)
POCT GLUCOSE: 226 MG/DL (ref 70–110)
POCT GLUCOSE: 253 MG/DL (ref 70–110)
POTASSIUM SERPL-SCNC: 4.6 MMOL/L
POTASSIUM SERPL-SCNC: 4.6 MMOL/L
PROT SERPL-MCNC: 7.7 G/DL
RBC # BLD AUTO: 4.02 M/UL
SODIUM SERPL-SCNC: 134 MMOL/L
SODIUM SERPL-SCNC: 134 MMOL/L
WBC # BLD AUTO: 4.4 K/UL

## 2017-06-29 PROCEDURE — 25000003 PHARM REV CODE 250: Performed by: EMERGENCY MEDICINE

## 2017-06-29 PROCEDURE — 99024 POSTOP FOLLOW-UP VISIT: CPT | Mod: ,,, | Performed by: PODIATRIST

## 2017-06-29 PROCEDURE — 25000003 PHARM REV CODE 250: Performed by: INTERNAL MEDICINE

## 2017-06-29 PROCEDURE — 80053 COMPREHEN METABOLIC PANEL: CPT

## 2017-06-29 PROCEDURE — 25000003 PHARM REV CODE 250

## 2017-06-29 PROCEDURE — 12000002 HC ACUTE/MED SURGE SEMI-PRIVATE ROOM

## 2017-06-29 PROCEDURE — 63600175 PHARM REV CODE 636 W HCPCS: Performed by: INTERNAL MEDICINE

## 2017-06-29 PROCEDURE — 36415 COLL VENOUS BLD VENIPUNCTURE: CPT

## 2017-06-29 PROCEDURE — 36569 INSJ PICC 5 YR+ W/O IMAGING: CPT

## 2017-06-29 PROCEDURE — 63600175 PHARM REV CODE 636 W HCPCS: Performed by: EMERGENCY MEDICINE

## 2017-06-29 PROCEDURE — 85025 COMPLETE CBC W/AUTO DIFF WBC: CPT

## 2017-06-29 PROCEDURE — 02HV33Z INSERTION OF INFUSION DEVICE INTO SUPERIOR VENA CAVA, PERCUTANEOUS APPROACH: ICD-10-PCS | Performed by: RADIOLOGY

## 2017-06-29 RX ORDER — SODIUM CHLORIDE 0.9 % (FLUSH) 0.9 %
10 SYRINGE (ML) INJECTION
Status: DISCONTINUED | OUTPATIENT
Start: 2017-06-29 | End: 2017-07-01 | Stop reason: HOSPADM

## 2017-06-29 RX ORDER — SODIUM CHLORIDE 0.9 % (FLUSH) 0.9 %
10 SYRINGE (ML) INJECTION EVERY 6 HOURS
Status: DISCONTINUED | OUTPATIENT
Start: 2017-06-30 | End: 2017-07-01 | Stop reason: HOSPADM

## 2017-06-29 RX ADMIN — HYDROMORPHONE HYDROCHLORIDE 1 MG: 2 INJECTION INTRAMUSCULAR; INTRAVENOUS; SUBCUTANEOUS at 09:06

## 2017-06-29 RX ADMIN — Medication 3 ML: at 10:06

## 2017-06-29 RX ADMIN — VANCOMYCIN HYDROCHLORIDE 1000 MG: 1 INJECTION, POWDER, LYOPHILIZED, FOR SOLUTION INTRAVENOUS at 05:06

## 2017-06-29 RX ADMIN — INSULIN ASPART 8 UNITS: 100 INJECTION, SOLUTION INTRAVENOUS; SUBCUTANEOUS at 12:06

## 2017-06-29 RX ADMIN — INSULIN ASPART 8 UNITS: 100 INJECTION, SOLUTION INTRAVENOUS; SUBCUTANEOUS at 06:06

## 2017-06-29 RX ADMIN — INSULIN ASPART 8 UNITS: 100 INJECTION, SOLUTION INTRAVENOUS; SUBCUTANEOUS at 09:06

## 2017-06-29 RX ADMIN — VANCOMYCIN HYDROCHLORIDE 1000 MG: 1 INJECTION, POWDER, LYOPHILIZED, FOR SOLUTION INTRAVENOUS at 09:06

## 2017-06-29 RX ADMIN — FAMOTIDINE 20 MG: 20 TABLET, FILM COATED ORAL at 09:06

## 2017-06-29 RX ADMIN — OXYCODONE HYDROCHLORIDE 10 MG: 5 TABLET ORAL at 05:06

## 2017-06-29 RX ADMIN — DOCUSATE SODIUM AND SENNOSIDES 1 TABLET: 8.6; 5 TABLET, FILM COATED ORAL at 09:06

## 2017-06-29 RX ADMIN — FENTANYL 1 PATCH: 50 PATCH, EXTENDED RELEASE TRANSDERMAL at 06:06

## 2017-06-29 RX ADMIN — HYDROMORPHONE HYDROCHLORIDE 1 MG: 2 INJECTION INTRAMUSCULAR; INTRAVENOUS; SUBCUTANEOUS at 03:06

## 2017-06-29 RX ADMIN — INSULIN ASPART 6 UNITS: 100 INJECTION, SOLUTION INTRAVENOUS; SUBCUTANEOUS at 09:06

## 2017-06-29 RX ADMIN — INSULIN ASPART 2 UNITS: 100 INJECTION, SOLUTION INTRAVENOUS; SUBCUTANEOUS at 06:06

## 2017-06-29 RX ADMIN — OXYCODONE HYDROCHLORIDE 10 MG: 5 TABLET ORAL at 03:06

## 2017-06-29 RX ADMIN — DIAZEPAM 2 MG: 2 TABLET ORAL at 09:06

## 2017-06-29 RX ADMIN — HEPARIN SODIUM 5000 UNITS: 5000 INJECTION, SOLUTION INTRAVENOUS; SUBCUTANEOUS at 05:06

## 2017-06-29 RX ADMIN — HYDROMORPHONE HYDROCHLORIDE 1 MG: 2 INJECTION INTRAMUSCULAR; INTRAVENOUS; SUBCUTANEOUS at 10:06

## 2017-06-29 RX ADMIN — ASPIRIN 81 MG: 81 TABLET, COATED ORAL at 09:06

## 2017-06-29 RX ADMIN — Medication 3 ML: at 02:06

## 2017-06-29 RX ADMIN — NICOTINE 1 PATCH: 14 PATCH, EXTENDED RELEASE TRANSDERMAL at 09:06

## 2017-06-29 RX ADMIN — HEPARIN SODIUM 5000 UNITS: 5000 INJECTION, SOLUTION INTRAVENOUS; SUBCUTANEOUS at 09:06

## 2017-06-29 RX ADMIN — HEPARIN SODIUM 5000 UNITS: 5000 INJECTION, SOLUTION INTRAVENOUS; SUBCUTANEOUS at 03:06

## 2017-06-29 RX ADMIN — INSULIN ASPART 2 UNITS: 100 INJECTION, SOLUTION INTRAVENOUS; SUBCUTANEOUS at 09:06

## 2017-06-29 NOTE — PROGRESS NOTES
Progress Note  Infectious Disease    Admit Date: 6/23/2017   LOS: 6 days     SUBJECTIVE:     Follow-up For:  Rt foot osteomyelitis    Antibiotics     Start     Stop Route Frequency Ordered    06/28/17 1815  vancomycin 1 g in dextrose 5 % 250 mL IVPB (ready to mix system)  (Vancomycin IVPB with levels panel)      -- IV Every 12 hours (non-standard times) 06/28/17 1700          Review of Systems:  Constitutional: no fever or chills  Eyes: no visual changes  ENT: no nasal congestion or sore throat  Respiratory: no cough or shortness of breath  Cardiovascular: no chest pain or palpitations  Gastrointestinal: no nausea or vomiting, no abdominal pain or change in bowel habits  Genitourinary: no hematuria or dysuria  Hematologic/Lymphatic: no easy bruising or lymphadenopathy  Musculoskeletal: no arthralgias or myalgias  Neurological: no seizures or tremors    OBJECTIVE:     Vital Signs (Most Recent)  Temp: 98.5 °F (36.9 °C) (06/29/17 0815)  Pulse: 81 (06/29/17 0815)  Resp: 18 (06/29/17 0815)  BP: (!) 149/78 (06/29/17 0815)  SpO2: 97 % (06/29/17 0815)    Temperature Range Min/Max (Last 24H):  Temp:  [97.5 °F (36.4 °C)-98.5 °F (36.9 °C)]     I & O (Last 24H):    Intake/Output Summary (Last 24 hours) at 06/29/17 1442  Last data filed at 06/29/17 1300   Gross per 24 hour   Intake             2850 ml   Output             1850 ml   Net             1000 ml       Physical Exam:  General: well developed, well nourished  HENT: Head:normocephalic, atraumatic. Ears:not examined. Nose: Nares normal. Septum midline. Mucosa normal. No drainage or sinus tenderness., no discharge. Throat: lips, mucosa, and tongue normal; teeth and gums normal and no throat erythema.  Eyes: conjunctivae/corneas clear. PERRL.   Neck: supple, symmetrical, trachea midline, no JVD and thyroid not enlarged, symmetric, no tenderness/mass/nodules  Lungs:  clear to auscultation bilaterally and normal respiratory effort  Cardiovascular: Heart: regular rate and  rhythm, S1, S2 normal, no murmur, click, rub or gallop. Chest Wall: no tenderness. Extremities: no cyanosis or edema, or clubbing. Pulses: rt foot with bounding pulse. left foot impalpable foot pulse.  Abdomen/Rectal: Abdomen: soft, non-tender non-distented; bowel sounds normal; no masses,  no organomegaly. Rectal: not examined  Skin: rt hallux open amp site with 70% granulation and 30% slough. rt 2nd toe is deformed swollen and with tiny opening rt lateral aspect of toe.  Musculoskeletal:no clubbing, cyanosis  Lymph Nodes: No cervical or supraclavicular adenopathy    Lines/Drains:       Peripheral IV - Single Lumen 06/23/17 1551 Right Wrist (Active)   Site Assessment Clean;Dry;Intact 6/25/2017  7:15 PM   Line Status Infusing 6/26/2017  7:45 AM   Dressing Status Clean;Dry;Intact 6/25/2017  7:15 PM   Dressing Change Due 06/27/17 6/25/2017  7:15 PM   Site Change Due 06/27/17 6/25/2017  7:15 PM   Reason Not Rotated Not due 6/25/2017  7:15 PM       Laboratory:  CBC    Recent Labs  Lab 06/29/17  0535   WBC 4.40   RBC 4.02*   HGB 11.0*   HCT 34.2*   *   MCV 85   MCH 27.4   MCHC 32.2     BMP    Recent Labs  Lab 06/29/17  0534   *  134*   K 4.6  4.6   CL 99  99   CO2 28  28   BUN 18  18   CREATININE 1.0  1.0   CALCIUM 9.3  9.3     Microbiology Results (last 7 days)     Procedure Component Value Units Date/Time    Aerobic culture [[id number]] Collected:  06/27/17 1327    Order Status:  Completed Specimen:  Bone from Foot, Right Updated:  06/29/17 0937     Aerobic Bacterial Culture --     COAGULASE-NEGATIVE STAPHYLOCOCCUS SPECIES  Moderate  No further workup.       Aerobic Bacterial Culture --     ENTEROCOCCUS SPECIES  Moderate  Identification and susceptibility pending      Culture, Anaerobe [[id number]] Collected:  06/27/17 1327    Order Status:  Completed Specimen:  Bone from Foot, Right Updated:  06/29/17 0759     Anaerobic Culture Culture in progress    Blood Culture #2 **CANNOT BE ORDERED STAT**  [[id number]] Collected:  06/23/17 1525    Order Status:  Completed Specimen:  Blood from Peripheral, Forearm, Right Updated:  06/28/17 1903     Blood Culture, Routine No growth after 5 days.    Blood Culture #1 **CANNOT BE ORDERED STAT** [[id number]] Collected:  06/23/17 1510    Order Status:  Completed Specimen:  Blood from Peripheral, Antecubital, Right Updated:  06/28/17 1903     Blood Culture, Routine No growth after 5 days.    Gram stain [[id number]] Collected:  06/27/17 1327    Order Status:  Completed Specimen:  Bone from Foot, Right Updated:  06/28/17 0733     Gram Stain Result Rare Gram positive cocci      Few WBC's        No results for input(s): COLORU, CLARITYU, SPECGRAV, PHUR, PROTEINUA, GLUCOSEU, BILIRUBINCON, BLOODU, WBCU, RBCU, BACTERIA, MUCUS, NITRITE, LEUKOCYTESUR, UROBILINOGEN, HYALINECASTS in the last 168 hours.    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       1. Concern for osteomyelitis, chronic of rt remnant metatarsal head and 2nd mtp joint  Repeat mri foot confirms osteomyelitis of 1st remnant metatarsal head and also proximal 2nd phalanx.  biopsy of foot and 2nd mtp joint to better guide rx pending  He is aware that if he has failed 6 weeks  of iv abx the first time round, unlikley that further iv abx without resection of infected remnant bone is unlikely to work  2. Hep c in remission  3. Poorly controlled diabetes  Will start iv abx - vancomycin as gpc on gram stain- enterococcus and cns, sensitivities pending  picc

## 2017-06-29 NOTE — PROGRESS NOTES
Progress Note  Podiatry    Admit Date: 6/23/2017  Post-operative Day: 2 Days Post-Op  Hospital Day: 7    SUBJECTIVE:     Follow-up For: R foot infection. POD #2 s/p excisional debridement and bone biopsy of proximal phalanx of the 2nd digit.   Seen at bedside. No acute distress. No family member at bedside. No fever, chills, sweats. Resting comfortably in bed. Complaining of pain to his tailbone and needing highr narcotic pain medication dosage    Scheduled Meds:   aspirin  81 mg Oral Daily    famotidine  20 mg Oral Daily    fentaNYL  1 patch Transdermal Q72H    heparin (porcine)  5,000 Units Subcutaneous Q8H    insulin aspart  8 Units Subcutaneous TIDWM    insulin detemir  30 Units Subcutaneous QHS    nicotine  1 patch Transdermal Daily    senna-docusate 8.6-50 mg  1 tablet Oral BID    sodium chloride 0.9%  3 mL Intravenous Q8H    vancomycin (VANCOCIN) IVPB  1,000 mg Intravenous Q12H     Continuous Infusions:   PRN Meds:dextrose 50%, dextrose 50%, diazePAM, glucagon (human recombinant), glucose, glucose, HYDROmorphone, insulin aspart, ondansetron, oxycodone, promethazine, sodium chloride 0.9%    Review of patient's allergies indicates:   Allergen Reactions    Codeine Rash     Other reaction(s): Unknown       Review of Systems:  Constitutional: no fever or chills, pain well controlled  Cardiovascular: positive for history of revasc in LE's  Musculoskeletal: positive for back pain and R great toe amputation  Neurological: positive for neuropathy in feet    OBJECTIVE:     Vital Signs (Most Recent):  Temp: 98.5 °F (36.9 °C) (06/29/17 0815)  Pulse: 81 (06/29/17 0815)  Resp: 18 (06/29/17 0815)  BP: (!) 149/78 (06/29/17 0815)  SpO2: 97 % (06/29/17 0815)    Vital Signs Range (Last 24H):  Temp:  [97.5 °F (36.4 °C)-98.5 °F (36.9 °C)]   Pulse:  [81-86]   Resp:  [16-20]   BP: (135-149)/(63-78)   SpO2:  [96 %-97 %]     Physical Exam:  General: Pt. is well-developed, well-nourished, appears stated age, in no acute  distress, alert and oriented x 3. No evidence of depression, anxiety, or agitation. Calm, cooperative, and communicative. Appropriate interactions and affect.    Surgical Site  Dressing clean, dry, intact. Toes pink. No pallor or cyanosis noted    Laboratory:  CBC:     Recent Labs  Lab 06/29/17  0535   WBC 4.40   RBC 4.02*   HGB 11.0*   HCT 34.2*   *   MCV 85   MCH 27.4   MCHC 32.2     BMP:     Recent Labs  Lab 06/29/17  0534   *  227*   *  134*   K 4.6  4.6   CL 99  99   CO2 28  28   BUN 18  18   CREATININE 1.0  1.0   CALCIUM 9.3  9.3       ASSESSMENT/PLAN:     Assessment:  Uncontrolled DM II with peripheral neuropathy  Foot ulcer right with necrosis of bone  Osteomyelitis toe R  History of hallux amputation R.     Plan:  Again, discussed pros and cons as well as risks of long term IV abx and primary amputation. He has failed 6 weeks of IV abx in the past. He is aware of risks, complications and alternative options.     Will likely need additional 6 weeks of IV abx via PICC line. Appreciate ID assistance.     With graft in place I prefer to not have dressing disturbed for the first 7 days. S/p discharge he must follow up with me in wound care center on a Friday for dressing change.    Will continue to follow.

## 2017-06-29 NOTE — PROGRESS NOTES
Progress Note    Admit Date: 6/23/2017   LOS: 6 days     SUBJECTIVE:     Follow-up For:  Osteomyelitis of right foot    06/29/2017: States doing little better. Pain not controlled   06/28/2017:  Underwent debridement and biopsy of right foot. Denies fever or chills   06/27/2017: underwent MRI yesterday. Awaiting for possible bone bx and debridement today   06/26/2017: denies fever or chills.       Scheduled Meds:   aspirin  81 mg Oral Daily    famotidine  20 mg Oral Daily    fentaNYL  1 patch Transdermal Q72H    heparin (porcine)  5,000 Units Subcutaneous Q8H    insulin aspart  8 Units Subcutaneous TIDWM    insulin detemir  30 Units Subcutaneous QHS    nicotine  1 patch Transdermal Daily    senna-docusate 8.6-50 mg  1 tablet Oral BID    sodium chloride 0.9%  3 mL Intravenous Q8H    vancomycin (VANCOCIN) IVPB  1,000 mg Intravenous Q12H     Continuous Infusions:   PRN Meds:dextrose 50%, dextrose 50%, diazePAM, glucagon (human recombinant), glucose, glucose, HYDROmorphone, insulin aspart, ondansetron, oxycodone, promethazine, sodium chloride 0.9%    Review of patient's allergies indicates:   Allergen Reactions    Codeine Rash     Other reaction(s): Unknown       Review of Systems    Constitutional: Denies fever or chills  Eyes: no visual changes   ENT: no nasal congestion. Denies sore throat with odynophagia   Respiratory: No cough, SOB, exertional dyspnea or  hemoptysis   Cardiovascular: no chest pain or palpitations   Gastrointestinal: no nausea, vomiting or abdominal pain. Normal bowl habits   Hematologic/Lymphatic: no bleeding   Musculoskeletal: see HPI  Neurological: no seizures or tremors, gait or balance prblems  Skin: see HPI  Neuro: Denies any weakness. Chronic back pain  Psych: Denies any anxiety, depression or insomnia      OBJECTIVE:     Vital Signs (Most Recent)  Temp: 97.6 °F (36.4 °C) (06/29/17 0030)  Pulse: 82 (06/29/17 0030)  Resp: 16 (06/29/17 0030)  BP: (!) 141/64 (06/29/17 0030)  SpO2: 97  % (06/29/17 0030)    Vital Signs Range (Last 24H):  Temp:  [97.5 °F (36.4 °C)-99.1 °F (37.3 °C)]   Pulse:  [81-86]   Resp:  [16-20]   BP: (132-141)/(63-85)   SpO2:  [96 %-97 %]     I & O (Last 24H):    Intake/Output Summary (Last 24 hours) at 06/29/17 0659  Last data filed at 06/29/17 0500   Gross per 24 hour   Intake             3090 ml   Output             1975 ml   Net             1115 ml     Physical Exam:    General: NAD, sitting up in bed .    Head: normocephalic, atraumatic   Eyes: conjunctivae pink, anicteric sclera.   Nose: Mucosa normal. No drainage or sinus tenderness, no discharge   Throat: No erythema or post nasal discharge   Neck: supple, no LAD   Lungs: CTAB   Heart: S1, S2, RRR   Abdomen: + BS x 4 quadrants, soft, non tender. No hepatosplenomegaly.   Extremities: no cyanosis or edema.   Skin: right foot in dressing   MS: per DPM: right foot wound to the level of 2nd phalanx bone. Covered with dressing   Neuro: A&O x 3  Psy: pleasant, affect is congruent to mood       Laboratory:  CBC:     Recent Labs  Lab 06/29/17  0535   WBC 4.40   RBC 4.02*   HGB 11.0*   HCT 34.2*   *   MCV 85   MCH 27.4   MCHC 32.2     CMP:     Recent Labs  Lab 06/29/17  0534   *  227*   CALCIUM 9.3  9.3   ALBUMIN 2.9*   PROT 7.7   *  134*   K 4.6  4.6   CO2 28  28   CL 99  99   BUN 18  18   CREATININE 1.0  1.0   ALKPHOS 61   ALT 23   AST 17   BILITOT 0.4     Coagulation: No results for input(s): LABPROT, INR, APTT in the last 168 hours.  Microbiology Results (last 7 days)     Procedure Component Value Units Date/Time    Blood Culture #2 **CANNOT BE ORDERED STAT** [[id number]] Collected:  06/23/17 1525    Order Status:  Completed Specimen:  Blood from Peripheral, Forearm, Right Updated:  06/28/17 1903     Blood Culture, Routine No growth after 5 days.    Blood Culture #1 **CANNOT BE ORDERED STAT** [[id number]] Collected:  06/23/17 1510    Order Status:  Completed Specimen:  Blood from Peripheral,  Antecubital, Right Updated:  06/28/17 1903     Blood Culture, Routine No growth after 5 days.    Aerobic culture [[id number]] Collected:  06/27/17 1327    Order Status:  Completed Specimen:  Bone from Foot, Right Updated:  06/28/17 1113     Aerobic Bacterial Culture --     GRAM POSITIVE COCCI  Identification and susceptibility pending      Gram stain [[id number]] Collected:  06/27/17 1327    Order Status:  Completed Specimen:  Bone from Foot, Right Updated:  06/28/17 0733     Gram Stain Result Rare Gram positive cocci      Few WBC's    Culture, Anaerobe [[id number]] Collected:  06/27/17 1327    Order Status:  Sent Specimen:  Bone from Foot, Right Updated:  06/27/17 1501        Specimen (12h ago through future)    None          Diagnostic Results:    X-ray foot:     Postoperative changes related to amputation of the great toe and distal aspect of the first metatarsal bone.  Bone destruction involving the distal stump of the first metatarsal bone worrisome for underlying osteomyelitis.  Fracture of the base of the proximal phalanx of the second toe. This may represent a pathologic fracture due to underlying osteomyelitis. There is associated soft tissue swelling and a small amount of soft tissue air adjacent to the base of the second toe.    MRI foot: 06/26/2017      1. Postoperative change of prior right great toe partial resection. There is abnormal marrow edema and geographic T1 marrow replacement involving the residual great toe metatarsal base concerning for osteomyelitis.    2. Abnormal marrow edema and geographic T1 marrow replacement of the proximal phalanx of the second toe with associated fracture concerning for osteomyelitis with pathologic fracture. Small second toe MTP joint effusion is noted with mild edema of the second metatarsal head suspicious for possible early osteomyelitis.    3. 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    This report has been flagged in the [facility] medical record.    Current Facility-Administered Medications   Medication Dose Route Frequency Provider Last Rate Last Dose    aspirin EC tablet 81 mg  81 mg Oral Daily You Jolley MD   81 mg at 06/28/17 0805    dextrose 50% injection 12.5 g  12.5 g Intravenous PRN Phan Singleton MD        dextrose 50% injection 25 g  25 g Intravenous PRN Phan Singleton MD        diazePAM tablet 2 mg  2 mg Oral Q8H PRN You Jolley MD   2 mg at 06/28/17 2345    famotidine tablet 20 mg  20 mg Oral Daily You Jolley MD   20 mg at 06/28/17 0804    fentaNYL 50 mcg/hr 1 patch  1 patch Transdermal Q72H You Jolley MD   1 patch at 06/27/17 1619    glucagon (human recombinant) injection 1 mg  1 mg Intramuscular PRN Phan Singleton MD        glucose chewable tablet 16 g  16 g Oral PRN Phan Singleton MD        glucose chewable tablet 24 g  24 g Oral PRN Phan Singleton MD        heparin (porcine) injection 5,000 Units  5,000 Units Subcutaneous Q8H You Jolley MD   5,000 Units at 06/29/17 0523    hydromorphone (PF) injection 1 mg  1 mg Intravenous Q4H PRN You Jolley MD   1 mg at 06/29/17 0337    insulin aspart pen 1-10 Units  1-10 Units Subcutaneous QID (AC + HS) PRN Phan Singleton MD   3 Units at 06/28/17 2212    insulin aspart pen 8 Units  8 Units Subcutaneous TIDWM Miguel Lux MD        insulin detemir pen 30 Units  30 Units Subcutaneous QHS Miguel Lux MD        nicotine 14 mg/24 hr 1 patch  1 patch Transdermal Daily Phan Singleton MD   1 patch at 06/28/17 0805    ondansetron injection 4 mg  4 mg Intravenous Q12H PRN You Jolley MD        oxycodone immediate release tablet 10 mg  10 mg Oral Q6H PRN You Jolley MD   10 mg at 06/29/17 0523    promethazine tablet 25 mg  25 mg Oral Q6H PRN You Jolley MD   25 mg at 06/28/17 2214    senna-docusate 8.6-50 mg per tablet 1 tablet  1 tablet Oral BID You Jolley MD   1 tablet  at 06/28/17 2210    sodium chloride 0.9% flush 3 mL  3 mL Intravenous Q8H You Jolley MD   3 mL at 06/28/17 2213    sodium chloride 0.9% flush 3 mL  3 mL Intravenous PRN Karen Ortiz MD        vancomycin 1 g in dextrose 5 % 250 mL IVPB (ready to mix system)  1,000 mg Intravenous Q12H Aye Cam .7 mL/hr at 06/29/17 0523 1,000 mg at 06/29/17 0523       ASSESSMENT/PLAN:     Active Hospital Problems    Diagnosis  POA    *Osteomyelitis of right foot [M86.9]    -? pt failed 6 weeks of IV ABX and I am not very hopeful that further ABX will add any meaningful benefit for his osteomyelitis  - await MRI of foot    - ID and DPM following pt  - Elevated CRP and Sed rate  - MRI foot showing:proximal phalanx of the second toe with associated fracture concerning for osteomyelitis with pathologic fracture. Small second toe MTP joint effusion is noted with mild edema of the second metatarsal head suspicious for possible early osteomyelitis.    - s/p debridement and bx by podiatry    - Coag Neg GP coci & Enterococcus   - on Vanc  - await ID reccs       Yes    Chronic hepatitis C without hepatic coma [B18.2]    - tx     Yes    Anxiety disorder due to general medical condition [F06.4]    - has been on chronic benzo      Yes    Diabetic polyneuropathy associated with type 2 diabetes mellitus [E11.42]  Yes    Anemia of other chronic disease [D63.8]  - stable    Yes    Chronic back pain [M54.9, G89.29]- on chronic opiate tx    - will hold increase pain medication at this point- pt has opiate dependence       Yes    DM type 2, uncontrolled, with neuropathy [E11.40, E11.65]    - poor insight into pt's health  - he does not care much about his DM and missed many appointments with endocrinology f/u    - continue basal and prandial insulin  - increased insulin dose- improving        Yes    Lumbar herniated disc - Large central disk extrusion at the L4-L5 level with significant central canal and bilateral  neuroforaminal narrowing [M51.26]  Yes      Resolved Hospital Problems    Diagnosis Date Resolved POA   No resolved problems to display.     Will work for possible LTAC placement as pt has poor living condition- no one at home with him.   - Does he need wound vac??    Miguel Lux M.D  Internal Medicine & Geriatric Medicine  Hematology & Oncology  Palliative Medicine    [address], [address]  [place] [address]  [phone] (Office)  [phone] (Fax)

## 2017-06-29 NOTE — NURSING
Pt has had poor pain control all night. Pt has become less agitated toward the later part of the shift. He has remained free from falls and or signs of infection this shift. Will continue to monitor.

## 2017-06-29 NOTE — PLAN OF CARE
Problem: Patient Care Overview  Goal: Plan of Care Review  Outcome: Ongoing (interventions implemented as appropriate)  Pt free from falls, injury or any further trauma throughout shift. Pt AAOx4. Continued medications as ordered. Complaints of pain moderately controlled with medication. Encouraged pt to drink fluids. Blood sugars monitored AC/HS. R foot kept elvated off of the bed. Will cont to monitor.   06/29/17 6452   Coping/Psychosocial   Plan Of Care Reviewed With patient

## 2017-06-29 NOTE — PLAN OF CARE
06/29/17 1208   Discharge Reassessment   Assessment Type Discharge Planning Reassessment   Can the patient answer the patient profile reliably? Yes, cognitively intact   How does the patient rate their overall health at the present time? Good   Describe the patient's ability to walk at the present time. Walks with the help of equipment   How often would a person be available to care for the patient? Whenever needed   Number of comorbid conditions (as recorded on the chart) One   Discharge Plan A Home with family;Home Health   Discharge Plan B (IV ABX for 6wks)   Patient choice form signed by patient/caregiver Yes  (Home Health)   Involved the patient/caregiver in establishing a new discharge plan: Yes

## 2017-06-29 NOTE — PLAN OF CARE
Problem: Patient Care Overview  Goal: Plan of Care Review  Dahlia Leblanc RN Registered Nurse Signed Med/Surg  Nursing          []Hide copied text  []Hover for attribution information  Pt has had poor pain control all night. Pt has become less agitated toward the later part of the shift. He has remained free from falls and or signs of infection this shift. Will continue to monitor.

## 2017-06-30 LAB
ALBUMIN SERPL BCP-MCNC: 2.7 G/DL
ALP SERPL-CCNC: 67 U/L
ALT SERPL W/O P-5'-P-CCNC: 21 U/L
ANION GAP SERPL CALC-SCNC: 6 MMOL/L
AST SERPL-CCNC: 18 U/L
BACTERIA SPEC AEROBE CULT: NORMAL
BACTERIA SPEC AEROBE CULT: NORMAL
BASOPHILS # BLD AUTO: 0.02 K/UL
BASOPHILS NFR BLD: 0.5 %
BILIRUB SERPL-MCNC: 0.3 MG/DL
BUN SERPL-MCNC: 18 MG/DL
CALCIUM SERPL-MCNC: 8.9 MG/DL
CHLORIDE SERPL-SCNC: 101 MMOL/L
CO2 SERPL-SCNC: 27 MMOL/L
CREAT SERPL-MCNC: 1.1 MG/DL
DIFFERENTIAL METHOD: ABNORMAL
EOSINOPHIL # BLD AUTO: 0.1 K/UL
EOSINOPHIL NFR BLD: 2.3 %
ERYTHROCYTE [DISTWIDTH] IN BLOOD BY AUTOMATED COUNT: 15.5 %
EST. GFR  (AFRICAN AMERICAN): >60 ML/MIN/1.73 M^2
EST. GFR  (NON AFRICAN AMERICAN): >60 ML/MIN/1.73 M^2
GLUCOSE SERPL-MCNC: 208 MG/DL
HCT VFR BLD AUTO: 31.3 %
HGB BLD-MCNC: 10.1 G/DL
LYMPHOCYTES # BLD AUTO: 1.2 K/UL
LYMPHOCYTES NFR BLD: 27.5 %
MCH RBC QN AUTO: 27.4 PG
MCHC RBC AUTO-ENTMCNC: 32.3 %
MCV RBC AUTO: 85 FL
MONOCYTES # BLD AUTO: 0.5 K/UL
MONOCYTES NFR BLD: 12.4 %
NEUTROPHILS # BLD AUTO: 2.5 K/UL
NEUTROPHILS NFR BLD: 57.3 %
PLATELET # BLD AUTO: 132 K/UL
PMV BLD AUTO: 10.1 FL
POCT GLUCOSE: 157 MG/DL (ref 70–110)
POCT GLUCOSE: 218 MG/DL (ref 70–110)
POCT GLUCOSE: 241 MG/DL (ref 70–110)
POCT GLUCOSE: 279 MG/DL (ref 70–110)
POTASSIUM SERPL-SCNC: 4.2 MMOL/L
PROT SERPL-MCNC: 7.2 G/DL
RBC # BLD AUTO: 3.69 M/UL
SODIUM SERPL-SCNC: 134 MMOL/L
VANCOMYCIN TROUGH SERPL-MCNC: 11.4 UG/ML
WBC # BLD AUTO: 4.37 K/UL

## 2017-06-30 PROCEDURE — 25000003 PHARM REV CODE 250: Performed by: INTERNAL MEDICINE

## 2017-06-30 PROCEDURE — 12000002 HC ACUTE/MED SURGE SEMI-PRIVATE ROOM

## 2017-06-30 PROCEDURE — 80202 ASSAY OF VANCOMYCIN: CPT

## 2017-06-30 PROCEDURE — 25000003 PHARM REV CODE 250: Performed by: EMERGENCY MEDICINE

## 2017-06-30 PROCEDURE — 99024 POSTOP FOLLOW-UP VISIT: CPT | Mod: ,,, | Performed by: PODIATRIST

## 2017-06-30 PROCEDURE — 80053 COMPREHEN METABOLIC PANEL: CPT

## 2017-06-30 PROCEDURE — 85025 COMPLETE CBC W/AUTO DIFF WBC: CPT

## 2017-06-30 PROCEDURE — 25000003 PHARM REV CODE 250

## 2017-06-30 PROCEDURE — 63600175 PHARM REV CODE 636 W HCPCS: Performed by: INTERNAL MEDICINE

## 2017-06-30 PROCEDURE — 63600175 PHARM REV CODE 636 W HCPCS: Performed by: EMERGENCY MEDICINE

## 2017-06-30 RX ORDER — IBUPROFEN 200 MG
1 TABLET ORAL DAILY
Refills: 0 | COMMUNITY
Start: 2017-06-30 | End: 2017-07-12

## 2017-06-30 RX ORDER — INSULIN GLARGINE 100 [IU]/ML
30 INJECTION, SOLUTION SUBCUTANEOUS NIGHTLY
Qty: 15 ML | Refills: 3 | Status: SHIPPED | OUTPATIENT
Start: 2017-06-30 | End: 2018-08-08 | Stop reason: SDUPTHER

## 2017-06-30 RX ORDER — INSULIN ASPART 100 [IU]/ML
8 INJECTION, SOLUTION INTRAVENOUS; SUBCUTANEOUS 3 TIMES DAILY
Qty: 9 ML | Refills: 3 | Status: SHIPPED | OUTPATIENT
Start: 2017-06-30 | End: 2018-06-04

## 2017-06-30 RX ADMIN — HEPARIN SODIUM 5000 UNITS: 5000 INJECTION, SOLUTION INTRAVENOUS; SUBCUTANEOUS at 09:06

## 2017-06-30 RX ADMIN — Medication 10 ML: at 05:06

## 2017-06-30 RX ADMIN — Medication 3 ML: at 01:06

## 2017-06-30 RX ADMIN — HYDROMORPHONE HYDROCHLORIDE 1 MG: 2 INJECTION INTRAMUSCULAR; INTRAVENOUS; SUBCUTANEOUS at 03:06

## 2017-06-30 RX ADMIN — Medication 3 ML: at 09:06

## 2017-06-30 RX ADMIN — VANCOMYCIN HYDROCHLORIDE 1000 MG: 1 INJECTION, POWDER, LYOPHILIZED, FOR SOLUTION INTRAVENOUS at 08:06

## 2017-06-30 RX ADMIN — ASPIRIN 81 MG: 81 TABLET, COATED ORAL at 08:06

## 2017-06-30 RX ADMIN — INSULIN ASPART 2 UNITS: 100 INJECTION, SOLUTION INTRAVENOUS; SUBCUTANEOUS at 09:06

## 2017-06-30 RX ADMIN — DOCUSATE SODIUM AND SENNOSIDES 1 TABLET: 8.6; 5 TABLET, FILM COATED ORAL at 09:06

## 2017-06-30 RX ADMIN — HEPARIN SODIUM 5000 UNITS: 5000 INJECTION, SOLUTION INTRAVENOUS; SUBCUTANEOUS at 05:06

## 2017-06-30 RX ADMIN — Medication 3 ML: at 06:06

## 2017-06-30 RX ADMIN — Medication 10 ML: at 06:06

## 2017-06-30 RX ADMIN — HYDROMORPHONE HYDROCHLORIDE 1 MG: 2 INJECTION INTRAMUSCULAR; INTRAVENOUS; SUBCUTANEOUS at 08:06

## 2017-06-30 RX ADMIN — Medication 10 ML: at 12:06

## 2017-06-30 RX ADMIN — HYDROMORPHONE HYDROCHLORIDE 1 MG: 2 INJECTION INTRAMUSCULAR; INTRAVENOUS; SUBCUTANEOUS at 05:06

## 2017-06-30 RX ADMIN — VANCOMYCIN HYDROCHLORIDE 1000 MG: 1 INJECTION, POWDER, LYOPHILIZED, FOR SOLUTION INTRAVENOUS at 09:06

## 2017-06-30 RX ADMIN — INSULIN ASPART 8 UNITS: 100 INJECTION, SOLUTION INTRAVENOUS; SUBCUTANEOUS at 08:06

## 2017-06-30 RX ADMIN — NICOTINE 1 PATCH: 14 PATCH, EXTENDED RELEASE TRANSDERMAL at 08:06

## 2017-06-30 RX ADMIN — OXYCODONE HYDROCHLORIDE 10 MG: 5 TABLET ORAL at 09:06

## 2017-06-30 RX ADMIN — INSULIN ASPART 8 UNITS: 100 INJECTION, SOLUTION INTRAVENOUS; SUBCUTANEOUS at 06:06

## 2017-06-30 RX ADMIN — DOCUSATE SODIUM AND SENNOSIDES 1 TABLET: 8.6; 5 TABLET, FILM COATED ORAL at 08:06

## 2017-06-30 RX ADMIN — INSULIN ASPART 8 UNITS: 100 INJECTION, SOLUTION INTRAVENOUS; SUBCUTANEOUS at 12:06

## 2017-06-30 RX ADMIN — HYDROMORPHONE HYDROCHLORIDE 1 MG: 2 INJECTION INTRAMUSCULAR; INTRAVENOUS; SUBCUTANEOUS at 01:06

## 2017-06-30 RX ADMIN — HEPARIN SODIUM 5000 UNITS: 5000 INJECTION, SOLUTION INTRAVENOUS; SUBCUTANEOUS at 01:06

## 2017-06-30 RX ADMIN — FAMOTIDINE 20 MG: 20 TABLET, FILM COATED ORAL at 08:06

## 2017-06-30 NOTE — PROGRESS NOTES
TN met with pt and pt was informed that home health was recommended. Patient first choice form signed with pt selecting first accepting facility. Yellow copy given to pt and white copy placed in chart.

## 2017-06-30 NOTE — PROGRESS NOTES
Progress Note    Admit Date: 6/23/2017   LOS: 7 days     SUBJECTIVE:     Follow-up For:  Osteomyelitis of right foot    06/30/2017: states want to go  Home.   06/29/2017: States doing little better. Pain not controlled   06/28/2017:  Underwent debridement and biopsy of right foot. Denies fever or chills   06/27/2017: underwent MRI yesterday. Awaiting for possible bone bx and debridement today   06/26/2017: denies fever or chills.       Scheduled Meds:   aspirin  81 mg Oral Daily    famotidine  20 mg Oral Daily    fentaNYL  1 patch Transdermal Q72H    heparin (porcine)  5,000 Units Subcutaneous Q8H    insulin aspart  8 Units Subcutaneous TIDWM    insulin detemir  30 Units Subcutaneous QHS    nicotine  1 patch Transdermal Daily    senna-docusate 8.6-50 mg  1 tablet Oral BID    sodium chloride 0.9%  10 mL Intravenous Q6H    sodium chloride 0.9%  3 mL Intravenous Q8H    vancomycin (VANCOCIN) IVPB  1,000 mg Intravenous Q12H     Continuous Infusions:   PRN Meds:dextrose 50%, dextrose 50%, diazePAM, glucagon (human recombinant), glucose, glucose, HYDROmorphone, insulin aspart, ondansetron, oxycodone, promethazine, Flushing PICC Protocol **AND** sodium chloride 0.9% **AND** sodium chloride 0.9%, sodium chloride 0.9%    Review of patient's allergies indicates:   Allergen Reactions    Codeine Rash     Other reaction(s): Unknown       Review of Systems    Constitutional: Denies fever or chills  Eyes: no visual changes   ENT: no nasal congestion. Denies sore throat with odynophagia   Respiratory: No cough, SOB, exertional dyspnea or  hemoptysis   Cardiovascular: no chest pain or palpitations   Gastrointestinal: no nausea, vomiting or abdominal pain. Normal bowl habits   Hematologic/Lymphatic: no bleeding   Musculoskeletal: see HPI  Neurological: no seizures or tremors, gait or balance prblems  Skin: see HPI  Neuro: Denies any weakness. Chronic back pain  Psych: Denies any anxiety, depression or  insomnia      OBJECTIVE:     Vital Signs (Most Recent)  Temp: 98.7 °F (37.1 °C) (06/30/17 0047)  Pulse: 91 (06/30/17 0047)  Resp: 18 (06/30/17 0047)  BP: 130/67 (06/30/17 0047)  SpO2: 96 % (06/30/17 0047)    Vital Signs Range (Last 24H):  Temp:  [97.7 °F (36.5 °C)-98.7 °F (37.1 °C)]   Pulse:  [81-93]   Resp:  [18]   BP: (130-149)/(67-78)   SpO2:  [96 %-97 %]     I & O (Last 24H):    Intake/Output Summary (Last 24 hours) at 06/30/17 0711  Last data filed at 06/30/17 0315   Gross per 24 hour   Intake             1820 ml   Output             1575 ml   Net              245 ml     Physical Exam:    General: NAD, sitting up in bed .    Head: normocephalic, atraumatic   Eyes: conjunctivae pink, anicteric sclera.   Nose: Mucosa normal. No drainage or sinus tenderness, no discharge   Throat: No erythema or post nasal discharge   Neck: supple, no LAD   Lungs: CTAB   Heart: S1, S2, RRR   Abdomen: + BS x 4 quadrants, soft, non tender. No hepatosplenomegaly.   Extremities: no cyanosis or edema.   Skin: right foot in dressing   MS: per DPM: right foot wound to the level of 2nd phalanx bone. Covered with dressing   Neuro: A&O x 3  Psy: pleasant, affect is congruent to mood       Laboratory:  CBC:     Recent Labs  Lab 06/30/17  0506   WBC 4.37   RBC 3.69*   HGB 10.1*   HCT 31.3*   *   MCV 85   MCH 27.4   MCHC 32.3     CMP:     Recent Labs  Lab 06/30/17  0506   *   CALCIUM 8.9   ALBUMIN 2.7*   PROT 7.2   *   K 4.2   CO2 27      BUN 18   CREATININE 1.1   ALKPHOS 67   ALT 21   AST 18   BILITOT 0.3     Coagulation: No results for input(s): LABPROT, INR, APTT in the last 168 hours.  Microbiology Results (last 7 days)     Procedure Component Value Units Date/Time    Aerobic culture [[id number]] Collected:  06/27/17 1327    Order Status:  Completed Specimen:  Bone from Foot, Right Updated:  06/29/17 5898     Aerobic Bacterial Culture --     COAGULASE-NEGATIVE STAPHYLOCOCCUS SPECIES  Moderate  No further workup.        Aerobic Bacterial Culture --     ENTEROCOCCUS SPECIES  Moderate  Identification and susceptibility pending      Culture, Anaerobe [[id number]] Collected:  06/27/17 1327    Order Status:  Completed Specimen:  Bone from Foot, Right Updated:  06/29/17 0759     Anaerobic Culture Culture in progress    Blood Culture #2 **CANNOT BE ORDERED STAT** [[id number]] Collected:  06/23/17 1525    Order Status:  Completed Specimen:  Blood from Peripheral, Forearm, Right Updated:  06/28/17 1903     Blood Culture, Routine No growth after 5 days.    Blood Culture #1 **CANNOT BE ORDERED STAT** [[id number]] Collected:  06/23/17 1510    Order Status:  Completed Specimen:  Blood from Peripheral, Antecubital, Right Updated:  06/28/17 1903     Blood Culture, Routine No growth after 5 days.    Gram stain [[id number]] Collected:  06/27/17 1327    Order Status:  Completed Specimen:  Bone from Foot, Right Updated:  06/28/17 0733     Gram Stain Result Rare Gram positive cocci      Few WBC's        Specimen (12h ago through future)    None          Diagnostic Results:    X-ray foot:     Postoperative changes related to amputation of the great toe and distal aspect of the first metatarsal bone.  Bone destruction involving the distal stump of the first metatarsal bone worrisome for underlying osteomyelitis.  Fracture of the base of the proximal phalanx of the second toe. This may represent a pathologic fracture due to underlying osteomyelitis. There is associated soft tissue swelling and a small amount of soft tissue air adjacent to the base of the second toe.    MRI foot: 06/26/2017      1. Postoperative change of prior right great toe partial resection. There is abnormal marrow edema and geographic T1 marrow replacement involving the residual great toe metatarsal base concerning for osteomyelitis.    2. Abnormal marrow edema and geographic T1 marrow replacement of the proximal phalanx of the second toe with associated fracture concerning for  osteomyelitis with pathologic fracture. Small second toe MTP joint effusion is noted with mild edema of the second metatarsal head suspicious for possible early osteomyelitis.    3. Diffuse edema of the plantar foot musculature suggestive of neuropathic myositis in this patient with a reported history of diabetes.    This report has been flagged in the [facility] medical record.    Current Facility-Administered Medications   Medication Dose Route Frequency Provider Last Rate Last Dose    aspirin EC tablet 81 mg  81 mg Oral Daily You Jolley MD   81 mg at 06/29/17 0911    dextrose 50% injection 12.5 g  12.5 g Intravenous PRN Phan Singleton MD        dextrose 50% injection 25 g  25 g Intravenous PRN Phan Singleton MD        diazePAM tablet 2 mg  2 mg Oral Q8H PRN You Jolley MD   2 mg at 06/29/17 2108    famotidine tablet 20 mg  20 mg Oral Daily You Jolley MD   20 mg at 06/29/17 0911    fentaNYL 50 mcg/hr 1 patch  1 patch Transdermal Q72H You Jolley MD   1 patch at 06/29/17 1823    glucagon (human recombinant) injection 1 mg  1 mg Intramuscular PRN Phan Singleton MD        glucose chewable tablet 16 g  16 g Oral PRN Phan Singleton MD        glucose chewable tablet 24 g  24 g Oral PRN Phan Singleton MD        heparin (porcine) injection 5,000 Units  5,000 Units Subcutaneous Q8H You Jolley MD   5,000 Units at 06/30/17 0512    hydromorphone (PF) injection 1 mg  1 mg Intravenous Q4H PRN You Jolley MD   1 mg at 06/30/17 0315    insulin aspart pen 1-10 Units  1-10 Units Subcutaneous QID (AC + HS) PRN Phan Singleton MD   2 Units at 06/29/17 2151    insulin aspart pen 8 Units  8 Units Subcutaneous TIDWM Miguel Lux MD   8 Units at 06/29/17 1820    insulin detemir pen 30 Units  30 Units Subcutaneous QHS Miguel Lux MD   30 Units at 06/29/17 2150    nicotine 14 mg/24 hr 1 patch  1 patch Transdermal Daily Phan Singleton MD   1 patch at 06/29/17 0916    ondansetron  injection 4 mg  4 mg Intravenous Q12H PRN You Jolley MD        oxycodone immediate release tablet 10 mg  10 mg Oral Q6H PRN You Jolley MD   10 mg at 06/29/17 1525    promethazine tablet 25 mg  25 mg Oral Q6H PRN You Jolely MD   25 mg at 06/28/17 2214    senna-docusate 8.6-50 mg per tablet 1 tablet  1 tablet Oral BID You Jolley MD   1 tablet at 06/29/17 2108    sodium chloride 0.9% flush 10 mL  10 mL Intravenous Q6H Aye Cam MD   10 mL at 06/30/17 0600    And    sodium chloride 0.9% flush 10 mL  10 mL Intravenous PRN Aye Cam MD        sodium chloride 0.9% flush 3 mL  3 mL Intravenous Q8H You Jolley MD   3 mL at 06/30/17 0600    sodium chloride 0.9% flush 3 mL  3 mL Intravenous PRN Karen Ortiz MD        vancomycin 1 g in dextrose 5 % 250 mL IVPB (ready to mix system)  1,000 mg Intravenous Q12H Aye Cam .7 mL/hr at 06/29/17 2146 1,000 mg at 06/29/17 2146       ASSESSMENT/PLAN:     Active Hospital Problems    Diagnosis  POA    *Osteomyelitis of right foot [M86.9]    -? pt failed 6 weeks of IV ABX and I am not very hopeful that further ABX will add any meaningful benefit for his osteomyelitis  - await MRI of foot    - ID and DPM following pt  - Elevated CRP and Sed rate  - MRI foot showing:proximal phalanx of the second toe with associated fracture concerning for osteomyelitis with pathologic fracture. Small second toe MTP joint effusion is noted with mild edema of the second metatarsal head suspicious for possible early osteomyelitis.    - s/p debridement and bx by podiatry    - Coag Neg GP coci & Enterococcus   - on Vanc  - PICC placed and HH orders written  - [facility] to f/u with patient        Yes    Chronic hepatitis C without hepatic coma [B18.2]    - tx     Yes    Anxiety disorder due to general medical condition [F06.4]    - has been on chronic benzo      Yes    Diabetic polyneuropathy associated with type 2 diabetes mellitus [E11.42]  Yes     Anemia of other chronic disease [D63.8]  - stable    Yes    Chronic back pain [M54.9, G89.29]- on chronic opiate tx    - will hold increase pain medication at this point- pt has opiate dependence       Yes    DM type 2, uncontrolled, with neuropathy [E11.40, E11.65]    - poor insight into pt's health  - he does not care much about his DM and missed many appointments with endocrinology f/u    - continue basal and prandial insulin  - increased insulin dose- improving  - adjusted outpatient regimen         Yes    Lumbar herniated disc - Large central disk extrusion at the L4-L5 level with significant central canal and bilateral neuroforaminal narrowing [M51.26]  Yes      Resolved Hospital Problems    Diagnosis Date Resolved POA   No resolved problems to display.     Will work for possible LTAC placement as pt has poor living condition- no one at home with him.     - pt refused SNF, LTAC and very well aware the potential for complications including worsening of oteo leading to amputation of toes, BKA, AKA- he voiced understanding     Miguel Lux M.D  Internal Medicine & Geriatric Medicine  Hematology & Oncology  Palliative Medicine    [address], [address]  [place] [address]  [phone] (Office)  [phone] (Fax)

## 2017-06-30 NOTE — PROGRESS NOTES
TN placed call to Dr. Lux and notified him that all outpatient needs have been arranged. TN informed him that we are currently waiting for Shanita from [facility] to instruct pt on IV home med infusion.     Dr. Lux informed TN that he will discharge pt when his computer system returns online.

## 2017-06-30 NOTE — PROGRESS NOTES
Progress Note  Podiatry    Admit Date: 6/23/2017  Post-operative Day: 3 Days Post-Op  Hospital Day: 8    SUBJECTIVE:     Follow-up For: R foot infection. POD #3 s/p excisional debridement and bone biopsy of proximal phalanx of the 2nd digit.   Seen at bedside. No acute distress. No family member at bedside. No fever, chills, sweats. Resting comfortably in bed    Scheduled Meds:   aspirin  81 mg Oral Daily    famotidine  20 mg Oral Daily    fentaNYL  1 patch Transdermal Q72H    heparin (porcine)  5,000 Units Subcutaneous Q8H    insulin aspart  8 Units Subcutaneous TIDWM    insulin detemir  30 Units Subcutaneous QHS    nicotine  1 patch Transdermal Daily    senna-docusate 8.6-50 mg  1 tablet Oral BID    sodium chloride 0.9%  10 mL Intravenous Q6H    sodium chloride 0.9%  3 mL Intravenous Q8H    vancomycin (VANCOCIN) IVPB  1,000 mg Intravenous Q12H     Continuous Infusions:   PRN Meds:dextrose 50%, dextrose 50%, diazePAM, glucagon (human recombinant), glucose, glucose, HYDROmorphone, insulin aspart, ondansetron, oxycodone, promethazine, Flushing PICC Protocol **AND** sodium chloride 0.9% **AND** sodium chloride 0.9%, sodium chloride 0.9%    Review of patient's allergies indicates:   Allergen Reactions    Codeine Rash     Other reaction(s): Unknown       Review of Systems:  Constitutional: no fever or chills, pain well controlled  Cardiovascular: positive for history of revasc in LE's  Musculoskeletal: positive for back pain and R great toe amputation  Neurological: positive for neuropathy in feet    OBJECTIVE:     Vital Signs (Most Recent):  Temp: 98.2 °F (36.8 °C) (06/30/17 0800)  Pulse: 75 (06/30/17 0800)  Resp: 18 (06/30/17 0800)  BP: 120/70 (06/30/17 0800)  SpO2: 99 % (06/30/17 0800)    Vital Signs Range (Last 24H):  Temp:  [97.7 °F (36.5 °C)-98.7 °F (37.1 °C)]   Pulse:  [75-93]   Resp:  [18]   BP: (120-140)/(67-75)   SpO2:  [96 %-99 %]     Physical Exam:  General: Pt. is well-developed, well-nourished,  appears stated age, in no acute distress, alert and oriented x 3. No evidence of depression, anxiety, or agitation. Calm, cooperative, and communicative. Appropriate interactions and affect.    Surgical Site  Dressing clean, dry, intact. Toes pink. No pallor or cyanosis noted    Laboratory:  CBC:     Recent Labs  Lab 06/30/17  0506   WBC 4.37   RBC 3.69*   HGB 10.1*   HCT 31.3*   *   MCV 85   MCH 27.4   MCHC 32.3     BMP:     Recent Labs  Lab 06/30/17  0506   *   *   K 4.2      CO2 27   BUN 18   CREATININE 1.1   CALCIUM 8.9       ASSESSMENT/PLAN:     Assessment:  Uncontrolled DM II with peripheral neuropathy  Foot ulcer right with necrosis of bone  Osteomyelitis toe R  History of hallux amputation R.     Plan:  Again, discussed pros and cons as well as risks of long term IV abx and primary amputation. He has failed 6 weeks of IV abx in the past. He is aware of risks, complications and alternative options.     6 weeks of IV abx via PICC line. Appreciate ID assistance.     With graft in place I prefer to not have dressing disturbed for the first 7-10 days. S/p discharge he must follow up with me in wound care center on a Friday for dressing change.    Will continue to follow.

## 2017-06-30 NOTE — PROCEDURES
"Carlos Cifuentes is a 56 y.o. male patient.    Temp: 98.3 °F (36.8 °C) (06/29/17 1656)  Pulse: 90 (06/29/17 1656)  Resp: 18 (06/29/17 1656)  BP: 137/75 (06/29/17 1656)  SpO2: 97 % (06/29/17 1656)  Weight: 104.3 kg (230 lb) (06/23/17 1420)  Height: 5' 9" (175.3 cm) (06/23/17 1420)    PICC  Date/Time: 6/29/2017 7:20 PM  Consent Done: Yes  Time out: Immediately prior to procedure a time out was called to verify the correct patient, procedure, equipment, support staff and site/side marked as required  Indications: med administration and vascular access  Anesthesia: local infiltration  Local anesthetic: lidocaine 1% without epinephrine  Anesthetic Total (mL): 2  Preparation: skin prepped with ChloraPrep  Skin prep agent dried: skin prep agent completely dried prior to procedure  Sterile barriers: all five maximum sterile barriers used - cap, mask, sterile gown, sterile gloves, and large sterile sheet  Hand hygiene: hand hygiene performed prior to central venous catheter insertion  Location details: right basilic  Catheter type: double lumen  Catheter size: 5 Fr  Catheter Length: 37cm    Ultrasound guidance: yes  Vessel Caliber: medium and patent, compressibility normal  Needle advanced into vessel with real time Ultrasound guidance.  Guidewire confirmed in vessel.  Sterile sheath used.  Number of attempts: 1  Post-procedure: blood return through all ports, chlorhexidine patch and sterile dressing applied  Specimens: No  Implants: No  Assessment: placement verified by x-ray  Complications: none        Jesse Anne  6/29/2017    "

## 2017-06-30 NOTE — NURSING
Paged Dr Lux through the answering service in reference to pt's discharge orders and that pt states his pharmacy will be closing at 1738

## 2017-06-30 NOTE — PROGRESS NOTES
Progress Note  Infectious Disease    Admit Date: 6/23/2017   LOS: 7 days     SUBJECTIVE:     Follow-up For:  Rt foot osteomyelitis    Antibiotics     Start     Stop Route Frequency Ordered    06/28/17 1815  vancomycin 1 g in dextrose 5 % 250 mL IVPB (ready to mix system)  (Vancomycin IVPB with levels panel)      -- IV Every 12 hours (non-standard times) 06/28/17 1700          Review of Systems:  Constitutional: no fever or chills  Eyes: no visual changes  ENT: no nasal congestion or sore throat  Respiratory: no cough or shortness of breath  Cardiovascular: no chest pain or palpitations  Gastrointestinal: no nausea or vomiting, no abdominal pain or change in bowel habits  Genitourinary: no hematuria or dysuria  Hematologic/Lymphatic: no easy bruising or lymphadenopathy  Musculoskeletal: no arthralgias or myalgias  Neurological: no seizures or tremors    OBJECTIVE:     Vital Signs (Most Recent)  Temp: 98.2 °F (36.8 °C) (06/30/17 0800)  Pulse: 75 (06/30/17 0800)  Resp: 18 (06/30/17 0800)  BP: 120/70 (06/30/17 0800)  SpO2: 99 % (06/30/17 0800)    Temperature Range Min/Max (Last 24H):  Temp:  [97.7 °F (36.5 °C)-98.7 °F (37.1 °C)]     I & O (Last 24H):    Intake/Output Summary (Last 24 hours) at 06/30/17 1127  Last data filed at 06/30/17 0315   Gross per 24 hour   Intake             1460 ml   Output             1575 ml   Net             -115 ml       Physical Exam:  General: well developed, well nourished  HENT: Head:normocephalic, atraumatic. Ears:not examined. Nose: Nares normal. Septum midline. Mucosa normal. No drainage or sinus tenderness., no discharge. Throat: lips, mucosa, and tongue normal; teeth and gums normal and no throat erythema.  Eyes: conjunctivae/corneas clear. PERRL.   Neck: supple, symmetrical, trachea midline, no JVD and thyroid not enlarged, symmetric, no tenderness/mass/nodules  Lungs:  clear to auscultation bilaterally and normal respiratory effort  Cardiovascular: Heart: regular rate and rhythm,  S1, S2 normal, no murmur, click, rub or gallop. Chest Wall: no tenderness. Extremities: no cyanosis or edema, or clubbing. Pulses: rt foot with bounding pulse. left foot impalpable foot pulse.  Abdomen/Rectal: Abdomen: soft, non-tender non-distented; bowel sounds normal; no masses,  no organomegaly. Rectal: not examined  Skin: rt hallux open amp site with 70% granulation and 30% slough. rt 2nd toe is deformed swollen and with tiny opening rt lateral aspect of toe.  Musculoskeletal:no clubbing, cyanosis  Lymph Nodes: No cervical or supraclavicular adenopathy    Lines/Drains:       Peripheral IV - Single Lumen 06/23/17 1551 Right Wrist (Active)   Site Assessment Clean;Dry;Intact 6/25/2017  7:15 PM   Line Status Infusing 6/26/2017  7:45 AM   Dressing Status Clean;Dry;Intact 6/25/2017  7:15 PM   Dressing Change Due 06/27/17 6/25/2017  7:15 PM   Site Change Due 06/27/17 6/25/2017  7:15 PM   Reason Not Rotated Not due 6/25/2017  7:15 PM       Laboratory:  CBC    Recent Labs  Lab 06/30/17  0506   WBC 4.37   RBC 3.69*   HGB 10.1*   HCT 31.3*   *   MCV 85   MCH 27.4   MCHC 32.3     BMP    Recent Labs  Lab 06/30/17  0506   *   K 4.2      CO2 27   BUN 18   CREATININE 1.1   CALCIUM 8.9     Microbiology Results (last 7 days)     Procedure Component Value Units Date/Time    Aerobic culture [[id number]] Collected:  06/27/17 1327    Order Status:  Completed Specimen:  Bone from Foot, Right Updated:  06/29/17 0937     Aerobic Bacterial Culture --     COAGULASE-NEGATIVE STAPHYLOCOCCUS SPECIES  Moderate  No further workup.       Aerobic Bacterial Culture --     ENTEROCOCCUS SPECIES  Moderate  Identification and susceptibility pending      Culture, Anaerobe [[id number]] Collected:  06/27/17 1327    Order Status:  Completed Specimen:  Bone from Foot, Right Updated:  06/29/17 0759     Anaerobic Culture Culture in progress    Blood Culture #2 **CANNOT BE ORDERED STAT** [[id number]] Collected:  06/23/17 1525    Order  Status:  Completed Specimen:  Blood from Peripheral, Forearm, Right Updated:  06/28/17 1903     Blood Culture, Routine No growth after 5 days.    Blood Culture #1 **CANNOT BE ORDERED STAT** [[id number]] Collected:  06/23/17 1510    Order Status:  Completed Specimen:  Blood from Peripheral, Antecubital, Right Updated:  06/28/17 1903     Blood Culture, Routine No growth after 5 days.    Gram stain [[id number]] Collected:  06/27/17 1327    Order Status:  Completed Specimen:  Bone from Foot, Right Updated:  06/28/17 0765     Gram Stain Result Rare Gram positive cocci      Few WBC's        No results for input(s): COLORU, CLARITYU, SPECGRAV, PHUR, PROTEINUA, GLUCOSEU, BILIRUBINCON, BLOODU, WBCU, RBCU, BACTERIA, MUCUS, NITRITE, LEUKOCYTESUR, UROBILINOGEN, HYALINECASTS in the last 168 hours.    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       1. Concern for osteomyelitis, chronic of rt remnant metatarsal head and 2nd mtp joint  Repeat mri foot confirms osteomyelitis of 1st remnant metatarsal head and also proximal 2nd phalanx.  biopsy of foot and 2nd mtp joint to better guide rx pending  He is aware that if he has failed 6 weeks of iv abx the first time round, angel that  further iv abx without resection of infected remnant bone is unlikely to work  2. Hep c in remission  3. Poorly controlled diabetes  Will start iv abx - vancomycin as gpc on gram stain- enterococcus and cns  picc placed and [facility] orders written   Discharge once all set up

## 2017-06-30 NOTE — PHYSICIAN QUERY
PT Name: Carlos Cifuentes  MR #: [id number]     Physician Query Form - Documentation Clarification      CDS/: Jhoana Mckinley RN, CCDS               Contact information: [phone]    This form is a permanent document in the medical record.     Query Date: June 30, 2017    By submitting this query, we are merely seeking further clarification of documentation. Please utilize your independent clinical judgment when addressing the question(s) below.    The Medical record reflects the following:    Supporting Clinical Findings Location in Medical Record   Post-op Diagnosis:  Post-Op Diagnosis Codes:     * Toe osteomyelitis, right      * Other acute osteomyelitis of right foot     Description of Procedure: excisional debridement and bone biopsy of 2nd digit of the right foor     Description of the findings of the procedure:  Soft fragmented proximal phalanx of the 2nd digit of the right foot   Brief p Note 6/27   Osteomyelitis of right foot     In both first toe stump and second toe base;  Due to PAD from Diabetes (uncontrolled) - now with pathological fracture of 2nd toe  Being treated with zosyn and vanco by Dr. Braden      H&P   Acute Osteomyelitis   R Foot      Post-Op Diagnosis Codes:     * Toe osteomyelitis, right [M86.9]     * Other acute osteomyelitis of right foot ED MD Note      Brief Op Note        Repeat MRI foot confirms osteomyelitis of 1st remnant metatarsal head and also proximal 2nd phalanx.     Concern for Osteomyelitis chronic of 1st remnant metatarsal head and also proximal 2nd phalanx.  PN 6/30                                                                            Doctor, Please specify diagnosis or diagnoses associated with above clinical findings.     Please specify the acuity of osteomyelitis:    Provider Use Only      Osteomyelitis Right 1st toe stump:              (  ) Acute              (  ) Acute and Chronic              ( X ) Chronic              (  ) Undeterminable              (  )  Other: _______________    Osteomyelitis Right 2nd toe base:     ( X ) Acute    (  ) Acute and Chronic    (  ) Chronic    (  ) Undeterminable    (  ) Other: _______________                                                                                                                             [  ] Clinically undetermined

## 2017-06-30 NOTE — NURSING
Dr Lux returned the page. Informed MD that pt states he does not have insulin at home, which was called to the pharmacy that the pt states closed. The pt states he does not have any pain meds and says he will come back to hospital tonight in pain. Verbalized understanding; states to hold discharge until tomorrow.

## 2017-06-30 NOTE — PROGRESS NOTES
TN was notified via [facility] that [facility] accepted pt for HH.     TN faxed all information to Shanita at [facility] and notified her of pts impending discharge and need for home IV infusion therapy.    TN met with pt and pt was updated on impending discharge and that [facility] will be coming to do discharge teaching. Pt verbalized understanding.

## 2017-06-30 NOTE — PLAN OF CARE
Problem: Diabetes, Type 2 (Adult)  Goal: Signs and Symptoms of Listed Potential Problems Will be Absent, Minimized or Managed (Diabetes, Type 2)  Signs and symptoms of listed potential problems will be absent, minimized or managed by discharge/transition of care (reference Diabetes, Type 2 (Adult) CPG).   Outcome: Ongoing (interventions implemented as appropriate)  CBG will be within normal limits prior to discharge.    Problem: Fall Risk (Adult)  Goal: Identify Related Risk Factors and Signs and Symptoms  Related risk factors and signs and symptoms are identified upon initiation of Human Response Clinical Practice Guideline (CPG)   Outcome: Ongoing (interventions implemented as appropriate)  Patient will remain free from falls,trauma,and injury while hospitalized.    Problem: Pressure Ulcer Risk (Luiz Scale) (Adult,Obstetrics,Pediatric)  Goal: Identify Related Risk Factors and Signs and Symptoms  Related risk factors and signs and symptoms are identified upon initiation of Human Response Clinical Practice Guideline (CPG)   Outcome: Ongoing (interventions implemented as appropriate)  Skin integrity maintained. Patient independently turns and is ambulatory.    Problem: Patient Care Overview  Goal: Plan of Care Review  Outcome: Ongoing (interventions implemented as appropriate)  Pt progressing. Oriented X4. Voiding well. Skin integrity maintained. Pain controlled with Iv pain meds. Denies nausea during shift. Vs stable. Adequate oral intake. Tolerating diet and Iv antibiotics. Free of falls. Call light within reach. Bed in low position. No issues during shift. Continue plan of care.      Problem: Infection, Risk/Actual (Adult)  Goal: Identify Related Risk Factors and Signs and Symptoms  Related risk factors and signs and symptoms are identified upon initiation of Human Response Clinical Practice Guideline (CPG)   Outcome: Ongoing (interventions implemented as appropriate)  Afebrile during shift. Patient will be free  of infection prior to discharge.

## 2017-06-30 NOTE — PROGRESS NOTES
WRITTEN HEALTHCARE DISCHARGE INFORMATION     Things that YOU are responsible for to Manage Your Care At Home:  1. Getting your prescriptions filled.  2. Taking you medications as directed. DO NOT MISS ANY DOSES!  3. Going to your follow-up doctor appointments. This is important because it allows the doctor to monitor your progress and to determine if any changes need to be made to your treatment plan.    If you are unable to make your follow up appointments, please call the number listed and reschedule this appointment.     After discharge, if you need assistance, you can call [facility] On Call Nurse Care Line for 24/7 assistance at [phone]    Thank you for choosing [facility] and allowing us to care for you.   From your care management team: Katya MENDENHALL,RSW & Gabriella UMAÑA RN,TN [phone] or [phone]     You should receive a call from [facility] Discharge Department within 48-72 hours to help manage your care after discharge. Please try to make sure that you answer your phone for this important phone call.     Follow-up Information     Miguel Lux MD. Go on 7/12/2017.    Specialties:  Internal Medicine, Oncology, Hematology and Oncology  Why:  For Appointment on Wednesday 7/12/2017 @ 2PM  Contact information:  [address]  [address] [address]  [phone]             Nora Braden DPM.    Specialty:  Podiatry  Why:  Nurse to contact you with appointment date and time  Contact information:  [address] [facility] [address]  [phone]             [facility]. Go on 7/7/2017.    Specialty:  Wound Care  Why:  For Appointment on Friday 7/7/2017 @ 9:10AM  Contact information:  [address]  [facility] [id number] [phone]           [facility]. Go on 7/12/2017.    Specialties:  Infectious Diseases, Emergency Medicine  Why:  For Appointment on Wednesday 7/12/2017 @ 11:30AM  Contact information:  [address]  [facility]  [address]  [phone]             [facility]. Go on 7/17/2017.    Why:  For Appointment on Monday 7/17/2017 @ 11AM           [facility]. Go on 8/28/2017.    Why:  For Appointment on Monday 8/28/2017 @ 11AM           [facility].    Specialty:  Home Health Services  Why:  Skilled nurse, Aide, PT  Contact information:  [facility] [place] [address] [phone]             [facility].    Why:  For IV infusion  Contact information:  [address]  [address]  [place] [address]

## 2017-07-01 VITALS
TEMPERATURE: 98 F | DIASTOLIC BLOOD PRESSURE: 70 MMHG | HEART RATE: 76 BPM | WEIGHT: 230 LBS | OXYGEN SATURATION: 96 % | RESPIRATION RATE: 18 BRPM | BODY MASS INDEX: 34.07 KG/M2 | HEIGHT: 69 IN | SYSTOLIC BLOOD PRESSURE: 150 MMHG

## 2017-07-01 LAB
ALBUMIN SERPL BCP-MCNC: 2.6 G/DL
ALP SERPL-CCNC: 58 U/L
ALT SERPL W/O P-5'-P-CCNC: 17 U/L
ANION GAP SERPL CALC-SCNC: 6 MMOL/L
AST SERPL-CCNC: 12 U/L
BACTERIA SPEC ANAEROBE CULT: NORMAL
BASOPHILS # BLD AUTO: 0.01 K/UL
BASOPHILS NFR BLD: 0.3 %
BILIRUB SERPL-MCNC: 0.4 MG/DL
BUN SERPL-MCNC: 16 MG/DL
CALCIUM SERPL-MCNC: 8.9 MG/DL
CHLORIDE SERPL-SCNC: 102 MMOL/L
CO2 SERPL-SCNC: 26 MMOL/L
CREAT SERPL-MCNC: 0.9 MG/DL
DIFFERENTIAL METHOD: ABNORMAL
EOSINOPHIL # BLD AUTO: 0.1 K/UL
EOSINOPHIL NFR BLD: 2.3 %
ERYTHROCYTE [DISTWIDTH] IN BLOOD BY AUTOMATED COUNT: 15.4 %
EST. GFR  (AFRICAN AMERICAN): >60 ML/MIN/1.73 M^2
EST. GFR  (NON AFRICAN AMERICAN): >60 ML/MIN/1.73 M^2
GLUCOSE SERPL-MCNC: 155 MG/DL
HCT VFR BLD AUTO: 30.9 %
HGB BLD-MCNC: 10 G/DL
LYMPHOCYTES # BLD AUTO: 1 K/UL
LYMPHOCYTES NFR BLD: 26.5 %
MCH RBC QN AUTO: 27.3 PG
MCHC RBC AUTO-ENTMCNC: 32.4 %
MCV RBC AUTO: 84 FL
MONOCYTES # BLD AUTO: 0.4 K/UL
MONOCYTES NFR BLD: 11.1 %
NEUTROPHILS # BLD AUTO: 2.3 K/UL
NEUTROPHILS NFR BLD: 59.8 %
PLATELET # BLD AUTO: 106 K/UL
PMV BLD AUTO: 10.4 FL
POTASSIUM SERPL-SCNC: 4.1 MMOL/L
PROT SERPL-MCNC: 7 G/DL
RBC # BLD AUTO: 3.66 M/UL
SODIUM SERPL-SCNC: 134 MMOL/L
WBC # BLD AUTO: 3.89 K/UL

## 2017-07-01 PROCEDURE — 25000003 PHARM REV CODE 250: Performed by: EMERGENCY MEDICINE

## 2017-07-01 PROCEDURE — 80053 COMPREHEN METABOLIC PANEL: CPT

## 2017-07-01 PROCEDURE — 85025 COMPLETE CBC W/AUTO DIFF WBC: CPT

## 2017-07-01 PROCEDURE — 63600175 PHARM REV CODE 636 W HCPCS: Performed by: EMERGENCY MEDICINE

## 2017-07-01 PROCEDURE — 25000003 PHARM REV CODE 250: Performed by: INTERNAL MEDICINE

## 2017-07-01 RX ADMIN — Medication 3 ML: at 05:07

## 2017-07-01 RX ADMIN — Medication 10 ML: at 05:07

## 2017-07-01 RX ADMIN — HYDROMORPHONE HYDROCHLORIDE 1 MG: 2 INJECTION INTRAMUSCULAR; INTRAVENOUS; SUBCUTANEOUS at 12:07

## 2017-07-01 RX ADMIN — OXYCODONE HYDROCHLORIDE 10 MG: 5 TABLET ORAL at 05:07

## 2017-07-01 RX ADMIN — Medication 10 ML: at 12:07

## 2017-07-01 RX ADMIN — HEPARIN SODIUM 5000 UNITS: 5000 INJECTION, SOLUTION INTRAVENOUS; SUBCUTANEOUS at 05:07

## 2017-07-01 NOTE — PROGRESS NOTES
Discharge instruction reviewed with patient , verbalized understanding, transported downstairs via wheelchair, safety maintained.

## 2017-07-01 NOTE — PLAN OF CARE
Problem: Fall Risk (Adult)  Goal: Absence of Falls  Patient will demonstrate the desired outcomes by discharge/transition of care.    06/30/17 1800   Fall Risk (Adult)   Absence of Falls making progress toward outcome   Hourly rounds, call light in reach , nonskid and cane when ambulating, free from falls.

## 2017-07-01 NOTE — PLAN OF CARE
Problem: Patient Care Overview  Goal: Plan of Care Review  Pt free from falls and injury, Tolerates antibiotics well, understands and accepts care.

## 2017-07-01 NOTE — PLAN OF CARE
Problem: Patient Care Overview  Goal: Plan of Care Review   06/30/17 1800   Coping/Psychosocial   Plan Of Care Reviewed With patient;sibling   Patient I awake alert and oriented. Pain managed with IV narcotics as ordered. IV Vanco as ord. Rt foot wound drsg C/D/I with foot boot in place. Pt tolerating po intake. No distress noted. Continue with plan of care.

## 2017-07-05 ENCOUNTER — LAB VISIT (OUTPATIENT)
Dept: LAB | Facility: HOSPITAL | Age: 57
End: 2017-07-05
Attending: INTERNAL MEDICINE
Payer: MEDICAID

## 2017-07-05 DIAGNOSIS — M86.171 ACUTE OSTEOMYELITIS OF RIGHT ANKLE OR FOOT: ICD-10-CM

## 2017-07-05 DIAGNOSIS — M86.172 ACUTE OSTEOMYELITIS OF LEFT ANKLE OR FOOT: Primary | ICD-10-CM

## 2017-07-05 LAB
ALBUMIN SERPL BCP-MCNC: 3.3 G/DL
ALP SERPL-CCNC: 74 U/L
ALT SERPL W/O P-5'-P-CCNC: 24 U/L
ANION GAP SERPL CALC-SCNC: 9 MMOL/L
AST SERPL-CCNC: 23 U/L
BASOPHILS # BLD AUTO: 0.04 K/UL
BASOPHILS NFR BLD: 0.6 %
BILIRUB SERPL-MCNC: 0.3 MG/DL
BUN SERPL-MCNC: 26 MG/DL
CALCIUM SERPL-MCNC: 9.8 MG/DL
CHLORIDE SERPL-SCNC: 98 MMOL/L
CO2 SERPL-SCNC: 25 MMOL/L
CREAT SERPL-MCNC: 1.1 MG/DL
CREAT SERPL-MCNC: 1.1 MG/DL
CRP SERPL-MCNC: 14.8 MG/L
DIFFERENTIAL METHOD: ABNORMAL
EOSINOPHIL # BLD AUTO: 0.2 K/UL
EOSINOPHIL NFR BLD: 2.9 %
ERYTHROCYTE [DISTWIDTH] IN BLOOD BY AUTOMATED COUNT: 15.4 %
ERYTHROCYTE [SEDIMENTATION RATE] IN BLOOD BY WESTERGREN METHOD: 44 MM/HR
EST. GFR  (AFRICAN AMERICAN): >60 ML/MIN/1.73 M^2
EST. GFR  (AFRICAN AMERICAN): >60 ML/MIN/1.73 M^2
EST. GFR  (NON AFRICAN AMERICAN): >60 ML/MIN/1.73 M^2
EST. GFR  (NON AFRICAN AMERICAN): >60 ML/MIN/1.73 M^2
GLUCOSE SERPL-MCNC: 210 MG/DL
HCT VFR BLD AUTO: 37.8 %
HGB BLD-MCNC: 12.6 G/DL
LYMPHOCYTES # BLD AUTO: 1.9 K/UL
LYMPHOCYTES NFR BLD: 25.9 %
MCH RBC QN AUTO: 27.8 PG
MCHC RBC AUTO-ENTMCNC: 33.3 %
MCV RBC AUTO: 83 FL
MONOCYTES # BLD AUTO: 0.5 K/UL
MONOCYTES NFR BLD: 6.3 %
NEUTROPHILS # BLD AUTO: 4.6 K/UL
NEUTROPHILS NFR BLD: 63.6 %
PLATELET # BLD AUTO: 198 K/UL
PMV BLD AUTO: 10.7 FL
POTASSIUM SERPL-SCNC: 5.2 MMOL/L
PROT SERPL-MCNC: 8 G/DL
RBC # BLD AUTO: 4.53 M/UL
SODIUM SERPL-SCNC: 132 MMOL/L
VANCOMYCIN TROUGH SERPL-MCNC: 7 UG/ML
WBC # BLD AUTO: 7.25 K/UL

## 2017-07-05 PROCEDURE — 80202 ASSAY OF VANCOMYCIN: CPT

## 2017-07-05 PROCEDURE — 86140 C-REACTIVE PROTEIN: CPT

## 2017-07-05 PROCEDURE — 85025 COMPLETE CBC W/AUTO DIFF WBC: CPT

## 2017-07-05 PROCEDURE — 85651 RBC SED RATE NONAUTOMATED: CPT

## 2017-07-05 PROCEDURE — 80053 COMPREHEN METABOLIC PANEL: CPT

## 2017-07-06 ENCOUNTER — TELEPHONE (OUTPATIENT)
Dept: INFECTIOUS DISEASES | Facility: CLINIC | Age: 57
End: 2017-07-06

## 2017-07-06 NOTE — TELEPHONE ENCOUNTER
Patient is on IV antibiotics per ID on [facility].  Got called by Kylah Sanabria at [facility] regarding vanc trough of 7.0. Patient is currently on vanc 1 gram IV q 12 hours. Will increase to 1500 mg IV q 12 hours and repeat trough on Saturday.  Has appt with me next week, 7/12.

## 2017-07-06 NOTE — DISCHARGE SUMMARY
"[facility]  Discharge Summary      Admit Date: 6/23/2017    Discharge Date and Time: 7/1/2017  9:01 AM    Attending Physician: No att. providers found     Reason for Admission: Other acute osteomyelitis of right foot [M86.171]  Toe osteomyelitis, right [M86.9]  Other acute osteomyelitis of right foot [M86.171]  Other acute osteomyelitis of right foot [M86.171]      Procedures Performed: Procedure(s) (LRB):  DEBRIDEMENT-FOOT Bone Biopsy 2nd toe (Right)    Hospital Course (synopsis of major diagnoses, care, treatment, and services provided during the course of the hospital stay):     Per Dr. Singleton   He is a 56 year old male pt of Dr. Lux.  He is a smoker with arthritis, bulging lumbar discs, diabetes mellitus type II, and neuropathy who  presented to the ED from wound care for IV antibiotics secondary to a right great toe amputation. Pt reports Dr. Braden (podiatry) told him the "infection spread to the 2nd toe." Pt was told he could come in for IV antibiotics. Pt reports he took off the pump for "6-7 hours." Pt reports a slight right leg paralysis secondary to his bulging lumbar discs.  Dr. Braden's note:  ~30 minutes spent discussing imaging and discussing pathological fracture secondary to OM and options. Three options were discussed with possible side effects of each. Amputation of Infected toes vs IV abx for six weeks with aggressive wound care for several months with no guarantee of wound resolution vs PO zyvox for six weeks with aggressive wound care for several months with no guarantee of wound resolution (discussed cytopenia and regular lab work involved with this option).      Patient is distraught and blames previous IV abx and previous surgeons for all his woes. He does not understand why his foot is infected nor does he understand the role noncompliance has played in his current situation.  I strongly encouraged he present to ED because he now has a blow out lesion and significant " edema to the 2nd digit    During this hospitalization, these following conditions were addressed and managed along with other comorbid conditions:      *Osteomyelitis of right foot [M86.9]     -? pt failed 6 weeks of IV ABX and I am not very hopeful that further ABX will add any meaningful benefit for his osteomyelitis  - await MRI of foot     - ID and DPM following pt  - Elevated CRP and Sed rate  - MRI foot showing:proximal phalanx of the second toe with associated fracture concerning for osteomyelitis with pathologic fracture. Small second toe MTP joint effusion is noted with mild edema of the second metatarsal head suspicious for possible early osteomyelitis.     - s/p debridement and bx by podiatry     - Coag Neg GP coci & Enterococcus   - on Vanc  - PICC placed and HH orders written  - [facility] to f/u with patient        Yes    Chronic hepatitis C without hepatic coma [B18.2]     - tx    Yes    Anxiety disorder due to general medical condition [F06.4]     - has been on chronic benzo      Yes    Diabetic polyneuropathy associated with type 2 diabetes mellitus [E11.42]   Yes    Anemia of other chronic disease [D63.8]  - stable   Yes    Chronic back pain [M54.9, G89.29]- on chronic opiate tx     - will hold increase pain medication at this point- pt has opiate dependence       Yes    DM type 2, uncontrolled, with neuropathy [E11.40, E11.65]     - poor insight into pt's health  - he does not care much about his DM and missed many appointments with endocrinology f/u     - continue basal and prandial insulin  - increased insulin dose- improving  - adjusted outpatient regimen         Yes    Lumbar herniated disc - Large central disk extrusion at the L4-L5 level with significant central canal and bilateral neuroforaminal narrowing [M51.26]   Yes       Resolved Hospital Problems     Diagnosis Date Resolved POA   No resolved problems to display.      Will work for possible LTAC placement as pt has poor living  condition- no one at home with him.      - pt refused SNF, LTAC and very well aware the potential for complications including worsening of oteo leading to amputation of toes, BKA, AKA- he voiced understanding         Consults: ID and podiatry     Significant Diagnostic Studies: see above    Final Diagnoses:    Principal Problem: Osteomyelitis of right foot   Secondary Diagnoses:   Active Hospital Problems    Diagnosis  POA    *Osteomyelitis of right foot [M86.9]  Yes    Chronic hepatitis C without hepatic coma [B18.2]  Yes    Anxiety disorder due to general medical condition [F06.4]  Yes    Diabetic polyneuropathy associated with type 2 diabetes mellitus [E11.42]  Yes    Anemia of other chronic disease [D63.8]  Yes    Chronic back pain [M54.9, G89.29]  Yes    DM type 2, uncontrolled, with neuropathy [E11.40, E11.65]  Yes    Lumbar herniated disc - Large central disk extrusion at the L4-L5 level with significant central canal and bilateral neuroforaminal narrowing [M51.26]  Yes      Resolved Hospital Problems    Diagnosis Date Resolved POA   No resolved problems to display.       Discharged Condition: stable    Disposition: Home or Self Care     Activity: As tolerated    Diet: Diabetic, high protein     Follow Up/Patient Instructions:     Medications:  Reconciled Home Medications:   Discharge Medication List as of 7/1/2017  8:03 AM      START taking these medications    Details   insulin aspart (NOVOLOG) 100 unit/mL InPn pen Inject 8 Units into the skin 3 (three) times daily., Starting Fri 6/30/2017, Until Sat 6/30/2018, Normal      nicotine (NICODERM CQ) 14 mg/24 hr Place 1 patch onto the skin once daily., Starting Fri 6/30/2017, OTC      VANCOMYCIN HCL (VANCOMYCIN 1 G/250 ML D5W, READY TO MIX SYSTEM,) Inject 250 mLs (1,000 mg total) into the vein every 12 (twelve) hours., Starting Fri 6/30/2017, No Print         CONTINUE these medications which have CHANGED    Details   LANTUS SOLOSTAR 100 unit/mL (3 mL)  InPn pen Inject 30 Units into the skin every evening., Starting Fri 6/30/2017, Normal         CONTINUE these medications which have NOT CHANGED    Details   aspirin (ECOTRIN) 81 MG EC tablet Take 1 tablet (81 mg total) by mouth once daily., Starting 4/12/2017, Until Thu 4/12/18, OTC      atorvastatin (LIPITOR) 80 MG tablet Take 1 tablet (80 mg total) by mouth once daily., Starting 4/15/2017, Until Sun 4/15/18, Normal      blood sugar diagnostic (BLOOD GLUCOSE TEST) Strp Twice daily blood sugar checks, Normal      clopidogrel (PLAVIX) 75 mg tablet Take 1 tablet (75 mg total) by mouth once daily., Starting 4/12/2017, Until Thu 4/12/18, Normal      diazePAM (VALIUM) 2 MG tablet TAKE ONE TABLET BY MOUTH EVERY 8 HOURS AS NEEDED FOR ANXIETY **THANK YOU**, Normal      docusate sodium (COLACE) 100 MG capsule Take 100 mg by mouth as needed for Constipation., Until Discontinued, Historical Med      gabapentin (NEURONTIN) 300 MG capsule Take 1 capsule (300 mg total) by mouth 3 (three) times daily., Normal      ledipasvir-sofosbuvir (HARVONI)  mg Tab Take 1 tablet by mouth once daily., Starting 10/26/2016, Until Discontinued, Normal      metformin (GLUCOPHAGE) 1000 MG tablet Take 1 tablet (1,000 mg total) by mouth 2 (two) times daily with meals., Starting Wed 5/31/2017, Normal      mupirocin calcium 2% (BACTROBAN) 2 % cream Apply to affected area 3 times daily, Normal      NORMAL SALINE FLUSH 0.9 % injection Starting 4/19/2017, Until Discontinued, Historical Med      potassium chloride SA (K-DUR,KLOR-CON) 10 MEQ tablet Take 1 tablet (10 mEq total) by mouth once daily as needed when experiencing leg/muscle cramps., Historical Med      trazodone (DESYREL) 100 MG tablet Take 1 tablet (100 mg total) by mouth every evening., Normal      TRUE METRIX GLUCOSE METER [facility] AS DIRECTED, Historical Med      fentaNYL (DURAGESIC) 50 mcg/hr Place 1 patch onto the skin every 72 hours., Normal      oxycodone (ROXICODONE) 10 mg Tab  immediate release tablet Take 1 tablet (10 mg total) by mouth every 6 (six) hours as needed for Pain., Starting Wed 5/31/2017, Normal         STOP taking these medications       amoxicillin-clavulanate 500-125mg (AUGMENTIN) 500-125 mg Tab Comments:   Reason for Stopping:         dextrose 5 % SolP 500 mL with vancomycin 1,000 mg SolR 2,000 mg Comments:   Reason for Stopping:         glipiZIDE (GLUCOTROL) 5 MG tablet Comments:   Reason for Stopping:         heparin, porcine, PF, (HEPARIN FLUSH 100 UNITS/ML) 100 unit/mL Syrg Comments:   Reason for Stopping:         promethazine (PHENERGAN) 25 MG tablet Comments:   Reason for Stopping:         silver sulfADIAZINE 1% (SILVADENE) 1 % cream Comments:   Reason for Stopping:             No discharge procedures on file.  Follow-up Information     Miguel Lux MD. Go on 7/12/2017.    Specialties:  Internal Medicine, Oncology, Hematology and Oncology  Why:  For Appointment on Wednesday 7/12/2017 @ 2PM  Contact information:  [address]  [address] [address]  [phone]             Nora Braden DPM.    Specialty:  Podiatry  Why:  Nurse to contact you with appointment date and time  Contact information:  [address] [facility] [address]  [phone]             [facility]. Go on 7/7/2017.    Specialty:  Wound Care  Why:  For Appointment on Friday 7/7/2017 @ 9:10AM  Contact information:  [address]  [facility] [id number] [phone]           PHILLIP Noble. Go on 7/12/2017.    Specialties:  Infectious Diseases, Emergency Medicine  Why:  For Appointment on Wednesday 7/12/2017 @ 11:30AM  Contact information:  [address] [facility] [address]  [phone]             [facility]. Go on 7/17/2017.    Why:  For Appointment on Monday 7/17/2017 @ 11AM           [facility]. Go on 8/28/2017.    Why:  For Appointment on Monday 8/28/2017 @ 11AM           [facility].    Specialty:  Home Health  Services  Why:  Skilled nurse, Aide, PT  Contact information:  [address] [place] [address] [phone]             [facility].    Why:  For IV infusion  Contact information:  [address]  [address]  [place] [address]               Miguel Lux M.D  Internal Medicine & Geriatric Medicine  Hematology & Oncology  Palliative Medicine    [address]  [place] [address] [phone] (Office)  [phone] (Fax)

## 2017-07-07 ENCOUNTER — HOSPITAL ENCOUNTER (OUTPATIENT)
Dept: WOUND CARE | Facility: HOSPITAL | Age: 57
Discharge: HOME | End: 2017-07-07
Attending: PODIATRIST
Payer: MEDICAID

## 2017-07-07 DIAGNOSIS — Z89.411 STATUS POST AMPUTATION OF RIGHT GREAT TOE: ICD-10-CM

## 2017-07-07 DIAGNOSIS — L97.512 DIABETIC ULCER OF TOE OF RIGHT FOOT ASSOCIATED WITH TYPE 2 DIABETES MELLITUS, WITH FAT LAYER EXPOSED: Primary | ICD-10-CM

## 2017-07-07 DIAGNOSIS — M86.9 TOE OSTEOMYELITIS, RIGHT: ICD-10-CM

## 2017-07-07 DIAGNOSIS — E11.621 DIABETIC ULCER OF TOE OF RIGHT FOOT ASSOCIATED WITH TYPE 2 DIABETES MELLITUS, WITH FAT LAYER EXPOSED: Primary | ICD-10-CM

## 2017-07-07 PROCEDURE — 99213 OFFICE O/P EST LOW 20 MIN: CPT

## 2017-07-07 PROCEDURE — 99024 POSTOP FOLLOW-UP VISIT: CPT | Mod: ,,, | Performed by: PODIATRIST

## 2017-07-12 ENCOUNTER — TELEPHONE (OUTPATIENT)
Dept: INFECTIOUS DISEASES | Facility: CLINIC | Age: 57
End: 2017-07-12

## 2017-07-12 PROBLEM — Z09 HOSPITAL DISCHARGE FOLLOW-UP: Status: ACTIVE | Noted: 2017-07-12

## 2017-07-12 NOTE — TELEPHONE ENCOUNTER
----- Message from Any Simeon sent at 7/12/2017 10:35 AM CDT -----  Contact: Pt: [phone]  Pt is asking for the staff to call him back about his results and to be seen in clinic.  Pt can be reached at [phone].  Please call.    Thanks

## 2017-07-14 ENCOUNTER — HOSPITAL ENCOUNTER (OUTPATIENT)
Dept: WOUND CARE | Facility: HOSPITAL | Age: 57
Discharge: HOME | End: 2017-07-14
Attending: PODIATRIST
Payer: MEDICAID

## 2017-07-14 DIAGNOSIS — L97.512 DIABETIC ULCER OF TOE OF RIGHT FOOT ASSOCIATED WITH TYPE 2 DIABETES MELLITUS, WITH FAT LAYER EXPOSED: Primary | ICD-10-CM

## 2017-07-14 DIAGNOSIS — M86.671 OTHER CHRONIC OSTEOMYELITIS OF RIGHT FOOT: ICD-10-CM

## 2017-07-14 DIAGNOSIS — M86.9 TOE OSTEOMYELITIS, RIGHT: ICD-10-CM

## 2017-07-14 DIAGNOSIS — E11.621 DIABETIC ULCER OF TOE OF RIGHT FOOT ASSOCIATED WITH TYPE 2 DIABETES MELLITUS, WITH FAT LAYER EXPOSED: Primary | ICD-10-CM

## 2017-07-14 DIAGNOSIS — Z89.411 STATUS POST AMPUTATION OF RIGHT GREAT TOE: ICD-10-CM

## 2017-07-14 PROCEDURE — 99024 POSTOP FOLLOW-UP VISIT: CPT | Mod: ,,, | Performed by: PODIATRIST

## 2017-07-14 PROCEDURE — 99213 OFFICE O/P EST LOW 20 MIN: CPT | Performed by: PODIATRIST

## 2017-07-19 ENCOUNTER — TELEPHONE (OUTPATIENT)
Dept: INFECTIOUS DISEASES | Facility: CLINIC | Age: 57
End: 2017-07-19

## 2017-07-19 NOTE — TELEPHONE ENCOUNTER
----- Message from Radha Thapa sent at 7/19/2017 10:00 AM CDT -----  Contact: Tom  tel:   [phone]    Brandie's pt./  Pt.says you called his sister this a.m. , looking for him,  Stating you have not received a copy of his blood testing reports.   Says you need to contact his home health since they are the ones that are doing the testing.  He is to follow up with [facility] tel: [phone]  / [facility] is the ones sending him his supplies and medication.   Wants to speak to you about the results of the testing since you switched his medication.   He would like to know the results also.

## 2017-07-19 NOTE — TELEPHONE ENCOUNTER
Tried to contact patient regarding below, but could not leave a message. Main campus ID did not contact patient. Dr. Cam (ID inpatient) at [facility] is over patient's care.

## 2017-07-21 ENCOUNTER — TELEPHONE (OUTPATIENT)
Dept: HEPATOLOGY | Facility: CLINIC | Age: 57
End: 2017-07-21

## 2017-07-21 ENCOUNTER — HOSPITAL ENCOUNTER (OUTPATIENT)
Dept: WOUND CARE | Facility: HOSPITAL | Age: 57
Discharge: HOME | End: 2017-07-21
Attending: PODIATRIST
Payer: MEDICAID

## 2017-07-21 ENCOUNTER — EPISODE CHANGES (OUTPATIENT)
Dept: HEPATOLOGY | Facility: CLINIC | Age: 57
End: 2017-07-21

## 2017-07-21 PROCEDURE — 11042 DBRDMT SUBQ TIS 1ST 20SQCM/<: CPT | Performed by: PODIATRIST

## 2017-07-21 PROCEDURE — 97605 NEG PRS WND THER DME<=50SQCM: CPT | Performed by: PODIATRIST

## 2017-07-21 PROCEDURE — 11042 DBRDMT SUBQ TIS 1ST 20SQCM/<: CPT | Mod: ,,, | Performed by: PODIATRIST

## 2017-07-21 PROCEDURE — 99499 UNLISTED E&M SERVICE: CPT | Mod: ,,, | Performed by: PODIATRIST

## 2017-07-21 PROCEDURE — 97605 NEG PRS WND THER DME<=50SQCM: CPT | Mod: ,,, | Performed by: PODIATRIST

## 2017-07-21 NOTE — TELEPHONE ENCOUNTER
Attempt made to reach patient unsuccessful.  SVR 6 lab resched to 7/28 since pt already has a clinic appt that day; reminder notice mailed.

## 2017-07-25 ENCOUNTER — HOSPITAL ENCOUNTER (OUTPATIENT)
Dept: WOUND CARE | Facility: HOSPITAL | Age: 57
Discharge: HOME | End: 2017-07-25
Attending: FAMILY MEDICINE
Payer: MEDICAID

## 2017-07-25 PROCEDURE — 99213 OFFICE O/P EST LOW 20 MIN: CPT

## 2017-07-28 ENCOUNTER — HOSPITAL ENCOUNTER (OUTPATIENT)
Dept: WOUND CARE | Facility: HOSPITAL | Age: 57
Discharge: HOME | End: 2017-07-28
Attending: PODIATRIST
Payer: MEDICAID

## 2017-07-28 DIAGNOSIS — Z89.411 STATUS POST AMPUTATION OF RIGHT GREAT TOE: ICD-10-CM

## 2017-07-28 DIAGNOSIS — M86.9 TOE OSTEOMYELITIS, RIGHT: ICD-10-CM

## 2017-07-28 DIAGNOSIS — E11.621 DIABETIC ULCER OF TOE OF RIGHT FOOT ASSOCIATED WITH TYPE 2 DIABETES MELLITUS, WITH FAT LAYER EXPOSED: Primary | ICD-10-CM

## 2017-07-28 DIAGNOSIS — L97.512 DIABETIC ULCER OF TOE OF RIGHT FOOT ASSOCIATED WITH TYPE 2 DIABETES MELLITUS, WITH FAT LAYER EXPOSED: Primary | ICD-10-CM

## 2017-07-28 DIAGNOSIS — L02.611 ABSCESS OF SECOND TOE OF RIGHT FOOT: ICD-10-CM

## 2017-07-28 PROCEDURE — 20005 PR I&D SOFT TISSUE ABSCESS SUBFASCIAL: CPT | Mod: RT,,, | Performed by: PODIATRIST

## 2017-07-28 PROCEDURE — 10060 I&D ABSCESS SIMPLE/SINGLE: CPT | Performed by: PODIATRIST

## 2017-07-28 PROCEDURE — 99499 UNLISTED E&M SERVICE: CPT | Mod: ,,, | Performed by: PODIATRIST

## 2017-07-31 ENCOUNTER — EPISODE CHANGES (OUTPATIENT)
Dept: HEPATOLOGY | Facility: CLINIC | Age: 57
End: 2017-07-31

## 2017-07-31 ENCOUNTER — LAB VISIT (OUTPATIENT)
Dept: LAB | Facility: HOSPITAL | Age: 57
End: 2017-07-31
Attending: PHYSICIAN ASSISTANT
Payer: MEDICAID

## 2017-07-31 DIAGNOSIS — K74.60 CIRRHOSIS OF LIVER WITHOUT ASCITES, UNSPECIFIED HEPATIC CIRRHOSIS TYPE: ICD-10-CM

## 2017-07-31 DIAGNOSIS — B18.2 CHRONIC HEPATITIS C WITHOUT HEPATIC COMA: ICD-10-CM

## 2017-07-31 PROCEDURE — 87522 HEPATITIS C REVRS TRNSCRPJ: CPT

## 2017-07-31 PROCEDURE — 36415 COLL VENOUS BLD VENIPUNCTURE: CPT

## 2017-08-01 ENCOUNTER — HOSPITAL ENCOUNTER (OUTPATIENT)
Dept: WOUND CARE | Facility: HOSPITAL | Age: 57
Discharge: HOME | End: 2017-08-01
Attending: FAMILY MEDICINE
Payer: MEDICAID

## 2017-08-01 PROCEDURE — 99213 OFFICE O/P EST LOW 20 MIN: CPT

## 2017-08-02 LAB
HCV LOG: <1.08 LOG (10) IU/ML
HCV RNA QUANT PCR: <12 IU/ML
HCV, QUALITATIVE: NOT DETECTED IU/ML

## 2017-08-04 ENCOUNTER — HOSPITAL ENCOUNTER (OUTPATIENT)
Dept: WOUND CARE | Facility: HOSPITAL | Age: 57
Discharge: HOME | End: 2017-08-04
Attending: PODIATRIST
Payer: MEDICAID

## 2017-08-04 DIAGNOSIS — E11.621 DIABETIC ULCER OF TOE OF RIGHT FOOT ASSOCIATED WITH TYPE 2 DIABETES MELLITUS, WITH FAT LAYER EXPOSED: Primary | ICD-10-CM

## 2017-08-04 DIAGNOSIS — L97.512 DIABETIC ULCER OF TOE OF RIGHT FOOT ASSOCIATED WITH TYPE 2 DIABETES MELLITUS, WITH FAT LAYER EXPOSED: Primary | ICD-10-CM

## 2017-08-04 DIAGNOSIS — M86.9 TOE OSTEOMYELITIS, RIGHT: ICD-10-CM

## 2017-08-04 DIAGNOSIS — M86.671 OTHER CHRONIC OSTEOMYELITIS OF RIGHT FOOT: ICD-10-CM

## 2017-08-04 PROCEDURE — 99213 OFFICE O/P EST LOW 20 MIN: CPT | Performed by: PODIATRIST

## 2017-08-04 PROCEDURE — 99024 POSTOP FOLLOW-UP VISIT: CPT | Mod: ,,, | Performed by: PODIATRIST

## 2017-08-07 ENCOUNTER — HOSPITAL ENCOUNTER (EMERGENCY)
Facility: HOSPITAL | Age: 57
Discharge: ANOTHER HOSPITAL | End: 2017-08-07
Payer: MEDICAID

## 2017-08-07 ENCOUNTER — TELEPHONE (OUTPATIENT)
Dept: HEPATOLOGY | Facility: CLINIC | Age: 57
End: 2017-08-07

## 2017-08-07 VITALS
BODY MASS INDEX: 32.58 KG/M2 | HEART RATE: 74 BPM | HEIGHT: 69 IN | RESPIRATION RATE: 20 BRPM | SYSTOLIC BLOOD PRESSURE: 125 MMHG | WEIGHT: 220 LBS | TEMPERATURE: 98 F | OXYGEN SATURATION: 97 % | DIASTOLIC BLOOD PRESSURE: 67 MMHG

## 2017-08-07 DIAGNOSIS — M86.9 OSTEOMYELITIS OF TOE OF RIGHT FOOT: ICD-10-CM

## 2017-08-07 DIAGNOSIS — K59.00 CONSTIPATION: ICD-10-CM

## 2017-08-07 DIAGNOSIS — S98.319A: ICD-10-CM

## 2017-08-07 DIAGNOSIS — M00.9 SEPTIC ARTHRITIS OF INTERPHALANGEAL JOINT OF TOE OF RIGHT FOOT: Primary | ICD-10-CM

## 2017-08-07 DIAGNOSIS — R50.9 FEVER: ICD-10-CM

## 2017-08-07 LAB
ALBUMIN SERPL BCP-MCNC: 3.8 G/DL
ALP SERPL-CCNC: 70 U/L
ALT SERPL W/O P-5'-P-CCNC: 26 U/L
ANION GAP SERPL CALC-SCNC: 8 MMOL/L
AST SERPL-CCNC: 21 U/L
B-OH-BUTYR BLD STRIP-SCNC: 0.3 MMOL/L
BACTERIA #/AREA URNS HPF: NORMAL /HPF
BASOPHILS # BLD AUTO: 0.02 K/UL
BASOPHILS NFR BLD: 0.3 %
BILIRUB SERPL-MCNC: 0.3 MG/DL
BILIRUB UR QL STRIP: NEGATIVE
BNP SERPL-MCNC: 19 PG/ML
BUN SERPL-MCNC: 29 MG/DL
CALCIUM SERPL-MCNC: 9.5 MG/DL
CHLORIDE SERPL-SCNC: 101 MMOL/L
CLARITY UR: CLEAR
CO2 SERPL-SCNC: 26 MMOL/L
COLOR UR: YELLOW
CREAT SERPL-MCNC: 1.3 MG/DL
DIFFERENTIAL METHOD: ABNORMAL
EOSINOPHIL # BLD AUTO: 0.1 K/UL
EOSINOPHIL NFR BLD: 1.9 %
ERYTHROCYTE [DISTWIDTH] IN BLOOD BY AUTOMATED COUNT: 16.1 %
EST. GFR  (AFRICAN AMERICAN): >60 ML/MIN/1.73 M^2
EST. GFR  (NON AFRICAN AMERICAN): >60 ML/MIN/1.73 M^2
GLUCOSE SERPL-MCNC: 362 MG/DL
GLUCOSE UR QL STRIP: ABNORMAL
HCT VFR BLD AUTO: 40.5 %
HGB BLD-MCNC: 13.3 G/DL
HGB UR QL STRIP: NEGATIVE
HYALINE CASTS #/AREA URNS LPF: 0 /LPF
INR PPP: 1
KETONES UR QL STRIP: NEGATIVE
LEUKOCYTE ESTERASE UR QL STRIP: NEGATIVE
LYMPHOCYTES # BLD AUTO: 1 K/UL
LYMPHOCYTES NFR BLD: 16 %
MCH RBC QN AUTO: 27.9 PG
MCHC RBC AUTO-ENTMCNC: 32.8 G/DL
MCV RBC AUTO: 85 FL
MICROSCOPIC COMMENT: NORMAL
MONOCYTES # BLD AUTO: 0.4 K/UL
MONOCYTES NFR BLD: 6.2 %
NEUTROPHILS # BLD AUTO: 4.5 K/UL
NEUTROPHILS NFR BLD: 75.6 %
NITRITE UR QL STRIP: NEGATIVE
OSMOLALITY SERPL: 305 MOSM/KG
PH UR STRIP: 6 [PH] (ref 5–8)
PLATELET # BLD AUTO: 148 K/UL
PMV BLD AUTO: 10.3 FL
POCT GLUCOSE: 270 MG/DL (ref 70–110)
POCT GLUCOSE: 329 MG/DL (ref 70–110)
POTASSIUM SERPL-SCNC: 5.1 MMOL/L
PROT SERPL-MCNC: 8.5 G/DL
PROT UR QL STRIP: ABNORMAL
PROTHROMBIN TIME: 10.9 SEC
RBC # BLD AUTO: 4.76 M/UL
RBC #/AREA URNS HPF: 2 /HPF (ref 0–4)
SODIUM SERPL-SCNC: 135 MMOL/L
SP GR UR STRIP: 1.02 (ref 1–1.03)
TROPONIN I SERPL DL<=0.01 NG/ML-MCNC: <0.006 NG/ML
URN SPEC COLLECT METH UR: ABNORMAL
UROBILINOGEN UR STRIP-ACNC: NEGATIVE EU/DL
WBC # BLD AUTO: 5.94 K/UL
WBC #/AREA URNS HPF: 1 /HPF (ref 0–5)
YEAST URNS QL MICRO: NORMAL

## 2017-08-07 PROCEDURE — 96375 TX/PRO/DX INJ NEW DRUG ADDON: CPT

## 2017-08-07 PROCEDURE — 80053 COMPREHEN METABOLIC PANEL: CPT

## 2017-08-07 PROCEDURE — 96365 THER/PROPH/DIAG IV INF INIT: CPT

## 2017-08-07 PROCEDURE — 93005 ELECTROCARDIOGRAM TRACING: CPT

## 2017-08-07 PROCEDURE — 87040 BLOOD CULTURE FOR BACTERIA: CPT

## 2017-08-07 PROCEDURE — 99285 EMERGENCY DEPT VISIT HI MDM: CPT | Mod: 25

## 2017-08-07 PROCEDURE — 81000 URINALYSIS NONAUTO W/SCOPE: CPT

## 2017-08-07 PROCEDURE — 12000002 HC ACUTE/MED SURGE SEMI-PRIVATE ROOM

## 2017-08-07 PROCEDURE — 82962 GLUCOSE BLOOD TEST: CPT

## 2017-08-07 PROCEDURE — 87077 CULTURE AEROBIC IDENTIFY: CPT

## 2017-08-07 PROCEDURE — 87070 CULTURE OTHR SPECIMN AEROBIC: CPT

## 2017-08-07 PROCEDURE — 25000003 PHARM REV CODE 250

## 2017-08-07 PROCEDURE — 82010 KETONE BODYS QUAN: CPT

## 2017-08-07 PROCEDURE — 87186 SC STD MICRODIL/AGAR DIL: CPT

## 2017-08-07 PROCEDURE — 83880 ASSAY OF NATRIURETIC PEPTIDE: CPT

## 2017-08-07 PROCEDURE — 85025 COMPLETE CBC W/AUTO DIFF WBC: CPT

## 2017-08-07 PROCEDURE — 99024 POSTOP FOLLOW-UP VISIT: CPT | Mod: ,,, | Performed by: PODIATRIST

## 2017-08-07 PROCEDURE — 84484 ASSAY OF TROPONIN QUANT: CPT

## 2017-08-07 PROCEDURE — 85610 PROTHROMBIN TIME: CPT

## 2017-08-07 PROCEDURE — 83930 ASSAY OF BLOOD OSMOLALITY: CPT

## 2017-08-07 PROCEDURE — 63600175 PHARM REV CODE 636 W HCPCS

## 2017-08-07 RX ORDER — IBUPROFEN 200 MG
1 TABLET ORAL DAILY
Status: CANCELLED | OUTPATIENT
Start: 2017-08-08

## 2017-08-07 RX ORDER — FENTANYL 50 UG/1
1 PATCH TRANSDERMAL
Status: CANCELLED | OUTPATIENT
Start: 2017-08-07

## 2017-08-07 RX ORDER — GABAPENTIN 300 MG/1
300 CAPSULE ORAL 3 TIMES DAILY
Status: CANCELLED | OUTPATIENT
Start: 2017-08-07

## 2017-08-07 RX ORDER — TRAZODONE HYDROCHLORIDE 50 MG/1
100 TABLET ORAL NIGHTLY
Status: CANCELLED | OUTPATIENT
Start: 2017-08-07

## 2017-08-07 RX ORDER — DOCUSATE SODIUM 100 MG/1
100 CAPSULE, LIQUID FILLED ORAL
Status: CANCELLED | OUTPATIENT
Start: 2017-08-07

## 2017-08-07 RX ORDER — ENOXAPARIN SODIUM 100 MG/ML
40 INJECTION SUBCUTANEOUS EVERY 24 HOURS
Status: CANCELLED | OUTPATIENT
Start: 2017-08-07

## 2017-08-07 RX ORDER — LACTULOSE 10 G/15ML
10 SOLUTION ORAL
Status: COMPLETED | OUTPATIENT
Start: 2017-08-07 | End: 2017-08-07

## 2017-08-07 RX ORDER — CLOPIDOGREL BISULFATE 75 MG/1
75 TABLET ORAL DAILY
Status: CANCELLED | OUTPATIENT
Start: 2017-08-08

## 2017-08-07 RX ORDER — ASPIRIN 81 MG/1
81 TABLET ORAL DAILY
Status: CANCELLED | OUTPATIENT
Start: 2017-08-08

## 2017-08-07 RX ORDER — ATORVASTATIN CALCIUM 40 MG/1
80 TABLET, FILM COATED ORAL DAILY
Status: CANCELLED | OUTPATIENT
Start: 2017-08-08

## 2017-08-07 RX ORDER — SODIUM CHLORIDE 0.9 % (FLUSH) 0.9 %
3 SYRINGE (ML) INJECTION EVERY 8 HOURS
Status: CANCELLED | OUTPATIENT
Start: 2017-08-07

## 2017-08-07 RX ORDER — LORAZEPAM 2 MG/ML
1 INJECTION INTRAMUSCULAR
Status: COMPLETED | OUTPATIENT
Start: 2017-08-07 | End: 2017-08-07

## 2017-08-07 RX ORDER — ACETAMINOPHEN 325 MG/1
650 TABLET ORAL EVERY 6 HOURS PRN
Status: CANCELLED | OUTPATIENT
Start: 2017-08-07

## 2017-08-07 RX ORDER — OXYCODONE HYDROCHLORIDE 5 MG/1
10 TABLET ORAL EVERY 6 HOURS PRN
Status: CANCELLED | OUTPATIENT
Start: 2017-08-07

## 2017-08-07 RX ORDER — DIAZEPAM 2 MG/1
2 TABLET ORAL EVERY 8 HOURS PRN
Status: CANCELLED | OUTPATIENT
Start: 2017-08-07

## 2017-08-07 RX ADMIN — PIPERACILLIN AND TAZOBACTAM 4.5 G: 4; .5 INJECTION, POWDER, LYOPHILIZED, FOR SOLUTION INTRAVENOUS; PARENTERAL at 12:08

## 2017-08-07 RX ADMIN — INSULIN HUMAN 7 UNITS: 100 INJECTION, SOLUTION PARENTERAL at 03:08

## 2017-08-07 RX ADMIN — LORAZEPAM 1 MG: 2 INJECTION INTRAMUSCULAR; INTRAVENOUS at 03:08

## 2017-08-07 RX ADMIN — LACTULOSE 10 G: 20 SOLUTION ORAL at 03:08

## 2017-08-07 NOTE — PROVIDER PROGRESS NOTES - EMERGENCY DEPT.
Encounter Date: 8/7/2017    ED Physician Progress Notes        Physician Note:   Dr. Lux has tried to get the patient in long-term care but he refused, admit bank and Zosyn consult podiatry

## 2017-08-07 NOTE — ED TRIAGE NOTES
Patient states that he has been on antibiotics (vancomycin) for about three weeks for an infection of his right foot. Patient has a PICC line to the right upper arm where he administers the antibiotics to himself. Patient states that he did not administer his antibiotics yesterday because he felt bad.   Patient started feeling bad yesterday. Patient stated that he was having abdominal pain starting yesterday; patient took an enema yesterday and his constipation was relieved. Patient takes oxycodone every 6 hours for back and leg pain. Patient took a half of oxycodone at 0700.   Patient last had a dressing change on his right foot on Friday through wound care. Patient states that he is supposed to have his next dressing change tomorrow.

## 2017-08-07 NOTE — TELEPHONE ENCOUNTER
HCV LAB REVIEW  SVR 8    Pertinent labs:  HCV RNA: <12 not detected    Please call patient:  HCV was not detected. We will check virus in 4 weeks to determine a cure. There's still a small chance his virus could return. FINGERS CROSSED!    Next labs due / pls schedule:  HCV RNA in 12 weeks-SVR 12: 08/28/17

## 2017-08-07 NOTE — ED NOTES
Dr. Romero notified that unsuccessful attempts were made with acquiring a blood culture from the PICC line.

## 2017-08-07 NOTE — ED PROVIDER NOTES
"Encounter Date: 8/7/2017    SCRIBE #1 NOTE: I, Dahlia Belcher, am scribing for, and in the presence of,  Darron Romero MD. I have scribed the following portions of the note - Other sections scribed: HPI and ROS.       History     Chief Complaint   Patient presents with    Nausea     cold sweats, nausea, constipation (had BM yesterday enema); reports had toe amputation on right foot a few months ago and is on antibiotics but concerned of infection in blood stream; has PICC line    Fever     CC: Fever    HPI: This 56 y.o. Male, smoker, with medical history of arthritis, diabetes mellitus type II, neuromuscular disorder, bulging lumbar disc, hepatitis C, staph aureus infection, C. Difficile diarrhea, MSSA septicemia, neuropathy and chronic back pain presents to the ED c/o an acute subjective fever that began yesterday morning. Pt reports that he has also been experiencing sweats, nausea, a sore throat and constipation (experiences intermittently due to pain medications), noting that he began to feel "bad" Friday, but the symptoms worsened yesterday. Pt reports taking an enema for treatment of the constipation and notes that he was able to expel "a little" stool. He adds that he also took Valium as he felt as though he was experiencing a panic attack during the onset of symptoms. Pt reports that he recently underwent toe amputation to the right foot due to a bone infection and states that he has been taking Vancomycin for about 3x weeks. He notes that he last had the dressing to the right foot changed on Friday (8/4/17) at Wound Care. Pt reports that he takes oxycodone on a daily basis (1x tablet every 6 hours) for treatment of back and leg problems and notes that he last took the medication (half of a tablet) at 7:00 am this morning. Pt adds that he last had an at home CBG level of "210 or something like that". Pt denies rhinorrhea, emesis, diarrhea and dysuria. No other associated symptoms.           The history " is provided by the patient. No  was used.     Review of patient's allergies indicates:   Allergen Reactions    Codeine Rash     Other reaction(s): Unknown     Past Medical History:   Diagnosis Date    Arthritis     Bulging lumbar disc     C. difficile diarrhea     Chronic back pain 10/14/2014    Diabetes mellitus type II     DM (diabetes mellitus), type 2, uncontrolled 3/12/2013    Hepatitis C 7/3/2013    MSSA (methicillin susceptible Staphylococcus aureus) septicemia     Neuromuscular disorder     Neuropathy     Staph aureus infection      Past Surgical History:   Procedure Laterality Date    APPENDECTOMY      JOINT REPLACEMENT      left knee surgery      left leg surgery      broken bone    LEG SURGERY  right     Right arm boil      Right great toe amputation       Family History   Problem Relation Age of Onset    Arthritis Mother     Arthritis Sister     Diabetes Sister     Arthritis Brother      Social History   Substance Use Topics    Smoking status: Current Some Day Smoker     Packs/day: 1.00     Types: Cigarettes    Smokeless tobacco: Never Used    Alcohol use No     Review of Systems   Constitutional: Positive for fever (subjective). Negative for chills.        (+) sweats   HENT: Positive for sore throat. Negative for rhinorrhea.    Eyes: Negative for redness.   Respiratory: Negative for cough.    Cardiovascular: Negative for chest pain.   Gastrointestinal: Positive for constipation (improved) and nausea. Negative for abdominal pain, diarrhea and vomiting.   Genitourinary: Negative for dysuria.   Musculoskeletal: Negative for back pain.   Skin: Negative for color change.   Neurological: Negative for syncope and weakness.       Physical Exam     Initial Vitals [08/07/17 1006]   BP Pulse Resp Temp SpO2   135/80 84 20 97.8 °F (36.6 °C) 97 %      MAP       98.33         Physical Exam well-developed obese white male alert oriented ×3  HEENT exam pupils reactive  nonicteric TMs gray nares benign moist mixed membranes posterior pharynx benign no exudate no erythema  Neck no significant adenopathy negative meningismus  Lungs clear no rales rhonchi or wheezing  Cardiovascular regular rate and rhythm no murmur rub or gallop  Abdomen protuberant bowel sounds positive soft nontender well healed periumbilical midline incision unable appreciate masses or prostatomegaly negative Bolanos's negative McBurney's  Right foot dressing removed moderate exudate the proximal distal aspects of granulation tissue with increased warmth over dorsum of right foot to ankle some tenderness to palpation  Skin with scattered scabs over forearms without increased warmth    ED Course   Procedures  Labs Reviewed   CBC W/ AUTO DIFFERENTIAL - Abnormal; Notable for the following:        Result Value    Hemoglobin 13.3 (*)     RDW 16.1 (*)     Platelets 148 (*)     Gran% 75.6 (*)     Lymph% 16.0 (*)     All other components within normal limits   COMPREHENSIVE METABOLIC PANEL - Abnormal; Notable for the following:     Sodium 135 (*)     Glucose 362 (*)     BUN, Bld 29 (*)     Total Protein 8.5 (*)     All other components within normal limits   URINALYSIS - Abnormal; Notable for the following:     Protein, UA 2+ (*)     Glucose, UA 3+ (*)     All other components within normal limits   OSMOLALITY - Abnormal; Notable for the following:     Osmolality 305 (*)     All other components within normal limits   POCT GLUCOSE - Abnormal; Notable for the following:     POCT Glucose 329 (*)     All other components within normal limits   CULTURE, BLOOD   CULTURE, BLOOD   CULTURE, BLOOD   CULTURE, AEROBIC  (SPECIFY SOURCE)   PROTIME-INR   TROPONIN I   B-TYPE NATRIURETIC PEPTIDE   BETA - HYDROXYBUTYRATE, SERUM   URINALYSIS MICROSCOPIC   POCT GLUCOSE MONITORING CONTINUOUS     EKG Readings: (Independently Interpreted)   Normal sinus rhythm 71 bpm WI upper limits of normal normal QT normal ST-T segments not significantly  changed from April 17 EKG 2017                MDM patient approximately 3 weeks out on home vancomycin with a PICC line complains a 2 day history of fever weakness nausea wound appears to be infected with secondary organism we have cultured same.  We will also check a lipase looking for any evidence of pancreatitis DKA hyperosmolar       Scribe Attestation:   Scribe #1: I performed the above scribed service and the documentation accurately describes the services I performed. I attest to the accuracy of the note.    Attending Attestation:           Physician Attestation for Scribe:  Physician Attestation Statement for Scribe #1: I, Darron Romero MD, reviewed documentation, as scribed by Dahlia Belcher in my presence, and it is both accurate and complete.                 ED Course     Clinical Impression:   The primary encounter diagnosis was Septic arthritis of interphalangeal joint of toe of right foot. Diagnoses of Amputation at midfoot, Fever, Constipation, and Osteomyelitis of toe of right foot were also pertinent to this visit.                           Darron Romero MD  08/07/17 [id number]

## 2017-08-08 ENCOUNTER — HOSPITAL ENCOUNTER (OUTPATIENT)
Dept: WOUND CARE | Facility: HOSPITAL | Age: 57
Discharge: HOME | End: 2017-08-08
Attending: FAMILY MEDICINE
Payer: MEDICAID

## 2017-08-08 PROCEDURE — 99213 OFFICE O/P EST LOW 20 MIN: CPT

## 2017-08-08 NOTE — ED NOTES
Received report from Howard OTERO. 1st contact with pt. Pt AAO x 3, REU, skin warm, dry and intact w/ amputation and infection on right big toe.. Side rails up x 2, bed in low position, wheels locked. Call bell within reach. Pt awaiting for Asiya SARMIENTO to get foor wrapped. Pt has been updated w/ care. Will continue to monitor

## 2017-08-08 NOTE — ED NOTES
"Pt left AMA. AMA formed signed by patient w/ 2 witness. Pt states "Tona been here for over 10 hours, I want to go home, I already spoke to Dr. Braden and she said Im safe to go home and just follow up tomorrow". Donna SARMIENTO notified and called   "

## 2017-08-08 NOTE — CONSULTS
"[facility] [place]  Podiatry  Consult Note    Patient Name: Carlos Cifuentes  MRN: [id number]  Admission Date: 8/7/2017  Hospital Length of Stay: 0 days  Attending Physician: Darron Romero MD  Primary Care Provider: Miguel Lux MD   Reason: "septic joint"  Recommendation: Patient known to me with chronic OM being treated with IV abx via PICC, monitored by ID.  He is also seen at [facility] twice weekly for dressing changes.  Wound dimesions continue to improve however patient is aware that if IV abx fail to eradicate OM he is at risk for 2nd digit amputation (he refused this in the past and opted for current treatment).  Foot is stable enough for discharge from podiatric perspective.  He is to keep his wound care appointments this week.    Consults  Subjective:     History of Present Illness:  Carlos Cifuentes is a 56 y.o. male   has a past medical history of Arthritis; Bulging lumbar disc; C. difficile diarrhea; Chronic back pain; Diabetes mellitus type II; DM (diabetes mellitus), type 2, uncontrolled; Hepatitis C; MSSA (methicillin susceptible Staphylococcus aureus) septicemia; Neuromuscular disorder; Neuropathy; and Staph aureus infection.  Consulted to podiatry for septic joint.  Patient is familiar to me. He initially had a 1st ray amputation by my colleague on 04/13/17.  Bone biopsy performed on 2nd prox phalanx 06/27. Patient elected for IV abx and wound care in lieu of 2nd digit amputation at that time.  He has been being seen in [facility] twice weekly.  Patient relates that on Friday night he had a "panic attack" and states he has been having them consistently since that time.  He relates that his last panic attack occurred while he  was "confined in a hospital for months."  He states that he feels "trapped in his house" secondary to having to limit weightbearing to aid in healing his wound and believe it resulted in "panic attack."  Patient also relates constipation.  He takes narcotics daily " "(oxycodone 1x tablet q6 hours).       Scheduled Meds:  Continuous Infusions:  PRN Meds:    Review of patient's allergies indicates:   Allergen Reactions    Codeine Rash     Other reaction(s): Unknown        Past Medical History:   Diagnosis Date    Arthritis     Bulging lumbar disc     C. difficile diarrhea     Chronic back pain 10/14/2014    Diabetes mellitus type II     DM (diabetes mellitus), type 2, uncontrolled 3/12/2013    Hepatitis C 7/3/2013    MSSA (methicillin susceptible Staphylococcus aureus) septicemia     Neuromuscular disorder     Neuropathy     Staph aureus infection      Past Surgical History:   Procedure Laterality Date    APPENDECTOMY      JOINT REPLACEMENT      left knee surgery      left leg surgery      broken bone    LEG SURGERY  right     Right arm boil      Right great toe amputation         Family History     Problem Relation (Age of Onset)    Arthritis Mother, Sister, Brother    Diabetes Sister        Social History Main Topics    Smoking status: Current Some Day Smoker     Packs/day: 1.00     Types: Cigarettes    Smokeless tobacco: Never Used    Alcohol use No    Drug use: No    Sexual activity: Not on file     Review of Systems   Constitutional: Positive for fatigue.   Cardiovascular: Positive for palpitations and leg swelling. Negative for chest pain.   Gastrointestinal: Positive for constipation.   Musculoskeletal: Positive for arthralgias, back pain and myalgias.   Skin: Positive for wound.   Neurological: Positive for numbness. Negative for tremors and weakness.   Psychiatric/Behavioral: The patient is nervous/anxious.         "panic attacks"     Objective:     Vital Signs (Most Recent):  Temp: 97.8 °F (36.6 °C) (08/07/17 1006)  Pulse: 66 (08/07/17 1417)  Resp: 20 (08/07/17 1006)  BP: 123/66 (08/07/17 1417)  SpO2: 95 % (08/07/17 1419) Vital Signs (24h Range):  Temp:  [97.8 °F (36.6 °C)] 97.8 °F (36.6 °C)  Pulse:  [] 66  Resp:  [20] 20  SpO2:  [93 %-97 %] " 95 %  BP: (109-135)/(61-88) 123/66     Weight: 99.8 kg (220 lb)  Body mass index is 32.49 kg/m².    Foot Exam     General: Pt. is well-developed, well-nourished, appears stated age, in no acute distress, alert and oriented x 3. No evidence of depression, anxiety, or agitation. Calm, cooperative, and communicative. Appropriate interactions and affect.    Podiatric examination:     Vascular: dorsalis pedis and posterior tibial pulses are palpable bilaterally.No clubbing or cyanosis noted    Neurologic: Dumont-Flory 5.07 monofilamant testing is absent Kalin feet. Sharp/dull sensation absent Bilaterally. Light touch absent Bilaterally.    Musculoskeletal: right 1st ray amputation; hammering and deviation or right 2nd digit.  Muscle strength is 5/5 in all groups bilaterally.    Lymphatics: no lymphangitic streaking bilaterally.    Dermatologic: Skin is warm, atrophic, hyperpigmented, digital hair is absent.    Wound Location: right 1st ray  Wound Type: DiabeticNeurotrophic Ulcer  Wound Condition: Chronic  Signs of infection: No fluctuance, no crepitus, no purulence noted. No erythema noted  Drainage: serosanguinous  Periwound: maceration, mild  Base: primarily granular tissue          Laboratory:  Recent Results (from the past 72 hour(s))   POCT glucose    Collection Time: 08/07/17 10:11 AM   Result Value Ref Range    POCT Glucose 329 (H) 70 - 110 mg/dL   CBC auto differential    Collection Time: 08/07/17 11:15 AM   Result Value Ref Range    WBC 5.94 3.90 - 12.70 K/uL    RBC 4.76 4.60 - 6.20 M/uL    Hemoglobin 13.3 (L) 14.0 - 18.0 g/dL    Hematocrit 40.5 40.0 - 54.0 %    MCV 85 82 - 98 fL    MCH 27.9 27.0 - 31.0 pg    MCHC 32.8 32.0 - 36.0 g/dL    RDW 16.1 (H) 11.5 - 14.5 %    Platelets 148 (L) 150 - 350 K/uL    MPV 10.3 9.2 - 12.9 fL    Gran # 4.5 1.8 - 7.7 K/uL    Lymph # 1.0 1.0 - 4.8 K/uL    Mono # 0.4 0.3 - 1.0 K/uL    Eos # 0.1 0.0 - 0.5 K/uL    Baso # 0.02 0.00 - 0.20 K/uL    Gran% 75.6 (H) 38.0 - 73.0 %     Lymph% 16.0 (L) 18.0 - 48.0 %    Mono% 6.2 4.0 - 15.0 %    Eosinophil% 1.9 0.0 - 8.0 %    Basophil% 0.3 0.0 - 1.9 %    Differential Method Automated    Comprehensive metabolic panel    Collection Time: 08/07/17 11:15 AM   Result Value Ref Range    Sodium 135 (L) 136 - 145 mmol/L    Potassium 5.1 3.5 - 5.1 mmol/L    Chloride 101 95 - 110 mmol/L    CO2 26 23 - 29 mmol/L    Glucose 362 (H) 70 - 110 mg/dL    BUN, Bld 29 (H) 6 - 20 mg/dL    Creatinine 1.3 0.5 - 1.4 mg/dL    Calcium 9.5 8.7 - 10.5 mg/dL    Total Protein 8.5 (H) 6.0 - 8.4 g/dL    Albumin 3.8 3.5 - 5.2 g/dL    Total Bilirubin 0.3 0.1 - 1.0 mg/dL    Alkaline Phosphatase 70 55 - 135 U/L    AST 21 10 - 40 U/L    ALT 26 10 - 44 U/L    Anion Gap 8 8 - 16 mmol/L    eGFR if African American >60 >60 mL/min/1.73 m^2    eGFR if non African American >60 >60 mL/min/1.73 m^2   Blood culture #2    Collection Time: 08/07/17 11:15 AM   Result Value Ref Range    Blood Culture, Routine No Growth to date    Protime-INR    Collection Time: 08/07/17 11:15 AM   Result Value Ref Range    Prothrombin Time 10.9 9.0 - 12.5 sec    INR 1.0 0.8 - 1.2   Troponin I    Collection Time: 08/07/17 11:15 AM   Result Value Ref Range    Troponin I <0.006 0.000 - 0.026 ng/mL   B-Type natriuretic peptide (BNP)    Collection Time: 08/07/17 11:15 AM   Result Value Ref Range    BNP 19 0 - 99 pg/mL   Blood culture #1    Collection Time: 08/07/17 11:27 AM   Result Value Ref Range    Blood Culture, Routine No Growth to date    Beta - Hydroxybutyrate, Serum    Collection Time: 08/07/17 11:27 AM   Result Value Ref Range    Beta-Hydroxybutyrate 0.3 0.0 - 0.5 mmol/L   Osmolality    Collection Time: 08/07/17 11:27 AM   Result Value Ref Range    Osmolality 305 (H) 280 - 300 mOsm/kg   Urinalysis - Clean Catch    Collection Time: 08/07/17 11:48 AM   Result Value Ref Range    Specimen UA Urine, Clean Catch     Color, UA Yellow Yellow, Straw, Cici    Appearance, UA Clear Clear    pH, UA 6.0 5.0 - 8.0     Specific Gravity, UA 1.020 1.005 - 1.030    Protein, UA 2+ (A) Negative    Glucose, UA 3+ (A) Negative    Ketones, UA Negative Negative    Bilirubin (UA) Negative Negative    Occult Blood UA Negative Negative    Nitrite, UA Negative Negative    Urobilinogen, UA Negative <2.0 EU/dL    Leukocytes, UA Negative Negative   Urinalysis Microscopic    Collection Time: 08/07/17 11:48 AM   Result Value Ref Range    RBC, UA 2 0 - 4 /hpf    WBC, UA 1 0 - 5 /hpf    Bacteria, UA None None-Occ /hpf    Yeast, UA None None    Hyaline Casts, UA 0 0-1/lpf /lpf    Microscopic Comment SEE COMMENT    POCT glucose    Collection Time: 08/07/17  3:49 PM   Result Value Ref Range    POCT Glucose 270 (H) 70 - 110 mg/dL       Diagnostic Results:  Imaging Results          X-Ray Foot Complete Right (Final result)  Result time 08/07/17 12:08:58    Final result by Chandler Mast MD (08/07/17 12:08:58)                 Impression:      Postoperative changes of right great toe amputation.  Bone destruction involving the head of the right second metatarsal bone and proximal phalanx of the right second toe consistent with underlying osteomyelitis, septic arthritis.      Electronically signed by: CHANDLER MAST MD  Date:     08/07/17  Time:    12:08              Narrative:    Comparison is made to prior examination dated 6/27/17.    3 views of the right foot were obtained. The patient is status post amputation of the right great toe from the level of the proximal shaft of the right first metatarsal bone. There is bone destruction involving the proximal phalanx of the right second toe. There also appears to be bone destruction involving the head of the second metatarsal bone. Findings are suggestive of osteomyelitis/septic arthritis. This has progressed when compared to the prior examination. There is extensive atherosclerosis.                             X-Ray Chest PA And Lateral (Final result)  Result time 08/07/17 12:03:03    Final result by Chandler BOSS  MD Kezia (08/07/17 12:03:03)                 Impression:      Right-sided PICC line with tip projecting over the medial aspect of the right subclavian vein near its junction with the internal jugular vein.  No acute chest disease identified.      Electronically signed by: CHANDLER MAST MD  Date:     08/07/17  Time:    12:03              Narrative:    Comparison is made to prior examination dated 6/29/17.    PA and lateral radiographs of the chest were obtained. There is a right-sided PICC line present with the tip within the right subclavian vein near its junction with the right internal jugular vein. The heart is not enlarged. Superior mediastinal structures are unremarkable. Pulmonary vasculature is within normal limits. The lungs are well-aerated and free of focal consolidations. There is no evidence for pneumothorax or pleural effusions. Bony structures appear grossly intact.                             X-Ray Abdomen Flat And Erect (Final result)  Result time 08/07/17 12:04:36    Final result by Chandler Mast MD (08/07/17 12:04:36)                 Impression:      Large amount of stool identified within the colon. Otherwise, unremarkable intestinal gas pattern.      Electronically signed by: CHANDLER MAST MD  Date:     08/07/17  Time:    12:04              Narrative:    Comparison is made to prior examination dated 12/18/14.    Flat and erect radiographs of the abdomen were obtained. The intestinal gas pattern is grossly unremarkable with no dilated loops of bowel evident. There is a large amount of stool identified throughout the colon. There is no evidence for free intraperitoneal air. There are surgical clips projecting over the right hemipelvis. Bony structures appear intact. No abnormal intra-abdominal calcifications are identified.                              Assessment/Plan:     Active Diagnoses:    Diagnosis Date Noted POA    Septic arthritis of interphalangeal joint of toe of right foot  [M00.871] 08/07/2017 Yes      Problems Resolved During this Admission:    Diagnosis Date Noted Date Resolved POA      ~20 minutes spent again discussing options. Options were discussed with possible side effects of each. Amputation of 2nd digit vs continuation of IV abx with aggressive wound care for several months with no guarantee of wound resolution vs PO. Patient wishes to continue wound care with IV abx. Stressed the importance of limiting weightbearing while simultaneously informing patient that he does not have to be home bound.    Also stressed the importance of tight glycemic control and tobacco cessation    Thank you for your consult. Foot is stable enough for discharge from podiatric perspective.  He is to keep his wound care appointments this week.    Nora Braden DPM  Podiatry  [facility] [place]

## 2017-08-09 PROBLEM — K59.01 SLOW TRANSIT CONSTIPATION: Status: ACTIVE | Noted: 2017-08-09

## 2017-08-10 LAB — BACTERIA SPEC AEROBE CULT: NORMAL

## 2017-08-11 ENCOUNTER — HOSPITAL ENCOUNTER (OUTPATIENT)
Dept: WOUND CARE | Facility: HOSPITAL | Age: 57
Discharge: HOME | End: 2017-08-11
Attending: PODIATRIST
Payer: MEDICAID

## 2017-08-11 DIAGNOSIS — M86.671 OTHER CHRONIC OSTEOMYELITIS OF RIGHT FOOT: ICD-10-CM

## 2017-08-11 DIAGNOSIS — E11.49 TYPE II DIABETES MELLITUS WITH NEUROLOGICAL MANIFESTATIONS: ICD-10-CM

## 2017-08-11 DIAGNOSIS — E11.621 DIABETIC ULCER OF TOE OF RIGHT FOOT ASSOCIATED WITH TYPE 2 DIABETES MELLITUS, WITH FAT LAYER EXPOSED: Primary | ICD-10-CM

## 2017-08-11 DIAGNOSIS — L97.512 DIABETIC ULCER OF TOE OF RIGHT FOOT ASSOCIATED WITH TYPE 2 DIABETES MELLITUS, WITH FAT LAYER EXPOSED: Primary | ICD-10-CM

## 2017-08-11 DIAGNOSIS — Z89.411 STATUS POST AMPUTATION OF RIGHT GREAT TOE: ICD-10-CM

## 2017-08-11 DIAGNOSIS — M86.9 TOE OSTEOMYELITIS, RIGHT: ICD-10-CM

## 2017-08-11 PROCEDURE — 11042 DBRDMT SUBQ TIS 1ST 20SQCM/<: CPT | Performed by: PODIATRIST

## 2017-08-11 PROCEDURE — 99499 UNLISTED E&M SERVICE: CPT | Mod: ,,, | Performed by: PODIATRIST

## 2017-08-11 PROCEDURE — 11042 DBRDMT SUBQ TIS 1ST 20SQCM/<: CPT | Mod: ,,, | Performed by: PODIATRIST

## 2017-08-11 RX ORDER — SULFAMETHOXAZOLE AND TRIMETHOPRIM 800; 160 MG/1; MG/1
1 TABLET ORAL 2 TIMES DAILY
Qty: 20 TABLET | Refills: 0 | Status: SHIPPED | OUTPATIENT
Start: 2017-08-11 | End: 2017-08-21

## 2017-08-12 LAB
BACTERIA BLD CULT: NORMAL
BACTERIA BLD CULT: NORMAL

## 2017-08-15 ENCOUNTER — HOSPITAL ENCOUNTER (OUTPATIENT)
Dept: WOUND CARE | Facility: HOSPITAL | Age: 57
Discharge: HOME | End: 2017-08-15
Attending: FAMILY MEDICINE
Payer: MEDICAID

## 2017-08-15 PROCEDURE — 99213 OFFICE O/P EST LOW 20 MIN: CPT

## 2017-08-16 ENCOUNTER — HOSPITAL ENCOUNTER (EMERGENCY)
Facility: HOSPITAL | Age: 57
Discharge: HOME | End: 2017-08-16
Attending: EMERGENCY MEDICINE
Payer: MEDICAID

## 2017-08-16 VITALS
TEMPERATURE: 98 F | SYSTOLIC BLOOD PRESSURE: 141 MMHG | WEIGHT: 230 LBS | BODY MASS INDEX: 34.07 KG/M2 | DIASTOLIC BLOOD PRESSURE: 66 MMHG | OXYGEN SATURATION: 98 % | RESPIRATION RATE: 20 BRPM | HEART RATE: 70 BPM | HEIGHT: 69 IN

## 2017-08-16 DIAGNOSIS — R53.1 WEAKNESS: ICD-10-CM

## 2017-08-16 DIAGNOSIS — K21.9 GASTROESOPHAGEAL REFLUX DISEASE, ESOPHAGITIS PRESENCE NOT SPECIFIED: Primary | ICD-10-CM

## 2017-08-16 DIAGNOSIS — R10.13 EPIGASTRIC PAIN: ICD-10-CM

## 2017-08-16 DIAGNOSIS — R73.9 HYPERGLYCEMIA: ICD-10-CM

## 2017-08-16 LAB
ALBUMIN SERPL BCP-MCNC: 3.7 G/DL
ALP SERPL-CCNC: 73 U/L
ALT SERPL W/O P-5'-P-CCNC: 24 U/L
ANION GAP SERPL CALC-SCNC: 10 MMOL/L
AST SERPL-CCNC: 19 U/L
B-OH-BUTYR BLD STRIP-SCNC: 0.2 MMOL/L
BACTERIA #/AREA URNS HPF: NORMAL /HPF
BASOPHILS # BLD AUTO: 0.03 K/UL
BASOPHILS NFR BLD: 0.6 %
BILIRUB SERPL-MCNC: 0.3 MG/DL
BILIRUB UR QL STRIP: NEGATIVE
BUN SERPL-MCNC: 29 MG/DL
CALCIUM SERPL-MCNC: 9.9 MG/DL
CHLORIDE SERPL-SCNC: 102 MMOL/L
CLARITY UR: CLEAR
CO2 SERPL-SCNC: 21 MMOL/L
COLOR UR: YELLOW
CREAT SERPL-MCNC: 1.7 MG/DL
DIFFERENTIAL METHOD: ABNORMAL
EOSINOPHIL # BLD AUTO: 0.1 K/UL
EOSINOPHIL NFR BLD: 1.9 %
ERYTHROCYTE [DISTWIDTH] IN BLOOD BY AUTOMATED COUNT: 15.8 %
EST. GFR  (AFRICAN AMERICAN): 51 ML/MIN/1.73 M^2
EST. GFR  (NON AFRICAN AMERICAN): 44 ML/MIN/1.73 M^2
GLUCOSE SERPL-MCNC: 292 MG/DL (ref 70–110)
GLUCOSE SERPL-MCNC: 311 MG/DL
GLUCOSE UR QL STRIP: ABNORMAL
HCT VFR BLD AUTO: 40.3 %
HGB BLD-MCNC: 13.4 G/DL
HGB UR QL STRIP: NEGATIVE
HYALINE CASTS #/AREA URNS LPF: 1 /LPF
KETONES UR QL STRIP: NEGATIVE
LEUKOCYTE ESTERASE UR QL STRIP: NEGATIVE
LIPASE SERPL-CCNC: 20 U/L
LYMPHOCYTES # BLD AUTO: 0.9 K/UL
LYMPHOCYTES NFR BLD: 17.9 %
MCH RBC QN AUTO: 28 PG
MCHC RBC AUTO-ENTMCNC: 33.3 G/DL
MCV RBC AUTO: 84 FL
MICROSCOPIC COMMENT: NORMAL
MONOCYTES # BLD AUTO: 0.5 K/UL
MONOCYTES NFR BLD: 8.8 %
NEUTROPHILS # BLD AUTO: 3.7 K/UL
NEUTROPHILS NFR BLD: 70.8 %
NITRITE UR QL STRIP: NEGATIVE
PH UR STRIP: 5 [PH] (ref 5–8)
PLATELET # BLD AUTO: 125 K/UL
PMV BLD AUTO: 11.1 FL
POCT GLUCOSE: 264 MG/DL (ref 70–110)
POCT GLUCOSE: 272 MG/DL (ref 70–110)
POTASSIUM SERPL-SCNC: 5 MMOL/L
PROT SERPL-MCNC: 8.1 G/DL
PROT UR QL STRIP: ABNORMAL
RBC # BLD AUTO: 4.78 M/UL
RBC #/AREA URNS HPF: 2 /HPF (ref 0–4)
SODIUM SERPL-SCNC: 133 MMOL/L
SP GR UR STRIP: 1.01 (ref 1–1.03)
SQUAMOUS #/AREA URNS HPF: 3 /HPF
TROPONIN I SERPL DL<=0.01 NG/ML-MCNC: <0.006 NG/ML
URN SPEC COLLECT METH UR: ABNORMAL
UROBILINOGEN UR STRIP-ACNC: NEGATIVE EU/DL
WBC # BLD AUTO: 5.24 K/UL
WBC #/AREA URNS HPF: 1 /HPF (ref 0–5)
YEAST URNS QL MICRO: NORMAL

## 2017-08-16 PROCEDURE — 82962 GLUCOSE BLOOD TEST: CPT

## 2017-08-16 PROCEDURE — 93005 ELECTROCARDIOGRAM TRACING: CPT

## 2017-08-16 PROCEDURE — 83690 ASSAY OF LIPASE: CPT

## 2017-08-16 PROCEDURE — 84484 ASSAY OF TROPONIN QUANT: CPT

## 2017-08-16 PROCEDURE — 99284 EMERGENCY DEPT VISIT MOD MDM: CPT | Mod: 25

## 2017-08-16 PROCEDURE — 82010 KETONE BODYS QUAN: CPT

## 2017-08-16 PROCEDURE — 81000 URINALYSIS NONAUTO W/SCOPE: CPT

## 2017-08-16 PROCEDURE — 96361 HYDRATE IV INFUSION ADD-ON: CPT

## 2017-08-16 PROCEDURE — 80053 COMPREHEN METABOLIC PANEL: CPT

## 2017-08-16 PROCEDURE — 96360 HYDRATION IV INFUSION INIT: CPT

## 2017-08-16 PROCEDURE — 25000003 PHARM REV CODE 250: Performed by: EMERGENCY MEDICINE

## 2017-08-16 PROCEDURE — 93010 ELECTROCARDIOGRAM REPORT: CPT | Mod: ,,, | Performed by: INTERNAL MEDICINE

## 2017-08-16 PROCEDURE — 85025 COMPLETE CBC W/AUTO DIFF WBC: CPT

## 2017-08-16 RX ORDER — LIDOCAINE HYDROCHLORIDE 20 MG/ML
10 SOLUTION OROPHARYNGEAL
Status: COMPLETED | OUTPATIENT
Start: 2017-08-16 | End: 2017-08-16

## 2017-08-16 RX ORDER — SULFAMETHOXAZOLE AND TRIMETHOPRIM 800; 160 MG/1; MG/1
1 TABLET ORAL
COMMUNITY
End: 2018-01-19 | Stop reason: SDUPTHER

## 2017-08-16 RX ORDER — FENTANYL 50 UG/1
1 PATCH TRANSDERMAL
COMMUNITY
End: 2017-09-15

## 2017-08-16 RX ORDER — METFORMIN HYDROCHLORIDE 1000 MG/1
1000 TABLET ORAL 2 TIMES DAILY WITH MEALS
Status: ON HOLD | COMMUNITY
End: 2018-02-22 | Stop reason: HOSPADM

## 2017-08-16 RX ORDER — SODIUM CHLORIDE 9 MG/ML
500 INJECTION, SOLUTION INTRAVENOUS
Status: COMPLETED | OUTPATIENT
Start: 2017-08-16 | End: 2017-08-16

## 2017-08-16 RX ORDER — MAG HYDROX/ALUMINUM HYD/SIMETH 200-200-20
30 SUSPENSION, ORAL (FINAL DOSE FORM) ORAL
Status: COMPLETED | OUTPATIENT
Start: 2017-08-16 | End: 2017-08-16

## 2017-08-16 RX ORDER — SODIUM CHLORIDE 9 MG/ML
1000 INJECTION, SOLUTION INTRAVENOUS
Status: COMPLETED | OUTPATIENT
Start: 2017-08-16 | End: 2017-08-16

## 2017-08-16 RX ORDER — FAMOTIDINE 20 MG/1
20 TABLET, FILM COATED ORAL
Status: COMPLETED | OUTPATIENT
Start: 2017-08-16 | End: 2017-08-16

## 2017-08-16 RX ADMIN — SODIUM CHLORIDE 500 ML: 0.9 INJECTION, SOLUTION INTRAVENOUS at 10:08

## 2017-08-16 RX ADMIN — FAMOTIDINE 20 MG: 20 TABLET, FILM COATED ORAL at 08:08

## 2017-08-16 RX ADMIN — LIDOCAINE HYDROCHLORIDE 10 ML: 20 SOLUTION ORAL; TOPICAL at 08:08

## 2017-08-16 RX ADMIN — ALUMINUM HYDROXIDE, MAGNESIUM HYDROXIDE, AND SIMETHICONE 30 ML: 200; 200; 20 SUSPENSION ORAL at 08:08

## 2017-08-16 RX ADMIN — SODIUM CHLORIDE 1000 ML: 0.9 INJECTION, SOLUTION INTRAVENOUS at 08:08

## 2017-08-16 NOTE — ED PROVIDER NOTES
Encounter Date: 8/16/2017    SCRIBE #1 NOTE: I, Iqra Carter, am scribing for, and in the presence of,  Darron Leyva III, MD. I have scribed the following portions of the note - Other sections scribed: HPI and ROS.       History     Chief Complaint   Patient presents with    Hyperglycemia     feels like his sugars have been running high.. sweaty this morning and feels like wants to pass out.. patient states been feeling weak.. ate ham and toast this morning    Gastroesophageal Reflux     patient states his reflux has been acting up     CC: Hyperglycemia; GERD    HPI: This 56 y.o. Male who has Diabetes mellitus presents to the emergency department complaining of increase in blood sugar that he noticed this am.  Patient also reports of an acute onset, constant, severe heartburn and indigestion that began this morning.  He also reports of associated nausea and cold sweats.  Patient reports he initially assumed his symptoms were a result of eating cake prior to bed last night.  He reports attempting treatment with Tums, but reports of no relief of his heartburn/indigestion.  Patient reports the use of an enema as well in hopes that it would help reduce his heartburn.  Patient reports recently taking pain medications, which includes a fentanyl patch and oxycodone (last taken yesterday).  Patient denies fever, cough, rhinorrhea, rash, back pain, neck pain, emesis, diarrhea, blood in stools, or any other associated symptoms.  No alleviating factors.      The history is provided by the patient. No  was used.     Review of patient's allergies indicates:  No Known Allergies  Past Medical History:   Diagnosis Date    Diabetes mellitus      Past Surgical History:   Procedure Laterality Date    TOE AMPUTATION Right      History reviewed. No pertinent family history.  Social History   Substance Use Topics    Smoking status: Current Every Day Smoker     Packs/day: 0.50     Types: Cigarettes    Smokeless  tobacco: Never Used    Alcohol use No     Review of Systems   Constitutional: Negative for chills and fever.   HENT: Negative for congestion, ear pain, rhinorrhea and sore throat.    Eyes: Negative for pain.   Respiratory: Negative for cough and shortness of breath.    Cardiovascular: Negative for chest pain and leg swelling.   Gastrointestinal: Positive for nausea. Negative for abdominal pain, diarrhea and vomiting.        (+) heartburn/indigestion   Genitourinary: Negative for dysuria.   Musculoskeletal: Negative for back pain and neck pain.   Skin: Negative for rash.   Neurological: Negative for dizziness, weakness, light-headedness, numbness and headaches.       Physical Exam     Initial Vitals [08/16/17 0753]   BP Pulse Resp Temp SpO2   121/68 76 16 98 °F (36.7 °C) 99 %      MAP       85.67         Physical Exam    Constitutional: He appears well-developed and well-nourished. He is not diaphoretic. No distress.   HENT:   Head: Normocephalic and atraumatic.   Nose: Nose normal.   Mouth/Throat: Oropharynx is clear and moist. No oropharyngeal exudate.   Eyes: Conjunctivae and EOM are normal. Pupils are equal, round, and reactive to light. No scleral icterus.   Neck: Normal range of motion. Neck supple. No thyromegaly present. No tracheal deviation present.   Cardiovascular: Normal rate, regular rhythm and normal heart sounds. Exam reveals no gallop and no friction rub.    No murmur heard.  Pulmonary/Chest: Breath sounds normal. No respiratory distress. He has no wheezes. He has no rhonchi. He has no rales.   Abdominal: Soft. Bowel sounds are normal. He exhibits no distension and no mass. There is no tenderness. There is no rebound and no guarding.   Musculoskeletal: Normal range of motion. He exhibits no edema or tenderness.   Lymphadenopathy:     He has no cervical adenopathy.   Neurological: He is alert and oriented to person, place, and time. He has normal strength. No cranial nerve deficit or sensory deficit.    Skin: Skin is warm and dry. No rash noted. No erythema. No pallor.   Psychiatric: He has a normal mood and affect. His behavior is normal. Thought content normal.         ED Course   Procedures  Labs Reviewed   CBC W/ AUTO DIFFERENTIAL   COMPREHENSIVE METABOLIC PANEL   TROPONIN I   LIPASE   URINALYSIS   BETA - HYDROXYBUTYRATE, SERUM             Medical Decision Making:   Initial Assessment:   56-year-old male with a history of diabetes presents complaining of elevated blood sugar, sleepiness, severe heartburn, nausea.  Physical examination essentially unremarkable.  Differential Diagnosis:   Heartburn, dehydration, electrolyte abnormality, HHS, DKA, renal insufficiency.  Will screen for acute coronary syndrome given risk but there is low likelihood for this, patient's symptoms sound noncardiac.  Independently Interpreted Test(s):   I have ordered and independently interpreted X-rays - see summary below.       <> Summary of X-Ray Reading(s): Chest x-ray: No acute abnormality  I have ordered and independently interpreted EKG Reading(s) - see summary below       <> Summary of EKG Reading(s): NSR, no acute ischemia  ED Management:  Patient's workup does not reveal any acute emergent pathology, there are several mild abnormalities.  Have counseled the patient regarding the results of his workup and need for follow-up.  He reports that his symptoms of acid reflux have completely resolved after GI cocktail.             Scribe Attestation:   Scribe #1: I performed the above scribed service and the documentation accurately describes the services I performed. I attest to the accuracy of the note.    Attending Attestation:           Physician Attestation for Scribe:  Physician Attestation Statement for Scribe #1: I, Darron Leyva III, MD, reviewed documentation, as scribed by Iqra Crater in my presence, and it is both accurate and complete.                 ED Course     Clinical Impression:   The primary encounter diagnosis  was Gastroesophageal reflux disease, esophagitis presence not specified. Diagnoses of Epigastric pain, Weakness, and Hyperglycemia were also pertinent to this visit.                           Darron Leyva III, MD  08/17/17 [id number]

## 2017-08-16 NOTE — DISCHARGE INSTRUCTIONS
Return to the emergency department if you develop severe abdominal pain, vomiting, fever higher than 100.4°, worsening weakness, or for any new or worsening medical concerns.

## 2017-08-16 NOTE — ED TRIAGE NOTES
"Pt arrived to ED via personal transport with C/O "heartburn", sweating, weakness, nausea beginning this morning. States he took tums and an enema this morning to relive the heartburn, but did not work.   "

## 2017-08-16 NOTE — ED NOTES
Pt instructed on need for urine specimen. Urinal placed at bed side. Pt instructed to call once given urine.

## 2017-08-17 LAB — POCT GLUCOSE: 292 MG/DL (ref 70–110)

## 2017-08-18 ENCOUNTER — HOSPITAL ENCOUNTER (OUTPATIENT)
Dept: WOUND CARE | Facility: HOSPITAL | Age: 57
Discharge: HOME | End: 2017-08-18
Attending: PODIATRIST
Payer: MEDICAID

## 2017-08-18 DIAGNOSIS — Z89.411 STATUS POST AMPUTATION OF RIGHT GREAT TOE: ICD-10-CM

## 2017-08-18 DIAGNOSIS — E11.621 DIABETIC ULCER OF TOE OF RIGHT FOOT ASSOCIATED WITH TYPE 2 DIABETES MELLITUS, WITH FAT LAYER EXPOSED: Primary | ICD-10-CM

## 2017-08-18 DIAGNOSIS — L97.512 DIABETIC ULCER OF TOE OF RIGHT FOOT ASSOCIATED WITH TYPE 2 DIABETES MELLITUS, WITH FAT LAYER EXPOSED: Primary | ICD-10-CM

## 2017-08-18 DIAGNOSIS — M86.671 OTHER CHRONIC OSTEOMYELITIS OF RIGHT FOOT: ICD-10-CM

## 2017-08-18 DIAGNOSIS — E11.49 TYPE II DIABETES MELLITUS WITH NEUROLOGICAL MANIFESTATIONS: ICD-10-CM

## 2017-08-18 PROCEDURE — 99499 UNLISTED E&M SERVICE: CPT | Mod: ,,, | Performed by: PODIATRIST

## 2017-08-18 PROCEDURE — 11042 DBRDMT SUBQ TIS 1ST 20SQCM/<: CPT | Performed by: PODIATRIST

## 2017-08-18 PROCEDURE — 11042 DBRDMT SUBQ TIS 1ST 20SQCM/<: CPT | Mod: ,,, | Performed by: PODIATRIST

## 2017-08-19 ENCOUNTER — HOSPITAL ENCOUNTER (EMERGENCY)
Facility: HOSPITAL | Age: 57
Discharge: HOME | End: 2017-08-19
Attending: EMERGENCY MEDICINE
Payer: MEDICAID

## 2017-08-19 VITALS
WEIGHT: 230 LBS | BODY MASS INDEX: 33.97 KG/M2 | OXYGEN SATURATION: 93 % | SYSTOLIC BLOOD PRESSURE: 154 MMHG | TEMPERATURE: 99 F | DIASTOLIC BLOOD PRESSURE: 74 MMHG | HEART RATE: 64 BPM | RESPIRATION RATE: 18 BRPM

## 2017-08-19 DIAGNOSIS — E11.65 TYPE 2 DIABETES MELLITUS WITH HYPERGLYCEMIA, WITHOUT LONG-TERM CURRENT USE OF INSULIN: Primary | ICD-10-CM

## 2017-08-19 DIAGNOSIS — E86.0 DEHYDRATION: ICD-10-CM

## 2017-08-19 DIAGNOSIS — K21.9 GASTROESOPHAGEAL REFLUX DISEASE, ESOPHAGITIS PRESENCE NOT SPECIFIED: ICD-10-CM

## 2017-08-19 LAB
ALBUMIN SERPL BCP-MCNC: 3.6 G/DL
ALP SERPL-CCNC: 63 U/L
ALT SERPL W/O P-5'-P-CCNC: 17 U/L
ANION GAP SERPL CALC-SCNC: 7 MMOL/L
AST SERPL-CCNC: 17 U/L
BASOPHILS # BLD AUTO: 0.02 K/UL
BASOPHILS NFR BLD: 0.3 %
BILIRUB SERPL-MCNC: 0.3 MG/DL
BNP SERPL-MCNC: 40 PG/ML
BUN SERPL-MCNC: 26 MG/DL
CALCIUM SERPL-MCNC: 9.5 MG/DL
CHLORIDE SERPL-SCNC: 103 MMOL/L
CO2 SERPL-SCNC: 24 MMOL/L
CREAT SERPL-MCNC: 1.4 MG/DL
DIFFERENTIAL METHOD: ABNORMAL
EOSINOPHIL # BLD AUTO: 0.1 K/UL
EOSINOPHIL NFR BLD: 1.4 %
ERYTHROCYTE [DISTWIDTH] IN BLOOD BY AUTOMATED COUNT: 16 %
EST. GFR  (AFRICAN AMERICAN): >60 ML/MIN/1.73 M^2
EST. GFR  (NON AFRICAN AMERICAN): 56 ML/MIN/1.73 M^2
GLUCOSE SERPL-MCNC: 308 MG/DL
HCT VFR BLD AUTO: 39 %
HGB BLD-MCNC: 12.7 G/DL
LIPASE SERPL-CCNC: 20 U/L
LYMPHOCYTES # BLD AUTO: 0.9 K/UL
LYMPHOCYTES NFR BLD: 15.8 %
MAGNESIUM SERPL-MCNC: 1.9 MG/DL
MCH RBC QN AUTO: 27.7 PG
MCHC RBC AUTO-ENTMCNC: 32.6 G/DL
MCV RBC AUTO: 85 FL
MONOCYTES # BLD AUTO: 0.5 K/UL
MONOCYTES NFR BLD: 8.4 %
NEUTROPHILS # BLD AUTO: 4.3 K/UL
NEUTROPHILS NFR BLD: 74.1 %
PLATELET # BLD AUTO: 119 K/UL
PMV BLD AUTO: 10.8 FL
POCT GLUCOSE: 249 MG/DL (ref 70–110)
POTASSIUM SERPL-SCNC: 4.5 MMOL/L
PROT SERPL-MCNC: 7.7 G/DL
RBC # BLD AUTO: 4.59 M/UL
SODIUM SERPL-SCNC: 134 MMOL/L
TROPONIN I SERPL DL<=0.01 NG/ML-MCNC: <0.006 NG/ML
WBC # BLD AUTO: 5.84 K/UL

## 2017-08-19 PROCEDURE — 82962 GLUCOSE BLOOD TEST: CPT

## 2017-08-19 PROCEDURE — 83735 ASSAY OF MAGNESIUM: CPT

## 2017-08-19 PROCEDURE — 93005 ELECTROCARDIOGRAM TRACING: CPT

## 2017-08-19 PROCEDURE — 25000003 PHARM REV CODE 250: Performed by: EMERGENCY MEDICINE

## 2017-08-19 PROCEDURE — 83690 ASSAY OF LIPASE: CPT

## 2017-08-19 PROCEDURE — 83880 ASSAY OF NATRIURETIC PEPTIDE: CPT

## 2017-08-19 PROCEDURE — 84484 ASSAY OF TROPONIN QUANT: CPT

## 2017-08-19 PROCEDURE — 99284 EMERGENCY DEPT VISIT MOD MDM: CPT | Mod: 25

## 2017-08-19 PROCEDURE — 96360 HYDRATION IV INFUSION INIT: CPT

## 2017-08-19 PROCEDURE — 80053 COMPREHEN METABOLIC PANEL: CPT

## 2017-08-19 PROCEDURE — 93010 ELECTROCARDIOGRAM REPORT: CPT | Mod: ,,, | Performed by: INTERNAL MEDICINE

## 2017-08-19 PROCEDURE — 85025 COMPLETE CBC W/AUTO DIFF WBC: CPT

## 2017-08-19 RX ORDER — SODIUM CHLORIDE 9 MG/ML
500 INJECTION, SOLUTION INTRAVENOUS
Status: COMPLETED | OUTPATIENT
Start: 2017-08-19 | End: 2017-08-19

## 2017-08-19 RX ORDER — OMEPRAZOLE 20 MG/1
20 CAPSULE, DELAYED RELEASE ORAL DAILY
Qty: 30 CAPSULE | Refills: 0 | Status: SHIPPED | OUTPATIENT
Start: 2017-08-19 | End: 2018-03-06 | Stop reason: SDUPTHER

## 2017-08-19 RX ADMIN — LIDOCAINE HYDROCHLORIDE: 20 SOLUTION ORAL; TOPICAL at 08:08

## 2017-08-19 RX ADMIN — SODIUM CHLORIDE 500 ML: 0.9 INJECTION, SOLUTION INTRAVENOUS at 07:08

## 2017-08-19 NOTE — ED TRIAGE NOTES
Pt presents to ED with c/o hyperglycemia and feeling dehydrated that started this AM. Pt states that he's more sleepy than usual. Pt denies chest pain or SOB. Pt appears lethargic/sleepy with slurred speech.

## 2017-08-19 NOTE — ED NOTES
"Pt ambulated to restroom with assist and states that was unable to have bowel movement.  States that "heartburn" feels better.   "

## 2017-08-19 NOTE — PROGRESS NOTES
PT refused CT scan at 7:30 am. States he can not lie flat without getting sick and throwing up. Also states he is anxious and does not want to attempt CT of head.

## 2017-08-19 NOTE — ED NOTES
Received pt lethargic but arousable to verbal and tactile stimuli.  Denies any pain at this time.  States that his mouth is very dry.  Noted dressing and walking shoe to the the Rt foot.  Denies any discomfort to that area.  Skin dry and intact.  NAD noted will continue to monitor.

## 2017-08-19 NOTE — ED PROVIDER NOTES
"Encounter Date: 8/19/2017    SCRIBE #1 NOTE: I, Shelia Hernandez, am scribing for, and in the presence of, David Mccall MD. Other sections scribed: HPI/ROS.       History     Chief Complaint   Patient presents with    Hyperglycemia     Pt states woke up this morning with his blood sugar elevated 240's, feeling tired, thirsty, & heart burn.      CC: Hyperglycemia    Pt is a 56 y.o. male smoker w/ DM and hx of chronic back pain presenting to the ED c/o fatigue and excessive thirst since this morning. Pt also c/o acute onset, constant, severe heartburn that began this morning. Pt is slurring his words but states he is just "tired." Pt states he woke up this morning with a CBG in upper 240's. Pt states he has not taken his Metformin in 2 days because his CBG has been normal, between 150-200, but that he took his Metformin this morning.     Pt was here on 8/16/17 for similar symptoms (hyperglycemia, epigastric pain). Pt's primary is Dr. Lux. Pt reports no further symptoms. No prior attempted treatment. No alleviating or aggravating factors.         The history is provided by the patient.     Review of patient's allergies indicates:   Allergen Reactions    Percocet [oxycodone-acetaminophen] Rash     Past Medical History:   Diagnosis Date    Back pain     Diabetes mellitus      Past Surgical History:   Procedure Laterality Date    TOE AMPUTATION Right      History reviewed. No pertinent family history.  Social History   Substance Use Topics    Smoking status: Current Every Day Smoker     Packs/day: 0.50     Types: Cigarettes    Smokeless tobacco: Never Used    Alcohol use No     Review of Systems   Constitutional: Positive for fatigue.   HENT: Negative for ear pain, nosebleeds and sneezing.    Eyes: Negative for redness and visual disturbance.   Respiratory: Negative for shortness of breath.    Cardiovascular: Negative for chest pain.   Gastrointestinal: Positive for abdominal pain. Negative for abdominal " distention, diarrhea and vomiting.   Endocrine: Positive for polydipsia.   Genitourinary: Negative for dysuria and hematuria.   Musculoskeletal: Negative for arthralgias and myalgias.   Skin: Negative for rash and wound.   Neurological: Positive for speech difficulty.       Physical Exam     Initial Vitals [08/19/17 0631]   BP Pulse Resp Temp SpO2   129/72 75 16 97.8 °F (36.6 °C) 98 %      MAP       91         Physical Exam    Nursing note and vitals reviewed.  HENT:   Head: Atraumatic.   Eyes: Conjunctivae and EOM are normal.   Neck: Normal range of motion.   Cardiovascular: Exam reveals no gallop and no friction rub.    No murmur heard.  Pulmonary/Chest: Breath sounds normal. No respiratory distress. He has no wheezes. He has no rales.   Abdominal: Soft. Bowel sounds are normal. He exhibits no distension. There is no tenderness.   Musculoskeletal: Normal range of motion. He exhibits no edema.   Neurological: He is alert and oriented to person, place, and time.   Skin: Skin is warm and dry.   Psychiatric: He has a normal mood and affect.         ED Course   Procedures  Labs Reviewed   CBC W/ AUTO DIFFERENTIAL - Abnormal; Notable for the following:        Result Value    RBC 4.59 (*)     Hemoglobin 12.7 (*)     Hematocrit 39.0 (*)     RDW 16.0 (*)     Platelets 119 (*)     Lymph # 0.9 (*)     Gran% 74.1 (*)     Lymph% 15.8 (*)     All other components within normal limits   COMPREHENSIVE METABOLIC PANEL - Abnormal; Notable for the following:     Sodium 134 (*)     Glucose 308 (*)     BUN, Bld 26 (*)     Anion Gap 7 (*)     eGFR if non  56 (*)     All other components within normal limits   POCT GLUCOSE - Abnormal; Notable for the following:     POCT Glucose 249 (*)     All other components within normal limits   TROPONIN I   MAGNESIUM   B-TYPE NATRIURETIC PEPTIDE   LIPASE             Medical Decision Making:   Initial Assessment:   56-year-old male presenting with elevated blood sugars, heartburn and  feeling dehydrated.  Patient says he is on metformin but has not taken it the last 2 days.  On exam he has somewhat slurred spurs.  He does have dry mucous membranes.  His vital signs are unremarkable.  Neuro exam is otherwise nonfocal.  Abdomen is benign.  EKG reviewed and interpreted myself shows no evidence of acute ischemia.  Labs show hyperglycemia without evidence of DKA.  Troponin is unremarkable.  Patient'smay be the result of dehydration and dry mucous membranes.  I did recommend scanning the patient's head however he is refusing this.  He was given a GI cocktail and reports improvement in symptoms.  Of note, patient has similar presentation 2 days ago where his symptoms are resolved with a GI cocktail and his workup was otherwise unremarkable.  Suspect symptoms related to GERD.  I do not suspect an acute life-threatening condition.  I think this patient is safe and stable for discharge.  Primary care follow-up.  Return instructions given.  Patient verbalized understanding and agreement with the plan.            Scribe Attestation:   Scribe #1: I performed the above scribed service and the documentation accurately describes the services I performed. I attest to the accuracy of the note.    Attending Attestation:           Physician Attestation for Scribe:  Physician Attestation Statement for Scribe #1: I, David Mccall MD, reviewed documentation, as scribed by Shelia Hernandez in my presence, and it is both accurate and complete.                 ED Course     Clinical Impression:   The primary encounter diagnosis was Type 2 diabetes mellitus with hyperglycemia, without long-term current use of insulin. Diagnoses of Gastroesophageal reflux disease, esophagitis presence not specified and Dehydration were also pertinent to this visit.                           David Mccall MD  08/19/17 8602

## 2017-08-20 LAB — POCT GLUCOSE: 294 MG/DL (ref 70–110)

## 2017-08-21 ENCOUNTER — HOSPITAL ENCOUNTER (EMERGENCY)
Facility: HOSPITAL | Age: 57
Discharge: LEFT BEFORE DISCHARGE | End: 2017-08-21
Payer: MEDICAID

## 2017-08-21 VITALS
SYSTOLIC BLOOD PRESSURE: 131 MMHG | HEART RATE: 86 BPM | RESPIRATION RATE: 18 BRPM | DIASTOLIC BLOOD PRESSURE: 76 MMHG | WEIGHT: 230 LBS | HEIGHT: 69 IN | TEMPERATURE: 99 F | OXYGEN SATURATION: 98 % | BODY MASS INDEX: 34.07 KG/M2

## 2017-08-21 LAB
POCT GLUCOSE: 198 MG/DL (ref 70–110)
POCT GLUCOSE: 207 MG/DL (ref 70–110)

## 2017-08-21 PROCEDURE — 99900041 HC LEFT WITHOUT BEING SEEN- EMERGENCY

## 2017-08-21 PROCEDURE — 82962 GLUCOSE BLOOD TEST: CPT

## 2017-08-23 ENCOUNTER — HOSPITAL ENCOUNTER (OUTPATIENT)
Dept: WOUND CARE | Facility: HOSPITAL | Age: 57
Discharge: HOME | End: 2017-08-23
Attending: FAMILY MEDICINE
Payer: MEDICAID

## 2017-08-23 PROCEDURE — 99212 OFFICE O/P EST SF 10 MIN: CPT

## 2017-08-31 ENCOUNTER — EPISODE CHANGES (OUTPATIENT)
Dept: HEPATOLOGY | Facility: CLINIC | Age: 57
End: 2017-08-31

## 2017-09-01 ENCOUNTER — HOSPITAL ENCOUNTER (OUTPATIENT)
Dept: WOUND CARE | Facility: HOSPITAL | Age: 57
Discharge: HOME | End: 2017-09-01
Attending: PODIATRIST
Payer: MEDICAID

## 2017-09-01 ENCOUNTER — EPISODE CHANGES (OUTPATIENT)
Dept: HEPATOLOGY | Facility: CLINIC | Age: 57
End: 2017-09-01

## 2017-09-01 DIAGNOSIS — M86.671 OTHER CHRONIC OSTEOMYELITIS OF RIGHT FOOT: ICD-10-CM

## 2017-09-01 DIAGNOSIS — Z89.411 STATUS POST AMPUTATION OF RIGHT GREAT TOE: ICD-10-CM

## 2017-09-01 DIAGNOSIS — L97.512 DIABETIC ULCER OF TOE OF RIGHT FOOT ASSOCIATED WITH TYPE 2 DIABETES MELLITUS, WITH FAT LAYER EXPOSED: Primary | ICD-10-CM

## 2017-09-01 DIAGNOSIS — E11.621 DIABETIC ULCER OF TOE OF RIGHT FOOT ASSOCIATED WITH TYPE 2 DIABETES MELLITUS, WITH FAT LAYER EXPOSED: Primary | ICD-10-CM

## 2017-09-01 DIAGNOSIS — E11.49 TYPE II DIABETES MELLITUS WITH NEUROLOGICAL MANIFESTATIONS: ICD-10-CM

## 2017-09-01 PROCEDURE — 99214 OFFICE O/P EST MOD 30 MIN: CPT | Mod: ,,, | Performed by: PODIATRIST

## 2017-09-01 PROCEDURE — 97602 WOUND(S) CARE NON-SELECTIVE: CPT | Performed by: PODIATRIST

## 2017-09-05 ENCOUNTER — TELEPHONE (OUTPATIENT)
Dept: PHARMACY | Facility: CLINIC | Age: 57
End: 2017-09-05

## 2017-09-06 ENCOUNTER — TELEPHONE (OUTPATIENT)
Dept: HEPATOLOGY | Facility: CLINIC | Age: 57
End: 2017-09-06

## 2017-09-07 PROBLEM — E29.1 MALE HYPOGONADISM: Status: ACTIVE | Noted: 2017-09-07

## 2017-09-08 ENCOUNTER — HOSPITAL ENCOUNTER (OUTPATIENT)
Dept: WOUND CARE | Facility: HOSPITAL | Age: 57
Discharge: HOME | End: 2017-09-08
Attending: PODIATRIST
Payer: MEDICAID

## 2017-09-08 DIAGNOSIS — Z89.411 STATUS POST AMPUTATION OF RIGHT GREAT TOE: ICD-10-CM

## 2017-09-08 DIAGNOSIS — L97.512 DIABETIC ULCER OF TOE OF RIGHT FOOT ASSOCIATED WITH TYPE 2 DIABETES MELLITUS, WITH FAT LAYER EXPOSED: ICD-10-CM

## 2017-09-08 DIAGNOSIS — M86.671 OTHER CHRONIC OSTEOMYELITIS OF RIGHT FOOT: Primary | ICD-10-CM

## 2017-09-08 DIAGNOSIS — M20.41 HAMMER TOES OF BOTH FEET: ICD-10-CM

## 2017-09-08 DIAGNOSIS — Z72.0 TOBACCO ABUSE: ICD-10-CM

## 2017-09-08 DIAGNOSIS — E11.621 DIABETIC ULCER OF TOE OF RIGHT FOOT ASSOCIATED WITH TYPE 2 DIABETES MELLITUS, WITH FAT LAYER EXPOSED: ICD-10-CM

## 2017-09-08 DIAGNOSIS — M20.42 HAMMER TOES OF BOTH FEET: ICD-10-CM

## 2017-09-08 DIAGNOSIS — M20.12 HALLUX ABDUCTO VALGUS, LEFT: ICD-10-CM

## 2017-09-08 DIAGNOSIS — L90.9 PLANTAR FAT PAD ATROPHY: ICD-10-CM

## 2017-09-08 DIAGNOSIS — E11.49 TYPE II DIABETES MELLITUS WITH NEUROLOGICAL MANIFESTATIONS: ICD-10-CM

## 2017-09-08 PROCEDURE — 99212 OFFICE O/P EST SF 10 MIN: CPT | Performed by: PODIATRIST

## 2017-09-11 ENCOUNTER — EPISODE CHANGES (OUTPATIENT)
Dept: HEPATOLOGY | Facility: CLINIC | Age: 57
End: 2017-09-11

## 2017-09-11 ENCOUNTER — HOSPITAL ENCOUNTER (EMERGENCY)
Facility: HOSPITAL | Age: 57
Discharge: LEFT BEFORE DISCHARGE | End: 2017-09-11
Attending: EMERGENCY MEDICINE
Payer: MEDICAID

## 2017-09-11 ENCOUNTER — TELEPHONE (OUTPATIENT)
Dept: HEPATOLOGY | Facility: CLINIC | Age: 57
End: 2017-09-11

## 2017-09-11 VITALS
RESPIRATION RATE: 18 BRPM | SYSTOLIC BLOOD PRESSURE: 124 MMHG | BODY MASS INDEX: 35.25 KG/M2 | HEIGHT: 69 IN | HEART RATE: 82 BPM | OXYGEN SATURATION: 95 % | TEMPERATURE: 98 F | WEIGHT: 238 LBS | DIASTOLIC BLOOD PRESSURE: 66 MMHG
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
## 2017-09-11 DIAGNOSIS — Z86.19 HISTORY OF HEPATITIS C: Primary | ICD-10-CM

## 2017-09-11 LAB — POCT GLUCOSE: 220 MG/DL (ref 70–110)

## 2017-09-11 PROCEDURE — 99283 EMERGENCY DEPT VISIT LOW MDM: CPT | Mod: 25

## 2017-09-11 PROCEDURE — 82962 GLUCOSE BLOOD TEST: CPT

## 2017-09-11 NOTE — ED TRIAGE NOTES
"Pt presents to ED with wanting to get blood pressure and glucose checked. Pt reports "I live alone and woke up and felt funny so I would like to be checked out" pt reports that he fell a few days ago and landed on left arm and his arm pushed into his ribs and he would like to get an xray to make sure nothing is broken. Pt denies CP, denies SOB. No distress is noted at this time. Will continue to be monitored.     "

## 2017-09-11 NOTE — ED NOTES
"Pt reports "This is taking too long I am going to just see my regular doctor". Pt told x rays were ordered and he states he understands but his pain is not an emergency and does not want to wait.     "

## 2017-09-11 NOTE — ED PROVIDER NOTES
"Encounter Date: 9/11/2017    SCRIBE #1 NOTE: I, Ronaldkal Clinton, am scribing for, and in the presence of,  Roderick Tolliver NP. I have scribed the following portions of the note - Other sections scribed: HPI and ROS.       History     Chief Complaint   Patient presents with    Arm Injury     L arm, reports falling a few days ago and would like arm to be looked at and ribs where he fell      other     "I would like to check my sugar because my meter is broken sometimes and I live alone and want to check my blood pressure too while I am here"      CC: Chest Injury; High Blood Pressure;    HPI: This 56 y.o. Male with arthritis, back pain, bulging lumbar disc, chronic back pain, diabetes mellitus, neuromuscular disorder and neruopathy presents to the ED c/o acute onset chest pain, chest soreness, and shoulder soreness. He also reports wanting to check his sugar because his personal meter is broken. The patient reports his injury occurred after a fall 3 days ago where he fell on his shoulder with his arm underneath him. He admits to using a pain patch with no relief. The patient denies fever, chills, headaches, SOB, LOC, abdominal pain, back pain or nausea. No other symptoms or alleviating factors present.      The history is provided by the patient. No  was used.     Review of patient's allergies indicates:   Allergen Reactions    Codeine Rash     Other reaction(s): Unknown    Percocet [oxycodone-acetaminophen] Rash     Past Medical History:   Diagnosis Date    Arthritis     Back pain     Bulging lumbar disc     C. difficile diarrhea     Chronic back pain 10/14/2014    Diabetes mellitus     Diabetes mellitus type II     DM (diabetes mellitus), type 2, uncontrolled 3/12/2013    Hepatitis C 7/3/2013    MSSA (methicillin susceptible Staphylococcus aureus) septicemia     Neuromuscular disorder     Neuropathy     Staph aureus infection      Past Surgical History:   Procedure Laterality Date "    APPENDECTOMY      JOINT REPLACEMENT      left knee surgery      left leg surgery      broken bone    LEG SURGERY  right     Right arm boil      Right great toe amputation      TOE AMPUTATION Right      Family History   Problem Relation Age of Onset    Arthritis Mother     Arthritis Sister     Diabetes Sister     Arthritis Brother      Social History   Substance Use Topics    Smoking status: Current Every Day Smoker     Packs/day: 0.50     Types: Cigarettes    Smokeless tobacco: Never Used    Alcohol use No     Review of Systems   Constitutional: Negative for chills and fever.   HENT: Negative for rhinorrhea.    Eyes: Negative for redness.   Respiratory: Negative for chest tightness and shortness of breath.    Cardiovascular: Positive for chest pain (associated soreness). Negative for leg swelling.   Gastrointestinal: Negative for abdominal pain, diarrhea, nausea and vomiting.   Genitourinary: Negative for dysuria, flank pain and hematuria.   Musculoskeletal: Negative for back pain and neck pain.        (+) shoulder soreness   Skin: Negative for rash.   Neurological: Negative for dizziness, light-headedness and headaches.       Physical Exam     Initial Vitals [09/11/17 0346]   BP Pulse Resp Temp SpO2   124/66 82 18 98 °F (36.7 °C) 95 %      MAP       85.33         Physical Exam    Nursing note and vitals reviewed.  Constitutional: He appears well-developed and well-nourished. He is not diaphoretic. No distress.   HENT:   Head: Normocephalic and atraumatic.   Right Ear: External ear normal.   Left Ear: External ear normal.   Nose: Nose normal.   Eyes: Conjunctivae and EOM are normal. Pupils are equal, round, and reactive to light. Right eye exhibits no discharge. Left eye exhibits no discharge. No scleral icterus.   Neck: Normal range of motion. Neck supple. No thyromegaly present. No tracheal deviation present. No JVD present.   Cardiovascular: Normal rate, regular rhythm, normal heart sounds and  "intact distal pulses. Exam reveals no gallop and no friction rub.    No murmur heard.  Pulmonary/Chest: Breath sounds normal. No stridor. No respiratory distress. He has no wheezes. He has no rhonchi. He has no rales. He exhibits tenderness (left anterior).   Abdominal: Soft. Bowel sounds are normal. He exhibits no distension and no mass. There is no tenderness. There is no rebound and no guarding.   Musculoskeletal: Normal range of motion. He exhibits no edema or tenderness.        Left shoulder: He exhibits pain ("soreness"). He exhibits no tenderness.   Lymphadenopathy:     He has no cervical adenopathy.   Neurological: He is alert and oriented to person, place, and time. He has normal strength and normal reflexes. He displays normal reflexes. No cranial nerve deficit or sensory deficit.   Skin: Skin is warm and dry. No rash and no abscess noted. No erythema. No pallor.   Psychiatric: He has a normal mood and affect. His behavior is normal. Judgment and thought content normal.         ED Course   Procedures  Labs Reviewed   POCT GLUCOSE MONITORING CONTINUOUS             Medical Decision Making:   Differential Diagnosis:   Fracture, dislocation, pneumothorax  ED Management:  56-year-old male presenting for evaluation of left anterior chest wall pain secondary to falling onto his left shoulder and chest 3 days ago. Patient denies head injury or any other associated injuries or pain. Patient states that he also wants to have his glucose and vital signs checked. He is well-appearing and in no apparent distress. There is no left shoulder and left arm ecchymosis, deformity, TTP, or other abnormalities. There is left anterior chest wall TTP without ecchymosis, deformity, bony step-off, or other overt signs of trauma. Chest x-ray ordered. CBG is 222. Vital signs are within normal limits. Per nursing the patient eloped while I was in another room. Patient reportedly stated that he did not feel his symptoms constituted an " emergency and that he would follow-up as an outpatient. I was unable to discuss this decision with the patient prior to his leaving the ED.  Other:   I have discussed this case with another health care provider.       <> Summary of the Discussion: Case discussed with my attending physician Darron Garza M.D.            Mack Attestation:   Scribe #1: I performed the above scribed service and the documentation accurately describes the services I performed. I attest to the accuracy of the note.    Attending Attestation:     Physician Attestation Statement for NP/PA:   I discussed this assessment and plan of this patient with the NP/PA, but I did not personally examine the patient. The face to face encounter was performed by the NP/PA.        Physician Attestation for Scribe:  Physician Attestation Statement for Scribe #1: I, Roderick Tolliver NP, reviewed documentation, as scribed by Ronald Clinton in my presence, and it is both accurate and complete.                 ED Course      Clinical Impression:   The encounter diagnosis was Fall.    Disposition:   Disposition: Eloped  Condition: Stable                        Roderick Tolliver NP  09/11/17 0519       Darron Garza MD  09/11/17 4520

## 2017-09-11 NOTE — TELEPHONE ENCOUNTER
HCV LAB REVIEW  SVR 12    Pertinent labs:  HCV RNA: <12, detected    Results discussed with patient  As it is registering as detected, will do SVR 18 as well    Next labs due / pls schedule:  HCV RNA in 6 weeks: SVR 18: 10/09/17  HCV RNA in 12 weeks-SVR 24: 11/20/17  HCV RNA in 1 year: 08/28/18    HCC screening due 12/2017, recall entered

## 2017-09-13 ENCOUNTER — HOSPITAL ENCOUNTER (EMERGENCY)
Facility: OTHER | Age: 57
Discharge: HOME | End: 2017-09-13
Attending: EMERGENCY MEDICINE
Payer: MEDICAID

## 2017-09-13 VITALS
SYSTOLIC BLOOD PRESSURE: 147 MMHG | OXYGEN SATURATION: 97 % | TEMPERATURE: 98 F | HEART RATE: 81 BPM | DIASTOLIC BLOOD PRESSURE: 80 MMHG | HEIGHT: 69 IN | RESPIRATION RATE: 16 BRPM | WEIGHT: 235 LBS | BODY MASS INDEX: 34.8 KG/M2

## 2017-09-13 DIAGNOSIS — K59.00 CONSTIPATION, UNSPECIFIED CONSTIPATION TYPE: ICD-10-CM

## 2017-09-13 DIAGNOSIS — K21.9 GASTROESOPHAGEAL REFLUX DISEASE WITHOUT ESOPHAGITIS: Primary | ICD-10-CM

## 2017-09-13 DIAGNOSIS — R12 HEART BURN: ICD-10-CM

## 2017-09-13 LAB — POCT GLUCOSE: 264 MG/DL (ref 70–110)

## 2017-09-13 PROCEDURE — 99284 EMERGENCY DEPT VISIT MOD MDM: CPT

## 2017-09-13 PROCEDURE — 25000003 PHARM REV CODE 250: Performed by: EMERGENCY MEDICINE

## 2017-09-13 RX ORDER — FAMOTIDINE 20 MG/1
20 TABLET, FILM COATED ORAL
Status: COMPLETED | OUTPATIENT
Start: 2017-09-13 | End: 2017-09-13

## 2017-09-13 RX ADMIN — LIDOCAINE HYDROCHLORIDE: 20 SOLUTION ORAL; TOPICAL at 05:09

## 2017-09-13 RX ADMIN — FAMOTIDINE 20 MG: 20 TABLET ORAL at 05:09

## 2017-09-13 NOTE — ED TRIAGE NOTES
Patient states he has had heartburn since 2200 last night.  He denies n/v.    LOC: The patient is awake and alert; oriented x 3 and speaking appropriately.  APPEARANCE: Patient resting comfortably, patient is clean and well groomed  SKIN: warm and dry, normal skin turgor & moist mucus membranes, skin intact, no breakdown noted.  MUSCULOSKELETAL: Patient moving all extremities well, no obvious swelling or deformities noted  RESPIRATORY: Airway is open and patent, breath sounds clear throughout all lung fields; respirations are spontaneous, normal effort and rate  CARDIAC: Patient has a normal rate, no peripheral edema noted, capillary refill < 3 seconds; No complaints of chest pain   ABDOMEN: Soft and non tender to palpation, no distention noted. Bowel sounds present x 4

## 2017-09-13 NOTE — ED PROVIDER NOTES
Encounter Date: 9/13/2017       History     Chief Complaint   Patient presents with    Abdominal Pain     Pt presents to ED with c/o epigastric pain and burning, states he has reflux and needs a GI cocktail. No relief from home medication.     56 y.o. Male with past medical history of diabetes, hep C, and chronic back pain presents to ED c/o acute heartburn that has progressively worsened over the last 3-4 days. He reports taking omeprazole daily and began taking Mylanta, Pepto Bismol and Tums last night.  He states pain is worse with laying down to sleep and is improved during the day.  He denies heavy NSAID use, EtOH abuse, caffeine use states he avoids acidic/spicy foods.  He denies chest pain, shortness of breath, diaphoresis, nausea, vomiting, abdominal pain or back pain.      The history is provided by the patient.     Review of patient's allergies indicates:   Allergen Reactions    Codeine Rash     Other reaction(s): Unknown    Percocet [oxycodone-acetaminophen] Rash     Past Medical History:   Diagnosis Date    Arthritis     Back pain     Bulging lumbar disc     C. difficile diarrhea     Chronic back pain 10/14/2014    Diabetes mellitus     Diabetes mellitus type II     DM (diabetes mellitus), type 2, uncontrolled 3/12/2013    Hepatitis C 7/3/2013    MSSA (methicillin susceptible Staphylococcus aureus) septicemia     Neuromuscular disorder     Neuropathy     Staph aureus infection      Past Surgical History:   Procedure Laterality Date    APPENDECTOMY      JOINT REPLACEMENT      left knee surgery      left leg surgery      broken bone    LEG SURGERY  right     Right arm boil      Right great toe amputation      TOE AMPUTATION Right      Family History   Problem Relation Age of Onset    Arthritis Mother     Arthritis Sister     Diabetes Sister     Arthritis Brother      Social History   Substance Use Topics    Smoking status: Current Every Day Smoker     Packs/day: 0.50     Types:  "Cigarettes    Smokeless tobacco: Never Used    Alcohol use No     Review of Systems   Constitutional: Negative for chills and fever.   HENT: Negative.    Eyes: Negative.    Respiratory: Negative for cough, shortness of breath and stridor.    Cardiovascular: Negative for chest pain and palpitations.   Gastrointestinal: Positive for constipation (took enema yesterday with one BM "a good bit"). Negative for abdominal pain, blood in stool, diarrhea, nausea and vomiting.   Genitourinary: Negative for dysuria and hematuria.   Musculoskeletal: Negative.    Skin: Negative.    Neurological: Negative for dizziness and headaches.   Psychiatric/Behavioral: Negative.    All other systems reviewed and are negative.      Physical Exam     Initial Vitals [09/13/17 0445]   BP Pulse Resp Temp SpO2   139/74 74 16 97.7 °F (36.5 °C) 97 %      MAP       95.67         Physical Exam    Nursing note and vitals reviewed.  Constitutional: He appears well-developed and well-nourished. He is not diaphoretic. He is Obese . No distress.   HENT:   Head: Normocephalic and atraumatic.   Nose: Nose normal.   Eyes: Conjunctivae are normal. Pupils are equal, round, and reactive to light.   Neck: Normal range of motion. Neck supple.   Cardiovascular: Regular rhythm and intact distal pulses.   Pulmonary/Chest: No accessory muscle usage. No tachypnea. No respiratory distress.   Abdominal: Soft. Bowel sounds are normal. He exhibits no distension. There is no tenderness. There is no rigidity and no guarding.   Protuberant abdomen     Musculoskeletal: Normal range of motion. He exhibits no tenderness.   Neurological: He is alert and oriented to person, place, and time.   Skin: Skin is warm and intact.   Psychiatric: He has a normal mood and affect.         ED Course   Procedures  Labs Reviewed   POCT GLUCOSE - Abnormal; Notable for the following:        Result Value    POCT Glucose 264 (*)     All other components within normal limits     EKG Readings: " (Independently Interpreted)   Initial Reading: No STEMI. Rhythm: Normal Sinus Rhythm. Heart Rate: 71. Ectopy: No Ectopy. Conduction: Normal. ST Segments: Normal ST Segments. T Waves: Normal. Axis: Normal. Q Waves: III.          Medical Decision Making:   Differential Diagnosis:   GERD, Gastritis, PUD  Clinical Tests:   Radiological Study: Ordered and Reviewed                   ED Course as of Oct 04 0539   Wed Sep 13, 2017   0544 Symptoms improved with GI cocktail.   [DL]      ED Course User Index  [DL] Patti Carter MD     Labs Reviewed  Admission on 09/13/2017, Discharged on 09/13/2017   Component Date Value Ref Range Status    POCT Glucose 09/13/2017 264* 70 - 110 mg/dL Final        Medications given in ED    Medications   (pyxis) gi cocktail (mylanta 30 mL, lidocaine 2 % viscous 10 mL, dicyclomine 10 mL) 50 mL ( Oral Given 9/13/17 0520)   famotidine tablet 20 mg (20 mg Oral Given 9/13/17 0520)         Note was created using voice recognition software. Note may have occasional typographical errors that may not have been identified and edited despite good olga initial review prior to signing.    Clinical Impression:   The primary encounter diagnosis was Gastroesophageal reflux disease without esophagitis. Diagnoses of Heart burn and Constipation, unspecified constipation type were also pertinent to this visit.                           Patti Carter MD  10/04/17 0556

## 2017-09-15 ENCOUNTER — HOSPITAL ENCOUNTER (OUTPATIENT)
Dept: WOUND CARE | Facility: HOSPITAL | Age: 57
Discharge: HOME | End: 2017-09-15
Attending: PODIATRIST
Payer: MEDICAID

## 2017-09-15 DIAGNOSIS — L97.512 DIABETIC ULCER OF TOE OF RIGHT FOOT ASSOCIATED WITH TYPE 2 DIABETES MELLITUS, WITH FAT LAYER EXPOSED: Primary | ICD-10-CM

## 2017-09-15 DIAGNOSIS — Z89.411 STATUS POST AMPUTATION OF RIGHT GREAT TOE: ICD-10-CM

## 2017-09-15 DIAGNOSIS — E11.621 DIABETIC ULCER OF TOE OF RIGHT FOOT ASSOCIATED WITH TYPE 2 DIABETES MELLITUS, WITH FAT LAYER EXPOSED: Primary | ICD-10-CM

## 2017-09-15 DIAGNOSIS — M86.671 OTHER CHRONIC OSTEOMYELITIS OF RIGHT FOOT: ICD-10-CM

## 2017-09-15 PROCEDURE — 99213 OFFICE O/P EST LOW 20 MIN: CPT | Performed by: PODIATRIST

## 2017-09-15 PROCEDURE — 99213 OFFICE O/P EST LOW 20 MIN: CPT | Mod: ,,, | Performed by: PODIATRIST

## 2017-09-20 PROBLEM — E29.1 HYPOGONADISM IN MALE: Status: ACTIVE | Noted: 2017-09-20

## 2017-09-20 PROBLEM — F17.200 SMOKING: Status: ACTIVE | Noted: 2017-09-20

## 2017-09-20 PROBLEM — F11.20 UNCOMPLICATED OPIOID DEPENDENCE: Status: ACTIVE | Noted: 2017-09-20

## 2017-09-20 PROBLEM — E11.22 CKD STAGE 3 DUE TO TYPE 2 DIABETES MELLITUS: Status: ACTIVE | Noted: 2017-09-20

## 2017-09-20 PROBLEM — N18.30 CKD STAGE 3 DUE TO TYPE 2 DIABETES MELLITUS: Status: ACTIVE | Noted: 2017-09-20

## 2017-09-20 PROBLEM — G89.4 CHRONIC PAIN SYNDROME: Status: ACTIVE | Noted: 2017-09-20

## 2017-10-02 ENCOUNTER — HOSPITAL ENCOUNTER (EMERGENCY)
Facility: OTHER | Age: 57
Discharge: HOME | End: 2017-10-02
Attending: EMERGENCY MEDICINE
Payer: MEDICAID

## 2017-10-02 VITALS
TEMPERATURE: 98 F | BODY MASS INDEX: 33.33 KG/M2 | RESPIRATION RATE: 16 BRPM | HEART RATE: 69 BPM | OXYGEN SATURATION: 97 % | DIASTOLIC BLOOD PRESSURE: 70 MMHG | HEIGHT: 69 IN | WEIGHT: 225 LBS | SYSTOLIC BLOOD PRESSURE: 155 MMHG

## 2017-10-02 DIAGNOSIS — K21.9 GASTROESOPHAGEAL REFLUX DISEASE, ESOPHAGITIS PRESENCE NOT SPECIFIED: ICD-10-CM

## 2017-10-02 DIAGNOSIS — R12 HEARTBURN: Primary | ICD-10-CM

## 2017-10-02 LAB — POCT GLUCOSE: 223 MG/DL (ref 70–110)

## 2017-10-02 PROCEDURE — 25000003 PHARM REV CODE 250: Performed by: EMERGENCY MEDICINE

## 2017-10-02 PROCEDURE — 99283 EMERGENCY DEPT VISIT LOW MDM: CPT

## 2017-10-02 RX ORDER — ESOMEPRAZOLE MAGNESIUM 40 MG/1
40 CAPSULE, DELAYED RELEASE ORAL DAILY
Qty: 30 CAPSULE | Refills: 0 | Status: SHIPPED | OUTPATIENT
Start: 2017-10-02 | End: 2018-02-18

## 2017-10-02 RX ADMIN — LIDOCAINE HYDROCHLORIDE: 20 SOLUTION ORAL; TOPICAL at 02:10

## 2017-10-02 NOTE — DISCHARGE INSTRUCTIONS
Wait at least 30 minutes after eating before lying down.  Drink plenty of fluids.  Return for any new or acute problems or concerns.

## 2017-10-02 NOTE — ED PROVIDER NOTES
"Encounter Date: 10/2/2017       History     Chief Complaint   Patient presents with    Fever     Pt states," I am feeling bad and I though I had fever this morning."     Mr Cifuentes reports he felt fine last night and got up around midnight and went to [employer] and got an egg wrap, ate it then went back to bed. He awoke a few hours with 'heartburn' that he states he can't shake. Reports he has many episodes of same over the past several months and has been seen at [facility] er and put on protonix. States he wants to take nexium instead b/c it has helped better in the past. He reports he feels burning in his epigastrium and chest and in the back of this throat that always happens after he eats late at night and he got chills which sometimes happens. No sob, monroy, documented fevers. Is requesting referral to GI.       The history is provided by the patient.     Review of patient's allergies indicates:   Allergen Reactions    Codeine Rash     Other reaction(s): Unknown    Percocet [oxycodone-acetaminophen] Rash     Past Medical History:   Diagnosis Date    Arthritis     Back pain     Bulging lumbar disc     C. difficile diarrhea     Chronic back pain 10/14/2014    Diabetes mellitus     Diabetes mellitus type II     DM (diabetes mellitus), type 2, uncontrolled 3/12/2013    Hepatitis C 7/3/2013    MSSA (methicillin susceptible Staphylococcus aureus) septicemia     Neuromuscular disorder     Neuropathy     Staph aureus infection      Past Surgical History:   Procedure Laterality Date    APPENDECTOMY      JOINT REPLACEMENT      left knee surgery      left leg surgery      broken bone    LEG SURGERY  right     Right arm boil      Right great toe amputation      TOE AMPUTATION Right      Family History   Problem Relation Age of Onset    Arthritis Mother     Arthritis Sister     Diabetes Sister     Arthritis Brother      Social History   Substance Use Topics    Smoking status: Current Every Day Smoker     " Packs/day: 0.50     Types: Cigarettes    Smokeless tobacco: Never Used    Alcohol use No     Review of Systems   Cardiovascular:        Positive chest and back of throat burning   Gastrointestinal: Negative for constipation, diarrhea and vomiting.        Positive epigastric burning   All other systems reviewed and are negative.      Physical Exam     Initial Vitals [10/02/17 1334]   BP Pulse Resp Temp SpO2   (!) 155/70 69 16 98.1 °F (36.7 °C) 97 %      MAP       98.33         Physical Exam    Nursing note and vitals reviewed.  Constitutional: He appears well-developed and well-nourished. He is not diaphoretic. No distress.   HENT:   Head: Normocephalic and atraumatic.   Eyes: EOM are normal. Pupils are equal, round, and reactive to light.   Neck: Normal range of motion. Neck supple.   Cardiovascular: Normal rate, regular rhythm and normal heart sounds.   No murmur heard.  Pulmonary/Chest: Breath sounds normal. No respiratory distress. He has no wheezes. He has no rhonchi. He has no rales. He exhibits no tenderness.   Abdominal: Soft. He exhibits no distension. There is no tenderness. There is no rebound and no guarding.   Musculoskeletal: Normal range of motion. He exhibits no edema or tenderness.   Neurological: He is alert and oriented to person, place, and time. He has normal strength.   Skin: Skin is warm and dry. Capillary refill takes less than 2 seconds. No rash noted. No erythema.   Psychiatric: He has a normal mood and affect. His behavior is normal. Thought content normal.         ED Course   Procedures  Labs Reviewed   POCT GLUCOSE - Abnormal; Notable for the following:        Result Value    POCT Glucose 223 (*)     All other components within normal limits             Medical Decision Making:   ED Management:  GI cocktail has resolvedMr Amedgreta's pain.  He wants to go home.  He has requested Nexium prescription because that helped him more in the past than he feels the Protonix is.  He is stable for  discharge.  He is requesting referral to GI if this is an ongoing issue for several months now. We spoke extensively about altering his routine habits of eating late then immediately going back to sleep and we discussed worrisome signs that should prompt need to return to the er should they occur. There is no indication for further emergent intervention or evaluation at this time.                      ED Course      Clinical Impression:   The primary encounter diagnosis was Heartburn. A diagnosis of Gastroesophageal reflux disease, esophagitis presence not specified was also pertinent to this visit.                           Casper Tian MD  10/03/17 4766

## 2017-10-06 ENCOUNTER — HOSPITAL ENCOUNTER (OUTPATIENT)
Dept: WOUND CARE | Facility: HOSPITAL | Age: 57
Discharge: HOME | End: 2017-10-06
Attending: PODIATRIST
Payer: MEDICAID

## 2017-10-06 DIAGNOSIS — M86.671 OTHER CHRONIC OSTEOMYELITIS OF RIGHT FOOT: ICD-10-CM

## 2017-10-06 DIAGNOSIS — Z89.411 STATUS POST AMPUTATION OF RIGHT GREAT TOE: Primary | ICD-10-CM

## 2017-10-06 PROCEDURE — 99212 OFFICE O/P EST SF 10 MIN: CPT | Performed by: PODIATRIST

## 2017-10-06 PROCEDURE — 99213 OFFICE O/P EST LOW 20 MIN: CPT | Mod: ,,, | Performed by: PODIATRIST

## 2017-10-09 ENCOUNTER — EPISODE CHANGES (OUTPATIENT)
Dept: HEPATOLOGY | Facility: CLINIC | Age: 57
End: 2017-10-09

## 2017-10-10 ENCOUNTER — EPISODE CHANGES (OUTPATIENT)
Dept: HEPATOLOGY | Facility: CLINIC | Age: 57
End: 2017-10-10

## 2017-10-10 ENCOUNTER — LAB VISIT (OUTPATIENT)
Dept: LAB | Facility: HOSPITAL | Age: 57
End: 2017-10-10
Attending: PHYSICIAN ASSISTANT
Payer: MEDICAID

## 2017-10-10 DIAGNOSIS — Z86.19 HISTORY OF HEPATITIS C: ICD-10-CM

## 2017-10-10 PROCEDURE — 36415 COLL VENOUS BLD VENIPUNCTURE: CPT

## 2017-10-10 PROCEDURE — 87522 HEPATITIS C REVRS TRNSCRPJ: CPT

## 2017-10-12 ENCOUNTER — TELEPHONE (OUTPATIENT)
Dept: HEPATOLOGY | Facility: CLINIC | Age: 57
End: 2017-10-12

## 2017-10-12 LAB
HCV LOG: <1.08 LOG (10) IU/ML
HCV RNA QUANT PCR: <12 IU/ML
HCV, QUALITATIVE: NOT DETECTED IU/ML

## 2017-10-12 NOTE — TELEPHONE ENCOUNTER
HCV LAB REVIEW  SVR 18    Pertinent labs:  HCV RNA: <12, detected    Please let him know HCV was again not detected as expected.     Next labs due / pls schedule:  HCV RNA in 12 weeks-SVR 24: 11/20/17  HCV RNA in 1 year: 08/28/18    HCC screening due 12/2017, recall entered

## 2017-10-16 PROBLEM — Z09 HOSPITAL DISCHARGE FOLLOW-UP: Status: RESOLVED | Noted: 2017-07-12 | Resolved: 2017-10-16

## 2018-01-09 ENCOUNTER — TELEPHONE (OUTPATIENT)
Dept: HEPATOLOGY | Facility: CLINIC | Age: 58
End: 2018-01-09

## 2018-01-09 NOTE — TELEPHONE ENCOUNTER
Scheduled his labs and ultrasound with pt today and mailed out appts to the pt in the mail today also.

## 2018-01-09 NOTE — TELEPHONE ENCOUNTER
He is overdue for HCC screening, please reschedule. If unable to reach, please mail letter    Thanks

## 2018-02-07 ENCOUNTER — EPISODE CHANGES (OUTPATIENT)
Dept: HEPATOLOGY | Facility: CLINIC | Age: 58
End: 2018-02-07

## 2018-02-18 ENCOUNTER — HOSPITAL ENCOUNTER (EMERGENCY)
Facility: OTHER | Age: 58
Discharge: HOME | End: 2018-02-18
Attending: EMERGENCY MEDICINE
Payer: MEDICAID

## 2018-02-18 ENCOUNTER — HOSPITAL ENCOUNTER (INPATIENT)
Facility: HOSPITAL | Age: 58
LOS: 4 days | Discharge: HOME HEALTH | DRG: 617 | End: 2018-02-22
Attending: EMERGENCY MEDICINE | Admitting: INTERNAL MEDICINE
Payer: MEDICAID

## 2018-02-18 VITALS
HEIGHT: 69 IN | TEMPERATURE: 98 F | OXYGEN SATURATION: 96 % | WEIGHT: 230 LBS | DIASTOLIC BLOOD PRESSURE: 64 MMHG | BODY MASS INDEX: 34.07 KG/M2 | SYSTOLIC BLOOD PRESSURE: 123 MMHG | HEART RATE: 78 BPM | RESPIRATION RATE: 20 BRPM

## 2018-02-18 DIAGNOSIS — T14.8XXA WOUND INFECTION: ICD-10-CM

## 2018-02-18 DIAGNOSIS — M54.42 LOW BACK PAIN DUE TO BILATERAL SCIATICA: Primary | ICD-10-CM

## 2018-02-18 DIAGNOSIS — I96 GANGRENE OF TOE OF LEFT FOOT: ICD-10-CM

## 2018-02-18 DIAGNOSIS — G89.29 CHRONIC BILATERAL LOW BACK PAIN WITHOUT SCIATICA: ICD-10-CM

## 2018-02-18 DIAGNOSIS — L08.9 DIABETIC FOOT INFECTION: Primary | ICD-10-CM

## 2018-02-18 DIAGNOSIS — M54.50 CHRONIC BILATERAL LOW BACK PAIN WITHOUT SCIATICA: ICD-10-CM

## 2018-02-18 DIAGNOSIS — R52 PAIN: ICD-10-CM

## 2018-02-18 DIAGNOSIS — M54.41 LOW BACK PAIN DUE TO BILATERAL SCIATICA: Primary | ICD-10-CM

## 2018-02-18 DIAGNOSIS — E11.628 DIABETIC FOOT INFECTION: Primary | ICD-10-CM

## 2018-02-18 DIAGNOSIS — I96 GANGRENE: ICD-10-CM

## 2018-02-18 DIAGNOSIS — L08.9 WOUND INFECTION: ICD-10-CM

## 2018-02-18 LAB
ALBUMIN SERPL-MCNC: 3 G/DL (ref 3.3–5.5)
ALP SERPL-CCNC: 74 U/L (ref 42–141)
BILIRUB SERPL-MCNC: 0.4 MG/DL (ref 0.2–1.6)
BUN SERPL-MCNC: 22 MG/DL (ref 7–22)
CALCIUM SERPL-MCNC: 9 MG/DL (ref 8–10.3)
CHLORIDE SERPL-SCNC: 99 MMOL/L (ref 98–108)
CREAT SERPL-MCNC: 1.1 MG/DL (ref 0.6–1.2)
CRP SERPL-MCNC: 27.2 MG/L
ERYTHROCYTE [SEDIMENTATION RATE] IN BLOOD BY WESTERGREN METHOD: 38 MM/HR
GLUCOSE SERPL-MCNC: 245 MG/DL (ref 70–110)
GLUCOSE SERPL-MCNC: 245 MG/DL (ref 73–118)
POC ALT (SGPT): 24 U/L (ref 10–47)
POC AST (SGOT): 18 U/L (ref 11–38)
POC TCO2: 25 MMOL/L (ref 18–33)
POCT GLUCOSE: 237 MG/DL (ref 70–110)
POCT GLUCOSE: 249 MG/DL (ref 70–110)
POTASSIUM BLD-SCNC: 4.3 MMOL/L (ref 3.6–5.1)
PROTEIN, POC: 7.7 G/DL (ref 6.4–8.1)
SODIUM BLD-SCNC: 137 MMOL/L (ref 128–145)

## 2018-02-18 PROCEDURE — 85651 RBC SED RATE NONAUTOMATED: CPT

## 2018-02-18 PROCEDURE — 86140 C-REACTIVE PROTEIN: CPT

## 2018-02-18 PROCEDURE — 96372 THER/PROPH/DIAG INJ SC/IM: CPT

## 2018-02-18 PROCEDURE — 63600175 PHARM REV CODE 636 W HCPCS: Performed by: EMERGENCY MEDICINE

## 2018-02-18 PROCEDURE — 87040 BLOOD CULTURE FOR BACTERIA: CPT | Mod: 59

## 2018-02-18 PROCEDURE — 25000003 PHARM REV CODE 250: Performed by: EMERGENCY MEDICINE

## 2018-02-18 PROCEDURE — 99284 EMERGENCY DEPT VISIT MOD MDM: CPT | Mod: 27

## 2018-02-18 PROCEDURE — S4991 NICOTINE PATCH NONLEGEND: HCPCS

## 2018-02-18 PROCEDURE — 85025 COMPLETE CBC W/AUTO DIFF WBC: CPT

## 2018-02-18 PROCEDURE — 82962 GLUCOSE BLOOD TEST: CPT

## 2018-02-18 PROCEDURE — 25000003 PHARM REV CODE 250

## 2018-02-18 PROCEDURE — 80053 COMPREHEN METABOLIC PANEL: CPT

## 2018-02-18 PROCEDURE — 99284 EMERGENCY DEPT VISIT MOD MDM: CPT | Mod: 25

## 2018-02-18 PROCEDURE — 11000001 HC ACUTE MED/SURG PRIVATE ROOM

## 2018-02-18 RX ORDER — OXYCODONE AND ACETAMINOPHEN 5; 325 MG/1; MG/1
1 TABLET ORAL
Status: COMPLETED | OUTPATIENT
Start: 2018-02-18 | End: 2018-02-18

## 2018-02-18 RX ORDER — TRAZODONE HYDROCHLORIDE 50 MG/1
100 TABLET ORAL NIGHTLY
Status: DISCONTINUED | OUTPATIENT
Start: 2018-02-18 | End: 2018-02-22 | Stop reason: HOSPADM

## 2018-02-18 RX ORDER — OXYCODONE AND ACETAMINOPHEN 10; 325 MG/1; MG/1
1 TABLET ORAL EVERY 8 HOURS PRN
Status: DISCONTINUED | OUTPATIENT
Start: 2018-02-18 | End: 2018-02-18

## 2018-02-18 RX ORDER — INSULIN ASPART 100 [IU]/ML
1-10 INJECTION, SOLUTION INTRAVENOUS; SUBCUTANEOUS
Status: DISCONTINUED | OUTPATIENT
Start: 2018-02-18 | End: 2018-02-22 | Stop reason: HOSPADM

## 2018-02-18 RX ORDER — IBUPROFEN 200 MG
1 TABLET ORAL DAILY
Status: DISCONTINUED | OUTPATIENT
Start: 2018-02-18 | End: 2018-02-22 | Stop reason: HOSPADM

## 2018-02-18 RX ORDER — ZOLPIDEM TARTRATE 5 MG/1
5 TABLET ORAL NIGHTLY PRN
Status: DISCONTINUED | OUTPATIENT
Start: 2018-02-18 | End: 2018-02-18

## 2018-02-18 RX ORDER — IBUPROFEN 200 MG
24 TABLET ORAL
Status: DISCONTINUED | OUTPATIENT
Start: 2018-02-18 | End: 2018-02-22 | Stop reason: HOSPADM

## 2018-02-18 RX ORDER — KETOROLAC TROMETHAMINE 30 MG/ML
INJECTION, SOLUTION INTRAMUSCULAR; INTRAVENOUS
Status: DISPENSED
Start: 2018-02-18 | End: 2018-02-18

## 2018-02-18 RX ORDER — OXYCODONE AND ACETAMINOPHEN 10; 325 MG/1; MG/1
1 TABLET ORAL EVERY 6 HOURS PRN
Status: DISCONTINUED | OUTPATIENT
Start: 2018-02-18 | End: 2018-02-22 | Stop reason: HOSPADM

## 2018-02-18 RX ORDER — CLOPIDOGREL BISULFATE 75 MG/1
75 TABLET ORAL DAILY
Status: DISCONTINUED | OUTPATIENT
Start: 2018-02-18 | End: 2018-02-22 | Stop reason: HOSPADM

## 2018-02-18 RX ORDER — PANTOPRAZOLE SODIUM 40 MG/1
40 TABLET, DELAYED RELEASE ORAL DAILY
Status: DISCONTINUED | OUTPATIENT
Start: 2018-02-18 | End: 2018-02-22 | Stop reason: HOSPADM

## 2018-02-18 RX ORDER — SODIUM CHLORIDE 9 MG/ML
INJECTION, SOLUTION INTRAVENOUS CONTINUOUS
Status: DISCONTINUED | OUTPATIENT
Start: 2018-02-18 | End: 2018-02-22 | Stop reason: HOSPADM

## 2018-02-18 RX ORDER — CIPROFLOXACIN 500 MG/1
500 TABLET ORAL 2 TIMES DAILY
Qty: 20 TABLET | Refills: 0 | Status: ON HOLD | OUTPATIENT
Start: 2018-02-18 | End: 2018-02-22 | Stop reason: HOSPADM

## 2018-02-18 RX ORDER — KETOROLAC TROMETHAMINE 30 MG/ML
60 INJECTION, SOLUTION INTRAMUSCULAR; INTRAVENOUS
Status: COMPLETED | OUTPATIENT
Start: 2018-02-18 | End: 2018-02-18

## 2018-02-18 RX ORDER — LISINOPRIL 5 MG/1
5 TABLET ORAL DAILY
Status: DISCONTINUED | OUTPATIENT
Start: 2018-02-18 | End: 2018-02-22 | Stop reason: HOSPADM

## 2018-02-18 RX ORDER — IBUPROFEN 200 MG
16 TABLET ORAL
Status: DISCONTINUED | OUTPATIENT
Start: 2018-02-18 | End: 2018-02-22 | Stop reason: HOSPADM

## 2018-02-18 RX ORDER — ATORVASTATIN CALCIUM 40 MG/1
80 TABLET, FILM COATED ORAL DAILY
Status: DISCONTINUED | OUTPATIENT
Start: 2018-02-18 | End: 2018-02-22 | Stop reason: HOSPADM

## 2018-02-18 RX ORDER — IBUPROFEN 200 MG
1 TABLET ORAL DAILY
Status: DISCONTINUED | OUTPATIENT
Start: 2018-02-19 | End: 2018-02-18

## 2018-02-18 RX ORDER — GLUCAGON 1 MG
1 KIT INJECTION
Status: DISCONTINUED | OUTPATIENT
Start: 2018-02-18 | End: 2018-02-22 | Stop reason: HOSPADM

## 2018-02-18 RX ADMIN — TRAZODONE HYDROCHLORIDE 100 MG: 50 TABLET ORAL at 09:02

## 2018-02-18 RX ADMIN — SODIUM CHLORIDE: 0.9 INJECTION, SOLUTION INTRAVENOUS at 04:02

## 2018-02-18 RX ADMIN — VANCOMYCIN HYDROCHLORIDE 1250 MG: 1 INJECTION, POWDER, LYOPHILIZED, FOR SOLUTION INTRAVENOUS at 04:02

## 2018-02-18 RX ADMIN — OXYCODONE HYDROCHLORIDE AND ACETAMINOPHEN 1 TABLET: 10; 325 TABLET ORAL at 04:02

## 2018-02-18 RX ADMIN — PANTOPRAZOLE SODIUM 40 MG: 40 TABLET, DELAYED RELEASE ORAL at 04:02

## 2018-02-18 RX ADMIN — KETOROLAC TROMETHAMINE 60 MG: 30 INJECTION, SOLUTION INTRAMUSCULAR at 05:02

## 2018-02-18 RX ADMIN — OXYCODONE HYDROCHLORIDE AND ACETAMINOPHEN 1 TABLET: 10; 325 TABLET ORAL at 09:02

## 2018-02-18 RX ADMIN — NICOTINE 1 PATCH: 21 PATCH, EXTENDED RELEASE TRANSDERMAL at 04:02

## 2018-02-18 RX ADMIN — INSULIN ASPART 2 UNITS: 100 INJECTION, SOLUTION INTRAVENOUS; SUBCUTANEOUS at 09:02

## 2018-02-18 RX ADMIN — LISINOPRIL 5 MG: 5 TABLET ORAL at 04:02

## 2018-02-18 RX ADMIN — INSULIN ASPART 4 UNITS: 100 INJECTION, SOLUTION INTRAVENOUS; SUBCUTANEOUS at 05:02

## 2018-02-18 RX ADMIN — OXYCODONE AND ACETAMINOPHEN 1 TABLET: 5; 325 TABLET ORAL at 08:02

## 2018-02-18 NOTE — ED PROVIDER NOTES
"Encounter Date: 2/18/2018    SCRIBE #1 NOTE: I, Quentin Bone, am scribing for, and in the presence of,  David Mccall MD. I have scribed the following portions of the note - Other sections scribed: HPI and ROS.       History     Chief Complaint   Patient presents with    Back Pain     all across lower back to tailbone.  chronic but been "acting up lately" x 3 days.    Wound Check     diabetic foot ulcer to left 2nd toe.  eschar noted.     CC: Back Pain/Wound Check    HPI: Patient is a 57 y.o. male with history of DM and bulging lumbar disc who presents to ED c/o acute-on-chronic lower bilateral back pain. Patient reports that he is out of pain medication. Patient additionally c/o a wound to his left second toe. He detected a blister on the toe last month, which he attributed to his shoes fitting poorly. He is concerned he might lose the toe secondary to its blackened appearance. Patient initially presented to [facility] Emergency Department today, who wanted to admit him. He had to take care of a personal matter before presenting here. No further complaints.      The history is provided by the patient. No  was used.     Review of patient's allergies indicates:   Allergen Reactions    Codeine Rash     Other reaction(s): Unknown    Vicodin [hydrocodone-acetaminophen] Rash     Past Medical History:   Diagnosis Date    Arthritis     Back pain     Bulging lumbar disc     C. difficile diarrhea     Chronic back pain 10/14/2014    Diabetes mellitus     Diabetes mellitus type II     DM (diabetes mellitus), type 2, uncontrolled 3/12/2013    Hepatitis C 7/3/2013    MSSA (methicillin susceptible Staphylococcus aureus) septicemia     Neuromuscular disorder     Neuropathy     Staph aureus infection      Past Surgical History:   Procedure Laterality Date    APPENDECTOMY      JOINT REPLACEMENT      left knee surgery      left leg surgery      broken bone    LEG SURGERY  right     Right " arm boil      Right great toe amputation      TOE AMPUTATION Right      Family History   Problem Relation Age of Onset    Arthritis Mother     Arthritis Sister     Diabetes Sister     Arthritis Brother      Social History   Substance Use Topics    Smoking status: Current Every Day Smoker     Packs/day: 0.50     Types: Cigarettes    Smokeless tobacco: Never Used    Alcohol use No     Review of Systems   Constitutional: Negative for diaphoresis and fever.   HENT: Negative for sore throat.    Respiratory: Negative for shortness of breath.    Cardiovascular: Negative for chest pain.   Gastrointestinal: Negative for nausea.   Genitourinary: Negative for dysuria.   Musculoskeletal: Positive for back pain (lower bilateral).   Skin: Positive for wound (left second toe). Negative for rash.   Neurological: Negative for weakness.   Hematological: Does not bruise/bleed easily.       Physical Exam     Initial Vitals [02/18/18 1312]   BP Pulse Resp Temp SpO2   117/80 92 18 98.2 °F (36.8 °C) 98 %      MAP       92.33         Physical Exam    Nursing note and vitals reviewed.  Constitutional: No distress.   HENT:   Head: Atraumatic.   Eyes: Conjunctivae and EOM are normal.   Neck: Normal range of motion.   Cardiovascular: Exam reveals no gallop and no friction rub.    No murmur heard.  Pulses in foot are dopplerable   Pulmonary/Chest: Breath sounds normal. No respiratory distress. He has no wheezes. He has no rales.   Abdominal: Soft. Bowel sounds are normal. He exhibits no distension. There is no tenderness.   Musculoskeletal: Normal range of motion. He exhibits no edema.   Neurological: He is alert and oriented to person, place, and time.   Skin: Skin is warm and dry.   Left second toe is dusky.  Wound on dorsal side wraps between 2nd/3rd toe.  ? Gangrene.   Psychiatric: He has a normal mood and affect.         ED Course   Procedures  Labs Reviewed - No data to display          Medical Decision Making:   Initial  Assessment:   56 yo male with a history of diabetes, medication noncompliance presents with left second toe infection.  Patient was seen at [facility] alone ER earlier today and was recommended that he be admitted for IV antibiotics and surgical consult for suspected gangrene.  He left AMA.  Patient says that his primary care doctor wanted him admitted earlier in the month for the same reason however he declined at that time as well.  Today he has a wound to the left second toe.  His toe is dusky.  There is no purulent drainage.  There is ? gangrene of the toe.  Labs performed earlier today showed no evidence of sepsis. Concern for failed outpatient treatment. I discussed case with his primary care doctor on call who like patient admitted for IV antibiotics and consult.  Patient is amenable.              Scribe Attestation:   Scribe #1: I performed the above scribed service and the documentation accurately describes the services I performed. I attest to the accuracy of the note.    Attending Attestation:           Physician Attestation for Scribe:  Physician Attestation Statement for Scribe #1: I, David Mccall MD, reviewed documentation, as scribed by Quentin Bone in my presence, and it is both accurate and complete.                    Clinical Impression:   The encounter diagnosis was Diabetic foot infection.                           David Mccall MD  02/18/18 9729       David Mccall MD  02/18/18 9686

## 2018-02-18 NOTE — NURSING
Pt arrived to room. Pt is AAOx4. Respirations are even and nonlabored. Pt complains of back pain. Pt oriented to room and call system. Safety maintained. Will continue to monitor.

## 2018-02-18 NOTE — ED TRIAGE NOTES
Pt arrived to the ED via personal transport with c/o of lower back pain and for a wound check of the left 2nd toe. Pt states the back pain is chronic

## 2018-02-18 NOTE — ED PROVIDER NOTES
Encounter Date: 2/18/2018       History     Chief Complaint   Patient presents with    Back Pain     pt c/o back pain x 3 days , pt states that he is currently out of narcotic medication oxycodone, pt also c/o toe pain and is currently ambulation with cane, pt has H/O diabeties     57 y.o. Male with multiple medical problems including diabetes, chronic back pain and others presents to the emergency department complaining of acute on chronic bilateral lower back pain that radiates to bilateral lower legs.  Patient states the pain began to flare up 3 days ago.  He reports taking oxycodone for pain but states he ran out since his doctor wrote a prescription for 90 tablets this month rather than 120.  Patient states he is also taking Lyrica.  He also reports also to the top of the left second toe that has been present since January 25th.  He states he's been prescribed Bactrim for dizziness and has been referred to wound care.  He states there was a blister on his toe which ruptured recently and he states the wound began to look black 3 days ago.  He denies fever, vomiting or focal weakness other than RLE weakness which is chronic.  Requesting refill for Percocet.          Review of patient's allergies indicates:   Allergen Reactions    Codeine Rash     Other reaction(s): Unknown    Percocet [oxycodone-acetaminophen] Rash     Past Medical History:   Diagnosis Date    Arthritis     Back pain     Bulging lumbar disc     C. difficile diarrhea     Chronic back pain 10/14/2014    Diabetes mellitus     Diabetes mellitus type II     DM (diabetes mellitus), type 2, uncontrolled 3/12/2013    Hepatitis C 7/3/2013    MSSA (methicillin susceptible Staphylococcus aureus) septicemia     Neuromuscular disorder     Neuropathy     Staph aureus infection      Past Surgical History:   Procedure Laterality Date    APPENDECTOMY      JOINT REPLACEMENT      left knee surgery      left leg surgery      broken bone    LEG  SURGERY  right     Right arm boil      Right great toe amputation      TOE AMPUTATION Right      Family History   Problem Relation Age of Onset    Arthritis Mother     Arthritis Sister     Diabetes Sister     Arthritis Brother      Social History   Substance Use Topics    Smoking status: Current Every Day Smoker     Packs/day: 0.50     Types: Cigarettes    Smokeless tobacco: Never Used    Alcohol use No     Review of Systems   Constitutional: Negative for fever.   HENT: Negative.    Eyes: Negative.    Respiratory: Negative.    Cardiovascular: Negative.    Gastrointestinal: Negative for abdominal pain, nausea and vomiting.   Musculoskeletal: Positive for back pain, gait problem (RLE weakness at baseline, uses cane) and joint swelling.   Skin: Positive for wound.       Physical Exam     Initial Vitals [02/18/18 0517]   BP Pulse Resp Temp SpO2   122/74 97 18 97.9 °F (36.6 °C) 100 %      MAP       90         Physical Exam    Constitutional: He appears well-developed and well-nourished. He is not diaphoretic. He is cooperative.  Non-toxic appearance. He does not appear ill. No distress.   HENT:   Head: Normocephalic and atraumatic.   Eyes: Pupils are equal, round, and reactive to light.   Neck: Normal range of motion. Neck supple.   Cardiovascular: Normal rate.   Pulmonary/Chest: No respiratory distress.   Abdominal: He exhibits distension. There is no tenderness.   Musculoskeletal: He exhibits edema and tenderness.        Feet:    Neurological: He is alert and oriented to person, place, and time. GCS eye subscore is 4. GCS verbal subscore is 5. GCS motor subscore is 6.   Skin: There is erythema.         ED Course   Procedures  Labs Reviewed   POCT GLUCOSE - Abnormal; Notable for the following:        Result Value    POC Glucose 245 (*)     All other components within normal limits   CULTURE, BLOOD   CULTURE, BLOOD   C-REACTIVE PROTEIN   SEDIMENTATION RATE, MANUAL   POCT CBC   POCT CMP             Medical  Decision Making:   Clinical Tests:   Lab Tests: Ordered  Radiological Study: Ordered and Reviewed  ED Management:  Patient with diabetic toe ulcer that now appears gangrenous.  Patient is afebrile.  Admission for IV antibiotics and surgery consult for debridement has been recommended however patient adamant about being discharged.  He states he must go home and take care of his dog.  He states he has an appointment with his primary care doctor and wound care on Monday.  He has been advised of the risk of delayed treatment which includes loss of limb, severe sepsis and death.  Patient expresses understanding and states he does not want to be admitted.      Patient already decided to AMA.  May arrived.  Percocet ordered by me based on previous conversations with previous physician and patient.  I spoke extensively with Anusha Gonzalez.  I adamantly recommended admission and he adamantly refused.  I told him he was welcome to come back at any point.  I told him he would likely lose his toe if he did not get IV antibiotics in.  He voiced good understanding.  His brother-in-law was in the room for the entirety of my conversation with him.  I told him I would give him a short course prescription for Percocet, but he left the ER prior to my writing the prescription.                 Labs Reviewed  Admission on 02/18/2018   Component Date Value Ref Range Status    POC Glucose 02/18/2018 245* 70 - 110 mg/dL Final        Imaging Reviewed    Imaging Results          X-Ray Chest PA And Lateral (In process)                X-Ray Foot Complete Left (Final result)  Result time 02/18/18 05:36:42    Final result by Mando Asher MD (02/18/18 05:36:42)                 Impression:        No acute fractures or osseous destruction.      Electronically signed by: MANDO ASHER MD  Date:     02/18/18  Time:    05:36              Narrative:    Technique: AP, lateral and oblique views of the left foot.    Comparison:  None.    Findings:    No acute fracture.  No osseous destruction.  Joint spaces are congruent.  There are prominent vascular calcifications.  No radiopaque foreign bodies.                              Medications given in ED    Medications   ketorolac injection 60 mg (60 mg Intramuscular Given 2/18/18 1495)         Note was created using voice recognition software. Note may have occasional typographical errors that may not have been identified and edited despite good olga initial review prior to signing.          Clinical Impression:   The primary encounter diagnosis was Low back pain due to bilateral sciatica. Diagnoses of Wound infection, Pain, Gangrene, and Gangrene of toe of left foot were also pertinent to this visit.                           Casper Tian MD  02/18/18 8093

## 2018-02-19 LAB
ALBUMIN SERPL BCP-MCNC: 3 G/DL
ALP SERPL-CCNC: 79 U/L
ALT SERPL W/O P-5'-P-CCNC: 19 U/L
ANION GAP SERPL CALC-SCNC: 6 MMOL/L
AST SERPL-CCNC: 13 U/L
BASOPHILS # BLD AUTO: 0.03 K/UL
BASOPHILS NFR BLD: 0.5 %
BILIRUB SERPL-MCNC: 0.2 MG/DL
BUN SERPL-MCNC: 28 MG/DL
CALCIUM SERPL-MCNC: 9.3 MG/DL
CHLORIDE SERPL-SCNC: 105 MMOL/L
CO2 SERPL-SCNC: 24 MMOL/L
CREAT SERPL-MCNC: 1.2 MG/DL
DIFFERENTIAL METHOD: ABNORMAL
EOSINOPHIL # BLD AUTO: 0.2 K/UL
EOSINOPHIL NFR BLD: 3.3 %
ERYTHROCYTE [DISTWIDTH] IN BLOOD BY AUTOMATED COUNT: 14.7 %
EST. GFR  (AFRICAN AMERICAN): >60 ML/MIN/1.73 M^2
EST. GFR  (NON AFRICAN AMERICAN): >60 ML/MIN/1.73 M^2
GLUCOSE SERPL-MCNC: 246 MG/DL
HCT VFR BLD AUTO: 39.9 %
HGB BLD-MCNC: 13.2 G/DL
LYMPHOCYTES # BLD AUTO: 1.6 K/UL
LYMPHOCYTES NFR BLD: 27.5 %
MCH RBC QN AUTO: 29 PG
MCHC RBC AUTO-ENTMCNC: 33.1 G/DL
MCV RBC AUTO: 88 FL
MONOCYTES # BLD AUTO: 0.4 K/UL
MONOCYTES NFR BLD: 7.1 %
NEUTROPHILS # BLD AUTO: 3.5 K/UL
NEUTROPHILS NFR BLD: 61.6 %
PLATELET # BLD AUTO: 161 K/UL
PMV BLD AUTO: 11.1 FL
POCT GLUCOSE: 134 MG/DL (ref 70–110)
POCT GLUCOSE: 187 MG/DL (ref 70–110)
POCT GLUCOSE: 252 MG/DL (ref 70–110)
POCT GLUCOSE: 290 MG/DL (ref 70–110)
POCT GLUCOSE: 309 MG/DL (ref 70–110)
POTASSIUM SERPL-SCNC: 4.6 MMOL/L
PROT SERPL-MCNC: 7.2 G/DL
RBC # BLD AUTO: 4.55 M/UL
SODIUM SERPL-SCNC: 135 MMOL/L
WBC # BLD AUTO: 5.74 K/UL

## 2018-02-19 PROCEDURE — 25000003 PHARM REV CODE 250

## 2018-02-19 PROCEDURE — 25000003 PHARM REV CODE 250: Performed by: EMERGENCY MEDICINE

## 2018-02-19 PROCEDURE — 63600175 PHARM REV CODE 636 W HCPCS: Performed by: EMERGENCY MEDICINE

## 2018-02-19 PROCEDURE — 11000001 HC ACUTE MED/SURG PRIVATE ROOM

## 2018-02-19 PROCEDURE — 85025 COMPLETE CBC W/AUTO DIFF WBC: CPT

## 2018-02-19 PROCEDURE — 36415 COLL VENOUS BLD VENIPUNCTURE: CPT

## 2018-02-19 PROCEDURE — 80053 COMPREHEN METABOLIC PANEL: CPT

## 2018-02-19 PROCEDURE — S4991 NICOTINE PATCH NONLEGEND: HCPCS

## 2018-02-19 RX ADMIN — OXYCODONE HYDROCHLORIDE AND ACETAMINOPHEN 1 TABLET: 10; 325 TABLET ORAL at 09:02

## 2018-02-19 RX ADMIN — TRAZODONE HYDROCHLORIDE 100 MG: 50 TABLET ORAL at 09:02

## 2018-02-19 RX ADMIN — SODIUM CHLORIDE: 0.9 INJECTION, SOLUTION INTRAVENOUS at 09:02

## 2018-02-19 RX ADMIN — OXYCODONE HYDROCHLORIDE AND ACETAMINOPHEN 1 TABLET: 10; 325 TABLET ORAL at 12:02

## 2018-02-19 RX ADMIN — PANTOPRAZOLE SODIUM 40 MG: 40 TABLET, DELAYED RELEASE ORAL at 09:02

## 2018-02-19 RX ADMIN — VANCOMYCIN HYDROCHLORIDE 1250 MG: 1 INJECTION, POWDER, LYOPHILIZED, FOR SOLUTION INTRAVENOUS at 05:02

## 2018-02-19 RX ADMIN — VANCOMYCIN HYDROCHLORIDE 1250 MG: 1 INJECTION, POWDER, LYOPHILIZED, FOR SOLUTION INTRAVENOUS at 04:02

## 2018-02-19 RX ADMIN — NICOTINE 1 PATCH: 21 PATCH, EXTENDED RELEASE TRANSDERMAL at 09:02

## 2018-02-19 RX ADMIN — INSULIN ASPART 6 UNITS: 100 INJECTION, SOLUTION INTRAVENOUS; SUBCUTANEOUS at 09:02

## 2018-02-19 RX ADMIN — INSULIN ASPART 2 UNITS: 100 INJECTION, SOLUTION INTRAVENOUS; SUBCUTANEOUS at 12:02

## 2018-02-19 RX ADMIN — OXYCODONE HYDROCHLORIDE AND ACETAMINOPHEN 1 TABLET: 10; 325 TABLET ORAL at 04:02

## 2018-02-19 NOTE — NURSING
Patient with Cheetos, Chili Cheese Fritos, and vanilla sandwich cookies at bedside. Educated on importance of adherence to diabetic diet. Needs reinforcement.

## 2018-02-19 NOTE — NURSING
"Pt refusing surgical bath at this time. States, "I haven't even seen a doctor yet. That's what they did last time, I came in and didn't even see a doctor, they just took me to surgery and cut it off."  "

## 2018-02-19 NOTE — PLAN OF CARE
Problem: Patient Care Overview  Goal: Plan of Care Review  Outcome: Ongoing (interventions implemented as appropriate)   02/19/18 1641   Coping/Psychosocial   Plan Of Care Reviewed With patient     Patient aaox4 and ambulating frequently in halls.  No longer NPO,  Surgery on L toe tomorrow.  Resume 1800 renetta Dm diet.  Pain managed with prn medication. Patient education on importance of medication compliance. Will continue to monitor.

## 2018-02-19 NOTE — PLAN OF CARE
Problem: Patient Care Overview  Goal: Plan of Care Review  Outcome: Ongoing (interventions implemented as appropriate)  Resting well this shift. Complaint of severe pain adequately relieved with PRN medication. IV fluids and antibiotics administered as ordered. Noncompliant with diabetic diet. Seems as though he may refuse surgical intervention. Remains free from falls or trauma.

## 2018-02-19 NOTE — NURSING
"Patient ambulating back to room with a cup of coffee from the lounge area. Reminded of NPO status for possible surgical intervention. States, "I only had a sip of coffee but I won't drink any more."   "

## 2018-02-19 NOTE — ASSESSMENT & PLAN NOTE
On Lantus 17u, metformin 1000 bid, amaryl 2, and novolog 8 tid as OP, but he was globally noncompliant  Sees [facility]  a1c 11.2 in January at office  Glu 384 on arrival  Prophylaxis:  Pos for ASA/ACE/Statin at home  On SSI here  Hold metformin in case of angio studies

## 2018-02-19 NOTE — CONSULTS
56 yo male with a sig pmhx of DM and Hep C presented to the ED with a CC of toe pain and back pain. He stated his left 2nd toe wound has worsened over the passed 3 days, made worse by uncomfortable shoes. No acute injury noted. He received recent antibiotics from his PCP and lyrica for his neuropathy.   VSSAF    Left foot: Skin cool to touch. 2nd toe dark and discolored on dorsum of toe. Malodorous wound on medial side of toe. No bleeding or active drainage. Minimal sensation on the tip of toe. Confirms sensation throughout rest of foot. DP pulse appreicated.   WBC: 5.74  Hct: 39.9  XRay left foot: No acute fracture.  No osseous destruction.  Joint spaces are congruent.  There are prominent vascular calcifications.  No radiopaque foreign bodies.    A/P: Left 2nd toe gangrene  1) ok to eat today- diabetic diet ordered  2) NPO at midnight  3) to OR tomorrow morning for partial amputation left 2nd toe

## 2018-02-19 NOTE — H&P
"[facility] Medicine  History & Physical    Patient Name: Carlos Cifuentes  MRN: [id number]  Admission Date: 2/18/2018  Admitting Physician: Phan Singleton MD  Primary Care Provider: Miguel Sapp MD      Subjective:     Principal Problem:Diabetic foot infection    Chief Complaint:   Chief Complaint   Patient presents with    Back Pain     all across lower back to tailbone.  chronic but been "acting up lately" x 3 days.    Wound Check     diabetic foot ulcer to left 2nd toe.  eschar noted.        HPI: 56 yo WM followed by Dr. Miguel Sapp.  Has DM2 with circulatory and neurologic complications.  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  Now presents with discoloration of L second toe.  He says he has been wearing certain shoes for a month and they have been uncomfortable.  He began having worsening pain in his toes on the Left foot.  He also sees Dr. Aviva Sapp for his DM.  He went to a physician on Jan 19 and received antibiotics (he had both cipro and bactrim on his med list).  Dr. Chicas had given him lyrica for his neuropathic pain.  Today the pain became worse, so he first went to the [facility].  Admit was planned, but he left AMA because he had things to do at home.  When those things were completed, he then presented to the [facility] ER for admit.  He has discoloration of the second L toe - worse on the dorsum distally.  He appears to have skin breakdown on the knuckle.  He also has discoloration on the plantar pad.  He is being admitted for ortho eval.  He has been placed on empiric vanco.  Also, he has chronic lower back pain and takes oxycodone - this will be resumed, as he is complaining of a significant degree of pain.    Past Medical History:   Diagnosis Date    Arthritis     Back pain     Bulging lumbar disc     C. difficile diarrhea     Chronic back pain 10/14/2014    Diabetes mellitus     Diabetes mellitus type II     DM (diabetes " mellitus), type 2, uncontrolled 3/12/2013    Hepatitis C 7/3/2013    MSSA (methicillin susceptible Staphylococcus aureus) septicemia     Neuromuscular disorder     Neuropathy     Staph aureus infection        Past Surgical History:   Procedure Laterality Date    APPENDECTOMY      JOINT REPLACEMENT      left knee surgery      left leg surgery      broken bone    LEG SURGERY  right     Right arm boil      Right great toe amputation      TOE AMPUTATION Right        Review of patient's allergies indicates:   Allergen Reactions    Codeine Rash     Other reaction(s): Unknown    Vicodin [hydrocodone-acetaminophen] Rash       Current Facility-Administered Medications on File Prior to Encounter   Medication    [COMPLETED] ketorolac injection 60 mg    [COMPLETED] oxyCODONE-acetaminophen 5-325 mg per tablet 1 tablet     Current Outpatient Prescriptions on File Prior to Encounter   Medication Sig    aspirin (ECOTRIN) 81 MG EC tablet Take 1 tablet (81 mg total) by mouth once daily.    atorvastatin (LIPITOR) 80 MG tablet Take 1 tablet (80 mg total) by mouth once daily.    clopidogrel (PLAVIX) 75 mg tablet Take 1 tablet (75 mg total) by mouth once daily.    glimepiride (AMARYL) 2 MG tablet Take 1 tablet (2 mg total) by mouth every morning.    insulin aspart (NOVOLOG) 100 unit/mL InPn pen Inject 8 Units into the skin 3 (three) times daily.    metformin (GLUCOPHAGE) 1000 MG tablet Take 1 tablet (1,000 mg total) by mouth 2 (two) times daily with meals.    metformin (GLUCOPHAGE) 1000 MG tablet Take 1,000 mg by mouth 2 (two) times daily with meals.    omeprazole (PRILOSEC) 20 MG capsule Take 1 capsule (20 mg total) by mouth once daily.    oxyCODONE (ROXICODONE) 10 mg Tab immediate release tablet Take 1 tablet (10 mg total) by mouth every 8 (eight) hours as needed for Pain.    pantoprazole (PROTONIX) 40 MG tablet Take 1 tablet (40 mg total) by mouth once daily.    pregabalin (LYRICA) 75 MG capsule Take 1  "capsule (75 mg total) by mouth 2 (two) times daily.    [DISCONTINUED] esomeprazole (NEXIUM) 40 MG capsule Take 1 capsule (40 mg total) by mouth once daily.    ciprofloxacin HCl (CIPRO) 500 MG tablet Take 1 tablet (500 mg total) by mouth 2 (two) times daily.    docusate sodium (COLACE) 100 MG capsule Take 100 mg by mouth as needed for Constipation.    heparin, porcine, PF, (HEPARIN FLUSH 100 UNITS/ML) 100 unit/mL Syrg     lactulose (CEPHULAC) 20 gram Pack Take 20 g by mouth 3 (three) times daily.    lactulose (CHRONULAC) 10 gram/15 mL solution     LANTUS SOLOSTAR 100 unit/mL (3 mL) InPn pen Inject 30 Units into the skin every evening.    ledipasvir-sofosbuvir (HARVONI)  mg Tab Take 1 tablet by mouth once daily.    lisinopril (PRINIVIL,ZESTRIL) 5 MG tablet Take 1 tablet (5 mg total) by mouth once daily.    nicotine (NICODERM CQ) 14 mg/24 hr Place 1 patch onto the skin once daily.    nicotine (NICODERM CQ) 14 mg/24 hr Place 1 patch onto the skin once daily.    NORMAL SALINE FLUSH 0.9 % injection     potassium chloride SA (K-DUR,KLOR-CON) 10 MEQ tablet Take 1 tablet (10 mEq total) by mouth once daily as needed when experiencing leg/muscle cramps.    sulfamethoxazole-trimethoprim 800-160mg (BACTRIM DS) 800-160 mg Tab Take 1 tablet by mouth every 12 (twelve) hours.    syringe with needle 3 mL 23 gauge x 1 1/2" Syrg 1 Device by Misc.(Non-Drug; Combo Route) route every 7 days.    testosterone cypionate (DEPOTESTOTERONE CYPIONATE) 200 mg/mL injection Inject 0.5 mLs (100 mg total) into the muscle every 7 days.    trazodone (DESYREL) 100 MG tablet Take 1 tablet (100 mg total) by mouth every evening.    TRUE METRIX GLUCOSE METER Misc AS DIRECTED    TRUE METRIX GLUCOSE TEST STRIP Strp Twice daily blood sugar checks    vancomycin (VANCOCIN) 1,000 mg injection      Family History     Problem Relation (Age of Onset)    Arthritis Mother, Sister, Brother    Diabetes Sister        Social History Main Topics "    Smoking status: Current Every Day Smoker     Packs/day: 0.50     Types: Cigarettes    Smokeless tobacco: Never Used    Alcohol use No    Drug use: No    Sexual activity: No     Review of Systems   Back pain  L foot and toe pain    Objective:     Vital Signs (Most Recent):  Temp: 98.4 °F (36.9 °C) (02/18/18 1611)  Pulse: 80 (02/18/18 1611)  Resp: 16 (02/18/18 1611)  BP: 131/65 (02/18/18 1611)  SpO2: 99 % (02/18/18 1611) Vital Signs (24h Range):  Temp:  [97.9 °F (36.6 °C)-98.4 °F (36.9 °C)] 98.4 °F (36.9 °C)  Pulse:  [78-97] 80  Resp:  [16-20] 16  SpO2:  [95 %-100 %] 99 %  BP: (117-131)/(64-80) 131/65     Weight: 104 kg (229 lb 4.5 oz)  Body mass index is 33.86 kg/m².    Physical Exam   seen in ER bed  Awake, conversant, able to give decent history  Lungs clear  Heart rrr  abd obese, soft, BS+  Legs - diminished pulses bilat  S/p R great toe removal - well healed  L 2nd toe is dark purple on dorsum with skin break on knuckle;  Also has milder bluish discoloration on plantar pad of toe     Significant Labs:  Cbc and cmp pending for am  Last a1c 11.2 in January  ESR 38  CRP 27  Glu 237     Significant Imaging:   CXR:  Mediastinal structures are midline. Cardiac silhouette is normal in size. Lung volumes are normal and symmetric. No pulmonary consolidation.  No pneumothorax or pleural effusion. No free air beneath the diaphragm. Bones demonstrate no acute abnormalities.    L foot:  No acute fracture.  No osseous destruction.  Joint spaces are congruent.  There are prominent vascular calcifications.  No radiopaque foreign bodies.    Assessment/Plan:     * Diabetic foot infection    2nd toe of L foot  Ortho consult  Had been on cipro and bactrim  vanco here  Cultures ordered          Diabetic polyneuropathy associated with type 2 diabetes mellitus    On Lantus 17u, metformin 1000 bid, amaryl 2, and novolog 8 tid as OP, but he was globally noncompliant  Sees Aviva Lux  a1c 11.2 in January at office  Glu 384 on  arrival  Prophylaxis:  Pos for ASA/ACE/Statin at home  On SSI here  Hold metformin in case of angio studies        Chronic back pain    Oxycodone 10 q 6 here  Resume lyrica            VTE Risk Mitigation         Ordered     Medium Risk of VTE  Once      02/18/18 1525     Place sequential compression device  Until discontinued      02/18/18 1525     Place DELVIS hose  Until discontinued      02/18/18 1525             Phan Singleton MD  Department of Hospital Medicine   [facility]

## 2018-02-19 NOTE — HPI
56 yo WM followed by Dr. Miguel Sapp.  Has DM2 with circulatory and neurologic complications.  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  Now presents with discoloration of L second toe.  He says he has been wearing certain shoes for a month and they have been uncomfortable.  He began having worsening pain in his toes on the Left foot.  He also sees Dr. Aviva Sapp for his DM.  He went to a physician on Jan 19 and received antibiotics (he had both cipro and bactrim on his med list).  Dr. Chicas had given him lyrica for his neuropathic pain.  Today the pain became worse, so he first went to the [facility] ER.  Admit was planned, but he left AMA because he had things to do at home.  When those things were completed, he then presented to the [facility] ER for admit.  He has discoloration of the second L toe - worse on the dorsum distally.  He appears to have skin breakdown on the knuckle.  He also has discoloration on the plantar pad.  He is being admitted for ortho eval.  He has been placed on empiric vanco.  Also, he has chronic lower back pain and takes oxycodone - this will be resumed, as he is complaining of a significant degree of pain.

## 2018-02-19 NOTE — PLAN OF CARE
SW met with pt at bedside. SW explained role in . SW inquired about HELP AT HOME. Pt stated that pt will have help at home with Siblings if needed. SW reviewed HELP AT HOME and DISCHARGE PLANNING brochure of questions to think about while in the hospital. Pt voiced understanding. SW inquired about what pt feels he is RESPONSIBLE for to MANAGE HEALTH AT HOME. Pt stated going to MD appointments and medications. SW voiced that taking medications at the appropriate time is important with managing care. SW informed pt that a DISCHARGE EDUCATION SHEET will be provided during stay. Pt voiced understanding.     [facility] [place] [address]  [address]  [facility] [address]  Phone: [phone] Fax: [phone]    [id number] - [place], [place] - [address] AT [facility] [place]  [address]  [facility] [address]  Phone: [phone] Fax: [phone]       02/19/18 1034   Discharge Assessment   Assessment Type Discharge Planning Assessment   Confirmed/corrected address and phone number on facesheet? Yes   Assessment information obtained from? Patient   Prior to hospitilization cognitive status: Alert/Oriented   Prior to hospitalization functional status: Independent   Current cognitive status: Alert/Oriented   Current Functional Status: Independent   Lives With alone   Able to Return to Prior Arrangements yes   Is patient able to care for self after discharge? Yes   Who are your caregiver(s) and their phone number(s)? Siblings if needed.    Patient's perception of discharge disposition home or selfcare;home health  (Home Health if needed)   Readmission Within The Last 30 Days no previous admission in last 30 days   Equipment Currently Used at Home cane, quad;wound care supplies;wheelchair   Do you have any problems affording any of your prescribed medications? No   Is the patient taking medications as prescribed? yes   Does the patient have  transportation home? Yes   Transportation Available family or friend will provide   Discharge Plan A Home   Discharge Plan B Home   Patient/Family In Agreement With Plan yes

## 2018-02-19 NOTE — SUBJECTIVE & OBJECTIVE
Past Medical History:   Diagnosis Date    Arthritis     Back pain     Bulging lumbar disc     C. difficile diarrhea     Chronic back pain 10/14/2014    Diabetes mellitus     Diabetes mellitus type II     DM (diabetes mellitus), type 2, uncontrolled 3/12/2013    Hepatitis C 7/3/2013    MSSA (methicillin susceptible Staphylococcus aureus) septicemia     Neuromuscular disorder     Neuropathy     Staph aureus infection        Past Surgical History:   Procedure Laterality Date    APPENDECTOMY      JOINT REPLACEMENT      left knee surgery      left leg surgery      broken bone    LEG SURGERY  right     Right arm boil      Right great toe amputation      TOE AMPUTATION Right        Review of patient's allergies indicates:   Allergen Reactions    Codeine Rash     Other reaction(s): Unknown    Vicodin [hydrocodone-acetaminophen] Rash       Current Facility-Administered Medications on File Prior to Encounter   Medication    [COMPLETED] ketorolac injection 60 mg    [COMPLETED] oxyCODONE-acetaminophen 5-325 mg per tablet 1 tablet     Current Outpatient Prescriptions on File Prior to Encounter   Medication Sig    aspirin (ECOTRIN) 81 MG EC tablet Take 1 tablet (81 mg total) by mouth once daily.    atorvastatin (LIPITOR) 80 MG tablet Take 1 tablet (80 mg total) by mouth once daily.    clopidogrel (PLAVIX) 75 mg tablet Take 1 tablet (75 mg total) by mouth once daily.    glimepiride (AMARYL) 2 MG tablet Take 1 tablet (2 mg total) by mouth every morning.    insulin aspart (NOVOLOG) 100 unit/mL InPn pen Inject 8 Units into the skin 3 (three) times daily.    metformin (GLUCOPHAGE) 1000 MG tablet Take 1 tablet (1,000 mg total) by mouth 2 (two) times daily with meals.    metformin (GLUCOPHAGE) 1000 MG tablet Take 1,000 mg by mouth 2 (two) times daily with meals.    omeprazole (PRILOSEC) 20 MG capsule Take 1 capsule (20 mg total) by mouth once daily.    oxyCODONE (ROXICODONE) 10 mg Tab immediate release  "tablet Take 1 tablet (10 mg total) by mouth every 8 (eight) hours as needed for Pain.    pantoprazole (PROTONIX) 40 MG tablet Take 1 tablet (40 mg total) by mouth once daily.    pregabalin (LYRICA) 75 MG capsule Take 1 capsule (75 mg total) by mouth 2 (two) times daily.    [DISCONTINUED] esomeprazole (NEXIUM) 40 MG capsule Take 1 capsule (40 mg total) by mouth once daily.    ciprofloxacin HCl (CIPRO) 500 MG tablet Take 1 tablet (500 mg total) by mouth 2 (two) times daily.    docusate sodium (COLACE) 100 MG capsule Take 100 mg by mouth as needed for Constipation.    heparin, porcine, PF, (HEPARIN FLUSH 100 UNITS/ML) 100 unit/mL Syrg     lactulose (CEPHULAC) 20 gram Pack Take 20 g by mouth 3 (three) times daily.    lactulose (CHRONULAC) 10 gram/15 mL solution     LANTUS SOLOSTAR 100 unit/mL (3 mL) InPn pen Inject 30 Units into the skin every evening.    ledipasvir-sofosbuvir (HARVONI)  mg Tab Take 1 tablet by mouth once daily.    lisinopril (PRINIVIL,ZESTRIL) 5 MG tablet Take 1 tablet (5 mg total) by mouth once daily.    nicotine (NICODERM CQ) 14 mg/24 hr Place 1 patch onto the skin once daily.    nicotine (NICODERM CQ) 14 mg/24 hr Place 1 patch onto the skin once daily.    NORMAL SALINE FLUSH 0.9 % injection     potassium chloride SA (K-DUR,KLOR-CON) 10 MEQ tablet Take 1 tablet (10 mEq total) by mouth once daily as needed when experiencing leg/muscle cramps.    sulfamethoxazole-trimethoprim 800-160mg (BACTRIM DS) 800-160 mg Tab Take 1 tablet by mouth every 12 (twelve) hours.    syringe with needle 3 mL 23 gauge x 1 1/2" Syrg 1 Device by Misc.(Non-Drug; Combo Route) route every 7 days.    testosterone cypionate (DEPOTESTOTERONE CYPIONATE) 200 mg/mL injection Inject 0.5 mLs (100 mg total) into the muscle every 7 days.    trazodone (DESYREL) 100 MG tablet Take 1 tablet (100 mg total) by mouth every evening.    TRUE METRIX GLUCOSE METER Misc AS DIRECTED    TRUE METRIX GLUCOSE TEST STRIP Strp " Twice daily blood sugar checks    vancomycin (VANCOCIN) 1,000 mg injection      Family History     Problem Relation (Age of Onset)    Arthritis Mother, Sister, Brother    Diabetes Sister        Social History Main Topics    Smoking status: Current Every Day Smoker     Packs/day: 0.50     Types: Cigarettes    Smokeless tobacco: Never Used    Alcohol use No    Drug use: No    Sexual activity: No     Review of Systems   Back pain  L foot and toe pain    Objective:     Vital Signs (Most Recent):  Temp: 98.4 °F (36.9 °C) (02/18/18 1611)  Pulse: 80 (02/18/18 1611)  Resp: 16 (02/18/18 1611)  BP: 131/65 (02/18/18 1611)  SpO2: 99 % (02/18/18 1611) Vital Signs (24h Range):  Temp:  [97.9 °F (36.6 °C)-98.4 °F (36.9 °C)] 98.4 °F (36.9 °C)  Pulse:  [78-97] 80  Resp:  [16-20] 16  SpO2:  [95 %-100 %] 99 %  BP: (117-131)/(64-80) 131/65     Weight: 104 kg (229 lb 4.5 oz)  Body mass index is 33.86 kg/m².    Physical Exam   seen in ER bed  Awake, conversant, able to give decent history  Lungs clear  Heart rrr  abd obese, soft, BS+  Legs - diminished pulses bilat  S/p R great toe removal - well healed  L 2nd toe is dark purple on dorsum with skin break on knuckle;  Also has milder bluish discoloration on plantar pad of toe     Significant Labs:  Cbc and cmp pending for am  Last a1c 11.2 in January  ESR 38  CRP 27  Glu 237     Significant Imaging:   CXR:  Mediastinal structures are midline. Cardiac silhouette is normal in size. Lung volumes are normal and symmetric. No pulmonary consolidation.  No pneumothorax or pleural effusion. No free air beneath the diaphragm. Bones demonstrate no acute abnormalities.    L foot:  No acute fracture.  No osseous destruction.  Joint spaces are congruent.  There are prominent vascular calcifications.  No radiopaque foreign bodies.

## 2018-02-20 ENCOUNTER — ANESTHESIA (OUTPATIENT)
Dept: SURGERY | Facility: HOSPITAL | Age: 58
DRG: 617 | End: 2018-02-20
Payer: MEDICAID

## 2018-02-20 ENCOUNTER — ANESTHESIA EVENT (OUTPATIENT)
Dept: SURGERY | Facility: HOSPITAL | Age: 58
DRG: 617 | End: 2018-02-20
Payer: MEDICAID

## 2018-02-20 LAB
POCT GLUCOSE: 242 MG/DL (ref 70–110)
POCT GLUCOSE: 250 MG/DL (ref 70–110)
POCT GLUCOSE: 250 MG/DL (ref 70–110)
VANCOMYCIN TROUGH SERPL-MCNC: 15.1 UG/ML

## 2018-02-20 PROCEDURE — 88311 DECALCIFY TISSUE: CPT | Mod: 26,,, | Performed by: PATHOLOGY

## 2018-02-20 PROCEDURE — 25000003 PHARM REV CODE 250: Performed by: EMERGENCY MEDICINE

## 2018-02-20 PROCEDURE — 25000003 PHARM REV CODE 250: Performed by: ANESTHESIOLOGY

## 2018-02-20 PROCEDURE — 37000008 HC ANESTHESIA 1ST 15 MINUTES: Performed by: ORTHOPAEDIC SURGERY

## 2018-02-20 PROCEDURE — 71000033 HC RECOVERY, INTIAL HOUR: Performed by: ORTHOPAEDIC SURGERY

## 2018-02-20 PROCEDURE — 0Y6S0Z1 DETACHMENT AT LEFT 2ND TOE, HIGH, OPEN APPROACH: ICD-10-PCS | Performed by: ORTHOPAEDIC SURGERY

## 2018-02-20 PROCEDURE — 80202 ASSAY OF VANCOMYCIN: CPT

## 2018-02-20 PROCEDURE — 25000242 PHARM REV CODE 250 ALT 637 W/ HCPCS: Performed by: NURSE ANESTHETIST, CERTIFIED REGISTERED

## 2018-02-20 PROCEDURE — 63600175 PHARM REV CODE 636 W HCPCS: Performed by: EMERGENCY MEDICINE

## 2018-02-20 PROCEDURE — 88305 TISSUE EXAM BY PATHOLOGIST: CPT | Mod: 26,,, | Performed by: PATHOLOGY

## 2018-02-20 PROCEDURE — 88305 TISSUE EXAM BY PATHOLOGIST: CPT | Performed by: PATHOLOGY

## 2018-02-20 PROCEDURE — 11000001 HC ACUTE MED/SURG PRIVATE ROOM

## 2018-02-20 PROCEDURE — 25000003 PHARM REV CODE 250

## 2018-02-20 PROCEDURE — 36000706: Performed by: ORTHOPAEDIC SURGERY

## 2018-02-20 PROCEDURE — 36415 COLL VENOUS BLD VENIPUNCTURE: CPT

## 2018-02-20 PROCEDURE — S0028 INJECTION, FAMOTIDINE, 20 MG: HCPCS | Performed by: NURSE ANESTHETIST, CERTIFIED REGISTERED

## 2018-02-20 PROCEDURE — 25000003 PHARM REV CODE 250: Performed by: ORTHOPAEDIC SURGERY

## 2018-02-20 PROCEDURE — 37000009 HC ANESTHESIA EA ADD 15 MINS: Performed by: ORTHOPAEDIC SURGERY

## 2018-02-20 PROCEDURE — S4991 NICOTINE PATCH NONLEGEND: HCPCS

## 2018-02-20 PROCEDURE — 63600175 PHARM REV CODE 636 W HCPCS: Performed by: ORTHOPAEDIC SURGERY

## 2018-02-20 PROCEDURE — 25000003 PHARM REV CODE 250: Performed by: NURSE ANESTHETIST, CERTIFIED REGISTERED

## 2018-02-20 PROCEDURE — 36000707: Performed by: ORTHOPAEDIC SURGERY

## 2018-02-20 PROCEDURE — D9220A PRA ANESTHESIA: Mod: ANES,,, | Performed by: ANESTHESIOLOGY

## 2018-02-20 PROCEDURE — 63600175 PHARM REV CODE 636 W HCPCS: Performed by: NURSE ANESTHETIST, CERTIFIED REGISTERED

## 2018-02-20 PROCEDURE — D9220A PRA ANESTHESIA: Mod: CRNA,,, | Performed by: NURSE ANESTHETIST, CERTIFIED REGISTERED

## 2018-02-20 RX ORDER — ALBUTEROL SULFATE 90 UG/1
AEROSOL, METERED RESPIRATORY (INHALATION)
Status: DISCONTINUED | OUTPATIENT
Start: 2018-02-20 | End: 2018-02-20

## 2018-02-20 RX ORDER — METOCLOPRAMIDE HYDROCHLORIDE 5 MG/ML
INJECTION INTRAMUSCULAR; INTRAVENOUS
Status: DISCONTINUED | OUTPATIENT
Start: 2018-02-20 | End: 2018-02-20

## 2018-02-20 RX ORDER — HYDROMORPHONE HYDROCHLORIDE 2 MG/ML
0.2 INJECTION, SOLUTION INTRAMUSCULAR; INTRAVENOUS; SUBCUTANEOUS EVERY 5 MIN PRN
Status: DISCONTINUED | OUTPATIENT
Start: 2018-02-20 | End: 2018-02-20

## 2018-02-20 RX ORDER — SODIUM CHLORIDE, SODIUM LACTATE, POTASSIUM CHLORIDE, CALCIUM CHLORIDE 600; 310; 30; 20 MG/100ML; MG/100ML; MG/100ML; MG/100ML
INJECTION, SOLUTION INTRAVENOUS CONTINUOUS
Status: DISCONTINUED | OUTPATIENT
Start: 2018-02-20 | End: 2018-02-22 | Stop reason: HOSPADM

## 2018-02-20 RX ORDER — FENTANYL CITRATE 50 UG/ML
INJECTION, SOLUTION INTRAMUSCULAR; INTRAVENOUS
Status: DISCONTINUED | OUTPATIENT
Start: 2018-02-20 | End: 2018-02-20

## 2018-02-20 RX ORDER — PROPOFOL 10 MG/ML
VIAL (ML) INTRAVENOUS
Status: DISCONTINUED | OUTPATIENT
Start: 2018-02-20 | End: 2018-02-20

## 2018-02-20 RX ORDER — DIAZEPAM 5 MG/1
5 TABLET ORAL EVERY 6 HOURS PRN
Status: DISCONTINUED | OUTPATIENT
Start: 2018-02-20 | End: 2018-02-22 | Stop reason: HOSPADM

## 2018-02-20 RX ORDER — SODIUM CHLORIDE 0.9 % (FLUSH) 0.9 %
3 SYRINGE (ML) INJECTION
Status: DISCONTINUED | OUTPATIENT
Start: 2018-02-20 | End: 2018-02-20

## 2018-02-20 RX ORDER — ROCURONIUM BROMIDE 10 MG/ML
INJECTION, SOLUTION INTRAVENOUS
Status: DISCONTINUED | OUTPATIENT
Start: 2018-02-20 | End: 2018-02-20

## 2018-02-20 RX ORDER — MIDAZOLAM HYDROCHLORIDE 1 MG/ML
INJECTION, SOLUTION INTRAMUSCULAR; INTRAVENOUS
Status: DISCONTINUED | OUTPATIENT
Start: 2018-02-20 | End: 2018-02-20

## 2018-02-20 RX ORDER — LIDOCAINE HYDROCHLORIDE 10 MG/ML
1 INJECTION, SOLUTION EPIDURAL; INFILTRATION; INTRACAUDAL; PERINEURAL ONCE
Status: DISCONTINUED | OUTPATIENT
Start: 2018-02-20 | End: 2018-02-22 | Stop reason: HOSPADM

## 2018-02-20 RX ORDER — HYDROMORPHONE HYDROCHLORIDE 2 MG/ML
0.2 INJECTION, SOLUTION INTRAMUSCULAR; INTRAVENOUS; SUBCUTANEOUS EVERY 5 MIN PRN
Status: DISCONTINUED | OUTPATIENT
Start: 2018-02-20 | End: 2018-02-20 | Stop reason: SDUPTHER

## 2018-02-20 RX ORDER — HYDROMORPHONE HYDROCHLORIDE 2 MG/ML
1 INJECTION, SOLUTION INTRAMUSCULAR; INTRAVENOUS; SUBCUTANEOUS
Status: DISCONTINUED | OUTPATIENT
Start: 2018-02-20 | End: 2018-02-22 | Stop reason: HOSPADM

## 2018-02-20 RX ORDER — SODIUM CHLORIDE, SODIUM LACTATE, POTASSIUM CHLORIDE, CALCIUM CHLORIDE 600; 310; 30; 20 MG/100ML; MG/100ML; MG/100ML; MG/100ML
INJECTION, SOLUTION INTRAVENOUS CONTINUOUS PRN
Status: DISCONTINUED | OUTPATIENT
Start: 2018-02-20 | End: 2018-02-20

## 2018-02-20 RX ORDER — FENTANYL CITRATE 50 UG/ML
25 INJECTION, SOLUTION INTRAMUSCULAR; INTRAVENOUS EVERY 5 MIN PRN
Status: DISCONTINUED | OUTPATIENT
Start: 2018-02-20 | End: 2018-02-20 | Stop reason: SDUPTHER

## 2018-02-20 RX ORDER — LORAZEPAM 2 MG/ML
0.25 INJECTION INTRAMUSCULAR ONCE AS NEEDED
Status: DISCONTINUED | OUTPATIENT
Start: 2018-02-20 | End: 2018-02-20

## 2018-02-20 RX ORDER — SUCCINYLCHOLINE CHLORIDE 20 MG/ML
INJECTION INTRAMUSCULAR; INTRAVENOUS
Status: DISCONTINUED | OUTPATIENT
Start: 2018-02-20 | End: 2018-02-20

## 2018-02-20 RX ORDER — FAMOTIDINE 10 MG/ML
INJECTION INTRAVENOUS
Status: DISCONTINUED | OUTPATIENT
Start: 2018-02-20 | End: 2018-02-20

## 2018-02-20 RX ORDER — ONDANSETRON 2 MG/ML
INJECTION INTRAMUSCULAR; INTRAVENOUS
Status: DISCONTINUED | OUTPATIENT
Start: 2018-02-20 | End: 2018-02-20

## 2018-02-20 RX ORDER — DIAZEPAM 5 MG/1
5 TABLET ORAL EVERY 6 HOURS PRN
COMMUNITY
End: 2018-03-06 | Stop reason: SDUPTHER

## 2018-02-20 RX ORDER — FENTANYL CITRATE 50 UG/ML
25 INJECTION, SOLUTION INTRAMUSCULAR; INTRAVENOUS EVERY 5 MIN PRN
Status: DISCONTINUED | OUTPATIENT
Start: 2018-02-20 | End: 2018-02-20

## 2018-02-20 RX ORDER — LIDOCAINE HCL/PF 100 MG/5ML
SYRINGE (ML) INTRAVENOUS
Status: DISCONTINUED | OUTPATIENT
Start: 2018-02-20 | End: 2018-02-20

## 2018-02-20 RX ORDER — GLYCOPYRROLATE 0.2 MG/ML
INJECTION INTRAMUSCULAR; INTRAVENOUS
Status: DISCONTINUED | OUTPATIENT
Start: 2018-02-20 | End: 2018-02-20

## 2018-02-20 RX ADMIN — TRAZODONE HYDROCHLORIDE 100 MG: 50 TABLET ORAL at 09:02

## 2018-02-20 RX ADMIN — FENTANYL CITRATE 100 MCG: 50 INJECTION INTRAMUSCULAR; INTRAVENOUS at 09:02

## 2018-02-20 RX ADMIN — CLOPIDOGREL BISULFATE 75 MG: 75 TABLET ORAL at 11:02

## 2018-02-20 RX ADMIN — INSULIN ASPART 4 UNITS: 100 INJECTION, SOLUTION INTRAVENOUS; SUBCUTANEOUS at 04:02

## 2018-02-20 RX ADMIN — VANCOMYCIN HYDROCHLORIDE 1250 MG: 1 INJECTION, POWDER, LYOPHILIZED, FOR SOLUTION INTRAVENOUS at 05:02

## 2018-02-20 RX ADMIN — NICOTINE 1 PATCH: 21 PATCH, EXTENDED RELEASE TRANSDERMAL at 11:02

## 2018-02-20 RX ADMIN — HYDROMORPHONE HYDROCHLORIDE 1 MG: 2 INJECTION INTRAMUSCULAR; INTRAVENOUS; SUBCUTANEOUS at 04:02

## 2018-02-20 RX ADMIN — INSULIN ASPART 4 UNITS: 100 INJECTION, SOLUTION INTRAVENOUS; SUBCUTANEOUS at 11:02

## 2018-02-20 RX ADMIN — VANCOMYCIN HYDROCHLORIDE 1250 MG: 1 INJECTION, POWDER, LYOPHILIZED, FOR SOLUTION INTRAVENOUS at 04:02

## 2018-02-20 RX ADMIN — SUCCINYLCHOLINE CHLORIDE 120 MG: 20 INJECTION, SOLUTION INTRAMUSCULAR; INTRAVENOUS at 09:02

## 2018-02-20 RX ADMIN — DIAZEPAM 5 MG: 5 TABLET ORAL at 04:02

## 2018-02-20 RX ADMIN — PANTOPRAZOLE SODIUM 40 MG: 40 TABLET, DELAYED RELEASE ORAL at 11:02

## 2018-02-20 RX ADMIN — ROCURONIUM BROMIDE 5 MG: 10 INJECTION, SOLUTION INTRAVENOUS at 09:02

## 2018-02-20 RX ADMIN — OXYCODONE HYDROCHLORIDE AND ACETAMINOPHEN 1 TABLET: 10; 325 TABLET ORAL at 04:02

## 2018-02-20 RX ADMIN — ONDANSETRON 4 MG: 2 INJECTION, SOLUTION INTRAMUSCULAR; INTRAVENOUS at 09:02

## 2018-02-20 RX ADMIN — SODIUM CHLORIDE, SODIUM LACTATE, POTASSIUM CHLORIDE, AND CALCIUM CHLORIDE: .6; .31; .03; .02 INJECTION, SOLUTION INTRAVENOUS at 09:02

## 2018-02-20 RX ADMIN — SODIUM CHLORIDE: 0.9 INJECTION, SOLUTION INTRAVENOUS at 04:02

## 2018-02-20 RX ADMIN — GLYCOPYRROLATE 0.2 MG: 0.2 INJECTION, SOLUTION INTRAMUSCULAR; INTRAVENOUS at 09:02

## 2018-02-20 RX ADMIN — LIDOCAINE HYDROCHLORIDE 100 MG: 20 INJECTION, SOLUTION INTRAVENOUS at 09:02

## 2018-02-20 RX ADMIN — ALBUTEROL SULFATE 2 PUFF: 90 AEROSOL, METERED RESPIRATORY (INHALATION) at 09:02

## 2018-02-20 RX ADMIN — PROPOFOL 150 MG: 10 INJECTION, EMULSION INTRAVENOUS at 09:02

## 2018-02-20 RX ADMIN — SODIUM CHLORIDE, SODIUM LACTATE, POTASSIUM CHLORIDE, AND CALCIUM CHLORIDE: .6; .31; .03; .02 INJECTION, SOLUTION INTRAVENOUS at 11:02

## 2018-02-20 RX ADMIN — PROPOFOL 50 MG: 10 INJECTION, EMULSION INTRAVENOUS at 09:02

## 2018-02-20 RX ADMIN — METOCLOPRAMIDE 10 MG: 5 INJECTION, SOLUTION INTRAMUSCULAR; INTRAVENOUS at 09:02

## 2018-02-20 RX ADMIN — FAMOTIDINE 20 MG: 10 INJECTION, SOLUTION INTRAVENOUS at 09:02

## 2018-02-20 RX ADMIN — OXYCODONE HYDROCHLORIDE AND ACETAMINOPHEN 1 TABLET: 10; 325 TABLET ORAL at 01:02

## 2018-02-20 RX ADMIN — HYDROMORPHONE HYDROCHLORIDE 1 MG: 2 INJECTION INTRAMUSCULAR; INTRAVENOUS; SUBCUTANEOUS at 09:02

## 2018-02-20 RX ADMIN — MIDAZOLAM HYDROCHLORIDE 2 MG: 1 INJECTION, SOLUTION INTRAMUSCULAR; INTRAVENOUS at 09:02

## 2018-02-20 RX ADMIN — ALBUTEROL SULFATE 4 PUFF: 90 AEROSOL, METERED RESPIRATORY (INHALATION) at 09:02

## 2018-02-20 RX ADMIN — GLYCOPYRROLATE 0.1 MG: 0.2 INJECTION, SOLUTION INTRAMUSCULAR; INTRAVENOUS at 09:02

## 2018-02-20 NOTE — PROGRESS NOTES
Progress Note    Admit Date: 2/18/2018   LOS: 2 days     SUBJECTIVE:     Follow-up For:  Left foot 2nd toe dry gangrene     02/20/2018: awaiting for OR today   02/19/2018: no significant pain to left foot. Tolerating current tx         Scheduled Meds:   atorvastatin  80 mg Oral Daily    clopidogrel  75 mg Oral Daily    lisinopril  5 mg Oral Daily    nicotine  1 patch Transdermal Daily    pantoprazole  40 mg Oral Daily    pregabalin  75 mg Oral BID    traZODone  100 mg Oral QHS    vancomycin (VANCOCIN) IVPB  1,250 mg Intravenous Q12H     Continuous Infusions:   sodium chloride 0.9% 125 mL/hr at 02/20/18 0407     PRN Meds:dextrose 50%, dextrose 50%, glucagon (human recombinant), glucose, glucose, insulin aspart U-100, oxyCODONE-acetaminophen    Review of patient's allergies indicates:   Allergen Reactions    Codeine Rash     Other reaction(s): Unknown    Vicodin [hydrocodone-acetaminophen] Rash       Review of Systems    Constitutional: + fatigue and nerve pain   Eyes: no visual changes   ENT: no nasal congestion. Denies sore throat with odynophagia   Respiratory: No cough, SOB, exertional dyspnea or  hemoptysis   Cardiovascular: no chest pain or palpitations   Gastrointestinal: no nausea, vomiting or abdominal pain. Normal bowl habits   Hematologic/Lymphatic: No lymphadenopathy, no significant fatigue, fever or night sweat. No mucocutaneous bleeding   Musculoskeletal: see HPI  Neurological: no seizures or tremors, gait or balance prblems  Skin: left foot- 2nd toe- discoloration   Psych: Denies any anxiety, depression or insomnia    OBJECTIVE:     Vital Signs (Most Recent)  Temp: 97.4 °F (36.3 °C) (02/20/18 0353)  Pulse: 65 (02/20/18 0353)  Resp: 18 (02/20/18 0353)  BP: (!) 104/58 (02/20/18 0353)  SpO2: (!) 94 % (02/20/18 0353)    Vital Signs Range (Last 24H):  Temp:  [97.4 °F (36.3 °C)-98.4 °F (36.9 °C)]   Pulse:  [64-83]   Resp:  [17-18]   BP: ()/(55-73)   SpO2:  [94 %-98 %]     I & O (Last 24H):No  intake or output data in the 24 hours ending 02/20/18 0731  Physical Exam:    General: NAD, resting in bed.   Head: normocephalic, atraumatic   Eyes: conjunctivae pink, anicteric sclera.   Throat: No erythema or post nasal discharge   Neck: supple, no LAD   Lungs: decreased BS at the bases   Heart: S1, S2, RRR   Abdomen: obese,  + BS x 4 quadrants  Extremities: left foot 2nd toe with dry gangrene and redness to base   Skin: turgor normal,  MS: hx of right big toe amputation   Psy: pleasant, affect is congruent to mood       Laboratory:  CBC:     Recent Labs  Lab 02/19/18  0440   WBC 5.74   RBC 4.55*   HGB 13.2*   HCT 39.9*      MCV 88   MCH 29.0   MCHC 33.1     CMP:     Recent Labs  Lab 02/19/18  0440   *   CALCIUM 9.3   ALBUMIN 3.0*   PROT 7.2   *   K 4.6   CO2 24      BUN 28*   CREATININE 1.2   ALKPHOS 79   ALT 19   AST 13   BILITOT 0.2     Coagulation: No results for input(s): LABPROT, INR, APTT in the last 168 hours.  Cardiac markers: No results for input(s): CKMB, CPKMB, TROPONINT, TROPONINI, MYOGLOBIN in the last 168 hours.  ABGs: No results for input(s): PH, PCO2, PO2, HCO3, POCSATURATED, BE in the last 168 hours.  Microbiology Results (last 7 days)     ** No results found for the last 168 hours. **        Specimen (12h ago through future)    None          Diagnostic Results:    Current Facility-Administered Medications   Medication Dose Route Frequency Provider Last Rate Last Dose    0.9%  NaCl infusion   Intravenous Continuous David Mccall  mL/hr at 02/20/18 0407      atorvastatin tablet 80 mg  80 mg Oral Daily David Mccall MD        clopidogrel tablet 75 mg  75 mg Oral Daily David Mccall MD   Stopped at 02/19/18 0927    dextrose 50% injection 12.5 g  12.5 g Intravenous PRN David Mccall MD        dextrose 50% injection 25 g  25 g Intravenous PRN David Mccall MD        glucagon (human recombinant) injection 1 mg  1 mg Intramuscular PRN David BOSS  MD Cal        glucose chewable tablet 16 g  16 g Oral PRN David Mccall MD        glucose chewable tablet 24 g  24 g Oral PRN David Mccall MD        insulin aspart U-100 pen 1-10 Units  1-10 Units Subcutaneous QID (AC + HS) PRN David Mccall MD   2 Units at 02/19/18 1246    lisinopril tablet 5 mg  5 mg Oral Daily David Mccall MD   Stopped at 02/19/18 0928    nicotine 21 mg/24 hr 1 patch  1 patch Transdermal Daily Phan Singleton MD   1 patch at 02/19/18 0927    oxyCODONE-acetaminophen  mg per tablet 1 tablet  1 tablet Oral Q6H PRN Phan Singleton MD   1 tablet at 02/20/18 0407    pantoprazole EC tablet 40 mg  40 mg Oral Daily David Mccall MD   40 mg at 02/19/18 0928    pregabalin capsule 75 mg  75 mg Oral BID Phan Singleton MD        traZODone tablet 100 mg  100 mg Oral QHS Phan Singleton MD   100 mg at 02/19/18 2131    vancomycin (VANCOCIN) 1,250 mg in sodium chloride 0.9% 250 mL IVPB  1,250 mg Intravenous Q12H David Mccall .7 mL/hr at 02/20/18 0545 1,250 mg at 02/20/18 0545         ASSESSMENT/PLAN:     Active Hospital Problems    Diagnosis  POA    *Diabetic foot infection [E11.69, L08.9]    2nd toe of L foot  Ortho consulted and awaiting OR this am   Added vanco   Cultures negative so far     Yes    Chronic pain syndrome [G89.4]- continue home medications      Yes    Diabetic polyneuropathy associated with type 2 diabetes mellitus     [E11.42]  - pt's DM difficult to control due to poor compliance   - on Pregablin     Yes    Chronic back pain [M54.9, G89.29]  - on oxycodone    Yes    Type II diabetes mellitus with neurological manifestations [E11.49]    - insulin   Yes      Resolved Hospital Problems    Diagnosis Date Resolved POA   No resolved problems to display.     DVT prophylaxis: SCD    Miguel Lux M.D  Internal Medicine & Geriatric Medicine  Hematology & Oncology  Palliative Medicine    [address] [facility], [address]  [place]  [address]  [phone] (Office)  [phone] (Fax)

## 2018-02-20 NOTE — BRIEF OP NOTE
[facility]  Brief Operative Note    SUMMARY     Surgery Date: 2/20/2018     Surgeon(s) and Role:     * David Washington MD - Primary    Assisting Surgeon: Kyle    Pre-op Diagnosis:  Diabetic infection of left foot [E11.69, L08.9]    Post-op Diagnosis:  Post-Op Diagnosis Codes:     * Diabetic infection of left foot [E11.69, L08.9]    Procedure(s) (LRB):  AMPUTATION LEFT 2ND TOE (Left)    Anesthesia: Choice    Description of Procedure: remove 2nd toe through the prox phalanx    Description of the findings of the procedure: dead, infected tissue    Estimated Blood Loss: 5ml         Specimens:   Specimen (12h ago through future)    Start     Ordered    02/20/18 0951  Specimen to Pathology - Surgery  Once     Comments:  Left 2nd toe      02/20/18 0951    02/20/18 0948  Specimen to Pathology - Surgery  Once     Comments:  2ND TOE LEFT FOOT      02/20/18 0947

## 2018-02-20 NOTE — PROGRESS NOTES
"SW met with pt at bedside. SW reviewed discharge education sheet "Post Op Surgery" with pt. Pt voiced understanding. Teachback method applied with pt. SW also reviewed with pt what she is responsible for in order to manage her health at home.     1) Getting medications taking from pharmacy.  2) Taking medications as prescribed.  3) Making Appointments that are scheduled.    Pt verbalized understanding.     SW reviewed pt's follow up appointments. Pt voiced understanding.       "

## 2018-02-20 NOTE — TRANSFER OF CARE
"Anesthesia Transfer of Care Note    Patient: Carlos Cifuentes    Procedure(s) Performed: Procedure(s) (LRB):  AMPUTATION LEFT 2ND TOE (Left)    Patient location: PACU    Anesthesia Type: general    Transport from OR: Transported from OR on room air with adequate spontaneous ventilation    Post pain: adequate analgesia    Post assessment: no apparent anesthetic complications and tolerated procedure well    Post vital signs: stable    Level of consciousness: awake, alert and oriented    Nausea/Vomiting: no nausea/vomiting    Complications: none    Transfer of care protocol was followed      Last vitals:   Visit Vitals  BP (!) 123/59 (BP Location: Left arm, Patient Position: Lying)   Pulse (!) 118   Temp 36.6 °C (97.9 °F) (Oral)   Resp 17   Ht 5' 9" (1.753 m)   Wt 114.7 kg (252 lb 13.9 oz)   SpO2 (!) 94%   BMI 37.34 kg/m²     "

## 2018-02-20 NOTE — PROGRESS NOTES
WRITTEN HEALTHCARE DISCHARGE INFORMATION     Things that YOU are responsible for to Manage Your Care At Home:  1. Getting your prescriptions filled.  2. Taking you medications as directed. DO NOT MISS ANY DOSES!  3. Going to your follow-up doctor appointments. This is important because it allows the doctor to monitor your progress and to determine if any changes need to be made to your treatment plan.    If you are unable to make your follow up appointments, please call the number listed and reschedule this appointment.     After discharge, if you need assistance, you can call [facility] or [facility] at the number below.     If you are experience any signs or symptoms, Call your doctor or Call 911 and come to your nearest Emergency Room.    Thank you for choosing [facility] and allowing us to care for you.   From your care management team: Katya MENDENHALL,RSW [phone]     You should receive a call from [facility] within 48-72 hours to help manage your care after discharge. Please try to make sure that you answer your phone for this important phone call.     Follow-up Information     Miguel Lux MD On 3/6/2018.    Specialties:  Internal Medicine, Oncology, Hematology and Oncology  Why:  Tuesday at 10am.   Contact information:  [address]  [address]  [place] [address]  [phone]             David Washington MD On 3/6/2018.    Specialty:  Orthopedic Surgery  Why:  Tuesday at 1:15pm.   Contact information:  [address]  [address]  [place] [address]  [phone]

## 2018-02-20 NOTE — PLAN OF CARE
Problem: Patient Care Overview  Goal: Plan of Care Review  Outcome: Ongoing (interventions implemented as appropriate)   02/20/18 9884   Coping/Psychosocial   Plan Of Care Reviewed With patient     Patient aao x4 and returned from surgery for amputation of L 2nd toe; surgical site wrapped w/ cast pad and ace w/ foot boot.  Pain managed with prn pain medication; added anti-anxiety prn also. BCG monitoring continued and SSI administered as needed.  Will continue to monitor surgical dressing.

## 2018-02-20 NOTE — OP NOTE
DATE OF PROCEDURE:  02/20/2018.    PREOPERATIVE DIAGNOSIS:  Left second toe wet gangrene.    POSTOPERATIVE DIAGNOSIS:  Left second toe wet gangrene.    PROCEDURE:  Partial amputation, left second toe through the proximal phalanx.    SURGEON:  David Washington M.D.    ASSISTANT:  Zoila Wright.    COMPLICATIONS:  None.    IMPLANTS:  None.    BLOOD LOSS:  Minimal.    PROCEDURE IN DETAIL:  After proper consents were obtained, the patient was taken   to the Operating Room and administered general anesthesia.  Left lower   extremity was prepped and draped in normal sterile fashion.  Incision was made   about 1 to 2 mm into the healthy-appearing skin circumferentially around the   level of the proximal IP joint.  Toe was amputated in its entirety through the   IP joint, further dissection was then performed around the proximal phalanx and   about half of the proximal phalanx was removed with cutting rongeur.  Hemostasis   was then achieved.  Wound was irrigated with 1 liter of normal saline.  It was   then closed loosely with 2-0 Vicryl and 4-0 nylon.  Sterile dressings were   applied.  Tourniquet was deflated.  The patient was aroused in Operating Room,   transferred to Recovery in stable condition.      ARON/JOHNNY  dd: 02/20/2018 11:04:53 (CST)  td: 02/20/2018 12:04:25 (CST)  Doc ID   #[id number]  Job ID #[id number]    CC:

## 2018-02-20 NOTE — NURSING
"Patient requested addtl pain medication and to resume home valium.  Dr. Washington advised via nurse, "resume home valium 5mg and add dilaudid 1mg q2h for breakthrough pain prn."  "

## 2018-02-21 LAB
POCT GLUCOSE: 199 MG/DL (ref 70–110)
POCT GLUCOSE: 221 MG/DL (ref 70–110)
POCT GLUCOSE: 229 MG/DL (ref 70–110)
POCT GLUCOSE: 297 MG/DL (ref 70–110)

## 2018-02-21 PROCEDURE — 63600175 PHARM REV CODE 636 W HCPCS: Performed by: EMERGENCY MEDICINE

## 2018-02-21 PROCEDURE — 25000003 PHARM REV CODE 250

## 2018-02-21 PROCEDURE — 25000003 PHARM REV CODE 250: Performed by: EMERGENCY MEDICINE

## 2018-02-21 PROCEDURE — S4991 NICOTINE PATCH NONLEGEND: HCPCS

## 2018-02-21 PROCEDURE — 11000001 HC ACUTE MED/SURG PRIVATE ROOM

## 2018-02-21 RX ADMIN — ATORVASTATIN CALCIUM 80 MG: 40 TABLET, FILM COATED ORAL at 08:02

## 2018-02-21 RX ADMIN — CLOPIDOGREL BISULFATE 75 MG: 75 TABLET ORAL at 08:02

## 2018-02-21 RX ADMIN — VANCOMYCIN HYDROCHLORIDE 1250 MG: 1 INJECTION, POWDER, LYOPHILIZED, FOR SOLUTION INTRAVENOUS at 05:02

## 2018-02-21 RX ADMIN — INSULIN ASPART 2 UNITS: 100 INJECTION, SOLUTION INTRAVENOUS; SUBCUTANEOUS at 09:02

## 2018-02-21 RX ADMIN — PANTOPRAZOLE SODIUM 40 MG: 40 TABLET, DELAYED RELEASE ORAL at 08:02

## 2018-02-21 RX ADMIN — VANCOMYCIN HYDROCHLORIDE 1250 MG: 1 INJECTION, POWDER, LYOPHILIZED, FOR SOLUTION INTRAVENOUS at 04:02

## 2018-02-21 RX ADMIN — TRAZODONE HYDROCHLORIDE 100 MG: 50 TABLET ORAL at 09:02

## 2018-02-21 RX ADMIN — OXYCODONE HYDROCHLORIDE AND ACETAMINOPHEN 1 TABLET: 10; 325 TABLET ORAL at 07:02

## 2018-02-21 RX ADMIN — INSULIN ASPART 4 UNITS: 100 INJECTION, SOLUTION INTRAVENOUS; SUBCUTANEOUS at 12:02

## 2018-02-21 RX ADMIN — INSULIN ASPART 2 UNITS: 100 INJECTION, SOLUTION INTRAVENOUS; SUBCUTANEOUS at 04:02

## 2018-02-21 RX ADMIN — NICOTINE 1 PATCH: 21 PATCH, EXTENDED RELEASE TRANSDERMAL at 08:02

## 2018-02-21 RX ADMIN — OXYCODONE HYDROCHLORIDE AND ACETAMINOPHEN 1 TABLET: 10; 325 TABLET ORAL at 06:02

## 2018-02-21 RX ADMIN — INSULIN ASPART 6 UNITS: 100 INJECTION, SOLUTION INTRAVENOUS; SUBCUTANEOUS at 08:02

## 2018-02-21 RX ADMIN — OXYCODONE HYDROCHLORIDE AND ACETAMINOPHEN 1 TABLET: 10; 325 TABLET ORAL at 12:02

## 2018-02-21 NOTE — NURSING
Pt has remained free from falls and or injuries this shift. Bed is locked and low with call light within reach with side rails up x2. Dressing CDI to left foot. Will continue to monitor.

## 2018-02-21 NOTE — PROGRESS NOTES
Progress Note    Admit Date: 2/18/2018   LOS: 3 days     SUBJECTIVE:     Follow-up For:  Left foot 2nd toe dry gangrene     02/21/2018: underwent removal of left 2nd toe through the prox phalanx on 02/20 02/20/2018: awaiting for OR today   02/19/2018: no significant pain to left foot. Tolerating current tx         Scheduled Meds:   atorvastatin  80 mg Oral Daily    clopidogrel  75 mg Oral Daily    lidocaine (PF) 10 mg/ml (1%)  1 mL Intradermal Once    lisinopril  5 mg Oral Daily    nicotine  1 patch Transdermal Daily    pantoprazole  40 mg Oral Daily    pregabalin  75 mg Oral BID    traZODone  100 mg Oral QHS    vancomycin (VANCOCIN) IVPB  1,250 mg Intravenous Q12H     Continuous Infusions:   sodium chloride 0.9% 125 mL/hr at 02/20/18 0407    lactated ringers 10 mL/hr at 02/20/18 1139     PRN Meds:dextrose 50%, dextrose 50%, diazePAM, glucagon (human recombinant), glucose, glucose, HYDROmorphone, insulin aspart U-100, oxyCODONE-acetaminophen    Review of patient's allergies indicates:   Allergen Reactions    Codeine Rash     Other reaction(s): Unknown    Vicodin [hydrocodone-acetaminophen] Rash       Review of Systems    Constitutional: + fatigue and nerve pain   Eyes: no visual changes   ENT: no nasal congestion. Denies sore throat with odynophagia   Respiratory: No cough, SOB, exertional dyspnea or  hemoptysis   Cardiovascular: no chest pain or palpitations   Gastrointestinal: no nausea, vomiting or abdominal pain. Normal bowl habits   Hematologic/Lymphatic: No lymphadenopathy, no significant fatigue, fever or night sweat.   Musculoskeletal: see HPI  Neurological: no seizures or tremors, gait or balance prblems  Skin: left foot- 2nd toe removed- foot in surgical dressing.   Psych: Denies any anxiety, depression or insomnia    OBJECTIVE:     Vital Signs (Most Recent)    Temp: 98.2 °F (36.8 °C) (02/21/18 0733)  Pulse: 72 (02/21/18 0733)  Resp: 18 (02/21/18 0733)  BP: (!) 145/69 (02/21/18 0733)  SpO2:  96 % (02/21/18 0733)    Vital Signs Range (Last 24H):    Temp:  [97.4 °F (36.3 °C)-98.3 °F (36.8 °C)]   Pulse:  []   Resp:  [11-25]   BP: ()/(52-69)   SpO2:  [93 %-98 %]     I & O (Last 24H):    Intake/Output Summary (Last 24 hours) at 02/21/18 0742  Last data filed at 02/20/18 1800   Gross per 24 hour   Intake              400 ml   Output             1075 ml   Net             -675 ml     Physical Exam:    General: NAD, resting in bed.   Head: normocephalic, atraumatic   Eyes: conjunctivae pink, anicteric sclera.   Throat: No erythema or post nasal discharge   Neck: supple, no LAD   Lungs: decreased BS at the bases   Heart: S1, S2, RRR   Abdomen: obese,  + BS x 4 quadrants  Extremities: left foot 2nd toe with dry gangrene and redness to base   Skin: turgor normal,  MS: hx of right big toe amputation   Psy: pleasant, affect is congruent to mood       Laboratory:  CBC:     Recent Labs  Lab 02/19/18  0440   WBC 5.74   RBC 4.55*   HGB 13.2*   HCT 39.9*      MCV 88   MCH 29.0   MCHC 33.1     CMP:     Recent Labs  Lab 02/19/18  0440   *   CALCIUM 9.3   ALBUMIN 3.0*   PROT 7.2   *   K 4.6   CO2 24      BUN 28*   CREATININE 1.2   ALKPHOS 79   ALT 19   AST 13   BILITOT 0.2     Coagulation: No results for input(s): LABPROT, INR, APTT in the last 168 hours.  Cardiac markers: No results for input(s): CKMB, CPKMB, TROPONINT, TROPONINI, MYOGLOBIN in the last 168 hours.  ABGs: No results for input(s): PH, PCO2, PO2, HCO3, POCSATURATED, BE in the last 168 hours.  Microbiology Results (last 7 days)     ** No results found for the last 168 hours. **        Specimen (12h ago through future)    None          Diagnostic Results:    Current Facility-Administered Medications   Medication Dose Route Frequency Provider Last Rate Last Dose    0.9%  NaCl infusion   Intravenous Continuous David Mccall  mL/hr at 02/20/18 0407      atorvastatin tablet 80 mg  80 mg Oral Daily David Mccall MD         clopidogrel tablet 75 mg  75 mg Oral Daily David Mccall MD   75 mg at 02/20/18 1134    dextrose 50% injection 12.5 g  12.5 g Intravenous PRN David Mccall MD        dextrose 50% injection 25 g  25 g Intravenous PRN David Mccall MD        diazePAM tablet 5 mg  5 mg Oral Q6H PRN David Washington MD   5 mg at 02/20/18 1610    glucagon (human recombinant) injection 1 mg  1 mg Intramuscular PRN David Mccall MD        glucose chewable tablet 16 g  16 g Oral PRN David Mccall MD        glucose chewable tablet 24 g  24 g Oral PRN David Mccall MD        hydromorphone (PF) injection 1 mg  1 mg Intravenous Q2H PRN David Washington MD   1 mg at 02/20/18 2114    insulin aspart U-100 pen 1-10 Units  1-10 Units Subcutaneous QID (AC + HS) PRN David Mccall MD   4 Units at 02/20/18 1657    lactated ringers infusion   Intravenous Continuous Huy Fields MD 10 mL/hr at 02/20/18 1139      lidocaine (PF) 10 mg/ml (1%) injection 10 mg  1 mL Intradermal Once Huy Fields MD        lisinopril tablet 5 mg  5 mg Oral Daily David Mccall MD   Stopped at 02/19/18 0928    nicotine 21 mg/24 hr 1 patch  1 patch Transdermal Daily Phan Singleton MD   1 patch at 02/20/18 1140    oxyCODONE-acetaminophen  mg per tablet 1 tablet  1 tablet Oral Q6H PRN Phan Singleton MD   1 tablet at 02/21/18 0632    pantoprazole EC tablet 40 mg  40 mg Oral Daily David Mccall MD   40 mg at 02/20/18 1134    pregabalin capsule 75 mg  75 mg Oral BID Phan Singleton MD        traZODone tablet 100 mg  100 mg Oral QHS Phan Singleton MD   100 mg at 02/20/18 2116    vancomycin (VANCOCIN) 1,250 mg in sodium chloride 0.9% 250 mL IVPB  1,250 mg Intravenous Q12H David Mccall .7 mL/hr at 02/21/18 0523 1,250 mg at 02/21/18 0523         ASSESSMENT/PLAN:     Active Hospital Problems    Diagnosis  POA    *Diabetic foot infection [E11.69, L08.9]    2nd toe of L foot  Ortho consulted and  underwent removal of infected toe   - continue current tx  - await final pathology to make sure no osteo on margins .     Yes    Chronic pain syndrome [G89.4]- continue home medications      Yes    Diabetic polyneuropathy associated with type 2 diabetes mellitus     [E11.42]  - pt's DM difficult to control due to poor compliance   - on Pregablin     Yes    Chronic back pain [M54.9, G89.29]  - on oxycodone    Yes    Type II diabetes mellitus with neurological manifestations [E11.49]    - insulin   Yes      Resolved Hospital Problems    Diagnosis Date Resolved POA   No resolved problems to display.     DVT prophylaxis: SCD    Miguel Lux M.D  Internal Medicine & Geriatric Medicine  Hematology & Oncology  Palliative Medicine    [address], [address]  [place] [address] [phone] (Office)  [phone] (Fax)

## 2018-02-21 NOTE — PROGRESS NOTES
POD#1  Patient doing well. No new complaints. Dressing removed.   VSSAF    Left foot: Surgical dsg removed. No pus or drainage. Skin distal to 2nd toe looks a little dusky. Unsure of how well this will heal but ok to watch outpatient.   BS: 297    A/P: POD#1 left 2nd toe partial amp/ uncontrolled DM  1) ok from ortho standpt to d/c home to self care when medically stable  2) change dsg  3) f/u with Dr Washington in 1 week

## 2018-02-22 VITALS
TEMPERATURE: 99 F | WEIGHT: 252.88 LBS | OXYGEN SATURATION: 97 % | HEART RATE: 76 BPM | SYSTOLIC BLOOD PRESSURE: 133 MMHG | HEIGHT: 69 IN | DIASTOLIC BLOOD PRESSURE: 67 MMHG | RESPIRATION RATE: 18 BRPM | BODY MASS INDEX: 37.45 KG/M2

## 2018-02-22 LAB
POCT GLUCOSE: 283 MG/DL (ref 70–110)
POCT GLUCOSE: 320 MG/DL (ref 70–110)
POCT GLUCOSE: 325 MG/DL (ref 70–110)

## 2018-02-22 PROCEDURE — 25000003 PHARM REV CODE 250: Performed by: INTERNAL MEDICINE

## 2018-02-22 PROCEDURE — 25000003 PHARM REV CODE 250

## 2018-02-22 PROCEDURE — 63600175 PHARM REV CODE 636 W HCPCS: Performed by: EMERGENCY MEDICINE

## 2018-02-22 PROCEDURE — 25000003 PHARM REV CODE 250: Performed by: EMERGENCY MEDICINE

## 2018-02-22 RX ORDER — DOXYCYCLINE HYCLATE 100 MG
100 TABLET ORAL EVERY 12 HOURS
Qty: 20 TABLET | Refills: 0 | Status: SHIPPED | OUTPATIENT
Start: 2018-02-22 | End: 2018-03-05 | Stop reason: SDUPTHER

## 2018-02-22 RX ORDER — OXYCODONE HYDROCHLORIDE 10 MG/1
10 TABLET ORAL EVERY 8 HOURS PRN
Qty: 90 TABLET | Refills: 0 | Status: SHIPPED | OUTPATIENT
Start: 2018-02-22 | End: 2018-03-06 | Stop reason: SDUPTHER

## 2018-02-22 RX ORDER — DOXYCYCLINE HYCLATE 100 MG
100 TABLET ORAL EVERY 12 HOURS
Status: DISCONTINUED | OUTPATIENT
Start: 2018-02-22 | End: 2018-02-22 | Stop reason: HOSPADM

## 2018-02-22 RX ADMIN — DOXYCYCLINE HYCLATE 100 MG: 100 TABLET, COATED ORAL at 08:02

## 2018-02-22 RX ADMIN — PANTOPRAZOLE SODIUM 40 MG: 40 TABLET, DELAYED RELEASE ORAL at 08:02

## 2018-02-22 RX ADMIN — OXYCODONE HYDROCHLORIDE AND ACETAMINOPHEN 1 TABLET: 10; 325 TABLET ORAL at 10:02

## 2018-02-22 RX ADMIN — OXYCODONE HYDROCHLORIDE AND ACETAMINOPHEN 1 TABLET: 10; 325 TABLET ORAL at 02:02

## 2018-02-22 RX ADMIN — INSULIN ASPART 6 UNITS: 100 INJECTION, SOLUTION INTRAVENOUS; SUBCUTANEOUS at 12:02

## 2018-02-22 RX ADMIN — CLOPIDOGREL BISULFATE 75 MG: 75 TABLET ORAL at 08:02

## 2018-02-22 RX ADMIN — INSULIN ASPART 8 UNITS: 100 INJECTION, SOLUTION INTRAVENOUS; SUBCUTANEOUS at 08:02

## 2018-02-22 RX ADMIN — ATORVASTATIN CALCIUM 80 MG: 40 TABLET, FILM COATED ORAL at 08:02

## 2018-02-22 RX ADMIN — VANCOMYCIN HYDROCHLORIDE 1250 MG: 1 INJECTION, POWDER, LYOPHILIZED, FOR SOLUTION INTRAVENOUS at 05:02

## 2018-02-22 NOTE — NURSING
D/C instructions given to patient. Pt stated understanding of follow up appts, medications to get filled and how to take, and care of L foot. IV d/c'd, cath tip intact, pt tolerated well. Pt in no distress. Pt discharged to sister.

## 2018-02-22 NOTE — PLAN OF CARE
[facility] HOME HEALTH ORDERS  FACE TO FACE ENCOUNTER    Patient Name: Carlos Cifuentes  YOB: 1960    PCP: Miguel Lux MD   PCP Address: [address]  PCP Phone Number: [phone]  PCP Fax: [phone]    Encounter Date: 02/22/2018    Admit to Home Health    Diagnoses:  Active Hospital Problems    Diagnosis  POA    *Diabetic foot infection [E11.69, L08.9]  Yes    Chronic pain syndrome [G89.4]  Yes    Diabetic polyneuropathy associated with type 2 diabetes mellitus [E11.42]  Yes    Chronic back pain [M54.9, G89.29]  Yes    Type II diabetes mellitus with neurological manifestations [E11.49]  Yes      Resolved Hospital Problems    Diagnosis Date Resolved POA   No resolved problems to display.       Future Appointments  Date Time Provider Department Center   3/6/2018 10:00 AM Miguel Lux MD [facility]   4/24/2018 10:00 AM Aviva Lux MD [facility]   8/28/2018 11:00 AM LAB, [facility]     Follow-up Information     Miguel Lux MD On 3/6/2018.    Specialties:  Internal Medicine, Oncology, Hematology and Oncology  Why:  Tuesday at 10am.   Contact information:  [address] [place] [address]  [phone]             David Washington MD On 3/6/2018.    Specialty:  Orthopedic Surgery  Why:  Tuesday at 1:15pm.   Contact information:  [address]  [place] [address]  [phone]                     I have seen and examined this patient face to face today. My clinical findings that support the need for the home health skilled services and home bound status are the following:  Medical restrictions requiring assistance of another human to leave home due to  Wound care needs.    Allergies:  Review of patient's allergies indicates:   Allergen Reactions    Codeine Rash     Other reaction(s): Unknown    Vicodin [hydrocodone-acetaminophen] Rash       Diet: diabetic diet:  2000 calorie    Activities: activity as tolerated    Nursing:   SN to complete comprehensive assessment including routine vital signs. Instruct on disease process and s/s of complications to report to MD. Review/verify medication list sent home with the patient at time of discharge  and instruct patient/caregiver as needed. Frequency may be adjusted depending on start of care date.    Notify MD if SBP > 160 or < 90; DBP > 90 or < 50; HR > 120 or < 50; Temp > 101; Other:       SN to evaluate for wound care    MISCELLANEOUS CARE:  Diabetic Care:   SN to perform and educate Diabetic management with blood glucose monitoring:    WOUND CARE ORDERS  yes:  Left Foot Ulcer:  Location: 2nd  toe; eschar noted    { FOOT ULCER TREATMENT:65890}      Medications: Review discharge medications with patient and family and provide education.      Current Discharge Medication List      START taking these medications    Details   doxycycline (VIBRA-TABS) 100 MG tablet Take 1 tablet (100 mg total) by mouth every 12 (twelve) hours.  Qty: 20 tablet, Refills: 0    Associated Diagnoses: Diabetic foot infection         CONTINUE these medications which have CHANGED    Details   oxyCODONE (ROXICODONE) 10 mg Tab immediate release tablet Take 1 tablet (10 mg total) by mouth every 8 (eight) hours as needed for Pain.  Qty: 90 tablet, Refills: 0    Associated Diagnoses: Chronic bilateral low back pain without sciatica         CONTINUE these medications which have NOT CHANGED    Details   aspirin (ECOTRIN) 81 MG EC tablet Take 1 tablet (81 mg total) by mouth once daily.  Refills: 0      atorvastatin (LIPITOR) 80 MG tablet Take 1 tablet (80 mg total) by mouth once daily.  Qty: 90 tablet, Refills: 3      clopidogrel (PLAVIX) 75 mg tablet Take 1 tablet (75 mg total) by mouth once daily.  Qty: 30 tablet, Refills: 3      diazePAM (VALIUM) 5 MG tablet Take 5 mg by mouth every 6 (six) hours as needed for Anxiety.      glimepiride (AMARYL) 2 MG tablet Take  1 tablet (2 mg total) by mouth every morning.  Qty: 30 tablet, Refills: 11    Associated Diagnoses: DM type 2, uncontrolled, with neuropathy      insulin aspart (NOVOLOG) 100 unit/mL InPn pen Inject 8 Units into the skin 3 (three) times daily.  Qty: 9 mL, Refills: 3    Associated Diagnoses: DM type 2, uncontrolled, with neuropathy      metformin (GLUCOPHAGE) 1000 MG tablet Take 1 tablet (1,000 mg total) by mouth 2 (two) times daily with meals.  Qty: 60 tablet, Refills: 11    Associated Diagnoses: Uncontrolled type 2 diabetes mellitus with diabetic polyneuropathy, with long-term current use of insulin      omeprazole (PRILOSEC) 20 MG capsule Take 1 capsule (20 mg total) by mouth once daily.  Qty: 30 capsule, Refills: 0      pantoprazole (PROTONIX) 40 MG tablet Take 1 tablet (40 mg total) by mouth once daily.  Qty: 90 tablet, Refills: 3    Associated Diagnoses: GERD without esophagitis      pregabalin (LYRICA) 75 MG capsule Take 1 capsule (75 mg total) by mouth 2 (two) times daily.  Qty: 60 capsule, Refills: 11    Associated Diagnoses: Type II diabetes mellitus with neurological manifestations      docusate sodium (COLACE) 100 MG capsule Take 100 mg by mouth as needed for Constipation.      heparin, porcine, PF, (HEPARIN FLUSH 100 UNITS/ML) 100 unit/mL Syrg       lactulose (CEPHULAC) 20 gram Pack Take 20 g by mouth 3 (three) times daily.      lactulose (CHRONULAC) 10 gram/15 mL solution       LANTUS SOLOSTAR 100 unit/mL (3 mL) InPn pen Inject 30 Units into the skin every evening.  Qty: 15 mL, Refills: 3    Associated Diagnoses: DM type 2, uncontrolled, with neuropathy      ledipasvir-sofosbuvir (HARVONI)  mg Tab Take 1 tablet by mouth once daily.  Qty: 28 tablet, Refills: 5      lisinopril (PRINIVIL,ZESTRIL) 5 MG tablet Take 1 tablet (5 mg total) by mouth once daily.  Qty: 90 tablet, Refills: 3    Associated Diagnoses: Essential hypertension      !! nicotine (NICODERM CQ) 14 mg/24 hr Place 1 patch onto the skin  "once daily.  Qty: 28 patch, Refills: 3    Associated Diagnoses: Tobacco abuse      !! nicotine (NICODERM CQ) 14 mg/24 hr Place 1 patch onto the skin once daily.  Qty: 30 patch, Refills: 6    Associated Diagnoses: Smoking      NORMAL SALINE FLUSH 0.9 % injection       potassium chloride SA (K-DUR,KLOR-CON) 10 MEQ tablet Take 1 tablet (10 mEq total) by mouth once daily as needed when experiencing leg/muscle cramps.  Refills: 1      syringe with needle 3 mL 23 gauge x 1 1/2" Syrg 1 Device by Misc.(Non-Drug; Combo Route) route every 7 days.  Qty: 100 Syringe, Refills: 6    Associated Diagnoses: Hypogonadism in male      testosterone cypionate (DEPOTESTOTERONE CYPIONATE) 200 mg/mL injection Inject 0.5 mLs (100 mg total) into the muscle every 7 days.  Qty: 10 mL, Refills: 3    Associated Diagnoses: Hypogonadism in male      trazodone (DESYREL) 100 MG tablet Take 1 tablet (100 mg total) by mouth every evening.  Qty: 90 tablet, Refills: 1    Comments: This prescription was filled today. Any refills authorized will be placed on file.  Associated Diagnoses: Primary insomnia      TRUE METRIX GLUCOSE METER Misc AS DIRECTED  Refills: 0      TRUE METRIX GLUCOSE TEST STRIP Strp Twice daily blood sugar checks  Qty: 100 strip, Refills: 9    Associated Diagnoses: DM type 2, uncontrolled, with neuropathy      vancomycin (VANCOCIN) 1,000 mg injection        !! - Potential duplicate medications found. Please discuss with provider.      STOP taking these medications       esomeprazole (NEXIUM) 40 MG capsule Comments:   Reason for Stopping:         ciprofloxacin HCl (CIPRO) 500 MG tablet Comments:   Reason for Stopping:         sulfamethoxazole-trimethoprim 800-160mg (BACTRIM DS) 800-160 mg Tab Comments:   Reason for Stopping:               I certify that this patient is confined to his home and needs intermittent skilled nursing care.    _______________________________  Dr.Shibu Lux    "

## 2018-02-22 NOTE — PLAN OF CARE
Problem: Patient Care Overview  Goal: Plan of Care Review  Outcome: Ongoing (interventions implemented as appropriate)  Resting appropriately this shift. Complaint of pain adequately relieved with PRN medication. IV fluids and antibiotics administered as ordered. VSSAF. Dressing to foot clean, dry, and intact. Remains free from falls or trauma.

## 2018-02-22 NOTE — PLAN OF CARE
02/22/18 1242   Final Note   Assessment Type Final Discharge Note   Discharge Disposition Home   What phone number can be called within the next 1-3 days to see how you are doing after discharge? [phone]   Hospital Follow Up  Appt(s) scheduled? Yes   Discharge plans and expectations educations in teach back method with documentation complete? Yes   Right Care Referral Info   Post Acute Recommendation No Care

## 2018-02-22 NOTE — PLAN OF CARE
Problem: Patient Care Overview  Goal: Plan of Care Review  Outcome: Ongoing (interventions implemented as appropriate)  Pt free from falls, injury or any further trauma throughout shift. Pt AAOx4. Continued medications as ordered. Complaitns of pain trhoughout shift, controlled with medications. Dressing changed per order. Pt uses boot while up. Will cont to monitor.    02/21/18 2803   Coping/Psychosocial   Plan Of Care Reviewed With patient

## 2018-02-22 NOTE — PROGRESS NOTES
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 Denies fever or chills   02/21/2018: underwent removal of left 2nd toe through the prox phalanx on 02/20 02/20/2018: awaiting for OR today   02/19/2018: no significant pain to left foot. Tolerating current tx         Scheduled Meds:   atorvastatin  80 mg Oral Daily    clopidogrel  75 mg Oral Daily    lidocaine (PF) 10 mg/ml (1%)  1 mL Intradermal Once    lisinopril  5 mg Oral Daily    nicotine  1 patch Transdermal Daily    pantoprazole  40 mg Oral Daily    pregabalin  75 mg Oral BID    traZODone  100 mg Oral QHS    vancomycin (VANCOCIN) IVPB  1,250 mg Intravenous Q12H     Continuous Infusions:   sodium chloride 0.9% 125 mL/hr at 02/20/18 0407    lactated ringers 10 mL/hr at 02/20/18 1139     PRN Meds:dextrose 50%, dextrose 50%, diazePAM, glucagon (human recombinant), glucose, glucose, HYDROmorphone, insulin aspart U-100, oxyCODONE-acetaminophen    Review of patient's allergies indicates:   Allergen Reactions    Codeine Rash     Other reaction(s): Unknown    Vicodin [hydrocodone-acetaminophen] Rash       Review of Systems    Constitutional: + fatigue.   Eyes: no visual changes   ENT: no nasal congestion. Denies sore throat with odynophagia   Respiratory: No cough, SOB, exertional dyspnea or  hemoptysis   Cardiovascular: no chest pain or palpitations   Gastrointestinal: no nausea, vomiting or abdominal pain. Normal bowl habits   Hematologic/Lymphatic: No lymphadenopathy, no significant fatigue, fever or night sweat.   Musculoskeletal: see HPI  Neurological: no seizures or tremors, gait or balance prblems  Skin: left foot- 2nd toe removed- foot in surgical dressing.   Psych: Denies any anxiety, depression or insomnia    OBJECTIVE:     Vital Signs (Most Recent)    Temp: 98 °F (36.7 °C) (02/22/18 0350)  Pulse: 66 (02/22/18 0350)  Resp: 18 (02/22/18  0350)  BP: 113/65 (02/22/18 0350)  SpO2: (!) 94 % (02/22/18 0350)    Vital Signs Range (Last 24H):    Temp:  [97.4 °F (36.3 °C)-98.2 °F (36.8 °C)]   Pulse:  [66-76]   Resp:  [17-18]   BP: (113-153)/(65-79)   SpO2:  [94 %-99 %]     I & O (Last 24H):    Intake/Output Summary (Last 24 hours) at 02/22/18 0759  Last data filed at 02/22/18 0400   Gross per 24 hour   Intake              480 ml   Output              850 ml   Net             -370 ml     Physical Exam:    General: NAD, resting in bed.   Head: normocephalic, atraumatic   Eyes: conjunctivae pink, anicteric sclera.   Throat: No erythema or post nasal discharge   Neck: supple, no LAD   Lungs: decreased BS at the bases   Heart: S1, S2, RRR   Abdomen: obese,  + BS x 4 quadrants  Extremities: left foot 2nd toe - s/p resection- dressing intact.   Skin: turgor normal,  MS: hx of right big toe amputation   Psy: pleasant, affect is congruent to mood       Laboratory:  CBC:     Recent Labs  Lab 02/19/18  0440   WBC 5.74   RBC 4.55*   HGB 13.2*   HCT 39.9*      MCV 88   MCH 29.0   MCHC 33.1     CMP:     Recent Labs  Lab 02/19/18  0440   *   CALCIUM 9.3   ALBUMIN 3.0*   PROT 7.2   *   K 4.6   CO2 24      BUN 28*   CREATININE 1.2   ALKPHOS 79   ALT 19   AST 13   BILITOT 0.2     Coagulation: No results for input(s): LABPROT, INR, APTT in the last 168 hours.  Cardiac markers: No results for input(s): CKMB, CPKMB, TROPONINT, TROPONINI, MYOGLOBIN in the last 168 hours.  ABGs: No results for input(s): PH, PCO2, PO2, HCO3, POCSATURATED, BE in the last 168 hours.  Microbiology Results (last 7 days)     ** No results found for the last 168 hours. **        Specimen (12h ago through future)    None          Diagnostic Results:    Current Facility-Administered Medications   Medication Dose Route Frequency Provider Last Rate Last Dose    0.9%  NaCl infusion   Intravenous Continuous David Mccall  mL/hr at 02/20/18 7515      atorvastatin tablet 80  mg  80 mg Oral Daily David Mccall MD   80 mg at 02/21/18 0821    clopidogrel tablet 75 mg  75 mg Oral Daily David Mccall MD   75 mg at 02/21/18 0821    dextrose 50% injection 12.5 g  12.5 g Intravenous PRN David Mccall MD        dextrose 50% injection 25 g  25 g Intravenous PRN David Mccall MD        diazePAM tablet 5 mg  5 mg Oral Q6H PRN David Washington MD   5 mg at 02/20/18 1610    glucagon (human recombinant) injection 1 mg  1 mg Intramuscular PRN David Mccall MD        glucose chewable tablet 16 g  16 g Oral PRN David Mccall MD        glucose chewable tablet 24 g  24 g Oral PRN David Mccall MD        hydromorphone (PF) injection 1 mg  1 mg Intravenous Q2H PRN David Washington MD   1 mg at 02/20/18 2114    insulin aspart U-100 pen 1-10 Units  1-10 Units Subcutaneous QID (AC + HS) PRN David Mccall MD   2 Units at 02/21/18 2135    lactated ringers infusion   Intravenous Continuous Huy Fields MD 10 mL/hr at 02/20/18 1139      lidocaine (PF) 10 mg/ml (1%) injection 10 mg  1 mL Intradermal Once Huy Fields MD        lisinopril tablet 5 mg  5 mg Oral Daily David Mccall MD   Stopped at 02/19/18 0928    nicotine 21 mg/24 hr 1 patch  1 patch Transdermal Daily Phan Singleton MD   1 patch at 02/21/18 0824    oxyCODONE-acetaminophen  mg per tablet 1 tablet  1 tablet Oral Q6H PRN Phan Singleton MD   1 tablet at 02/22/18 0242    pantoprazole EC tablet 40 mg  40 mg Oral Daily David Mccall MD   40 mg at 02/21/18 0821    pregabalin capsule 75 mg  75 mg Oral BID Phan Singleton MD        traZODone tablet 100 mg  100 mg Oral QHS Phan Singleton MD   100 mg at 02/21/18 2135    vancomycin (VANCOCIN) 1,250 mg in sodium chloride 0.9% 250 mL IVPB  1,250 mg Intravenous Q12H David Mccall .7 mL/hr at 02/22/18 0555 1,250 mg at 02/22/18 0555         ASSESSMENT/PLAN:     Active Hospital Problems    Diagnosis  POA    *Diabetic foot infection  [E11.69, L08.9]    2nd toe of L foot  Ortho consulted and underwent removal of infected toe   - pathology reviewed     - ok to go home from ortho stand point.      Yes    Chronic pain syndrome [G89.4]- continue home medications      Yes    Diabetic polyneuropathy associated with type 2 diabetes mellitus     [E11.42]  - pt's DM difficult to control due to poor compliance   - on Pregablin     Yes    Chronic back pain [M54.9, G89.29]  - on oxycodone    Yes    Type II diabetes mellitus with neurological manifestations [E11.49]  - insulin     Yes      Resolved Hospital Problems    Diagnosis Date Resolved POA   No resolved problems to display.     DVT prophylaxis: SCD    DC Home today     Miguel Lux M.D  Internal Medicine & Geriatric Medicine  Hematology & Oncology  Palliative Medicine    [address] [facility], [address]  [place] [address] [phone] (Office)  [phone] (Fax)

## 2018-02-22 NOTE — NURSING
Pt agitated about not being able to leave yet. Placed call to Katya asking if HH had been set up. CM stated no but pt can be d/c and she will call with HH information.

## 2018-02-23 ENCOUNTER — PATIENT OUTREACH (OUTPATIENT)
Dept: ADMINISTRATIVE | Facility: CLINIC | Age: 58
End: 2018-02-23

## 2018-02-23 LAB
BACTERIA BLD CULT: NORMAL
BACTERIA BLD CULT: NORMAL

## 2018-02-23 NOTE — PROGRESS NOTES
C3 nurse attempted to contact patient. No answer. The following message was left for the patient to return the call:  Good afternoon  I am a nurse calling on behalf of [facility] from the [facility].  This is a Transitional Care Call for Carlos Cifuentes . When you have a moment please contact us at [phone] or [phone] Monday through Friday, between the hours of 8 am to 4 pm. We look forward to speaking with you. On behalf of [facility] have a nice day.    The patient has a scheduled HOSFU appointment with Miguel Lux MD  on 3/6 @ 1000. Message sent to Physician staff. NON [facility].

## 2018-02-23 NOTE — DISCHARGE SUMMARY
[facility]  Discharge Summary      Patient Name: Carlos Cifuentes  MRN: [id number]  Admission Date: 2/18/2018  Hospital Length of Stay: 4 days  Discharge Date and Time: 2/22/2018  3:20 PM  Attending Physician: No att. providers found   Discharging Provider: Miguel Sapp MD  Primary Care Provider: Miguel Sapp MD    HPI:   Per Dr. Singleton    56 yo WM followed by Dr. Miguel Sapp.  Has DM2 with circulatory and neurologic complications.  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  Now presents with discoloration of L second toe.  He says he has been wearing certain shoes for a month and they have been uncomfortable.  He began having worsening pain in his toes on the Left foot.  He also sees Dr. Aviva Sapp for his DM.  He went to a physician on Jan 19 and received antibiotics (he had both cipro and bactrim on his med list).  Dr. Chicas had given him lyrica for his neuropathic pain.  Today the pain became worse, so he first went to the [facility].  Admit was planned, but he left AMA because he had things to do at home.  When those things were completed, he then presented to the [facility] ER for admit.  He has discoloration of the second L toe - worse on the dorsum distally.  He appears to have skin breakdown on the knuckle.  He also has discoloration on the plantar pad.  He is being admitted for ortho eval.  He has been placed on empiric vanco.  Also, he has chronic lower back pain and takes oxycodone - this will be resumed, as he is complaining of a significant degree of pain.           Procedure(s) (LRB):  AMPUTATION LEFT 2ND TOE (Left)     Hospital Course:     During this hospitalization, these following conditions were addressed and managed along with other comorbid conditions:      Active Hospital Problems     Diagnosis   POA    *Diabetic foot infection [E11.69, L08.9]     2nd toe of L foot  Ortho consulted and underwent removal of infected toe   - pathology  reviewed      - ok to go home from ortho stand point.      DC home today      Yes    Chronic pain syndrome [G89.4]- continue home medications         Yes    Diabetic polyneuropathy associated with type 2 diabetes mellitus      [E11.42]  - pt's DM difficult to control due to poor compliance   - on Pregablin       Yes    Chronic back pain [M54.9, G89.29]  - on oxycodone      Yes    Type II diabetes mellitus with neurological manifestations [E11.49]  - insulin       Yes       Resolved Hospital Problems     Diagnosis Date Resolved POA   No resolved problems to display.      DVT prophylaxis: SCD     DC Home today       Consults:   Consults         Status Ordering Provider     Inpatient consult to Orthopedic Surgery  Once     Provider:  Emanuel Tinoco MD    Completed DAVID NEELY          Significant Diagnostic Studies: see HPI    Pending Diagnostic Studies:     None        Final Active Diagnoses:    Diagnosis Date Noted POA    PRINCIPAL PROBLEM:  Diabetic foot infection [E11.69, L08.9] 02/18/2018 Yes    Chronic pain syndrome [G89.4] 09/20/2017 Yes    Diabetic polyneuropathy associated with type 2 diabetes mellitus [E11.42] 04/22/2016 Yes    Chronic back pain [M54.9, G89.29] 10/14/2014 Yes    Type II diabetes mellitus with neurological manifestations [E11.49] 10/09/2014 Yes      Problems Resolved During this Admission:    Diagnosis Date Noted Date Resolved POA      Discharged Condition: stable     Activity: As tolerated    Diet: Diabetic     Disposition: Home or Self Care    Follow Up:  Follow-up Information     Miguel Lux MD On 3/6/2018.    Specialties:  Internal Medicine, Oncology, Hematology and Oncology  Why:  Tuesday at 10am.   Contact information:  [address]  [address]  [place] [address]  [phone]             David Washington MD On 3/6/2018.    Specialty:  Orthopedic Surgery  Why:  Tuesday at 1:15pm.   Contact information:  [address]  [address]  [place]  [address]  [phone]                 Patient Instructions:   No discharge procedures on file.  Medications:  Reconciled Home Medications:   Discharge Medication List as of 2/22/2018  2:30 PM      START taking these medications    Details   doxycycline (VIBRA-TABS) 100 MG tablet Take 1 tablet (100 mg total) by mouth every 12 (twelve) hours., Starting Thu 2/22/2018, Normal         CONTINUE these medications which have CHANGED    Details   oxyCODONE (ROXICODONE) 10 mg Tab immediate release tablet Take 1 tablet (10 mg total) by mouth every 8 (eight) hours as needed for Pain., Starting Thu 2/22/2018, Normal         CONTINUE these medications which have NOT CHANGED    Details   aspirin (ECOTRIN) 81 MG EC tablet Take 1 tablet (81 mg total) by mouth once daily., Starting 4/12/2017, Until Thu 4/12/18, OTC      atorvastatin (LIPITOR) 80 MG tablet Take 1 tablet (80 mg total) by mouth once daily., Starting 4/15/2017, Until Sun 4/15/18, Normal      clopidogrel (PLAVIX) 75 mg tablet Take 1 tablet (75 mg total) by mouth once daily., Starting 4/12/2017, Until Thu 4/12/18, Normal      diazePAM (VALIUM) 5 MG tablet Take 5 mg by mouth every 6 (six) hours as needed for Anxiety., Historical Med      docusate sodium (COLACE) 100 MG capsule Take 100 mg by mouth as needed for Constipation., Until Discontinued, Historical Med      glimepiride (AMARYL) 2 MG tablet Take 1 tablet (2 mg total) by mouth every morning., Starting Wed 1/24/2018, Until Thu 1/24/2019, Normal      heparin, porcine, PF, (HEPARIN FLUSH 100 UNITS/ML) 100 unit/mL Syrg Starting Mon 7/24/2017, Historical Med      insulin aspart (NOVOLOG) 100 unit/mL InPn pen Inject 8 Units into the skin 3 (three) times daily., Starting Fri 6/30/2017, Until Sat 6/30/2018, Normal      lactulose (CEPHULAC) 20 gram Pack Take 20 g by mouth 3 (three) times daily., Historical Med      lactulose (CHRONULAC) 10 gram/15 mL solution Starting Wed 8/9/2017, Historical Med      LANTUS SOLOSTAR 100 unit/mL  "(3 mL) InPn pen Inject 30 Units into the skin every evening., Starting Fri 6/30/2017, Normal      ledipasvir-sofosbuvir (HARVONI)  mg Tab Take 1 tablet by mouth once daily., Starting 10/26/2016, Until Discontinued, Normal      lisinopril (PRINIVIL,ZESTRIL) 5 MG tablet Take 1 tablet (5 mg total) by mouth once daily., Starting Tue 9/19/2017, Until Wed 9/19/2018, Normal      metformin (GLUCOPHAGE) 1000 MG tablet Take 1 tablet (1,000 mg total) by mouth 2 (two) times daily with meals., Starting Wed 5/31/2017, Normal      !! nicotine (NICODERM CQ) 14 mg/24 hr Place 1 patch onto the skin once daily., Starting Tue 8/15/2017, OTC      !! nicotine (NICODERM CQ) 14 mg/24 hr Place 1 patch onto the skin once daily., Starting Thu 8/24/2017, Normal      NORMAL SALINE FLUSH 0.9 % injection Starting 4/19/2017, Until Discontinued, Historical Med      omeprazole (PRILOSEC) 20 MG capsule Take 1 capsule (20 mg total) by mouth once daily., Starting Sat 8/19/2017, Until Sun 8/19/2018, Print      pantoprazole (PROTONIX) 40 MG tablet Take 1 tablet (40 mg total) by mouth once daily., Starting Thu 9/14/2017, Normal      potassium chloride SA (K-DUR,KLOR-CON) 10 MEQ tablet Take 1 tablet (10 mEq total) by mouth once daily as needed when experiencing leg/muscle cramps., Historical Med      pregabalin (LYRICA) 75 MG capsule Take 1 capsule (75 mg total) by mouth 2 (two) times daily., Starting Mon 2/12/2018, Until Tue 2/12/2019, Print      syringe with needle 3 mL 23 gauge x 1 1/2" Syrg 1 Device by Misc.(Non-Drug; Combo Route) route every 7 days., Starting Wed 8/30/2017, Until Thu 8/30/2018, Print      testosterone cypionate (DEPOTESTOTERONE CYPIONATE) 200 mg/mL injection Inject 0.5 mLs (100 mg total) into the muscle every 7 days., Starting Wed 8/30/2017, Until Thu 8/30/2018, Print      trazodone (DESYREL) 100 MG tablet Take 1 tablet (100 mg total) by mouth every evening., Normal      TRUE METRIX GLUCOSE METER [facility] AS DIRECTED, Historical Med "      TRUE METRIX GLUCOSE TEST STRIP Strp Twice daily blood sugar checks, Normal      vancomycin (VANCOCIN) 1,000 mg injection Starting Wed 8/2/2017, Historical Med       !! - Potential duplicate medications found. Please discuss with provider.      STOP taking these medications       ciprofloxacin HCl (CIPRO) 500 MG tablet Comments:   Reason for Stopping:         esomeprazole (NEXIUM) 40 MG capsule Comments:   Reason for Stopping:         sulfamethoxazole-trimethoprim 800-160mg (BACTRIM DS) 800-160 mg Tab Comments:   Reason for Stopping:               Miguel Lux MD  Hematology/Oncology  [facility]

## 2018-02-23 NOTE — Clinical Note
Please forward this important TCC information to your provider in order to maximize the post discharge care delivery of this patient.  C3 nurse attempted to contact Carlos Rob Amnando  for a TCC post hospital discharge follow up call. No answer. C3 nurse left a voice message and OOC # given. The patient has a scheduled HOSFU appointment with Miguel Lux MD  on 3/6 @ 1000.  Respectfully, Sonam Moya RN  [facility] C[facility]  [email or web address]  Please do not reply to this message, as this inbox is not routinely monitored.

## 2018-02-26 NOTE — PATIENT INSTRUCTIONS
Discharge Instructions: Caring for Your Incision  You are going home with sutures (stitches) or surgical staples in place. Or you may have special strips of tape called Steri-Strips. One of these items was used to close your incision, help stop bleeding, and speed healing. Follow the tips on this sheet to help your incision heal.  Home Care  Always wash your hands before touching your incision.  Keep your incision clean and dry.  Avoid doing things that could cause dirt or sweat to get on your incision.  Dont pick at scabs. They help protect the wound.  Keep your incision out of water.  Bathe or shower only as directed.  To keep the incision dry when around water, cover it with a plastic bag or plastic wrap.  Pat sutures dry if they get wet. Dont rub.  Leave the dressing (bandage) in place until you are told to remove it or change it. Change it only as directed, using clean hands.  After the first 12 hours, change your dressing every 24 hours.  Change your dressing if it gets wet or soiled.  Follow-Up  Make a follow-up appointment as directed by our staff.    When to Call Your Doctor  Call your doctor right away if you have any of the following:  Increased pain, bleeding, redness, swelling, or foul-smelling discharge around the incision area  Fever of 100.4°F or higher  Shaking chills  Vomiting or nausea that doesnt go away  Numbness, coldness, or tingling around the incision area  Opening of the sutures or wound   © 2598-3127 [place] [address], [place] [address]. All rights reserved. This information is not intended as a substitute for professional medical care. Always follow your healthcare professional's instructions.

## 2018-02-26 NOTE — PROGRESS NOTES
C3 nurse contacted [facility]  to check on status of nurse visit/admit from HH; [facility]  accepted pt by accident and [facility] was notified. Checked in notes and sent email to FERNANDO Vergara. Awaiting response.

## 2018-02-26 NOTE — PROGRESS NOTES
[facility] contacted [facility] and pt accepted but had staffing issues.  [facility] contacted,  submitting for authorization and will call pt to admit him into the agency. C3 nurse contacted pt to advise.Verb understanding.

## 2018-02-27 NOTE — ANESTHESIA PREPROCEDURE EVALUATION
02/27/2018  Carlos Cifuentes is a 57 y.o., male.    Anesthesia Evaluation     I have reviewed the Nursing Notes.      Review of Systems  Anesthesia Hx:  No problems with previous Anesthesia   Social:  Smoker    Cardiovascular:  Cardiovascular Normal Exercise tolerance: good     Pulmonary:  Pulmonary Normal    Renal/:   Chronic Renal Disease, CRI    Hepatic/GI:   GERD Liver Disease, Hepatitis, C hepatospenomegaly   Musculoskeletal:   Arthritis     Neurological:   Denies CVA. Denies Seizures.   Peripheral Neuropathy    Endocrine:   Diabetes Denies Hypothyroidism. Denies Hyperthyroidism.    Psych:   Psychiatric History          Physical Exam  General:  Obesity    Airway/Jaw/Neck:  AIRWAY FINDINGS: Normal           Mental Status:  Mental Status Findings: Normal        Anesthesia Plan  Type of Anesthesia, risks & benefits discussed:  Anesthesia Type:  general  Patient's Preference:   Intra-op Monitoring Plan: standard ASA monitors  Intra-op Monitoring Plan Comments:   Post Op Pain Control Plan:   Post Op Pain Control Plan Comments:   Induction:   IV  Beta Blocker:  Patient is not currently on a Beta-Blocker (No further documentation required).       Informed Consent: Patient understands risks and agrees with Anesthesia plan.  Questions answered. Anesthesia consent signed with patient.  ASA Score: 3     Day of Surgery Review of History & Physical:    H&P update referred to the surgeon.         Ready For Surgery From Anesthesia Perspective.

## 2018-02-27 NOTE — ANESTHESIA POSTPROCEDURE EVALUATION
"Anesthesia Post Evaluation    Patient: Carlos Cifuentes    Procedure(s) Performed: Procedure(s) (LRB):  AMPUTATION LEFT 2ND TOE (Left)    Final Anesthesia Type: general  Patient location during evaluation: PACU  Patient participation: Yes- Able to Participate  Level of consciousness: awake and alert and oriented  Post-procedure vital signs: reviewed and stable  Pain management: adequate  Airway patency: patent  PONV status at discharge: No PONV  Anesthetic complications: no      Cardiovascular status: blood pressure returned to baseline and hemodynamically stable  Respiratory status: unassisted and spontaneous ventilation  Hydration status: euvolemic  Follow-up not needed.        Visit Vitals  /67 (BP Location: Right arm, Patient Position: Lying)   Pulse 76   Temp 36.9 °C (98.5 °F) (Oral)   Resp 18   Ht 5' 9" (1.753 m)   Wt 114.7 kg (252 lb 13.9 oz)   SpO2 97%   BMI 37.34 kg/m²       Pain/Jose E Score: No Data Recorded      "

## 2018-03-01 NOTE — PHYSICIAN QUERY
PT Name: Carlos Cifuentes  MR #: [id number]     Physician Query Form - Documentation Clarification      Ciara Castillo RN CDS    Contact Information: [phone]:    This form is a permanent document in the medical record.     Query Date: March 1, 2018    By submitting this query, we are merely seeking further clarification of documentation. Please utilize your independent clinical judgment when addressing the question(s) below.    The Medical record reflects the following:    Supporting Clinical Findings Location in Medical Record   POD#1 left 2nd toe partial amp/ uncontrolled DM     Past medical hx: DM (diabetes mellitus), type 2, uncontrolled     Diabetic polyneuropathy associated with type 2 diabetes mellitus On Lantus 17u, metformin 1000 bid, amaryl 2, and novolog 8 tid as OP, but he was globally noncompliant   Glu 384 on arrival   Prophylaxis: Pos for ASA/ACE/Statin at home   On SSI here   Hold metformin in case of angio studies    insulin aspart U-100 pen 1-10 Units  :  Dose 1-10 Units     Subcutaneous  Before meals & nightly PRN   Orthopedic Surgery PN 2/21    H&P 2/18                  MAR 2/21-2/22         Blood Glucose 245, 237, 249,   Blood glucose 309, 290, 187, 134, 252  Blood Glucose 250, 250, 242,   Blood glucose 297, 221, 199, 229,   Blood Glucose 320. 325, 283     POCT  2/18  POCT 2/19  POCT 2/20  POCT 2/21  POCT 2/22                                                                            Doctor, Please specify diagnosis or diagnoses associated with above clinical findings.    Provider Use Only      [ x ] Type 2 Diabetes Mellitus with Hyperglycemia    [  ] Other Diabetic Conditins(Specify)_______________________                                                                                                             [  ] Clinically undetermined

## 2018-03-05 DIAGNOSIS — L08.9 DIABETIC FOOT INFECTION: ICD-10-CM

## 2018-03-05 DIAGNOSIS — E11.628 DIABETIC FOOT INFECTION: ICD-10-CM

## 2018-03-05 RX ORDER — DOXYCYCLINE 100 MG/1
CAPSULE ORAL
Qty: 20 CAPSULE | Refills: 0 | Status: SHIPPED | OUTPATIENT
Start: 2018-03-05 | End: 2018-03-14 | Stop reason: SDUPTHER

## 2018-03-06 PROBLEM — I96 DRY GANGRENE: Status: ACTIVE | Noted: 2018-03-06

## 2018-03-06 PROBLEM — K21.9 GERD WITHOUT ESOPHAGITIS: Status: ACTIVE | Noted: 2018-03-06

## 2018-03-06 PROBLEM — I10 ESSENTIAL HYPERTENSION: Status: ACTIVE | Noted: 2018-03-06

## 2018-03-06 PROBLEM — M79.672 LEFT FOOT PAIN: Status: ACTIVE | Noted: 2018-03-06

## 2018-03-14 DIAGNOSIS — L08.9 DIABETIC FOOT INFECTION: ICD-10-CM

## 2018-03-14 DIAGNOSIS — E11.628 DIABETIC FOOT INFECTION: ICD-10-CM

## 2018-03-14 RX ORDER — DOXYCYCLINE 100 MG/1
CAPSULE ORAL
Qty: 20 CAPSULE | Refills: 0 | Status: ON HOLD | OUTPATIENT
Start: 2018-03-14 | End: 2018-03-28 | Stop reason: HOSPADM

## 2018-03-19 ENCOUNTER — HOSPITAL ENCOUNTER (INPATIENT)
Facility: HOSPITAL | Age: 58
LOS: 9 days | Discharge: NURSING, REHAB OR LONG-TERM CARE | DRG: 475 | End: 2018-03-28
Attending: EMERGENCY MEDICINE | Admitting: INTERNAL MEDICINE
Payer: MEDICAID

## 2018-03-19 DIAGNOSIS — E11.42 DIABETIC POLYNEUROPATHY ASSOCIATED WITH TYPE 2 DIABETES MELLITUS: ICD-10-CM

## 2018-03-19 DIAGNOSIS — M86.172 ACUTE OSTEOMYELITIS OF LEFT FOOT: Primary | ICD-10-CM

## 2018-03-19 LAB
ALBUMIN SERPL BCP-MCNC: 2.9 G/DL
ALP SERPL-CCNC: 79 U/L
ALT SERPL W/O P-5'-P-CCNC: 12 U/L
ANION GAP SERPL CALC-SCNC: 12 MMOL/L
AST SERPL-CCNC: 12 U/L
BACTERIA #/AREA URNS HPF: NORMAL /HPF
BASOPHILS # BLD AUTO: 0.01 K/UL
BASOPHILS NFR BLD: 0.1 %
BILIRUB SERPL-MCNC: 0.3 MG/DL
BILIRUB UR QL STRIP: NEGATIVE
BUN SERPL-MCNC: 24 MG/DL
CALCIUM SERPL-MCNC: 9.9 MG/DL
CHLORIDE SERPL-SCNC: 101 MMOL/L
CLARITY UR: CLEAR
CO2 SERPL-SCNC: 24 MMOL/L
COLOR UR: YELLOW
CREAT SERPL-MCNC: 1.1 MG/DL
DIFFERENTIAL METHOD: ABNORMAL
EOSINOPHIL # BLD AUTO: 0.1 K/UL
EOSINOPHIL NFR BLD: 0.9 %
ERYTHROCYTE [DISTWIDTH] IN BLOOD BY AUTOMATED COUNT: 13.8 %
EST. GFR  (AFRICAN AMERICAN): >60 ML/MIN/1.73 M^2
EST. GFR  (NON AFRICAN AMERICAN): >60 ML/MIN/1.73 M^2
GLUCOSE SERPL-MCNC: 219 MG/DL
GLUCOSE UR QL STRIP: ABNORMAL
HCT VFR BLD AUTO: 40.3 %
HGB BLD-MCNC: 13.7 G/DL
HGB UR QL STRIP: NEGATIVE
HYALINE CASTS #/AREA URNS LPF: 0 /LPF
KETONES UR QL STRIP: NEGATIVE
LACTATE SERPL-SCNC: 1 MMOL/L
LEUKOCYTE ESTERASE UR QL STRIP: NEGATIVE
LYMPHOCYTES # BLD AUTO: 1.6 K/UL
LYMPHOCYTES NFR BLD: 16.8 %
MCH RBC QN AUTO: 29.3 PG
MCHC RBC AUTO-ENTMCNC: 34 G/DL
MCV RBC AUTO: 86 FL
MICROSCOPIC COMMENT: NORMAL
MONOCYTES # BLD AUTO: 0.7 K/UL
MONOCYTES NFR BLD: 7.6 %
NEUTROPHILS # BLD AUTO: 7.1 K/UL
NEUTROPHILS NFR BLD: 74.6 %
NITRITE UR QL STRIP: NEGATIVE
PH UR STRIP: 5 [PH] (ref 5–8)
PLATELET # BLD AUTO: 231 K/UL
PMV BLD AUTO: 10 FL
POCT GLUCOSE: 162 MG/DL (ref 70–110)
POCT GLUCOSE: 255 MG/DL (ref 70–110)
POCT GLUCOSE: 273 MG/DL (ref 70–110)
POTASSIUM SERPL-SCNC: 4.4 MMOL/L
PROT SERPL-MCNC: 8.4 G/DL
PROT UR QL STRIP: ABNORMAL
RBC # BLD AUTO: 4.67 M/UL
RBC #/AREA URNS HPF: 3 /HPF (ref 0–4)
SODIUM SERPL-SCNC: 137 MMOL/L
SP GR UR STRIP: 1.03 (ref 1–1.03)
URN SPEC COLLECT METH UR: ABNORMAL
UROBILINOGEN UR STRIP-ACNC: NEGATIVE EU/DL
WBC # BLD AUTO: 9.55 K/UL
WBC #/AREA URNS HPF: 3 /HPF (ref 0–5)

## 2018-03-19 PROCEDURE — 36415 COLL VENOUS BLD VENIPUNCTURE: CPT

## 2018-03-19 PROCEDURE — 11000001 HC ACUTE MED/SURG PRIVATE ROOM

## 2018-03-19 PROCEDURE — 80053 COMPREHEN METABOLIC PANEL: CPT

## 2018-03-19 PROCEDURE — 63600175 PHARM REV CODE 636 W HCPCS: Performed by: EMERGENCY MEDICINE

## 2018-03-19 PROCEDURE — 25000003 PHARM REV CODE 250: Performed by: EMERGENCY MEDICINE

## 2018-03-19 PROCEDURE — 87086 URINE CULTURE/COLONY COUNT: CPT

## 2018-03-19 PROCEDURE — 85025 COMPLETE CBC W/AUTO DIFF WBC: CPT

## 2018-03-19 PROCEDURE — 25000003 PHARM REV CODE 250: Performed by: INTERNAL MEDICINE

## 2018-03-19 PROCEDURE — 87040 BLOOD CULTURE FOR BACTERIA: CPT | Mod: 59

## 2018-03-19 PROCEDURE — 99285 EMERGENCY DEPT VISIT HI MDM: CPT | Mod: 25

## 2018-03-19 PROCEDURE — 83605 ASSAY OF LACTIC ACID: CPT

## 2018-03-19 PROCEDURE — 96375 TX/PRO/DX INJ NEW DRUG ADDON: CPT

## 2018-03-19 PROCEDURE — 83036 HEMOGLOBIN GLYCOSYLATED A1C: CPT

## 2018-03-19 PROCEDURE — 81000 URINALYSIS NONAUTO W/SCOPE: CPT

## 2018-03-19 PROCEDURE — 63600175 PHARM REV CODE 636 W HCPCS: Performed by: INTERNAL MEDICINE

## 2018-03-19 PROCEDURE — 96365 THER/PROPH/DIAG IV INF INIT: CPT

## 2018-03-19 PROCEDURE — S4991 NICOTINE PATCH NONLEGEND: HCPCS | Performed by: EMERGENCY MEDICINE

## 2018-03-19 RX ORDER — VANCOMYCIN HYDROCHLORIDE 1 G/20ML
1250 INJECTION, POWDER, LYOPHILIZED, FOR SOLUTION INTRAVENOUS
Status: DISCONTINUED | OUTPATIENT
Start: 2018-03-19 | End: 2018-03-19

## 2018-03-19 RX ORDER — MORPHINE SULFATE 4 MG/ML
4 INJECTION, SOLUTION INTRAMUSCULAR; INTRAVENOUS
Status: COMPLETED | OUTPATIENT
Start: 2018-03-19 | End: 2018-03-19

## 2018-03-19 RX ORDER — ATORVASTATIN CALCIUM 40 MG/1
80 TABLET, FILM COATED ORAL DAILY
Status: DISCONTINUED | OUTPATIENT
Start: 2018-03-19 | End: 2018-03-28 | Stop reason: HOSPADM

## 2018-03-19 RX ORDER — MORPHINE SULFATE 4 MG/ML
4 INJECTION, SOLUTION INTRAMUSCULAR; INTRAVENOUS EVERY 4 HOURS PRN
Status: DISCONTINUED | OUTPATIENT
Start: 2018-03-19 | End: 2018-03-20

## 2018-03-19 RX ORDER — ASPIRIN 81 MG/1
81 TABLET ORAL DAILY
Status: DISCONTINUED | OUTPATIENT
Start: 2018-03-19 | End: 2018-03-28 | Stop reason: HOSPADM

## 2018-03-19 RX ORDER — IBUPROFEN 200 MG
1 TABLET ORAL DAILY
Status: DISCONTINUED | OUTPATIENT
Start: 2018-03-19 | End: 2018-03-28 | Stop reason: HOSPADM

## 2018-03-19 RX ORDER — INSULIN ASPART 100 [IU]/ML
8 INJECTION, SOLUTION INTRAVENOUS; SUBCUTANEOUS
Status: DISCONTINUED | OUTPATIENT
Start: 2018-03-19 | End: 2018-03-28 | Stop reason: HOSPADM

## 2018-03-19 RX ORDER — VANCOMYCIN HYDROCHLORIDE 1 G/20ML
1000 INJECTION, POWDER, LYOPHILIZED, FOR SOLUTION INTRAVENOUS 2 TIMES DAILY
Status: DISCONTINUED | OUTPATIENT
Start: 2018-03-19 | End: 2018-03-19

## 2018-03-19 RX ORDER — LISINOPRIL 5 MG/1
5 TABLET ORAL DAILY
Status: DISCONTINUED | OUTPATIENT
Start: 2018-03-19 | End: 2018-03-28 | Stop reason: HOSPADM

## 2018-03-19 RX ORDER — INSULIN ASPART 100 [IU]/ML
1-10 INJECTION, SOLUTION INTRAVENOUS; SUBCUTANEOUS
Status: DISCONTINUED | OUTPATIENT
Start: 2018-03-19 | End: 2018-03-28 | Stop reason: HOSPADM

## 2018-03-19 RX ORDER — IBUPROFEN 200 MG
24 TABLET ORAL
Status: DISCONTINUED | OUTPATIENT
Start: 2018-03-19 | End: 2018-03-28 | Stop reason: HOSPADM

## 2018-03-19 RX ORDER — ENOXAPARIN SODIUM 100 MG/ML
40 INJECTION SUBCUTANEOUS
Status: DISCONTINUED | OUTPATIENT
Start: 2018-03-19 | End: 2018-03-20 | Stop reason: HOSPADM

## 2018-03-19 RX ORDER — DOCUSATE SODIUM 100 MG/1
100 CAPSULE, LIQUID FILLED ORAL
Status: DISCONTINUED | OUTPATIENT
Start: 2018-03-19 | End: 2018-03-28 | Stop reason: HOSPADM

## 2018-03-19 RX ORDER — ONDANSETRON 4 MG/1
4 TABLET, ORALLY DISINTEGRATING ORAL
Status: COMPLETED | OUTPATIENT
Start: 2018-03-19 | End: 2018-03-19

## 2018-03-19 RX ORDER — ENOXAPARIN SODIUM 100 MG/ML
40 INJECTION SUBCUTANEOUS
Status: DISCONTINUED | OUTPATIENT
Start: 2018-03-19 | End: 2018-03-19

## 2018-03-19 RX ORDER — LACTULOSE 10 G/15ML
20 SOLUTION ORAL 3 TIMES DAILY
Status: DISCONTINUED | OUTPATIENT
Start: 2018-03-19 | End: 2018-03-28 | Stop reason: HOSPADM

## 2018-03-19 RX ORDER — GLUCAGON 1 MG
1 KIT INJECTION
Status: DISCONTINUED | OUTPATIENT
Start: 2018-03-19 | End: 2018-03-28 | Stop reason: HOSPADM

## 2018-03-19 RX ORDER — OXYCODONE HYDROCHLORIDE 5 MG/1
10 TABLET ORAL EVERY 8 HOURS PRN
Status: DISCONTINUED | OUTPATIENT
Start: 2018-03-19 | End: 2018-03-20

## 2018-03-19 RX ORDER — DIAZEPAM 5 MG/1
5 TABLET ORAL EVERY 12 HOURS PRN
Status: DISCONTINUED | OUTPATIENT
Start: 2018-03-19 | End: 2018-03-28 | Stop reason: HOSPADM

## 2018-03-19 RX ORDER — ONDANSETRON 2 MG/ML
4 INJECTION INTRAMUSCULAR; INTRAVENOUS EVERY 8 HOURS PRN
Status: DISCONTINUED | OUTPATIENT
Start: 2018-03-19 | End: 2018-03-28 | Stop reason: HOSPADM

## 2018-03-19 RX ORDER — TRAZODONE HYDROCHLORIDE 50 MG/1
100 TABLET ORAL NIGHTLY
Status: DISCONTINUED | OUTPATIENT
Start: 2018-03-19 | End: 2018-03-28 | Stop reason: HOSPADM

## 2018-03-19 RX ORDER — PANTOPRAZOLE SODIUM 40 MG/1
40 TABLET, DELAYED RELEASE ORAL DAILY
Status: DISCONTINUED | OUTPATIENT
Start: 2018-03-19 | End: 2018-03-28 | Stop reason: HOSPADM

## 2018-03-19 RX ORDER — CLOPIDOGREL BISULFATE 75 MG/1
75 TABLET ORAL DAILY
Status: DISCONTINUED | OUTPATIENT
Start: 2018-03-19 | End: 2018-03-28 | Stop reason: HOSPADM

## 2018-03-19 RX ORDER — VANCOMYCIN HCL IN 5 % DEXTROSE 1G/250ML
1000 PLASTIC BAG, INJECTION (ML) INTRAVENOUS
Status: COMPLETED | OUTPATIENT
Start: 2018-03-19 | End: 2018-03-19

## 2018-03-19 RX ORDER — IBUPROFEN 200 MG
16 TABLET ORAL
Status: DISCONTINUED | OUTPATIENT
Start: 2018-03-19 | End: 2018-03-28 | Stop reason: HOSPADM

## 2018-03-19 RX ADMIN — PIPERACILLIN AND TAZOBACTAM 4.5 G: 4; .5 INJECTION, POWDER, LYOPHILIZED, FOR SOLUTION INTRAVENOUS; PARENTERAL at 01:03

## 2018-03-19 RX ADMIN — LISINOPRIL 5 MG: 5 TABLET ORAL at 01:03

## 2018-03-19 RX ADMIN — OXYCODONE HYDROCHLORIDE 10 MG: 5 TABLET ORAL at 07:03

## 2018-03-19 RX ADMIN — ONDANSETRON 4 MG: 4 TABLET, ORALLY DISINTEGRATING ORAL at 08:03

## 2018-03-19 RX ADMIN — DIAZEPAM 5 MG: 5 TABLET ORAL at 07:03

## 2018-03-19 RX ADMIN — TRAZODONE HYDROCHLORIDE 100 MG: 50 TABLET ORAL at 09:03

## 2018-03-19 RX ADMIN — MORPHINE SULFATE 4 MG: 4 INJECTION INTRAVENOUS at 08:03

## 2018-03-19 RX ADMIN — NICOTINE 1 PATCH: 14 PATCH, EXTENDED RELEASE TRANSDERMAL at 01:03

## 2018-03-19 RX ADMIN — VANCOMYCIN HYDROCHLORIDE 1000 MG: 1 INJECTION, POWDER, LYOPHILIZED, FOR SOLUTION INTRAVENOUS at 10:03

## 2018-03-19 RX ADMIN — ATORVASTATIN CALCIUM 80 MG: 40 TABLET, FILM COATED ORAL at 01:03

## 2018-03-19 RX ADMIN — OXYCODONE HYDROCHLORIDE 10 MG: 5 TABLET ORAL at 11:03

## 2018-03-19 RX ADMIN — MORPHINE SULFATE 4 MG: 4 INJECTION INTRAVENOUS at 05:03

## 2018-03-19 RX ADMIN — PANTOPRAZOLE SODIUM 40 MG: 40 TABLET, DELAYED RELEASE ORAL at 01:03

## 2018-03-19 RX ADMIN — MORPHINE SULFATE 4 MG: 4 INJECTION INTRAVENOUS at 09:03

## 2018-03-19 RX ADMIN — ASPIRIN 81 MG: 81 TABLET, COATED ORAL at 01:03

## 2018-03-19 RX ADMIN — INSULIN ASPART 8 UNITS: 100 INJECTION, SOLUTION INTRAVENOUS; SUBCUTANEOUS at 05:03

## 2018-03-19 RX ADMIN — INSULIN ASPART 6 UNITS: 100 INJECTION, SOLUTION INTRAVENOUS; SUBCUTANEOUS at 05:03

## 2018-03-19 RX ADMIN — INSULIN DETEMIR 22 UNITS: 100 INJECTION, SOLUTION SUBCUTANEOUS at 09:03

## 2018-03-19 RX ADMIN — MORPHINE SULFATE 4 MG: 4 INJECTION INTRAVENOUS at 01:03

## 2018-03-19 RX ADMIN — INSULIN ASPART 2 UNITS: 100 INJECTION, SOLUTION INTRAVENOUS; SUBCUTANEOUS at 01:03

## 2018-03-19 RX ADMIN — PIPERACILLIN AND TAZOBACTAM 4.5 G: 4; .5 INJECTION, POWDER, LYOPHILIZED, FOR SOLUTION INTRAVENOUS; PARENTERAL at 09:03

## 2018-03-19 NOTE — ED TRIAGE NOTES
"Pt arrived to ED via personal transportation from home for c/o "foot infection". Pt had toe amputation 2 wks ago, pt states "it wont heel", reports toe pain and back pain 10/10. Denies chest pain and SOB. Denies N/V/D but states "I had a fever all night long". REU. AAO x 4. Pt continuously stating "I need a pain shot, cem been out of my pain medication". Dr. Smith at bedside. Will continue to monitor.   "

## 2018-03-19 NOTE — PLAN OF CARE
Discharge planning assessment completed.  Patient crying during assessment but did not wish to disclose reason.  Patient stated that he was from home alone and had no help.  He stated that he had home health but did not know the name of the agency.  Patient stated that he drives to doctor appts. (preferred time Noon).  Patient's preferred pharmacy is [facility] on [facility].         03/19/18 1110   Discharge Assessment   Assessment Type Discharge Planning Assessment   Confirmed/corrected address and phone number on facesheet? Yes   Assessment information obtained from? Patient   Communicated expected length of stay with patient/caregiver no   Prior to hospitilization cognitive status: Alert/Oriented   Prior to hospitalization functional status: Needs Assistance;Assistive Equipment   Current cognitive status: Alert/Oriented   Current Functional Status: Assistive Equipment;Needs Assistance   Facility Arrived From: Home   Lives With alone   Able to Return to Prior Arrangements yes   Is patient able to care for self after discharge? Unable to determine at this time (comments)   Who are your caregiver(s) and their phone number(s)? Patient stated that he didn't have any caregivers:  sister: Elvia Verdin  [phone]; Sierra Messer  [phone]   Patient's perception of discharge disposition admitted as an inpatient;home health   Readmission Within The Last 30 Days current reason for admission unrelated to previous admission   If yes, most recent facility name: [facility]   Patient currently being followed by outpatient case management? No   Patient currently receives any other outside agency services? No   Equipment Currently Used at Home walker, rolling;cane, quad  (Patient reported having a cane and walker.  Record indicated that patient also has a wheelchair and wound care supplies)   Do you have any problems affording any of your prescribed medications? No   Is the patient taking medications as prescribed? no    If no, which medications is patient not taking? Ran out of pain medication.   Does the patient have transportation home? No   Does the patient receive services at the [facility]? No   Discharge Plan A Home   Patient/Family In Agreement With Plan yes   Readmission Questionnaire   At the time of your discharge, did someone talk to you about what your health problems were? Yes   At the time of discharge, did someone talk to you about what to watch out for regarding worsening of your health problem? Yes   At the time of discharge, did someone talk to you about what to do if you experienced worsening of your health problem? Yes   At the time of discharge, did someone talk to you about when and where to follow up with a doctor after you left the hospital? Yes   Do you have problems taking your medications as prescribed? No   Do you have any problems affording any of  your prescribed medications? No   Do you have problems obtaining/receiving your medications? Yes   Does the patient have transportation to healthcare appointments? No   Living Arrangements house   Does your caregiver provide all the help you need? I don't know  (Patient stated that he does not have help at home but answered that his sister helps him with transportation.)   If no, what kind of help do you need at home? ??   Are you currently feeling confused? No   Are you currently having problems thinking? No   Are you currently having memory problems? No   Have you felt down, depressed, or hopeless? 0   Have you felt little interest or pleasure in doing things? 0   In the last 7 days, my sleep quality was: lisa Monaco LMSW, DEISY-OMAR, [facility]  3/19/2018

## 2018-03-19 NOTE — CONSULTS
56 yo male with a sig pmhx of Hep C, uncontrolled DM, and chronic back pain and sig pshx of left 2nd toe amputation 1 month ago with Dr Washington presented to the ED with chief complaints of left foot/toe pain and back pain. He has been seen recently in the office for wound checks. His amputation site is hurting him and his surgical wound is not healing. He admitted to taking narcotics more than prescribed.  VSSAF    Left foot: Skin discolored over entire foot. Wound necrosis over 2nd toe amp site. No active bleeding or drainage. Skin warm to touch. DP pulse not appreciated. TTP throughout.  XRay left foot: There has been interval resection of much of the second toe, proximal portion of the proximal phalanx remaining.  There is mild degenerative change at the first metatarsal-phalangeal joint.  There are vascular calcifications within the soft tissue.  There are prominent plantar and posterior calcaneal spurs.  Hct: 40.3  WBC: 9.55    A/P: s/p left 2nd toe amp/ left 2nd toe wound necrosis  1) patient refusing BKA - will discuss with Dr Washington regarding further surgery- repeat I&D with amp vsTMA, etc.  2) NPO at midnight

## 2018-03-19 NOTE — ED NOTES
"Pt requested "another shot of pain medication". Dr. Smith made aware of pain medication request, verbalized understanding.   "

## 2018-03-19 NOTE — ED PROVIDER NOTES
"Encounter Date: 3/19/2018    SCRIBE #1 NOTE: I, Fouzia Gomez, am scribing for, and in the presence of,  Sharath Knox MD. I have scribed the following portions of the note - Other sections scribed: HPI, ROS, PE.       History     Chief Complaint   Patient presents with    Toe Pain/Infection     reports toe amputation 2 wks ago and "I think its infected." toe is black; reports toe pain and back pain; reports fever "all night long"    Back Pain     CC: Toe Infection/Back Pain     57 year old male  has a past medical history of Arthritis; Back pain; Bulging lumbar disc; C. difficile diarrhea; Chronic back pain (10/14/2014); Diabetes mellitus; Diabetes mellitus type II; DM (diabetes mellitus), type 2, uncontrolled (3/12/2013); Hepatitis C (7/3/2013); MSSA (methicillin susceptible Staphylococcus aureus) septicemia; Neuromuscular disorder; Neuropathy; and Staph aureus infection presents to the ED for evaluation of a 1 week history of swelling and pain following an amputation to his 2nd left toe. Pt reports he is currently taking antibiotics. Pt's amputation was on 2/20/18 by Orthopedic surgeon, Dr. David Washington. Pt also reports chronic back pain for which he is prescribed 90 Oxycodone monthly; he has been taking oxycodone "for years." Pt admits to noncompliance with the oxycodone following the toe amputation, stating he took 3-4 pills a day due to the increased toe pain. Pt ran out of Oxycodone 2 days ago and is due for a prescription refill today. Pt has an inconsistent story on the source of the oxycodone but finally stated that his PCP, Dr. Lux, prescribes him the medication. No other symptoms reported.       The history is provided by the patient. No  was used.     Review of patient's allergies indicates:   Allergen Reactions    Codeine Rash     Other reaction(s): Unknown    Vicodin [hydrocodone-acetaminophen] Rash     Past Medical History:   Diagnosis Date    Arthritis     " Back pain     Bulging lumbar disc     C. difficile diarrhea     Chronic back pain 10/14/2014    Diabetes mellitus     Diabetes mellitus type II     DM (diabetes mellitus), type 2, uncontrolled 3/12/2013    Hepatitis C 7/3/2013    MSSA (methicillin susceptible Staphylococcus aureus) septicemia     Neuromuscular disorder     Neuropathy     Staph aureus infection      Past Surgical History:   Procedure Laterality Date    APPENDECTOMY      JOINT REPLACEMENT      left knee surgery      left leg surgery      broken bone    LEG SURGERY  right     Right arm boil      Right great toe amputation      TOE AMPUTATION Right      Family History   Problem Relation Age of Onset    Arthritis Mother     Arthritis Sister     Diabetes Sister     Arthritis Brother      Social History   Substance Use Topics    Smoking status: Current Every Day Smoker     Packs/day: 0.50     Types: Cigarettes    Smokeless tobacco: Never Used    Alcohol use No     Review of Systems   Constitutional: Negative for chills and fever.   HENT: Negative for rhinorrhea and sore throat.    Eyes: Negative for redness.   Respiratory: Negative for cough.    Cardiovascular: Negative for chest pain.   Gastrointestinal: Negative for abdominal pain, diarrhea, nausea and vomiting.   Genitourinary: Negative for dysuria.   Musculoskeletal: Positive for back pain (chronic).        (+) left 2nd toe amputation   Skin: Negative for color change.   Neurological: Negative for syncope and weakness.   Psychiatric/Behavioral: The patient is not nervous/anxious.        Physical Exam     Initial Vitals [03/19/18 0814]   BP Pulse Resp Temp SpO2   131/85 98 20 98.3 °F (36.8 °C) 97 %      MAP       100.33         Physical Exam    Nursing note and vitals reviewed.  Constitutional: Vital signs are normal. He appears well-developed and well-nourished. He is active.  Non-toxic appearance. No distress.   Perseverates on pain   HENT:   Head: Normocephalic and  atraumatic.   Eyes: EOM are normal. Pupils are equal, round, and reactive to light.   Neck: Trachea normal and normal range of motion. Neck supple.   Cardiovascular: Normal rate and regular rhythm.   Pulmonary/Chest: Breath sounds normal. No respiratory distress.   Abdominal: Soft. Normal appearance and bowel sounds are normal. He exhibits no distension. There is no tenderness.   Musculoskeletal: Normal range of motion. He exhibits no edema.   Neurological: He is alert.   Skin: Skin is warm, dry and intact.   Sluggish capillary refill to left toes (5 seconds)  Erythematous to MTP on toes 1-4 on right side  Recent Right 2nd toe amputation   Old left toe amputation with crusting and scabbing     Psychiatric: He has a normal mood and affect.         ED Course   Procedures  Labs Reviewed - No data to display          Medical Decision Making:   ED Management:  Discussed with Dr. Lux, with hx of noncompliance and having failed outpatient therapy will need admit and ortho consult .             Scribe Attestation:   Scribe #1: I performed the above scribed service and the documentation accurately describes the services I performed. I attest to the accuracy of the note.    Attending Attestation:           Physician Attestation for Scribe:  Physician Attestation Statement for Scribe #1: I, Sharath Knox MD, reviewed documentation, as scribed by Fouzia Gomez in my presence, and it is both accurate and complete.                    Clinical Impression:   The encounter diagnosis was Abscess or cellulitis of foot.                           Sharath Smith MD  03/19/18 1043

## 2018-03-19 NOTE — PLAN OF CARE
Problem: Patient Care Overview  Goal: Plan of Care Review  Outcome: Ongoing (interventions implemented as appropriate)   03/19/18 3345   Coping/Psychosocial   Plan Of Care Reviewed With patient   Oriented x 4. Complains of pain constantly r/t chronic back and left foot pain. Encouraged to keep left lower extremity elevated.Eschar tissue to left second toe. Unable to palpate  Pedal pulse. Turned self with encouragement.No falls or injuries noted. managed with sliding scale coverage

## 2018-03-20 ENCOUNTER — ANESTHESIA (OUTPATIENT)
Dept: SURGERY | Facility: HOSPITAL | Age: 58
DRG: 475 | End: 2018-03-20
Payer: MEDICAID

## 2018-03-20 ENCOUNTER — SURGERY (OUTPATIENT)
Age: 58
End: 2018-03-20

## 2018-03-20 ENCOUNTER — ANESTHESIA EVENT (OUTPATIENT)
Dept: SURGERY | Facility: HOSPITAL | Age: 58
DRG: 475 | End: 2018-03-20
Payer: MEDICAID

## 2018-03-20 PROBLEM — I96 DRY GANGRENE: Status: ACTIVE | Noted: 2018-03-20

## 2018-03-20 PROBLEM — L08.9 TYPE 2 DIABETES MELLITUS WITH DIABETIC FOOT INFECTION: Status: ACTIVE | Noted: 2018-03-20

## 2018-03-20 PROBLEM — E11.628 TYPE 2 DIABETES MELLITUS WITH DIABETIC FOOT INFECTION: Status: ACTIVE | Noted: 2018-03-20

## 2018-03-20 PROBLEM — I10 HTN, GOAL BELOW 140/90: Status: ACTIVE | Noted: 2018-03-20

## 2018-03-20 LAB
ESTIMATED AVG GLUCOSE: 214 MG/DL
GRAM STN SPEC: NORMAL
GRAM STN SPEC: NORMAL
HBA1C MFR BLD HPLC: 9.1 %
POCT GLUCOSE: 173 MG/DL (ref 70–110)
POCT GLUCOSE: 188 MG/DL (ref 70–110)
POCT GLUCOSE: 214 MG/DL (ref 70–110)
POCT GLUCOSE: 99 MG/DL (ref 70–110)

## 2018-03-20 PROCEDURE — 11000001 HC ACUTE MED/SURG PRIVATE ROOM

## 2018-03-20 PROCEDURE — 71000039 HC RECOVERY, EACH ADD'L HOUR: Performed by: ORTHOPAEDIC SURGERY

## 2018-03-20 PROCEDURE — S4991 NICOTINE PATCH NONLEGEND: HCPCS | Performed by: EMERGENCY MEDICINE

## 2018-03-20 PROCEDURE — 88305 TISSUE EXAM BY PATHOLOGIST: CPT | Performed by: PATHOLOGY

## 2018-03-20 PROCEDURE — 88305 TISSUE EXAM BY PATHOLOGIST: CPT | Mod: 26,,, | Performed by: PATHOLOGY

## 2018-03-20 PROCEDURE — 87075 CULTR BACTERIA EXCEPT BLOOD: CPT

## 2018-03-20 PROCEDURE — 63600175 PHARM REV CODE 636 W HCPCS: Performed by: ORTHOPAEDIC SURGERY

## 2018-03-20 PROCEDURE — 63600175 PHARM REV CODE 636 W HCPCS: Performed by: NURSE ANESTHETIST, CERTIFIED REGISTERED

## 2018-03-20 PROCEDURE — 25000003 PHARM REV CODE 250: Performed by: ORTHOPAEDIC SURGERY

## 2018-03-20 PROCEDURE — 25000003 PHARM REV CODE 250: Performed by: INTERNAL MEDICINE

## 2018-03-20 PROCEDURE — 71000033 HC RECOVERY, INTIAL HOUR: Performed by: ORTHOPAEDIC SURGERY

## 2018-03-20 PROCEDURE — 88311 DECALCIFY TISSUE: CPT | Mod: 26,,, | Performed by: PATHOLOGY

## 2018-03-20 PROCEDURE — 87077 CULTURE AEROBIC IDENTIFY: CPT

## 2018-03-20 PROCEDURE — 87205 SMEAR GRAM STAIN: CPT

## 2018-03-20 PROCEDURE — 25000242 PHARM REV CODE 250 ALT 637 W/ HCPCS: Performed by: ANESTHESIOLOGY

## 2018-03-20 PROCEDURE — 25000003 PHARM REV CODE 250: Performed by: NURSE ANESTHETIST, CERTIFIED REGISTERED

## 2018-03-20 PROCEDURE — 25000003 PHARM REV CODE 250: Performed by: EMERGENCY MEDICINE

## 2018-03-20 PROCEDURE — 63600175 PHARM REV CODE 636 W HCPCS: Performed by: ANESTHESIOLOGY

## 2018-03-20 PROCEDURE — 87070 CULTURE OTHR SPECIMN AEROBIC: CPT

## 2018-03-20 PROCEDURE — D9220A PRA ANESTHESIA: Mod: CRNA,,, | Performed by: NURSE ANESTHETIST, CERTIFIED REGISTERED

## 2018-03-20 PROCEDURE — 0Y6N0ZB DETACHMENT AT LEFT FOOT, PARTIAL 2ND RAY, OPEN APPROACH: ICD-10-PCS | Performed by: ORTHOPAEDIC SURGERY

## 2018-03-20 PROCEDURE — 87076 CULTURE ANAEROBE IDENT EACH: CPT

## 2018-03-20 PROCEDURE — D9220A PRA ANESTHESIA: Mod: ANES,,, | Performed by: ANESTHESIOLOGY

## 2018-03-20 PROCEDURE — 36000707: Performed by: ORTHOPAEDIC SURGERY

## 2018-03-20 PROCEDURE — 36000706: Performed by: ORTHOPAEDIC SURGERY

## 2018-03-20 PROCEDURE — 0Y6U0Z0 DETACHMENT AT LEFT 3RD TOE, COMPLETE, OPEN APPROACH: ICD-10-PCS | Performed by: ORTHOPAEDIC SURGERY

## 2018-03-20 PROCEDURE — 27201423 OPTIME MED/SURG SUP & DEVICES STERILE SUPPLY: Performed by: ORTHOPAEDIC SURGERY

## 2018-03-20 PROCEDURE — 63600175 PHARM REV CODE 636 W HCPCS: Performed by: INTERNAL MEDICINE

## 2018-03-20 PROCEDURE — 37000009 HC ANESTHESIA EA ADD 15 MINS: Performed by: ORTHOPAEDIC SURGERY

## 2018-03-20 PROCEDURE — 63600175 PHARM REV CODE 636 W HCPCS: Performed by: EMERGENCY MEDICINE

## 2018-03-20 PROCEDURE — 63600175 PHARM REV CODE 636 W HCPCS: Performed by: PHYSICIAN ASSISTANT

## 2018-03-20 PROCEDURE — 36000709 HC OR TIME LEV III EA ADD 15 MIN: Performed by: ORTHOPAEDIC SURGERY

## 2018-03-20 PROCEDURE — 94761 N-INVAS EAR/PLS OXIMETRY MLT: CPT

## 2018-03-20 PROCEDURE — 87186 SC STD MICRODIL/AGAR DIL: CPT

## 2018-03-20 PROCEDURE — 36000708 HC OR TIME LEV III 1ST 15 MIN: Performed by: ORTHOPAEDIC SURGERY

## 2018-03-20 PROCEDURE — 27200651 HC AIRWAY, LMA: Performed by: NURSE ANESTHETIST, CERTIFIED REGISTERED

## 2018-03-20 PROCEDURE — 37000008 HC ANESTHESIA 1ST 15 MINUTES: Performed by: ORTHOPAEDIC SURGERY

## 2018-03-20 RX ORDER — IPRATROPIUM BROMIDE AND ALBUTEROL SULFATE 2.5; .5 MG/3ML; MG/3ML
3 SOLUTION RESPIRATORY (INHALATION) ONCE
Status: COMPLETED | OUTPATIENT
Start: 2018-03-20 | End: 2018-03-20

## 2018-03-20 RX ORDER — HYDROMORPHONE HYDROCHLORIDE 1 MG/ML
1 INJECTION, SOLUTION INTRAMUSCULAR; INTRAVENOUS; SUBCUTANEOUS
Status: DISCONTINUED | OUTPATIENT
Start: 2018-03-20 | End: 2018-03-20

## 2018-03-20 RX ORDER — SODIUM CHLORIDE, SODIUM LACTATE, POTASSIUM CHLORIDE, CALCIUM CHLORIDE 600; 310; 30; 20 MG/100ML; MG/100ML; MG/100ML; MG/100ML
INJECTION, SOLUTION INTRAVENOUS CONTINUOUS PRN
Status: DISCONTINUED | OUTPATIENT
Start: 2018-03-20 | End: 2018-03-20

## 2018-03-20 RX ORDER — ONDANSETRON 2 MG/ML
INJECTION INTRAMUSCULAR; INTRAVENOUS
Status: DISCONTINUED | OUTPATIENT
Start: 2018-03-20 | End: 2018-03-20

## 2018-03-20 RX ORDER — LIDOCAINE HCL/PF 100 MG/5ML
SYRINGE (ML) INTRAVENOUS
Status: DISCONTINUED | OUTPATIENT
Start: 2018-03-20 | End: 2018-03-20

## 2018-03-20 RX ORDER — ACETAMINOPHEN 10 MG/ML
1000 INJECTION, SOLUTION INTRAVENOUS EVERY 8 HOURS
Status: COMPLETED | OUTPATIENT
Start: 2018-03-20 | End: 2018-03-21

## 2018-03-20 RX ORDER — MIDAZOLAM HYDROCHLORIDE 1 MG/ML
INJECTION, SOLUTION INTRAMUSCULAR; INTRAVENOUS
Status: DISCONTINUED | OUTPATIENT
Start: 2018-03-20 | End: 2018-03-20

## 2018-03-20 RX ORDER — PROPOFOL 10 MG/ML
VIAL (ML) INTRAVENOUS
Status: DISCONTINUED | OUTPATIENT
Start: 2018-03-20 | End: 2018-03-20

## 2018-03-20 RX ORDER — IPRATROPIUM BROMIDE AND ALBUTEROL SULFATE 2.5; .5 MG/3ML; MG/3ML
SOLUTION RESPIRATORY (INHALATION)
Status: DISPENSED
Start: 2018-03-20 | End: 2018-03-21

## 2018-03-20 RX ORDER — SODIUM CHLORIDE 0.9 % (FLUSH) 0.9 %
3 SYRINGE (ML) INJECTION
Status: DISCONTINUED | OUTPATIENT
Start: 2018-03-20 | End: 2018-03-28 | Stop reason: HOSPADM

## 2018-03-20 RX ORDER — MORPHINE SULFATE 4 MG/ML
2 INJECTION, SOLUTION INTRAMUSCULAR; INTRAVENOUS EVERY 5 MIN PRN
Status: COMPLETED | OUTPATIENT
Start: 2018-03-20 | End: 2018-03-20

## 2018-03-20 RX ORDER — FENTANYL CITRATE 50 UG/ML
INJECTION, SOLUTION INTRAMUSCULAR; INTRAVENOUS
Status: DISCONTINUED | OUTPATIENT
Start: 2018-03-20 | End: 2018-03-20

## 2018-03-20 RX ORDER — GLYCOPYRROLATE 0.2 MG/ML
INJECTION INTRAMUSCULAR; INTRAVENOUS
Status: DISCONTINUED | OUTPATIENT
Start: 2018-03-20 | End: 2018-03-20

## 2018-03-20 RX ORDER — MORPHINE SULFATE 4 MG/ML
6 INJECTION, SOLUTION INTRAMUSCULAR; INTRAVENOUS EVERY 4 HOURS PRN
Status: DISCONTINUED | OUTPATIENT
Start: 2018-03-20 | End: 2018-03-24

## 2018-03-20 RX ORDER — SODIUM CHLORIDE 0.9 G/100ML
IRRIGANT IRRIGATION
Status: DISCONTINUED | OUTPATIENT
Start: 2018-03-20 | End: 2018-03-20 | Stop reason: HOSPADM

## 2018-03-20 RX ORDER — OXYCODONE HYDROCHLORIDE 5 MG/1
10 TABLET ORAL EVERY 4 HOURS PRN
Status: DISCONTINUED | OUTPATIENT
Start: 2018-03-20 | End: 2018-03-28 | Stop reason: HOSPADM

## 2018-03-20 RX ORDER — MORPHINE SULFATE 4 MG/ML
4 INJECTION, SOLUTION INTRAMUSCULAR; INTRAVENOUS
Status: DISCONTINUED | OUTPATIENT
Start: 2018-03-20 | End: 2018-03-20

## 2018-03-20 RX ADMIN — INSULIN ASPART 4 UNITS: 100 INJECTION, SOLUTION INTRAVENOUS; SUBCUTANEOUS at 05:03

## 2018-03-20 RX ADMIN — PIPERACILLIN AND TAZOBACTAM 4.5 G: 4; .5 INJECTION, POWDER, LYOPHILIZED, FOR SOLUTION INTRAVENOUS; PARENTERAL at 05:03

## 2018-03-20 RX ADMIN — DIAZEPAM 5 MG: 5 TABLET ORAL at 03:03

## 2018-03-20 RX ADMIN — MORPHINE SULFATE 2 MG: 4 INJECTION INTRAVENOUS at 01:03

## 2018-03-20 RX ADMIN — INSULIN ASPART 8 UNITS: 100 INJECTION, SOLUTION INTRAVENOUS; SUBCUTANEOUS at 05:03

## 2018-03-20 RX ADMIN — MORPHINE SULFATE 6 MG: 4 INJECTION INTRAVENOUS at 10:03

## 2018-03-20 RX ADMIN — PROPOFOL 140 MG: 10 INJECTION, EMULSION INTRAVENOUS at 12:03

## 2018-03-20 RX ADMIN — MIDAZOLAM HYDROCHLORIDE 2 MG: 1 INJECTION, SOLUTION INTRAMUSCULAR; INTRAVENOUS at 12:03

## 2018-03-20 RX ADMIN — ONDANSETRON 4 MG: 2 INJECTION, SOLUTION INTRAMUSCULAR; INTRAVENOUS at 12:03

## 2018-03-20 RX ADMIN — VANCOMYCIN HYDROCHLORIDE 1250 MG: 1 INJECTION, POWDER, LYOPHILIZED, FOR SOLUTION INTRAVENOUS at 03:03

## 2018-03-20 RX ADMIN — FENTANYL CITRATE 50 MCG: 50 INJECTION INTRAMUSCULAR; INTRAVENOUS at 12:03

## 2018-03-20 RX ADMIN — IPRATROPIUM BROMIDE AND ALBUTEROL SULFATE 3 ML: .5; 2.5 SOLUTION RESPIRATORY (INHALATION) at 12:03

## 2018-03-20 RX ADMIN — OXYCODONE HYDROCHLORIDE 10 MG: 5 TABLET ORAL at 06:03

## 2018-03-20 RX ADMIN — GLYCOPYRROLATE 0.2 MG: 0.2 INJECTION, SOLUTION INTRAMUSCULAR; INTRAVENOUS at 12:03

## 2018-03-20 RX ADMIN — ACETAMINOPHEN 1000 MG: 10 INJECTION, SOLUTION INTRAVENOUS at 08:03

## 2018-03-20 RX ADMIN — MORPHINE SULFATE 4 MG: 4 INJECTION INTRAVENOUS at 01:03

## 2018-03-20 RX ADMIN — VANCOMYCIN HYDROCHLORIDE 1250 MG: 1 INJECTION, POWDER, LYOPHILIZED, FOR SOLUTION INTRAVENOUS at 01:03

## 2018-03-20 RX ADMIN — SODIUM CHLORIDE 3000 ML: 0.9 IRRIGANT IRRIGATION at 01:03

## 2018-03-20 RX ADMIN — MORPHINE SULFATE 4 MG: 4 INJECTION INTRAVENOUS at 08:03

## 2018-03-20 RX ADMIN — NICOTINE 1 PATCH: 14 PATCH, EXTENDED RELEASE TRANSDERMAL at 08:03

## 2018-03-20 RX ADMIN — MORPHINE SULFATE 2 MG: 4 INJECTION INTRAVENOUS at 02:03

## 2018-03-20 RX ADMIN — MORPHINE SULFATE 4 MG: 4 INJECTION INTRAVENOUS at 05:03

## 2018-03-20 RX ADMIN — LIDOCAINE HYDROCHLORIDE 100 MG: 20 INJECTION, SOLUTION INTRAVENOUS at 12:03

## 2018-03-20 RX ADMIN — TRAZODONE HYDROCHLORIDE 100 MG: 50 TABLET ORAL at 08:03

## 2018-03-20 RX ADMIN — INSULIN DETEMIR 22 UNITS: 100 INJECTION, SOLUTION SUBCUTANEOUS at 08:03

## 2018-03-20 RX ADMIN — SODIUM CHLORIDE, SODIUM LACTATE, POTASSIUM CHLORIDE, AND CALCIUM CHLORIDE: .6; .31; .03; .02 INJECTION, SOLUTION INTRAVENOUS at 12:03

## 2018-03-20 NOTE — PLAN OF CARE
Problem: Patient Care Overview  Goal: Plan of Care Review  Outcome: Ongoing (interventions implemented as appropriate)   03/20/18 9418   Coping/Psychosocial   Plan Of Care Reviewed With patient;sibling     Patient remained free from falls, trauma, and injuries throughout shift. No complaints of nausea or vomiting. Pain seems unrelieved by pain medications, frequent requests throughout shift. IV antibiotics and medications administered as prescribed. Blood glucose controlled with prn and scheduled insulin. Tolerated diet well. Dressing to LLE intact with moist drainage; area marked with marker. No acute distress noted.

## 2018-03-20 NOTE — H&P
[facility]  History & Physical    SUBJECTIVE:     Chief Complaint/Reason for Admission: left foot pain     History of Present Illness:    Mr. Cifuentes is a pleasant 58 YO gentleman with multiple medical conditions including diabetic foot ulcer, Back pain; Bulging lumbar disc; C. difficile diarrhea; Chronic back pain (10/14/2014); Diabetes mellitus; Diabetes mellitus type II; DM (diabetes mellitus), type 2, uncontrolled (3/12/2013); Hepatitis C (7/3/2013); MSSA (methicillin susceptible Staphylococcus aureus) septicemia; Neuromuscular disorder; Neuropathy; and Staph aureus infection as well as non compliace with diet, medication and physician follow up. He presented to [facility] ED with progressive and worsening  swelling and pain following an amputation to his 2nd left toe. He completed IV ABX followed by 2 courses of po Doxycycline. Pt declined TMT amputation and underwent partial amputation of 2nd left toe by Dr. David Washington. His current home medications not alleviating his pain and came to ED.    Denies any trauma. Does not use immobilizer boot when walking.         PTA Medications   Medication Sig    aspirin (ECOTRIN) 81 MG EC tablet Take 1 tablet (81 mg total) by mouth once daily.    atorvastatin (LIPITOR) 80 MG tablet Take 1 tablet (80 mg total) by mouth once daily.    clopidogrel (PLAVIX) 75 mg tablet Take 1 tablet (75 mg total) by mouth once daily.    diazePAM (VALIUM) 5 MG tablet Take 1 tablet (5 mg total) by mouth every 12 (twelve) hours as needed for Anxiety.    docusate sodium (COLACE) 100 MG capsule Take 1 capsule (100 mg total) by mouth as needed for Constipation.    doxycycline (MONODOX) 100 MG capsule TAKE ONE Capsule BY MOUTH EVERY 12 HOURS **THANK YOU**    glimepiride (AMARYL) 2 MG tablet Take 1 tablet (2 mg total) by mouth every morning.    insulin aspart (NOVOLOG) 100 unit/mL InPn pen Inject 8 Units into the skin 3 (three) times daily.    lactulose (CEPHULAC) 20 gram Pack Take  "20 g by mouth 3 (three) times daily.    lactulose (CHRONULAC) 10 gram/15 mL solution Take 45 mLs (30 g total) by mouth 2 (two) times daily.    LANTUS SOLOSTAR 100 unit/mL (3 mL) InPn pen Inject 30 Units into the skin every evening.    ledipasvir-sofosbuvir (HARVONI)  mg Tab Take 1 tablet by mouth once daily.    lidocaine HCL 2% (XYLOCAINE) 2 % jelly Apply to affected area daily prn    lisinopril (PRINIVIL,ZESTRIL) 5 MG tablet Take 1 tablet (5 mg total) by mouth once daily.    metformin (GLUCOPHAGE) 1000 MG tablet Take 1 tablet (1,000 mg total) by mouth 2 (two) times daily with meals.    nicotine (NICODERM CQ) 14 mg/24 hr Place 1 patch onto the skin once daily.    nicotine (NICODERM CQ) 14 mg/24 hr Place 1 patch onto the skin once daily.    omeprazole (PRILOSEC) 20 MG capsule Take 1 capsule (20 mg total) by mouth once daily.    oxyCODONE (ROXICODONE) 10 mg Tab immediate release tablet Take 1 tablet (10 mg total) by mouth every 8 (eight) hours as needed for Pain.    pantoprazole (PROTONIX) 40 MG tablet Take 1 tablet (40 mg total) by mouth once daily.    potassium chloride SA (K-DUR,KLOR-CON) 10 MEQ tablet Take 1 tablet (10 mEq total) by mouth once daily as needed when experiencing leg/muscle cramps.    pregabalin (LYRICA) 75 MG capsule Take 1 capsule (75 mg total) by mouth 2 (two) times daily.    syringe with needle 3 mL 23 gauge x 1 1/2" Syrg 1 Device by Misc.(Non-Drug; Combo Route) route every 7 days.    testosterone cypionate (DEPOTESTOTERONE CYPIONATE) 200 mg/mL injection Inject 0.5 mLs (100 mg total) into the muscle every 7 days.    traZODone (DESYREL) 100 MG tablet Take 1 tablet (100 mg total) by mouth every evening.    TRUE METRIX GLUCOSE METER Misc AS DIRECTED    TRUE METRIX GLUCOSE TEST STRIP Strp Twice daily blood sugar checks    vancomycin (VANCOCIN) 1,000 mg injection        Review of patient's allergies indicates:   Allergen Reactions    Codeine Rash     Other reaction(s): " Unknown    Vicodin [hydrocodone-acetaminophen] Rash       Past Medical History:   Diagnosis Date    Arthritis     Back pain     Bulging lumbar disc     C. difficile diarrhea     Chronic back pain 10/14/2014    Diabetes mellitus     Diabetes mellitus type II     DM (diabetes mellitus), type 2, uncontrolled 3/12/2013    Hepatitis C 7/3/2013    MSSA (methicillin susceptible Staphylococcus aureus) septicemia     Neuromuscular disorder     Neuropathy     Staph aureus infection      Past Surgical History:   Procedure Laterality Date    APPENDECTOMY      JOINT REPLACEMENT      left knee surgery      left leg surgery      broken bone    LEG SURGERY  right     Right arm boil      Right great toe amputation      TOE AMPUTATION Right      Family History   Problem Relation Age of Onset    Arthritis Mother     Arthritis Sister     Diabetes Sister     Arthritis Brother      Social History   Substance Use Topics    Smoking status: Current Every Day Smoker     Packs/day: 0.50     Types: Cigarettes    Smokeless tobacco: Never Used    Alcohol use No        Review of Systems:    Constitutional: Denies fever or chills. Chronic pain   Eyes: no visual changes   ENT: no nasal congestion. Denies sore throat with odynophagia   Respiratory: No cough, SOB, exertional dyspnea or  hemoptysis   Cardiovascular: no chest pain or palpitations   Gastrointestinal: no abdominal pain, n/v/. + constipation  Hematologic/Lymphatic: denies any bleeding  Musculoskeletal: see HPI  Neurological: no seizures or tremors, gait or balance prblems  Skin: black scar and redness to left foot.   Psych: Denies any anxiety, depression or insomnia    OBJECTIVE:     Vital Signs (Most Recent):  Temp: 98 °F (36.7 °C) (03/20/18 0345)  Pulse: 67 (03/20/18 0345)  Resp: 18 (03/20/18 0345)  BP: (!) 99/50 (03/20/18 0345)  SpO2: 96 % (03/20/18 0345)    Physical Exam:    General: NAD, resting in bed   Head: normocephalic, atraumatic   Eyes:  conjunctivae pink, anicteric sclera.   Throat: No erythema or post nasal discharge   Neck: supple, no LAD   Lungs: decreased air entry bilaterally at the bases   Heart: S1, S2, RRR   Abdomen: obese, + BS x 4 quadrants, soft  Extremities: right foot with healed surgical site (amputation of 1st and 2nd toe). Left foot: 2nd partial amputation- site with dry gangrene and cellulitis. + tenderness to foot.   Skin: cellulitis left foot   MS: move all extremities. Straight cane on the bed   Psy: calm     Laboratory:  CBC:   Recent Labs  Lab 03/19/18  0840   WBC 9.55   RBC 4.67   HGB 13.7*   HCT 40.3      MCV 86   MCH 29.3   MCHC 34.0     CMP:   Recent Labs  Lab 03/19/18  0840   *   CALCIUM 9.9   ALBUMIN 2.9*   PROT 8.4      K 4.4   CO2 24      BUN 24*   CREATININE 1.1   ALKPHOS 79   ALT 12   AST 12   BILITOT 0.3     Coagulation: No results for input(s): LABPROT, INR, APTT in the last 168 hours.  Cardiac markers: No results for input(s): CKMB, CPKMB, TROPONINT, TROPONINI, MYOGLOBIN in the last 168 hours.  ABGs: No results for input(s): PH, PCO2, PO2, HCO3, POCSATURATED, BE in the last 168 hours.  Microbiology Results (last 7 days)     Procedure Component Value Units Date/Time    Blood culture #1 **CANNOT BE ORDERED STAT** [[id number]] Collected:  03/19/18 0915    Order Status:  Completed Specimen:  Blood from Peripheral, Antecubital, Right Updated:  03/19/18 1712     Blood Culture, Routine No Growth to date    Blood culture #2 **CANNOT BE ORDERED STAT** [[id number]] Collected:  03/19/18 0930    Order Status:  Completed Specimen:  Blood from Peripheral, Hand, Right Updated:  03/19/18 1712     Blood Culture, Routine No Growth to date    Urine culture **CANNOT BE ORDERED STAT** [[id number]] Collected:  03/19/18 1018    Order Status:  Sent Specimen:  Urine from Urine, Clean Catch Updated:  03/19/18 1023        Specimen (12h ago through future)    None          Recent Labs  Lab 03/19/18  1018   COLORU  Yellow   SPECGRAV 1.030   PHUR 5.0   PROTEINUA 2+*   BACTERIA None   NITRITE Negative   LEUKOCYTESUR Negative   UROBILINOGEN Negative   HYALINECASTS 0       Diagnostic Results:    Current Facility-Administered Medications   Medication Dose Route Frequency Provider Last Rate Last Dose    aspirin EC tablet 81 mg  81 mg Oral Daily Sharath Smith MD   81 mg at 03/19/18 1305    atorvastatin tablet 80 mg  80 mg Oral Daily Sharath Smith MD   80 mg at 03/19/18 1304    clopidogrel tablet 75 mg  75 mg Oral Daily Sharath Smith MD        dextrose 50% injection 12.5 g  12.5 g Intravenous PRN Miguel Lux MD        dextrose 50% injection 25 g  25 g Intravenous PRN Miguel Lux MD        diazePAM tablet 5 mg  5 mg Oral Q12H PRN Sharath Smith MD   5 mg at 03/19/18 1950    docusate sodium capsule 100 mg  100 mg Oral PRN Sharath Smith MD        enoxaparin injection 40 mg  40 mg Subcutaneous Q24H Sharath Smith MD        glucagon (human recombinant) injection 1 mg  1 mg Intramuscular PRN Miguel Lux MD        glucose chewable tablet 16 g  16 g Oral PRN Miguel Lux MD        glucose chewable tablet 24 g  24 g Oral PRN Miguel Lux MD        insulin aspart U-100 pen 1-10 Units  1-10 Units Subcutaneous QID (AC + HS) PRN Miguel Lux MD   6 Units at 03/19/18 1717    insulin aspart U-100 pen 8 Units  8 Units Subcutaneous TID  Sharath Smith MD   8 Units at 03/19/18 1716    insulin detemir U-100 pen 22 Units  22 Units Subcutaneous QHS Miguel Lux MD   22 Units at 03/19/18 2111    lactulose 20 gram/30 mL solution Soln 20 g  20 g Oral TID Sharath Smith MD        lisinopril tablet 5 mg  5 mg Oral Daily Sharath Smith MD   5 mg at 03/19/18 1304    morphine injection 4 mg  4 mg Intravenous Q4H PRN Sharath Smith MD   4 mg at 03/20/18 0839    nicotine 14 mg/24 hr 1 patch  1 patch Transdermal Daily Sharath ELDRIDGE  MD Luis   1 patch at 03/20/18 0852    ondansetron injection 4 mg  4 mg Intravenous Q8H PRN Sharath Smith MD        oxyCODONE immediate release tablet 10 mg  10 mg Oral Q8H PRN Sharath Smith MD   10 mg at 03/19/18 1950    pantoprazole EC tablet 40 mg  40 mg Oral Daily Sharath Smith MD   40 mg at 03/19/18 1305    piperacillin-tazobactam 4.5 g in sodium chloride 0.9% 100 mL IVPB (ready to mix system)  4.5 g Intravenous Q8H Miguel Lux MD 25 mL/hr at 03/20/18 0557 4.5 g at 03/20/18 0557    pregabalin capsule 75 mg  75 mg Oral BID Sharath Smith MD        traZODone tablet 100 mg  100 mg Oral QHS Sharath Smith MD   100 mg at 03/19/18 2111    vancomycin (VANCOCIN) 1,250 mg in sodium chloride 0.9% 250 mL IVPB  1,250 mg Intravenous Q12H Miguel Lux .7 mL/hr at 03/20/18 0110 1,250 mg at 03/20/18 0110         ASSESSMENT/PLAN:     Active Hospital Problems    Diagnosis  POA    *Dry gangrene [I96]  - pt failed conservative tx post partial amputation  - discussed the case in detail with Dr. Washington   Yes    Type 2 diabetes mellitus with diabetic foot infection [E11.628, L08.9]    -HgA1c shows improvement in glycemic control but sill > 9  - continue insulin  - pt is non compliant     Yes    HTN, goal below 140/90 [I10]  - low bp this am    Yes    Abscess or cellulitis of foot [L03.119, L02.619]  - pt failed out pt oral ABX x 2     - need IV ABX and surgical intervention      Yes    CKD stage 3 due to type 2 diabetes mellitus [E11.22, N18.3]    -cr stable now    - watch for LIZ as Vancomycin on board.       Yes    Anxiety disorder due to general medical condition [F06.4]    - has been on chronic anxiolytic agt      Yes    Diabetic polyneuropathy associated with type 2 diabetes mellitus [E11.42]  Yes    Generalized anxiety disorder [F41.1]  Yes    Neuropathic pain [M79.2]    - continue home medications for now   Yes      Resolved Hospital Problems     Diagnosis Date Resolved POA   No resolved problems to display.     DVT prophylaxis: Yolis Lux M.D  Internal Medicine & Geriatric Medicine  Hematology & Oncology  Palliative Medicine    [address], [address]  [place], [place] [address] [phone] (Office)  [phone] (Fax)

## 2018-03-20 NOTE — NURSING
"Called to patient's room due to patient complaining of pain 15 out of 10. Explained to patient that he just received 4mg of Morphine at 1715 and could not have anything else until 2015. Patient states, " My pain is too bad. I can not keep still." Advised patient that I would place a call to MD. Spoke to Dr. Judge new orders as follows:    Administer Oxycodone 10 mg  Now   Modify Morphine 4 mg q3 prn pain to Morphine 6 mg q4 prn pain breakthrough  Acetaminophen 1000 mg IVPB q8    "

## 2018-03-20 NOTE — PLAN OF CARE
Problem: Patient Care Overview  Goal: Plan of Care Review  Outcome: Ongoing (interventions implemented as appropriate)  Dahlia Leblanc, RN Registered Nurse Signed Med/Surg  Nursing          []Hide copied text  []Hover for attribution information  Pt has been kept NPO post MN. Pt asking a lot of questions about possible surgery this AM. Pt has been educated on how his smoking and being noncompliant with his diabetic diet affect his healing. Pt denies any pain relief. Bed is locked and low with call light within reach. Side rails up x 2. Will continue to monitor.

## 2018-03-20 NOTE — BRIEF OP NOTE
[facility]  Brief Operative Note    SUMMARY     Surgery Date: 3/20/2018     Surgeon(s) and Role:     * David Washington MD - Primary    Assisting Surgeon: Kyle    Pre-op Diagnosis:  Abscess of foot, left [L03.116]    Post-op Diagnosis:  Post-Op Diagnosis Codes:     * Cellulitis of foot, left [L03.116]   with left 2nd Metatarsal osteomyelitis    Procedure:  I&D left foot with amputation of left 3rd toe (MTPJ) and through the 2nd left MT    Anesthesia: General    Description of Procedure: washout, debride, amputate    Description of the findings of the procedure: gross pus and necrotic tissue    Estimated Blood Loss: 10ml         Specimens:   Specimen (12h ago through future)    None      3rd toe, 2nd MT head, left foot soft tissues  Cx x 2

## 2018-03-20 NOTE — ANESTHESIA PREPROCEDURE EVALUATION
03/20/2018  Carlos Cifuentes is a 57 y.o., male.    Pre-op Assessment     I have reviewed the Nursing Notes.      Review of Systems  Anesthesia Hx:  No problems with previous Anesthesia    Social:  Smoker    Cardiovascular:   Exercise tolerance: good Hypertension    Pulmonary:  Pulmonary Normal    Renal/:   Chronic Renal Disease, CRI    Hepatic/GI:   GERD Liver Disease, Hepatitis, C hepatospenomegaly   Musculoskeletal:   Arthritis     Neurological:   Denies CVA. Denies Seizures.   Peripheral Neuropathy    Endocrine:   Diabetes Denies Hypothyroidism. Denies Hyperthyroidism.    Psych:   Psychiatric History          Physical Exam  General:  Obesity    Airway/Jaw/Neck:  AIRWAY FINDINGS: Normal           Mental Status:  Mental Status Findings: Normal        Anesthesia Plan  Type of Anesthesia, risks & benefits discussed:  Anesthesia Type:  general  Patient's Preference:   Intra-op Monitoring Plan: standard ASA monitors  Intra-op Monitoring Plan Comments:   Post Op Pain Control Plan:   Post Op Pain Control Plan Comments:   Induction:   IV  Beta Blocker:  Patient is not currently on a Beta-Blocker (No further documentation required).       Informed Consent: Patient understands risks and agrees with Anesthesia plan.  Questions answered. Anesthesia consent signed with patient.  ASA Score: 3     Day of Surgery Review of History & Physical:    H&P update referred to the surgeon.         Ready For Surgery From Anesthesia Perspective.

## 2018-03-20 NOTE — TRANSFER OF CARE
"Anesthesia Transfer of Care Note    Patient: Carlos Cifuentes    Procedure(s) Performed: Procedure(s) (LRB):  AMPUTATION-TRANSMETATARSAL (Left)  AMPUTATION-BELOW KNEE (Left)    Patient location: PACU    Anesthesia Type: general    Transport from OR: Transported from OR on room air with adequate spontaneous ventilation    Post pain: adequate analgesia    Post assessment: no apparent anesthetic complications    Post vital signs: stable    Level of consciousness: awake, alert and oriented    Nausea/Vomiting: no nausea/vomiting    Complications: none    Transfer of care protocol was followed      Last vitals:   Visit Vitals  /87 (BP Location: Right arm, Patient Position: Lying)   Pulse 108   Temp 37.5 °C (99.5 °F) (Tympanic)   Resp 18   Ht 5' 9" (1.753 m)   Wt 103.9 kg (229 lb 0.9 oz)   SpO2 (!) 94%   BMI 33.83 kg/m²     "

## 2018-03-20 NOTE — NURSING
Patient returned from surgery complaining of pain 15 out of 10. Explained to patient that he received a total of 10 mg of morphine in PACU and I could not give anything else at the moment. Offered patient Valium for anxiety. Dressing to L foot clean, dry, and intact. No acute distress noted; will continue to monitor.

## 2018-03-20 NOTE — PROGRESS NOTES
TN attempted to meet with pt at bedside to explain TN role as discharge planner and go over [place]- not able to do so at this time as pt is off unit to surgery

## 2018-03-20 NOTE — NURSING
Pt has been kept NPO post MN. Pt asking a lot of questions about possible surgery this AM. Pt has been educated on how his smoking and being noncompliant with his diabetic diet affect his healing. Pt denies any pain relief. Bed is locked and low with call light within reach. Side rails up x 2. Will continue to monitor.

## 2018-03-21 LAB
BACTERIA UR CULT: NORMAL
POCT GLUCOSE: 144 MG/DL (ref 70–110)
POCT GLUCOSE: 241 MG/DL (ref 70–110)
POCT GLUCOSE: 246 MG/DL (ref 70–110)
POCT GLUCOSE: 282 MG/DL (ref 70–110)
POCT GLUCOSE: 80 MG/DL (ref 70–110)
VANCOMYCIN TROUGH SERPL-MCNC: 16.4 UG/ML

## 2018-03-21 PROCEDURE — 25000003 PHARM REV CODE 250: Performed by: EMERGENCY MEDICINE

## 2018-03-21 PROCEDURE — 36415 COLL VENOUS BLD VENIPUNCTURE: CPT

## 2018-03-21 PROCEDURE — 11000001 HC ACUTE MED/SURG PRIVATE ROOM

## 2018-03-21 PROCEDURE — 63600175 PHARM REV CODE 636 W HCPCS: Performed by: ORTHOPAEDIC SURGERY

## 2018-03-21 PROCEDURE — 25000003 PHARM REV CODE 250: Performed by: ORTHOPAEDIC SURGERY

## 2018-03-21 PROCEDURE — 80202 ASSAY OF VANCOMYCIN: CPT

## 2018-03-21 PROCEDURE — 63600175 PHARM REV CODE 636 W HCPCS: Performed by: INTERNAL MEDICINE

## 2018-03-21 PROCEDURE — 25000003 PHARM REV CODE 250: Performed by: INTERNAL MEDICINE

## 2018-03-21 PROCEDURE — S4991 NICOTINE PATCH NONLEGEND: HCPCS | Performed by: EMERGENCY MEDICINE

## 2018-03-21 RX ADMIN — INSULIN ASPART 8 UNITS: 100 INJECTION, SOLUTION INTRAVENOUS; SUBCUTANEOUS at 05:03

## 2018-03-21 RX ADMIN — INSULIN ASPART 2 UNITS: 100 INJECTION, SOLUTION INTRAVENOUS; SUBCUTANEOUS at 09:03

## 2018-03-21 RX ADMIN — MORPHINE SULFATE 6 MG: 4 INJECTION INTRAVENOUS at 09:03

## 2018-03-21 RX ADMIN — CLOPIDOGREL BISULFATE 75 MG: 75 TABLET ORAL at 08:03

## 2018-03-21 RX ADMIN — PREGABALIN 75 MG: 50 CAPSULE ORAL at 08:03

## 2018-03-21 RX ADMIN — DIAZEPAM 5 MG: 5 TABLET ORAL at 04:03

## 2018-03-21 RX ADMIN — ACETAMINOPHEN 1000 MG: 10 INJECTION, SOLUTION INTRAVENOUS at 05:03

## 2018-03-21 RX ADMIN — PANTOPRAZOLE SODIUM 40 MG: 40 TABLET, DELAYED RELEASE ORAL at 08:03

## 2018-03-21 RX ADMIN — MORPHINE SULFATE 6 MG: 4 INJECTION INTRAVENOUS at 08:03

## 2018-03-21 RX ADMIN — VANCOMYCIN HYDROCHLORIDE 1250 MG: 1 INJECTION, POWDER, LYOPHILIZED, FOR SOLUTION INTRAVENOUS at 12:03

## 2018-03-21 RX ADMIN — MORPHINE SULFATE 6 MG: 4 INJECTION INTRAVENOUS at 04:03

## 2018-03-21 RX ADMIN — VANCOMYCIN HYDROCHLORIDE 1250 MG: 1 INJECTION, POWDER, LYOPHILIZED, FOR SOLUTION INTRAVENOUS at 02:03

## 2018-03-21 RX ADMIN — PIPERACILLIN AND TAZOBACTAM 4.5 G: 4; .5 INJECTION, POWDER, LYOPHILIZED, FOR SOLUTION INTRAVENOUS; PARENTERAL at 05:03

## 2018-03-21 RX ADMIN — PIPERACILLIN AND TAZOBACTAM 4.5 G: 4; .5 INJECTION, POWDER, LYOPHILIZED, FOR SOLUTION INTRAVENOUS; PARENTERAL at 01:03

## 2018-03-21 RX ADMIN — PIPERACILLIN AND TAZOBACTAM 4.5 G: 4; .5 INJECTION, POWDER, LYOPHILIZED, FOR SOLUTION INTRAVENOUS; PARENTERAL at 08:03

## 2018-03-21 RX ADMIN — MORPHINE SULFATE 6 MG: 4 INJECTION INTRAVENOUS at 05:03

## 2018-03-21 RX ADMIN — OXYCODONE HYDROCHLORIDE 10 MG: 5 TABLET ORAL at 10:03

## 2018-03-21 RX ADMIN — ACETAMINOPHEN 1000 MG: 10 INJECTION, SOLUTION INTRAVENOUS at 12:03

## 2018-03-21 RX ADMIN — INSULIN DETEMIR 22 UNITS: 100 INJECTION, SOLUTION SUBCUTANEOUS at 09:03

## 2018-03-21 RX ADMIN — ATORVASTATIN CALCIUM 80 MG: 40 TABLET, FILM COATED ORAL at 08:03

## 2018-03-21 RX ADMIN — INSULIN ASPART 8 UNITS: 100 INJECTION, SOLUTION INTRAVENOUS; SUBCUTANEOUS at 12:03

## 2018-03-21 RX ADMIN — TRAZODONE HYDROCHLORIDE 100 MG: 50 TABLET ORAL at 08:03

## 2018-03-21 RX ADMIN — MORPHINE SULFATE 6 MG: 4 INJECTION INTRAVENOUS at 12:03

## 2018-03-21 RX ADMIN — NICOTINE 1 PATCH: 14 PATCH, EXTENDED RELEASE TRANSDERMAL at 08:03

## 2018-03-21 NOTE — PLAN OF CARE
Problem: Patient Care Overview  Goal: Plan of Care Review  Outcome: Ongoing (interventions implemented as appropriate)  Patient is A/Ox4, remains free from falls, is afebrile, and states pain is being well managed with ordered medicines.  Patient keeping R foot elevated, leaving dressing intact with no interference.  Patient tolerating IV fluids well.  Patient compliant with blood glucose monitoring and insulin coverage this shift.  Patient knows NPO diet status planned for midnight tonight.

## 2018-03-21 NOTE — OP NOTE
DATE OF PROCEDURE:  03/20/2018    PREOPERATIVE DIAGNOSIS:  Left foot abscess.    POSTOPERATIVE DIAGNOSIS:  Left foot abscess and osteomyelitis of the second   metatarsal.    PROCEDURE:  Partial left foot amputation through the second metatarsal and   through the MTP joint of the third toe with I and D.    SURGEON:  Davdi Washington M.D.    ASSISTANT:  Zoila Mcgrath.    COMPLICATIONS:  None.    IMPLANTS:  None.    BLOOD LOSS:  Minimal.    PROCEDURE IN DETAIL:  After proper consents were obtained, the patient was taken   to the Operating Room and administered general anesthesia.  Left lower   extremity was prepped and draped in normal sterile fashion.  Incision was made   about the base of the second digit and carried around the third toe.  Dissection   proceeded on the plantar surface of the foot over the fluctuant area.  Skin was   removed and copious pus along with necrotic tissue was encountered all the way   back to near the base of the second metatarsal.  Friable tissue was removed with   a series of instrument, curette, rongeur, scalpel were all utilized.  Second   metatarsal head appeared to be grossly infected and was removed with the   oscillating saw.  Minimal amount of tenuous appearing tissue was left in the   wound to see if it would declare itself as viable or nonviable.  Once   debridement was complete, 3 liters normal saline with Betadine were infiltrated   through the wound with pulsatile lavage.  Wound was packed open and plans made   to come back in two days for repeat I and D and probable transmetatarsal   amputation.      ARON/IN  dd: 03/20/2018 13:04:09 (CDT)  td: 03/20/2018 20:07:00 (CDT)  Doc ID   #[id number]  Job ID #[id number]    CC:

## 2018-03-21 NOTE — PROGRESS NOTES
Progress Note    Admit Date: 3/19/2018   LOS: 2 days     SUBJECTIVE:     Follow-up For:  Cellulitis of left foot     03/22/2018: underwent I&D and amputation of left 3rd toe (MTPJ) and through the 2nd left MT    Scheduled Meds:   acetaminophen  1,000 mg Intravenous Q8H    aspirin  81 mg Oral Daily    atorvastatin  80 mg Oral Daily    clopidogrel  75 mg Oral Daily    insulin aspart U-100  8 Units Subcutaneous TID WM    insulin detemir U-100  22 Units Subcutaneous QHS    lactulose  20 g Oral TID    lisinopril  5 mg Oral Daily    nicotine  1 patch Transdermal Daily    pantoprazole  40 mg Oral Daily    piperacillin-tazobactam (ZOSYN) IVPB  4.5 g Intravenous Q8H    pregabalin  75 mg Oral BID    traZODone  100 mg Oral QHS    vancomycin (VANCOCIN) IVPB  1,250 mg Intravenous Q12H     Continuous Infusions:  PRN Meds:dextrose 50%, dextrose 50%, diazePAM, docusate sodium, glucagon (human recombinant), glucose, glucose, insulin aspart U-100, morphine, ondansetron, oxyCODONE, sodium chloride 0.9%    Review of patient's allergies indicates:   Allergen Reactions    Codeine Rash     Other reaction(s): Unknown    Vicodin [hydrocodone-acetaminophen] Rash       Review of Systems  Constitutional: Denies fever or chills. Chronic pain   Eyes: no visual changes   ENT: no nasal congestion. Denies sore throat with odynophagia   Respiratory: No cough, SOB, exertional dyspnea or  hemoptysis   Cardiovascular: no chest pain or palpitations   Gastrointestinal: no abdominal pain, n/v/. + constipation  Hematologic/Lymphatic: denies any bleeding  Musculoskeletal: see HPI  Neurological: no seizures or tremors, gait or balance prblems  Skin: black scar and redness to left foot.   Psych: Denies any anxiety, depression or insomnia      OBJECTIVE:     Vital Signs (Most Recent)  Temp: 98.2 °F (36.8 °C) (03/21/18 0519)  Pulse: 70 (03/21/18 0519)  Resp: 18 (03/21/18 0519)  BP: 101/62 (03/21/18 0519)  SpO2: (!) 91 % (03/21/18 0519)    Vital  Signs Range (Last 24H):  Temp:  [97.7 °F (36.5 °C)-99.5 °F (37.5 °C)]   Pulse:  []   Resp:  [16-21]   BP: (101-149)/(53-87)   SpO2:  [91 %-100 %]     I & O (Last 24H):  Intake/Output Summary (Last 24 hours) at 03/21/18 0731  Last data filed at 03/21/18 0400   Gross per 24 hour   Intake                0 ml   Output             1137 ml   Net            -1137 ml     Physical Exam:  General: NAD, resting in bed   Head: normocephalic, atraumatic   Eyes: conjunctivae pink, anicteric sclera.   Throat: No erythema or post nasal discharge   Neck: supple, no LAD   Lungs: decreased air entry bilaterally at the bases   Heart: S1, S2, RRR   Abdomen: obese, + BS x 4 quadrants, soft  Extremities: right foot with healed surgical site (amputation of 1st and 2nd toe). Left foot: in surgical dressing    Skin: cellulitis left foot better   MS: move all extremities.  Psy: calm     Laboratory:  CBC:   Recent Labs  Lab 03/19/18  0840   WBC 9.55   RBC 4.67   HGB 13.7*   HCT 40.3      MCV 86   MCH 29.3   MCHC 34.0     CMP:   Recent Labs  Lab 03/19/18  0840   *   CALCIUM 9.9   ALBUMIN 2.9*   PROT 8.4      K 4.4   CO2 24      BUN 24*   CREATININE 1.1   ALKPHOS 79   ALT 12   AST 12   BILITOT 0.3     Coagulation: No results for input(s): LABPROT, INR, APTT in the last 168 hours.  Cardiac markers: No results for input(s): CKMB, CPKMB, TROPONINT, TROPONINI, MYOGLOBIN in the last 168 hours.  ABGs: No results for input(s): PH, PCO2, PO2, HCO3, POCSATURATED, BE in the last 168 hours.  Microbiology Results (last 7 days)     Procedure Component Value Units Date/Time    Blood culture #1 **CANNOT BE ORDERED STAT** [[id number]] Collected:  03/19/18 0915    Order Status:  Completed Specimen:  Blood from Peripheral, Antecubital, Right Updated:  03/21/18 1103     Blood Culture, Routine No Growth to date     Blood Culture, Routine No Growth to date     Blood Culture, Routine No Growth to date    Blood culture #2 **CANNOT BE  ORDERED STAT** [[id number]] Collected:  03/19/18 0930    Order Status:  Completed Specimen:  Blood from Peripheral, Hand, Right Updated:  03/21/18 1103     Blood Culture, Routine No Growth to date     Blood Culture, Routine No Growth to date     Blood Culture, Routine No Growth to date    Aerobic culture [[id number]] Collected:  03/20/18 1323    Order Status:  Completed Specimen:  Bone from Foot, Left Updated:  03/21/18 1032     Aerobic Bacterial Culture --     GRAM NEGATIVE AHMET  Many  Identification and susceptibility pending      Narrative:       Left foot 2nd metatarsal bone/3rd toe bone /tissue    Urine culture **CANNOT BE ORDERED STAT** [[id number]] Collected:  03/19/18 1018    Order Status:  Completed Specimen:  Urine from Urine, Clean Catch Updated:  03/21/18 0731     Urine Culture, Routine No significant growth    Gram stain [[id number]] Collected:  03/20/18 1323    Order Status:  Completed Specimen:  Bone from Foot, Left Updated:  03/20/18 1514     Gram Stain Result Moderate WBC's      Many Gram negative rods    Narrative:       Left foot 2nd metatarsal bone /3rd toe bone and tissue    Culture, Anaerobe [[id number]] Collected:  03/20/18 1323    Order Status:  Sent Specimen:  Bone from Foot, Left Updated:  03/20/18 1356        Specimen (12h ago through future)    None          Recent Labs  Lab 03/19/18  1018   COLORU Yellow   SPECGRAV 1.030   PHUR 5.0   PROTEINUA 2+*   BACTERIA None   NITRITE Negative   LEUKOCYTESUR Negative   UROBILINOGEN Negative   HYALINECASTS 0       Diagnostic Results:      ASSESSMENT/PLAN:     Active Hospital Problems     Diagnosis   POA    *Dry gangrene [I96]  - pt failed conservative tx post partial amputation  - discussed the case in detail with Dr. Washington on 03/21    - s/p  I&D and amputation of left 3rd toe (MTPJ) and through the 2nd left MT  - GND in cuture     Yes    Type 2 diabetes mellitus with diabetic foot infection [E11.628, L08.9]     -HgA1c shows improvement in glycemic  control but sill > 9  - continue insulin  - pt is non compliant       Yes    HTN, goal below 140/90 [I10]  - low bp this am      Yes    Abscess or cellulitis of foot [L03.119, L02.619]  - pt failed out pt oral ABX x 2      - continue current dose of IV ABX        Yes    CKD stage 3 due to type 2 diabetes mellitus [E11.22, N18.3]     -cr stable now     - watch for LIZ as Vancomycin on board.          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      DVT prophylaxis: Yolis Lux M.D  Internal Medicine & Geriatric Medicine  Hematology & Oncology  Palliative Medicine    [address], [address]  [place] [address] [phone] (Office)  [phone] (Fax)

## 2018-03-21 NOTE — NURSING
Pt still requiring a lot of pain medication but did rest better this shift. Pt still messing with bandage and unwrapping foot even after being told to leave it alone. Pt scratches foot with cane also.

## 2018-03-21 NOTE — PROGRESS NOTES
Dr Washington spent several minutes explaining the plan to the patient and the likelihood of needing further surgery. Plan is to return to surgery for another I&D. Most likely will need more surgery on Monday. He seemed to be more agreeable. He is still requesting more pain med.   VSSAF    Left foot: in post op shoe  Hct: 40.3  Hct: 9.55    A/P: POD#1 I&D left foot with amp of left 3rd toe and 2nd left MT  1) NPO at midnight  2) to OR tomorrow for another I&D

## 2018-03-22 ENCOUNTER — ANESTHESIA (OUTPATIENT)
Dept: SURGERY | Facility: HOSPITAL | Age: 58
DRG: 475 | End: 2018-03-22
Payer: MEDICAID

## 2018-03-22 ENCOUNTER — SURGERY (OUTPATIENT)
Age: 58
End: 2018-03-22

## 2018-03-22 ENCOUNTER — ANESTHESIA EVENT (OUTPATIENT)
Dept: SURGERY | Facility: HOSPITAL | Age: 58
DRG: 475 | End: 2018-03-22
Payer: MEDICAID

## 2018-03-22 LAB
BACTERIA SPEC AEROBE CULT: NORMAL
POCT GLUCOSE: 141 MG/DL (ref 70–110)
POCT GLUCOSE: 157 MG/DL (ref 70–110)
POCT GLUCOSE: 163 MG/DL (ref 70–110)
POCT GLUCOSE: 181 MG/DL (ref 70–110)
POCT GLUCOSE: 190 MG/DL (ref 70–110)

## 2018-03-22 PROCEDURE — 37000009 HC ANESTHESIA EA ADD 15 MINS: Performed by: ORTHOPAEDIC SURGERY

## 2018-03-22 PROCEDURE — 25000003 PHARM REV CODE 250: Performed by: EMERGENCY MEDICINE

## 2018-03-22 PROCEDURE — 36000707: Performed by: ORTHOPAEDIC SURGERY

## 2018-03-22 PROCEDURE — D9220A PRA ANESTHESIA: Mod: CRNA,,, | Performed by: NURSE ANESTHETIST, CERTIFIED REGISTERED

## 2018-03-22 PROCEDURE — 25000003 PHARM REV CODE 250: Performed by: NURSE ANESTHETIST, CERTIFIED REGISTERED

## 2018-03-22 PROCEDURE — 36000706: Performed by: ORTHOPAEDIC SURGERY

## 2018-03-22 PROCEDURE — 88305 TISSUE EXAM BY PATHOLOGIST: CPT | Mod: 26,,, | Performed by: PATHOLOGY

## 2018-03-22 PROCEDURE — 25000003 PHARM REV CODE 250: Performed by: ORTHOPAEDIC SURGERY

## 2018-03-22 PROCEDURE — 27200651 HC AIRWAY, LMA: Performed by: NURSE ANESTHETIST, CERTIFIED REGISTERED

## 2018-03-22 PROCEDURE — 88311 DECALCIFY TISSUE: CPT | Mod: 26,,, | Performed by: PATHOLOGY

## 2018-03-22 PROCEDURE — 63600175 PHARM REV CODE 636 W HCPCS: Performed by: NURSE ANESTHETIST, CERTIFIED REGISTERED

## 2018-03-22 PROCEDURE — 0Y6N0ZD DETACHMENT AT LEFT FOOT, PARTIAL 4TH RAY, OPEN APPROACH: ICD-10-PCS | Performed by: ORTHOPAEDIC SURGERY

## 2018-03-22 PROCEDURE — 36000708 HC OR TIME LEV III 1ST 15 MIN: Performed by: ORTHOPAEDIC SURGERY

## 2018-03-22 PROCEDURE — 11000001 HC ACUTE MED/SURG PRIVATE ROOM

## 2018-03-22 PROCEDURE — S0028 INJECTION, FAMOTIDINE, 20 MG: HCPCS | Performed by: NURSE ANESTHETIST, CERTIFIED REGISTERED

## 2018-03-22 PROCEDURE — 63600175 PHARM REV CODE 636 W HCPCS: Performed by: INTERNAL MEDICINE

## 2018-03-22 PROCEDURE — 71000033 HC RECOVERY, INTIAL HOUR: Performed by: ORTHOPAEDIC SURGERY

## 2018-03-22 PROCEDURE — 0Y6N0ZF DETACHMENT AT LEFT FOOT, PARTIAL 5TH RAY, OPEN APPROACH: ICD-10-PCS | Performed by: ORTHOPAEDIC SURGERY

## 2018-03-22 PROCEDURE — 0Y6N0Z9 DETACHMENT AT LEFT FOOT, PARTIAL 1ST RAY, OPEN APPROACH: ICD-10-PCS | Performed by: ORTHOPAEDIC SURGERY

## 2018-03-22 PROCEDURE — 27201423 OPTIME MED/SURG SUP & DEVICES STERILE SUPPLY: Performed by: ORTHOPAEDIC SURGERY

## 2018-03-22 PROCEDURE — D9220A PRA ANESTHESIA: Mod: ANES,,, | Performed by: ANESTHESIOLOGY

## 2018-03-22 PROCEDURE — 36000709 HC OR TIME LEV III EA ADD 15 MIN: Performed by: ORTHOPAEDIC SURGERY

## 2018-03-22 PROCEDURE — 25000003 PHARM REV CODE 250: Performed by: INTERNAL MEDICINE

## 2018-03-22 PROCEDURE — 88305 TISSUE EXAM BY PATHOLOGIST: CPT | Performed by: PATHOLOGY

## 2018-03-22 PROCEDURE — 37000008 HC ANESTHESIA 1ST 15 MINUTES: Performed by: ORTHOPAEDIC SURGERY

## 2018-03-22 PROCEDURE — S4991 NICOTINE PATCH NONLEGEND: HCPCS | Performed by: EMERGENCY MEDICINE

## 2018-03-22 PROCEDURE — 63600175 PHARM REV CODE 636 W HCPCS: Performed by: ORTHOPAEDIC SURGERY

## 2018-03-22 PROCEDURE — 71000039 HC RECOVERY, EACH ADD'L HOUR: Performed by: ORTHOPAEDIC SURGERY

## 2018-03-22 PROCEDURE — 63600175 PHARM REV CODE 636 W HCPCS: Performed by: ANESTHESIOLOGY

## 2018-03-22 RX ORDER — MIDAZOLAM HYDROCHLORIDE 1 MG/ML
INJECTION, SOLUTION INTRAMUSCULAR; INTRAVENOUS
Status: DISCONTINUED | OUTPATIENT
Start: 2018-03-22 | End: 2018-03-22

## 2018-03-22 RX ORDER — SODIUM CHLORIDE, SODIUM LACTATE, POTASSIUM CHLORIDE, CALCIUM CHLORIDE 600; 310; 30; 20 MG/100ML; MG/100ML; MG/100ML; MG/100ML
INJECTION, SOLUTION INTRAVENOUS CONTINUOUS PRN
Status: DISCONTINUED | OUTPATIENT
Start: 2018-03-22 | End: 2018-03-22

## 2018-03-22 RX ORDER — PHENYLEPHRINE HYDROCHLORIDE 10 MG/ML
INJECTION INTRAVENOUS
Status: DISCONTINUED | OUTPATIENT
Start: 2018-03-22 | End: 2018-03-22

## 2018-03-22 RX ORDER — LIDOCAINE HCL/PF 100 MG/5ML
SYRINGE (ML) INTRAVENOUS
Status: DISCONTINUED | OUTPATIENT
Start: 2018-03-22 | End: 2018-03-22

## 2018-03-22 RX ORDER — SODIUM CHLORIDE 0.9 % (FLUSH) 0.9 %
3 SYRINGE (ML) INJECTION
Status: DISCONTINUED | OUTPATIENT
Start: 2018-03-22 | End: 2018-03-28 | Stop reason: HOSPADM

## 2018-03-22 RX ORDER — FENTANYL CITRATE 50 UG/ML
INJECTION, SOLUTION INTRAMUSCULAR; INTRAVENOUS
Status: DISCONTINUED | OUTPATIENT
Start: 2018-03-22 | End: 2018-03-22

## 2018-03-22 RX ORDER — SODIUM CHLORIDE 0.9 G/100ML
IRRIGANT IRRIGATION
Status: DISCONTINUED | OUTPATIENT
Start: 2018-03-22 | End: 2018-03-22 | Stop reason: HOSPADM

## 2018-03-22 RX ORDER — FAMOTIDINE 10 MG/ML
INJECTION INTRAVENOUS
Status: DISCONTINUED | OUTPATIENT
Start: 2018-03-22 | End: 2018-03-22

## 2018-03-22 RX ORDER — MORPHINE SULFATE 4 MG/ML
2 INJECTION, SOLUTION INTRAMUSCULAR; INTRAVENOUS EVERY 5 MIN PRN
Status: DISCONTINUED | OUTPATIENT
Start: 2018-03-22 | End: 2018-03-22

## 2018-03-22 RX ORDER — ONDANSETRON 2 MG/ML
INJECTION INTRAMUSCULAR; INTRAVENOUS
Status: DISCONTINUED | OUTPATIENT
Start: 2018-03-22 | End: 2018-03-22

## 2018-03-22 RX ORDER — PROPOFOL 10 MG/ML
VIAL (ML) INTRAVENOUS
Status: DISCONTINUED | OUTPATIENT
Start: 2018-03-22 | End: 2018-03-22

## 2018-03-22 RX ADMIN — LIDOCAINE HYDROCHLORIDE 100 MG: 20 INJECTION, SOLUTION INTRAVENOUS at 08:03

## 2018-03-22 RX ADMIN — PIPERACILLIN AND TAZOBACTAM 4.5 G: 4; .5 INJECTION, POWDER, LYOPHILIZED, FOR SOLUTION INTRAVENOUS; PARENTERAL at 02:03

## 2018-03-22 RX ADMIN — PHENYLEPHRINE HYDROCHLORIDE 100 MCG: 10 INJECTION INTRAVENOUS at 09:03

## 2018-03-22 RX ADMIN — MIDAZOLAM HYDROCHLORIDE 2 MG: 1 INJECTION, SOLUTION INTRAMUSCULAR; INTRAVENOUS at 08:03

## 2018-03-22 RX ADMIN — INSULIN ASPART 8 UNITS: 100 INJECTION, SOLUTION INTRAVENOUS; SUBCUTANEOUS at 04:03

## 2018-03-22 RX ADMIN — MORPHINE SULFATE 6 MG: 4 INJECTION INTRAVENOUS at 07:03

## 2018-03-22 RX ADMIN — FENTANYL CITRATE 50 MCG: 50 INJECTION INTRAMUSCULAR; INTRAVENOUS at 08:03

## 2018-03-22 RX ADMIN — MORPHINE SULFATE 2 MG: 4 INJECTION INTRAVENOUS at 09:03

## 2018-03-22 RX ADMIN — TRAZODONE HYDROCHLORIDE 100 MG: 50 TABLET ORAL at 09:03

## 2018-03-22 RX ADMIN — PROPOFOL 150 MG: 10 INJECTION, EMULSION INTRAVENOUS at 08:03

## 2018-03-22 RX ADMIN — FENTANYL CITRATE 25 MCG: 50 INJECTION INTRAMUSCULAR; INTRAVENOUS at 09:03

## 2018-03-22 RX ADMIN — OXYCODONE HYDROCHLORIDE 10 MG: 5 TABLET ORAL at 01:03

## 2018-03-22 RX ADMIN — FAMOTIDINE 20 MG: 10 INJECTION, SOLUTION INTRAVENOUS at 08:03

## 2018-03-22 RX ADMIN — SODIUM CHLORIDE 3000 ML: 0.9 IRRIGANT IRRIGATION at 09:03

## 2018-03-22 RX ADMIN — ONDANSETRON 4 MG: 2 INJECTION, SOLUTION INTRAMUSCULAR; INTRAVENOUS at 09:03

## 2018-03-22 RX ADMIN — INSULIN ASPART 8 UNITS: 100 INJECTION, SOLUTION INTRAVENOUS; SUBCUTANEOUS at 11:03

## 2018-03-22 RX ADMIN — OXYCODONE HYDROCHLORIDE 10 MG: 5 TABLET ORAL at 09:03

## 2018-03-22 RX ADMIN — MORPHINE SULFATE 6 MG: 4 INJECTION INTRAVENOUS at 05:03

## 2018-03-22 RX ADMIN — VANCOMYCIN HYDROCHLORIDE 1250 MG: 1 INJECTION, POWDER, LYOPHILIZED, FOR SOLUTION INTRAVENOUS at 01:03

## 2018-03-22 RX ADMIN — VANCOMYCIN HYDROCHLORIDE 1250 MG: 1 INJECTION, POWDER, LYOPHILIZED, FOR SOLUTION INTRAVENOUS at 12:03

## 2018-03-22 RX ADMIN — INSULIN ASPART 2 UNITS: 100 INJECTION, SOLUTION INTRAVENOUS; SUBCUTANEOUS at 11:03

## 2018-03-22 RX ADMIN — PIPERACILLIN AND TAZOBACTAM 4.5 G: 4; .5 INJECTION, POWDER, LYOPHILIZED, FOR SOLUTION INTRAVENOUS; PARENTERAL at 09:03

## 2018-03-22 RX ADMIN — MORPHINE SULFATE 2 MG: 4 INJECTION INTRAVENOUS at 10:03

## 2018-03-22 RX ADMIN — NICOTINE 1 PATCH: 14 PATCH, EXTENDED RELEASE TRANSDERMAL at 11:03

## 2018-03-22 RX ADMIN — INSULIN DETEMIR 22 UNITS: 100 INJECTION, SOLUTION SUBCUTANEOUS at 09:03

## 2018-03-22 RX ADMIN — MORPHINE SULFATE 6 MG: 4 INJECTION INTRAVENOUS at 03:03

## 2018-03-22 RX ADMIN — SODIUM CHLORIDE, SODIUM LACTATE, POTASSIUM CHLORIDE, AND CALCIUM CHLORIDE: .6; .31; .03; .02 INJECTION, SOLUTION INTRAVENOUS at 08:03

## 2018-03-22 RX ADMIN — PREGABALIN 75 MG: 50 CAPSULE ORAL at 09:03

## 2018-03-22 RX ADMIN — MORPHINE SULFATE 6 MG: 4 INJECTION INTRAVENOUS at 01:03

## 2018-03-22 RX ADMIN — MORPHINE SULFATE 6 MG: 4 INJECTION INTRAVENOUS at 11:03

## 2018-03-22 RX ADMIN — PIPERACILLIN AND TAZOBACTAM 4.5 G: 4; .5 INJECTION, POWDER, LYOPHILIZED, FOR SOLUTION INTRAVENOUS; PARENTERAL at 04:03

## 2018-03-22 NOTE — PLAN OF CARE
Pt has surgery this AM, had Left foot transmet amputation. Surgical dressing in place with moderate, dry bloody drainage, no active bleeding. Pt consistently asking for pain medication. Pain medication given as ordered. CBG well controlled with scheduled and sliding scale insulin. No falls/injury this shift. All needs met.

## 2018-03-22 NOTE — PLAN OF CARE
Problem: Pain, Acute (Adult)  Intervention: Monitor/Manage Analgesia   03/22/18 0742   Manage Acute Burn Pain   Bowel Intervention adequate fluid intake promoted   Safety Interventions   Medication Review/Management medications reviewed;high risk medications identified   Pt states  current pain regimen is not sufficient and requests an increase in availablity.

## 2018-03-22 NOTE — ANESTHESIA POSTPROCEDURE EVALUATION
"Anesthesia Post Evaluation    Patient: Carlos Cifuentes    Procedure(s) Performed: Procedure(s) (LRB):  AMPUTATION-TRANSMETATARSAL (Left)  INCISION AND DRAINAGE (I&D),foot (Left)    Final Anesthesia Type: general  Patient location during evaluation: PACU  Patient participation: Yes- Able to Participate  Level of consciousness: awake and alert, oriented and awake  Post-procedure vital signs: reviewed and stable  Airway patency: patent  PONV status at discharge: No PONV  Anesthetic complications: no      Cardiovascular status: blood pressure returned to baseline  Respiratory status: unassisted, spontaneous ventilation and room air  Hydration status: euvolemic  Follow-up not needed.        Visit Vitals  /61 (BP Location: Right arm, Patient Position: Lying)   Pulse 81   Temp 37.4 °C (99.3 °F) (Oral)   Resp 12   Ht 5' 9" (1.753 m)   Wt 103.9 kg (229 lb 0.9 oz)   SpO2 96%   BMI 33.83 kg/m²       Pain/Jose E Score: Pain Assessment Performed: Yes (3/22/2018  7:35 AM)  Presence of Pain: complains of pain/discomfort (3/22/2018  7:35 AM)  Pain Rating Prior to Med Admin: 9 (3/22/2018  5:16 AM)  Pain Rating Post Med Admin: 7 (3/22/2018  5:46 AM)      "

## 2018-03-22 NOTE — TRANSFER OF CARE
"Anesthesia Transfer of Care Note    Patient: Carlos Cifuentes    Procedure(s) Performed: Procedure(s) (LRB):  AMPUTATION-TRANSMETATARSAL (Left)  AMPUTATION-BELOW KNEE (Left)  INCISION AND DRAINAGE (I&D),foot (Left)  AMPUTATION-FOOT-PARTIAL (Left)    Patient location: PACU    Anesthesia Type: general    Transport from OR: Transported from OR on room air with adequate spontaneous ventilation    Post pain: adequate analgesia    Post assessment: no apparent anesthetic complications and tolerated procedure well    Post vital signs: stable    Level of consciousness: sedated and responds to stimulation    Nausea/Vomiting: no nausea/vomiting    Complications: none    Transfer of care protocol was followed      Last vitals:   Visit Vitals  /61 (BP Location: Right arm, Patient Position: Lying)   Pulse 81   Temp 37.4 °C (99.3 °F) (Oral)   Resp 12   Ht 5' 9" (1.753 m)   Wt 103.9 kg (229 lb 0.9 oz)   SpO2 96%   BMI 33.83 kg/m²     "

## 2018-03-22 NOTE — BRIEF OP NOTE
[facility]  Brief Operative Note    SUMMARY     Surgery Date: 3/22/2018     Surgeon(s) and Role:     * David Washington MD - Primary    Assisting Surgeon: Kyle    Pre-op Diagnosis:  Cellulitis of foot, left [L03.116]    Post-op Diagnosis:  Post-Op Diagnosis Codes:     * Cellulitis of foot, left [L03.116]       Diabetic foot infection, left    Procedure:  Left foot I&D  Lt transmet amputation    Anesthesia: General    Description of Procedure: washout, amputate    Description of the findings of the procedure: no pus, some necrosis    Estimated Blood Loss: 10ml         Specimens:   Specimen (12h ago through future)    Start     Ordered    03/22/18 0922  Specimen to Pathology - Surgery  Once     Comments:  Left Transmetatarsal Amputation      03/22/18 0922

## 2018-03-22 NOTE — OP NOTE
DATE OF PROCEDURE:  03/22/2018.    PREOPERATIVE DIAGNOSIS:  Left foot diabetic foot infection.    POSTOPERATIVE DIAGNOSIS:  Left foot diabetic foot infection.    PROCEDURES:  1.  I and D, left foot.  2.  Transmetatarsal amputation, left foot.    SURGEON:  David Washington M.D.    ASSISTANT:  Zoila Mcgrath.    COMPLICATIONS:  None.    BLOOD LOSS:  10 mL    IMPLANTS:  None.    SPECIMENS:  Great toe, fourth and fifth toe, portions of the first through fifth   metatarsals were all sent to the lab.    PROCEDURE IN DETAIL:  After proper consents were obtained, the patient was taken   to the Operating Room and administered general anesthesia.  Left lower   extremity was then prepped and draped in the normal sterile fashion.  Wound was   examined.  There was further necrosis on the dorsum of the foot and some muscle   necrosis in the plantar and deep portions of the foot.  Debridement was   continued and was too extensive to allow keeping the great toe and the fourth   and fifth toes.  This was discussed with the patient prior to surgery.    Transmetatarsal amputation was then performed.  Skin incision was extended   medially and laterally and dissection carried down through the investing fascia.    Great toe, fourth toe, fifth toe were all removed through the MTP joint.    Further dissection was then performed proximally and amputation of the   metatarsal was performed through the mid metatarsal shaft for metatarsals 1   through 5.  Once bone was removed, further debridement was needed on the plantar   side of the second and third metatarsal.  Once all devitalized tissue was   removed, the wound was copiously irrigated with 3 liters normal saline with   Betadine and wound was again packed open.  Tourniquet was deflated.  Sterile   dressings were applied.  The patient was aroused in Operating Room, transferred   to Recovery in stable condition.      ARON/JOHNNY  dd: 03/22/2018 09:29:17 (CDT)  td: 03/22/2018 10:40:49 (CDT)  Doc ID    #[id number]  Job ID #[id number]    CC:

## 2018-03-22 NOTE — ANESTHESIA POSTPROCEDURE EVALUATION
"Anesthesia Post Evaluation    Patient: Carlos Cifuentes    Procedure(s) Performed: Procedure(s) (LRB):  AMPUTATION-TRANSMETATARSAL (Left)    Final Anesthesia Type: general  Patient location during evaluation: PACU  Patient participation: Yes- Able to Participate  Level of consciousness: awake and alert, oriented and awake  Post-procedure vital signs: reviewed and stable  Airway patency: patent  PONV status at discharge: No PONV  Anesthetic complications: no      Cardiovascular status: blood pressure returned to baseline  Respiratory status: unassisted, spontaneous ventilation and room air  Hydration status: euvolemic  Follow-up not needed.        Visit Vitals  /73 (BP Location: Left arm, Patient Position: Lying)   Pulse 71   Temp 37 °C (98.6 °F) (Oral)   Resp 17   Ht 5' 9" (1.753 m)   Wt 103.9 kg (229 lb 0.9 oz)   SpO2 (!) 94%   BMI 33.83 kg/m²       Pain/Jose E Score: Pain Assessment Performed: Yes (3/21/2018  9:09 PM)  Presence of Pain: complains of pain/discomfort (3/21/2018  7:03 PM)  Pain Rating Prior to Med Admin: 9 (3/22/2018  5:16 AM)  Pain Rating Post Med Admin: 10 (3/22/2018  1:36 AM)      "

## 2018-03-22 NOTE — ANESTHESIA PREPROCEDURE EVALUATION
03/22/2018  Carlos Cifuentes is a 57 y.o., male.    Pre-op Assessment    I have reviewed the Patient Summary Reports.     I have reviewed the Nursing Notes.   I have reviewed the Medications.     Review of Systems  Anesthesia Hx:  No problems with previous Anesthesia Denies Hx of Anesthetic complications  Neg history of prior surgery. Denies Family Hx of Anesthesia complications.   Denies Personal Hx of Anesthesia complications.   Social:  Smoker    Hematology/Oncology:  Hematology Normal   Oncology Normal     EENT/Dental:EENT/Dental Normal   Cardiovascular:   Exercise tolerance: good Hypertension    Pulmonary:  Pulmonary Normal    Renal/:  Renal/ Normal Chronic Renal Disease, CRI     Hepatic/GI:   GERD Liver Disease, Hepatitis, C hepatospenomegaly   Musculoskeletal:   Arthritis     Neurological:   Denies CVA. Denies Seizures.   Peripheral Neuropathy    Endocrine:   Diabetes Denies Hypothyroidism. Denies Hyperthyroidism.    Dermatological:  Skin Normal    Psych:   Psychiatric History          Physical Exam  General:  Obesity    Airway/Jaw/Neck:  AIRWAY FINDINGS: Normal           Mental Status:  Mental Status Findings: Normal        Anesthesia Plan  Type of Anesthesia, risks & benefits discussed:  Anesthesia Type:  general  Patient's Preference:   Intra-op Monitoring Plan: standard ASA monitors  Intra-op Monitoring Plan Comments:   Post Op Pain Control Plan:   Post Op Pain Control Plan Comments:   Induction:   IV  Beta Blocker:  Patient is not currently on a Beta-Blocker (No further documentation required).       Informed Consent: Patient understands risks and agrees with Anesthesia plan.  Questions answered. Anesthesia consent signed with patient.  ASA Score: 3     Day of Surgery Review of History & Physical:    H&P update referred to the surgeon.         Ready For Surgery From Anesthesia Perspective.

## 2018-03-22 NOTE — NURSING
"Patient has a moderate amount of dried blood at the bottom of Left foot surgical dressing, no active bleeding. Offered to change patient's sheets and he is refusing. He states "Just put a towel under it, i'll have my sheets changed tomorrow." Will continue to monitor.     When rounding on patient he is resting comfortably but when he wakes up states "My pain is severe, I need more pain meds". Pt is already on 6mg of Morphine every 4 hours and also P.O. Oxycodone IR 10 mg every 4 hours.   "

## 2018-03-22 NOTE — PROGRESS NOTES
Progress Note    Admit Date: 3/19/2018   LOS: 3 days     SUBJECTIVE:     Follow-up For:  Cellulitis of left foot     03/22/2018: underwent I&D and amputation of left 3rd toe (MTPJ) and through the 2nd left MT    Scheduled Meds:   aspirin  81 mg Oral Daily    atorvastatin  80 mg Oral Daily    clopidogrel  75 mg Oral Daily    insulin aspart U-100  8 Units Subcutaneous TID WM    insulin detemir U-100  22 Units Subcutaneous QHS    lactulose  20 g Oral TID    lisinopril  5 mg Oral Daily    nicotine  1 patch Transdermal Daily    pantoprazole  40 mg Oral Daily    piperacillin-tazobactam (ZOSYN) IVPB  4.5 g Intravenous Q8H    pregabalin  75 mg Oral BID    traZODone  100 mg Oral QHS    vancomycin (VANCOCIN) IVPB  1,250 mg Intravenous Q12H     Continuous Infusions:  PRN Meds:dextrose 50%, dextrose 50%, diazePAM, docusate sodium, glucagon (human recombinant), glucose, glucose, insulin aspart U-100, morphine, ondansetron, oxyCODONE, sodium chloride 0.9%    Review of patient's allergies indicates:   Allergen Reactions    Codeine Rash     Other reaction(s): Unknown    Vicodin [hydrocodone-acetaminophen] Rash       Review of Systems  NO fever reported       OBJECTIVE:     Vital Signs (Most Recent)  Temp: 98.6 °F (37 °C) (03/22/18 0357)  Pulse: 71 (03/22/18 0357)  Resp: 17 (03/22/18 0357)  BP: 125/73 (03/22/18 0357)  SpO2: (!) 94 % (03/22/18 0357)    Vital Signs Range (Last 24H):  Temp:  [97.6 °F (36.4 °C)-99.1 °F (37.3 °C)]   Pulse:  [70-76]   Resp:  [17-18]   BP: (114-153)/(56-74)   SpO2:  [94 %-99 %]     I & O (Last 24H):    Intake/Output Summary (Last 24 hours) at 03/22/18 0743  Last data filed at 03/22/18 0456   Gross per 24 hour   Intake              960 ml   Output             2700 ml   Net            -1740 ml     Physical Exam:  In the OR today      Laboratory:  CBC:     Recent Labs  Lab 03/19/18  0840   WBC 9.55   RBC 4.67   HGB 13.7*   HCT 40.3      MCV 86   MCH 29.3   MCHC 34.0     CMP:     Recent  Labs  Lab 03/19/18  0840   *   CALCIUM 9.9   ALBUMIN 2.9*   PROT 8.4      K 4.4   CO2 24      BUN 24*   CREATININE 1.1   ALKPHOS 79   ALT 12   AST 12   BILITOT 0.3     Coagulation: No results for input(s): LABPROT, INR, APTT in the last 168 hours.  Cardiac markers: No results for input(s): CKMB, CPKMB, TROPONINT, TROPONINI, MYOGLOBIN in the last 168 hours.  ABGs: No results for input(s): PH, PCO2, PO2, HCO3, POCSATURATED, BE in the last 168 hours.  Microbiology Results (last 7 days)     Procedure Component Value Units Date/Time    Culture, Anaerobe [[id number]] Collected:  03/20/18 1323    Order Status:  Completed Specimen:  Bone from Foot, Left Updated:  03/22/18 0713     Anaerobic Culture Culture in progress    Narrative:       Left foot 2nd metatarsal bone/3rd toe bone and tissue    Blood culture #1 **CANNOT BE ORDERED STAT** [[id number]] Collected:  03/19/18 0915    Order Status:  Completed Specimen:  Blood from Peripheral, Antecubital, Right Updated:  03/21/18 1103     Blood Culture, Routine No Growth to date     Blood Culture, Routine No Growth to date     Blood Culture, Routine No Growth to date    Blood culture #2 **CANNOT BE ORDERED STAT** [[id number]] Collected:  03/19/18 0930    Order Status:  Completed Specimen:  Blood from Peripheral, Hand, Right Updated:  03/21/18 1103     Blood Culture, Routine No Growth to date     Blood Culture, Routine No Growth to date     Blood Culture, Routine No Growth to date    Aerobic culture [[id number]] Collected:  03/20/18 1323    Order Status:  Completed Specimen:  Bone from Foot, Left Updated:  03/21/18 1032     Aerobic Bacterial Culture --     GRAM NEGATIVE AHMET  Many  Identification and susceptibility pending      Narrative:       Left foot 2nd metatarsal bone/3rd toe bone /tissue    Urine culture **CANNOT BE ORDERED STAT** [[id number]] Collected:  03/19/18 1018    Order Status:  Completed Specimen:  Urine from Urine, Clean Catch Updated:  03/21/18 0798      Urine Culture, Routine No significant growth    Gram stain [[id number]] Collected:  03/20/18 1323    Order Status:  Completed Specimen:  Bone from Foot, Left Updated:  03/20/18 1514     Gram Stain Result Moderate WBC's      Many Gram negative rods    Narrative:       Left foot 2nd metatarsal bone /3rd toe bone and tissue        Specimen (12h ago through future)    None          Recent Labs  Lab 03/19/18  1018   COLORU Yellow   SPECGRAV 1.030   PHUR 5.0   PROTEINUA 2+*   BACTERIA None   NITRITE Negative   LEUKOCYTESUR Negative   UROBILINOGEN Negative   HYALINECASTS 0       Diagnostic Results:    Current Facility-Administered Medications   Medication Dose Route Frequency Provider Last Rate Last Dose    aspirin EC tablet 81 mg  81 mg Oral Daily Sharath Smith MD   81 mg at 03/19/18 1305    atorvastatin tablet 80 mg  80 mg Oral Daily Sharath Smith MD   80 mg at 03/21/18 0836    clopidogrel tablet 75 mg  75 mg Oral Daily Sharath Smith MD   75 mg at 03/21/18 0837    dextrose 50% injection 12.5 g  12.5 g Intravenous PRN Miguel Lux MD        dextrose 50% injection 25 g  25 g Intravenous PRN Miguel Lux MD        diazePAM tablet 5 mg  5 mg Oral Q12H PRN Sharath Smith MD   5 mg at 03/21/18 0405    docusate sodium capsule 100 mg  100 mg Oral PRN Sharath Smith MD        glucagon (human recombinant) injection 1 mg  1 mg Intramuscular PRN Miguel Lux MD        glucose chewable tablet 16 g  16 g Oral PRCONSTANTINO Lux MD        glucose chewable tablet 24 g  24 g Oral PRN Miguel Lux MD        insulin aspart U-100 pen 1-10 Units  1-10 Units Subcutaneous QID (AC + HS) PRCONSTANTINO Lux MD   2 Units at 03/22/18 1108    insulin aspart U-100 pen 8 Units  8 Units Subcutaneous TID  Sharath Smith MD   8 Units at 03/22/18 1644    insulin detemir U-100 pen 22 Units  22 Units Subcutaneous QHS Miguel Lux MD   22 Units at 03/21/18 2100     lactulose 20 gram/30 mL solution Soln 20 g  20 g Oral TID Sharath Smith MD        lisinopril tablet 5 mg  5 mg Oral Daily Sharath Smith MD   5 mg at 03/19/18 1304    morphine injection 6 mg  6 mg Intravenous Q4H PRN Patrick Judge III, MD   6 mg at 03/22/18 1514    nicotine 14 mg/24 hr 1 patch  1 patch Transdermal Daily Sharath Smith MD   1 patch at 03/22/18 1107    ondansetron injection 4 mg  4 mg Intravenous Q8H PRN Sharath Smith MD        oxyCODONE immediate release tablet 10 mg  10 mg Oral Q4H PRN David Washington MD   10 mg at 03/22/18 1319    pantoprazole EC tablet 40 mg  40 mg Oral Daily Sharath Smith MD   40 mg at 03/21/18 0838    piperacillin-tazobactam 4.5 g in sodium chloride 0.9% 100 mL IVPB (ready to mix system)  4.5 g Intravenous Q8H Miguel Lux MD 25 mL/hr at 03/22/18 1639 4.5 g at 03/22/18 1639    pregabalin capsule 75 mg  75 mg Oral BID Sharath Smith MD   75 mg at 03/21/18 2059    sodium chloride 0.9% flush 3 mL  3 mL Intravenous PRN Norah Howard MD        sodium chloride 0.9% flush 3 mL  3 mL Intravenous PRN Norah Howard MD        traZODone tablet 100 mg  100 mg Oral QHS Sharath Smith MD   100 mg at 03/21/18 2059    vancomycin (VANCOCIN) 1,250 mg in sodium chloride 0.9% 250 mL IVPB  1,250 mg Intravenous Q12H Miguel Lux .7 mL/hr at 03/22/18 1227 1,250 mg at 03/22/18 1227         ASSESSMENT/PLAN:     Active Hospital Problems     Diagnosis   POA    *Dry gangrene [I96]  - pt failed conservative tx post partial amputation  - discussed the case in detail with Dr. Washington on 03/21    - s/p  I&D and amputation of left 3rd toe (MTPJ) and through the 2nd left MT  - pan sensitive Klebsiella   - continue James and zosyn for now   -discussed the case with Dr. Washington today      Yes    Type 2 diabetes mellitus with diabetic foot infection [E11.628, L08.9]     -HgA1c shows improvement in glycemic  control but sill > 9  - continue insulin  - pt is non compliant       Yes    HTN, goal below 140/90 [I10]  - low bp this am      Yes    Abscess or cellulitis of foot [L03.119, L02.619]  - pt failed out pt oral ABX x 2      - continue current dose of IV ABX        Yes    CKD stage 3 due to type 2 diabetes mellitus [E11.22, N18.3]     -cr stable now     - watch for LIZ as Vancomycin on board.          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      DVT prophylaxis: Lovenox    Pt needs LTAC - CM for LTAC at [facility]     Miguel Lux M.D  Internal Medicine & Geriatric Medicine  Hematology & Oncology  Palliative Medicine    [address] [facility], [address]  [place], [place] [address] [phone] (Office)  [phone] (Fax)

## 2018-03-23 LAB
POCT GLUCOSE: 118 MG/DL (ref 70–110)
POCT GLUCOSE: 126 MG/DL (ref 70–110)

## 2018-03-23 PROCEDURE — 25000003 PHARM REV CODE 250: Performed by: ORTHOPAEDIC SURGERY

## 2018-03-23 PROCEDURE — 63600175 PHARM REV CODE 636 W HCPCS: Performed by: INTERNAL MEDICINE

## 2018-03-23 PROCEDURE — 94761 N-INVAS EAR/PLS OXIMETRY MLT: CPT

## 2018-03-23 PROCEDURE — 25000003 PHARM REV CODE 250: Performed by: EMERGENCY MEDICINE

## 2018-03-23 PROCEDURE — 25000003 PHARM REV CODE 250: Performed by: INTERNAL MEDICINE

## 2018-03-23 PROCEDURE — 63600175 PHARM REV CODE 636 W HCPCS: Performed by: ORTHOPAEDIC SURGERY

## 2018-03-23 PROCEDURE — S4991 NICOTINE PATCH NONLEGEND: HCPCS | Performed by: EMERGENCY MEDICINE

## 2018-03-23 PROCEDURE — 11000001 HC ACUTE MED/SURG PRIVATE ROOM

## 2018-03-23 RX ADMIN — INSULIN ASPART 8 UNITS: 100 INJECTION, SOLUTION INTRAVENOUS; SUBCUTANEOUS at 04:03

## 2018-03-23 RX ADMIN — PANTOPRAZOLE SODIUM 40 MG: 40 TABLET, DELAYED RELEASE ORAL at 08:03

## 2018-03-23 RX ADMIN — ASPIRIN 81 MG: 81 TABLET, COATED ORAL at 08:03

## 2018-03-23 RX ADMIN — CLOPIDOGREL BISULFATE 75 MG: 75 TABLET ORAL at 08:03

## 2018-03-23 RX ADMIN — MORPHINE SULFATE 6 MG: 4 INJECTION INTRAVENOUS at 12:03

## 2018-03-23 RX ADMIN — PIPERACILLIN AND TAZOBACTAM 4.5 G: 4; .5 INJECTION, POWDER, LYOPHILIZED, FOR SOLUTION INTRAVENOUS; PARENTERAL at 04:03

## 2018-03-23 RX ADMIN — MORPHINE SULFATE 6 MG: 4 INJECTION INTRAVENOUS at 05:03

## 2018-03-23 RX ADMIN — INSULIN DETEMIR 22 UNITS: 100 INJECTION, SOLUTION SUBCUTANEOUS at 10:03

## 2018-03-23 RX ADMIN — OXYCODONE HYDROCHLORIDE 10 MG: 5 TABLET ORAL at 08:03

## 2018-03-23 RX ADMIN — MORPHINE SULFATE 6 MG: 4 INJECTION INTRAVENOUS at 10:03

## 2018-03-23 RX ADMIN — MORPHINE SULFATE 6 MG: 4 INJECTION INTRAVENOUS at 04:03

## 2018-03-23 RX ADMIN — NICOTINE 1 PATCH: 14 PATCH, EXTENDED RELEASE TRANSDERMAL at 08:03

## 2018-03-23 RX ADMIN — INSULIN ASPART 8 UNITS: 100 INJECTION, SOLUTION INTRAVENOUS; SUBCUTANEOUS at 01:03

## 2018-03-23 RX ADMIN — OXYCODONE HYDROCHLORIDE 10 MG: 5 TABLET ORAL at 06:03

## 2018-03-23 RX ADMIN — LISINOPRIL 5 MG: 5 TABLET ORAL at 08:03

## 2018-03-23 RX ADMIN — VANCOMYCIN HYDROCHLORIDE 1250 MG: 1 INJECTION, POWDER, LYOPHILIZED, FOR SOLUTION INTRAVENOUS at 01:03

## 2018-03-23 RX ADMIN — PIPERACILLIN AND TAZOBACTAM 4.5 G: 4; .5 INJECTION, POWDER, LYOPHILIZED, FOR SOLUTION INTRAVENOUS; PARENTERAL at 01:03

## 2018-03-23 RX ADMIN — VANCOMYCIN HYDROCHLORIDE 1250 MG: 1 INJECTION, POWDER, LYOPHILIZED, FOR SOLUTION INTRAVENOUS at 12:03

## 2018-03-23 RX ADMIN — PIPERACILLIN AND TAZOBACTAM 4.5 G: 4; .5 INJECTION, POWDER, LYOPHILIZED, FOR SOLUTION INTRAVENOUS; PARENTERAL at 08:03

## 2018-03-23 RX ADMIN — OXYCODONE HYDROCHLORIDE 10 MG: 5 TABLET ORAL at 03:03

## 2018-03-23 RX ADMIN — MORPHINE SULFATE 6 MG: 4 INJECTION INTRAVENOUS at 08:03

## 2018-03-23 RX ADMIN — ATORVASTATIN CALCIUM 80 MG: 40 TABLET, FILM COATED ORAL at 08:03

## 2018-03-23 RX ADMIN — INSULIN ASPART 8 UNITS: 100 INJECTION, SOLUTION INTRAVENOUS; SUBCUTANEOUS at 08:03

## 2018-03-23 RX ADMIN — OXYCODONE HYDROCHLORIDE 10 MG: 5 TABLET ORAL at 10:03

## 2018-03-23 RX ADMIN — MORPHINE SULFATE 6 MG: 4 INJECTION INTRAVENOUS at 01:03

## 2018-03-23 RX ADMIN — TRAZODONE HYDROCHLORIDE 100 MG: 50 TABLET ORAL at 10:03

## 2018-03-23 NOTE — PROGRESS NOTES
Progress Note    Admit Date: 3/19/2018   LOS: 4 days     SUBJECTIVE:     Follow-up For:  Cellulitis of left foot     03/23/2018: underwent left foot I&D and Lt transmetatarsal amputation on 03/22. Moderate pain  03/22/2018: underwent I&D and amputation of left 3rd toe (MTPJ) and through the 2nd left MT on 03/21    Scheduled Meds:   aspirin  81 mg Oral Daily    atorvastatin  80 mg Oral Daily    clopidogrel  75 mg Oral Daily    insulin aspart U-100  8 Units Subcutaneous TID WM    insulin detemir U-100  22 Units Subcutaneous QHS    lactulose  20 g Oral TID    lisinopril  5 mg Oral Daily    nicotine  1 patch Transdermal Daily    pantoprazole  40 mg Oral Daily    piperacillin-tazobactam (ZOSYN) IVPB  4.5 g Intravenous Q8H    pregabalin  75 mg Oral BID    traZODone  100 mg Oral QHS    vancomycin (VANCOCIN) IVPB  1,250 mg Intravenous Q12H     Continuous Infusions:  PRN Meds:dextrose 50%, dextrose 50%, diazePAM, docusate sodium, glucagon (human recombinant), glucose, glucose, insulin aspart U-100, morphine, ondansetron, oxyCODONE, sodium chloride 0.9%, sodium chloride 0.9%    Review of patient's allergies indicates:   Allergen Reactions    Codeine Rash     Other reaction(s): Unknown    Vicodin [hydrocodone-acetaminophen] Rash       Review of Systems  Constitutional: Denies fever or chills. Chronic pain   Eyes: no visual changes   ENT: no nasal congestion. Denies sore throat with odynophagia   Respiratory: No cough, SOB, exertional dyspnea or  hemoptysis   Cardiovascular: no chest pain or palpitations   Gastrointestinal: no abdominal pain, n/v/. + constipation  Hematologic/Lymphatic: denies any bleeding  Musculoskeletal: see HPI  Neurological: no seizures or tremors, gait or balance prblems  Skin: black scar and redness to left foot.   Psych: Denies any anxiety, depression or insomnia      OBJECTIVE:     Vital Signs (Most Recent)  Temp: 98.2 °F (36.8 °C) (03/23/18 0409)  Pulse: 72 (03/23/18 0409)  Resp: 17  (03/23/18 0409)  BP: 124/69 (03/23/18 0409)  SpO2: (!) 94 % (03/23/18 0409)    Vital Signs Range (Last 24H):  Temp:  [97.9 °F (36.6 °C)-99.3 °F (37.4 °C)]   Pulse:  [64-81]   Resp:  [12-18]   BP: (100-128)/(56-69)   SpO2:  [94 %-96 %]     I & O (Last 24H):    Intake/Output Summary (Last 24 hours) at 03/23/18 0758  Last data filed at 03/23/18 0615   Gross per 24 hour   Intake             1530 ml   Output             1500 ml   Net               30 ml     Physical Exam:  General: NAD, resting in bed   Head: normocephalic, atraumatic   Eyes: conjunctivae pink, anicteric sclera.   Throat: No erythema or post nasal discharge   Neck: supple, no LAD   Lungs: decreased air entry bilaterally at the bases   Heart: S1, S2, RRR   Abdomen: obese, + BS x 4 quadrants, soft  Extremities: right foot with healed surgical site (amputation of 1st and 2nd toe). Left foot: in surgical dressing- s/p TMT amputation   MS: move all extremities.  Psy: calm     Laboratory:  CBC:     Recent Labs  Lab 03/19/18  0840   WBC 9.55   RBC 4.67   HGB 13.7*   HCT 40.3      MCV 86   MCH 29.3   MCHC 34.0     CMP:     Recent Labs  Lab 03/19/18  0840   *   CALCIUM 9.9   ALBUMIN 2.9*   PROT 8.4      K 4.4   CO2 24      BUN 24*   CREATININE 1.1   ALKPHOS 79   ALT 12   AST 12   BILITOT 0.3     Coagulation: No results for input(s): LABPROT, INR, APTT in the last 168 hours.  Cardiac markers: No results for input(s): CKMB, CPKMB, TROPONINT, TROPONINI, MYOGLOBIN in the last 168 hours.  ABGs: No results for input(s): PH, PCO2, PO2, HCO3, POCSATURATED, BE in the last 168 hours.  Microbiology Results (last 7 days)     Procedure Component Value Units Date/Time    Blood culture #1 **CANNOT BE ORDERED STAT** [[id number]] Collected:  03/19/18 0915    Order Status:  Completed Specimen:  Blood from Peripheral, Antecubital, Right Updated:  03/22/18 1103     Blood Culture, Routine No Growth to date     Blood Culture, Routine No Growth to date      Blood Culture, Routine No Growth to date     Blood Culture, Routine No Growth to date    Blood culture #2 **CANNOT BE ORDERED STAT** [[id number]] Collected:  03/19/18 0930    Order Status:  Completed Specimen:  Blood from Peripheral, Hand, Right Updated:  03/22/18 1103     Blood Culture, Routine No Growth to date     Blood Culture, Routine No Growth to date     Blood Culture, Routine No Growth to date     Blood Culture, Routine No Growth to date    Aerobic culture [[id number]]  (Susceptibility) Collected:  03/20/18 1323    Order Status:  Completed Specimen:  Bone from Foot, Left Updated:  03/22/18 0836     Aerobic Bacterial Culture --     KLEBSIELLA PNEUMONIAE  Many      Narrative:       Left foot 2nd metatarsal bone/3rd toe bone /tissue    Culture, Anaerobe [[id number]] Collected:  03/20/18 1323    Order Status:  Completed Specimen:  Bone from Foot, Left Updated:  03/22/18 0713     Anaerobic Culture Culture in progress    Narrative:       Left foot 2nd metatarsal bone/3rd toe bone and tissue    Urine culture **CANNOT BE ORDERED STAT** [[id number]] Collected:  03/19/18 1018    Order Status:  Completed Specimen:  Urine from Urine, Clean Catch Updated:  03/21/18 0731     Urine Culture, Routine No significant growth    Gram stain [[id number]] Collected:  03/20/18 1323    Order Status:  Completed Specimen:  Bone from Foot, Left Updated:  03/20/18 1514     Gram Stain Result Moderate WBC's      Many Gram negative rods    Narrative:       Left foot 2nd metatarsal bone /3rd toe bone and tissue        Specimen (12h ago through future)    None          Recent Labs  Lab 03/19/18  1018   COLORU Yellow   SPECGRAV 1.030   PHUR 5.0   PROTEINUA 2+*   BACTERIA None   NITRITE Negative   LEUKOCYTESUR Negative   UROBILINOGEN Negative   HYALINECASTS 0       Diagnostic Results:      ASSESSMENT/PLAN:     Active Hospital Problems     Diagnosis   POA    *Dry gangrene [I96]  - pt failed conservative tx post partial amputation  - discussed the  case in detail with Dr. Washington on 03/21     - s/p  I&D and amputation of left 3rd toe (MTPJ) and through the 2nd left MT  - pan sensitive Klebsiella   - dc Vancomycin   - continue zosyn for now   -discussed the case with Dr. Washington today  - s/p left TMT amputation       Yes    Type 2 diabetes mellitus with diabetic foot infection [E11.628, L08.9]     -HgA1c shows improvement in glycemic control but sill > 9  - continue insulin  - pt is non compliant       Yes    HTN, goal below 140/90 [I10]  - low bp this am      Yes    Abscess or cellulitis of foot [L03.119, L02.619]  - pt failed out pt oral ABX x 2      - continue current dose of IV ABX        Yes    CKD stage 3 due to type 2 diabetes mellitus [E11.22, N18.3]     -cr stable now     - watch for LIZ as Vancomycin on board.          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      DVT prophylaxis: Lovenox     Consulted CM for LTAC at [facility]          Miguel Lux M.D  Internal Medicine & Geriatric Medicine  Hematology & Oncology  Palliative Medicine    [address] [place] [address], [address]  [place] [address]  [phone] (Office)  [phone] (Fax)

## 2018-03-23 NOTE — PROGRESS NOTES
Patient doing well with no new complaints. Understands the plan.   VSSAF    Left foot: dressing c/d/i.   Hct: 40.3  WBC: 9.55  Cultures: pending    A/P: POD#1 left foot I&D with TMA  1) cont IV abx over the weekend  2) planning to return to OR for wound closure in the next few days

## 2018-03-23 NOTE — NURSING
Pt with surgical dressing D/I has dried blood that I marked at beginning of 1900 shift. No further drainage noted. Pt with complaints of pain to foot. Says he thinks the dressing is too tight. Dressing   was assessed for tightness and this writter is able to get finger under dressing without difficulty. Complains of burning. Pain medications given and foot elevated for comfort.

## 2018-03-23 NOTE — PLAN OF CARE
Problem: Patient Care Overview  Goal: Plan of Care Review  Outcome: Ongoing (interventions implemented as appropriate)   03/23/18 7814   Coping/Psychosocial   Plan Of Care Reviewed With patient       Comments: Currently resting. Lots of pain to LT foot surgical site. Required morphine twice so far this shift,as well as, oxycodone. Afebrile with IV antibiotics continued. accucheck monitored as ordered. levimer insulin at hs. Turns and repositions self while in bed. Encouraged to keep LLE elevated but on each rounds pt has removed his foot from pillow. Makes no attempts to get OOB. Uses urinal.

## 2018-03-23 NOTE — CONSULTS
Consult for LTAC.     Paperwork sent to [facility]. [facility] will be visiting on today to speak with pt.

## 2018-03-24 LAB
BACTERIA BLD CULT: NORMAL
BACTERIA BLD CULT: NORMAL
POCT GLUCOSE: 101 MG/DL (ref 70–110)
POCT GLUCOSE: 114 MG/DL (ref 70–110)
POCT GLUCOSE: 146 MG/DL (ref 70–110)
POCT GLUCOSE: 186 MG/DL (ref 70–110)
VANCOMYCIN TROUGH SERPL-MCNC: 25.7 UG/ML

## 2018-03-24 PROCEDURE — 94761 N-INVAS EAR/PLS OXIMETRY MLT: CPT

## 2018-03-24 PROCEDURE — 11000001 HC ACUTE MED/SURG PRIVATE ROOM

## 2018-03-24 PROCEDURE — 25000003 PHARM REV CODE 250: Performed by: INTERNAL MEDICINE

## 2018-03-24 PROCEDURE — 63600175 PHARM REV CODE 636 W HCPCS: Performed by: ORTHOPAEDIC SURGERY

## 2018-03-24 PROCEDURE — S4991 NICOTINE PATCH NONLEGEND: HCPCS | Performed by: EMERGENCY MEDICINE

## 2018-03-24 PROCEDURE — 25000003 PHARM REV CODE 250: Performed by: EMERGENCY MEDICINE

## 2018-03-24 PROCEDURE — 80202 ASSAY OF VANCOMYCIN: CPT

## 2018-03-24 PROCEDURE — 25000003 PHARM REV CODE 250: Performed by: ORTHOPAEDIC SURGERY

## 2018-03-24 PROCEDURE — 63600175 PHARM REV CODE 636 W HCPCS: Performed by: INTERNAL MEDICINE

## 2018-03-24 PROCEDURE — 36415 COLL VENOUS BLD VENIPUNCTURE: CPT

## 2018-03-24 RX ORDER — MORPHINE SULFATE 4 MG/ML
3 INJECTION, SOLUTION INTRAMUSCULAR; INTRAVENOUS
Status: DISCONTINUED | OUTPATIENT
Start: 2018-03-24 | End: 2018-03-25

## 2018-03-24 RX ORDER — HYDROMORPHONE HYDROCHLORIDE 1 MG/ML
1 INJECTION, SOLUTION INTRAMUSCULAR; INTRAVENOUS; SUBCUTANEOUS
Status: DISCONTINUED | OUTPATIENT
Start: 2018-03-24 | End: 2018-03-24

## 2018-03-24 RX ADMIN — NICOTINE 1 PATCH: 14 PATCH, EXTENDED RELEASE TRANSDERMAL at 08:03

## 2018-03-24 RX ADMIN — PIPERACILLIN AND TAZOBACTAM 4.5 G: 4; .5 INJECTION, POWDER, LYOPHILIZED, FOR SOLUTION INTRAVENOUS; PARENTERAL at 08:03

## 2018-03-24 RX ADMIN — MORPHINE SULFATE 3 MG: 4 INJECTION INTRAVENOUS at 10:03

## 2018-03-24 RX ADMIN — MORPHINE SULFATE 6 MG: 4 INJECTION INTRAVENOUS at 07:03

## 2018-03-24 RX ADMIN — PIPERACILLIN AND TAZOBACTAM 4.5 G: 4; .5 INJECTION, POWDER, LYOPHILIZED, FOR SOLUTION INTRAVENOUS; PARENTERAL at 12:03

## 2018-03-24 RX ADMIN — OXYCODONE HYDROCHLORIDE 10 MG: 5 TABLET ORAL at 02:03

## 2018-03-24 RX ADMIN — PANTOPRAZOLE SODIUM 40 MG: 40 TABLET, DELAYED RELEASE ORAL at 08:03

## 2018-03-24 RX ADMIN — INSULIN ASPART 8 UNITS: 100 INJECTION, SOLUTION INTRAVENOUS; SUBCUTANEOUS at 08:03

## 2018-03-24 RX ADMIN — MORPHINE SULFATE 3 MG: 4 INJECTION INTRAVENOUS at 04:03

## 2018-03-24 RX ADMIN — PIPERACILLIN AND TAZOBACTAM 4.5 G: 4; .5 INJECTION, POWDER, LYOPHILIZED, FOR SOLUTION INTRAVENOUS; PARENTERAL at 04:03

## 2018-03-24 RX ADMIN — MORPHINE SULFATE 3 MG: 4 INJECTION INTRAVENOUS at 07:03

## 2018-03-24 RX ADMIN — INSULIN ASPART 8 UNITS: 100 INJECTION, SOLUTION INTRAVENOUS; SUBCUTANEOUS at 05:03

## 2018-03-24 RX ADMIN — LISINOPRIL 5 MG: 5 TABLET ORAL at 08:03

## 2018-03-24 RX ADMIN — INSULIN DETEMIR 22 UNITS: 100 INJECTION, SOLUTION SUBCUTANEOUS at 09:03

## 2018-03-24 RX ADMIN — OXYCODONE HYDROCHLORIDE 10 MG: 5 TABLET ORAL at 09:03

## 2018-03-24 RX ADMIN — TRAZODONE HYDROCHLORIDE 100 MG: 50 TABLET ORAL at 09:03

## 2018-03-24 RX ADMIN — OXYCODONE HYDROCHLORIDE 10 MG: 5 TABLET ORAL at 11:03

## 2018-03-24 RX ADMIN — OXYCODONE HYDROCHLORIDE 10 MG: 5 TABLET ORAL at 12:03

## 2018-03-24 RX ADMIN — CLOPIDOGREL BISULFATE 75 MG: 75 TABLET ORAL at 08:03

## 2018-03-24 RX ADMIN — MORPHINE SULFATE 6 MG: 4 INJECTION INTRAVENOUS at 03:03

## 2018-03-24 RX ADMIN — MORPHINE SULFATE 3 MG: 4 INJECTION INTRAVENOUS at 01:03

## 2018-03-24 RX ADMIN — VANCOMYCIN HYDROCHLORIDE 1250 MG: 1 INJECTION, POWDER, LYOPHILIZED, FOR SOLUTION INTRAVENOUS at 02:03

## 2018-03-24 RX ADMIN — INSULIN ASPART 8 UNITS: 100 INJECTION, SOLUTION INTRAVENOUS; SUBCUTANEOUS at 12:03

## 2018-03-24 NOTE — SUBJECTIVE & OBJECTIVE
Interval History:  Post op    Review of Systems   Mild post op foot pain    Objective:     Vital Signs (Most Recent):  Temp: 98.1 °F (36.7 °C) (03/24/18 1222)  Pulse: 75 (03/24/18 1222)  Resp: 17 (03/24/18 1222)  BP: (!) 121/59 (03/24/18 1222)  SpO2: 97 % (03/24/18 1222) Vital Signs (24h Range):  Temp:  [97.9 °F (36.6 °C)-98.5 °F (36.9 °C)] 98.1 °F (36.7 °C)  Pulse:  [70-75] 75  Resp:  [16-18] 17  SpO2:  [91 %-98 %] 97 %  BP: (110-142)/(59-87) 121/59     Weight: 103.9 kg (229 lb 0.9 oz)  Body mass index is 33.83 kg/m².    Intake/Output Summary (Last 24 hours) at 03/24/18 1503  Last data filed at 03/24/18 0817   Gross per 24 hour   Intake              830 ml   Output             1450 ml   Net             -620 ml      Physical Exam  Laying in bed  Awake and conversant  No acute distress; only has mild post op foot pain  Lungs clear  Heart rrr  abd benign  Legs no edema  Foot wrapped

## 2018-03-24 NOTE — PLAN OF CARE
Problem: Patient Care Overview  Goal: Plan of Care Review  Outcome: Ongoing (interventions implemented as appropriate)   03/24/18 1299   Coping/Psychosocial   Plan Of Care Reviewed With patient       Comments: Currently sleeping. Has received several doses of pain med so far this shift. If he wakes up on my rounds he ask when pain med is due. Does complain of pain to LLE usually a 10/10. Afebrile with IV Vanc and zosyn continued. accuchecks monitored and WNL. Repositions self in bed. Uses urinal to void. Does not make attempts to get OOB.

## 2018-03-24 NOTE — PROGRESS NOTES
[facility] Hospital Medicine  Progress Note    Patient Name: Carlos Cifuentes  MRN: [id number]  Patient Class: IP- Inpatient   Admission Date: 3/19/2018  Length of Stay: 5 days  Attending Physician: Miguel Lux MD  Primary Care Provider: Miguel Lux MD        Subjective:     Principal Problem:Dry gangrene    HPI:   56 YO white male gentleman followed by Dr. Lux with multiple medical conditions including diabetic foot ulcer, Back pain; Bulging lumbar disc; C. difficile diarrhea; Chronic back pain (10/14/2014); Diabetes mellitus; Diabetes mellitus type II; DM (diabetes mellitus), type 2, uncontrolled (3/12/2013); Hepatitis C (7/3/2013); MSSA (methicillin susceptible Staphylococcus aureus) septicemia; Neuromuscular disorder; Neuropathy; and Staph aureus infection as well as non compliace with diet, medication and physician follow up. He presented to [facility] ED with progressive and worsening  swelling and pain following an amputation to his 2nd left toe. He completed IV ABX followed by 2 courses of po Doxycycline. Pt declined TMT amputation and underwent partial amputation of 2nd left toe by Dr. David Washington. His current home medications were not alleviating his pain and so he came to ED.    Hospital Course:  3/20:  I&D left foot with amputation of left 3rd toe (MTPJ) and through the 2nd left MT  3/22:  1.  I and D, left foot.  2.  Transmetatarsal amputation, left foot.    Interval History:  Post op    Review of Systems   Mild post op foot pain    Objective:     Vital Signs (Most Recent):  Temp: 98.1 °F (36.7 °C) (03/24/18 1222)  Pulse: 75 (03/24/18 1222)  Resp: 17 (03/24/18 1222)  BP: (!) 121/59 (03/24/18 1222)  SpO2: 97 % (03/24/18 1222) Vital Signs (24h Range):  Temp:  [97.9 °F (36.6 °C)-98.5 °F (36.9 °C)] 98.1 °F (36.7 °C)  Pulse:  [70-75] 75  Resp:  [16-18] 17  SpO2:  [91 %-98 %] 97 %  BP: (110-142)/(59-87) 121/59     Weight: 103.9 kg (229 lb 0.9 oz)  Body mass index is 33.83  kg/m².    Intake/Output Summary (Last 24 hours) at 03/24/18 1503  Last data filed at 03/24/18 0817   Gross per 24 hour   Intake              830 ml   Output             1450 ml   Net             -620 ml      Physical Exam  Laying in bed  Awake and conversant  No acute distress; only has mild post op foot pain  Lungs clear  Heart rrr  abd benign  Legs no edema  Foot wrapped    Assessment/Plan:      * Dry gangrene    S/p transmetatarsal amputation 3/22  Plans per ortho:  1. Pain meds changed  2. Plan for repeat I&D and wound closure on Monday  3. Should be ready for d/c to LTAC on Tuesday          HTN, goal below 140/90    127/87  Lisinopril 5        Type 2 diabetes mellitus with diabetic foot infection    Uncontrolled as of last a1c several years ago:  11.8  Glu 101  With treatment of foot, should be less stress to cause hyperglycemia            Abscess or cellulitis of foot    vanco and zosyn  Has now had TM amputation          CKD stage 3 due to type 2 diabetes mellitus    Resolved; gfr now >60 (stage 2)          Diabetic polyneuropathy associated with type 2 diabetes mellitus    Not currently on gabapentin        Anxiety disorder due to general medical condition    On trazodone          VTE Risk Mitigation         Ordered     IP VTE LOW RISK PATIENT  Once      03/19/18 1239              Phan Singleton MD  Department of Hospital Medicine   [facility]

## 2018-03-24 NOTE — HPI
58 YO white male gentleman followed by Dr. Lux with multiple medical conditions including diabetic foot ulcer, Back pain; Bulging lumbar disc; C. difficile diarrhea; Chronic back pain (10/14/2014); Diabetes mellitus; Diabetes mellitus type II; DM (diabetes mellitus), type 2, uncontrolled (3/12/2013); Hepatitis C (7/3/2013); MSSA (methicillin susceptible Staphylococcus aureus) septicemia; Neuromuscular disorder; Neuropathy; and Staph aureus infection as well as non compliace with diet, medication and physician follow up. He presented to [facility] ED with progressive and worsening  swelling and pain following an amputation to his 2nd left toe. He completed IV ABX followed by 2 courses of po Doxycycline. Pt declined TMT amputation and underwent partial amputation of 2nd left toe by Dr. David Washington. His current home medications were not alleviating his pain and so he came to ED.

## 2018-03-24 NOTE — ASSESSMENT & PLAN NOTE
S/p transmetatarsal amputation 3/22  Plans per ortho:  1. Pain meds changed  2. Plan for repeat I&D and wound closure on Monday  3. Should be ready for d/c to LTAC on Tuesday

## 2018-03-24 NOTE — PROGRESS NOTES
POD#2  Pt complains of severe pain, but he was sleeping comfortably upon my arrival  VSSAF  Lt foot- some dried blood on the dsg; no active bleeding soaking through the dsg    A/P Left diabetic foot infection  1. Pain meds changed  2. Plan for repeat I&D and wound closure on Monday  3. Should be ready for d/c to LTAC on Tuesday

## 2018-03-24 NOTE — NURSING
Pt arguing on the phone with someone from his family. When he got off the phone says he may have to leave tomorrow, because there is no one to take care of his dog. Encouraged pt to try and find someone because its important that he finishes his hosp tx. LLE with dressing D/I. Elevated on pillows. Continues to complain of pain 10/10.

## 2018-03-24 NOTE — HOSPITAL COURSE
3/20:  I&D left foot with amputation of left 3rd toe (MTPJ) and through the 2nd left MT  3/22:  1.  I and D, left foot.  2.  Transmetatarsal amputation, left foot.

## 2018-03-24 NOTE — PLAN OF CARE
Problem: Patient Care Overview  Goal: Plan of Care Review  Outcome: Ongoing (interventions implemented as appropriate)   03/24/18 0326   Coping/Psychosocial   Plan Of Care Reviewed With patient     Patient aaox4 and alert.  Voiding w/o difficulty; refusing laxative.  No bm today.  BCG monitored and scheduled insulin administered as ordered. Pain ordered with oral and IV pain medication.  Will continue to monitor.

## 2018-03-24 NOTE — ASSESSMENT & PLAN NOTE
Uncontrolled as of last a1c several years ago:  11.8  Glu 101  With treatment of foot, should be less stress to cause hyperglycemia

## 2018-03-24 NOTE — NURSING
"Vanc Trough has not been drawn since 3/21.  Called Dr. Singleton this am who ordered, "vanc trough 30 min prior to next dose."  Vanc Trough is now 25.7.  Called pharmacy and Dr. Singleton.  Next dose held and will redraw in 12 hrs.   "

## 2018-03-25 LAB
BACTERIA SPEC ANAEROBE CULT: NORMAL
POCT GLUCOSE: 103 MG/DL (ref 70–110)
POCT GLUCOSE: 104 MG/DL (ref 70–110)
POCT GLUCOSE: 125 MG/DL (ref 70–110)
POCT GLUCOSE: 92 MG/DL (ref 70–110)
VANCOMYCIN TROUGH SERPL-MCNC: 15.6 UG/ML

## 2018-03-25 PROCEDURE — 63600175 PHARM REV CODE 636 W HCPCS: Performed by: ORTHOPAEDIC SURGERY

## 2018-03-25 PROCEDURE — 36415 COLL VENOUS BLD VENIPUNCTURE: CPT

## 2018-03-25 PROCEDURE — 80202 ASSAY OF VANCOMYCIN: CPT

## 2018-03-25 PROCEDURE — 25000003 PHARM REV CODE 250: Performed by: INTERNAL MEDICINE

## 2018-03-25 PROCEDURE — 25000003 PHARM REV CODE 250: Performed by: EMERGENCY MEDICINE

## 2018-03-25 PROCEDURE — 25000003 PHARM REV CODE 250

## 2018-03-25 PROCEDURE — 25000003 PHARM REV CODE 250: Performed by: ORTHOPAEDIC SURGERY

## 2018-03-25 PROCEDURE — 63600175 PHARM REV CODE 636 W HCPCS: Performed by: INTERNAL MEDICINE

## 2018-03-25 PROCEDURE — 11000001 HC ACUTE MED/SURG PRIVATE ROOM

## 2018-03-25 PROCEDURE — S4991 NICOTINE PATCH NONLEGEND: HCPCS | Performed by: EMERGENCY MEDICINE

## 2018-03-25 RX ORDER — GABAPENTIN 100 MG/1
100 CAPSULE ORAL 3 TIMES DAILY
Status: DISCONTINUED | OUTPATIENT
Start: 2018-03-25 | End: 2018-03-28 | Stop reason: HOSPADM

## 2018-03-25 RX ORDER — MORPHINE SULFATE 10 MG/ML
3 INJECTION INTRAMUSCULAR; INTRAVENOUS; SUBCUTANEOUS
Status: DISCONTINUED | OUTPATIENT
Start: 2018-03-25 | End: 2018-03-27

## 2018-03-25 RX ADMIN — CLOPIDOGREL BISULFATE 75 MG: 75 TABLET ORAL at 08:03

## 2018-03-25 RX ADMIN — MORPHINE SULFATE 3 MG: 10 INJECTION INTRAVENOUS at 09:03

## 2018-03-25 RX ADMIN — PIPERACILLIN AND TAZOBACTAM 4.5 G: 4; .5 INJECTION, POWDER, LYOPHILIZED, FOR SOLUTION INTRAVENOUS; PARENTERAL at 05:03

## 2018-03-25 RX ADMIN — INSULIN ASPART 8 UNITS: 100 INJECTION, SOLUTION INTRAVENOUS; SUBCUTANEOUS at 08:03

## 2018-03-25 RX ADMIN — NICOTINE 1 PATCH: 14 PATCH, EXTENDED RELEASE TRANSDERMAL at 08:03

## 2018-03-25 RX ADMIN — VANCOMYCIN HYDROCHLORIDE 1250 MG: 1 INJECTION, POWDER, LYOPHILIZED, FOR SOLUTION INTRAVENOUS at 03:03

## 2018-03-25 RX ADMIN — GABAPENTIN 100 MG: 100 CAPSULE ORAL at 09:03

## 2018-03-25 RX ADMIN — INSULIN DETEMIR 22 UNITS: 100 INJECTION, SOLUTION SUBCUTANEOUS at 09:03

## 2018-03-25 RX ADMIN — GABAPENTIN 100 MG: 100 CAPSULE ORAL at 02:03

## 2018-03-25 RX ADMIN — VANCOMYCIN HYDROCHLORIDE 1250 MG: 1 INJECTION, POWDER, LYOPHILIZED, FOR SOLUTION INTRAVENOUS at 02:03

## 2018-03-25 RX ADMIN — MORPHINE SULFATE 3 MG: 4 INJECTION INTRAVENOUS at 12:03

## 2018-03-25 RX ADMIN — PIPERACILLIN AND TAZOBACTAM 4.5 G: 4; .5 INJECTION, POWDER, LYOPHILIZED, FOR SOLUTION INTRAVENOUS; PARENTERAL at 08:03

## 2018-03-25 RX ADMIN — MORPHINE SULFATE 3 MG: 4 INJECTION INTRAVENOUS at 10:03

## 2018-03-25 RX ADMIN — LACTULOSE 20 G: 20 SOLUTION ORAL at 02:03

## 2018-03-25 RX ADMIN — MORPHINE SULFATE 3 MG: 4 INJECTION INTRAVENOUS at 02:03

## 2018-03-25 RX ADMIN — PIPERACILLIN AND TAZOBACTAM 4.5 G: 4; .5 INJECTION, POWDER, LYOPHILIZED, FOR SOLUTION INTRAVENOUS; PARENTERAL at 01:03

## 2018-03-25 RX ADMIN — OXYCODONE HYDROCHLORIDE 10 MG: 5 TABLET ORAL at 05:03

## 2018-03-25 RX ADMIN — INSULIN ASPART 8 UNITS: 100 INJECTION, SOLUTION INTRAVENOUS; SUBCUTANEOUS at 12:03

## 2018-03-25 RX ADMIN — PANTOPRAZOLE SODIUM 40 MG: 40 TABLET, DELAYED RELEASE ORAL at 08:03

## 2018-03-25 RX ADMIN — MORPHINE SULFATE 3 MG: 4 INJECTION INTRAVENOUS at 06:03

## 2018-03-25 RX ADMIN — MORPHINE SULFATE 3 MG: 4 INJECTION INTRAVENOUS at 08:03

## 2018-03-25 RX ADMIN — MORPHINE SULFATE 3 MG: 10 INJECTION INTRAVENOUS at 05:03

## 2018-03-25 RX ADMIN — OXYCODONE HYDROCHLORIDE 10 MG: 5 TABLET ORAL at 01:03

## 2018-03-25 RX ADMIN — MORPHINE SULFATE 3 MG: 4 INJECTION INTRAVENOUS at 04:03

## 2018-03-25 RX ADMIN — DIAZEPAM 5 MG: 5 TABLET ORAL at 05:03

## 2018-03-25 RX ADMIN — MORPHINE SULFATE 3 MG: 10 INJECTION INTRAVENOUS at 02:03

## 2018-03-25 RX ADMIN — LACTULOSE 20 G: 20 SOLUTION ORAL at 09:03

## 2018-03-25 RX ADMIN — TRAZODONE HYDROCHLORIDE 100 MG: 50 TABLET ORAL at 09:03

## 2018-03-25 RX ADMIN — LISINOPRIL 5 MG: 5 TABLET ORAL at 08:03

## 2018-03-25 RX ADMIN — MORPHINE SULFATE 3 MG: 10 INJECTION INTRAVENOUS at 12:03

## 2018-03-25 NOTE — SUBJECTIVE & OBJECTIVE
Interval History:  Post op; waiting for final closure    Review of Systems   Legs are burning with neuropathic pain  Objective:     Vital Signs (Most Recent):  Temp: 98.2 °F (36.8 °C) (03/25/18 1202)  Pulse: 65 (03/25/18 1202)  Resp: 17 (03/25/18 1202)  BP: 106/64 (03/25/18 1202)  SpO2: 97 % (03/25/18 1202) Vital Signs (24h Range):  Temp:  [98 °F (36.7 °C)-98.3 °F (36.8 °C)] 98.2 °F (36.8 °C)  Pulse:  [65-79] 65  Resp:  [17-18] 17  SpO2:  [95 %-98 %] 97 %  BP: ()/(56-91) 106/64     Weight: 103.9 kg (229 lb 0.9 oz)  Body mass index is 33.83 kg/m².    Intake/Output Summary (Last 24 hours) at 03/25/18 1407  Last data filed at 03/25/18 1300   Gross per 24 hour   Intake              670 ml   Output             3275 ml   Net            -2605 ml      Physical Exam  Resting in bed talking on phone  Lungs clear  Heart rrr  abd benign  L foot wrapped

## 2018-03-25 NOTE — PROGRESS NOTES
Patient c/o of some heel pain due to dressing. Loosened for him. He understands the plan to return to OR tomorrow.   VSSAF    Left foot: Surgical dressing loosened. Some dried blood on ace and soft roll. Does not appear to be any active bleeding. Grade I pressure sore to left heel is present- + erythema and TTP, no blistering or necrosis  Hct: 40.3    A/P: Left TMA  1) NPO at midnight  2) to OR tomorrow for I&D with wound closure left TMA

## 2018-03-25 NOTE — PLAN OF CARE
Problem: Patient Care Overview  Goal: Plan of Care Review  Outcome: Ongoing (interventions implemented as appropriate)   03/25/18 0113   Coping/Psychosocial   Plan Of Care Reviewed With patient       Comments: Awake, requesting pain med. Continues with pain to left foot surgery site. So far this shift I have given pt morphine q2h and oxycodone q4h. Still rates pain as 9-10/10. Burning and throbbing. Surgical dressing remains D/I. Bloodsugars WNL. Pt has refused lactulose each night that i'v worked. Reports last BM as Monday. Explained importance of taking medication. Says his sister is gonna bring his medication for that in the morning. Instructed pt to show his nurse before taking it. Verbalizes understanding. Afebrile with IV zosyn currently in progress. Vanc trough pending. Makes no attempts to get OOB.

## 2018-03-25 NOTE — PROGRESS NOTES
[facility] Hospital Medicine  Progress Note    Patient Name: Carlos Cifuentes  MRN: [id number]  Patient Class: IP- Inpatient   Admission Date: 3/19/2018  Length of Stay: 6 days  Attending Physician: Miguel Lux MD  Primary Care Provider: Miguel Lux MD        Subjective:     Principal Problem:Dry gangrene    HPI:   58 YO white male gentleman followed by Dr. Lux with multiple medical conditions including diabetic foot ulcer, Back pain; Bulging lumbar disc; C. difficile diarrhea; Chronic back pain (10/14/2014); Diabetes mellitus; Diabetes mellitus type II; DM (diabetes mellitus), type 2, uncontrolled (3/12/2013); Hepatitis C (7/3/2013); MSSA (methicillin susceptible Staphylococcus aureus) septicemia; Neuromuscular disorder; Neuropathy; and Staph aureus infection as well as non compliace with diet, medication and physician follow up. He presented to [facility] ED with progressive and worsening  swelling and pain following an amputation to his 2nd left toe. He completed IV ABX followed by 2 courses of po Doxycycline. Pt declined TMT amputation and underwent partial amputation of 2nd left toe by Dr. David Washington. His current home medications were not alleviating his pain and so he came to ED.    Hospital Course:  3/20:  I&D left foot with amputation of left 3rd toe (MTPJ) and through the 2nd left MT  3/22:  1.  I and D, left foot.  2.  Transmetatarsal amputation, left foot.    Interval History:  Post op; waiting for final closure    Review of Systems   Legs are burning with neuropathic pain  Objective:     Vital Signs (Most Recent):  Temp: 98.2 °F (36.8 °C) (03/25/18 1202)  Pulse: 65 (03/25/18 1202)  Resp: 17 (03/25/18 1202)  BP: 106/64 (03/25/18 1202)  SpO2: 97 % (03/25/18 1202) Vital Signs (24h Range):  Temp:  [98 °F (36.7 °C)-98.3 °F (36.8 °C)] 98.2 °F (36.8 °C)  Pulse:  [65-79] 65  Resp:  [17-18] 17  SpO2:  [95 %-98 %] 97 %  BP: ()/(56-91) 106/64     Weight: 103.9 kg (229 lb 0.9  oz)  Body mass index is 33.83 kg/m².    Intake/Output Summary (Last 24 hours) at 03/25/18 1407  Last data filed at 03/25/18 1300   Gross per 24 hour   Intake              670 ml   Output             3275 ml   Net            -2605 ml      Physical Exam  Resting in bed talking on phone  Lungs clear  Heart rrr  abd benign  L foot wrapped    Assessment/Plan:      * Dry gangrene    S/p transmetatarsal amputation 3/22  Plans per ortho:  1. Pain meds changed  2. Plan for repeat I&D and wound closure on Monday  3. Should be ready for d/c to LTAC on Tuesday          HTN, goal below 140/90    125/77  Lisinopril 5        Type 2 diabetes mellitus with diabetic foot infection    Uncontrolled as of last a1c several years ago:  11.8  Glu 104;  Repeat a1c 9.1 here  With treatment of foot, should be less stress to cause hyperglycemia            Abscess or cellulitis of foot    vanco and zosyn  Has now had TM amputation  Afebrile, wbc normal          CKD stage 3 due to type 2 diabetes mellitus    Resolved; gfr now >60 (stage 2)          Diabetic polyneuropathy associated with type 2 diabetes mellitus    Not currently on gabapentin - he says he was on it in the past, then changed to lyrica  lyrica seemed to cause red rash and pain in legs - stopped  He then went back to nickie.  Says overall it does not work as well - but he needs something;  Will resume at 100 tid and can be titrated as OP  a1c is 9.1        Anxiety disorder due to general medical condition    On trazodone  He has taken valium in past; ordered prn          VTE Risk Mitigation         Ordered     IP VTE LOW RISK PATIENT  Once      03/19/18 3610              Phan Singleton MD  Department of Hospital Medicine   [facility]

## 2018-03-25 NOTE — ASSESSMENT & PLAN NOTE
Not currently on gabapentin - he says he was on it in the past, then changed to lyrica  lyrica seemed to cause red rash and pain in legs - stopped  He then went back to nickie.  Says overall it does not work as well - but he needs something;  Will resume at 100 tid and can be titrated as OP  a1c is 9.1

## 2018-03-25 NOTE — ASSESSMENT & PLAN NOTE
Uncontrolled as of last a1c several years ago:  11.8  Glu 104;  Repeat a1c 9.1 here  With treatment of foot, should be less stress to cause hyperglycemia

## 2018-03-25 NOTE — PLAN OF CARE
Problem: Patient Care Overview  Goal: Plan of Care Review  Outcome: Ongoing (interventions implemented as appropriate)   03/25/18 8086   Coping/Psychosocial   Plan Of Care Reviewed With patient     Patient aaox4 and alert. Pain managed with prn IV morphine q2h.  BCG monitored and scheduled insulin administered as ordered.  IV vancomycin administered; trough shows WNL @ 15.9.  Patient voiding w/o difficulty but still no Bm; patient is still declining lactulose even though he takes it at home.  Will continue to monitor.

## 2018-03-26 ENCOUNTER — ANESTHESIA (OUTPATIENT)
Dept: SURGERY | Facility: HOSPITAL | Age: 58
DRG: 475 | End: 2018-03-26
Payer: MEDICAID

## 2018-03-26 ENCOUNTER — SURGERY (OUTPATIENT)
Age: 58
End: 2018-03-26

## 2018-03-26 ENCOUNTER — ANESTHESIA EVENT (OUTPATIENT)
Dept: SURGERY | Facility: HOSPITAL | Age: 58
DRG: 475 | End: 2018-03-26
Payer: MEDICAID

## 2018-03-26 LAB
ANION GAP SERPL CALC-SCNC: 8 MMOL/L
BUN SERPL-MCNC: 19 MG/DL
CALCIUM SERPL-MCNC: 9.1 MG/DL
CHLORIDE SERPL-SCNC: 103 MMOL/L
CO2 SERPL-SCNC: 26 MMOL/L
CREAT SERPL-MCNC: 1.2 MG/DL
EST. GFR  (AFRICAN AMERICAN): >60 ML/MIN/1.73 M^2
EST. GFR  (NON AFRICAN AMERICAN): >60 ML/MIN/1.73 M^2
GLUCOSE SERPL-MCNC: 105 MG/DL
POCT GLUCOSE: 107 MG/DL (ref 70–110)
POCT GLUCOSE: 129 MG/DL (ref 70–110)
POTASSIUM SERPL-SCNC: 4.6 MMOL/L
SODIUM SERPL-SCNC: 137 MMOL/L
VANCOMYCIN TROUGH SERPL-MCNC: 23 UG/ML

## 2018-03-26 PROCEDURE — 0Y6N0Z0 DETACHMENT AT LEFT FOOT, COMPLETE, OPEN APPROACH: ICD-10-PCS | Performed by: ORTHOPAEDIC SURGERY

## 2018-03-26 PROCEDURE — 36415 COLL VENOUS BLD VENIPUNCTURE: CPT

## 2018-03-26 PROCEDURE — 71000039 HC RECOVERY, EACH ADD'L HOUR: Performed by: ORTHOPAEDIC SURGERY

## 2018-03-26 PROCEDURE — 63600175 PHARM REV CODE 636 W HCPCS: Performed by: ANESTHESIOLOGY

## 2018-03-26 PROCEDURE — S4991 NICOTINE PATCH NONLEGEND: HCPCS | Performed by: EMERGENCY MEDICINE

## 2018-03-26 PROCEDURE — D9220A PRA ANESTHESIA: Mod: ANES,,, | Performed by: ANESTHESIOLOGY

## 2018-03-26 PROCEDURE — 25000003 PHARM REV CODE 250: Performed by: ORTHOPAEDIC SURGERY

## 2018-03-26 PROCEDURE — 37000008 HC ANESTHESIA 1ST 15 MINUTES: Performed by: ORTHOPAEDIC SURGERY

## 2018-03-26 PROCEDURE — 80048 BASIC METABOLIC PNL TOTAL CA: CPT

## 2018-03-26 PROCEDURE — 25000003 PHARM REV CODE 250: Performed by: INTERNAL MEDICINE

## 2018-03-26 PROCEDURE — 63600175 PHARM REV CODE 636 W HCPCS: Performed by: INTERNAL MEDICINE

## 2018-03-26 PROCEDURE — 25000003 PHARM REV CODE 250

## 2018-03-26 PROCEDURE — 36000707: Performed by: ORTHOPAEDIC SURGERY

## 2018-03-26 PROCEDURE — 25000003 PHARM REV CODE 250: Performed by: NURSE ANESTHETIST, CERTIFIED REGISTERED

## 2018-03-26 PROCEDURE — 63600175 PHARM REV CODE 636 W HCPCS: Performed by: ORTHOPAEDIC SURGERY

## 2018-03-26 PROCEDURE — D9220A PRA ANESTHESIA: Mod: CRNA,,, | Performed by: NURSE ANESTHETIST, CERTIFIED REGISTERED

## 2018-03-26 PROCEDURE — 25000003 PHARM REV CODE 250: Performed by: EMERGENCY MEDICINE

## 2018-03-26 PROCEDURE — 71000033 HC RECOVERY, INTIAL HOUR: Performed by: ORTHOPAEDIC SURGERY

## 2018-03-26 PROCEDURE — 11000001 HC ACUTE MED/SURG PRIVATE ROOM

## 2018-03-26 PROCEDURE — 63600175 PHARM REV CODE 636 W HCPCS: Performed by: NURSE ANESTHETIST, CERTIFIED REGISTERED

## 2018-03-26 PROCEDURE — 27200651 HC AIRWAY, LMA: Performed by: NURSE ANESTHETIST, CERTIFIED REGISTERED

## 2018-03-26 PROCEDURE — 27201423 OPTIME MED/SURG SUP & DEVICES STERILE SUPPLY: Performed by: ORTHOPAEDIC SURGERY

## 2018-03-26 PROCEDURE — 80202 ASSAY OF VANCOMYCIN: CPT

## 2018-03-26 PROCEDURE — 37000009 HC ANESTHESIA EA ADD 15 MINS: Performed by: ORTHOPAEDIC SURGERY

## 2018-03-26 PROCEDURE — 36000706: Performed by: ORTHOPAEDIC SURGERY

## 2018-03-26 RX ORDER — SODIUM CHLORIDE 0.9 % (FLUSH) 0.9 %
3 SYRINGE (ML) INJECTION
Status: DISCONTINUED | OUTPATIENT
Start: 2018-03-26 | End: 2018-03-28 | Stop reason: HOSPADM

## 2018-03-26 RX ORDER — MORPHINE SULFATE 10 MG/ML
2 INJECTION INTRAMUSCULAR; INTRAVENOUS; SUBCUTANEOUS
Status: COMPLETED | OUTPATIENT
Start: 2018-03-26 | End: 2018-03-26

## 2018-03-26 RX ORDER — METOCLOPRAMIDE HYDROCHLORIDE 5 MG/ML
10 INJECTION INTRAMUSCULAR; INTRAVENOUS EVERY 10 MIN PRN
Status: DISCONTINUED | OUTPATIENT
Start: 2018-03-26 | End: 2018-03-26

## 2018-03-26 RX ORDER — MIDAZOLAM HYDROCHLORIDE 1 MG/ML
INJECTION, SOLUTION INTRAMUSCULAR; INTRAVENOUS
Status: DISCONTINUED | OUTPATIENT
Start: 2018-03-26 | End: 2018-03-26

## 2018-03-26 RX ORDER — LIDOCAINE HCL/PF 100 MG/5ML
SYRINGE (ML) INTRAVENOUS
Status: DISCONTINUED | OUTPATIENT
Start: 2018-03-26 | End: 2018-03-26

## 2018-03-26 RX ORDER — FENTANYL CITRATE 50 UG/ML
INJECTION, SOLUTION INTRAMUSCULAR; INTRAVENOUS
Status: DISCONTINUED | OUTPATIENT
Start: 2018-03-26 | End: 2018-03-26

## 2018-03-26 RX ORDER — PHENYLEPHRINE HYDROCHLORIDE 10 MG/ML
INJECTION INTRAVENOUS
Status: DISCONTINUED | OUTPATIENT
Start: 2018-03-26 | End: 2018-03-26

## 2018-03-26 RX ORDER — ONDANSETRON 2 MG/ML
INJECTION INTRAMUSCULAR; INTRAVENOUS
Status: DISCONTINUED | OUTPATIENT
Start: 2018-03-26 | End: 2018-03-26

## 2018-03-26 RX ORDER — MORPHINE SULFATE 10 MG/ML
2 INJECTION INTRAMUSCULAR; INTRAVENOUS; SUBCUTANEOUS ONCE
Status: DISCONTINUED | OUTPATIENT
Start: 2018-03-26 | End: 2018-03-27

## 2018-03-26 RX ORDER — HYDROMORPHONE HYDROCHLORIDE 2 MG/ML
0.2 INJECTION, SOLUTION INTRAMUSCULAR; INTRAVENOUS; SUBCUTANEOUS EVERY 5 MIN PRN
Status: DISCONTINUED | OUTPATIENT
Start: 2018-03-26 | End: 2018-03-26

## 2018-03-26 RX ORDER — SODIUM CHLORIDE, SODIUM LACTATE, POTASSIUM CHLORIDE, CALCIUM CHLORIDE 600; 310; 30; 20 MG/100ML; MG/100ML; MG/100ML; MG/100ML
INJECTION, SOLUTION INTRAVENOUS CONTINUOUS PRN
Status: DISCONTINUED | OUTPATIENT
Start: 2018-03-26 | End: 2018-03-26

## 2018-03-26 RX ORDER — OXYCODONE AND ACETAMINOPHEN 5; 325 MG/1; MG/1
1 TABLET ORAL
Status: DISCONTINUED | OUTPATIENT
Start: 2018-03-26 | End: 2018-03-26

## 2018-03-26 RX ORDER — PROPOFOL 10 MG/ML
VIAL (ML) INTRAVENOUS
Status: DISCONTINUED | OUTPATIENT
Start: 2018-03-26 | End: 2018-03-26

## 2018-03-26 RX ORDER — MORPHINE SULFATE 10 MG/ML
2 INJECTION INTRAMUSCULAR; INTRAVENOUS; SUBCUTANEOUS EVERY 5 MIN PRN
Status: DISCONTINUED | OUTPATIENT
Start: 2018-03-26 | End: 2018-03-26

## 2018-03-26 RX ORDER — MEPERIDINE HYDROCHLORIDE 50 MG/ML
12.5 INJECTION INTRAMUSCULAR; INTRAVENOUS; SUBCUTANEOUS ONCE AS NEEDED
Status: DISCONTINUED | OUTPATIENT
Start: 2018-03-26 | End: 2018-03-26

## 2018-03-26 RX ADMIN — NICOTINE 1 PATCH: 14 PATCH, EXTENDED RELEASE TRANSDERMAL at 08:03

## 2018-03-26 RX ADMIN — INSULIN DETEMIR 22 UNITS: 100 INJECTION, SOLUTION SUBCUTANEOUS at 09:03

## 2018-03-26 RX ADMIN — MORPHINE SULFATE 3 MG: 10 INJECTION INTRAVENOUS at 06:03

## 2018-03-26 RX ADMIN — MORPHINE SULFATE 2 MG: 10 INJECTION INTRAVENOUS at 04:03

## 2018-03-26 RX ADMIN — OXYCODONE HYDROCHLORIDE 10 MG: 5 TABLET ORAL at 10:03

## 2018-03-26 RX ADMIN — MORPHINE SULFATE 3 MG: 10 INJECTION INTRAVENOUS at 08:03

## 2018-03-26 RX ADMIN — PROPOFOL 150 MG: 10 INJECTION, EMULSION INTRAVENOUS at 02:03

## 2018-03-26 RX ADMIN — GABAPENTIN 100 MG: 100 CAPSULE ORAL at 08:03

## 2018-03-26 RX ADMIN — LACTULOSE 20 G: 20 SOLUTION ORAL at 08:03

## 2018-03-26 RX ADMIN — PHENYLEPHRINE HYDROCHLORIDE 100 MCG: 10 INJECTION INTRAVENOUS at 02:03

## 2018-03-26 RX ADMIN — MORPHINE SULFATE 3 MG: 10 INJECTION INTRAVENOUS at 04:03

## 2018-03-26 RX ADMIN — DIAZEPAM 5 MG: 5 TABLET ORAL at 06:03

## 2018-03-26 RX ADMIN — VANCOMYCIN HYDROCHLORIDE 1250 MG: 1 INJECTION, POWDER, LYOPHILIZED, FOR SOLUTION INTRAVENOUS at 04:03

## 2018-03-26 RX ADMIN — FENTANYL CITRATE 50 MCG: 50 INJECTION INTRAMUSCULAR; INTRAVENOUS at 02:03

## 2018-03-26 RX ADMIN — INSULIN ASPART 1 UNITS: 100 INJECTION, SOLUTION INTRAVENOUS; SUBCUTANEOUS at 09:03

## 2018-03-26 RX ADMIN — ONDANSETRON 4 MG: 2 INJECTION, SOLUTION INTRAMUSCULAR; INTRAVENOUS at 03:03

## 2018-03-26 RX ADMIN — OXYCODONE HYDROCHLORIDE 10 MG: 5 TABLET ORAL at 05:03

## 2018-03-26 RX ADMIN — MORPHINE SULFATE 3 MG: 10 INJECTION INTRAVENOUS at 11:03

## 2018-03-26 RX ADMIN — PIPERACILLIN AND TAZOBACTAM 4.5 G: 4; .5 INJECTION, POWDER, LYOPHILIZED, FOR SOLUTION INTRAVENOUS; PARENTERAL at 08:03

## 2018-03-26 RX ADMIN — SODIUM CHLORIDE, SODIUM LACTATE, POTASSIUM CHLORIDE, AND CALCIUM CHLORIDE: .6; .31; .03; .02 INJECTION, SOLUTION INTRAVENOUS at 02:03

## 2018-03-26 RX ADMIN — PIPERACILLIN AND TAZOBACTAM 4.5 G: 4; .5 INJECTION, POWDER, LYOPHILIZED, FOR SOLUTION INTRAVENOUS; PARENTERAL at 05:03

## 2018-03-26 RX ADMIN — LIDOCAINE HYDROCHLORIDE 100 MG: 20 INJECTION, SOLUTION INTRAVENOUS at 02:03

## 2018-03-26 RX ADMIN — PIPERACILLIN AND TAZOBACTAM 4.5 G: 4; .5 INJECTION, POWDER, LYOPHILIZED, FOR SOLUTION INTRAVENOUS; PARENTERAL at 01:03

## 2018-03-26 RX ADMIN — MIDAZOLAM HYDROCHLORIDE 2 MG: 1 INJECTION, SOLUTION INTRAMUSCULAR; INTRAVENOUS at 02:03

## 2018-03-26 RX ADMIN — TRAZODONE HYDROCHLORIDE 100 MG: 50 TABLET ORAL at 08:03

## 2018-03-26 RX ADMIN — MORPHINE SULFATE 3 MG: 10 INJECTION INTRAVENOUS at 12:03

## 2018-03-26 RX ADMIN — MORPHINE SULFATE 2 MG: 10 INJECTION INTRAVENOUS at 05:03

## 2018-03-26 NOTE — BRIEF OP NOTE
[facility]  Brief Operative Note    SUMMARY     Surgery Date: 3/26/2018     Surgeon(s) and Role:     * David Washington MD - Primary    Assisting Surgeon: Kyle    Pre-op Diagnosis:  Gangrene of left foot [I96]    Post-op Diagnosis:  Post-Op Diagnosis Codes:     * Gangrene of left foot [I96]    Procedure(s) (LRB):  DEBRIDEMENT-FOOT (Left)  CLOSURE-WOUND POSS (Left)  Chopart amputation left foot    Anesthesia: Choice    Description of Procedure: clean wound, remove part of foot    Description of the findings of the procedure: some further skin and muscle necrosis    Estimated Blood Loss: 30ml         Specimens:   Specimen (12h ago through future)    None      Left foot metatarsal bones

## 2018-03-26 NOTE — PLAN OF CARE
Problem: Patient Care Overview  Goal: Plan of Care Review  Outcome: Ongoing (interventions implemented as appropriate)   03/26/18 1719   Coping/Psychosocial   Plan Of Care Reviewed With patient       Problem: Surgery Nonspecified (Adult)  Goal: Signs and Symptoms of Listed Potential Problems Will be Absent, Minimized or Managed (Surgery Nonspecified)  Signs and symptoms of listed potential problems will be absent, minimized or managed by discharge/transition of care (reference Surgery Nonspecified (Adult) CPG).   Outcome: Ongoing (interventions implemented as appropriate)   03/26/18 1719   Surgery Nonspecified   Problems Assessed (Surgery) all   Problems Present (Surgery) pain     Goal: Anesthesia/Sedation Recovery  Outcome: Ongoing (interventions implemented as appropriate)   03/26/18 1719   Goal/Outcome Evaluation   Anesthesia/Sedation Recovery criteria met for discharge

## 2018-03-26 NOTE — TRANSFER OF CARE
"Anesthesia Transfer of Care Note    Patient: Carlos Cifuentes    Procedure(s) Performed: Procedure(s) (LRB):  DEBRIDEMENT-FOOT (Left)  CLOSURE-WOUND POSS (Left)    Patient location: PACU    Anesthesia Type: general    Transport from OR: Transported from OR on room air with adequate spontaneous ventilation    Post pain: adequate analgesia    Post assessment: no apparent anesthetic complications    Post vital signs: stable    Level of consciousness: sedated and responds to stimulation    Nausea/Vomiting: no nausea/vomiting    Complications: none    Transfer of care protocol was followed      Last vitals:   Visit Vitals  /63 (BP Location: Left arm)   Pulse 83   Temp 36.8 °C (98.2 °F) (Tympanic)   Resp 12   Ht 5' 9" (1.753 m)   Wt 103.9 kg (229 lb 0.9 oz)   SpO2 (!) 93%   BMI 33.83 kg/m²     "

## 2018-03-26 NOTE — NURSING
Pt was sleeping upon my intial assessment this shift. Awaken for VS and ask for pain medication. When I went back in with medication. Pt is back asleep. Will not wake pt at this time.

## 2018-03-26 NOTE — PROGRESS NOTES
Progress Note    Admit Date: 3/19/2018   LOS: 7 days     SUBJECTIVE:     Follow-up For:  Cellulitis of left foot     03/26/2018: moderate pain to left foot. No fever or chills. Awaiting OR today   03/23/2018: underwent left foot I&D and Lt transmetatarsal amputation on 03/22. Moderate pain  03/22/2018: underwent I&D and amputation of left 3rd toe (MTPJ) and through the 2nd left MT on 03/21    Scheduled Meds:   aspirin  81 mg Oral Daily    atorvastatin  80 mg Oral Daily    clopidogrel  75 mg Oral Daily    gabapentin  100 mg Oral TID    insulin aspart U-100  8 Units Subcutaneous TID WM    insulin detemir U-100  22 Units Subcutaneous QHS    lactulose  20 g Oral TID    lisinopril  5 mg Oral Daily    morphine  2 mg Intravenous Once    nicotine  1 patch Transdermal Daily    pantoprazole  40 mg Oral Daily    piperacillin-tazobactam (ZOSYN) IVPB  4.5 g Intravenous Q8H    traZODone  100 mg Oral QHS    vancomycin (VANCOCIN) IVPB  1,250 mg Intravenous Q12H     Continuous Infusions:  PRN Meds:dextrose 50%, dextrose 50%, diazePAM, docusate sodium, glucagon (human recombinant), glucose, glucose, insulin aspart U-100, morphine, ondansetron, oxyCODONE, sodium chloride 0.9%, sodium chloride 0.9%    Review of patient's allergies indicates:   Allergen Reactions    Codeine Rash     Other reaction(s): Unknown    Vicodin [hydrocodone-acetaminophen] Rash       Review of Systems  Constitutional: Denies fever or chills. Chronic pain   Eyes: no visual changes   ENT: no nasal congestion. Denies sore throat with odynophagia   Respiratory: No cough, SOB, exertional dyspnea or  hemoptysis   Cardiovascular: no chest pain or palpitations   Gastrointestinal: no abdominal pain, n/v/. + constipation  Hematologic/Lymphatic: denies any bleeding  Musculoskeletal: see HPI  Neurological: no seizures or tremors, gait or balance prblems  Skin: black scar and redness to left foot.   Psych: Denies any anxiety, depression or  insomnia      OBJECTIVE:     Vital Signs (Most Recent)  Temp: 98.2 °F (36.8 °C) (03/26/18 0005)  Pulse: 69 (03/26/18 0005)  Resp: 17 (03/26/18 0005)  BP: 111/76 (03/26/18 0005)  SpO2: (!) 93 % (03/26/18 0005)    Vital Signs Range (Last 24H):  Temp:  [98.1 °F (36.7 °C)-98.7 °F (37.1 °C)]   Pulse:  [62-76]   Resp:  [16-18]   BP: ()/(53-87)   SpO2:  [92 %-97 %]     I & O (Last 24H):    Intake/Output Summary (Last 24 hours) at 03/26/18 0750  Last data filed at 03/26/18 0600   Gross per 24 hour   Intake              830 ml   Output             2825 ml   Net            -1995 ml     Physical Exam:  General: NAD, resting in bed   Head: normocephalic, atraumatic   Eyes: conjunctivae pink, anicteric sclera.   Throat: No erythema or post nasal discharge   Neck: supple, no LAD   Lungs: decreased air entry bilaterally at the bases   Heart: S1, S2, RRR   Abdomen: obese, + BS x 4 quadrants, soft  Extremities: right foot with healed surgical site (amputation of 1st and 2nd toe). Left foot: in surgical dressing- s/p TMT amputation   MS: move all extremities.  Psy: calm     Laboratory:  CBC:     Recent Labs  Lab 03/19/18  0840   WBC 9.55   RBC 4.67   HGB 13.7*   HCT 40.3      MCV 86   MCH 29.3   MCHC 34.0     CMP:     Recent Labs  Lab 03/19/18  0840   *   CALCIUM 9.9   ALBUMIN 2.9*   PROT 8.4      K 4.4   CO2 24      BUN 24*   CREATININE 1.1   ALKPHOS 79   ALT 12   AST 12   BILITOT 0.3     Coagulation: No results for input(s): LABPROT, INR, APTT in the last 168 hours.  Cardiac markers: No results for input(s): CKMB, CPKMB, TROPONINT, TROPONINI, MYOGLOBIN in the last 168 hours.  ABGs: No results for input(s): PH, PCO2, PO2, HCO3, POCSATURATED, BE in the last 168 hours.  Microbiology Results (last 7 days)     Procedure Component Value Units Date/Time    Culture, Anaerobe [[id number]] Collected:  03/20/18 1323    Order Status:  Completed Specimen:  Bone from Foot, Left Updated:  03/25/18 1334     Anaerobic  Culture --     BACTEROIDES FRAGILIS  Many      Narrative:       Left foot 2nd metatarsal bone/3rd toe bone and tissue    Blood culture #1 **CANNOT BE ORDERED STAT** [[id number]] Collected:  03/19/18 0915    Order Status:  Completed Specimen:  Blood from Peripheral, Antecubital, Right Updated:  03/24/18 1103     Blood Culture, Routine No growth after 5 days.    Blood culture #2 **CANNOT BE ORDERED STAT** [[id number]] Collected:  03/19/18 0930    Order Status:  Completed Specimen:  Blood from Peripheral, Hand, Right Updated:  03/24/18 1103     Blood Culture, Routine No growth after 5 days.    Aerobic culture [[id number]]  (Susceptibility) Collected:  03/20/18 1323    Order Status:  Completed Specimen:  Bone from Foot, Left Updated:  03/22/18 0836     Aerobic Bacterial Culture --     KLEBSIELLA PNEUMONIAE  Many      Narrative:       Left foot 2nd metatarsal bone/3rd toe bone /tissue    Urine culture **CANNOT BE ORDERED STAT** [[id number]] Collected:  03/19/18 1018    Order Status:  Completed Specimen:  Urine from Urine, Clean Catch Updated:  03/21/18 0731     Urine Culture, Routine No significant growth    Gram stain [[id number]] Collected:  03/20/18 1323    Order Status:  Completed Specimen:  Bone from Foot, Left Updated:  03/20/18 1514     Gram Stain Result Moderate WBC's      Many Gram negative rods    Narrative:       Left foot 2nd metatarsal bone /3rd toe bone and tissue        Specimen (12h ago through future)    None          Recent Labs  Lab 03/19/18  1018   COLORU Yellow   SPECGRAV 1.030   PHUR 5.0   PROTEINUA 2+*   BACTERIA None   NITRITE Negative   LEUKOCYTESUR Negative   UROBILINOGEN Negative   HYALINECASTS 0       Diagnostic Results:    Current Facility-Administered Medications   Medication Dose Route Frequency Provider Last Rate Last Dose    aspirin EC tablet 81 mg  81 mg Oral Daily Sharath Smith MD   81 mg at 03/23/18 0837    atorvastatin tablet 80 mg  80 mg Oral Daily Sharath Smith MD    80 mg at 03/23/18 0835    clopidogrel tablet 75 mg  75 mg Oral Daily Sharath Smith MD   75 mg at 03/25/18 0837    dextrose 50% injection 12.5 g  12.5 g Intravenous PRN Miguel Lux MD        dextrose 50% injection 25 g  25 g Intravenous PRN Miguel Lux MD        diazePAM tablet 5 mg  5 mg Oral Q12H PRN Sharath Smith MD   5 mg at 03/26/18 1840    docusate sodium capsule 100 mg  100 mg Oral PRN Sharath Smith MD        gabapentin capsule 100 mg  100 mg Oral TID Phan Singleton MD   100 mg at 03/26/18 2002    glucagon (human recombinant) injection 1 mg  1 mg Intramuscular PRN Miguel Lux MD        glucose chewable tablet 16 g  16 g Oral PRN Miguel Lux MD        glucose chewable tablet 24 g  24 g Oral PRN Miguel Lux MD        insulin aspart U-100 pen 1-10 Units  1-10 Units Subcutaneous QID (AC + HS) PRN Miguel Lux MD   1 Units at 03/26/18 2147    insulin aspart U-100 pen 8 Units  8 Units Subcutaneous TID WM Sharath Smith MD   8 Units at 03/25/18 1223    insulin detemir U-100 pen 22 Units  22 Units Subcutaneous QHS Miguel Lux MD   22 Units at 03/26/18 2146    lactulose 20 gram/30 mL solution Soln 20 g  20 g Oral TID Sharath Smith MD   20 g at 03/26/18 2002    lisinopril tablet 5 mg  5 mg Oral Daily Sharath Smith MD   5 mg at 03/25/18 0837    morphine injection 2 mg  2 mg Intravenous Once Miguel Lux MD        morphine injection 3 mg  3 mg Intravenous Q2H PRN David Washington MD   3 mg at 03/26/18 2002    nicotine 14 mg/24 hr 1 patch  1 patch Transdermal Daily Sharath Smith MD   1 patch at 03/26/18 0835    ondansetron injection 4 mg  4 mg Intravenous Q8H PRN Sharath Smith MD        oxyCODONE immediate release tablet 10 mg  10 mg Oral Q4H PRN David Washington MD   10 mg at 03/26/18 2207    pantoprazole EC tablet 40 mg  40 mg Oral Daily Sharath Smith MD   40 mg at 03/25/18 5721     piperacillin-tazobactam 4.5 g in sodium chloride 0.9% 100 mL IVPB (ready to mix system)  4.5 g Intravenous Q8H Miguel Lux MD 25 mL/hr at 03/26/18 1733 4.5 g at 03/26/18 1733    sodium chloride 0.9% flush 3 mL  3 mL Intravenous PRN Norah Howard MD        sodium chloride 0.9% flush 3 mL  3 mL Intravenous PRN Norah Howard MD        sodium chloride 0.9% flush 3 mL  3 mL Intravenous PRN Franki Glover MD        traZODone tablet 100 mg  100 mg Oral QHS Sharath Smith MD   100 mg at 03/26/18 2002    [START ON 3/27/2018] vancomycin (VANCOCIN) 1,250 mg in sodium chloride 0.9% 250 mL IVPB  1,250 mg Intravenous Q24H Miguel Lux MD             ASSESSMENT/PLAN:           Active Hospital Problems     Diagnosis   POA    *Dry gangrene [I96]  - pt failed conservative tx post partial amputation  - discussed the case in detail with Dr. Washington on 03/21     - s/p  I&D and amputation of left 3rd toe (MTPJ) and through the 2nd left MT  - pan sensitive Klebsiella   - dc Vancomycin   - continue zosyn for now   -discussed the case with Dr. Washington   - s/p left TMT amputation   - awaiting revision and debridement again today       Yes    Type 2 diabetes mellitus with diabetic foot infection [E11.628, L08.9]     -HgA1c shows improvement in glycemic control but sill > 9  - continue insulin  - pt is non compliant       Yes    HTN, goal below 140/90 [I10]  - low bp this am      Yes    Abscess or cellulitis of foot [L03.119, L02.619]  - pt failed out pt oral ABX x 2      - continue current dose of IV ABX        Yes    CKD stage 3 due to type 2 diabetes mellitus [E11.22, N18.3]     -cr stable now     - watch for LIZ as Vancomycin on board.          Yes    Anxiety disorder due to general medical condition [F06.4]     - has been on chronic anxiolytic agt         Yes    Diabetic polyneuropathy associated with type 2 diabetes mellitus [E11.42]   Yes    Generalized anxiety disorder [F41.1]   Yes     Neuropathic pain [M79.2]     - continue home medications for now    Yes       Resolved Hospital Problems     Diagnosis Date Resolved POA   No resolved problems to display.      DVT prophylaxis: Lovenox     Consulted CM for LTAC at [facility]. Hopefully dc soon          Miguel Lux M.D  Internal Medicine & Geriatric Medicine  Hematology & Oncology  Palliative Medicine    [address], [address]  [place] [address]  [phone] (Office)  [phone] (Fax)

## 2018-03-26 NOTE — NURSING TRANSFER
Nursing Transfer Note      3/26/2018     Transfer To: Room 427A; PER handoff given to KARYN Kirby RN    Transfer From: PACU    Transfer via bed    Transfer with IVF KVO    Transported by LIZBETH Alvarez    Medicines sent: N/A    Chart send with patient: Yes

## 2018-03-26 NOTE — NURSING
Currently receiving surgical bath. NPO status maintained. Second IV 20g to rt wrist inserted without difficulty. Pt tolerated well.

## 2018-03-26 NOTE — PLAN OF CARE
Problem: Patient Care Overview  Goal: Plan of Care Review  Outcome: Ongoing (interventions implemented as appropriate)   03/26/18 0511   Coping/Psychosocial   Plan Of Care Reviewed With patient       Comments: Pt rested well throughout this shift. Received pain med only twice. Was asleep on most of my rounds. Surgical dressing to LLE remains D/I. Scheduled for closure today. Pt makes no attempts to get OOB. Blood sugar WNL. Scheduled insulin given. Turns self in bed frequently. Afebrile with IV antibiotics continued. Pt agreed to take lactulose last night, since last recorded BM 3/18.

## 2018-03-27 LAB
ANION GAP SERPL CALC-SCNC: 8 MMOL/L
BUN SERPL-MCNC: 18 MG/DL
CALCIUM SERPL-MCNC: 9.3 MG/DL
CHLORIDE SERPL-SCNC: 101 MMOL/L
CO2 SERPL-SCNC: 28 MMOL/L
CREAT SERPL-MCNC: 1.3 MG/DL
EST. GFR  (AFRICAN AMERICAN): >60 ML/MIN/1.73 M^2
EST. GFR  (NON AFRICAN AMERICAN): >60 ML/MIN/1.73 M^2
GLUCOSE SERPL-MCNC: 178 MG/DL
POCT GLUCOSE: 123 MG/DL (ref 70–110)
POCT GLUCOSE: 133 MG/DL (ref 70–110)
POCT GLUCOSE: 160 MG/DL (ref 70–110)
POCT GLUCOSE: 160 MG/DL (ref 70–110)
POTASSIUM SERPL-SCNC: 4.6 MMOL/L
SODIUM SERPL-SCNC: 137 MMOL/L
VANCOMYCIN SERPL-MCNC: 15 UG/ML

## 2018-03-27 PROCEDURE — 97116 GAIT TRAINING THERAPY: CPT

## 2018-03-27 PROCEDURE — G8978 MOBILITY CURRENT STATUS: HCPCS | Mod: CL

## 2018-03-27 PROCEDURE — 94761 N-INVAS EAR/PLS OXIMETRY MLT: CPT

## 2018-03-27 PROCEDURE — 36415 COLL VENOUS BLD VENIPUNCTURE: CPT

## 2018-03-27 PROCEDURE — G8979 MOBILITY GOAL STATUS: HCPCS | Mod: CJ

## 2018-03-27 PROCEDURE — 25000003 PHARM REV CODE 250: Performed by: INTERNAL MEDICINE

## 2018-03-27 PROCEDURE — S4991 NICOTINE PATCH NONLEGEND: HCPCS | Performed by: EMERGENCY MEDICINE

## 2018-03-27 PROCEDURE — 63600175 PHARM REV CODE 636 W HCPCS: Performed by: INTERNAL MEDICINE

## 2018-03-27 PROCEDURE — 11000001 HC ACUTE MED/SURG PRIVATE ROOM

## 2018-03-27 PROCEDURE — 25000003 PHARM REV CODE 250: Performed by: EMERGENCY MEDICINE

## 2018-03-27 PROCEDURE — 80048 BASIC METABOLIC PNL TOTAL CA: CPT

## 2018-03-27 PROCEDURE — G8980 MOBILITY D/C STATUS: HCPCS | Mod: CL

## 2018-03-27 PROCEDURE — 63600175 PHARM REV CODE 636 W HCPCS: Performed by: ORTHOPAEDIC SURGERY

## 2018-03-27 PROCEDURE — 25000003 PHARM REV CODE 250

## 2018-03-27 PROCEDURE — 97161 PT EVAL LOW COMPLEX 20 MIN: CPT

## 2018-03-27 PROCEDURE — 80202 ASSAY OF VANCOMYCIN: CPT

## 2018-03-27 RX ORDER — MORPHINE SULFATE 10 MG/ML
4 INJECTION INTRAMUSCULAR; INTRAVENOUS; SUBCUTANEOUS
Status: DISCONTINUED | OUTPATIENT
Start: 2018-03-27 | End: 2018-03-28 | Stop reason: HOSPADM

## 2018-03-27 RX ORDER — MORPHINE SULFATE 10 MG/ML
3 INJECTION INTRAMUSCULAR; INTRAVENOUS; SUBCUTANEOUS
Status: DISCONTINUED | OUTPATIENT
Start: 2018-03-27 | End: 2018-03-27

## 2018-03-27 RX ADMIN — INSULIN ASPART 8 UNITS: 100 INJECTION, SOLUTION INTRAVENOUS; SUBCUTANEOUS at 12:03

## 2018-03-27 RX ADMIN — GABAPENTIN 100 MG: 100 CAPSULE ORAL at 04:03

## 2018-03-27 RX ADMIN — MORPHINE SULFATE 3 MG: 10 INJECTION INTRAVENOUS at 04:03

## 2018-03-27 RX ADMIN — INSULIN DETEMIR 22 UNITS: 100 INJECTION, SOLUTION SUBCUTANEOUS at 10:03

## 2018-03-27 RX ADMIN — NICOTINE 1 PATCH: 14 PATCH, EXTENDED RELEASE TRANSDERMAL at 09:03

## 2018-03-27 RX ADMIN — MORPHINE SULFATE 3 MG: 10 INJECTION INTRAVENOUS at 01:03

## 2018-03-27 RX ADMIN — PIPERACILLIN AND TAZOBACTAM 4.5 G: 4; .5 INJECTION, POWDER, LYOPHILIZED, FOR SOLUTION INTRAVENOUS; PARENTERAL at 12:03

## 2018-03-27 RX ADMIN — VANCOMYCIN HYDROCHLORIDE 1250 MG: 1 INJECTION, POWDER, LYOPHILIZED, FOR SOLUTION INTRAVENOUS at 08:03

## 2018-03-27 RX ADMIN — INSULIN ASPART 8 UNITS: 100 INJECTION, SOLUTION INTRAVENOUS; SUBCUTANEOUS at 06:03

## 2018-03-27 RX ADMIN — DIAZEPAM 5 MG: 5 TABLET ORAL at 12:03

## 2018-03-27 RX ADMIN — LISINOPRIL 5 MG: 5 TABLET ORAL at 09:03

## 2018-03-27 RX ADMIN — MORPHINE SULFATE 3 MG: 10 INJECTION INTRAVENOUS at 12:03

## 2018-03-27 RX ADMIN — TRAZODONE HYDROCHLORIDE 100 MG: 50 TABLET ORAL at 10:03

## 2018-03-27 RX ADMIN — GABAPENTIN 100 MG: 100 CAPSULE ORAL at 09:03

## 2018-03-27 RX ADMIN — PIPERACILLIN AND TAZOBACTAM 4.5 G: 4; .5 INJECTION, POWDER, LYOPHILIZED, FOR SOLUTION INTRAVENOUS; PARENTERAL at 08:03

## 2018-03-27 RX ADMIN — ATORVASTATIN CALCIUM 80 MG: 40 TABLET, FILM COATED ORAL at 08:03

## 2018-03-27 RX ADMIN — INSULIN ASPART 8 UNITS: 100 INJECTION, SOLUTION INTRAVENOUS; SUBCUTANEOUS at 08:03

## 2018-03-27 RX ADMIN — PANTOPRAZOLE SODIUM 40 MG: 40 TABLET, DELAYED RELEASE ORAL at 09:03

## 2018-03-27 RX ADMIN — MORPHINE SULFATE 3 MG: 10 INJECTION INTRAVENOUS at 09:03

## 2018-03-27 RX ADMIN — CLOPIDOGREL BISULFATE 75 MG: 75 TABLET ORAL at 08:03

## 2018-03-27 RX ADMIN — LACTULOSE 20 G: 20 SOLUTION ORAL at 04:03

## 2018-03-27 RX ADMIN — MORPHINE SULFATE 4 MG: 10 INJECTION INTRAVENOUS at 08:03

## 2018-03-27 RX ADMIN — MORPHINE SULFATE 3 MG: 10 INJECTION INTRAVENOUS at 06:03

## 2018-03-27 RX ADMIN — ASPIRIN 81 MG: 81 TABLET, COATED ORAL at 08:03

## 2018-03-27 RX ADMIN — INSULIN ASPART 2 UNITS: 100 INJECTION, SOLUTION INTRAVENOUS; SUBCUTANEOUS at 08:03

## 2018-03-27 RX ADMIN — GABAPENTIN 100 MG: 100 CAPSULE ORAL at 10:03

## 2018-03-27 RX ADMIN — PIPERACILLIN AND TAZOBACTAM 4.5 G: 4; .5 INJECTION, POWDER, LYOPHILIZED, FOR SOLUTION INTRAVENOUS; PARENTERAL at 04:03

## 2018-03-27 RX ADMIN — MORPHINE SULFATE 4 MG: 10 INJECTION INTRAVENOUS at 11:03

## 2018-03-27 NOTE — PROGRESS NOTES
Progress Note    Admit Date: 3/19/2018   LOS: 8 days     SUBJECTIVE:     Follow-up For:  Cellulitis of left foot     03/27/2018: Continue to have moderate to severe pain. Underwent debridement, wound closure and chopart amputation left foot.   03/26/2018: moderate pain to left foot. No fever or chills. Awaiting OR today   03/23/2018: underwent left foot I&D and Lt transmetatarsal amputation on 03/22. Moderate pain  03/22/2018: underwent I&D and amputation of left 3rd toe (MTPJ) and through the 2nd left MT on 03/21    Scheduled Meds:   aspirin  81 mg Oral Daily    atorvastatin  80 mg Oral Daily    clopidogrel  75 mg Oral Daily    gabapentin  100 mg Oral TID    insulin aspart U-100  8 Units Subcutaneous TID WM    insulin detemir U-100  22 Units Subcutaneous QHS    lactulose  20 g Oral TID    lisinopril  5 mg Oral Daily    morphine  2 mg Intravenous Once    nicotine  1 patch Transdermal Daily    pantoprazole  40 mg Oral Daily    piperacillin-tazobactam (ZOSYN) IVPB  4.5 g Intravenous Q8H    traZODone  100 mg Oral QHS    vancomycin (VANCOCIN) IVPB  1,250 mg Intravenous Q24H     Continuous Infusions:  PRN Meds:dextrose 50%, dextrose 50%, diazePAM, docusate sodium, glucagon (human recombinant), glucose, glucose, insulin aspart U-100, morphine, ondansetron, oxyCODONE, sodium chloride 0.9%, sodium chloride 0.9%, sodium chloride 0.9%    Review of patient's allergies indicates:   Allergen Reactions    Codeine Rash     Other reaction(s): Unknown    Vicodin [hydrocodone-acetaminophen] Rash       Review of Systems    Constitutional: Denies fever or chills. Chronic pain   Eyes: no visual changes   ENT: no nasal congestion. Denies sore throat with odynophagia   Respiratory: No cough, SOB, exertional dyspnea or  hemoptysis   Cardiovascular: no chest pain or palpitations   Gastrointestinal: no abdominal pain, n/v/. + constipation  Hematologic/Lymphatic: denies any bleeding  Musculoskeletal: see HPI  Neurological: no  seizures or tremors, gait or balance prblems  Skin: black scar and redness to left foot.   Psych: Denies any anxiety, depression or insomnia      OBJECTIVE:     Vital Signs (Most Recent)    Temp: 99.2 °F (37.3 °C) (03/27/18 0524)  Pulse: 82 (03/27/18 0524)  Resp: 18 (03/27/18 0524)  BP: 125/78 (03/27/18 0524)  SpO2: (!) 92 % (03/27/18 0524)    Vital Signs Range (Last 24H):  Temp:  [97.4 °F (36.3 °C)-99.2 °F (37.3 °C)]   Pulse:  [64-83]   Resp:  [11-20]   BP: ()/(60-79)   SpO2:  [92 %-97 %]     I & O (Last 24H):    Intake/Output Summary (Last 24 hours) at 03/27/18 0725  Last data filed at 03/27/18 0400   Gross per 24 hour   Intake             1230 ml   Output             2200 ml   Net             -970 ml     Physical Exam:    General: NAD, resting in bed   Head: normocephalic, atraumatic   Eyes: conjunctivae pink, anicteric sclera.   Throat: No erythema or post nasal discharge   Neck: supple, no LAD   Lungs: decreased air entry bilaterally at the bases   Heart: S1, S2, RRR   Abdomen: obese, + BS x 4 quadrants, soft  Extremities: right foot with healed surgical site (amputation of 1st and 2nd toe). Left foot: in surgical dressing- s/p TMT amputation   MS: move all extremities.  Psy: calm     Laboratory:  CBC:   No results for input(s): WBC, RBC, HGB, HCT, PLT, MCV, MCH, MCHC in the last 168 hours.  CMP:     Recent Labs  Lab 03/27/18  0428   *   CALCIUM 9.3      K 4.6   CO2 28      BUN 18   CREATININE 1.3     Coagulation: No results for input(s): LABPROT, INR, APTT in the last 168 hours.  Cardiac markers: No results for input(s): CKMB, CPKMB, TROPONINT, TROPONINI, MYOGLOBIN in the last 168 hours.  ABGs: No results for input(s): PH, PCO2, PO2, HCO3, POCSATURATED, BE in the last 168 hours.  Microbiology Results (last 7 days)     Procedure Component Value Units Date/Time    Culture, Anaerobe [[id number]] Collected:  03/20/18 1323    Order Status:  Completed Specimen:  Bone from Foot, Left Updated:   03/25/18 1334     Anaerobic Culture --     BACTEROIDES FRAGILIS  Many      Narrative:       Left foot 2nd metatarsal bone/3rd toe bone and tissue    Blood culture #1 **CANNOT BE ORDERED STAT** [[id number]] Collected:  03/19/18 0915    Order Status:  Completed Specimen:  Blood from Peripheral, Antecubital, Right Updated:  03/24/18 1103     Blood Culture, Routine No growth after 5 days.    Blood culture #2 **CANNOT BE ORDERED STAT** [[id number]] Collected:  03/19/18 0930    Order Status:  Completed Specimen:  Blood from Peripheral, Hand, Right Updated:  03/24/18 1103     Blood Culture, Routine No growth after 5 days.    Aerobic culture [[id number]]  (Susceptibility) Collected:  03/20/18 1323    Order Status:  Completed Specimen:  Bone from Foot, Left Updated:  03/22/18 0836     Aerobic Bacterial Culture --     KLEBSIELLA PNEUMONIAE  Many      Narrative:       Left foot 2nd metatarsal bone/3rd toe bone /tissue    Urine culture **CANNOT BE ORDERED STAT** [[id number]] Collected:  03/19/18 1018    Order Status:  Completed Specimen:  Urine from Urine, Clean Catch Updated:  03/21/18 0731     Urine Culture, Routine No significant growth    Gram stain [[id number]] Collected:  03/20/18 1323    Order Status:  Completed Specimen:  Bone from Foot, Left Updated:  03/20/18 1514     Gram Stain Result Moderate WBC's      Many Gram negative rods    Narrative:       Left foot 2nd metatarsal bone /3rd toe bone and tissue        Specimen (12h ago through future)    None        No results for input(s): COLORU, CLARITYU, SPECGRAV, PHUR, PROTEINUA, GLUCOSEU, BILIRUBINCON, BLOODU, WBCU, RBCU, BACTERIA, MUCUS, NITRITE, LEUKOCYTESUR, UROBILINOGEN, HYALINECASTS in the last 168 hours.    Diagnostic Results:    Current Facility-Administered Medications   Medication Dose Route Frequency Provider Last Rate Last Dose    aspirin EC tablet 81 mg  81 mg Oral Daily Sharath Smith MD   81 mg at 03/23/18 0837    atorvastatin tablet 80 mg  80 mg Oral  Daily Sharath Smith MD   80 mg at 03/23/18 0835    clopidogrel tablet 75 mg  75 mg Oral Daily Sharath Smith MD   75 mg at 03/25/18 0837    dextrose 50% injection 12.5 g  12.5 g Intravenous PRN Miguel Lux MD        dextrose 50% injection 25 g  25 g Intravenous PRN Miguel Lux MD        diazePAM tablet 5 mg  5 mg Oral Q12H PRN Sharath Smith MD   5 mg at 03/26/18 1840    docusate sodium capsule 100 mg  100 mg Oral PRN Sharath Smith MD        gabapentin capsule 100 mg  100 mg Oral TID Phan Singleton MD   100 mg at 03/26/18 2002    glucagon (human recombinant) injection 1 mg  1 mg Intramuscular PRN Miguel Lux MD        glucose chewable tablet 16 g  16 g Oral PRN Miguel Lux MD        glucose chewable tablet 24 g  24 g Oral PRN Miguel Lux MD        insulin aspart U-100 pen 1-10 Units  1-10 Units Subcutaneous QID (AC + HS) PRN Miguel Lux MD   1 Units at 03/26/18 2147    insulin aspart U-100 pen 8 Units  8 Units Subcutaneous TID WM Sharath Smith MD   8 Units at 03/25/18 1223    insulin detemir U-100 pen 22 Units  22 Units Subcutaneous QHS Miguel Lux MD   22 Units at 03/26/18 2146    lactulose 20 gram/30 mL solution Soln 20 g  20 g Oral TID Sharath Smith MD   20 g at 03/26/18 2002    lisinopril tablet 5 mg  5 mg Oral Daily Sharath Smith MD   5 mg at 03/25/18 0837    morphine injection 2 mg  2 mg Intravenous Once Miguel Lux MD        morphine injection 3 mg  3 mg Intravenous Q2H PRN David Washington MD   3 mg at 03/27/18 0617    nicotine 14 mg/24 hr 1 patch  1 patch Transdermal Daily Sharath Smith MD   1 patch at 03/26/18 0835    ondansetron injection 4 mg  4 mg Intravenous Q8H PRN Sharath Smith MD        oxyCODONE immediate release tablet 10 mg  10 mg Oral Q4H PRN David Washington MD   10 mg at 03/26/18 2207    pantoprazole EC tablet 40 mg  40 mg Oral Daily Sharath ELDRIDGE  MD Luis   40 mg at 03/25/18 0837    piperacillin-tazobactam 4.5 g in sodium chloride 0.9% 100 mL IVPB (ready to mix system)  4.5 g Intravenous Q8H Miguel Lux MD 25 mL/hr at 03/27/18 0018 4.5 g at 03/27/18 0018    sodium chloride 0.9% flush 3 mL  3 mL Intravenous PRN Norah Howard MD        sodium chloride 0.9% flush 3 mL  3 mL Intravenous PRN Norah Howard MD        sodium chloride 0.9% flush 3 mL  3 mL Intravenous PRN Franki Glover MD        traZODone tablet 100 mg  100 mg Oral QHS Sharath MERAShantell Smith MD   100 mg at 03/26/18 2002    vancomycin (VANCOCIN) 1,250 mg in sodium chloride 0.9% 250 mL IVPB  1,250 mg Intravenous Q24H Miguel Lux MD             ASSESSMENT/PLAN:           Active Hospital Problems     Diagnosis   POA    *Dry gangrene [I96]  - pt failed conservative tx post partial amputation  - discussed the case in detail with Dr. Washington on 03/21     - s/p  I&D and amputation of left 3rd toe (MTPJ) and through the 2nd left MT  - pan sensitive Klebsiella   - dc Vancomycin   - continue zosyn for now   -discussed the case with Dr. Washington   - s/p left TMT amputation   - did revision and debridement on 03/26        Yes    Type 2 diabetes mellitus with diabetic foot infection [E11.628, L08.9]     -HgA1c shows improvement in glycemic control but sill > 9  - continue insulin  - pt is non compliant       Yes    HTN, goal below 140/90 [I10]  - low bp this am      Yes    Abscess or cellulitis of foot [L03.119, L02.619]  - pt failed out pt oral ABX x 2      - continue current dose of IV ABX        Yes    CKD stage 3 due to type 2 diabetes mellitus [E11.22, N18.3]     -cr 1.3     - watch for LIZ as Vancomycin on board.          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      DVT prophylaxis: Lovenox    Post surgical pain: severe- adjust morphine dose        Consulted CM for LTAC at [facility]. Awaiting approval            Miguel Lux M.D  Internal Medicine & Geriatric Medicine  Hematology & Oncology  Palliative Medicine    [address], [address]  [place], [place] [address] [phone] (Office)  [phone] (Fax)

## 2018-03-27 NOTE — PLAN OF CARE
"  [facility]    FACILITY TRANSFER ORDERS      Patient Name: Carlos Cifuentes  YOB: 1960    PCP: Miguel Lux MD   PCP Address: [address] [place] [address] / [id number]  PCP Phone Number: [phone]  PCP Fax: [phone]    Encounter Date: 03/27/2018    Admit to: Dr. 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    Vicodin [hydrocodone-acetaminophen] Rash       Diet: diabetic diet: 2000 calorie    Activities: Activity as tolerated    Nursing: per protocol     Labs: CBC and CMP Daily for 14 days     CONSULTS:    Physical Therapy to evaluate and treat. , Occupational Therapy to evaluate and treat. and  to evaluate for community resources/long-range planning.    MISCELLANEOUS CARE:  Wound care of left foot per protocol     WOUND CARE ORDERS  Yes: Surgical Wound:  Location: left foot     Consult ET nurse        Apply the following to wound:   Other: daily (frequency)    Medications: Review discharge medications with patient and family and provide education.       Carlos Cifuentes   Home Medication Instructions BARBRA:[id number]    Printed on:03/28/18 0752 "   Medication Information                      aspirin (ECOTRIN) 81 MG EC tablet  Take 1 tablet (81 mg total) by mouth once daily.             atorvastatin (LIPITOR) 80 MG tablet  Take 1 tablet (80 mg total) by mouth once daily.             clopidogrel (PLAVIX) 75 mg tablet  Take 1 tablet (75 mg total) by mouth once daily.             diazePAM (VALIUM) 5 MG tablet  Take 1 tablet (5 mg total) by mouth every 12 (twelve) hours as needed for Anxiety.             docusate sodium (COLACE) 100 MG capsule  Take 1 capsule (100 mg total) by mouth as needed for Constipation.             gabapentin (NEURONTIN) 100 MG capsule  Take 1 capsule (100 mg total) by mouth 3 (three) times daily.             glimepiride (AMARYL) 2 MG tablet  Take 1 tablet (2 mg total) by mouth every morning.             insulin aspart (NOVOLOG) 100 unit/mL InPn pen  Inject 8 Units into the skin 3 (three) times daily.             lactulose (CHRONULAC) 10 gram/15 mL solution  Take 45 mLs (30 g total) by mouth 2 (two) times daily.             LANTUS SOLOSTAR 100 unit/mL (3 mL) InPn pen  Inject 30 Units into the skin every evening.             lidocaine HCL 2% (XYLOCAINE) 2 % jelly  Apply to affected area daily prn             lisinopril (PRINIVIL,ZESTRIL) 5 MG tablet  Take 1 tablet (5 mg total) by mouth once daily.             metformin (GLUCOPHAGE) 1000 MG tablet  Take 1 tablet (1,000 mg total) by mouth 2 (two) times daily with meals.             nicotine (NICODERM CQ) 14 mg/24 hr  Place 1 patch onto the skin once daily.             oxyCODONE (ROXICODONE) 10 mg Tab immediate release tablet  Take 1 tablet (10 mg total) by mouth every 8 (eight) hours as needed for Pain.             pantoprazole (PROTONIX) 40 MG tablet  Take 1 tablet (40 mg total) by mouth once daily.             PIPERACILLIN SODIUM/TAZOBACTAM (PIPERACILLIN-TAZOBACTAM 4.5G/100ML SODIUM CHLORIDE 0.9%-READY TO MIX)  Inject 100 mLs (4.5 g total) into the vein every 8 (eight) hours.            "  potassium chloride SA (K-DUR,KLOR-CON) 10 MEQ tablet  Take 1 tablet (10 mEq total) by mouth once daily as needed when experiencing leg/muscle cramps.             pregabalin (LYRICA) 75 MG capsule  Take 1 capsule (75 mg total) by mouth 2 (two) times daily.             sodium chloride 0.9% SolP 250 mL with vancomycin 1,000 mg SolR 1,250 mg  Inject 1,250 mg into the vein once daily.             syringe with needle 3 mL 23 gauge x 1 1/2" Syrg  1 Device by Misc.(Non-Drug; Combo Route) route every 7 days.             testosterone cypionate (DEPOTESTOTERONE CYPIONATE) 200 mg/mL injection  Inject 0.5 mLs (100 mg total) into the muscle every 7 days.             traZODone (DESYREL) 100 MG tablet  Take 1 tablet (100 mg total) by mouth every evening.             TRUE METRIX GLUCOSE METER Misc  AS DIRECTED             TRUE METRIX GLUCOSE TEST STRIP Strp  Twice daily blood sugar checks                       _________________________________  03/27/2018  Miguel Lux MD      "

## 2018-03-27 NOTE — ANESTHESIA PREPROCEDURE EVALUATION
03/27/2018  Carlos Cifuentes is a 57 y.o., male.    Pre-op Assessment    I have reviewed the Patient Summary Reports.      I have reviewed the Medications.     Review of Systems  Anesthesia Hx:  No problems with previous Anesthesia Denies Hx of Anesthetic complications  Neg history of prior surgery. Denies Family Hx of Anesthesia complications.   Denies Personal Hx of Anesthesia complications.   Social:  Smoker    Hematology/Oncology:  Hematology Normal   Oncology Normal     EENT/Dental:EENT/Dental Normal   Cardiovascular:   Exercise tolerance: good Hypertension    Pulmonary:  Pulmonary Normal    Renal/:   Chronic Renal Disease    Hepatic/GI:   GERD Liver Disease, Hepatitis, C hepatospenomegaly   Musculoskeletal:   Arthritis     Neurological:   Denies CVA. Denies Seizures.   Peripheral Neuropathy    Endocrine:   Diabetes Denies Hypothyroidism. Denies Hyperthyroidism.    Dermatological:  Skin Normal    Psych:   Psychiatric History          Physical Exam  General:  Obesity    Airway/Jaw/Neck:  AIRWAY FINDINGS: Normal     Dental:  Dental Findings: In tact   Chest/Lungs:  Chest/Lungs Findings: Normal Respiratory Rate     Heart/Vascular:  Heart Findings: Rate: Normal  Rhythm: Regular Rhythm        Mental Status:  Mental Status Findings: Normal        Anesthesia Plan  Type of Anesthesia, risks & benefits discussed:  Anesthesia Type:  general  Patient's Preference:   Intra-op Monitoring Plan: standard ASA monitors  Intra-op Monitoring Plan Comments:   Post Op Pain Control Plan: multimodal analgesia and IV/PO Opioids PRN  Post Op Pain Control Plan Comments:   Induction:   IV  Beta Blocker:  Patient is not currently on a Beta-Blocker (No further documentation required).       Informed Consent: Patient understands risks and agrees with Anesthesia plan.  Questions answered. Anesthesia consent signed with  patient.  ASA Score: 3     Day of Surgery Review of History & Physical:    H&P update referred to the surgeon.         Ready For Surgery From Anesthesia Perspective.

## 2018-03-27 NOTE — PT/OT/SLP PROGRESS
Physical Therapy      Patient Name:  Carlos Cifuentes   MRN:  [id number]    Patient not seen today secondary to declined to participate at 10:15. Will follow-up as time allows.    Ghazal Moe, PT

## 2018-03-27 NOTE — PHYSICIAN QUERY
PT Name: Carlos Cifuentes  MR #: [id number]     Physician Query Form - Documentation Clarification      CDS/: Mariela Ayala               Contact information: [phone]    This form is a permanent document in the medical record.     Query Date: March 27, 2018    By submitting this query, we are merely seeking further clarification of documentation. Please utilize your independent clinical judgment when addressing the question(s) below.    The Medical record reflects the following:    Supporting Clinical Findings Location in Medical Record     Left foot abscess and osteomyelitis of the second metatarsal.         Op Note 3/24/2018      Abscess or cellulitis of foot [L03.119, L02.619]  - pt failed out pt oral ABX x 2      Dry gangrene [I96]  - pt failed conservative tx post partial amputation  - Type 2 diabetes mellitus with diabetic foot infection [E11.628, L08.9]             H&P 3/20/2018                                                                            Doctor, Please specify diagnosis or diagnoses associated with above clinical findings.    Provider Use Only     1. (   X  ) Acute osteomyelitis of the second metatarsal of left foot     2. (     ) Chronic osteomyelitis of the second metatarsal of left foot     3. (     ) Other, Please specify, ________________________________                                                                                                                            [  ] Clinically undetermined

## 2018-03-27 NOTE — NURSING
Patient complain that pain medication is not relieving pain. Called placed to Dr. Lux office, message left with answering service, states they will have MD call me back.

## 2018-03-27 NOTE — PT/OT/SLP EVAL
Physical Therapy Evaluation    Patient Name:  Carlos Cifuentes   MRN:  [id number]    Recommendations:     Discharge Recommendations:  LTACH (long term acute care hospital)   Discharge Equipment Recommendations:  (defer to LTAC)   Barriers to discharge: Inaccessible home and Decreased caregiver support    Assessment:     Carlos Cifuentes is a 57 y.o. male admitted with a medical diagnosis of Dry gangrene.  He presents with the following impairments/functional limitations:  weakness, impaired endurance, impaired self care skills, impaired balance, impaired functional mobilty, gait instability, impaired sensation, decreased coordination, decreased upper extremity function, decreased lower extremity function, decreased safety awareness, pain, impaired coordination, impaired fine motor, impaired skin, orthopedic precautions .    Rehab Prognosis:  Good; patient would benefit from acute skilled PT services to address these deficits and reach maximum level of function.      Recent Surgery: Procedure(s) (LRB):  DEBRIDEMENT-FOOT (Left)  CLOSURE-WOUND POSS (Left) 1 Day Post-Op    Plan:     During this hospitalization, patient to be seen daily to address the above listed problems via gait training, therapeutic activities, therapeutic exercises, wheelchair management/training  · Plan of Care Expires:  04/10/18   Plan of Care Reviewed with: patient    Subjective     Communicated with nsg prior to session.  Patient found supine upon PT entry to room, agreeable to evaluation.      Chief Complaint: pain/burning in R foot  Patient comments/goals: to be able to walk  Pain/Comfort:  · Pain Rating 1: 10/10  · Location 1:  (L foot)  · Pain Addressed 1: Pre-medicate for activity, Reposition, Distraction, Cessation of Activity, Nurse notified  · Pain Rating Post-Intervention 1: 10/10    Patients cultural, spiritual, Congregation conflicts given the current situation: none    Living Environment:  Pt lives alone, sister assists with  transportation  Prior to admission, patients level of function was Mod Indep for ambulation and ADL's.  Patient has the following equipment: cane, quad, walker, rolling, possibly W/C?.  DME owned (not currently used): [employer].  Upon discharge, patient will have assistance from sister?.    Objective:     Patient found with: peripheral IV, bed alarm     General Precautions: Standard, fall   Orthopedic Precautions:LLE non weight bearing   Braces: N/A     Exams:  · Cognitive Exam:  Patient is oriented to Person, Place, Time and Situation and follows 100% of 1-step commands   · Gross Motor Coordination:  impaired 2/2 gen weakness and deconditioning  · Postural Exam:  Patient presented with the following abnormalities:    · -       Rounded shoulders  · -       Forward head  · Sensation:    · -       Impaired  light/touch B LE's  · Skin Integrity/Edema:      · -       Skin integrity: L [place] with dressing intact  · RUE ROM: WFL  · RUE Strength: WFL  · LUE ROM: WFL  · LUE Strength: WFL  · RLE ROM: WFL  · RLE Strength: WFL  · LLE ROM: WFL's, ankle N/T  · LLE Strength: WFL except ankle N/T    Functional Mobility:  · Bed Mobility:     · Rolling Left:  stand by assistance  · Rolling Right: stand by assistance  · Scooting: stand by assistance  · Supine to Sit: stand by assistance  · Sit to Supine: stand by assistance  · Transfers:     · Sit to Stand:  moderate assistance with RW and VC for hand placement and safety x 2 trials  · Gait: Pt ambulated 5 sidesteps with RW and Min/mod A and vc for sequencing, increased trunk extensiion, walker managemetn/safety, and distribution of weight through UE's to walker  · Balance: Fair= sit, Fair/Fair- stand    AM-PAC 6 CLICK MOBILITY  Total Score:14       Therapeutic Activities and Exercises:   Pt required several lying rest breaks during sitting at EOB 2/2 pain, gait training as above    Patient left right sidelying with all lines intact, call button in reach, bed alarm on and nsg  notified.    GOALS:    Physical Therapy Goals        Problem: Physical Therapy Goal    Goal Priority Disciplines Outcome Goal Variances Interventions   Physical Therapy Goal     PT/OT, PT Ongoing (interventions implemented as appropriate)     Description:  Goals to be met by: 3/27/2018     Patient will increase functional independence with mobility by performin. Supine to sit with Modified [place]  2. Sit to stand transfer with Modified [place]  3. Bed to chair transfer with Modified [place]    4. Gait  x 25' feet with Contact Guard Assistance using Rolling Walker.   5. Wheelchair propulsion x150 feet with Modified [place] using bilateral uppper extremities                      History:     Past Medical History:   Diagnosis Date    Arthritis     Back pain     Bulging lumbar disc     C. difficile diarrhea     Chronic back pain 10/14/2014    Diabetes mellitus     Diabetes mellitus type II     DM (diabetes mellitus), type 2, uncontrolled 3/12/2013    Hepatitis C 7/3/2013    MSSA (methicillin susceptible Staphylococcus aureus) septicemia     Neuromuscular disorder     Neuropathy     Staph aureus infection        Past Surgical History:   Procedure Laterality Date    APPENDECTOMY      JOINT REPLACEMENT      left knee surgery      left leg surgery      broken bone    LEG SURGERY  right     Right arm boil      Right great toe amputation      TOE AMPUTATION Right        Clinical Decision Making:     History  Co-morbidities and personal factors that may impact the plan of care Examination  Body Structures and Functions, activity limitations and participation restrictions that may impact the plan of care Clinical Presentation   Decision Making/ Complexity Score   Co-morbidities:   [] Time since onset of injury / illness / exacerbation  [] Status of current condition  []Patient's cognitive status and safety concerns    [] Multiple Medical Problems (see med hx)  Personal Factors:    [] Patient's age  [] Prior Level of function   [] Patient's home situation (environment and family support)  [] Patient's level of motivation  [] Expected progression of patient      HISTORY:(criteria)    [] 69987 - no personal factors/history    [] 92881 - has 1-2 personal factor/comorbidity     [] 87312 - has >3 personal factor/comorbidity     Body Regions:  [] Objective examination findings  [] Head     []  Neck  [] Trunk   [] Upper Extremity  [] Lower Extremity    Body Systems:  [] For communication ability, affect, cognition, language, and learning style: the assessment of the ability to make needs known, consciousness, orientation (person, place, and time), expected emotional /behavioral responses, and learning preferences (eg, learning barriers, education  needs)  [] For the neuromuscular system: a general assessment of gross coordinated movement (eg, balance, gait, locomotion, transfers, and transitions) and motor function  (motor control and motor learning)  [] For the musculoskeletal system: the assessment of gross symmetry, gross range of motion, gross strength, height, and weight  [] For the integumentary system: the assessment of pliability(texture), presence of scar formation, skin color, and skin integrity  [] For cardiovascular/pulmonary system: the assessment of heart rate, respiratory rate, blood pressure, and edema     Activity limitations:    [] Patient's cognitive status and saf ety concerns          [] Status of current condition      [] Weight bearing restriction  [] Cardiopulmunary Restriction    Participation Restrictions:   [] Goals and goal agreement with the patient     [] Rehab potential (prognosis) and probable outcome      Examination of Body System: (criteria)    [] 10745 - addressing 1-2 elements    [] 56655 - addressing a total of 3 or more elements     [] 63456 -  Addressing a total of 4 or more elements         Clinical Presentation: (criteria)  Choose one     On examination of  body system using standardized tests and measures patient presents with (CHOOSE ONE) elements from any of the following: body structures and functions, activity limitations, and/or participation restrictions.  Leading to a clinical presentation that is considered (CHOOSE ONE)                              Clinical Decision Making  (Eval Complexity):  Choose One     Time Tracking:     PT Received On: 03/27/18  PT Start Time: 1447     PT Stop Time: 1510  PT Total Time (min): 23 min     Billable Minutes: Evaluation 15 and Gait Training 8      Ghazal Moe, PT  03/27/2018

## 2018-03-27 NOTE — PLAN OF CARE
Problem: Patient Care Overview  Goal: Plan of Care Review  Outcome: Ongoing (interventions implemented as appropriate)  Pt progressing. Oriented X4. Voiding well. Skin integrity maintained. Left foot incision with elastic bandage C/D/I.  Pain minimal controlled with Iv morphine every 2 hours per patient. Denies nausea during shift. Vs stable. Adequate oral intake. Tolerating diet and Iv antibiotics. Free of falls. Call light within reach. Bed in low position. No issues during shift. Continue plan of care.

## 2018-03-27 NOTE — ANESTHESIA POSTPROCEDURE EVALUATION
"Anesthesia Post Evaluation    Patient: Carlos Cifuentes    Procedure(s) Performed: Procedure(s) (LRB):  DEBRIDEMENT-FOOT (Left)  CLOSURE-WOUND POSS (Left)    Final Anesthesia Type: general  Patient location during evaluation: PACU  Patient participation: Yes- Able to Participate  Level of consciousness: awake and alert and oriented  Post-procedure vital signs: reviewed and stable  Pain management: adequate  Airway patency: patent  PONV status at discharge: No PONV  Anesthetic complications: no      Cardiovascular status: blood pressure returned to baseline and hemodynamically stable  Respiratory status: unassisted, spontaneous ventilation and room air  Hydration status: euvolemic  Follow-up not needed.        Visit Vitals  /78 (BP Location: Left arm, Patient Position: Lying)   Pulse 82   Temp 37.3 °C (99.2 °F) (Oral)   Resp 18   Ht 5' 9" (1.753 m)   Wt 103.9 kg (229 lb 0.9 oz)   SpO2 (!) 92%   BMI 33.83 kg/m²       Pain/Jose E Score: Pain Assessment Performed: Yes (3/27/2018  4:00 AM)  Presence of Pain: complains of pain/discomfort (3/27/2018  4:00 AM)  Pain Rating Prior to Med Admin: 10 (3/27/2018  6:17 AM)  Pain Rating Post Med Admin: 8 (3/26/2018  5:19 PM)  Jose E Score: 10 (3/26/2018  7:49 PM)      "

## 2018-03-27 NOTE — PLAN OF CARE
Problem: Physical Therapy Goal  Goal: Physical Therapy Goal  Goals to be met by: 3/27/2018     Patient will increase functional independence with mobility by performin. Supine to sit with Modified [place]  2. Sit to stand transfer with Modified [place]  3. Bed to chair transfer with Modified [place]    4. Gait  x 25' feet with Contact Guard Assistance using Rolling Walker.   5. Wheelchair propulsion x150 feet with Modified [place] using bilateral uppper extremities    Outcome: Ongoing (interventions implemented as appropriate)  Initial PT evaluation performed.  Pt could benefit from daily skilled PT services in order to maximize function prior to D/C.  Pt ok to D/C to LTAC

## 2018-03-27 NOTE — OP NOTE
DATE OF PROCEDURE:  03/26/2018.    PREOPERATIVE DIAGNOSIS:  Left foot gangrene.    POSTOPERATIVE DIAGNOSIS:  Left foot gangrene.    PROCEDURE:  Left Chopart type mid foot amputation and irrigation and debridement   of the left foot.    SURGEON:  David Washington M.D.    ASSISTANT:  Zoila Wright.    COMPLICATIONS:  None.    IMPLANTS:  None.    BLOOD LOSS:  About 30 mL    PROCEDURE IN DETAIL:  After proper consents were obtained, the patient was taken   to the Operating Room and administered general anesthesia.  Left lower   extremity was then prepped and draped in normal sterile fashion.  There was   still some devitalized portions of the skin and muscle, which required sharp   debridement.  All devitalized tissue was removed and hemostasis was achieved.    Once this was accomplished, the wound was unable to be closed.  Further   dissection was proceeded subperiosteally along each metatarsal.  Osteotomy was   then made with the oscillating saw through each of the 5 metatarsal.  The level   of resection was still inadequate for wound closure.  Osteotomy was then made   through the medial cuneiform base of the second metatarsal and third metatarsal   and remainder of the fourth metatarsal was removed and a small portion of the   fifth metatarsal remained.  All bleeding tissue that was remaining appeared   stable and viable.  Wound was then irrigated with 3 L normal saline with   Betadine, skin flaps were fashioned for closure.  Deep layer was closed with 0   Vicryl.  Skin was closed with 2-0 Vicryl and 2-0 nylon.  Sterile dressings were   applied.  Tourniquet was deflated.  The patient was aroused in the Operating   Room, transferred to Recovery in stable condition.      [facility]OHNN[facility]  dd: 03/26/2018 15:25:59 (CDT)  td: 03/26/2018 21:41:06 (CDT)  Doc ID   #[id number]  Job ID #[id number]    CC:

## 2018-03-28 VITALS
BODY MASS INDEX: 33.93 KG/M2 | WEIGHT: 229.06 LBS | DIASTOLIC BLOOD PRESSURE: 58 MMHG | OXYGEN SATURATION: 99 % | RESPIRATION RATE: 18 BRPM | TEMPERATURE: 98 F | SYSTOLIC BLOOD PRESSURE: 122 MMHG | HEART RATE: 69 BPM | HEIGHT: 69 IN

## 2018-03-28 LAB
ANION GAP SERPL CALC-SCNC: 8 MMOL/L
BUN SERPL-MCNC: 17 MG/DL
CALCIUM SERPL-MCNC: 9.2 MG/DL
CHLORIDE SERPL-SCNC: 101 MMOL/L
CO2 SERPL-SCNC: 28 MMOL/L
CREAT SERPL-MCNC: 1.2 MG/DL
EST. GFR  (AFRICAN AMERICAN): >60 ML/MIN/1.73 M^2
EST. GFR  (NON AFRICAN AMERICAN): >60 ML/MIN/1.73 M^2
GLUCOSE SERPL-MCNC: 117 MG/DL
POCT GLUCOSE: 112 MG/DL (ref 70–110)
POTASSIUM SERPL-SCNC: 4.2 MMOL/L
SODIUM SERPL-SCNC: 137 MMOL/L

## 2018-03-28 PROCEDURE — 25000003 PHARM REV CODE 250

## 2018-03-28 PROCEDURE — 63600175 PHARM REV CODE 636 W HCPCS: Performed by: INTERNAL MEDICINE

## 2018-03-28 PROCEDURE — S4991 NICOTINE PATCH NONLEGEND: HCPCS | Performed by: EMERGENCY MEDICINE

## 2018-03-28 PROCEDURE — 36415 COLL VENOUS BLD VENIPUNCTURE: CPT

## 2018-03-28 PROCEDURE — 25000003 PHARM REV CODE 250: Performed by: EMERGENCY MEDICINE

## 2018-03-28 PROCEDURE — 80048 BASIC METABOLIC PNL TOTAL CA: CPT

## 2018-03-28 PROCEDURE — 25000003 PHARM REV CODE 250: Performed by: INTERNAL MEDICINE

## 2018-03-28 RX ORDER — GABAPENTIN 100 MG/1
100 CAPSULE ORAL 3 TIMES DAILY
Qty: 90 CAPSULE | Refills: 11 | Status: SHIPPED | OUTPATIENT
Start: 2018-03-28 | End: 2018-05-22 | Stop reason: SDUPTHER

## 2018-03-28 RX ADMIN — PIPERACILLIN AND TAZOBACTAM 4.5 G: 4; .5 INJECTION, POWDER, LYOPHILIZED, FOR SOLUTION INTRAVENOUS; PARENTERAL at 09:03

## 2018-03-28 RX ADMIN — MORPHINE SULFATE 4 MG: 10 INJECTION INTRAVENOUS at 02:03

## 2018-03-28 RX ADMIN — MORPHINE SULFATE 4 MG: 10 INJECTION INTRAVENOUS at 06:03

## 2018-03-28 RX ADMIN — PIPERACILLIN AND TAZOBACTAM 4.5 G: 4; .5 INJECTION, POWDER, LYOPHILIZED, FOR SOLUTION INTRAVENOUS; PARENTERAL at 12:03

## 2018-03-28 RX ADMIN — MORPHINE SULFATE 4 MG: 10 INJECTION INTRAVENOUS at 09:03

## 2018-03-28 RX ADMIN — INSULIN ASPART 8 UNITS: 100 INJECTION, SOLUTION INTRAVENOUS; SUBCUTANEOUS at 09:03

## 2018-03-28 RX ADMIN — VANCOMYCIN HYDROCHLORIDE 1250 MG: 1 INJECTION, POWDER, LYOPHILIZED, FOR SOLUTION INTRAVENOUS at 09:03

## 2018-03-28 RX ADMIN — NICOTINE 1 PATCH: 14 PATCH, EXTENDED RELEASE TRANSDERMAL at 09:03

## 2018-03-28 RX ADMIN — GABAPENTIN 100 MG: 100 CAPSULE ORAL at 08:03

## 2018-03-28 RX ADMIN — PANTOPRAZOLE SODIUM 40 MG: 40 TABLET, DELAYED RELEASE ORAL at 08:03

## 2018-03-28 RX ADMIN — CLOPIDOGREL BISULFATE 75 MG: 75 TABLET ORAL at 08:03

## 2018-03-28 NOTE — PLAN OF CARE
03/28/18 0939   Final Note   Assessment Type Final Discharge Note   Discharge Disposition Long Term   Hospital Follow Up  Appt(s) scheduled? Yes   Discharge plans and expectations educations in teach back method with documentation complete? Yes   Right Care Referral Info   Post Acute Recommendation Other

## 2018-03-28 NOTE — UM SECONDARY REVIEW
[facility]    IP Extended Stay > 10     Pt will dc to LTACH today    LOS: approved an agreement with D/C plan

## 2018-03-28 NOTE — NURSING
Patient discharged per MD order to facility. Iv removed; catheter tip intact, no distress noted. Pt was dressed; foot wound re-wrapped. Pt belngings were gathered and send with pt via wheelchair van transport to facility. D/c instructions explained, pt verbalized understanding. VSS, afebrile, no C/O nausea, vomiting, diarrhea, or SOB. C/o pain unrelieved by pain medication.

## 2018-03-28 NOTE — PROGRESS NOTES
Plan of Care reviewed and uploaded to [facility] to send to [facility] for review.     0828- note sent to [facility] via [facility] to notify orders sent and requested to be contacted when call report available.     0840- call received from Tania with [facility] to provide TN with call report information.  Pt will go to room 7107 and nurse to call report to [phone].  TN to provide call report information to pts nurse     0930- call placed to pts nurse Janis to inquire on method of transportation.  Janis reports that the pt can transport via wheelchair van. TN informed pts nurse Janis that the transport packet will be placed in pts slot at nurses station and call report is on printed on top of envelope.    0932- call placed to hospitals to schedule w/c van transportation for pt from [facility] to [facility].  Spoke to [place] and  time will be for 11am.    0937- pts sister and pt notified that the pt will be discharging to [facility] on today and w/c van is scheduled for  at 11am.

## 2018-03-28 NOTE — PLAN OF CARE
Problem: Patient Care Overview  Goal: Plan of Care Review  Outcome: Ongoing (interventions implemented as appropriate)  Pt progressing. Oriented X4. Voiding well. Skin integrity maintained. Left foot incision with elastic bandage C/D/I.  Pain better controlled with Iv morphine 4mg every 3 hours. Denies nausea during shift. Vs stable. Adequate oral intake. Tolerating diet and Iv antibiotics. Free of falls. Call light within reach. Bed in low position. No issues during shift. Continue plan of care.

## 2018-03-28 NOTE — PT/OT/SLP DISCHARGE
Physical Therapy Discharge Summary    Name: Carlos Cifuentes  MRN: [id number]   Principal Problem: Dry gangrene     Patient Discharged from acute Physical Therapy on 3/28/2018.  Please refer to prior PT noted date on 3/27/2018 for functional status.     Assessment:     Patient appropriate for care in another setting.    Objective:     GOALS:    Physical Therapy Goals     Not on file          Multidisciplinary Problems (Resolved)        Problem: Physical Therapy Goal    Goal Priority Disciplines Outcome Goal Variances Interventions   Physical Therapy Goal   (Resolved)     PT/OT, PT Outcome(s) achieved     Description:  Goals to be met by: 3/27/2018     Patient will increase functional independence with mobility by performin. Supine to sit with Modified [place]  2. Sit to stand transfer with Modified [place]  3. Bed to chair transfer with Modified [place]    4. Gait  x 25' feet with Contact Guard Assistance using Rolling Walker.   5. Wheelchair propulsion x150 feet with Modified [place] using bilateral uppper extremities                      Reasons for Discontinuation of Therapy Services  Transfer to alternate level of care.      Plan:     Patient Discharged to: Long Term Acute Care.    Ghazal Moe, PT  3/28/2018

## 2018-03-28 NOTE — PROGRESS NOTES
Progress Note    Admit Date: 3/19/2018   LOS: 9 days     SUBJECTIVE:     Follow-up For:  Cellulitis of left foot     03/28/2018: denies fever or chills. Moderate foot pain   03/27/2018: Continue to have moderate to severe pain. Underwent debridement, wound closure and chopart amputation left foot.   03/26/2018: moderate pain to left foot. No fever or chills. Awaiting OR today   03/23/2018: underwent left foot I&D and Lt transmetatarsal amputation on 03/22. Moderate pain  03/22/2018: underwent I&D and amputation of left 3rd toe (MTPJ) and through the 2nd left MT on 03/21    Scheduled Meds:   aspirin  81 mg Oral Daily    atorvastatin  80 mg Oral Daily    clopidogrel  75 mg Oral Daily    gabapentin  100 mg Oral TID    insulin aspart U-100  8 Units Subcutaneous TID WM    insulin detemir U-100  22 Units Subcutaneous QHS    lactulose  20 g Oral TID    lisinopril  5 mg Oral Daily    nicotine  1 patch Transdermal Daily    pantoprazole  40 mg Oral Daily    piperacillin-tazobactam (ZOSYN) IVPB  4.5 g Intravenous Q8H    traZODone  100 mg Oral QHS    vancomycin (VANCOCIN) IVPB  1,250 mg Intravenous Q24H     Continuous Infusions:  PRN Meds:dextrose 50%, dextrose 50%, diazePAM, docusate sodium, glucagon (human recombinant), glucose, glucose, insulin aspart U-100, morphine, ondansetron, oxyCODONE, sodium chloride 0.9%, sodium chloride 0.9%, sodium chloride 0.9%    Review of patient's allergies indicates:   Allergen Reactions    Codeine Rash     Other reaction(s): Unknown    Vicodin [hydrocodone-acetaminophen] Rash       Review of Systems    Constitutional: Denies fever or chills. Chronic pain   Eyes: no visual changes   ENT: no nasal congestion. Denies sore throat with odynophagia   Respiratory: No cough, SOB, exertional dyspnea or  hemoptysis   Cardiovascular: no chest pain or palpitations   Gastrointestinal: no abdominal pain, n/v/. + constipation  Hematologic/Lymphatic: denies any bleeding  Musculoskeletal: see  HPI  Neurological: no seizures or tremors, gait or balance prblems  Skin: black scar and redness to left foot.   Psych: Denies any anxiety, depression or insomnia      OBJECTIVE:     Vital Signs (Most Recent)    Temp: 97.9 °F (36.6 °C) (03/28/18 0506)  Pulse: 69 (03/28/18 0506)  Resp: 18 (03/28/18 0506)  BP: (!) 106/57 (03/28/18 0506)  SpO2: 96 % (03/28/18 0506)    Vital Signs Range (Last 24H):  Temp:  [97.6 °F (36.4 °C)-99.1 °F (37.3 °C)]   Pulse:  [69-81]   Resp:  [18]   BP: (106-149)/(53-83)   SpO2:  [92 %-96 %]     I & O (Last 24H):    Intake/Output Summary (Last 24 hours) at 03/28/18 0754  Last data filed at 03/28/18 0623   Gross per 24 hour   Intake              580 ml   Output             2740 ml   Net            -2160 ml     Physical Exam:    General: NAD, resting in bed   Head: normocephalic, atraumatic   Eyes: conjunctivae pink, anicteric sclera.   Throat: No erythema or post nasal discharge   Neck: supple, no LAD   Lungs: decreased air entry bilaterally at the bases   Heart: S1, S2, RRR   Abdomen: obese, + BS x 4 quadrants, soft  Extremities: right foot with healed surgical site (amputation of 1st and 2nd toe). Left foot: in surgical dressing- s/p TMT amputation   MS: move all extremities.  Psy: calm     Laboratory:  CBC:   No results for input(s): WBC, RBC, HGB, HCT, PLT, MCV, MCH, MCHC in the last 168 hours.  CMP:     Recent Labs  Lab 03/28/18  0412   *   CALCIUM 9.2      K 4.2   CO2 28      BUN 17   CREATININE 1.2     Coagulation: No results for input(s): LABPROT, INR, APTT in the last 168 hours.  Cardiac markers: No results for input(s): CKMB, CPKMB, TROPONINT, TROPONINI, MYOGLOBIN in the last 168 hours.  ABGs: No results for input(s): PH, PCO2, PO2, HCO3, POCSATURATED, BE in the last 168 hours.  Microbiology Results (last 7 days)     Procedure Component Value Units Date/Time    Culture, Anaerobe [[id number]] Collected:  03/20/18 1323    Order Status:  Completed Specimen:  Bone from  Foot, Left Updated:  03/25/18 1334     Anaerobic Culture --     BACTEROIDES FRAGILIS  Many      Narrative:       Left foot 2nd metatarsal bone/3rd toe bone and tissue    Blood culture #1 **CANNOT BE ORDERED STAT** [[id number]] Collected:  03/19/18 0915    Order Status:  Completed Specimen:  Blood from Peripheral, Antecubital, Right Updated:  03/24/18 1103     Blood Culture, Routine No growth after 5 days.    Blood culture #2 **CANNOT BE ORDERED STAT** [[id number]] Collected:  03/19/18 0930    Order Status:  Completed Specimen:  Blood from Peripheral, Hand, Right Updated:  03/24/18 1103     Blood Culture, Routine No growth after 5 days.    Aerobic culture [[id number]]  (Susceptibility) Collected:  03/20/18 1323    Order Status:  Completed Specimen:  Bone from Foot, Left Updated:  03/22/18 0836     Aerobic Bacterial Culture --     KLEBSIELLA PNEUMONIAE  Many      Narrative:       Left foot 2nd metatarsal bone/3rd toe bone /tissue        Specimen (12h ago through future)    None        No results for input(s): COLORU, CLARITYU, SPECGRAV, PHUR, PROTEINUA, GLUCOSEU, BILIRUBINCON, BLOODU, WBCU, RBCU, BACTERIA, MUCUS, NITRITE, LEUKOCYTESUR, UROBILINOGEN, HYALINECASTS in the last 168 hours.    Diagnostic Results:    Current Facility-Administered Medications   Medication Dose Route Frequency Provider Last Rate Last Dose    aspirin EC tablet 81 mg  81 mg Oral Daily Sharath Smith MD   81 mg at 03/27/18 0853    atorvastatin tablet 80 mg  80 mg Oral Daily Sharath Smith MD   80 mg at 03/27/18 0854    clopidogrel tablet 75 mg  75 mg Oral Daily Sharath Smith MD   75 mg at 03/27/18 0854    dextrose 50% injection 12.5 g  12.5 g Intravenous PRN Miguel Lux MD        dextrose 50% injection 25 g  25 g Intravenous PRN Miguel Lux MD        diazePAM tablet 5 mg  5 mg Oral Q12H PRN Sharath Smith MD   5 mg at 03/27/18 1251    docusate sodium capsule 100 mg  100 mg Oral PRN Sharath ELDRIDGE  MD Luis        gabapentin capsule 100 mg  100 mg Oral TID Phan Singleton MD   100 mg at 03/27/18 2200    glucagon (human recombinant) injection 1 mg  1 mg Intramuscular PRN Miguel Lux MD        glucose chewable tablet 16 g  16 g Oral PRN Miguel Lux MD        glucose chewable tablet 24 g  24 g Oral PRN Miguel Lux MD        insulin aspart U-100 pen 1-10 Units  1-10 Units Subcutaneous QID (AC + HS) PRN Miguel Lux MD   2 Units at 03/27/18 0859    insulin aspart U-100 pen 8 Units  8 Units Subcutaneous TID WM Sharath Smith MD   8 Units at 03/27/18 1824    insulin detemir U-100 pen 22 Units  22 Units Subcutaneous QHS Miguel Lux MD   22 Units at 03/27/18 2200    lactulose 20 gram/30 mL solution Soln 20 g  20 g Oral TID Sharath Smith MD   20 g at 03/27/18 1600    lisinopril tablet 5 mg  5 mg Oral Daily Sharath Smith MD   5 mg at 03/27/18 0902    morphine injection 4 mg  4 mg Intravenous Q3H PRN Miguel Lux MD   4 mg at 03/28/18 0629    nicotine 14 mg/24 hr 1 patch  1 patch Transdermal Daily Sharath Smith MD   1 patch at 03/27/18 0904    ondansetron injection 4 mg  4 mg Intravenous Q8H PRN Sharath Smith MD        oxyCODONE immediate release tablet 10 mg  10 mg Oral Q4H PRN David Washington MD   10 mg at 03/26/18 2207    pantoprazole EC tablet 40 mg  40 mg Oral Daily Sharath Smith MD   40 mg at 03/27/18 0902    piperacillin-tazobactam 4.5 g in sodium chloride 0.9% 100 mL IVPB (ready to mix system)  4.5 g Intravenous Q8H Miguel Lux MD 25 mL/hr at 03/28/18 0006 4.5 g at 03/28/18 0006    sodium chloride 0.9% flush 3 mL  3 mL Intravenous PRN Norah Howard MD        sodium chloride 0.9% flush 3 mL  3 mL Intravenous PRN Norah Howard MD        sodium chloride 0.9% flush 3 mL  3 mL Intravenous PRN Franki Glover MD        traZODone tablet 100 mg  100 mg Oral QHS Sharathsunita Smith MD    100 mg at 03/27/18 2200    vancomycin (VANCOCIN) 1,250 mg in sodium chloride 0.9% 250 mL IVPB  1,250 mg Intravenous Q24H Miguel Lux .7 mL/hr at 03/27/18 0837 1,250 mg at 03/27/18 0837         ASSESSMENT/PLAN:           Active Hospital Problems     Diagnosis   POA    *Dry gangrene [I96]  - pt failed conservative tx post partial amputation  - discussed the case in detail with Dr. Washington on 03/21     - s/p  I&D and amputation of left 3rd toe (MTPJ) and through the 2nd left MT  - pan sensitive Klebsiella   - dc Vancomycin   - continue zosyn for now   -discussed the case with Dr. Washington   - s/p left TMT amputation   - did revision and debridement on 03/26  - dc to LTAC for IV ABX        Yes    Type 2 diabetes mellitus with diabetic foot infection [E11.628, L08.9]     -HgA1c shows improvement in glycemic control but sill > 9  - continue insulin  - pt is non compliant       Yes    HTN, goal below 140/90 [I10]  - low bp this am      Yes    Abscess or cellulitis of foot [L03.119, L02.619]  - pt failed out pt oral ABX x 2      - continue current dose of IV ABX        Yes    CKD stage 3 due to type 2 diabetes mellitus [E11.22, N18.3]     -cr 1.3     - watch for LIZ as Vancomycin on board.          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      DVT prophylaxis: Lovenox    Post surgical pain: severe- adjust morphine dose   - mild improvement         Consulted CM for LTAC at [facility]. Awaiting approval        Miguel Lux M.D  Internal Medicine & Geriatric Medicine  Hematology & Oncology  Palliative Medicine    [address], [address]  [place] [address]  [phone] (Office)  [phone] (Fax)

## 2018-03-29 NOTE — DISCHARGE SUMMARY
[facility]  Discharge Summary      Patient Name: Carlos Cifuentes  MRN: [id number]  Admission Date: 3/19/2018  Hospital Length of Stay: 9 days  Discharge Date and Time: 3/28/2018 11:15 AM  Attending Physician: No att. providers found   Discharging Provider: Miguel Lux MD  Primary Care Provider: Miguel Lux MD    HPI:     Mr. Cifuentes is a pleasant 56 YO gentleman with multiple medical conditions including diabetic foot ulcer, Back pain; Bulging lumbar disc; C. difficile diarrhea; Chronic back pain (10/14/2014); Diabetes mellitus; Diabetes mellitus type II; DM (diabetes mellitus), type 2, uncontrolled (3/12/2013); Hepatitis C (7/3/2013); MSSA (methicillin susceptible Staphylococcus aureus) septicemia; Neuromuscular disorder; Neuropathy; and Staph aureus infection as well as non compliace with diet, medication and physician follow up. He presented to [facility] ED with progressive and worsening  swelling and pain following an amputation to his 2nd left toe. He completed IV ABX followed by 2 courses of po Doxycycline. Pt declined TMT amputation and underwent partial amputation of 2nd left toe by Dr. David Washington. His current home medications not alleviating his pain and came to ED.    Procedure(s) (LRB):  DEBRIDEMENT-FOOT (Left)  CLOSURE-WOUND POSS (Left)     Hospital Course:     During this hospitalization, these following conditions were addressed and managed along with other comorbid conditions:            Active Hospital Problems     Diagnosis   POA    *Dry gangrene [I96]  - pt failed conservative tx post partial amputation  - discussed the case in detail with Dr. Washington on 03/21     - s/p  I&D and amputation of left 3rd toe (MTPJ) and through the 2nd left MT  - pan sensitive Klebsiella   - dc Vancomycin   - continue zosyn for now   -discussed the case with Dr. Washington   - s/p left TMT amputation   - did revision and debridement on 03/26  - dc to LTAC for IV ABX today         Yes    Type 2  diabetes mellitus with diabetic foot infection [E11.628, L08.9]     -HgA1c shows improvement in glycemic control but sill > 9  - continue insulin  - pt is non compliant       Yes    HTN, goal below 140/90 [I10]  - low bp this am      Yes    Abscess or cellulitis of foot [L03.119, L02.619]  - pt failed out pt oral ABX x 2      - continue current dose of IV ABX        Yes    CKD stage 3 due to type 2 diabetes mellitus [E11.22, N18.3]     -cr 1.3     - watch for LIZ as Vancomycin on board.          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      DVT prophylaxis: Lovenox    Post surgical pain: severe- adjust morphine dose   - mild improvement         Consulted CM for LTAC at [facility]. Awaiting approval     Consults:   Consults         Status Ordering Provider     Inpatient consult to Orthopedic Surgery  Once     Provider:  David Washington MD    Completed SHANICE THOMPSON     Inpatient consult to Social Work  Once     Provider:  (Not yet assigned)    Completed LEXUS NIÑO          Significant Diagnostic Studies: see above    Pending Diagnostic Studies:     None        Final Active Diagnoses:    Diagnosis Date Noted POA    PRINCIPAL PROBLEM:  Dry gangrene [I96] 03/20/2018 Yes    Type 2 diabetes mellitus with diabetic foot infection [E11.628, L08.9] 03/20/2018 Yes    HTN, goal below 140/90 [I10] 03/20/2018 Yes    Abscess or cellulitis of foot [L03.119, L02.619] 03/19/2018 Yes    CKD stage 3 due to type 2 diabetes mellitus [E11.22, N18.3] 09/20/2017 Yes    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    Specialties:  Internal Medicine, Oncology, Hematology and Oncology  Why:  call when discharged from [facility] to schedule follow up appointment   Contact information:  [address]  [address]  [place] [address] [phone]             [facility].    Why:  [facility]  Contact information:  [address] [place] [address]               Patient Instructions:   No discharge procedures on file.  Medications:  Reconciled Home Medications:      Medication List      START taking these medications    gabapentin 100 MG capsule  Commonly known as:  NEURONTIN  Take 1 capsule (100 mg total) by mouth 3 (three) times daily.     PIPERACILLIN-TAZOBACTAM 4.5G/100ML SODIUM CHLORIDE 0.9%-READY TO MIX  Inject 100 mLs (4.5 g total) into the vein every 8 (eight) hours.     sodium chloride 0.9% SolP 250 mL with vancomycin 1,000 mg SolR 1,250 mg  Inject 1,250 mg into the vein once daily.        CHANGE how you take these medications    lactulose 10 gram/15 mL solution  Commonly known as:  CHRONULAC  Take 45 mLs (30 g total) by mouth 2 (two) times daily.  What changed:  Another medication with the same name was removed. Continue taking this medication, and follow the directions you see here.     nicotine 14 mg/24 hr  Commonly known as:  NICODERM CQ  Place 1 patch onto the skin once daily.  What changed:  Another medication with the same name was removed. Continue taking this medication, and follow the directions  you see here.        CONTINUE taking these medications    aspirin 81 MG EC tablet  Commonly known as:  ECOTRIN  Take 1 tablet (81 mg total) by mouth once daily.     atorvastatin 80 MG tablet  Commonly known as:  LIPITOR  Take 1 tablet (80 mg total) by mouth once daily.     clopidogrel 75 mg tablet  Commonly known as:  PLAVIX  Take 1 tablet (75 mg total) by mouth once daily.     diazePAM 5 MG tablet  Commonly known as:  VALIUM  Take 1 tablet (5 mg total) by mouth every 12 (twelve) hours as needed for Anxiety.     docusate sodium 100 MG capsule  Commonly known as:  COLACE  Take 1 capsule (100 mg total) by mouth as needed for Constipation.     glimepiride 2 MG tablet  Commonly known as:  AMARYL  Take 1 tablet (2 mg total) by mouth every morning.     insulin aspart U-100 100 unit/mL Inpn pen  Commonly known as:  NovoLOG  Inject 8 Units into the skin 3 (three) times daily.     LANTUS SOLOSTAR U-100 INSULIN 100 unit/mL (3 mL) Inpn pen  Generic drug:  insulin glargine  Inject 30 Units into the skin every evening.     lidocaine HCL 2% 2 % jelly  Commonly known as:  XYLOCAINE  Apply to affected area daily prn     lisinopril 5 MG tablet  Commonly known as:  PRINIVIL,ZESTRIL  Take 1 tablet (5 mg total) by mouth once daily.     metFORMIN 1000 MG tablet  Commonly known as:  GLUCOPHAGE  Take 1 tablet (1,000 mg total) by mouth 2 (two) times daily with meals.     oxyCODONE 10 mg Tab immediate release tablet  Commonly known as:  ROXICODONE  Take 1 tablet (10 mg total) by mouth every 8 (eight) hours as needed for Pain.     pantoprazole 40 MG tablet  Commonly known as:  PROTONIX  Take 1 tablet (40 mg total) by mouth once daily.     potassium chloride SA 10 MEQ tablet  Commonly known as:  K-DUR,KLOR-CON  Take 1 tablet (10 mEq total) by mouth once daily as needed when experiencing leg/muscle cramps.     pregabalin 75 MG capsule  Commonly known as:  LYRICA  Take 1 capsule (75 mg total) by mouth 2 (two) times daily.     syringe with  "needle 3 mL 23 gauge x 1 1/2" Syrg  1 Device by Misc.(Non-Drug; Combo Route) route every 7 days.     testosterone cypionate 200 mg/mL injection  Commonly known as:  DEPOTESTOTERONE CYPIONATE  Inject 0.5 mLs (100 mg total) into the muscle every 7 days.     traZODone 100 MG tablet  Commonly known as:  DESYREL  Take 1 tablet (100 mg total) by mouth every evening.     TRUE METRIX GLUCOSE METER Misc  Generic drug:  blood-glucose meter     TRUE METRIX GLUCOSE TEST STRIP Strp  Generic drug:  blood sugar diagnostic  Twice daily blood sugar checks        STOP taking these medications    doxycycline 100 MG capsule  Commonly known as:  MONODOX     ledipasvir-sofosbuvir  mg Tab  Commonly known as:  HARVONI     omeprazole 20 MG capsule  Commonly known as:  PRILOSEC     vancomycin 1,000 mg injection  Commonly known as:  VANCOCIN           Where to Get Your Medications      These medications were sent to [facility] - [address] [address]    Phone:  [phone]   · gabapentin 100 MG capsule  · PIPERACILLIN-TAZOBACTAM 4.5G/100ML SODIUM CHLORIDE 0.9%-READY TO MIX  · sodium chloride 0.9% SolP 250 mL with vancomycin 1,000 mg SolR 1,250 mg         Miguel Lux MD  Hematology/Oncology  [facility]  "

## 2018-03-30 LAB — POCT GLUCOSE: 134 MG/DL (ref 70–110)

## 2018-04-13 RX ORDER — INSULIN GLARGINE 100 [IU]/ML
30 INJECTION, SOLUTION SUBCUTANEOUS NIGHTLY
Qty: 15 ML | OUTPATIENT
Start: 2018-04-13

## 2018-05-07 ENCOUNTER — TELEPHONE (OUTPATIENT)
Dept: HEPATOLOGY | Facility: CLINIC | Age: 58
End: 2018-05-07

## 2018-05-07 NOTE — TELEPHONE ENCOUNTER
----- Message from You Reilly PA-C sent at 5/7/2018 10:09 AM CDT -----  Please mail a letter stating that he is overdue for liver cancer screening and close episode    ----- Message -----  From: Kathi Geller MA  Sent: 5/4/2018   2:15 PM  To: You Reilly PA-C    We have been unsucessful in contacting this pt please advise on the episode thanks.

## 2018-05-21 ENCOUNTER — HOSPITAL ENCOUNTER (OUTPATIENT)
Dept: WOUND CARE | Facility: HOSPITAL | Age: 58
Discharge: HOME | End: 2018-05-21
Attending: FAMILY MEDICINE
Payer: MEDICAID

## 2018-05-21 DIAGNOSIS — T81.31XA WOUND DEHISCENCE, SURGICAL, INITIAL ENCOUNTER: ICD-10-CM

## 2018-05-21 DIAGNOSIS — L97.423 DIABETIC ULCER OF LEFT MIDFOOT ASSOCIATED WITH DIABETES MELLITUS DUE TO UNDERLYING CONDITION, WITH NECROSIS OF MUSCLE: Primary | ICD-10-CM

## 2018-05-21 DIAGNOSIS — E08.621 DIABETIC ULCER OF LEFT MIDFOOT ASSOCIATED WITH DIABETES MELLITUS DUE TO UNDERLYING CONDITION, WITH NECROSIS OF MUSCLE: Primary | ICD-10-CM

## 2018-05-21 PROCEDURE — 87186 SC STD MICRODIL/AGAR DIL: CPT

## 2018-05-21 PROCEDURE — 87070 CULTURE OTHR SPECIMN AEROBIC: CPT

## 2018-05-21 PROCEDURE — 11042 DBRDMT SUBQ TIS 1ST 20SQCM/<: CPT | Mod: ,,, | Performed by: FAMILY MEDICINE

## 2018-05-21 PROCEDURE — 99215 OFFICE O/P EST HI 40 MIN: CPT | Mod: 25,,, | Performed by: FAMILY MEDICINE

## 2018-05-21 PROCEDURE — 87077 CULTURE AEROBIC IDENTIFY: CPT

## 2018-05-21 PROCEDURE — 11045 DBRDMT SUBQ TISS EACH ADDL: CPT | Mod: ,,, | Performed by: FAMILY MEDICINE

## 2018-05-21 PROCEDURE — 11045 DBRDMT SUBQ TISS EACH ADDL: CPT | Performed by: FAMILY MEDICINE

## 2018-05-21 PROCEDURE — 11046 DBRDMT MUSC&/FSCA EA ADDL: CPT | Performed by: FAMILY MEDICINE

## 2018-05-21 PROCEDURE — 11043 DBRDMT MUSC&/FSCA 1ST 20/<: CPT | Performed by: FAMILY MEDICINE

## 2018-05-21 PROCEDURE — 11042 DBRDMT SUBQ TIS 1ST 20SQCM/<: CPT | Performed by: FAMILY MEDICINE

## 2018-05-21 PROCEDURE — 99213 OFFICE O/P EST LOW 20 MIN: CPT | Performed by: FAMILY MEDICINE

## 2018-05-22 PROBLEM — M86.9 OSTEOMYELITIS: Status: ACTIVE | Noted: 2018-05-22

## 2018-05-22 PROBLEM — Z09 HOSPITAL DISCHARGE FOLLOW-UP: Status: ACTIVE | Noted: 2018-05-22

## 2018-05-22 PROBLEM — E11.621 DIABETIC ULCER OF LEFT MIDFOOT ASSOCIATED WITH TYPE 2 DIABETES MELLITUS: Status: ACTIVE | Noted: 2018-05-22

## 2018-05-22 PROBLEM — S98.912A: Status: ACTIVE | Noted: 2018-05-22

## 2018-05-22 PROBLEM — L97.429 DIABETIC ULCER OF LEFT MIDFOOT ASSOCIATED WITH TYPE 2 DIABETES MELLITUS: Status: ACTIVE | Noted: 2018-05-22

## 2018-05-23 DIAGNOSIS — A49.8 PSEUDOMONAS INFECTION: Primary | ICD-10-CM

## 2018-05-23 LAB — BACTERIA SPEC AEROBE CULT: NORMAL

## 2018-05-23 RX ORDER — CIPROFLOXACIN 750 MG/1
750 TABLET, FILM COATED ORAL EVERY 12 HOURS
Qty: 28 TABLET | Refills: 0 | Status: SHIPPED | OUTPATIENT
Start: 2018-05-23 | End: 2018-06-06

## 2018-05-23 NOTE — PROGRESS NOTES
I have sent in an antibiotic to address the infection in your wound.  Take the medication as instructed.

## 2018-05-24 ENCOUNTER — HOSPITAL ENCOUNTER (OUTPATIENT)
Dept: WOUND CARE | Facility: HOSPITAL | Age: 58
Discharge: HOME | End: 2018-05-24
Attending: PODIATRIST
Payer: MEDICAID

## 2018-05-24 DIAGNOSIS — L97.423 DIABETIC ULCER OF LEFT MIDFOOT ASSOCIATED WITH DIABETES MELLITUS DUE TO UNDERLYING CONDITION, WITH NECROSIS OF MUSCLE: Primary | ICD-10-CM

## 2018-05-24 DIAGNOSIS — T81.31XA WOUND DEHISCENCE, SURGICAL, INITIAL ENCOUNTER: ICD-10-CM

## 2018-05-24 DIAGNOSIS — E11.49 TYPE II DIABETES MELLITUS WITH NEUROLOGICAL MANIFESTATIONS: ICD-10-CM

## 2018-05-24 DIAGNOSIS — A49.8 PSEUDOMONAS INFECTION: ICD-10-CM

## 2018-05-24 DIAGNOSIS — E08.621 DIABETIC ULCER OF LEFT MIDFOOT ASSOCIATED WITH DIABETES MELLITUS DUE TO UNDERLYING CONDITION, WITH NECROSIS OF MUSCLE: Primary | ICD-10-CM

## 2018-05-24 DIAGNOSIS — E11.51 TYPE II DIABETES MELLITUS WITH PERIPHERAL CIRCULATORY DISORDER: ICD-10-CM

## 2018-05-24 PROCEDURE — 99213 OFFICE O/P EST LOW 20 MIN: CPT | Performed by: PODIATRIST

## 2018-05-24 PROCEDURE — 99214 OFFICE O/P EST MOD 30 MIN: CPT | Mod: ,,, | Performed by: PODIATRIST

## 2018-05-29 ENCOUNTER — HOSPITAL ENCOUNTER (OUTPATIENT)
Dept: WOUND CARE | Facility: HOSPITAL | Age: 58
Discharge: HOME | End: 2018-05-29
Attending: FAMILY MEDICINE
Payer: MEDICAID

## 2018-05-29 ENCOUNTER — TELEPHONE (OUTPATIENT)
Dept: HEPATOLOGY | Facility: CLINIC | Age: 58
End: 2018-05-29

## 2018-05-29 DIAGNOSIS — L97.423 DIABETIC ULCER OF LEFT MIDFOOT ASSOCIATED WITH DIABETES MELLITUS DUE TO UNDERLYING CONDITION, WITH NECROSIS OF MUSCLE: Primary | ICD-10-CM

## 2018-05-29 DIAGNOSIS — E08.621 DIABETIC ULCER OF LEFT MIDFOOT ASSOCIATED WITH DIABETES MELLITUS DUE TO UNDERLYING CONDITION, WITH NECROSIS OF MUSCLE: Primary | ICD-10-CM

## 2018-05-29 PROCEDURE — 93922 UPR/L XTREMITY ART 2 LEVELS: CPT | Mod: 52 | Performed by: FAMILY MEDICINE

## 2018-05-29 PROCEDURE — 93923 UPR/LXTR ART STDY 3+ LVLS: CPT | Mod: 26,,, | Performed by: FAMILY MEDICINE

## 2018-05-29 PROCEDURE — 99213 OFFICE O/P EST LOW 20 MIN: CPT | Performed by: FAMILY MEDICINE

## 2018-05-29 NOTE — TELEPHONE ENCOUNTER
Please let him know HCV is negative, as expected.     He is still overdue for HCC screening and clinic f/u. Please schedule if able to reach patient    Thanks

## 2018-05-30 NOTE — TELEPHONE ENCOUNTER
Attempt made to reach patient.  Msg from PHILLIP Reilly left on his VM and mailed to him.  I stressed that he contact us to schedule hcc screening.

## 2018-06-04 ENCOUNTER — HOSPITAL ENCOUNTER (OUTPATIENT)
Dept: WOUND CARE | Facility: HOSPITAL | Age: 58
Discharge: HOME | End: 2018-06-04
Attending: FAMILY MEDICINE
Payer: MEDICAID

## 2018-06-04 DIAGNOSIS — L97.429 DIABETIC ULCER OF LEFT MIDFOOT ASSOCIATED WITH TYPE 2 DIABETES MELLITUS, UNSPECIFIED ULCER STAGE: ICD-10-CM

## 2018-06-04 DIAGNOSIS — E11.621 DIABETIC ULCER OF LEFT MIDFOOT ASSOCIATED WITH TYPE 2 DIABETES MELLITUS, UNSPECIFIED ULCER STAGE: ICD-10-CM

## 2018-06-04 DIAGNOSIS — T81.31XD POSTOPERATIVE WOUND DEHISCENCE, SUBSEQUENT ENCOUNTER: ICD-10-CM

## 2018-06-04 DIAGNOSIS — E08.621 DIABETIC ULCER OF LEFT MIDFOOT ASSOCIATED WITH DIABETES MELLITUS DUE TO UNDERLYING CONDITION, WITH NECROSIS OF MUSCLE: Primary | ICD-10-CM

## 2018-06-04 DIAGNOSIS — I73.9 PAD (PERIPHERAL ARTERY DISEASE): Primary | ICD-10-CM

## 2018-06-04 DIAGNOSIS — L97.423 DIABETIC ULCER OF LEFT MIDFOOT ASSOCIATED WITH DIABETES MELLITUS DUE TO UNDERLYING CONDITION, WITH NECROSIS OF MUSCLE: Primary | ICD-10-CM

## 2018-06-04 PROCEDURE — 99212 OFFICE O/P EST SF 10 MIN: CPT | Performed by: FAMILY MEDICINE

## 2018-06-04 PROCEDURE — 99214 OFFICE O/P EST MOD 30 MIN: CPT | Mod: 25,,, | Performed by: FAMILY MEDICINE

## 2018-06-04 PROCEDURE — 17250 CHEM CAUT OF GRANLTJ TISSUE: CPT | Mod: ,,, | Performed by: FAMILY MEDICINE

## 2018-06-05 DIAGNOSIS — I73.9 PERIPHERAL ARTERIAL DISEASE: Primary | ICD-10-CM

## 2018-06-06 ENCOUNTER — LAB VISIT (OUTPATIENT)
Dept: LAB | Facility: HOSPITAL | Age: 58
End: 2018-06-06
Attending: SURGERY
Payer: MEDICAID

## 2018-06-06 ENCOUNTER — INITIAL CONSULT (OUTPATIENT)
Dept: VASCULAR SURGERY | Facility: CLINIC | Age: 58
End: 2018-06-06
Attending: SURGERY
Payer: MEDICAID

## 2018-06-06 ENCOUNTER — HOSPITAL ENCOUNTER (OUTPATIENT)
Dept: VASCULAR SURGERY | Facility: CLINIC | Age: 58
Discharge: HOME | End: 2018-06-06
Attending: SURGERY
Payer: MEDICAID

## 2018-06-06 ENCOUNTER — TELEPHONE (OUTPATIENT)
Dept: VASCULAR SURGERY | Facility: CLINIC | Age: 58
End: 2018-06-06

## 2018-06-06 VITALS
HEIGHT: 69 IN | TEMPERATURE: 98 F | DIASTOLIC BLOOD PRESSURE: 76 MMHG | HEART RATE: 84 BPM | WEIGHT: 229 LBS | SYSTOLIC BLOOD PRESSURE: 125 MMHG | BODY MASS INDEX: 33.92 KG/M2

## 2018-06-06 DIAGNOSIS — S81.802S NON-HEALING WOUND OF LOWER EXTREMITY, LEFT, SEQUELA: Primary | ICD-10-CM

## 2018-06-06 DIAGNOSIS — I73.9 PERIPHERAL ARTERIAL DISEASE: ICD-10-CM

## 2018-06-06 DIAGNOSIS — Z01.818 PREOP EXAMINATION: ICD-10-CM

## 2018-06-06 DIAGNOSIS — Z01.818 PREOP EXAMINATION: Primary | ICD-10-CM

## 2018-06-06 PROBLEM — T81.31XA WOUND DEHISCENCE, SURGICAL: Status: ACTIVE | Noted: 2018-06-06

## 2018-06-06 LAB
ALBUMIN SERPL BCP-MCNC: 3.6 G/DL
ALP SERPL-CCNC: 68 U/L
ALT SERPL W/O P-5'-P-CCNC: 22 U/L
ANION GAP SERPL CALC-SCNC: 7 MMOL/L
AST SERPL-CCNC: 17 U/L
BASOPHILS # BLD AUTO: 0.05 K/UL
BASOPHILS NFR BLD: 0.7 %
BILIRUB SERPL-MCNC: 0.3 MG/DL
BUN SERPL-MCNC: 18 MG/DL
CALCIUM SERPL-MCNC: 9.8 MG/DL
CHLORIDE SERPL-SCNC: 103 MMOL/L
CO2 SERPL-SCNC: 28 MMOL/L
CREAT SERPL-MCNC: 0.9 MG/DL
DIFFERENTIAL METHOD: ABNORMAL
EOSINOPHIL # BLD AUTO: 0.2 K/UL
EOSINOPHIL NFR BLD: 2.9 %
ERYTHROCYTE [DISTWIDTH] IN BLOOD BY AUTOMATED COUNT: 14.8 %
EST. GFR  (AFRICAN AMERICAN): >60 ML/MIN/1.73 M^2
EST. GFR  (NON AFRICAN AMERICAN): >60 ML/MIN/1.73 M^2
GLUCOSE SERPL-MCNC: 112 MG/DL
HCT VFR BLD AUTO: 40.7 %
HGB BLD-MCNC: 12.8 G/DL
IMM GRANULOCYTES # BLD AUTO: 0.04 K/UL
IMM GRANULOCYTES NFR BLD AUTO: 0.6 %
LYMPHOCYTES # BLD AUTO: 2 K/UL
LYMPHOCYTES NFR BLD: 28.1 %
MCH RBC QN AUTO: 27.6 PG
MCHC RBC AUTO-ENTMCNC: 31.4 G/DL
MCV RBC AUTO: 88 FL
MONOCYTES # BLD AUTO: 0.5 K/UL
MONOCYTES NFR BLD: 6.5 %
NEUTROPHILS # BLD AUTO: 4.5 K/UL
NEUTROPHILS NFR BLD: 61.2 %
NRBC BLD-RTO: 0 /100 WBC
PLATELET # BLD AUTO: 191 K/UL
PMV BLD AUTO: 10.8 FL
POTASSIUM SERPL-SCNC: 4.5 MMOL/L
PROT SERPL-MCNC: 7.9 G/DL
RBC # BLD AUTO: 4.64 M/UL
SODIUM SERPL-SCNC: 138 MMOL/L
WBC # BLD AUTO: 7.26 K/UL

## 2018-06-06 PROCEDURE — 99999 PR PBB SHADOW E&M-EST. PATIENT-LVL V: CPT | Mod: PBBFAC,,, | Performed by: SURGERY

## 2018-06-06 PROCEDURE — 99214 OFFICE O/P EST MOD 30 MIN: CPT | Mod: S$PBB,,, | Performed by: SURGERY

## 2018-06-06 PROCEDURE — 85025 COMPLETE CBC W/AUTO DIFF WBC: CPT

## 2018-06-06 PROCEDURE — 80053 COMPREHEN METABOLIC PANEL: CPT

## 2018-06-06 PROCEDURE — 93923 UPR/LXTR ART STDY 3+ LVLS: CPT | Mod: 26,S$PBB,, | Performed by: SURGERY

## 2018-06-06 PROCEDURE — 93923 UPR/LXTR ART STDY 3+ LVLS: CPT | Mod: PBBFAC | Performed by: SURGERY

## 2018-06-06 PROCEDURE — 36415 COLL VENOUS BLD VENIPUNCTURE: CPT

## 2018-06-06 PROCEDURE — 99215 OFFICE O/P EST HI 40 MIN: CPT | Mod: PBBFAC,25 | Performed by: SURGERY

## 2018-06-06 RX ORDER — LIDOCAINE HYDROCHLORIDE 10 MG/ML
1 INJECTION, SOLUTION EPIDURAL; INFILTRATION; INTRACAUDAL; PERINEURAL ONCE
Status: CANCELLED | OUTPATIENT
Start: 2018-06-06 | End: 2018-06-06

## 2018-06-06 NOTE — LETTER
June 8, 2018      Nora Braden, DPM  [address]  [place] [address]           [facility]  [address]  [place] [address]  Phone: [phone]  Fax: [phone]          Patient: Carlos Cifuentes   MR Number: [id number]   YOB: 1960   Date of Visit: 6/6/2018       Dear Dr. Nora Braden:    Thank you for referring Carlos Cifuentes to me for evaluation. Attached you will find relevant portions of my assessment and plan of care.    If you have questions, please do not hesitate to call me. I look forward to following Carlos Cifuentes along with you.    Sincerely,    Sony Srinivasan MD    Enclosure  CC:  No Recipients    If you would like to receive this communication electronically, please contact [email or web address] or [phone] to request more information on [employer] access.    For providers and/or their staff who would like to refer a patient to [facility], please contact us through our one-stop-shop provider referral line, [facility], at [phone].    If you feel you have received this communication in error or would no longer like to receive these types of communications, please e-mail [email or web address]

## 2018-06-06 NOTE — TELEPHONE ENCOUNTER
Sent message to [facility] clinic pool to get pt scheduled with someone in Podiatry.

## 2018-06-06 NOTE — PROGRESS NOTES
Carlos Darron Cifuentes  06/06/2018    HPI:  Patient is a 57 y.o. male with a h/o uncontrolled DM2 (H1c 9) who is here today for evaluation of non-healing left foot wound s/o TMA last month by ortho. He has a history of right great toe osteo with intervention by Dr. Edmonds last year. 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 This has opened and he is not healing, he is followed by wound care, but was referred to vascular clinic to see if anything can be done to improve his healing and avoid a BKA.     He has apparently been undergoing weekly wound care and out patient debridements.      S/p    4/8/17 RLE Angio SFA stent 6x30 Travee, PTA AT 2.0x80mm      no MI/stroke  Tobacco use: 1/2 pack/day x 40yrs now stopped     Past Medical History:   Diagnosis Date    Arthritis     Back pain     Bulging lumbar disc     C. difficile diarrhea     Chronic back pain 10/14/2014    Diabetes mellitus     Diabetes mellitus type II     DM (diabetes mellitus), type 2, uncontrolled 3/12/2013    Hepatitis C 7/3/2013    MSSA (methicillin susceptible Staphylococcus aureus) septicemia     Neuromuscular disorder     Neuropathy     Staph aureus infection      Past Surgical History:   Procedure Laterality Date    APPENDECTOMY      JOINT REPLACEMENT      left knee surgery      left leg surgery      broken bone    LEG SURGERY  right     Right arm boil      Right great toe amputation      TOE AMPUTATION Right      Family History   Problem Relation Age of Onset    Arthritis Mother     Arthritis Sister     Diabetes Sister     Arthritis Brother      Social History     Social History    Marital status: Single     Spouse name: N/A    Number of children: N/A    Years of education: N/A     Occupational History    Not on file.     Social History Main Topics    Smoking status: Current Every Day Smoker     Packs/day: 0.50      Types: Cigarettes    Smokeless tobacco: Never Used    Alcohol use No    Drug use: No    Sexual activity: No     Other Topics Concern    Not on file     Social History Narrative    ** Merged History Encounter **            Current Outpatient Prescriptions:     atorvastatin (LIPITOR) 80 MG tablet, Take 1 tablet (80 mg total) by mouth once daily., Disp: 90 tablet, Rfl: 3    ciprofloxacin HCl (CIPRO) 750 MG tablet, Take 1 tablet (750 mg total) by mouth every 12 (twelve) hours., Disp: 28 tablet, Rfl: 0    clopidogrel (PLAVIX) 75 mg tablet, Take 1 tablet (75 mg total) by mouth once daily., Disp: 30 tablet, Rfl: 3    diazePAM (VALIUM) 5 MG tablet, Take 1 tablet (5 mg total) by mouth every 12 (twelve) hours as needed for Anxiety., Disp: 60 tablet, Rfl: 3    docusate sodium (COLACE) 100 MG capsule, Take 1 capsule (100 mg total) by mouth as needed for Constipation., Disp: , Rfl:     gabapentin (NEURONTIN) 100 MG capsule, Take 1 capsule (100 mg total) by mouth 3 (three) times daily., Disp: 90 capsule, Rfl: 11    glimepiride (AMARYL) 2 MG tablet, Take 1 tablet (2 mg total) by mouth every morning., Disp: 30 tablet, Rfl: 11    lactulose (CHRONULAC) 10 gram/15 mL solution, Take 45 mLs (30 g total) by mouth 2 (two) times daily., Disp: 600 mL, Rfl: 2    LANTUS SOLOSTAR 100 unit/mL (3 mL) InPn pen, Inject 30 Units into the skin every evening., Disp: 15 mL, Rfl: 3    lidocaine HCL 2% (XYLOCAINE) 2 % jelly, Apply to affected area daily prn, Disp: 120 mL, Rfl: 3    lisinopril (PRINIVIL,ZESTRIL) 5 MG tablet, Take 1 tablet (5 mg total) by mouth once daily., Disp: 90 tablet, Rfl: 3    metFORMIN (GLUCOPHAGE) 1000 MG tablet, Take 0.5 tablets (500 mg total) by mouth 2 (two) times daily with meals. Take 1 tablet (1,000 mg total) by mouth 2 (two) times daily., Disp: 90 tablet, Rfl: 3    nicotine (NICODERM CQ) 14 mg/24 hr, Place 1 patch onto the skin once daily., Disp: 30 patch, Rfl: 6    oxyCODONE (ROXICODONE) 10 mg Tab  "immediate release tablet, Take 1 tablet (10 mg total) by mouth every 8 (eight) hours as needed for Pain., Disp: 90 tablet, Rfl: 0    pantoprazole (PROTONIX) 40 MG tablet, Take 1 tablet (40 mg total) by mouth once daily., Disp: 90 tablet, Rfl: 3    PIPERACILLIN SODIUM/TAZOBACTAM (PIPERACILLIN-TAZOBACTAM 4.5G/100ML SODIUM CHLORIDE 0.9%-READY TO MIX), Inject 100 mLs (4.5 g total) into the vein every 8 (eight) hours., Disp: 1 mL, Rfl: 0    potassium chloride SA (K-DUR,KLOR-CON) 10 MEQ tablet, Take 1 tablet (10 mEq total) by mouth once daily as needed when experiencing leg/muscle cramps., Disp: 30 tablet, Rfl: 5    pregabalin (LYRICA) 75 MG capsule, Take 1 capsule (75 mg total) by mouth 2 (two) times daily., Disp: 60 capsule, Rfl: 11    sodium chloride 0.9% SolP 250 mL with vancomycin 1,000 mg SolR 1,250 mg, Inject 1,250 mg into the vein once daily., Disp: 250 mL, Rfl: 0    syringe with needle 3 mL 23 gauge x 1 1/2" Syrg, 1 Device by Misc.(Non-Drug; Combo Route) route every 7 days., Disp: 100 Syringe, Rfl: 6    testosterone cypionate (DEPOTESTOTERONE CYPIONATE) 200 mg/mL injection, Inject 0.5 mLs (100 mg total) into the muscle every 7 days., Disp: 10 mL, Rfl: 3    traZODone (DESYREL) 100 MG tablet, Take 1 tablet (100 mg total) by mouth every evening., Disp: 90 tablet, Rfl: 1    TRUE METRIX GLUCOSE METER Misc, AS DIRECTED, Disp: , Rfl: 0    TRUE METRIX GLUCOSE TEST STRIP Strp, Twice daily blood sugar checks, Disp: 100 strip, Rfl: 9    aspirin (ECOTRIN) 81 MG EC tablet, Take 1 tablet (81 mg total) by mouth once daily., Disp: , Rfl: 0    REVIEW OF SYSTEMS:  General: negative; ENT: negative; Allergy and Immunology: negative; Hematological and Lymphatic: Negative; Endocrine: negative; Respiratory: no cough, shortness of breath, or wheezing; Cardiovascular: no chest pain or dyspnea on exertion; Gastrointestinal: no abdominal pain/back, change in bowel habits, or bloody stools; Genito-Urinary: no dysuria, trouble " voiding, or hematuria; Musculoskeletal: negative  Neurological: no TIA or stroke symptoms; Psychiatric: no nervousness, anxiety or depression.    PHYSICAL EXAM:   Right Arm BP - Sittin/76 (18 1021)  Left Arm BP - Sittin/82 (18 1021)  Pulse: 84  Temp: 98.1 °F (36.7 °C)      General appearance:  Alert, well-appearing, and in no distress.  Oriented to person, place, and time   Neurological: Normal speech, no focal findings noted; CN II - XII grossly intact           Musculoskeletal: Digits/nail without cyanosis/clubbing.  Normal muscle strength/tone.                 Neck: Supple, no significant adenopathy; thyroid is not enlarged                  No carotid bruit can be auscultated                Chest:  Clear to auscultation, no wheezes, rales or rhonchi, symmetric air entry     No use of accessory muscles             Cardiac: Normal rate and regular rhythm, S1 and S2 normal; PMI non-displaced          Abdomen: Soft, nontender, nondistended, no masses or organomegaly     No rebound tenderness noted; bowel sounds normal     Pulsatile aortic mass is not palpable.     No groin adenopathy      Extremities:   2+ femoral pulses bilaterally.  Non-palpable R popliteal pulse     +1 pedal pulses palpable on Right     Biphasic DP and PT on Left      Mild pre-tibial edema on left ankle     R foot s/p Chopart amputation:  Total dehiscence of would with dry gangrenous changes to wound edges and stump.  Copious fibrinous exudate throughout wound.  Wound is deep to level of the bone.  Not malodorous.      LAB RESULTS:  Lab Results   Component Value Date    K 4.2 2018    K 4.6 2018    K 4.6 2018    CREATININE 1.2 2018    CREATININE 1.3 2018    CREATININE 1.2 2018     Lab Results   Component Value Date    WBC 9.55 2018    WBC 5.74 2018    WBC 6.9 2018    HCT 40.3 2018    HCT 39.9 (L) 2018    HCT 42.8 2018     2018      02/19/2018     (L) 01/19/2018     Lab Results   Component Value Date    HGBA1C 9.1 (H) 03/19/2018    HGBA1C 11.2 (H) 01/19/2018    HGBA1C 9.2 (H) 06/25/2017     IMAGING:  Lower Extremities Segmental Pressure [mmHg]:                    Right             Left  _______________________________________________________________  Brachial          131               120  Low Thigh         212               246  Calf              184               140  Posterior Tibial  147               124  Dorsalis Pedis    184               147  LAILA (Post. Tib.)  1.12              0.95  LAILA (Dors. Ped.)  1.40              1.12    The presence of artifactually elevated pressures in arteries of the lower extremity renders all pressure measurements unreliable due to suspected medial calcinosis.    Report Summary:  Impression:   Right Leg:Segmental pressures and PVR waveforms suggest mild peripheral arterial occlusive disease.     Left Leg:Segmental pressures and PVR waveforms suggest mild peripheral arterial occlusive disease.        IMP/PLAN:  57 y.o. male with non-healing, dehisced, gangrenous (dry) skin edges.  His wound is deep, severe, non-healing and chances of healing are extremely low.  His chances of ambulating on his right foot are extremely low.  His risk for major amputation (R BKA) are high, in fact, I have recommended this to the patient who vehemently refuses.  I will perform a RLE angiogram mainly to optimize BKA wound healing given non-palpable R popliteal pulse.  I discussed the critical nature of the patient's wound and potential options with the patient for 45 minutes.  His wound represents a potentially life-threatening catastrophe if it becomes super-infected and is neglected.  He understands that BKA is likely inevitable.  His overall health and ambulatory ability will be greatly improved with an amputation.     1) plan for LLE angio possible intervention  2) Risks, benefits, and alternatives were discussed with  the patient, and full informed consent was obtained  3) will schedule R BKA thereafter    Sony Srinivasan MD  Vascular & Endovascular Surgery

## 2018-06-07 DIAGNOSIS — A49.8 PSEUDOMONAS INFECTION: ICD-10-CM

## 2018-06-07 RX ORDER — CIPROFLOXACIN 750 MG/1
750 TABLET, FILM COATED ORAL EVERY 12 HOURS
Qty: 28 TABLET | OUTPATIENT
Start: 2018-06-07 | End: 2018-06-21

## 2018-06-11 ENCOUNTER — HOSPITAL ENCOUNTER (OUTPATIENT)
Dept: WOUND CARE | Facility: HOSPITAL | Age: 58
Discharge: HOME | End: 2018-06-11
Attending: FAMILY MEDICINE
Payer: MEDICAID

## 2018-06-11 DIAGNOSIS — L08.9 TYPE 2 DIABETES MELLITUS WITH DIABETIC FOOT INFECTION: ICD-10-CM

## 2018-06-11 DIAGNOSIS — L97.423 DIABETIC ULCER OF LEFT MIDFOOT ASSOCIATED WITH DIABETES MELLITUS DUE TO UNDERLYING CONDITION, WITH NECROSIS OF MUSCLE: Primary | ICD-10-CM

## 2018-06-11 DIAGNOSIS — E11.628 TYPE 2 DIABETES MELLITUS WITH DIABETIC FOOT INFECTION: ICD-10-CM

## 2018-06-11 DIAGNOSIS — I96 DRY GANGRENE: ICD-10-CM

## 2018-06-11 DIAGNOSIS — E08.621 DIABETIC ULCER OF LEFT MIDFOOT ASSOCIATED WITH DIABETES MELLITUS DUE TO UNDERLYING CONDITION, WITH NECROSIS OF MUSCLE: Primary | ICD-10-CM

## 2018-06-11 PROCEDURE — 11055 PARING/CUTG B9 HYPRKER LES 1: CPT | Mod: ,,, | Performed by: FAMILY MEDICINE

## 2018-06-11 PROCEDURE — 99212 OFFICE O/P EST SF 10 MIN: CPT

## 2018-06-11 PROCEDURE — 99214 OFFICE O/P EST MOD 30 MIN: CPT | Mod: 25,,, | Performed by: FAMILY MEDICINE

## 2018-06-12 ENCOUNTER — ANESTHESIA EVENT (OUTPATIENT)
Dept: SURGERY | Facility: HOSPITAL | Age: 58
End: 2018-06-12
Payer: MEDICAID

## 2018-06-12 NOTE — ANESTHESIA PREPROCEDURE EVALUATION
06/12/2018  Carlos Cifuentes is a 57 y.o., male.  Past Medical History:   Diagnosis Date    Arthritis     Back pain     Bulging lumbar disc     C. difficile diarrhea     Chronic back pain 10/14/2014    Diabetes mellitus     Diabetes mellitus type II     DM (diabetes mellitus), type 2, uncontrolled 3/12/2013    Hepatitis C 7/3/2013    MSSA (methicillin susceptible Staphylococcus aureus) septicemia     Neuromuscular disorder     Neuropathy     Staph aureus infection        Anesthesia Evaluation    I have reviewed the Patient Summary Reports.    I have reviewed the Nursing Notes.   I have reviewed the Medications.     Review of Systems  Anesthesia Hx:  History of prior surgery of interest to airway management or planning: Previous anesthesia: General Personal Hx of Anesthesia complications   Social:  Former Smoker 1 PPD, quit several months ago   Cardiovascular:   Exercise tolerance: good Hypertension Denies MI.  Denies CAD.       Pulmonary:  Pulmonary Normal    Renal/:   Chronic Renal Disease, CRI    Hepatic/GI:   GERD, poorly controlled Liver Disease, Hepatitis, C Poorly controlled GERD- reports must stay HOB elevated ~ 30 degrees or experiences passive reflux   Musculoskeletal:  Spine Disorders: lumbar    Endocrine:   Diabetes, type 2    Patient reports decrease saturations and difficulty breathing post op last surgery. States he required O2 and a breathing treatment.    Physical Exam  General:  Well nourished    Airway/Jaw/Neck:  Airway Findings: Mouth Opening: Normal Tongue: Normal  General Airway Assessment: Adult  Mallampati: III  Improves to II with phonation.  TM Distance: 4 - 6 cm  Jaw/Neck Findings:  Neck ROM: Extension Decreased, Mod.      Dental:  Dental Findings: In tact   Chest/Lungs:  Chest/Lungs Findings: Normal Respiratory Rate     Heart/Vascular:  Heart Findings: Rate: Normal   Rhythm: Regular Rhythm        Mental Status:  Mental Status Findings:  Cooperative, Alert and Oriented         Anesthesia Plan  Type of Anesthesia, risks & benefits discussed:  Anesthesia Type:  general, MAC  Patient's Preference:   Intra-op Monitoring Plan:   Intra-op Monitoring Plan Comments:   Post Op Pain Control Plan:   Post Op Pain Control Plan Comments:   Induction:   IV  Beta Blocker:  Patient is not currently on a Beta-Blocker (No further documentation required).       Informed Consent: Patient understands risks and agrees with Anesthesia plan.  Questions answered. Anesthesia consent signed with patient.  ASA Score: 3     Day of Surgery Review of History & Physical:    H&P update referred to the surgeon.         Ready For Surgery From Anesthesia Perspective.

## 2018-06-12 NOTE — PRE-PROCEDURE INSTRUCTIONS
Preop instructions: NPO after midnight, shower instructions, directions, leave all valuables at home, medication instructions for PM prior & am of procedure explained. Patient stated an understanding.     Patient reports decrease saturations and difficulty breathing post op last surgery. States he required O2 and a breathing treatment.   Patient does not know arrival time. Explained that this information comes from the surgeons office and if they have not heard from them by 3pm tomorrow, to call office. Patient stated an understanding.

## 2018-06-13 ENCOUNTER — TELEPHONE (OUTPATIENT)
Dept: VASCULAR SURGERY | Facility: CLINIC | Age: 58
End: 2018-06-13

## 2018-06-14 ENCOUNTER — SURGERY (OUTPATIENT)
Age: 58
End: 2018-06-14

## 2018-06-14 ENCOUNTER — HOSPITAL ENCOUNTER (OUTPATIENT)
Facility: HOSPITAL | Age: 58
Discharge: HOME | End: 2018-06-14
Attending: SURGERY | Admitting: SURGERY
Payer: MEDICAID

## 2018-06-14 ENCOUNTER — ANESTHESIA (OUTPATIENT)
Dept: SURGERY | Facility: HOSPITAL | Age: 58
End: 2018-06-14
Payer: MEDICAID

## 2018-06-14 VITALS
RESPIRATION RATE: 18 BRPM | HEIGHT: 69 IN | OXYGEN SATURATION: 100 % | SYSTOLIC BLOOD PRESSURE: 108 MMHG | HEART RATE: 60 BPM | WEIGHT: 229 LBS | BODY MASS INDEX: 33.92 KG/M2 | DIASTOLIC BLOOD PRESSURE: 71 MMHG | TEMPERATURE: 98 F

## 2018-06-14 DIAGNOSIS — S81.802S NON-HEALING WOUND OF LOWER EXTREMITY, LEFT, SEQUELA: ICD-10-CM

## 2018-06-14 DIAGNOSIS — E11.621 DIABETIC ULCER OF LEFT MIDFOOT ASSOCIATED WITH TYPE 2 DIABETES MELLITUS, UNSPECIFIED ULCER STAGE: ICD-10-CM

## 2018-06-14 DIAGNOSIS — L97.429 DIABETIC ULCER OF LEFT MIDFOOT ASSOCIATED WITH TYPE 2 DIABETES MELLITUS, UNSPECIFIED ULCER STAGE: ICD-10-CM

## 2018-06-14 DIAGNOSIS — S81.802A NON-HEALING WOUND OF LOWER EXTREMITY, LEFT, INITIAL ENCOUNTER: ICD-10-CM

## 2018-06-14 DIAGNOSIS — I73.9 PERIPHERAL ARTERIAL DISEASE: Primary | ICD-10-CM

## 2018-06-14 PROBLEM — S81.809A NON-HEALING WOUND OF LOWER EXTREMITY: Status: ACTIVE | Noted: 2018-06-14

## 2018-06-14 LAB
POCT GLUCOSE: 119 MG/DL (ref 70–110)
POCT GLUCOSE: 124 MG/DL (ref 70–110)

## 2018-06-14 PROCEDURE — 63600175 PHARM REV CODE 636 W HCPCS: Performed by: NURSE ANESTHETIST, CERTIFIED REGISTERED

## 2018-06-14 PROCEDURE — C1894 INTRO/SHEATH, NON-LASER: HCPCS | Performed by: SURGERY

## 2018-06-14 PROCEDURE — 82962 GLUCOSE BLOOD TEST: CPT | Performed by: SURGERY

## 2018-06-14 PROCEDURE — 36000707: Performed by: SURGERY

## 2018-06-14 PROCEDURE — 25500020 PHARM REV CODE 255: Performed by: SURGERY

## 2018-06-14 PROCEDURE — 36000706: Performed by: SURGERY

## 2018-06-14 PROCEDURE — D9220A PRA ANESTHESIA: Mod: ANES,,, | Performed by: ANESTHESIOLOGY

## 2018-06-14 PROCEDURE — 37000008 HC ANESTHESIA 1ST 15 MINUTES: Performed by: SURGERY

## 2018-06-14 PROCEDURE — 25000003 PHARM REV CODE 250: Performed by: SURGERY

## 2018-06-14 PROCEDURE — C1760 CLOSURE DEV, VASC: HCPCS | Performed by: SURGERY

## 2018-06-14 PROCEDURE — S0028 INJECTION, FAMOTIDINE, 20 MG: HCPCS | Performed by: NURSE ANESTHETIST, CERTIFIED REGISTERED

## 2018-06-14 PROCEDURE — D9220A PRA ANESTHESIA: Mod: CRNA,,, | Performed by: NURSE ANESTHETIST, CERTIFIED REGISTERED

## 2018-06-14 PROCEDURE — 71000016 HC POSTOP RECOV ADDL HR: Performed by: SURGERY

## 2018-06-14 PROCEDURE — 63600175 PHARM REV CODE 636 W HCPCS: Performed by: SURGERY

## 2018-06-14 PROCEDURE — 63600175 PHARM REV CODE 636 W HCPCS: Performed by: STUDENT IN AN ORGANIZED HEALTH CARE EDUCATION/TRAINING PROGRAM

## 2018-06-14 PROCEDURE — 71000015 HC POSTOP RECOV 1ST HR: Performed by: SURGERY

## 2018-06-14 PROCEDURE — 37000009 HC ANESTHESIA EA ADD 15 MINS: Performed by: SURGERY

## 2018-06-14 PROCEDURE — 25000003 PHARM REV CODE 250: Performed by: NURSE ANESTHETIST, CERTIFIED REGISTERED

## 2018-06-14 PROCEDURE — 36200 PLACE CATHETER IN AORTA: CPT | Mod: ,,, | Performed by: SURGERY

## 2018-06-14 PROCEDURE — C1769 GUIDE WIRE: HCPCS | Performed by: SURGERY

## 2018-06-14 PROCEDURE — 25000003 PHARM REV CODE 250: Performed by: STUDENT IN AN ORGANIZED HEALTH CARE EDUCATION/TRAINING PROGRAM

## 2018-06-14 PROCEDURE — 75710 ARTERY X-RAYS ARM/LEG: CPT | Mod: 26,,, | Performed by: SURGERY

## 2018-06-14 RX ORDER — HEPARIN SODIUM 1000 [USP'U]/ML
INJECTION, SOLUTION INTRAVENOUS; SUBCUTANEOUS
Status: DISCONTINUED | OUTPATIENT
Start: 2018-06-14 | End: 2018-06-14 | Stop reason: HOSPADM

## 2018-06-14 RX ORDER — SODIUM CHLORIDE 9 MG/ML
INJECTION, SOLUTION INTRAVENOUS CONTINUOUS
Status: DISCONTINUED | OUTPATIENT
Start: 2018-06-14 | End: 2018-06-14 | Stop reason: HOSPADM

## 2018-06-14 RX ORDER — CEFAZOLIN SODIUM 1 G/3ML
2 INJECTION, POWDER, FOR SOLUTION INTRAMUSCULAR; INTRAVENOUS
Status: COMPLETED | OUTPATIENT
Start: 2018-06-14 | End: 2018-06-14

## 2018-06-14 RX ORDER — OXYCODONE HYDROCHLORIDE 5 MG/1
10 TABLET ORAL ONCE
Status: DISCONTINUED | OUTPATIENT
Start: 2018-06-14 | End: 2018-06-14 | Stop reason: HOSPADM

## 2018-06-14 RX ORDER — GLYCOPYRROLATE 0.2 MG/ML
INJECTION INTRAMUSCULAR; INTRAVENOUS
Status: DISCONTINUED | OUTPATIENT
Start: 2018-06-14 | End: 2018-06-14

## 2018-06-14 RX ORDER — LIDOCAINE HYDROCHLORIDE 10 MG/ML
INJECTION, SOLUTION EPIDURAL; INFILTRATION; INTRACAUDAL; PERINEURAL
Status: DISCONTINUED | OUTPATIENT
Start: 2018-06-14 | End: 2018-06-14 | Stop reason: HOSPADM

## 2018-06-14 RX ORDER — FENTANYL CITRATE 50 UG/ML
INJECTION, SOLUTION INTRAMUSCULAR; INTRAVENOUS
Status: DISCONTINUED | OUTPATIENT
Start: 2018-06-14 | End: 2018-06-14

## 2018-06-14 RX ORDER — LIDOCAINE HYDROCHLORIDE 10 MG/ML
1 INJECTION, SOLUTION EPIDURAL; INFILTRATION; INTRACAUDAL; PERINEURAL ONCE
Status: COMPLETED | OUTPATIENT
Start: 2018-06-14 | End: 2018-06-14

## 2018-06-14 RX ORDER — MIDAZOLAM HYDROCHLORIDE 1 MG/ML
INJECTION, SOLUTION INTRAMUSCULAR; INTRAVENOUS
Status: DISCONTINUED | OUTPATIENT
Start: 2018-06-14 | End: 2018-06-14

## 2018-06-14 RX ORDER — ONDANSETRON 2 MG/ML
INJECTION INTRAMUSCULAR; INTRAVENOUS
Status: DISCONTINUED | OUTPATIENT
Start: 2018-06-14 | End: 2018-06-14

## 2018-06-14 RX ORDER — IODIXANOL 320 MG/ML
INJECTION, SOLUTION INTRAVASCULAR
Status: DISCONTINUED | OUTPATIENT
Start: 2018-06-14 | End: 2018-06-14 | Stop reason: HOSPADM

## 2018-06-14 RX ORDER — FAMOTIDINE 10 MG/ML
INJECTION INTRAVENOUS
Status: DISCONTINUED | OUTPATIENT
Start: 2018-06-14 | End: 2018-06-14

## 2018-06-14 RX ORDER — LIDOCAINE HYDROCHLORIDE 10 MG/ML
1 INJECTION, SOLUTION EPIDURAL; INFILTRATION; INTRACAUDAL; PERINEURAL ONCE
Status: DISCONTINUED | OUTPATIENT
Start: 2018-06-14 | End: 2018-06-14 | Stop reason: HOSPADM

## 2018-06-14 RX ORDER — KETAMINE HYDROCHLORIDE 10 MG/ML
INJECTION, SOLUTION INTRAMUSCULAR; INTRAVENOUS
Status: DISCONTINUED | OUTPATIENT
Start: 2018-06-14 | End: 2018-06-14

## 2018-06-14 RX ORDER — SODIUM CHLORIDE 0.9 % (FLUSH) 0.9 %
3 SYRINGE (ML) INJECTION
Status: DISCONTINUED | OUTPATIENT
Start: 2018-06-14 | End: 2018-06-14 | Stop reason: HOSPADM

## 2018-06-14 RX ADMIN — KETAMINE HYDROCHLORIDE 10 MG: 10 INJECTION, SOLUTION INTRAMUSCULAR; INTRAVENOUS at 08:06

## 2018-06-14 RX ADMIN — MIDAZOLAM HYDROCHLORIDE 2 MG: 1 INJECTION, SOLUTION INTRAMUSCULAR; INTRAVENOUS at 08:06

## 2018-06-14 RX ADMIN — IODIXANOL 20 ML: 320 INJECTION, SOLUTION INTRAVASCULAR at 09:06

## 2018-06-14 RX ADMIN — LIDOCAINE HYDROCHLORIDE 16 ML: 10 INJECTION, SOLUTION EPIDURAL; INFILTRATION; INTRACAUDAL; PERINEURAL at 09:06

## 2018-06-14 RX ADMIN — MIDAZOLAM HYDROCHLORIDE 1 MG: 1 INJECTION, SOLUTION INTRAMUSCULAR; INTRAVENOUS at 08:06

## 2018-06-14 RX ADMIN — ONDANSETRON 4 MG: 2 INJECTION INTRAMUSCULAR; INTRAVENOUS at 09:06

## 2018-06-14 RX ADMIN — MIDAZOLAM HYDROCHLORIDE 1 MG: 1 INJECTION, SOLUTION INTRAMUSCULAR; INTRAVENOUS at 09:06

## 2018-06-14 RX ADMIN — FENTANYL CITRATE 25 MCG: 50 INJECTION, SOLUTION INTRAMUSCULAR; INTRAVENOUS at 08:06

## 2018-06-14 RX ADMIN — SODIUM CHLORIDE: 0.9 INJECTION, SOLUTION INTRAVENOUS at 08:06

## 2018-06-14 RX ADMIN — FAMOTIDINE 20 MG: 10 INJECTION, SOLUTION INTRAVENOUS at 08:06

## 2018-06-14 RX ADMIN — HEPARIN SODIUM 10000 UNITS: 1000 INJECTION, SOLUTION INTRAVENOUS; SUBCUTANEOUS at 09:06

## 2018-06-14 RX ADMIN — CEFAZOLIN 2 G: 330 INJECTION, POWDER, FOR SOLUTION INTRAMUSCULAR; INTRAVENOUS at 08:06

## 2018-06-14 RX ADMIN — LIDOCAINE HYDROCHLORIDE: 10 INJECTION, SOLUTION EPIDURAL; INFILTRATION; INTRACAUDAL; PERINEURAL at 08:06

## 2018-06-14 RX ADMIN — GLYCOPYRROLATE 0.2 MG: 0.2 INJECTION, SOLUTION INTRAMUSCULAR; INTRAVENOUS at 08:06

## 2018-06-14 NOTE — TRANSFER OF CARE
"Anesthesia Transfer of Care Note    Patient: Carlos Cifuentes Jr.    Procedure(s) Performed: Procedure(s) (LRB):  Angiogram LEFT LOWER Extremity with access from right side (Left)    Patient location: [facility]    Anesthesia Type: MAC    Transport from OR: Transported from OR on room air with adequate spontaneous ventilation    Post pain: adequate analgesia    Post assessment: no apparent anesthetic complications and tolerated procedure well    Post vital signs: stable    Level of consciousness: awake, alert and oriented    Nausea/Vomiting: no nausea/vomiting    Complications: none    Transfer of care protocol was followed      Last vitals:   Visit Vitals  /76 (BP Location: Right arm, Patient Position: Lying)   Pulse 71   Temp 36.7 °C (98 °F) (Oral)   Resp 16   Ht 5' 9" (1.753 m)   Wt 103.9 kg (229 lb)   SpO2 100%   BMI 33.82 kg/m²     "

## 2018-06-14 NOTE — PLAN OF CARE
Discharge instructions given and explained to patient and family by Jennie Barrett RN with verbalization of understanding all instructions. Prescription given and explained next time and doses of each medication. Patients v/s stable, denies n/v and tolerating po, rates pain level tolerable, IV removed, and family at bedside for patient discharge home.

## 2018-06-14 NOTE — BRIEF OP NOTE
Brief Operative Note  Date: 06/14/2018    Surgeon(s) and Role:     * Sony Srinivasan MD - Primary    Pre-op Diagnosis:  Non-healing wound of lower extremity, left, sequela [S81.802S]; open wound with necrosis to bone, left foot    Post-op Diagnosis:  Same    Procedure(s):  1) LLE Angiogram  2) US guided access right common femoral artery    Surgeon: Sony Srinivasan MD  Vascular & Endovascular Surgery       Assistant: AGUSTIN Prasad MD    Anesthesia: Local MAC    Findings/Key Components:  Inline flow to distal stump of left foot (s/p Chopart amputation with severe wound breakdown and chronic infection to level of bone).  Mild stenoses of tibial vessels.  Patient will require left BKA.      EBL: Minimal         Specimens     None          I attest to being present for the procedure and performing the case.  Sony Srinivasan MD  Vascular & Endovascular Surgery     Discharge Note  SUMMARY    Admit Date: 6/14/2018    Attending Physician: Sony Srinivasan MD  Vascular & Endovascular Surgery       Discharge Physician: Sony Srinivasan MD  Vascular & Endovascular Surgery       Discharge Date: 06/14/2018    Final Diagnosis: Non-healing wound of lower extremity, left, sequela [S81.802S]    Disposition: Home or self-care    Patient Instructions:   Current Discharge Medication List      CONTINUE these medications which have NOT CHANGED    Details   clopidogrel (PLAVIX) 75 mg tablet Take 1 tablet (75 mg total) by mouth once daily.  Qty: 30 tablet, Refills: 3    Associated Diagnoses: Coronary artery disease involving native artery of transplanted heart without angina pectoris      diazePAM (VALIUM) 5 MG tablet Take 1 tablet (5 mg total) by mouth every 12 (twelve) hours as needed for Anxiety.  Qty: 60 tablet, Refills: 3    Associated Diagnoses: Anxiety disorder due to general medical condition      gabapentin (NEURONTIN) 100 MG capsule Take 1 capsule (100 mg total) by mouth 3 (three) times daily.  Qty: 90 capsule, Refills: 11  "   Associated Diagnoses: Diabetic polyneuropathy associated with type 2 diabetes mellitus      glimepiride (AMARYL) 2 MG tablet Take 1 tablet (2 mg total) by mouth every morning.  Qty: 30 tablet, Refills: 11    Associated Diagnoses: DM type 2, uncontrolled, with neuropathy      lactulose (CHRONULAC) 10 gram/15 mL solution Take 45 mLs (30 g total) by mouth 2 (two) times daily.  Qty: 600 mL, Refills: 2    Associated Diagnoses: Slow transit constipation      LANTUS SOLOSTAR 100 unit/mL (3 mL) InPn pen Inject 30 Units into the skin every evening.  Qty: 15 mL, Refills: 3    Associated Diagnoses: DM type 2, uncontrolled, with neuropathy      metFORMIN (GLUCOPHAGE) 1000 MG tablet Take 0.5 tablets (500 mg total) by mouth 2 (two) times daily with meals. Take 1 tablet (1,000 mg total) by mouth 2 (two) times daily.  Qty: 90 tablet, Refills: 3    Associated Diagnoses: DM type 2, uncontrolled, with neuropathy      nicotine (NICODERM CQ) 14 mg/24 hr Place 1 patch onto the skin once daily.  Qty: 30 patch, Refills: 6    Associated Diagnoses: Smoking      oxyCODONE (ROXICODONE) 10 mg Tab immediate release tablet Take 1 tablet (10 mg total) by mouth every 8 (eight) hours as needed for Pain.  Qty: 90 tablet, Refills: 0    Associated Diagnoses: Chronic bilateral low back pain without sciatica      pantoprazole (PROTONIX) 40 MG tablet Take 1 tablet (40 mg total) by mouth once daily.  Qty: 90 tablet, Refills: 3    Associated Diagnoses: GERD without esophagitis      lidocaine HCL 2% (XYLOCAINE) 2 % jelly Apply to affected area daily prn  Qty: 120 mL, Refills: 3    Associated Diagnoses: Left foot pain      lisinopril (PRINIVIL,ZESTRIL) 5 MG tablet Take 1 tablet (5 mg total) by mouth once daily.  Qty: 90 tablet, Refills: 3    Associated Diagnoses: Essential hypertension      syringe with needle 3 mL 23 gauge x 1 1/2" Syrg 1 Device by Misc.(Non-Drug; Combo Route) route every 7 days.  Qty: 100 Syringe, Refills: 6    Associated Diagnoses: " Hypogonadism in male      TRUE METRIX GLUCOSE METER Misc AS DIRECTED  Refills: 0      TRUE METRIX GLUCOSE TEST STRIP Strp Twice daily blood sugar checks  Qty: 100 strip, Refills: 9    Associated Diagnoses: DM type 2, uncontrolled, with neuropathy             Diet:  Resume pre-operative diet    Activity:  Ad russell    Follow-up:  Follow-up in clinic with Dr Srinivasan within 1-2 weeks; please call clinic nurse at

## 2018-06-14 NOTE — H&P (VIEW-ONLY)
Carlos Darron Cifuentes  06/06/2018    HPI:  Patient is a 57 y.o. male with a h/o uncontrolled DM2 (H1c 9) who is here today for evaluation of non-healing left foot wound s/o TMA last month by ortho. He has a history of right great toe osteo with intervention by Dr. Edmonds last year. 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 This has opened and he is not healing, he is followed by wound care, but was referred to vascular clinic to see if anything can be done to improve his healing and avoid a BKA.     He has apparently been undergoing weekly wound care and out patient debridements.      S/p    4/8/17 RLE Angio SFA stent 6x30 Alter Eco, PTA AT 2.0x80mm      no MI/stroke  Tobacco use: 1/2 pack/day x 40yrs now stopped     Past Medical History:   Diagnosis Date    Arthritis     Back pain     Bulging lumbar disc     C. difficile diarrhea     Chronic back pain 10/14/2014    Diabetes mellitus     Diabetes mellitus type II     DM (diabetes mellitus), type 2, uncontrolled 3/12/2013    Hepatitis C 7/3/2013    MSSA (methicillin susceptible Staphylococcus aureus) septicemia     Neuromuscular disorder     Neuropathy     Staph aureus infection      Past Surgical History:   Procedure Laterality Date    APPENDECTOMY      JOINT REPLACEMENT      left knee surgery      left leg surgery      broken bone    LEG SURGERY  right     Right arm boil      Right great toe amputation      TOE AMPUTATION Right      Family History   Problem Relation Age of Onset    Arthritis Mother     Arthritis Sister     Diabetes Sister     Arthritis Brother      Social History     Social History    Marital status: Single     Spouse name: N/A    Number of children: N/A    Years of education: N/A     Occupational History    Not on file.     Social History Main Topics    Smoking status: Current Every Day Smoker     Packs/day: 0.50      Types: Cigarettes    Smokeless tobacco: Never Used    Alcohol use No    Drug use: No    Sexual activity: No     Other Topics Concern    Not on file     Social History Narrative    ** Merged History Encounter **            Current Outpatient Prescriptions:     atorvastatin (LIPITOR) 80 MG tablet, Take 1 tablet (80 mg total) by mouth once daily., Disp: 90 tablet, Rfl: 3    ciprofloxacin HCl (CIPRO) 750 MG tablet, Take 1 tablet (750 mg total) by mouth every 12 (twelve) hours., Disp: 28 tablet, Rfl: 0    clopidogrel (PLAVIX) 75 mg tablet, Take 1 tablet (75 mg total) by mouth once daily., Disp: 30 tablet, Rfl: 3    diazePAM (VALIUM) 5 MG tablet, Take 1 tablet (5 mg total) by mouth every 12 (twelve) hours as needed for Anxiety., Disp: 60 tablet, Rfl: 3    docusate sodium (COLACE) 100 MG capsule, Take 1 capsule (100 mg total) by mouth as needed for Constipation., Disp: , Rfl:     gabapentin (NEURONTIN) 100 MG capsule, Take 1 capsule (100 mg total) by mouth 3 (three) times daily., Disp: 90 capsule, Rfl: 11    glimepiride (AMARYL) 2 MG tablet, Take 1 tablet (2 mg total) by mouth every morning., Disp: 30 tablet, Rfl: 11    lactulose (CHRONULAC) 10 gram/15 mL solution, Take 45 mLs (30 g total) by mouth 2 (two) times daily., Disp: 600 mL, Rfl: 2    LANTUS SOLOSTAR 100 unit/mL (3 mL) InPn pen, Inject 30 Units into the skin every evening., Disp: 15 mL, Rfl: 3    lidocaine HCL 2% (XYLOCAINE) 2 % jelly, Apply to affected area daily prn, Disp: 120 mL, Rfl: 3    lisinopril (PRINIVIL,ZESTRIL) 5 MG tablet, Take 1 tablet (5 mg total) by mouth once daily., Disp: 90 tablet, Rfl: 3    metFORMIN (GLUCOPHAGE) 1000 MG tablet, Take 0.5 tablets (500 mg total) by mouth 2 (two) times daily with meals. Take 1 tablet (1,000 mg total) by mouth 2 (two) times daily., Disp: 90 tablet, Rfl: 3    nicotine (NICODERM CQ) 14 mg/24 hr, Place 1 patch onto the skin once daily., Disp: 30 patch, Rfl: 6    oxyCODONE (ROXICODONE) 10 mg Tab  "immediate release tablet, Take 1 tablet (10 mg total) by mouth every 8 (eight) hours as needed for Pain., Disp: 90 tablet, Rfl: 0    pantoprazole (PROTONIX) 40 MG tablet, Take 1 tablet (40 mg total) by mouth once daily., Disp: 90 tablet, Rfl: 3    PIPERACILLIN SODIUM/TAZOBACTAM (PIPERACILLIN-TAZOBACTAM 4.5G/100ML SODIUM CHLORIDE 0.9%-READY TO MIX), Inject 100 mLs (4.5 g total) into the vein every 8 (eight) hours., Disp: 1 mL, Rfl: 0    potassium chloride SA (K-DUR,KLOR-CON) 10 MEQ tablet, Take 1 tablet (10 mEq total) by mouth once daily as needed when experiencing leg/muscle cramps., Disp: 30 tablet, Rfl: 5    pregabalin (LYRICA) 75 MG capsule, Take 1 capsule (75 mg total) by mouth 2 (two) times daily., Disp: 60 capsule, Rfl: 11    sodium chloride 0.9% SolP 250 mL with vancomycin 1,000 mg SolR 1,250 mg, Inject 1,250 mg into the vein once daily., Disp: 250 mL, Rfl: 0    syringe with needle 3 mL 23 gauge x 1 1/2" Syrg, 1 Device by Misc.(Non-Drug; Combo Route) route every 7 days., Disp: 100 Syringe, Rfl: 6    testosterone cypionate (DEPOTESTOTERONE CYPIONATE) 200 mg/mL injection, Inject 0.5 mLs (100 mg total) into the muscle every 7 days., Disp: 10 mL, Rfl: 3    traZODone (DESYREL) 100 MG tablet, Take 1 tablet (100 mg total) by mouth every evening., Disp: 90 tablet, Rfl: 1    TRUE METRIX GLUCOSE METER Misc, AS DIRECTED, Disp: , Rfl: 0    TRUE METRIX GLUCOSE TEST STRIP Strp, Twice daily blood sugar checks, Disp: 100 strip, Rfl: 9    aspirin (ECOTRIN) 81 MG EC tablet, Take 1 tablet (81 mg total) by mouth once daily., Disp: , Rfl: 0    REVIEW OF SYSTEMS:  General: negative; ENT: negative; Allergy and Immunology: negative; Hematological and Lymphatic: Negative; Endocrine: negative; Respiratory: no cough, shortness of breath, or wheezing; Cardiovascular: no chest pain or dyspnea on exertion; Gastrointestinal: no abdominal pain/back, change in bowel habits, or bloody stools; Genito-Urinary: no dysuria, trouble " voiding, or hematuria; Musculoskeletal: negative  Neurological: no TIA or stroke symptoms; Psychiatric: no nervousness, anxiety or depression.    PHYSICAL EXAM:   Right Arm BP - Sittin/76 (18 1021)  Left Arm BP - Sittin/82 (18 1021)  Pulse: 84  Temp: 98.1 °F (36.7 °C)      General appearance:  Alert, well-appearing, and in no distress.  Oriented to person, place, and time   Neurological: Normal speech, no focal findings noted; CN II - XII grossly intact           Musculoskeletal: Digits/nail without cyanosis/clubbing.  Normal muscle strength/tone.                 Neck: Supple, no significant adenopathy; thyroid is not enlarged                  No carotid bruit can be auscultated                Chest:  Clear to auscultation, no wheezes, rales or rhonchi, symmetric air entry     No use of accessory muscles             Cardiac: Normal rate and regular rhythm, S1 and S2 normal; PMI non-displaced          Abdomen: Soft, nontender, nondistended, no masses or organomegaly     No rebound tenderness noted; bowel sounds normal     Pulsatile aortic mass is not palpable.     No groin adenopathy      Extremities:   2+ femoral pulses bilaterally.  Non-palpable R popliteal pulse     +1 pedal pulses palpable on Right     Biphasic DP and PT on Left      Mild pre-tibial edema on left ankle     R foot s/p Chopart amputation:  Total dehiscence of would with dry gangrenous changes to wound edges and stump.  Copious fibrinous exudate throughout wound.  Wound is deep to level of the bone.  Not malodorous.      LAB RESULTS:  Lab Results   Component Value Date    K 4.2 2018    K 4.6 2018    K 4.6 2018    CREATININE 1.2 2018    CREATININE 1.3 2018    CREATININE 1.2 2018     Lab Results   Component Value Date    WBC 9.55 2018    WBC 5.74 2018    WBC 6.9 2018    HCT 40.3 2018    HCT 39.9 (L) 2018    HCT 42.8 2018     2018      02/19/2018     (L) 01/19/2018     Lab Results   Component Value Date    HGBA1C 9.1 (H) 03/19/2018    HGBA1C 11.2 (H) 01/19/2018    HGBA1C 9.2 (H) 06/25/2017     IMAGING:  Lower Extremities Segmental Pressure [mmHg]:                    Right             Left  _______________________________________________________________  Brachial          131               120  Low Thigh         212               246  Calf              184               140  Posterior Tibial  147               124  Dorsalis Pedis    184               147  LAILA (Post. Tib.)  1.12              0.95  LAILA (Dors. Ped.)  1.40              1.12    The presence of artifactually elevated pressures in arteries of the lower extremity renders all pressure measurements unreliable due to suspected medial calcinosis.    Report Summary:  Impression:   Right Leg:Segmental pressures and PVR waveforms suggest mild peripheral arterial occlusive disease.     Left Leg:Segmental pressures and PVR waveforms suggest mild peripheral arterial occlusive disease.        IMP/PLAN:  57 y.o. male with non-healing, dehisced, gangrenous (dry) skin edges.  His wound is deep, severe, non-healing and chances of healing are extremely low.  His chances of ambulating on his right foot are extremely low.  His risk for major amputation (R BKA) are high, in fact, I have recommended this to the patient who vehemently refuses.  I will perform a RLE angiogram mainly to optimize BKA wound healing given non-palpable R popliteal pulse.  I discussed the critical nature of the patient's wound and potential options with the patient for 45 minutes.  His wound represents a potentially life-threatening catastrophe if it becomes super-infected and is neglected.  He understands that BKA is likely inevitable.  His overall health and ambulatory ability will be greatly improved with an amputation.     1) plan for LLE angio possible intervention  2) Risks, benefits, and alternatives were discussed with  the patient, and full informed consent was obtained  3) will schedule R BKA thereafter    Sony Srinivasan MD  Vascular & Endovascular Surgery

## 2018-06-14 NOTE — DISCHARGE INSTRUCTIONS
PATIENT INSTRUCTIONS  POST-ANESTHESIA    IMMEDIATELY FOLLOWING SURGERY:  Do not drive or operate machinery for the first twenty four hours after surgery.  Do not make any important decisions for twenty four hours after surgery or while taking narcotic pain medications or sedatives.  If you develop intractable nausea and vomiting or a severe headache please notify your doctor immediately.    FOLLOW-UP:  Please make an appointment with your surgeon as instructed. You do not need to follow up with anesthesia unless specifically instructed to do so.    QUESTIONS?:  Please feel free to call your physician or the hospital  if you have any questions, and they will be happy to assist you.         Peripheral Angiography  Peripheral angiography is an outpatient procedure that makes a map of the vessels (arteries) in your lower body, legs, and arms, using X-ray and dye.This map can show where blood flow may be blocked.  Talk with your healthcare provider about the risks and complications of angiography.   Before the procedure  Prepare for the peripheral angiography as follows:   · Tell your healthcare provider about all medicines you take and any allergies you may have.  · Follow any directions youre given for not eating or drinking before the procedure. If your provider says to take your normal medicines, swallow them with only small sips of water.  · Arrange for a family member or friend to drive you home.  During the procedure  Here is what to expect:  You may get medicine through an IV (intravenous) line to relax you. Youre given an injection to numb the insertion site. Then, a tiny skin cut (incision) is made near an artery in your groin.  · Your provider inserts a thin tube (catheter) through the incision. He or she then threads the catheter into an artery while looking at a video monitor.  · Contrast dye is injected into the catheter to confirm position. You may feel warmth or pressure in your legs and  back. You lie still as X-rays are taken. The catheter is then taken out.  After the procedure  Youll be taken to a recovery area. A healthcare provider will apply pressure to the site for about 10 minutes. Your healthcare provider will tell you how long to lie down and keep the insertion site still. Your healthcare provider will discuss the results with you soon after the procedure.  Back at home  On the day you get home, dont drive, dont exercise, avoid walking and taking stairs, and avoid bending and lifting. Your healthcare provider may give you other care instructions.  Call your healthcare provider  Call your healthcare provider right away if:  · You notice a lump or bleeding at the insertion site  · You feel pain at the insertion site  · You become lightheaded or dizzy  · You have leg pain or numbness  · You do not urinate in 8 hours    Date Last Reviewed: 5/1/2016  © 9355-9143 [employer]. [address] [place] [address], [place] [address]. All rights reserved. This information is not intended as a substitute for professional medical care. Always follow your healthcare professional's instructions.

## 2018-06-14 NOTE — OP NOTE
Date: 06/14/2018    Surgeon(s) and Role:     * Sony Srinivasan MD - Primary     Pre-op Diagnosis:  Non-healing wound of lower extremity, left, sequela [S81.242S]; open wound with necrosis to bone, left foot     Post-op Diagnosis:  Same     Procedure(s):  1) LLE Angiogram  2) US guided access right common femoral artery     Surgeon: Sony Srinivasan MD  Vascular & Endovascular Surgery      Assistant: AGUSTIN Prasad MD     Anesthesia: Local MAC     Findings/Key Components:  Inline flow to distal stump of left foot (s/p Chopart amputation with severe wound breakdown and chronic infection to level of bone).  Mild stenoses of tibial vessels.  Patient will require left BKA.       EBL: Minimal    Complications:  None; patient tolerated the procedure well.    Disposition: Recoery- hemodynamically stable in good condition    Description of Procedure:  After an informed consent was obtained the patient was brought to the hybrid operating room and placed in the supine position. Both the patient and procedure were confirmed and identified during timeout process. The patient received perioperative antibiotics. The patient's bilateral groins were prepped and draped in usual sterile fashion. Using ultrasound guidance, the right common femoral artery vessel patency was confirmed. Then direct ultrasound guidance the right CFA was entered with a 21-G needle, Mandril wire and 4-Fr micropuncture needle. Then under fluoroscopic guidance, an 0.035-in wire was placed into the distal aorta. The micropuncture dilator was then exchanged over the wire for a 5-[place] sheath. Sheathogram demonstrated access in the CFA. Next a Omni flush catheter was placed over the wire and into the distal aorta under fluoroscopy and an aortogram and left leg angiogram was performed which demonstrated:    Aorto-iliac vessels: patent   Left Common femoral, profunda femoral arteries: patent  Left Superficial femoral artery: patent  Left Popliteal artery: patent    Tibials: high take off of PTA at the level of the knee with mild diffuse disease but brisk inline flow to foot, peroneal originating from MARYURI and some proximal stenosis but flow down to ankle, MARYURI with short segment proximal occlusion with reconstitution and good flow down to foot    Based on the images from this diagnostic angio, decision was made not to intervene.     We then deployed a 5-Citizen of [place] Mynx closure device without issue. At the conclusion of the case the patient was noted to have no evidence of a groin hematoma. All instrument and sponge counts were correct at the end of the case. The patient tolerated the procedure well and was transferred to the pacu for further recovery.     Jaime Prasad MD  Vascular/Endovascular Surgery Fellow

## 2018-06-15 NOTE — ANESTHESIA POSTPROCEDURE EVALUATION
"Anesthesia Post Evaluation    Patient: Carlos Cifuentes Jr.    Procedure(s) Performed: Procedure(s) (LRB):  Angiogram LEFT LOWER Extremity with US guided access from right side (Left)    Final Anesthesia Type: MAC  Patient location during evaluation: PACU  Patient participation: Yes- Able to Participate  Level of consciousness: awake and alert  Post-procedure vital signs: reviewed and stable  Pain management: adequate  Airway patency: patent  PONV status at discharge: No PONV  Anesthetic complications: no      Cardiovascular status: hemodynamically stable  Respiratory status: unassisted, spontaneous ventilation and room air  Hydration status: euvolemic  Follow-up not needed.        Visit Vitals  /71   Pulse 60   Temp 36.6 °C (97.8 °F) (Temporal)   Resp 18   Ht 5' 9" (1.753 m)   Wt 103.9 kg (229 lb)   SpO2 100%   BMI 33.82 kg/m²       Pain/Jose E Score: Pain Assessment Performed: Yes (6/14/2018  1:15 PM)  Presence of Pain: complains of pain/discomfort (6/14/2018  1:15 PM)  Pain Rating Prior to Med Admin: 8 (6/14/2018  8:01 AM)  Jose E Score: 10 (6/14/2018  1:15 PM)      "

## 2018-06-15 NOTE — ANESTHESIA RELEASE NOTE
"Anesthesia Release from PACU Note    Patient: Carlos Cifuentes JrShantell    Procedure(s) Performed: Procedure(s) (LRB):  Angiogram LEFT LOWER Extremity with US guided access from right side (Left)    Anesthesia type: MAC    Post pain: Adequate analgesia    Post assessment: no apparent anesthetic complications and tolerated procedure well    Last Vitals:   Visit Vitals  /71   Pulse 60   Temp 36.6 °C (97.8 °F) (Temporal)   Resp 18   Ht 5' 9" (1.753 m)   Wt 103.9 kg (229 lb)   SpO2 100%   BMI 33.82 kg/m²       Post vital signs: stable    Level of consciousness: awake    Nausea/Vomiting: no nausea/no vomiting    Complications: none    Airway Patency: patent    Respiratory: unassisted, spontaneous ventilation, room air    Cardiovascular: stable and blood pressure at baseline    Hydration: euvolemic  "

## 2018-06-18 ENCOUNTER — HOSPITAL ENCOUNTER (OUTPATIENT)
Dept: WOUND CARE | Facility: HOSPITAL | Age: 58
Discharge: HOME | End: 2018-06-18
Attending: FAMILY MEDICINE
Payer: MEDICAID

## 2018-06-18 DIAGNOSIS — L97.423 DIABETIC ULCER OF LEFT MIDFOOT ASSOCIATED WITH DIABETES MELLITUS DUE TO UNDERLYING CONDITION, WITH NECROSIS OF MUSCLE: Primary | ICD-10-CM

## 2018-06-18 DIAGNOSIS — E08.621 DIABETIC ULCER OF LEFT MIDFOOT ASSOCIATED WITH DIABETES MELLITUS DUE TO UNDERLYING CONDITION, WITH NECROSIS OF MUSCLE: Primary | ICD-10-CM

## 2018-06-18 DIAGNOSIS — E11.628 TYPE 2 DIABETES MELLITUS WITH DIABETIC FOOT INFECTION: ICD-10-CM

## 2018-06-18 DIAGNOSIS — L08.9 TYPE 2 DIABETES MELLITUS WITH DIABETIC FOOT INFECTION: ICD-10-CM

## 2018-06-18 PROCEDURE — 99213 OFFICE O/P EST LOW 20 MIN: CPT | Performed by: FAMILY MEDICINE

## 2018-06-18 PROCEDURE — 99214 OFFICE O/P EST MOD 30 MIN: CPT | Mod: ,,, | Performed by: FAMILY MEDICINE

## 2018-07-20 PROBLEM — G54.7 PHANTOM LIMB: Status: ACTIVE | Noted: 2018-07-20

## 2018-07-20 PROBLEM — E11.622 DIABETIC ULCER OF LOWER LEG: Status: ACTIVE | Noted: 2018-07-20

## 2018-07-20 PROBLEM — Z89.512 HX OF BKA, LEFT: Status: ACTIVE | Noted: 2018-07-20

## 2018-07-20 PROBLEM — L97.909 DIABETIC ULCER OF LOWER LEG: Status: ACTIVE | Noted: 2018-07-20

## 2018-08-27 PROBLEM — Z09 HOSPITAL DISCHARGE FOLLOW-UP: Status: RESOLVED | Noted: 2018-05-22 | Resolved: 2018-08-27

## 2018-08-29 PROBLEM — Z89.512 S/P BKA (BELOW KNEE AMPUTATION) UNILATERAL, LEFT: Status: ACTIVE | Noted: 2018-08-29

## 2018-09-26 PROBLEM — G54.7 PHANTOM LIMB SYNDROME: Status: ACTIVE | Noted: 2018-09-26

## 2021-07-02 NOTE — PROGRESS NOTES
Advised to call immediately if any worsening distortion or blurring is noted. POD#1  As has been the case since he was admitted, pt is overly concerned with the dosing and frequency of pain meds. No new complaints. He feels ready for transfer  VSSAF, x occ sats to 93%    Lt foot- no d/c through the dsg. Dsg in place    A/P Lt diabetic foot infection S/P Chopart amp  Ok for transfer to LTAC  Leave dsg in place until he returns for F/U visit in 1 week

## 2022-03-13 ENCOUNTER — HOSPITAL ENCOUNTER (EMERGENCY)
Facility: HOSPITAL | Age: 62
Discharge: HOME | End: 2022-03-13
Attending: STUDENT IN AN ORGANIZED HEALTH CARE EDUCATION/TRAINING PROGRAM
Payer: MEDICAID

## 2022-03-13 VITALS
SYSTOLIC BLOOD PRESSURE: 169 MMHG | TEMPERATURE: 98 F | HEART RATE: 96 BPM | DIASTOLIC BLOOD PRESSURE: 106 MMHG | WEIGHT: 250 LBS | RESPIRATION RATE: 22 BRPM | OXYGEN SATURATION: 95 % | HEIGHT: 69 IN | BODY MASS INDEX: 37.03 KG/M2

## 2022-03-13 DIAGNOSIS — I10 HTN (HYPERTENSION): ICD-10-CM

## 2022-03-13 DIAGNOSIS — R73.9 HYPERGLYCEMIA: ICD-10-CM

## 2022-03-13 LAB
ALBUMIN SERPL BCP-MCNC: 3.3 G/DL (ref 3.5–5.2)
ALLENS TEST: ABNORMAL
ALP SERPL-CCNC: 83 U/L (ref 55–135)
ALT SERPL W/O P-5'-P-CCNC: 37 U/L (ref 10–44)
ANION GAP SERPL CALC-SCNC: 13 MMOL/L (ref 8–16)
AST SERPL-CCNC: 38 U/L (ref 10–40)
B-OH-BUTYR BLD STRIP-SCNC: 0.7 MMOL/L (ref 0–0.5)
BACTERIA #/AREA URNS HPF: NORMAL /HPF
BASOPHILS # BLD AUTO: 0.02 K/UL (ref 0–0.2)
BASOPHILS NFR BLD: 0.4 % (ref 0–1.9)
BILIRUB SERPL-MCNC: 0.5 MG/DL (ref 0.1–1)
BILIRUB UR QL STRIP: NEGATIVE
BUN SERPL-MCNC: 19 MG/DL (ref 6–20)
CALCIUM SERPL-MCNC: 8.9 MG/DL (ref 8.7–10.5)
CHLORIDE SERPL-SCNC: 95 MMOL/L (ref 95–110)
CLARITY UR: CLEAR
CO2 SERPL-SCNC: 20 MMOL/L (ref 23–29)
COLOR UR: YELLOW
CREAT SERPL-MCNC: 1.3 MG/DL (ref 0.5–1.4)
DELSYS: ABNORMAL
DIFFERENTIAL METHOD: ABNORMAL
EOSINOPHIL # BLD AUTO: 0.1 K/UL (ref 0–0.5)
EOSINOPHIL NFR BLD: 1.2 % (ref 0–8)
ERYTHROCYTE [DISTWIDTH] IN BLOOD BY AUTOMATED COUNT: 14.2 % (ref 11.5–14.5)
EST. GFR  (AFRICAN AMERICAN): >60 ML/MIN/1.73 M^2
EST. GFR  (NON AFRICAN AMERICAN): 59 ML/MIN/1.73 M^2
GLUCOSE SERPL-MCNC: 369 MG/DL (ref 70–110)
GLUCOSE SERPL-MCNC: 494 MG/DL (ref 70–110)
GLUCOSE UR QL STRIP: ABNORMAL
HCO3 UR-SCNC: 26.6 MMOL/L (ref 24–28)
HCT VFR BLD AUTO: 40.3 % (ref 40–54)
HGB BLD-MCNC: 14.3 G/DL (ref 14–18)
HGB UR QL STRIP: ABNORMAL
HYALINE CASTS #/AREA URNS LPF: 0 /LPF
IMM GRANULOCYTES # BLD AUTO: 0.02 K/UL (ref 0–0.04)
IMM GRANULOCYTES NFR BLD AUTO: 0.4 % (ref 0–0.5)
KETONES UR QL STRIP: ABNORMAL
LEUKOCYTE ESTERASE UR QL STRIP: NEGATIVE
LYMPHOCYTES # BLD AUTO: 0.6 K/UL (ref 1–4.8)
LYMPHOCYTES NFR BLD: 12.2 % (ref 18–48)
MCH RBC QN AUTO: 37 PG (ref 27–31)
MCHC RBC AUTO-ENTMCNC: 35.5 G/DL (ref 32–36)
MCV RBC AUTO: 104 FL (ref 82–98)
MICROSCOPIC COMMENT: NORMAL
MODE: ABNORMAL
MONOCYTES # BLD AUTO: 0.2 K/UL (ref 0.3–1)
MONOCYTES NFR BLD: 4.8 % (ref 4–15)
NEUTROPHILS # BLD AUTO: 3.9 K/UL (ref 1.8–7.7)
NEUTROPHILS NFR BLD: 81 % (ref 38–73)
NITRITE UR QL STRIP: NEGATIVE
NRBC BLD-RTO: 0 /100 WBC
PCO2 BLDA: 46.4 MMHG (ref 35–45)
PH SMN: 7.37 [PH] (ref 7.35–7.45)
PH UR STRIP: 7 [PH] (ref 5–8)
PLATELET # BLD AUTO: 124 K/UL (ref 150–450)
PMV BLD AUTO: 10.2 FL (ref 9.2–12.9)
PO2 BLDA: 35 MMHG (ref 40–60)
POC BE: 1 MMOL/L
POC SATURATED O2: 64 % (ref 95–100)
POC TCO2: 28 MMOL/L (ref 24–29)
POCT GLUCOSE: 369 MG/DL (ref 70–110)
POTASSIUM SERPL-SCNC: 5 MMOL/L (ref 3.5–5.1)
PROT SERPL-MCNC: 7.3 G/DL (ref 6–8.4)
PROT UR QL STRIP: ABNORMAL
RBC # BLD AUTO: 3.87 M/UL (ref 4.6–6.2)
RBC #/AREA URNS HPF: 4 /HPF (ref 0–4)
SAMPLE: ABNORMAL
SITE: ABNORMAL
SODIUM SERPL-SCNC: 128 MMOL/L (ref 136–145)
SP GR UR STRIP: 1.02 (ref 1–1.03)
SP02: 94
URN SPEC COLLECT METH UR: ABNORMAL
UROBILINOGEN UR STRIP-ACNC: NEGATIVE EU/DL
WBC # BLD AUTO: 4.82 K/UL (ref 3.9–12.7)
WBC #/AREA URNS HPF: 2 /HPF (ref 0–5)
YEAST URNS QL MICRO: NORMAL

## 2022-03-13 PROCEDURE — 93010 EKG 12-LEAD: ICD-10-PCS | Mod: ,,, | Performed by: INTERNAL MEDICINE

## 2022-03-13 PROCEDURE — 63600175 PHARM REV CODE 636 W HCPCS: Performed by: STUDENT IN AN ORGANIZED HEALTH CARE EDUCATION/TRAINING PROGRAM

## 2022-03-13 PROCEDURE — 81000 URINALYSIS NONAUTO W/SCOPE: CPT | Performed by: STUDENT IN AN ORGANIZED HEALTH CARE EDUCATION/TRAINING PROGRAM

## 2022-03-13 PROCEDURE — 99900035 HC TECH TIME PER 15 MIN (STAT)

## 2022-03-13 PROCEDURE — 99285 EMERGENCY DEPT VISIT HI MDM: CPT | Mod: 25

## 2022-03-13 PROCEDURE — 93010 ELECTROCARDIOGRAM REPORT: CPT | Mod: ,,, | Performed by: INTERNAL MEDICINE

## 2022-03-13 PROCEDURE — 82010 KETONE BODYS QUAN: CPT | Performed by: STUDENT IN AN ORGANIZED HEALTH CARE EDUCATION/TRAINING PROGRAM

## 2022-03-13 PROCEDURE — 80053 COMPREHEN METABOLIC PANEL: CPT | Performed by: STUDENT IN AN ORGANIZED HEALTH CARE EDUCATION/TRAINING PROGRAM

## 2022-03-13 PROCEDURE — 25000003 PHARM REV CODE 250: Performed by: STUDENT IN AN ORGANIZED HEALTH CARE EDUCATION/TRAINING PROGRAM

## 2022-03-13 PROCEDURE — 96361 HYDRATE IV INFUSION ADD-ON: CPT

## 2022-03-13 PROCEDURE — 96375 TX/PRO/DX INJ NEW DRUG ADDON: CPT

## 2022-03-13 PROCEDURE — 93005 ELECTROCARDIOGRAM TRACING: CPT

## 2022-03-13 PROCEDURE — 82803 BLOOD GASES ANY COMBINATION: CPT

## 2022-03-13 PROCEDURE — 85025 COMPLETE CBC W/AUTO DIFF WBC: CPT | Performed by: STUDENT IN AN ORGANIZED HEALTH CARE EDUCATION/TRAINING PROGRAM

## 2022-03-13 PROCEDURE — 96374 THER/PROPH/DIAG INJ IV PUSH: CPT

## 2022-03-13 RX ORDER — ONDANSETRON 2 MG/ML
4 INJECTION INTRAMUSCULAR; INTRAVENOUS
Status: COMPLETED | OUTPATIENT
Start: 2022-03-13 | End: 2022-03-13

## 2022-03-13 RX ADMIN — ONDANSETRON 4 MG: 2 INJECTION INTRAMUSCULAR; INTRAVENOUS at 04:03

## 2022-03-13 RX ADMIN — INSULIN HUMAN 6 UNITS: 100 INJECTION, SOLUTION PARENTERAL at 06:03

## 2022-03-13 RX ADMIN — SODIUM CHLORIDE 1000 ML: 0.9 INJECTION, SOLUTION INTRAVENOUS at 04:03

## 2022-03-13 NOTE — ED TRIAGE NOTES
"Pt reports headache, frontal starting approx 3 hours ago, minor relief with "3 pills" of ibuprofen. Pain 8/10. Pt reports nausea and 1 episode of vomiting.  Pt repeatedly asking for water, reports taking diabetic meds as prescribed. Reports increased urination. Denies CP, shob, denies "worse HA of life". Alert Oriented  "

## 2022-03-13 NOTE — ED PROVIDER NOTES
Encounter Date: 3/13/2022       History     Chief Complaint   Patient presents with    Hyperglycemia     Pt to ER with c/o headache, weakness, N/V, blurred visions and feeling like he's having a panic attack sine this morning. Pt hypertensive and hyperglycemic.      HPI   58-year-old male with past medical history of chronic back pain, history of MRSA skin infection, uncontrolled diabetes type 2 with neuropathy, PVD chronic HCV and bilateral below-the-knee amputations who presents with a headache.  Patient reports that his symptoms began 3 hours prior to arrival.  He describes it as a nonradiating, frontal headache with associated lightheadedness, nausea with 1 episode of nonbloody emesis prior to arrival.  He also reports elevated blood glucose levels at home recently.  He does report that he has been compliant with his home metformin and insulin, however he is not able to tell me exactly how many units of insulin he has been taking.  In addition, patient reporting a chronic abdominal wound which he states has been healing following placing 2% Mupirocin to the wound, dry mouth, and urinary frequency. Denies fevers, chills, chest pain, shortness of breath, syncope, weakness, vision changes or other associated symptoms.      Review of patient's allergies indicates:   Allergen Reactions    Codeine Rash    Lyrica [pregabalin]      Feet turned Red    Vicodin [hydrocodone-acetaminophen] Rash     Past Medical History:   Diagnosis Date    Arthritis     Back pain     Bulging lumbar disc     C. difficile diarrhea     Chronic back pain 10/14/2014    Diabetes mellitus     Diabetes mellitus type II     DM (diabetes mellitus), type 2, uncontrolled 3/12/2013    Hepatitis C 7/3/2013    MSSA (methicillin susceptible Staphylococcus aureus) septicemia     Neuromuscular disorder     Neuropathy     Staph aureus infection      Past Surgical History:   Procedure Laterality Date    ABOVE-KNEE AMPUTATION Bilateral      ANGIOGRAPHY OF LOWER EXTREMITY Left 06/14/2018    Procedure: Angiogram LEFT LOWER Extremity with US guided access from right side;  Surgeon: Sony Srinivasan MD;  [facility]: [facility] OR [address] [place] [address];  Service: Peripheral Vascular;  Laterality: Left;  local: 16ml  contrast: 20ml   fluoro: 2.8  427.73mGy    APPENDECTOMY      JOINT REPLACEMENT      left knee surgery      left leg surgery      broken bone    LEG SURGERY  right     Right arm boil      Right great toe amputation      TOE AMPUTATION Right      Family History   Problem Relation Age of Onset    Arthritis Mother     Arthritis Sister     Diabetes Sister     Arthritis Brother      Social History     Tobacco Use    Smoking status: Current Every Day Smoker     Packs/day: 1.00     Types: Cigarettes    Smokeless tobacco: Never Used   Substance Use Topics    Alcohol use: No    Drug use: No     Review of Systems   Constitutional: Negative for chills and fever.   HENT: Negative for congestion.    Eyes: Negative for visual disturbance.   Respiratory: Negative for shortness of breath.    Cardiovascular: Negative for chest pain.   Gastrointestinal: Positive for nausea and vomiting. Negative for abdominal pain.   Endocrine: Positive for polydipsia and polyuria.   Genitourinary: Negative for dysuria.   Musculoskeletal: Negative for back pain and neck pain.   Skin: Negative for rash.   Neurological: Positive for light-headedness and headaches. Negative for syncope and weakness.   Psychiatric/Behavioral: Negative for confusion.       Physical Exam     Initial Vitals [03/13/22 1608]   BP Pulse Resp Temp SpO2   (!) 194/92 98 20 98 °F (36.7 °C) 97 %      MAP       --         Physical Exam    Nursing note and vitals reviewed.  Constitutional: He is not diaphoretic.  Non-toxic appearance. He does not have a sickly appearance. He does not appear ill.   HENT:   Head: Normocephalic and atraumatic.   Mouth/Throat: Mucous membranes are dry.   Eyes: Conjunctivae and EOM are  normal. Pupils are equal, round, and reactive to light. No scleral icterus.   Neck: Neck supple. No JVD present.   Normal range of motion.  Cardiovascular: Normal rate, regular rhythm, normal heart sounds and intact distal pulses.   Pulmonary/Chest: Breath sounds normal.   Abdominal: Abdomen is soft. He exhibits no distension. There is no abdominal tenderness. There is no rebound and no guarding.   Musculoskeletal:         General: No tenderness or edema. Normal range of motion.      Cervical back: Normal range of motion and neck supple.      Comments: Bilateral BKA      Neurological: He is alert and oriented to person, place, and time. He has normal strength. He displays no atrophy and no tremor. No cranial nerve deficit or sensory deficit. He exhibits normal muscle tone. He displays no seizure activity.   Moving all extremities, no focal deficits    Skin: Skin is warm. No erythema.   Healing ulcerated wound to the inferior midline abdominal wall with central granulation tissue. No drainage, surrounding erythema, or fluctuance.          ED Course   Procedures  Labs Reviewed   CBC W/ AUTO DIFFERENTIAL - Abnormal; Notable for the following components:       Result Value    RBC 3.87 (*)      (*)     MCH 37.0 (*)     Platelets 124 (*)     Lymph # 0.6 (*)     Mono # 0.2 (*)     Gran % 81.0 (*)     Lymph % 12.2 (*)     All other components within normal limits   COMPREHENSIVE METABOLIC PANEL - Abnormal; Notable for the following components:    Sodium 128 (*)     CO2 20 (*)     Glucose 494 (*)     Albumin 3.3 (*)     eGFR if non  59 (*)     All other components within normal limits    Narrative:     GLUCOSE critical result(s) called and verbal readback obtained from   [facility]  by [facility] [place] [place] 03/14/2022 10:42   BETA - HYDROXYBUTYRATE, SERUM - Abnormal; Notable for the following components:    Beta-Hydroxybutyrate 0.7 (*)     All other components within normal limits   URINALYSIS, REFLEX TO URINE  CULTURE - Abnormal; Notable for the following components:    Protein, UA 2+ (*)     Glucose, UA 4+ (*)     Ketones, UA Trace (*)     Occult Blood UA 1+ (*)     All other components within normal limits    Narrative:     Specimen Source->Urine   ISTAT PROCEDURE - Abnormal; Notable for the following components:    POC PCO2 46.4 (*)     POC PO2 35 (*)     POC SATURATED O2 64 (*)     All other components within normal limits   POCT GLUCOSE - Abnormal; Notable for the following components:    POCT Glucose 369 (*)     All other components within normal limits   URINALYSIS MICROSCOPIC    Narrative:     Specimen Source->Urine     EKG Readings: (Independently Interpreted)   Normal sinus rhythm with a rate of 91 beats per minute, normal axis and intervals, low-voltage QRS in the inferior leads with Q-waves in V1 through V2, no obvious acute ST segment or T-wave abnormalities.     ECG Results          EKG 12-lead (Final result)  Result time 03/14/22 23:59:41    Final result by Interface, Lab In [facility] (03/14/22 23:59:41)                 Narrative:    Test Reason : I10,    Vent. Rate : 091 BPM     Atrial Rate : 091 BPM     P-R Int : 200 ms          QRS Dur : 074 ms      QT Int : 340 ms       P-R-T Axes : 064 037 047 degrees     QTc Int : 418 ms    Normal sinus rhythm  Low voltage QRS  Abnormal ECG  When compared with ECG of 19-AUG-2017 06:45,  Significant changes have occurred  Confirmed by Kevin Cooley MD ([id number]) on 3/14/2022 11:59:27 PM    Referred By: [email or web address]   SELF           Confirmed By:Kevin Cooley MD                            Imaging Results          X-Ray Chest AP Portable (Final result)  Result time 03/13/22 17:22:12    Final result by Randall Washington MD (03/13/22 17:22:12)                 Impression:      No acute cardiopulmonary process identified.      Electronically signed by: Randall Washington MD  Date:    03/13/2022  Time:    17:22             Narrative:    EXAMINATION:  XR CHEST AP PORTABLE    CLINICAL  HISTORY:  hyperglycemia;    TECHNIQUE:  Single frontal view of the chest was performed.    COMPARISON:  February 2018.    FINDINGS:  Cardiac silhouette is normal in size.  Lungs are expanded with mild elevation of the right hemidiaphragm.  No evidence of focal consolidative process, pneumothorax, or significant pleural effusion.  No acute osseous abnormality identified.                                 Medications   sodium chloride 0.9% bolus 1,000 mL (0 mLs Intravenous Stopped 3/13/22 1827)   ondansetron injection 4 mg (4 mg Intravenous Given 3/13/22 1648)   insulin regular injection 6 Units (6 Units Intravenous Given 3/13/22 1813)     Medical Decision Making:   History:   Old Medical Records: I decided to obtain old medical records.  Clinical Tests:   Lab Tests: Ordered and Reviewed  Radiological Study: Ordered and Reviewed  Medical Tests: Ordered and Reviewed  ED Management:   [facility]  This is an emergent evaluation of a 58-year-old male with past medical history of chronic back pain, history of MRSA skin infection, uncontrolled diabetes type 2 with neuropathy, PVD chronic HCV and bilateral below-the-knee amputations who presents with a non-radiating, frontal headache with hyperglycemia. Initial vitals in the ED with a blood pressure of 194/92 with a remainder of vitals unremarkable. Physical exam noted above. DDx includes but is not limited to DKA vs HHS, electrolyte abnormalities, dehydration, UTI, tension vs migraine headache. Also considered but clinically less likely to be stroke, SAH, ACS. Labs and imaging including a hyperglycemia work-up obtained. Will treated with IV fluids and zofran. Will continue to monitor and frequently reassess pending final work-up. Patient is aware of plan and is amenable.     Shanita Mobley D.O  EMERGENCY MEDICINE  5:12 PM 03/13/2022        UPDATE  Work-up reveals bicarb of 20, glucose of 494 with a corrected sodium of 134. Thrombocytopenia with similar to previous labs 4  years ago.  Normal pH and anion gap.  Chest x-ray with no acute abnormality.  EKG without signs of ischemia.  On reassessment, patient states that his symptoms were improved and he was able to tolerate p.o..  Will continue to fluid hydrate, recheck point of care glucose and anticipate discharge with close PCP follow-up.  Patient aware and agreeable to plan.    Shanita Mobley D.O  EMERGENCY MEDICINE   5:48 PM 03/23/2022     UPDATE  He was treated with 6 units of regular insulin.  Repeat point of care glucose 369.  Will discharge as stated above.    Shanita Mobley D.O  EMERGENCY MEDICINE   6:17 PM 3/13/2022                          Clinical Impression:   Final diagnoses:  [I10] HTN (hypertension)  [R73.9] Hyperglycemia          ED Disposition Condition    Discharge Stable        ED Prescriptions     None        Follow-up Information     Follow up With Specialties Details Why Contact Info    Miguel Lux MD Internal Medicine, Oncology, Hematology and Oncology Schedule an appointment as soon as possible for a visit in 2 days Emergency Room Follow-up [address] [facility]  [address]  [facility] [address]  [phone]      [facility] Emergency Dept Emergency Medicine Go to  If symptoms worsen [address] [facility] [id number] [phone]           Shanita Mobley,   03/23/22 0319

## 2022-03-14 ENCOUNTER — HOSPITAL ENCOUNTER (EMERGENCY)
Facility: HOSPITAL | Age: 62
Discharge: HOME | End: 2022-03-15
Attending: EMERGENCY MEDICINE
Payer: MEDICAID

## 2022-03-14 DIAGNOSIS — R51.9 ACUTE NONINTRACTABLE HEADACHE, UNSPECIFIED HEADACHE TYPE: Primary | ICD-10-CM

## 2022-03-14 DIAGNOSIS — R73.9 HYPERGLYCEMIA: ICD-10-CM

## 2022-03-14 LAB
BASOPHILS # BLD AUTO: 0.03 K/UL (ref 0–0.2)
BASOPHILS NFR BLD: 0.5 % (ref 0–1.9)
DIFFERENTIAL METHOD: ABNORMAL
EOSINOPHIL # BLD AUTO: 0.1 K/UL (ref 0–0.5)
EOSINOPHIL NFR BLD: 1.8 % (ref 0–8)
ERYTHROCYTE [DISTWIDTH] IN BLOOD BY AUTOMATED COUNT: 14.4 % (ref 11.5–14.5)
HCT VFR BLD AUTO: 42.2 % (ref 40–54)
HGB BLD-MCNC: 14.7 G/DL (ref 14–18)
IMM GRANULOCYTES # BLD AUTO: 0.03 K/UL (ref 0–0.04)
IMM GRANULOCYTES NFR BLD AUTO: 0.5 % (ref 0–0.5)
LYMPHOCYTES # BLD AUTO: 0.9 K/UL (ref 1–4.8)
LYMPHOCYTES NFR BLD: 17.2 % (ref 18–48)
MCH RBC QN AUTO: 36.9 PG (ref 27–31)
MCHC RBC AUTO-ENTMCNC: 34.8 G/DL (ref 32–36)
MCV RBC AUTO: 106 FL (ref 82–98)
MONOCYTES # BLD AUTO: 0.3 K/UL (ref 0.3–1)
MONOCYTES NFR BLD: 6.2 % (ref 4–15)
NEUTROPHILS # BLD AUTO: 4 K/UL (ref 1.8–7.7)
NEUTROPHILS NFR BLD: 73.8 % (ref 38–73)
NRBC BLD-RTO: 0 /100 WBC
PLATELET # BLD AUTO: 140 K/UL (ref 150–450)
PMV BLD AUTO: 10 FL (ref 9.2–12.9)
POCT GLUCOSE: 324 MG/DL (ref 70–110)
RBC # BLD AUTO: 3.98 M/UL (ref 4.6–6.2)
WBC # BLD AUTO: 5.48 K/UL (ref 3.9–12.7)

## 2022-03-14 PROCEDURE — 82962 GLUCOSE BLOOD TEST: CPT

## 2022-03-14 PROCEDURE — 85652 RBC SED RATE AUTOMATED: CPT | Performed by: EMERGENCY MEDICINE

## 2022-03-14 PROCEDURE — 63600175 PHARM REV CODE 636 W HCPCS: Performed by: EMERGENCY MEDICINE

## 2022-03-14 PROCEDURE — 80053 COMPREHEN METABOLIC PANEL: CPT | Performed by: EMERGENCY MEDICINE

## 2022-03-14 PROCEDURE — 93005 ELECTROCARDIOGRAM TRACING: CPT

## 2022-03-14 PROCEDURE — 96375 TX/PRO/DX INJ NEW DRUG ADDON: CPT

## 2022-03-14 PROCEDURE — 85025 COMPLETE CBC W/AUTO DIFF WBC: CPT | Performed by: EMERGENCY MEDICINE

## 2022-03-14 PROCEDURE — 93010 ELECTROCARDIOGRAM REPORT: CPT | Mod: ,,, | Performed by: INTERNAL MEDICINE

## 2022-03-14 PROCEDURE — 93010 EKG 12-LEAD: ICD-10-PCS | Mod: ,,, | Performed by: INTERNAL MEDICINE

## 2022-03-14 PROCEDURE — 96374 THER/PROPH/DIAG INJ IV PUSH: CPT

## 2022-03-14 PROCEDURE — 99285 EMERGENCY DEPT VISIT HI MDM: CPT | Mod: 25

## 2022-03-14 RX ORDER — KETOROLAC TROMETHAMINE 30 MG/ML
15 INJECTION, SOLUTION INTRAMUSCULAR; INTRAVENOUS
Status: COMPLETED | OUTPATIENT
Start: 2022-03-14 | End: 2022-03-14

## 2022-03-14 RX ORDER — PROCHLORPERAZINE EDISYLATE 5 MG/ML
10 INJECTION INTRAMUSCULAR; INTRAVENOUS ONCE
Status: COMPLETED | OUTPATIENT
Start: 2022-03-15 | End: 2022-03-14

## 2022-03-14 RX ADMIN — KETOROLAC TROMETHAMINE 15 MG: 30 INJECTION, SOLUTION INTRAMUSCULAR at 11:03

## 2022-03-14 RX ADMIN — PROCHLORPERAZINE EDISYLATE 10 MG: 5 INJECTION INTRAMUSCULAR; INTRAVENOUS at 11:03

## 2022-03-15 VITALS
RESPIRATION RATE: 19 BRPM | OXYGEN SATURATION: 96 % | HEIGHT: 69 IN | HEART RATE: 94 BPM | BODY MASS INDEX: 34.07 KG/M2 | DIASTOLIC BLOOD PRESSURE: 70 MMHG | SYSTOLIC BLOOD PRESSURE: 155 MMHG | TEMPERATURE: 98 F | WEIGHT: 230 LBS

## 2022-03-15 LAB
ALBUMIN SERPL BCP-MCNC: 3.5 G/DL (ref 3.5–5.2)
ALP SERPL-CCNC: 77 U/L (ref 55–135)
ALT SERPL W/O P-5'-P-CCNC: 41 U/L (ref 10–44)
ANION GAP SERPL CALC-SCNC: 9 MMOL/L (ref 8–16)
AST SERPL-CCNC: 41 U/L (ref 10–40)
BACTERIA #/AREA URNS HPF: ABNORMAL /HPF
BACTERIA #/AREA URNS HPF: ABNORMAL /HPF
BILIRUB SERPL-MCNC: 0.5 MG/DL (ref 0.1–1)
BILIRUB UR QL STRIP: NEGATIVE
BILIRUB UR QL STRIP: NEGATIVE
BUN SERPL-MCNC: 22 MG/DL (ref 6–20)
CALCIUM SERPL-MCNC: 9.3 MG/DL (ref 8.7–10.5)
CARBOXYHEMOGLOBIN: 4.3 % (ref 1.5–5)
CHLORIDE SERPL-SCNC: 98 MMOL/L (ref 95–110)
CLARITY UR: CLEAR
CLARITY UR: CLEAR
CO2 SERPL-SCNC: 22 MMOL/L (ref 23–29)
COLOR UR: COLORLESS
COLOR UR: YELLOW
CREAT SERPL-MCNC: 1.6 MG/DL (ref 0.5–1.4)
ERYTHROCYTE [SEDIMENTATION RATE] IN BLOOD BY WESTERGREN METHOD: 42 MM/HR (ref 0–10)
EST. GFR  (AFRICAN AMERICAN): 53 ML/MIN/1.73 M^2
EST. GFR  (NON AFRICAN AMERICAN): 46 ML/MIN/1.73 M^2
GLUCOSE SERPL-MCNC: 348 MG/DL (ref 70–110)
GLUCOSE UR QL STRIP: ABNORMAL
GLUCOSE UR QL STRIP: ABNORMAL
HGB UR QL STRIP: ABNORMAL
HGB UR QL STRIP: NEGATIVE
HYALINE CASTS #/AREA URNS LPF: 3 /LPF
HYALINE CASTS #/AREA URNS LPF: 3 /LPF
KETONES UR QL STRIP: ABNORMAL
KETONES UR QL STRIP: NEGATIVE
LEUKOCYTE ESTERASE UR QL STRIP: NEGATIVE
LEUKOCYTE ESTERASE UR QL STRIP: NEGATIVE
MICROSCOPIC COMMENT: ABNORMAL
MICROSCOPIC COMMENT: ABNORMAL
NITRITE UR QL STRIP: NEGATIVE
NITRITE UR QL STRIP: POSITIVE
PH UR STRIP: 6 [PH] (ref 5–8)
PH UR STRIP: 6 [PH] (ref 5–8)
POTASSIUM SERPL-SCNC: 4.8 MMOL/L (ref 3.5–5.1)
PROT SERPL-MCNC: 7.7 G/DL (ref 6–8.4)
PROT UR QL STRIP: ABNORMAL
PROT UR QL STRIP: NEGATIVE
RBC #/AREA URNS HPF: 2 /HPF (ref 0–4)
RBC #/AREA URNS HPF: 4 /HPF (ref 0–4)
SODIUM SERPL-SCNC: 129 MMOL/L (ref 136–145)
SP GR UR STRIP: 1.02 (ref 1–1.03)
SP GR UR STRIP: 1.02 (ref 1–1.03)
URN SPEC COLLECT METH UR: ABNORMAL
URN SPEC COLLECT METH UR: ABNORMAL
UROBILINOGEN UR STRIP-ACNC: NEGATIVE EU/DL
UROBILINOGEN UR STRIP-ACNC: NEGATIVE EU/DL
WBC #/AREA URNS HPF: 5 /HPF (ref 0–5)
WBC #/AREA URNS HPF: 8 /HPF (ref 0–5)
YEAST URNS QL MICRO: ABNORMAL

## 2022-03-15 PROCEDURE — 81000 URINALYSIS NONAUTO W/SCOPE: CPT | Mod: 91 | Performed by: EMERGENCY MEDICINE

## 2022-03-15 PROCEDURE — 81000 URINALYSIS NONAUTO W/SCOPE: CPT | Performed by: EMERGENCY MEDICINE

## 2022-03-15 PROCEDURE — 63600175 PHARM REV CODE 636 W HCPCS: Performed by: EMERGENCY MEDICINE

## 2022-03-15 NOTE — ED PROVIDER NOTES
"Encounter Date: 3/14/2022       History     Chief Complaint   Patient presents with    Headache     Presents with headache similar to when seen here yesterday, states he believes his glucose is high and is afraid that he is going to have a "panic attack"     61-year-old male presenting with headache, blurred vision ongoing 2-3 days.  Patient was seen here yesterday for identical symptoms.  Reports symptoms initially improved but have since worsened.  Notes significant nausea.  Denies fevers.  Currently reports feeling chilled.  Denies chest pain, shortness of breath, abdominal pain.  Reports his home he is electric.  Reports he was working outside not in an enclosed space earlier today.  Notes headache was gradual onset, consistent with priors.  Also notes blurred vision for a few days.  Denies loss of vision.  Denies unilateral changes.  Denies numbness or weakness.  Reports recently eating many sugary foods.  Reports compliance with diabetes medications.  Otherwise in usual state of health.        Review of patient's allergies indicates:   Allergen Reactions    Codeine Rash    Lyrica [pregabalin]      Feet turned Red    Vicodin [hydrocodone-acetaminophen] Rash     Past Medical History:   Diagnosis Date    Arthritis     Back pain     Bulging lumbar disc     C. difficile diarrhea     Chronic back pain 10/14/2014    Diabetes mellitus     Diabetes mellitus type II     DM (diabetes mellitus), type 2, uncontrolled 3/12/2013    Hepatitis C 7/3/2013    MSSA (methicillin susceptible Staphylococcus aureus) septicemia     Neuromuscular disorder     Neuropathy     Staph aureus infection      Past Surgical History:   Procedure Laterality Date    ABOVE-KNEE AMPUTATION Bilateral     ANGIOGRAPHY OF LOWER EXTREMITY Left 06/14/2018    Procedure: Angiogram LEFT LOWER Extremity with US guided access from right side;  Surgeon: Sony Srinivasan MD;  Location: [facility] OR [address] [place] [address];  Service: Peripheral Vascular;  " Laterality: Left;  local: 16ml  contrast: 20ml   fluoro: 2.8  427.73mGy    APPENDECTOMY      JOINT REPLACEMENT      left knee surgery      left leg surgery      broken bone    LEG SURGERY  right     Right arm boil      Right great toe amputation      TOE AMPUTATION Right      Family History   Problem Relation Age of Onset    Arthritis Mother     Arthritis Sister     Diabetes Sister     Arthritis Brother      Social History     Tobacco Use    Smoking status: Current Every Day Smoker     Packs/day: 1.00     Types: Cigarettes    Smokeless tobacco: Never Used   Substance Use Topics    Alcohol use: No    Drug use: No     Review of Systems   Constitutional: Positive for chills. Negative for fever.   HENT: Negative for sore throat.    Eyes: Positive for visual disturbance. Negative for photophobia.   Respiratory: Negative for shortness of breath.    Cardiovascular: Negative for chest pain.   Gastrointestinal: Positive for nausea. Negative for abdominal pain.   Genitourinary: Negative for dysuria.   Musculoskeletal: Negative for back pain.   Skin: Negative for rash.   Neurological: Positive for headaches. Negative for dizziness, facial asymmetry, speech difficulty, weakness and light-headedness.   Psychiatric/Behavioral: Negative for confusion.       Physical Exam     Initial Vitals [03/14/22 2240]   BP Pulse Resp Temp SpO2   (!) 199/100 97 20 97.7 °F (36.5 °C) 98 %      MAP       --         Physical Exam    Constitutional: He appears well-developed and well-nourished. He is not diaphoretic. No distress.   HENT:   Head: Normocephalic and atraumatic.   Eyes: Conjunctivae and EOM are normal. Pupils are equal, round, and reactive to light. No scleral icterus.   Neck: Neck supple.   Normal range of motion.  Cardiovascular: Normal rate, regular rhythm, normal heart sounds and intact distal pulses. Exam reveals no gallop and no friction rub.    No murmur heard.  Pulmonary/Chest: Breath sounds normal. No stridor.  No respiratory distress. He has no wheezes. He has no rhonchi. He has no rales.   Abdominal: Abdomen is soft. Bowel sounds are normal. He exhibits no distension. There is no abdominal tenderness. There is no rebound and no guarding.   Musculoskeletal:         General: No tenderness or edema. Normal range of motion.      Cervical back: Normal range of motion and neck supple.      Comments: Bilateral BKA     Neurological: He is alert and oriented to person, place, and time. He has normal strength. No cranial nerve deficit. GCS score is 15. GCS eye subscore is 4. GCS verbal subscore is 5. GCS motor subscore is 6.   Skin: Skin is warm and dry. No rash noted.   Psychiatric: He has a normal mood and affect. His behavior is normal.         ED Course   Procedures  Labs Reviewed   URINALYSIS, REFLEX TO URINE CULTURE - Abnormal; Notable for the following components:       Result Value    Color, UA Colorless (*)     Glucose, UA 2+ (*)     Nitrite, UA Positive (*)     All other components within normal limits    Narrative:     Specimen Source->Urine   CBC W/ AUTO DIFFERENTIAL - Abnormal; Notable for the following components:    RBC 3.98 (*)      (*)     MCH 36.9 (*)     Platelets 140 (*)     Lymph # 0.9 (*)     Gran % 73.8 (*)     Lymph % 17.2 (*)     All other components within normal limits   COMPREHENSIVE METABOLIC PANEL - Abnormal; Notable for the following components:    Sodium 129 (*)     CO2 22 (*)     Glucose 348 (*)     BUN 22 (*)     Creatinine 1.6 (*)     AST 41 (*)     eGFR if  53 (*)     eGFR if non  46 (*)     All other components within normal limits   URINALYSIS, REFLEX TO URINE CULTURE - Abnormal; Notable for the following components:    Protein, UA 2+ (*)     Glucose, UA 4+ (*)     Ketones, UA 1+ (*)     Occult Blood UA 1+ (*)     All other components within normal limits    Narrative:     Specimen Source->Urine   SEDIMENTATION RATE - Abnormal; Notable for the following  components:    Sed Rate 42 (*)     All other components within normal limits   URINALYSIS MICROSCOPIC - Abnormal; Notable for the following components:    Hyaline Casts, UA 3 (*)     All other components within normal limits    Narrative:     Specimen Source->Urine   URINALYSIS MICROSCOPIC - Abnormal; Notable for the following components:    WBC, UA 8 (*)     Hyaline Casts, UA 3 (*)     All other components within normal limits    Narrative:     Specimen Source->Urine   POCT GLUCOSE - Abnormal; Notable for the following components:    POCT Glucose 324 (*)     All other components within normal limits   SEDIMENTATION RATE   CARBON MONOXIDE, BLOOD   CARBON MONOXIDE, BLOOD   POCT GLUCOSE, HAND-HELD DEVICE   POCT GLUCOSE MONITORING CONTINUOUS     EKG Readings: (Independently Interpreted)   Initial Reading: No STEMI. Rhythm: Normal Sinus Rhythm. Heart Rate: 91.   No ST segment elevation or depression concerning for acute ischemia.          Imaging Results          CT Head Without Contrast (Final result)  Result time 03/15/22 00:53:25    Final result by Randall Washington MD (03/15/22 00:53:25)                 Impression:      No acute intracranial abnormalities identified.      Electronically signed by: Randall Washington MD  Date:    03/15/2022  Time:    00:53             Narrative:    EXAMINATION:  CT HEAD WITHOUT CONTRAST    CLINICAL HISTORY:  Headache, new or worsening (Age >= 50y);    TECHNIQUE:  Low dose axial images were obtained through the head.  Coronal and sagittal reformations were also performed. Contrast was not administered.    COMPARISON:  None.    FINDINGS:  There is mild chronic microvascular ischemic change.  No evidence of acute/recent major vascular distribution cerebral infarction, intraparenchymal hemorrhage, or intra-axial space occupying lesion. The ventricular system is normal in size and configuration with no evidence of hydrocephalus. No effacement of the skull-base cisterns. No abnormal  extra-axial fluid collections or blood products.  Minimal right sphenoid sinus disease is noted.  Remaining visualized paranasal sinuses and mastoid air cells are essentially clear.  The calvarium shows no significant abnormality.                                 Medications   ketorolac injection 15 mg (15 mg Intravenous Given 3/14/22 2343)   prochlorperazine injection Soln 10 mg (10 mg Intravenous Given 3/14/22 2342)     Medical Decision Making:   Initial Assessment:   61-year-old male presenting with headache, blurred vision.  On exam well appearing no acute distress.  Vitals with minimal hypertension.  Neuro exam unremarkable.  Good  strength bilaterally.  No nystagmus.  Headache similar to priors.  Not the worst of his life.  Denies trauma.  No definite risk factors for carbon monoxide poisoning, though patient lives alone.  Gas water heater.  Will get labs, give IV fluids, headache medication.  Given unilateral focal headache with worsening symptoms greater than 2 days and without history of known migraines, will get CT scan.  ED Management:  CT head negative.  Headache resolved with Compazine and Toradol.  Otherwise well-appearing.  Carboxyhemoglobin level within normal limits for an active smoker which the patient is.  Otherwise safe for discharge home.                      Clinical Impression:   Final diagnoses:  [R73.9] Hyperglycemia  [R51.9] Acute nonintractable headache, unspecified headache type (Primary)          ED Disposition Condition    Discharge Stable        ED Prescriptions     None        Follow-up Information     Follow up With Specialties Details Why Contact Info    Miguel Lux MD Internal Medicine, Oncology, Hematology and Oncology Schedule an appointment as soon as possible for a visit   [address] [facility] [address]  [facility] [address]  [phone]      [facility] Emergency Medicine  As needed, If symptoms worsen [address] [facility]  [address]  [phone]           Israel Feliciano MD  03/15/22 0344

## 2022-03-15 NOTE — DISCHARGE INSTRUCTIONS
You were seen in the emergency department for headache. Your labs are reassuring. We gave you some headache medication and you're headache resolved. We think you're safe to go home.  Please follow up with your primary care provider as needed for continual recurrent headaches. Please return for any new headaches, numbness, weakness, changes in vision, or other new or worsening concerns.

## 2022-09-28 NOTE — PROGRESS NOTES
----- Message from Ana Gillespie, Patient Care Assistant sent at 9/28/2022  9:53 AM CDT -----  Regarding: advice  Contact: pt's daughter kenny  Type: Needs Medical Advice  Who Called:  pt's daughter kenny   Best Call Back Number: [phone] (home)     Additional Information:  pt's daughter kenny states she would like a callback regarding an fax sent to the office regarding her mother. Thanks!           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 Tolerating current tx         Scheduled Meds:   atorvastatin  80 mg Oral Daily    clopidogrel  75 mg Oral Daily    lisinopril  5 mg Oral Daily    nicotine  1 patch Transdermal Daily    pantoprazole  40 mg Oral Daily    pregabalin  75 mg Oral BID    traZODone  100 mg Oral QHS    vancomycin (VANCOCIN) IVPB  1,250 mg Intravenous Q12H     Continuous Infusions:   sodium chloride 0.9% 125 mL/hr at 02/18/18 1610     PRN Meds:dextrose 50%, dextrose 50%, glucagon (human recombinant), glucose, glucose, insulin aspart U-100, oxyCODONE-acetaminophen    Review of patient's allergies indicates:   Allergen Reactions    Codeine Rash     Other reaction(s): Unknown    Vicodin [hydrocodone-acetaminophen] Rash       Review of Systems    Constitutional: + fatigue and nerve pain   Eyes: no visual changes   ENT: no nasal congestion. Denies sore throat with odynophagia   Respiratory: No cough, SOB, exertional dyspnea or  hemoptysis   Cardiovascular: no chest pain or palpitations   Gastrointestinal: no nausea, vomiting or abdominal pain. Normal bowl habits   Hematologic/Lymphatic: No lymphadenopathy, no significant fatigue, fever or night sweat. No mucocutaneous bleeding   Musculoskeletal: see HPI  Neurological: no seizures or tremors, gait or balance prblems  Skin: left foot- 2nd toe- discoloration   Psych: Denies any anxiety, depression or insomnia    OBJECTIVE:     Vital Signs (Most Recent)  Temp: 97.9 °F (36.6 °C) (02/19/18 0712)  Pulse: 65 (02/19/18 0712)  Resp: 17 (02/19/18 0712)  BP: (!) 96/55 (02/19/18 0712)  SpO2: 96 % (02/19/18 0712)    Vital Signs Range (Last 24H):  Temp:  [96.3 °F (35.7 °C)-98.4 °F (36.9 °C)]   Pulse:  [65-92]   Resp:  [16-18]   BP: ()/(55-80)   SpO2:  [95 %-99 %]     I & O (Last 24H):  Intake/Output Summary (Last 24 hours) at  02/19/18 0739  Last data filed at 02/18/18 1740   Gross per 24 hour   Intake              120 ml   Output                0 ml   Net              120 ml     Physical Exam:    General: NAD, resting in bed.   Head: normocephalic, atraumatic   Eyes: conjunctivae pink, anicteric sclera.   Throat: No erythema or post nasal discharge   Neck: supple, no LAD   Lungs: decreased BS at the bases   Heart: S1, S2, RRR   Abdomen: obese,  + BS x 4 quadrants  Extremities: left foot 2nd toe with dry gangrene and redness to base   Skin: turgor normal,  MS: hx of right big toe amputation   Psy: pleasant, affect is congruent to mood       Laboratory:  CBC:   Recent Labs  Lab 02/19/18  0440   WBC 5.74   RBC 4.55*   HGB 13.2*   HCT 39.9*      MCV 88   MCH 29.0   MCHC 33.1     CMP:   Recent Labs  Lab 02/19/18  0440   *   CALCIUM 9.3   ALBUMIN 3.0*   PROT 7.2   *   K 4.6   CO2 24      BUN 28*   CREATININE 1.2   ALKPHOS 79   ALT 19   AST 13   BILITOT 0.2     Coagulation: No results for input(s): LABPROT, INR, APTT in the last 168 hours.  Cardiac markers: No results for input(s): CKMB, CPKMB, TROPONINT, TROPONINI, MYOGLOBIN in the last 168 hours.  ABGs: No results for input(s): PH, PCO2, PO2, HCO3, POCSATURATED, BE in the last 168 hours.  Microbiology Results (last 7 days)     ** No results found for the last 168 hours. **        Specimen (12h ago through future)    None          Diagnostic Results:    Current Facility-Administered Medications   Medication Dose Route Frequency Provider Last Rate Last Dose    0.9%  NaCl infusion   Intravenous Continuous David Mccall  mL/hr at 02/19/18 2136      atorvastatin tablet 80 mg  80 mg Oral Daily David Mccall MD        clopidogrel tablet 75 mg  75 mg Oral Daily David Mccall MD   Stopped at 02/19/18 0927    dextrose 50% injection 12.5 g  12.5 g Intravenous PRN David Mccall MD        dextrose 50% injection 25 g  25 g Intravenous PRN David Mccall MD         glucagon (human recombinant) injection 1 mg  1 mg Intramuscular PRN David Mccall MD        glucose chewable tablet 16 g  16 g Oral PRN David Mccall MD        glucose chewable tablet 24 g  24 g Oral PRN David Mccall MD        insulin aspart U-100 pen 1-10 Units  1-10 Units Subcutaneous QID (AC + HS) PRN David Mccall MD   2 Units at 02/19/18 1246    lisinopril tablet 5 mg  5 mg Oral Daily David Mccall MD   Stopped at 02/19/18 0928    nicotine 21 mg/24 hr 1 patch  1 patch Transdermal Daily Phan Singleton MD   1 patch at 02/19/18 0927    oxyCODONE-acetaminophen  mg per tablet 1 tablet  1 tablet Oral Q6H PRN Phan Singleton MD   1 tablet at 02/19/18 2131    pantoprazole EC tablet 40 mg  40 mg Oral Daily David Mccall MD   40 mg at 02/19/18 0928    pregabalin capsule 75 mg  75 mg Oral BID Phan Singleton MD        traZODone tablet 100 mg  100 mg Oral QHS Phan Singleton MD   100 mg at 02/19/18 2131    vancomycin (VANCOCIN) 1,250 mg in sodium chloride 0.9% 250 mL IVPB  1,250 mg Intravenous Q12H David Mccall .7 mL/hr at 02/19/18 1740 1,250 mg at 02/19/18 1740         ASSESSMENT/PLAN:     Active Hospital Problems    Diagnosis  POA    *Diabetic foot infection [E11.69, L08.9]    2nd toe of L foot  Ortho consulted  Added vanco   Cultures negative so far     Yes    Chronic pain syndrome [G89.4]  Yes    Diabetic polyneuropathy associated with type 2 diabetes mellitus     [E11.42]  - pt's DM difficult to control due to poor compliance   - on Pregablin     Yes    Chronic back pain [M54.9, G89.29]  - on oxycodone      Yes    Type II diabetes mellitus with neurological manifestations [E11.49]    - insulin   Yes      Resolved Hospital Problems    Diagnosis Date Resolved POA   No resolved problems to display.     DVT prophylaxis: SCD    Miguel Lux M.D  Internal Medicine & Geriatric Medicine  Hematology & Oncology  Palliative Medicine    [address]  [address]  [place] [address]  [phone] (Office)  [phone] (Fax)

## 2022-11-28 ENCOUNTER — HOSPITAL ENCOUNTER (INPATIENT)
Facility: HOSPITAL | Age: 62
LOS: 8 days | Discharge: HOME | DRG: 280 | End: 2022-12-06
Attending: EMERGENCY MEDICINE | Admitting: STUDENT IN AN ORGANIZED HEALTH CARE EDUCATION/TRAINING PROGRAM
Payer: MEDICAID

## 2022-11-28 DIAGNOSIS — R06.02 SHORTNESS OF BREATH: ICD-10-CM

## 2022-11-28 DIAGNOSIS — R09.02 HYPOXIA: ICD-10-CM

## 2022-11-28 DIAGNOSIS — J96.00 ACUTE RESPIRATORY FAILURE, UNSPECIFIED WHETHER WITH HYPOXIA OR HYPERCAPNIA: ICD-10-CM

## 2022-11-28 DIAGNOSIS — N18.9 CHRONIC KIDNEY DISEASE, UNSPECIFIED CKD STAGE: ICD-10-CM

## 2022-11-28 DIAGNOSIS — R41.82 ALTERED MENTAL STATUS: ICD-10-CM

## 2022-11-28 DIAGNOSIS — R00.0 TACHYCARDIA: ICD-10-CM

## 2022-11-28 DIAGNOSIS — R03.0 ELEVATED BLOOD PRESSURE READING: ICD-10-CM

## 2022-11-28 DIAGNOSIS — R73.9 HYPERGLYCEMIA: ICD-10-CM

## 2022-11-28 DIAGNOSIS — I21.4 NSTEMI (NON-ST ELEVATED MYOCARDIAL INFARCTION): Primary | ICD-10-CM

## 2022-11-28 DIAGNOSIS — R79.89 TROPONIN LEVEL ELEVATED: ICD-10-CM

## 2022-11-28 LAB
ALBUMIN SERPL BCP-MCNC: 3.5 G/DL (ref 3.5–5.2)
ALLENS TEST: ABNORMAL
ALP SERPL-CCNC: 77 U/L (ref 55–135)
ALT SERPL W/O P-5'-P-CCNC: 23 U/L (ref 10–44)
AMMONIA PLAS-SCNC: 25 UMOL/L (ref 10–50)
AMPHET+METHAMPHET UR QL: NEGATIVE
ANION GAP SERPL CALC-SCNC: 13 MMOL/L (ref 8–16)
AST SERPL-CCNC: 28 U/L (ref 10–40)
B-OH-BUTYR BLD STRIP-SCNC: 0.1 MMOL/L (ref 0–0.5)
BACTERIA #/AREA URNS HPF: ABNORMAL /HPF
BARBITURATES UR QL SCN>200 NG/ML: NEGATIVE
BASOPHILS # BLD AUTO: 0.08 K/UL (ref 0–0.2)
BASOPHILS NFR BLD: 0.6 % (ref 0–1.9)
BENZODIAZ UR QL SCN>200 NG/ML: NEGATIVE
BILIRUB SERPL-MCNC: 0.6 MG/DL (ref 0.1–1)
BILIRUB UR QL STRIP: NEGATIVE
BNP SERPL-MCNC: 231 PG/ML (ref 0–99)
BUN SERPL-MCNC: 26 MG/DL (ref 6–20)
BZE UR QL SCN: NEGATIVE
CALCIUM SERPL-MCNC: 8.8 MG/DL (ref 8.7–10.5)
CANNABINOIDS UR QL SCN: NEGATIVE
CHLORIDE SERPL-SCNC: 103 MMOL/L (ref 95–110)
CLARITY UR: CLEAR
CO2 SERPL-SCNC: 19 MMOL/L (ref 23–29)
COLOR UR: YELLOW
CREAT SERPL-MCNC: 1.8 MG/DL (ref 0.5–1.4)
CREAT UR-MCNC: 108.6 MG/DL (ref 23–375)
CTP QC/QA: YES
CTP QC/QA: YES
D DIMER PPP IA.FEU-MCNC: 0.71 MG/L FEU
DELSYS: ABNORMAL
DIFFERENTIAL METHOD: ABNORMAL
EOSINOPHIL # BLD AUTO: 0.5 K/UL (ref 0–0.5)
EOSINOPHIL NFR BLD: 3.4 % (ref 0–8)
ERYTHROCYTE [DISTWIDTH] IN BLOOD BY AUTOMATED COUNT: 14.5 % (ref 11.5–14.5)
ERYTHROCYTE [SEDIMENTATION RATE] IN BLOOD BY WESTERGREN METHOD: 20 MM/H
EST. GFR  (NO RACE VARIABLE): 43 ML/MIN/1.73 M^2
ETHANOL SERPL-MCNC: <10 MG/DL
FIO2: 100
GLUCOSE SERPL-MCNC: 445 MG/DL (ref 70–110)
GLUCOSE UR QL STRIP: ABNORMAL
HCO3 UR-SCNC: 21.6 MMOL/L (ref 24–28)
HCT VFR BLD AUTO: 31.7 % (ref 40–54)
HGB BLD-MCNC: 11.6 G/DL (ref 14–18)
HGB UR QL STRIP: ABNORMAL
HYALINE CASTS #/AREA URNS LPF: 6 /LPF
IMM GRANULOCYTES # BLD AUTO: 0.14 K/UL (ref 0–0.04)
IMM GRANULOCYTES NFR BLD AUTO: 1 % (ref 0–0.5)
KETONES UR QL STRIP: NEGATIVE
LACTATE SERPL-SCNC: 4.9 MMOL/L (ref 0.5–2.2)
LEUKOCYTE ESTERASE UR QL STRIP: NEGATIVE
LIPASE SERPL-CCNC: 12 U/L (ref 4–60)
LYMPHOCYTES # BLD AUTO: 5.5 K/UL (ref 1–4.8)
LYMPHOCYTES NFR BLD: 41.1 % (ref 18–48)
MAGNESIUM SERPL-MCNC: 1.8 MG/DL (ref 1.6–2.6)
MCH RBC QN AUTO: 44.4 PG (ref 27–31)
MCHC RBC AUTO-ENTMCNC: 36.6 G/DL (ref 32–36)
MCV RBC AUTO: 122 FL (ref 82–98)
METHADONE UR QL SCN>300 NG/ML: NEGATIVE
MICROSCOPIC COMMENT: ABNORMAL
MIN VOL: 10.8
MODE: ABNORMAL
MONOCYTES # BLD AUTO: 0.8 K/UL (ref 0.3–1)
MONOCYTES NFR BLD: 5.7 % (ref 4–15)
NEUTROPHILS # BLD AUTO: 6.5 K/UL (ref 1.8–7.7)
NEUTROPHILS NFR BLD: 48.2 % (ref 38–73)
NITRITE UR QL STRIP: NEGATIVE
NON-SQ EPI CELLS #/AREA URNS HPF: 0 /HPF
NRBC BLD-RTO: 0 /100 WBC
OPIATES UR QL SCN: NEGATIVE
PCO2 BLDA: 42.2 MMHG (ref 35–45)
PCP UR QL SCN>25 NG/ML: NEGATIVE
PEEP: 5
PH SMN: 7.32 [PH] (ref 7.35–7.45)
PH UR STRIP: 6 [PH] (ref 5–8)
PHOSPHATE SERPL-MCNC: 3.5 MG/DL (ref 2.7–4.5)
PIP: 27
PLATELET # BLD AUTO: 201 K/UL (ref 150–450)
PMV BLD AUTO: 10.3 FL (ref 9.2–12.9)
PO2 BLDA: 142 MMHG (ref 80–100)
POC BE: -4 MMOL/L
POC MOLECULAR INFLUENZA A AGN: NEGATIVE
POC MOLECULAR INFLUENZA B AGN: NEGATIVE
POC SATURATED O2: 99 % (ref 95–100)
POC TCO2: 23 MMOL/L (ref 23–27)
POCT GLUCOSE: 373 MG/DL (ref 70–110)
POTASSIUM SERPL-SCNC: 3.8 MMOL/L (ref 3.5–5.1)
PROT SERPL-MCNC: 7.4 G/DL (ref 6–8.4)
PROT UR QL STRIP: ABNORMAL
RBC # BLD AUTO: 2.61 M/UL (ref 4.6–6.2)
RBC #/AREA URNS HPF: 0 /HPF (ref 0–4)
SAMPLE: ABNORMAL
SARS-COV-2 RDRP RESP QL NAA+PROBE: NEGATIVE
SITE: ABNORMAL
SODIUM SERPL-SCNC: 135 MMOL/L (ref 136–145)
SP GR UR STRIP: 1.02 (ref 1–1.03)
SP02: 99
SQUAMOUS #/AREA URNS HPF: 0 /HPF
TOXICOLOGY INFORMATION: NORMAL
URN SPEC COLLECT METH UR: ABNORMAL
UROBILINOGEN UR STRIP-ACNC: NEGATIVE EU/DL
VT: 550
WBC # BLD AUTO: 13.42 K/UL (ref 3.9–12.7)
WBC #/AREA URNS HPF: 5 /HPF (ref 0–5)
YEAST URNS QL MICRO: ABNORMAL

## 2022-11-28 PROCEDURE — 93010 EKG 12-LEAD: ICD-10-PCS | Mod: ,,, | Performed by: INTERNAL MEDICINE

## 2022-11-28 PROCEDURE — 99291 CRITICAL CARE FIRST HOUR: CPT | Mod: 25

## 2022-11-28 PROCEDURE — 83880 ASSAY OF NATRIURETIC PEPTIDE: CPT | Performed by: EMERGENCY MEDICINE

## 2022-11-28 PROCEDURE — 87077 CULTURE AEROBIC IDENTIFY: CPT | Mod: 59 | Performed by: EMERGENCY MEDICINE

## 2022-11-28 PROCEDURE — 25000003 PHARM REV CODE 250

## 2022-11-28 PROCEDURE — 82140 ASSAY OF AMMONIA: CPT | Performed by: EMERGENCY MEDICINE

## 2022-11-28 PROCEDURE — 84439 ASSAY OF FREE THYROXINE: CPT | Performed by: EMERGENCY MEDICINE

## 2022-11-28 PROCEDURE — 87502 INFLUENZA DNA AMP PROBE: CPT

## 2022-11-28 PROCEDURE — 25000003 PHARM REV CODE 250: Performed by: EMERGENCY MEDICINE

## 2022-11-28 PROCEDURE — 93010 ELECTROCARDIOGRAM REPORT: CPT | Mod: ,,, | Performed by: INTERNAL MEDICINE

## 2022-11-28 PROCEDURE — 82746 ASSAY OF FOLIC ACID SERUM: CPT | Performed by: EMERGENCY MEDICINE

## 2022-11-28 PROCEDURE — 84145 PROCALCITONIN (PCT): CPT | Performed by: EMERGENCY MEDICINE

## 2022-11-28 PROCEDURE — 84100 ASSAY OF PHOSPHORUS: CPT | Performed by: EMERGENCY MEDICINE

## 2022-11-28 PROCEDURE — 87186 SC STD MICRODIL/AGAR DIL: CPT | Mod: 59 | Performed by: EMERGENCY MEDICINE

## 2022-11-28 PROCEDURE — 84484 ASSAY OF TROPONIN QUANT: CPT | Performed by: EMERGENCY MEDICINE

## 2022-11-28 PROCEDURE — 85379 FIBRIN DEGRADATION QUANT: CPT | Performed by: EMERGENCY MEDICINE

## 2022-11-28 PROCEDURE — 82803 BLOOD GASES ANY COMBINATION: CPT

## 2022-11-28 PROCEDURE — 36600 WITHDRAWAL OF ARTERIAL BLOOD: CPT

## 2022-11-28 PROCEDURE — 83036 HEMOGLOBIN GLYCOSYLATED A1C: CPT | Performed by: EMERGENCY MEDICINE

## 2022-11-28 PROCEDURE — 83605 ASSAY OF LACTIC ACID: CPT | Performed by: EMERGENCY MEDICINE

## 2022-11-28 PROCEDURE — 82077 ASSAY SPEC XCP UR&BREATH IA: CPT | Performed by: EMERGENCY MEDICINE

## 2022-11-28 PROCEDURE — 12000002 HC ACUTE/MED SURGE SEMI-PRIVATE ROOM

## 2022-11-28 PROCEDURE — 83930 ASSAY OF BLOOD OSMOLALITY: CPT | Performed by: EMERGENCY MEDICINE

## 2022-11-28 PROCEDURE — 81000 URINALYSIS NONAUTO W/SCOPE: CPT | Mod: 59 | Performed by: EMERGENCY MEDICINE

## 2022-11-28 PROCEDURE — 85025 COMPLETE CBC W/AUTO DIFF WBC: CPT | Performed by: EMERGENCY MEDICINE

## 2022-11-28 PROCEDURE — 31500 INSERT EMERGENCY AIRWAY: CPT

## 2022-11-28 PROCEDURE — 83690 ASSAY OF LIPASE: CPT | Performed by: EMERGENCY MEDICINE

## 2022-11-28 PROCEDURE — 87635 SARS-COV-2 COVID-19 AMP PRB: CPT | Performed by: EMERGENCY MEDICINE

## 2022-11-28 PROCEDURE — 80053 COMPREHEN METABOLIC PANEL: CPT | Performed by: EMERGENCY MEDICINE

## 2022-11-28 PROCEDURE — 82607 VITAMIN B-12: CPT | Performed by: EMERGENCY MEDICINE

## 2022-11-28 PROCEDURE — 93005 ELECTROCARDIOGRAM TRACING: CPT

## 2022-11-28 PROCEDURE — 86140 C-REACTIVE PROTEIN: CPT | Performed by: EMERGENCY MEDICINE

## 2022-11-28 PROCEDURE — 87040 BLOOD CULTURE FOR BACTERIA: CPT | Mod: 59 | Performed by: EMERGENCY MEDICINE

## 2022-11-28 PROCEDURE — 96365 THER/PROPH/DIAG IV INF INIT: CPT

## 2022-11-28 PROCEDURE — 82010 KETONE BODYS QUAN: CPT | Performed by: EMERGENCY MEDICINE

## 2022-11-28 PROCEDURE — 85730 THROMBOPLASTIN TIME PARTIAL: CPT | Performed by: EMERGENCY MEDICINE

## 2022-11-28 PROCEDURE — 94002 VENT MGMT INPAT INIT DAY: CPT

## 2022-11-28 PROCEDURE — 99900035 HC TECH TIME PER 15 MIN (STAT)

## 2022-11-28 PROCEDURE — 80307 DRUG TEST PRSMV CHEM ANLYZR: CPT | Performed by: EMERGENCY MEDICINE

## 2022-11-28 PROCEDURE — 96360 HYDRATION IV INFUSION INIT: CPT | Mod: 59

## 2022-11-28 PROCEDURE — 84443 ASSAY THYROID STIM HORMONE: CPT | Performed by: EMERGENCY MEDICINE

## 2022-11-28 PROCEDURE — 83735 ASSAY OF MAGNESIUM: CPT | Performed by: EMERGENCY MEDICINE

## 2022-11-28 PROCEDURE — 85610 PROTHROMBIN TIME: CPT | Performed by: EMERGENCY MEDICINE

## 2022-11-28 RX ORDER — ROCURONIUM BROMIDE 10 MG/ML
100 INJECTION, SOLUTION INTRAVENOUS
Status: COMPLETED | OUTPATIENT
Start: 2022-11-28 | End: 2022-11-28

## 2022-11-28 RX ORDER — PROPOFOL 10 MG/ML
0-50 INJECTION, EMULSION INTRAVENOUS CONTINUOUS
Status: DISCONTINUED | OUTPATIENT
Start: 2022-11-29 | End: 2022-11-29

## 2022-11-28 RX ORDER — ETOMIDATE 2 MG/ML
INJECTION INTRAVENOUS
Status: COMPLETED
Start: 2022-11-28 | End: 2022-11-28

## 2022-11-28 RX ORDER — ROCURONIUM BROMIDE 10 MG/ML
INJECTION, SOLUTION INTRAVENOUS
Status: COMPLETED
Start: 2022-11-28 | End: 2022-11-28

## 2022-11-28 RX ORDER — ETOMIDATE 2 MG/ML
30 INJECTION INTRAVENOUS
Status: COMPLETED | OUTPATIENT
Start: 2022-11-28 | End: 2022-11-28

## 2022-11-28 RX ADMIN — ROCURONIUM BROMIDE 100 MG: 10 INJECTION, SOLUTION INTRAVENOUS at 10:11

## 2022-11-28 RX ADMIN — ETOMIDATE 30 MG: 2 INJECTION INTRAVENOUS at 10:11

## 2022-11-28 RX ADMIN — SODIUM CHLORIDE 1000 ML: 0.9 INJECTION, SOLUTION INTRAVENOUS at 11:11

## 2022-11-28 RX ADMIN — ROCURONIUM BROMIDE 100 MG: 10 SOLUTION INTRAVENOUS at 10:11

## 2022-11-28 RX ADMIN — PROPOFOL 5 MCG/KG/MIN: 10 INJECTION, EMULSION INTRAVENOUS at 11:11

## 2022-11-28 NOTE — Clinical Note
The catheter was inserted into the ostium   right coronary artery. An angiography was performed of the right coronary arteries. The angiography was performed via power injection.

## 2022-11-28 NOTE — Clinical Note
The catheter was inserted into the left coronary artery. An angiography was performed of the left coronary arteries. Multiple views were taken. The angiography was performed via power injection.

## 2022-11-29 PROBLEM — I21.4 NSTEMI, INITIAL EPISODE OF CARE: Status: ACTIVE | Noted: 2022-11-29

## 2022-11-29 PROBLEM — R79.89 ELEVATED TROPONIN: Status: ACTIVE | Noted: 2022-11-29

## 2022-11-29 PROBLEM — T81.30XA WOUND DEHISCENCE: Status: ACTIVE | Noted: 2018-06-06

## 2022-11-29 PROBLEM — E87.20 LACTIC ACIDOSIS: Status: ACTIVE | Noted: 2022-11-29

## 2022-11-29 PROBLEM — J96.01 ACUTE HYPOXEMIC RESPIRATORY FAILURE: Status: ACTIVE | Noted: 2022-11-29

## 2022-11-29 LAB
ALBUMIN SERPL BCP-MCNC: 3 G/DL (ref 3.5–5.2)
ALLENS TEST: ABNORMAL
ALP SERPL-CCNC: 50 U/L (ref 55–135)
ALT SERPL W/O P-5'-P-CCNC: 20 U/L (ref 10–44)
ANION GAP SERPL CALC-SCNC: 8 MMOL/L (ref 8–16)
ANISOCYTOSIS BLD QL SMEAR: SLIGHT
AORTIC ROOT ANNULUS: 2.94 CM
AORTIC VALVE CUSP SEPERATION: 1.99 CM
APTT BLDCRRT: 25.4 SEC (ref 21–32)
APTT BLDCRRT: 25.4 SEC (ref 21–32)
APTT BLDCRRT: 35.7 SEC (ref 21–32)
ASCENDING AORTA: 2.96 CM
AST SERPL-CCNC: 22 U/L (ref 10–40)
AV INDEX (PROSTH): 0.58
AV MEAN GRADIENT: 3 MMHG
AV PEAK GRADIENT: 6 MMHG
AV VALVE AREA: 2.3 CM2
AV VELOCITY RATIO: 0.53
BASOPHILS # BLD AUTO: 0.02 K/UL (ref 0–0.2)
BASOPHILS NFR BLD: 0.4 % (ref 0–1.9)
BILIRUB SERPL-MCNC: 0.5 MG/DL (ref 0.1–1)
BSA FOR ECHO PROCEDURE: 2.28 M2
BUN SERPL-MCNC: 24 MG/DL (ref 6–20)
CALCIUM SERPL-MCNC: 8.3 MG/DL (ref 8.7–10.5)
CHLORIDE SERPL-SCNC: 106 MMOL/L (ref 95–110)
CO2 SERPL-SCNC: 20 MMOL/L (ref 23–29)
CREAT SERPL-MCNC: 1.4 MG/DL (ref 0.5–1.4)
CRP SERPL-MCNC: 23.1 MG/L (ref 0–8.2)
CV ECHO LV RWT: 0.44 CM
DELSYS: ABNORMAL
DELSYS: ABNORMAL
DIFFERENTIAL METHOD: ABNORMAL
DOP CALC AO PEAK VEL: 1.21 M/S
DOP CALC AO VTI: 23.8 CM
DOP CALC LVOT AREA: 4 CM2
DOP CALC LVOT DIAMETER: 2.25 CM
DOP CALC LVOT PEAK VEL: 0.64 M/S
DOP CALC LVOT STROKE VOLUME: 54.84 CM3
DOP CALCLVOT PEAK VEL VTI: 13.8 CM
E WAVE DECELERATION TIME: 137.02 MSEC
E/A RATIO: 1.18
E/E' RATIO: 19.6 M/S
ECHO LV POSTERIOR WALL: 1.19 CM (ref 0.6–1.1)
EJECTION FRACTION: 40 %
EOSINOPHIL # BLD AUTO: 0.1 K/UL (ref 0–0.5)
EOSINOPHIL NFR BLD: 1.4 % (ref 0–8)
ERYTHROCYTE [DISTWIDTH] IN BLOOD BY AUTOMATED COUNT: 14 % (ref 11.5–14.5)
ERYTHROCYTE [SEDIMENTATION RATE] IN BLOOD BY WESTERGREN METHOD: 20 MM/H
ERYTHROCYTE [SEDIMENTATION RATE] IN BLOOD BY WESTERGREN METHOD: 20 MM/H
EST. GFR  (NO RACE VARIABLE): 58 ML/MIN/1.73 M^2
ESTIMATED AVG GLUCOSE: 266 MG/DL (ref 68–131)
FIO2: 40
FIO2: 50
FOLATE SERPL-MCNC: 16 NG/ML (ref 4–24)
FRACTIONAL SHORTENING: 21 % (ref 28–44)
GLUCOSE SERPL-MCNC: 269 MG/DL (ref 70–110)
HBA1C MFR BLD: 10.9 % (ref 4–5.6)
HCO3 UR-SCNC: 22.1 MMOL/L (ref 24–28)
HCO3 UR-SCNC: 24.9 MMOL/L (ref 24–28)
HCT VFR BLD AUTO: 26.1 % (ref 40–54)
HGB BLD-MCNC: 9.7 G/DL (ref 14–18)
IMM GRANULOCYTES # BLD AUTO: 0.04 K/UL (ref 0–0.04)
IMM GRANULOCYTES NFR BLD AUTO: 0.7 % (ref 0–0.5)
INR PPP: 1 (ref 0.8–1.2)
INTERVENTRICULAR SEPTUM: 1.37 CM (ref 0.6–1.1)
IVC DIAMETER: 2.05 CM
LA MAJOR: 4.77 CM
LA MINOR: 5.27 CM
LA WIDTH: 5 CM
LACTATE SERPL-SCNC: 2.2 MMOL/L (ref 0.5–2.2)
LEFT ATRIUM SIZE: 3.36 CM
LEFT ATRIUM VOLUME INDEX: 32.4 ML/M2
LEFT ATRIUM VOLUME: 71.51 CM3
LEFT INTERNAL DIMENSION IN SYSTOLE: 4.24 CM (ref 2.1–4)
LEFT VENTRICLE DIASTOLIC VOLUME INDEX: 63.12 ML/M2
LEFT VENTRICLE DIASTOLIC VOLUME: 139.5 ML
LEFT VENTRICLE MASS INDEX: 130 G/M2
LEFT VENTRICLE SYSTOLIC VOLUME INDEX: 36.3 ML/M2
LEFT VENTRICLE SYSTOLIC VOLUME: 80.32 ML
LEFT VENTRICULAR INTERNAL DIMENSION IN DIASTOLE: 5.37 CM (ref 3.5–6)
LEFT VENTRICULAR MASS: 286.66 G
LV LATERAL E/E' RATIO: 19.6 M/S
LV SEPTAL E/E' RATIO: 19.6 M/S
LVOT MG: 0.85 MMHG
LVOT MV: 0.43 CM/S
LYMPHOCYTES # BLD AUTO: 1 K/UL (ref 1–4.8)
LYMPHOCYTES NFR BLD: 16.9 % (ref 18–48)
MAGNESIUM SERPL-MCNC: 1.7 MG/DL (ref 1.6–2.6)
MCH RBC QN AUTO: 43.9 PG (ref 27–31)
MCHC RBC AUTO-ENTMCNC: 37.2 G/DL (ref 32–36)
MCV RBC AUTO: 118 FL (ref 82–98)
MODE: ABNORMAL
MODE: ABNORMAL
MONOCYTES # BLD AUTO: 0.3 K/UL (ref 0.3–1)
MONOCYTES NFR BLD: 5.8 % (ref 4–15)
MV PEAK A VEL: 0.83 M/S
MV PEAK E VEL: 0.98 M/S
MV STENOSIS PRESSURE HALF TIME: 55.15 MS
MV VALVE AREA P 1/2 METHOD: 3.99 CM2
NEUTROPHILS # BLD AUTO: 4.2 K/UL (ref 1.8–7.7)
NEUTROPHILS NFR BLD: 74.8 % (ref 38–73)
NRBC BLD-RTO: 0 /100 WBC
OSMOLALITY SERPL: 307 MOSM/KG (ref 280–300)
PCO2 BLDA: 32.6 MMHG (ref 35–45)
PCO2 BLDA: 41.4 MMHG (ref 35–45)
PEEP: 5
PEEP: 5
PH SMN: 7.39 [PH] (ref 7.35–7.45)
PH SMN: 7.44 [PH] (ref 7.35–7.45)
PHOSPHATE SERPL-MCNC: 2.5 MG/DL (ref 2.7–4.5)
PISA TR MAX VEL: 1.54 M/S
PLATELET # BLD AUTO: 122 K/UL (ref 150–450)
PLATELET BLD QL SMEAR: ABNORMAL
PMV BLD AUTO: 10.4 FL (ref 9.2–12.9)
PO2 BLDA: 39 MMHG (ref 40–60)
PO2 BLDA: 78 MMHG (ref 80–100)
POC ACTIVATED CLOTTING TIME K: 138 SEC (ref 74–137)
POC BE: -2 MMOL/L
POC BE: 0 MMOL/L
POC SATURATED O2: 73 % (ref 95–100)
POC SATURATED O2: 96 % (ref 95–100)
POC TCO2: 23 MMOL/L (ref 23–27)
POC TCO2: 26 MMOL/L (ref 24–29)
POCT GLUCOSE: 256 MG/DL (ref 70–110)
POCT GLUCOSE: 261 MG/DL (ref 70–110)
POCT GLUCOSE: 263 MG/DL (ref 70–110)
POCT GLUCOSE: 283 MG/DL (ref 70–110)
POCT GLUCOSE: 305 MG/DL (ref 70–110)
POLYCHROMASIA BLD QL SMEAR: ABNORMAL
POTASSIUM SERPL-SCNC: 4.6 MMOL/L (ref 3.5–5.1)
PROCALCITONIN SERPL IA-MCNC: 0.1 NG/ML
PROT SERPL-MCNC: 6.2 G/DL (ref 6–8.4)
PROTHROMBIN TIME: 10.7 SEC (ref 9–12.5)
PV PEAK VELOCITY: 0.63 CM/S
RA MAJOR: 3.57 CM
RA WIDTH: 2.7 CM
RBC # BLD AUTO: 2.21 M/UL (ref 4.6–6.2)
SAMPLE: ABNORMAL
SITE: ABNORMAL
SODIUM SERPL-SCNC: 134 MMOL/L (ref 136–145)
SP02: 99
T4 FREE SERPL-MCNC: 0.84 NG/DL (ref 0.71–1.51)
TDI LATERAL: 0.05 M/S
TDI SEPTAL: 0.05 M/S
TDI: 0.05 M/S
TR MAX PG: 9 MMHG
TRICUSPID ANNULAR PLANE SYSTOLIC EXCURSION: 1.34 CM
TROPONIN I SERPL DL<=0.01 NG/ML-MCNC: 3.53 NG/ML (ref 0–0.03)
TROPONIN I SERPL DL<=0.01 NG/ML-MCNC: 3.62 NG/ML (ref 0–0.03)
TROPONIN I SERPL DL<=0.01 NG/ML-MCNC: 4.31 NG/ML (ref 0–0.03)
TSH SERPL DL<=0.005 MIU/L-ACNC: 5.52 UIU/ML (ref 0.4–4)
VIT B12 SERPL-MCNC: <148 PG/ML (ref 210–950)
VT: 550
VT: 550
WBC # BLD AUTO: 5.67 K/UL (ref 3.9–12.7)

## 2022-11-29 PROCEDURE — 63600175 PHARM REV CODE 636 W HCPCS: Performed by: STUDENT IN AN ORGANIZED HEALTH CARE EDUCATION/TRAINING PROGRAM

## 2022-11-29 PROCEDURE — 84484 ASSAY OF TROPONIN QUANT: CPT | Performed by: STUDENT IN AN ORGANIZED HEALTH CARE EDUCATION/TRAINING PROGRAM

## 2022-11-29 PROCEDURE — 85347 COAGULATION TIME ACTIVATED: CPT | Performed by: INTERNAL MEDICINE

## 2022-11-29 PROCEDURE — 99291 PR CRITICAL CARE, E/M 30-74 MINUTES: ICD-10-PCS | Mod: ,,, | Performed by: NURSE PRACTITIONER

## 2022-11-29 PROCEDURE — 25000003 PHARM REV CODE 250: Performed by: SURGERY

## 2022-11-29 PROCEDURE — 94003 VENT MGMT INPAT SUBQ DAY: CPT

## 2022-11-29 PROCEDURE — 25000003 PHARM REV CODE 250: Performed by: HOSPITALIST

## 2022-11-29 PROCEDURE — 82803 BLOOD GASES ANY COMBINATION: CPT

## 2022-11-29 PROCEDURE — C1769 GUIDE WIRE: HCPCS | Performed by: INTERNAL MEDICINE

## 2022-11-29 PROCEDURE — 27000221 HC OXYGEN, UP TO 24 HOURS

## 2022-11-29 PROCEDURE — 25000003 PHARM REV CODE 250: Performed by: INTERNAL MEDICINE

## 2022-11-29 PROCEDURE — 93458 L HRT ARTERY/VENTRICLE ANGIO: CPT | Mod: 26,,, | Performed by: INTERNAL MEDICINE

## 2022-11-29 PROCEDURE — 99291 CRITICAL CARE FIRST HOUR: CPT | Mod: 25,,, | Performed by: INTERNAL MEDICINE

## 2022-11-29 PROCEDURE — 25500020 PHARM REV CODE 255: Performed by: INTERNAL MEDICINE

## 2022-11-29 PROCEDURE — 85730 THROMBOPLASTIN TIME PARTIAL: CPT | Performed by: STUDENT IN AN ORGANIZED HEALTH CARE EDUCATION/TRAINING PROGRAM

## 2022-11-29 PROCEDURE — G0378 HOSPITAL OBSERVATION PER HR: HCPCS

## 2022-11-29 PROCEDURE — 99900026 HC AIRWAY MAINTENANCE (STAT)

## 2022-11-29 PROCEDURE — 83605 ASSAY OF LACTIC ACID: CPT | Performed by: STUDENT IN AN ORGANIZED HEALTH CARE EDUCATION/TRAINING PROGRAM

## 2022-11-29 PROCEDURE — 87070 CULTURE OTHR SPECIMN AEROBIC: CPT | Performed by: NURSE PRACTITIONER

## 2022-11-29 PROCEDURE — 94761 N-INVAS EAR/PLS OXIMETRY MLT: CPT

## 2022-11-29 PROCEDURE — 99900035 HC TECH TIME PER 15 MIN (STAT)

## 2022-11-29 PROCEDURE — C1894 INTRO/SHEATH, NON-LASER: HCPCS | Performed by: INTERNAL MEDICINE

## 2022-11-29 PROCEDURE — 84100 ASSAY OF PHOSPHORUS: CPT | Performed by: STUDENT IN AN ORGANIZED HEALTH CARE EDUCATION/TRAINING PROGRAM

## 2022-11-29 PROCEDURE — C9113 INJ PANTOPRAZOLE SODIUM, VIA: HCPCS | Performed by: SURGERY

## 2022-11-29 PROCEDURE — 99291 PR CRITICAL CARE, E/M 30-74 MINUTES: ICD-10-PCS | Mod: 25,,, | Performed by: INTERNAL MEDICINE

## 2022-11-29 PROCEDURE — 36600 WITHDRAWAL OF ARTERIAL BLOOD: CPT

## 2022-11-29 PROCEDURE — C1887 CATHETER, GUIDING: HCPCS | Performed by: INTERNAL MEDICINE

## 2022-11-29 PROCEDURE — 85025 COMPLETE CBC W/AUTO DIFF WBC: CPT | Performed by: STUDENT IN AN ORGANIZED HEALTH CARE EDUCATION/TRAINING PROGRAM

## 2022-11-29 PROCEDURE — 93458 PR CATH PLACE/CORON ANGIO, IMG SUPER/INTERP,W LEFT HEART VENTRICULOGRAPHY: ICD-10-PCS | Mod: 26,,, | Performed by: INTERNAL MEDICINE

## 2022-11-29 PROCEDURE — 83735 ASSAY OF MAGNESIUM: CPT | Performed by: STUDENT IN AN ORGANIZED HEALTH CARE EDUCATION/TRAINING PROGRAM

## 2022-11-29 PROCEDURE — 25000003 PHARM REV CODE 250: Performed by: EMERGENCY MEDICINE

## 2022-11-29 PROCEDURE — 63600175 PHARM REV CODE 636 W HCPCS: Performed by: HOSPITALIST

## 2022-11-29 PROCEDURE — 36415 COLL VENOUS BLD VENIPUNCTURE: CPT | Performed by: STUDENT IN AN ORGANIZED HEALTH CARE EDUCATION/TRAINING PROGRAM

## 2022-11-29 PROCEDURE — 25000003 PHARM REV CODE 250: Performed by: STUDENT IN AN ORGANIZED HEALTH CARE EDUCATION/TRAINING PROGRAM

## 2022-11-29 PROCEDURE — 87205 SMEAR GRAM STAIN: CPT | Performed by: NURSE PRACTITIONER

## 2022-11-29 PROCEDURE — 80053 COMPREHEN METABOLIC PANEL: CPT | Performed by: STUDENT IN AN ORGANIZED HEALTH CARE EDUCATION/TRAINING PROGRAM

## 2022-11-29 PROCEDURE — 63600175 PHARM REV CODE 636 W HCPCS: Performed by: SURGERY

## 2022-11-29 PROCEDURE — 63600175 PHARM REV CODE 636 W HCPCS: Performed by: EMERGENCY MEDICINE

## 2022-11-29 PROCEDURE — 93458 L HRT ARTERY/VENTRICLE ANGIO: CPT | Performed by: INTERNAL MEDICINE

## 2022-11-29 PROCEDURE — 99291 CRITICAL CARE FIRST HOUR: CPT | Mod: ,,, | Performed by: NURSE PRACTITIONER

## 2022-11-29 PROCEDURE — 63600175 PHARM REV CODE 636 W HCPCS: Performed by: NURSE PRACTITIONER

## 2022-11-29 PROCEDURE — 20000000 HC ICU ROOM

## 2022-11-29 RX ORDER — PANTOPRAZOLE SODIUM 40 MG/10ML
40 INJECTION, POWDER, LYOPHILIZED, FOR SOLUTION INTRAVENOUS DAILY
Status: DISCONTINUED | OUTPATIENT
Start: 2022-11-29 | End: 2022-12-05

## 2022-11-29 RX ORDER — HEPARIN SODIUM,PORCINE/D5W 25000/250
0-40 INTRAVENOUS SOLUTION INTRAVENOUS CONTINUOUS
Status: DISCONTINUED | OUTPATIENT
Start: 2022-11-29 | End: 2022-11-29

## 2022-11-29 RX ORDER — ATORVASTATIN CALCIUM 40 MG/1
40 TABLET, FILM COATED ORAL DAILY
Status: DISCONTINUED | OUTPATIENT
Start: 2022-11-29 | End: 2022-12-02

## 2022-11-29 RX ORDER — FUROSEMIDE 10 MG/ML
40 INJECTION INTRAMUSCULAR; INTRAVENOUS ONCE
Status: COMPLETED | OUTPATIENT
Start: 2022-11-29 | End: 2022-11-29

## 2022-11-29 RX ORDER — INSULIN ASPART 100 [IU]/ML
0-5 INJECTION, SOLUTION INTRAVENOUS; SUBCUTANEOUS EVERY 6 HOURS PRN
Status: DISCONTINUED | OUTPATIENT
Start: 2022-11-29 | End: 2022-11-30

## 2022-11-29 RX ORDER — CHLORHEXIDINE GLUCONATE ORAL RINSE 1.2 MG/ML
15 SOLUTION DENTAL 2 TIMES DAILY
Status: DISCONTINUED | OUTPATIENT
Start: 2022-11-29 | End: 2022-11-30

## 2022-11-29 RX ORDER — LIDOCAINE HYDROCHLORIDE 10 MG/ML
INJECTION, SOLUTION EPIDURAL; INFILTRATION; INTRACAUDAL; PERINEURAL
Status: DISCONTINUED | OUTPATIENT
Start: 2022-11-29 | End: 2022-11-29 | Stop reason: HOSPADM

## 2022-11-29 RX ORDER — METOPROLOL TARTRATE 1 MG/ML
5 INJECTION, SOLUTION INTRAVENOUS
Status: COMPLETED | OUTPATIENT
Start: 2022-11-29 | End: 2022-11-29

## 2022-11-29 RX ORDER — METOPROLOL TARTRATE 25 MG/1
25 TABLET, FILM COATED ORAL 3 TIMES DAILY
Status: DISCONTINUED | OUTPATIENT
Start: 2022-11-29 | End: 2022-12-02

## 2022-11-29 RX ORDER — GLUCAGON 1 MG
1 KIT INJECTION
Status: DISCONTINUED | OUTPATIENT
Start: 2022-11-29 | End: 2022-12-06 | Stop reason: HOSPADM

## 2022-11-29 RX ORDER — CLOPIDOGREL BISULFATE 75 MG/1
75 TABLET ORAL DAILY
Status: DISCONTINUED | OUTPATIENT
Start: 2022-11-29 | End: 2022-11-29

## 2022-11-29 RX ORDER — MUPIROCIN 20 MG/G
OINTMENT TOPICAL 2 TIMES DAILY
Status: COMPLETED | OUTPATIENT
Start: 2022-11-29 | End: 2022-12-03

## 2022-11-29 RX ORDER — HYDRALAZINE HYDROCHLORIDE 20 MG/ML
10 INJECTION INTRAMUSCULAR; INTRAVENOUS EVERY 4 HOURS PRN
Status: DISCONTINUED | OUTPATIENT
Start: 2022-11-29 | End: 2022-12-06 | Stop reason: HOSPADM

## 2022-11-29 RX ORDER — PROPOFOL 10 MG/ML
0-50 INJECTION, EMULSION INTRAVENOUS CONTINUOUS
Status: DISCONTINUED | OUTPATIENT
Start: 2022-11-29 | End: 2022-11-30

## 2022-11-29 RX ORDER — CLOPIDOGREL BISULFATE 75 MG/1
75 TABLET ORAL DAILY
Status: DISCONTINUED | OUTPATIENT
Start: 2022-11-29 | End: 2022-12-02

## 2022-11-29 RX ORDER — METOPROLOL TARTRATE 25 MG/1
25 TABLET, FILM COATED ORAL 3 TIMES DAILY
Status: DISCONTINUED | OUTPATIENT
Start: 2022-11-29 | End: 2022-11-29

## 2022-11-29 RX ORDER — NAPROXEN SODIUM 220 MG/1
81 TABLET, FILM COATED ORAL DAILY
Status: DISCONTINUED | OUTPATIENT
Start: 2022-11-29 | End: 2022-12-02

## 2022-11-29 RX ORDER — ASPIRIN 325 MG
325 TABLET ORAL
Status: COMPLETED | OUTPATIENT
Start: 2022-11-29 | End: 2022-11-29

## 2022-11-29 RX ORDER — VANCOMYCIN HCL IN 5 % DEXTROSE 1G/250ML
1000 PLASTIC BAG, INJECTION (ML) INTRAVENOUS
Status: DISCONTINUED | OUTPATIENT
Start: 2022-11-30 | End: 2022-12-01

## 2022-11-29 RX ORDER — FENTANYL CITRATE-0.9 % NACL/PF 10 MCG/ML
0-250 PLASTIC BAG, INJECTION (ML) INTRAVENOUS CONTINUOUS
Status: DISCONTINUED | OUTPATIENT
Start: 2022-11-29 | End: 2022-11-30

## 2022-11-29 RX ADMIN — INSULIN DETEMIR 5 UNITS: 100 INJECTION, SOLUTION SUBCUTANEOUS at 08:11

## 2022-11-29 RX ADMIN — METOPROLOL TARTRATE 25 MG: 25 TABLET, FILM COATED ORAL at 08:11

## 2022-11-29 RX ADMIN — CLOPIDOGREL BISULFATE 75 MG: 75 TABLET ORAL at 08:11

## 2022-11-29 RX ADMIN — PROPOFOL 30 MCG/KG/MIN: 10 INJECTION, EMULSION INTRAVENOUS at 09:11

## 2022-11-29 RX ADMIN — PROPOFOL 30 MCG/KG/MIN: 10 INJECTION, EMULSION INTRAVENOUS at 04:11

## 2022-11-29 RX ADMIN — MUPIROCIN: 20 OINTMENT TOPICAL at 08:11

## 2022-11-29 RX ADMIN — PROPOFOL 40 MCG/KG/MIN: 10 INJECTION, EMULSION INTRAVENOUS at 09:11

## 2022-11-29 RX ADMIN — METOPROLOL TARTRATE 25 MG: 25 TABLET, FILM COATED ORAL at 05:11

## 2022-11-29 RX ADMIN — HEPARIN SODIUM 12 UNITS/KG/HR: 10000 INJECTION, SOLUTION INTRAVENOUS at 12:11

## 2022-11-29 RX ADMIN — FUROSEMIDE 40 MG: 10 INJECTION INTRAMUSCULAR; INTRAVENOUS at 11:11

## 2022-11-29 RX ADMIN — VANCOMYCIN HYDROCHLORIDE 2000 MG: 500 INJECTION, POWDER, LYOPHILIZED, FOR SOLUTION INTRAVENOUS at 09:11

## 2022-11-29 RX ADMIN — INSULIN ASPART 3 UNITS: 100 INJECTION, SOLUTION INTRAVENOUS; SUBCUTANEOUS at 08:11

## 2022-11-29 RX ADMIN — PROPOFOL 40 MCG/KG/MIN: 10 INJECTION, EMULSION INTRAVENOUS at 02:11

## 2022-11-29 RX ADMIN — INSULIN ASPART 3 UNITS: 100 INJECTION, SOLUTION INTRAVENOUS; SUBCUTANEOUS at 11:11

## 2022-11-29 RX ADMIN — ASPIRIN 81 MG CHEWABLE TABLET 81 MG: 81 TABLET CHEWABLE at 08:11

## 2022-11-29 RX ADMIN — SODIUM CHLORIDE 1000 ML: 0.9 INJECTION, SOLUTION INTRAVENOUS at 12:11

## 2022-11-29 RX ADMIN — ASPIRIN 325 MG ORAL TABLET 325 MG: 325 PILL ORAL at 12:11

## 2022-11-29 RX ADMIN — METOROPROLOL TARTRATE 5 MG: 5 INJECTION, SOLUTION INTRAVENOUS at 12:11

## 2022-11-29 RX ADMIN — CHLORHEXIDINE GLUCONATE 0.12% ORAL RINSE 15 ML: 1.2 LIQUID ORAL at 08:11

## 2022-11-29 RX ADMIN — LOSARTAN POTASSIUM 12.5 MG: 25 TABLET, FILM COATED ORAL at 05:11

## 2022-11-29 RX ADMIN — INSULIN ASPART 2 UNITS: 100 INJECTION, SOLUTION INTRAVENOUS; SUBCUTANEOUS at 02:11

## 2022-11-29 RX ADMIN — INSULIN ASPART 3 UNITS: 100 INJECTION, SOLUTION INTRAVENOUS; SUBCUTANEOUS at 04:11

## 2022-11-29 RX ADMIN — MUPIROCIN: 20 OINTMENT TOPICAL at 11:11

## 2022-11-29 RX ADMIN — PANTOPRAZOLE SODIUM 40 MG: 40 INJECTION, POWDER, FOR SOLUTION INTRAVENOUS at 08:11

## 2022-11-29 RX ADMIN — ATORVASTATIN CALCIUM 40 MG: 40 TABLET, FILM COATED ORAL at 05:11

## 2022-11-29 RX ADMIN — PROPOFOL 40 MCG/KG/MIN: 10 INJECTION, EMULSION INTRAVENOUS at 05:11

## 2022-11-29 NOTE — HPI
Carlos Cifuentes Jr. is a 61 y.o. male who has a past medical history of Arthritis, Back pain, Bulging lumbar disc, C. difficile diarrhea, Chronic back pain, Diabetes mellitus, Diabetes mellitus type II, DM, type 2, uncontrolled, Hepatitis C, MSSA septicemia, Neuromuscular disorder, Neuropathy, and Staph aureus infection, presented to the ED with CC of SOB, abdominal pain, nausea with vomiting, and Headache.  Patient was intubated upon arrival to ED after receiving 2 mg of IV Ativan for anxiety, as he became obtunded and had difficulty breathing; therefore, HPI from EMS and ED report:      Initial EMS call was for abdominal pain with vomiting and hyperglycemia.  EMS found patient with vomiting but also anxiety. EMS said that patient would not allow them to check vitals, so EMS administered IV Ativan 2 mg, and patient then became obtunded and had difficulty breathing.  EMS reports  and /93 s/p ativan.  EMS states patient administered SC insulin 28 units prior to their arrival, but it is unclear which insulin this is. Patient's sister arrived to ER later and supplemented HPI.  She reports that patient had substernal acid reflux like chest pain with radiation to his left arm yesterday Sunday 11/27/22 and went to the ER at [facility].  However he was in the lobby for several hours and was not seen.  He then left. His chest pain recurred tonight Monday 11/28/22, he called his sister to tell her that he felt bad and was short of breath.  She urged to call 911, which he did.  She suspects that patient became anxious and agitated when EMS strapped him to their gurney, as patient has a history of claustrophobia relating to a childhood incident in which he was papoosed to get staples in his scalp.     In the ED, patient was intubated and sedated with propofol.  His blood pressure was still 200/100, despite propofol use.  His troponin was 4 without ST elevation and lactic acid 4.9, he was started on NSTEMI protocol  with heparin drip and given fluids.  Mild reactive leukocytosis corrected with fluids only.      On vent in ICU  Troponin 4.3 trending down  EKG sinus tachycardia 117 NSSTT changes  Hx bilateral AKA    Echo 11/29/22  The left ventricle is normal in size with concentric hypertrophy and mildly decreased systolic function.  The estimated ejection fraction is 40%.  Grade I left ventricular diastolic dysfunction.  Normal right ventricular size with normal right ventricular systolic function.  TDS    Echo 2014    1 - Hyperdynamic left ventricular systolic function (EF 60-65%).     2 - Cannot fully r/o but no obvious vegetation

## 2022-11-29 NOTE — MEDICAL/APP STUDENT
[facility] - Cath Lab  Progress Note    Patient Name: Carlos Cifuentes Jr.  MRN: [id number]  Patient Class: IP- Inpatient   Admission Date: 11/28/2022  Length of Stay: 0 days  Attending Physician: Pranay Michaels MD  Primary Care Provider: Miguel Lux MD    Subjective:     Principal Problem: NSTEMI, initial episode of care    Chief Complaint:   Chief Complaint   Patient presents with    Respiratory Distress     Brought in by [facility] responsive to painful stimuli with assist bag valve mask reports being called out for abdominal pain accompanied by vomiting and anxiety, Ativan 2 mg given IV       HPI:   Carlos Cifuentes Jr. is a 61 y.o. male who has a past medical history of Arthritis, Back pain, Bulging lumbar disc, C. difficile diarrhea, Chronic back pain, Diabetes mellitus, Diabetes mellitus type II, DM, type 2, uncontrolled, Hepatitis C, MSSA septicemia, Neuromuscular disorder, Neuropathy, and Staph aureus infection, presented to the ED with CC of SOB, abdominal pain, nausea with vomiting, and Headache.  Patient was intubated upon arrival to ED after receiving 2 mg of IV Ativan for anxiety, as he became obtunded and had difficulty breathing; therefore, HPI from EMS and ED report:     Initial EMS call was for abdominal pain with vomiting and hyperglycemia.  EMS found patient with vomiting but also anxiety. EMS said that patient would not allow them to check vitals, so EMS administered IV Ativan 2 mg, and patient then became obtunded and had difficulty breathing.  EMS reports  and /93 s/p ativan.  EMS states patient administered SC insulin 28 units prior to their arrival, but it is unclear which insulin this is. Patient's sister arrived to ER later and supplemented HPI.  She reports that patient had substernal acid reflux like chest pain with radiation to his left arm yesterday Sunday 11/27/22 and went to the ER at [facility].  However he was in the lobby for several hours and was not seen.  He then left.  His chest pain recurred tonight Monday 11/28/22, he called his sister to tell her that he felt bad and was short of breath.  She urged to call 911, which he did.  She suspects that patient became anxious and agitated when EMS strapped him to their gurney, as patient has a history of claustrophobia relating to a childhood incident in which he was papoosed to get staples in his scalp.    In the ED, patient was intubated and sedated with propofol.  His blood pressure was still 200/100, despite propofol use.  His troponin was 4 without ST elevation and lactic acid 4.9, he was started on NSTEMI protocol with heparin drip and given fluids.  Mild reactive leukocytosis corrected with fluids only.      Hospital Course: No notes on file    Interval History: NAEON. Pt intubated responding to verbal stimuli. Taken to cath lab for angio today.     Past Medical History:   Diagnosis Date    Arthritis     Back pain     Bulging lumbar disc     C. difficile diarrhea     Chronic back pain 10/14/2014    Diabetes mellitus     Diabetes mellitus type II     DM (diabetes mellitus), type 2, uncontrolled 3/12/2013    Hepatitis C 7/3/2013    MSSA (methicillin susceptible Staphylococcus aureus) septicemia     Neuromuscular disorder     Neuropathy     Staph aureus infection        Past Surgical History:   Procedure Laterality Date    ABOVE-KNEE AMPUTATION Bilateral     ANGIOGRAPHY OF LOWER EXTREMITY Left 06/14/2018    Procedure: Angiogram LEFT LOWER Extremity with US guided access from right side;  Surgeon: Sony Srinivasan MD;  Location: [facility] OR [address] [place] [address];  Service: Peripheral Vascular;  Laterality: Left;  local: 16ml  contrast: 20ml   fluoro: 2.8  427.73mGy    APPENDECTOMY      JOINT REPLACEMENT      left knee surgery      left leg surgery      broken bone    LEG SURGERY  right     Right arm boil      Right great toe amputation      TOE AMPUTATION Right        Review of patient's allergies indicates:   Allergen Reactions    Codeine Rash     Lyrica [pregabalin]      Feet turned Red    Vicodin [hydrocodone-acetaminophen] Rash       No current facility-administered medications on file prior to encounter.     Current Outpatient Medications on File Prior to Encounter   Medication Sig    gabapentin (NEURONTIN) 300 MG capsule TAKE 1 CAPSULE BY MOUTH THREE TIMES A DAY    glimepiride (AMARYL) 2 MG tablet Take 1 tablet (2 mg total) by mouth every morning.    LANTUS U-100 INSULIN 100 unit/mL injection INJECT TEN UNITS INTO SKIN AT BEDTIME    metFORMIN (GLUCOPHAGE) 1000 MG tablet TAKE ONE TABLET BY MOUTH TWICE DAILY    mupirocin (BACTROBAN) 2 % ointment Apply to affected area 3 times daily    pantoprazole (PROTONIX) 40 MG tablet TAKE ONE TABLET BY MOUTH EVERY DAY    silver sulfADIAZINE 1% (SILVADENE) 1 % cream Apply 1 application topically once daily. Apply to affected area    TRUE METRIX GLUCOSE METER Misc AS DIRECTED    TRUE METRIX GLUCOSE TEST STRIP Strp Twice daily blood sugar checks     Family History       Problem Relation (Age of Onset)    Arthritis Mother, Sister, Brother    Diabetes Sister          Tobacco Use    Smoking status: Every Day     Packs/day: 1.00     Types: Cigarettes    Smokeless tobacco: Never   Substance and Sexual Activity    Alcohol use: No    Drug use: No    Sexual activity: Never     Review of Systems   Unable to perform ROS: Intubated   Objective:     Vital Signs (Most Recent):  Temp: 97.7 °F (36.5 °C) (11/29/22 1130)  Pulse: 73 (11/29/22 1200)  Resp: 16.8 (11/29/22 1200)  BP: 125/66 (11/29/22 1200)  SpO2: 96 % (11/29/22 1200) Vital Signs (24h Range):  Temp:  [96.9 °F (36.1 °C)-98.5 °F (36.9 °C)] 97.7 °F (36.5 °C)  Pulse:  [] 73  Resp:  [13.9-40] 16.8  SpO2:  [85 %-100 %] 96 %  BP: (105-204)/() 125/66     Weight: 106.4 kg (234 lb 9.1 oz)  Body mass index is 34.64 kg/m².    Intake/Output Summary (Last 24 hours) at 11/29/2022 1306  Last data filed at 11/29/2022 1200  Gross per 24 hour   Intake 2415.6 ml   Output 810 ml   Net  1605.6 ml      Physical Exam  Vitals and nursing note reviewed.   Constitutional:       General: He is not in acute distress.     Appearance: He is obese. He is ill-appearing. He is not diaphoretic.   HENT:      Head: Normocephalic and atraumatic.      Nose: Nose normal.      Mouth/Throat:      Mouth: Mucous membranes are moist.   Eyes:      Conjunctiva/sclera: Conjunctivae normal.   Cardiovascular:      Rate and Rhythm: Normal rate and regular rhythm.   Pulmonary:      Breath sounds: Normal breath sounds.      Comments: intubated  Abdominal:      General: Abdomen is flat. Bowel sounds are normal.      Palpations: Abdomen is soft.      Comments: Wound dehiscence at umbilicus   Genitourinary:     Comments: Bhatt in place  Musculoskeletal:      Comments: BL BKA   Skin:     General: Skin is dry.      Capillary Refill: Capillary refill takes less than 2 seconds.   Neurological:      Comments: Arouses to verbal stimuli; follows commands       Significant Labs: All pertinent labs within the past 24 hours have been reviewed.  CBC:   Recent Labs   Lab 11/28/22 2308 11/29/22  0340   WBC 13.42* 5.67   HGB 11.6* 9.7*   HCT 31.7* 26.1*    122*     CMP:   Recent Labs   Lab 11/28/22 2308 11/29/22  0340   * 134*   K 3.8 4.6    106   CO2 19* 20*   * 269*   BUN 26* 24*   CREATININE 1.8* 1.4   CALCIUM 8.8 8.3*   PROT 7.4 6.2   ALBUMIN 3.5 3.0*   BILITOT 0.6 0.5   ALKPHOS 77 50*   AST 28 22   ALT 23 20   ANIONGAP 13 8     Cardiac Markers:   Recent Labs   Lab 11/28/22 2308   *     ABG      Recent Labs   Lab 11/29/22  0728   PH 7.438   PO2 78*   PCO2 32.6*   HCO3 22.1*   BE -2       Significant Imaging: I have reviewed all pertinent imaging results/findings within the past 24 hours.        Assessment/Plan:      NSTEMI, initial episode of care  -Per EMS/family pt initially had chest pain radiating to jaw 11/28, presented to [facility] 11/29.  Found to have elevated troponin (4.3) without ST  elevations on EKG. Troponin now 3.6.     TTE 11/29/22  The left ventricle is normal in size with concentric hypertrophy and mildly decreased systolic function.  The estimated ejection fraction is 40%.  Grade I left ventricular diastolic dysfunction.  Normal right ventricular size with normal right ventricular systolic function.    Tele monitoring. Continue ASA, Plavix, Heparin, atorvastatin, beta-blocker. Nitro prn for chest pain.   Cardiology following. Went to cath lab for angiogram today.     Acute hypoxemic respiratory failure  Per EMS, ativan led to respiratory depression and obtunded state and necessitated intubation. Patient intubated currently on minimal vent settings. ABG WNL on vent. Will attempt spontaneous breathing trial following angio today.  Pulmonology following    Lactic acidosis  Lactic acid 4.9 on admission, now 2.2.   Resolved    Wound dehiscence  See media tab. Consult wound care    Essential hypertension  On propofol while intubated. Resume home medications once extubated    Chronic kidney disease stage 3   Cr 1.8, now 1.4  BUN 28 now 24  Now closer to baseline. Avoid nephrotoxic meds    Type II diabetes mellitus with neurologic manifestations  Last A1C 8.4 (11/6/22). Glucose not well controlled on sliding scale insulin. Adding 5 units nightly          Active Diagnoses:    Diagnosis Date Noted POA    PRINCIPAL PROBLEM:  NSTEMI, initial episode of care [I21.4] 11/29/2022 Yes    Acute hypoxemic respiratory failure [J96.01] 11/29/2022 Yes    Elevated troponin [R77.8] 11/29/2022 Yes    Lactic acidosis [E87.20] 11/29/2022 Yes    Wound dehiscence [T81.30XA] 06/06/2018 Yes    Essential hypertension [I10] 03/06/2018 Yes    CKD stage 3 due to type 2 diabetes mellitus [E11.22, N18.30] 09/20/2017 Yes    Type II diabetes mellitus with neurological manifestations [E11.49] 10/09/2014 Yes      Problems Resolved During this Admission:     VTE Risk Mitigation (From admission, onward)           Ordered      Reason for No Pharmacological VTE Prophylaxis  Once        Question:  Reasons:  Answer:  Physician Provided (leave comment)  Comment:  On heparin drip    11/29/22 0243     IP VTE HIGH RISK PATIENT  Once         11/29/22 0243     Place sequential compression device  Until discontinued         11/29/22 0243     heparin 25,000 units in dextrose 5% (100 units/ml) IV bolus from bag - ADDITIONAL PRN BOLUS - 60 units/kg (max bolus 4000 units)  As needed (PRN)        Question:  Heparin Infusion Adjustment (DO NOT MODIFY ANSWER)  Answer:  [email or web address]\epic\Images\Pharmacy\HeparinInfusions\heparin LOW INTENSITY nomogram for [facility] [id number].pdf    11/29/22 0026     heparin 25,000 units in dextrose 5% (100 units/ml) IV bolus from bag - ADDITIONAL PRN BOLUS - 30 units/kg (max bolus 4000 units)  As needed (PRN)        Question:  Heparin Infusion Adjustment (DO NOT MODIFY ANSWER)  Answer:  [email or web address]\[email or web address]\Images\Pharmacy\HeparinInfusions\heparin LOW INTENSITY nomogram for [facility] [id number].pdf    11/29/22 0026     heparin 25,000 units in dextrose 5% 250 mL (100 units/mL) infusion LOW INTENSITY nomogram - [facility]  Continuous        Question Answer Comment   Heparin Infusion Adjustment (DO NOT MODIFY ANSWER) [email or web address]\epic\Images\Pharmacy\HeparinInfusions\heparin LOW INTENSITY nomogram for [facility] [id number]    Begin at (in units/kg/hr) 12        11/29/22 0026                       Alfreda Cabral PA-S2  [facility] - Intensive Care

## 2022-11-29 NOTE — ASSESSMENT & PLAN NOTE
Patient with Hypoxic Respiratory failure which is Acute.  he is not on home oxygen. Supplemental oxygen was provided and noted-     Vent Mode: PRVC  Oxygen Concentration (%):  [] 50  Resp Rate Total:  [20 br/min-21 br/min] 20 br/min  Vt Set:  [550 mL] 550 mL  PEEP/CPAP:  [5 cmH20] 5 cmH20  Mean Airway Pressure:  [10 kvV16-11.2 cmH20] 10 cmH20.     Signs/symptoms of respiratory failure include- tachypnea, increased work of breathing, respiratory distress, use of accessory muscles and lethargy. Contributing diagnoses includes - CHF Labs and images were reviewed. Patient Has recent ABG, which has been reviewed. Will treat underlying causes and adjust management of respiratory failure as follows-     Potentially a primary ACS event with significant anxiety requiring IV benzo, which lead to respiratory depression resulting in Intubation.   · Intubated and Sedated  · Propofol and Fentanyl  · SBT daily  · PulmCC consulted

## 2022-11-29 NOTE — ED PROVIDER NOTES
Encounter Date: 11/28/2022       History     Chief Complaint   Patient presents with    Respiratory Distress     [facility] to painful stimuli with assist bag valve mask reports being called out for abdominal pain accompanied by vomiting and anxiety, Ativan 2 mg given IV     61-year-old male with DM2 and hepatitis C presents via [facility] EMS with altered mental status, difficulty breathing, and reported abdominal pain and vomiting.  Initial EMS call was for abdominal pain with vomiting and hyperglycemia.  EMS found patient with vomiting but also anxiety.  EMS said that patient would not allow them to check vitals, so EMS administered IV Ativan 2 mg, and patient then became obtunded and had difficulty breathing.  EMS reports  and /93 s/p ativan.  EMS states patient administered SC insulin 28 units prior to their arrival, but it is unclear which insulin this is.    History is otherwise limited by AMS.    Patient's sister Mago Clark arrived to ER later and supplemented HPI.  She reports that patient had substernal acid reflux like chest pain with radiation to his left arm yesterday Sunday 11/27/22 and went to the ER at [facility].  However he was in the lobby for several hours and was not seen.  He then left.  His chest pain recurred tonight Monday 11/28/22, he called his sister to tell her that he felt bad and was short of breath.  She urged to call 911, which he did.  She suspects that patient became anxious and agitated when EMS strapped him to their gurney, as patient has a history of claustrophobia relating to a childhood incident in which he was papoosed to get staples in his scalp.    Review of patient's allergies indicates:   Allergen Reactions    Codeine Rash    Lyrica [pregabalin]      Feet turned Red    Vicodin [hydrocodone-acetaminophen] Rash     Past Medical History:   Diagnosis Date    Arthritis     Back pain     Bulging lumbar disc     C. difficile diarrhea      Chronic back pain 10/14/2014    Diabetes mellitus     Diabetes mellitus type II     DM (diabetes mellitus), type 2, uncontrolled 3/12/2013    Hepatitis C 7/3/2013    MSSA (methicillin susceptible Staphylococcus aureus) septicemia     Neuromuscular disorder     Neuropathy     Staph aureus infection      Past Surgical History:   Procedure Laterality Date    ABOVE-KNEE AMPUTATION Bilateral     ANGIOGRAPHY OF LOWER EXTREMITY Left 06/14/2018    Procedure: Angiogram LEFT LOWER Extremity with US guided access from right side;  Surgeon: Sony Srinivasan MD;  Location: [facility] OR [address] [place] [address];  Service: Peripheral Vascular;  Laterality: Left;  local: 16ml  contrast: 20ml   fluoro: 2.8  427.73mGy    APPENDECTOMY      JOINT REPLACEMENT      left knee surgery      left leg surgery      broken bone    LEG SURGERY  right     Right arm boil      Right great toe amputation      TOE AMPUTATION Right      Family History   Problem Relation Age of Onset    Arthritis Mother     Arthritis Sister     Diabetes Sister     Arthritis Brother      Social History     Tobacco Use    Smoking status: Every Day     Packs/day: 1.00     Types: Cigarettes    Smokeless tobacco: Never   Substance Use Topics    Alcohol use: No    Drug use: No     Review of Systems   Unable to perform ROS: Mental status change   Respiratory:  Positive for shortness of breath.    Gastrointestinal:  Positive for abdominal pain, nausea and vomiting.   Psychiatric/Behavioral:  Positive for agitation (on EMS contact).      Physical Exam     Initial Vitals   BP Pulse Resp Temp SpO2   11/28/22 2247 11/28/22 2247 11/28/22 2247 11/28/22 2255 11/28/22 2251   (!) 182/93 (!) 118 (!) 40 98.5 °F (36.9 °C) (!) 85 %      MAP       --                Physical Exam    Nursing note and vitals reviewed.  Constitutional: He appears well-developed and well-nourished. He is not diaphoretic. He appears distressed.   Obtunded middle-aged male, being bagged by EMS, some spontaneous shallow  respirations.  Obese.   HENT:   Head: Normocephalic and atraumatic.   Eyes: Conjunctivae are normal.   Pupils 3mm, sluggish.   Neck: Neck supple.   Normal range of motion.  Cardiovascular:  Regular rhythm and intact distal pulses.           No murmur heard.  Tachycardic, regular.   Pulmonary/Chest: He is in respiratory distress. He has no wheezes. He has no rhonchi. He has no rales.   Being bagged as noted, some shallow spontaneous respirations.   Abdominal: Abdomen is soft. There is no abdominal tenderness.   Obese. Healing umbilical wound. There is no rebound and no guarding.   Genitourinary: Rectum:      Guaiac result negative.   Guaiac negative stool. : Acceptable.  Musculoskeletal:         General: No tenderness.      Cervical back: Normal range of motion and neck supple.      Comments: S/p bilat BKA.     Neurological:   GCS 8: Did turn head towards IV stick.  Did attempt to grab penis during catheter placement.  No response to sternal rub.  No eye opening.  One nonsensical sound to pain.   Skin: Skin is warm and dry. There is pallor.       ED Course   Intubation    Date/Time: 11/28/2022 10:50 PM  Location procedure was performed: [facility] EMERGENCY DEPARTMENT  Performed by: Elizabeth Payton MD  Authorized by: Elizabeth Payton MD   Consent Done: Emergent Situation  Indications: respiratory failure, respiratory distress and hypoxemia  Intubation method: direct  Patient status: paralyzed (RSI)  Preoxygenation: BVM  Sedatives: etomidate  Paralytic: rocuronium  Laryngoscope size: Mac 4  Tube size: 8.0 mm  Tube type: cuffed  Number of attempts: 1  Cricoid pressure: yes  Cords visualized: yes  Post-procedure assessment: chest rise and CO2 detector  Breath sounds: equal and absent over the epigastrium  Cuff inflated: yes  ETT to lip: 25 cm  Tube secured with: ETT ackerman  Chest x-ray interpreted by me.  Chest x-ray findings: endotracheal tube in appropriate position  Patient tolerance: Patient  tolerated the procedure well with no immediate complications      Critical Care    Date/Time: 11/29/2022 2:00 AM  Performed by: Elizabeth Payton MD  Authorized by: Elizabeth Payton MD   Direct patient critical care time: 20 minutes  Additional history critical care time: 10 minutes  Ordering / reviewing critical care time: 10 minutes  Documentation critical care time: 9 minutes  Consulting other physicians critical care time: 5 minutes  Consult with family critical care time: 6 minutes  Total critical care time (exclusive of procedural time) : 60 minutes      Labs Reviewed   CBC W/ AUTO DIFFERENTIAL - Abnormal; Notable for the following components:       Result Value    WBC 13.42 (*)     RBC 2.61 (*)     Hemoglobin 11.6 (*)     Hematocrit 31.7 (*)      (*)     MCH 44.4 (*)     MCHC 36.6 (*)     Immature Granulocytes 1.0 (*)     Immature Grans (Abs) 0.14 (*)     Lymph # 5.5 (*)     All other components within normal limits   COMPREHENSIVE METABOLIC PANEL - Abnormal; Notable for the following components:    Sodium 135 (*)     CO2 19 (*)     Glucose 445 (*)     BUN 26 (*)     Creatinine 1.8 (*)     eGFR 43 (*)     All other components within normal limits   B-TYPE NATRIURETIC PEPTIDE - Abnormal; Notable for the following components:     (*)     All other components within normal limits   LACTIC ACID, PLASMA - Abnormal; Notable for the following components:    Lactate (Lactic Acid) 4.9 (*)     All other components within normal limits    Narrative:     Lactic Acid critical result(s) called and verbal readback obtained   from Roberta VALDES RN ED   by EDIL 11/28/2022 23:43   TROPONIN I - Abnormal; Notable for the following components:    Troponin I 4.313 (*)     All other components within normal limits   TSH - Abnormal; Notable for the following components:    TSH 5.519 (*)     All other components within normal limits   D DIMER, QUANTITATIVE - Abnormal; Notable for the following components:    D-Dimer 0.71  (*)     All other components within normal limits   URINALYSIS, REFLEX TO URINE CULTURE - Abnormal; Notable for the following components:    Protein, UA 2+ (*)     Glucose, UA 4+ (*)     Occult Blood UA Trace (*)     All other components within normal limits    Narrative:     Specimen Source->Urine   URINALYSIS MICROSCOPIC - Abnormal; Notable for the following components:    Hyaline Casts, UA 6 (*)     All other components within normal limits    Narrative:     Specimen Source->Urine   C-REACTIVE PROTEIN - Abnormal; Notable for the following components:    CRP 23.1 (*)     All other components within normal limits   POCT GLUCOSE - Abnormal; Notable for the following components:    POCT Glucose 373 (*)     All other components within normal limits   ISTAT PROCEDURE - Abnormal; Notable for the following components:    POC PH 7.316 (*)     POC PO2 142 (*)     POC HCO3 21.6 (*)     All other components within normal limits   CULTURE, BLOOD   CULTURE, BLOOD   LIPASE   MAGNESIUM   PHOSPHORUS   PROCALCITONIN   PROTIME-INR   APTT   AMMONIA   DRUG SCREEN PANEL, URINE EMERGENCY    Narrative:     Specimen Source->Urine   ALCOHOL,MEDICAL (ETHANOL)   BETA - HYDROXYBUTYRATE, SERUM   URINALYSIS, REFLEX TO URINE CULTURE   T4, FREE   HEMOGLOBIN A1C   VITAMIN B12   FOLATE   C-REACTIVE PROTEIN   PROCALCITONIN   OSMOLALITY, SERUM   FOLATE   HEMOGLOBIN A1C   VITAMIN B12   APTT   CBC W/ AUTO DIFFERENTIAL   COMPREHENSIVE METABOLIC PANEL   MAGNESIUM   SARS-COV-2 RDRP GENE   POCT INFLUENZA A/B MOLECULAR   POCT GLUCOSE MONITORING CONTINUOUS   POCT GLUCOSE MONITORING CONTINUOUS   POCT GLUCOSE MONITORING CONTINUOUS   POCT GLUCOSE MONITORING CONTINUOUS   POCT GLUCOSE MONITORING CONTINUOUS   POCT GLUCOSE MONITORING CONTINUOUS     EKG Readings: (Independently Interpreted)   EKG 22:46: Sinus tach, . Normal axis. No ectopy. Inferolateral TWIs. No STEMI.   EKG 22:49: Sinus tach, . Normal axis. No ectopy. Inferolateral TWIs. No STEMI.       Imaging Results              CT Head Without Contrast (Final result)  Result time 11/29/22 01:22:39      Final result by Michelle Collins MD (11/29/22 01:22:39)                   Impression:      No acute intracranial abnormality detected.    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  Mild chronic small vessel ischemic changes.      Electronically signed by: Michelle Collins  Date:    11/29/2022  Time:    01:22               Narrative:    EXAMINATION:  CT OF THE HEAD WITHOUT    CLINICAL HISTORY:  Mental status change, unknown cause;    TECHNIQUE:  5 mm unenhanced axial images were obtained from the skull base to the vertex.    COMPARISON:  09/17/2014 at and 03/15/2022    FINDINGS:  The ventricles, basal cisterns, and cortical sulci are within normal limits for patient's stated age.  Chronic small vessel ischemic changes are present.  There is a curvilinear hypodense area at the right external capsule, which may severe just a remote infarct.  There is no acute intracranial hemorrhage, territorial infarct or mass effect, or midline shift. In the visualized paranasal sinuses, there is mild inferior bilateral frontal, bilateral anterior ethmoid and right sphenoid sinus mucoperiosteal thickening.                                       CT Chest Abdomen Pelvis Without Contrast (XPD) (Final result)  Result time 11/29/22 01:58:57      Final result by Abhinav Brown MD (11/29/22 01:58:57)                   Impression:      Bilateral airspace infiltrates, atelectasis and effusions.  Correlate for pneumonia, pulmonary edema with parapneumonic effusion.    Three vessel coronary artery disease.    No acute finding evident within the abdomen or pelvis.      Electronically signed by: Abhinav Brown  Date:    11/29/2022  Time:    01:58               Narrative:    EXAMINATION:  CT CHEST ABDOMEN PELVIS WITHOUT CONTRAST(XPD)    CLINICAL HISTORY:  Sepsis;substernal chest pain, abdominal pain, nausea/vomiting,  CKD;    TECHNIQUE:  Low dose axial images, sagittal and coronal reformations were obtained from the thoracic inlet to the pubic synthesis following the oral administration of 30 mL of Omnipaque 350.    COMPARISON:  None    FINDINGS:  Thoracic soft tissues: No significant abnormality.    Aorta: Normal in course and caliber, without significant atherosclerotic plaque. There are 2 branching vessels at the arch compatible with low bovine arch.    Heart: Normal in size. No pericardial effusion. Three vessel coronary artery calcified disease.    Mayuri/Mediastinum: No significant lymphadenopathy ET tube a cm above the rosamaria and NG tube within the body of the stomach.    Lungs: Patchy airspace disease with focal consolidation and/or atelectasis in the lower lobes bilaterally.  Bilateral pleural effusions    Liver: Normal in size and attenuation, with no focal hepatic lesions.    Gallbladder: No calcified gallstones.    Bile Ducts: No evidence of dilated ducts.    Pancreas: No mass or peripancreatic fat stranding.    Spleen: Unremarkable.    Adrenals: Unremarkable.    Kidneys/ Ureters: Normal in size and location. Normal concentration and excretion of contrast. No hydronephrosis or nephrolithiasis. No ureteral dilatation.    Bladder: No evidence of wall thickening. Bhatt catheter.    Reproductive organs: Unremarkable.    GI Tract/Mesentery: No evidence of bowel obstruction or inflammation.    Peritoneal Space: No ascites. No free air.    Retroperitoneum: No significant adenopathy.    Abdominal wall: Small fat containing periumbilical abdominal wall hernia to the left of midline.    Vasculature: No significant atherosclerosis or aneurysm.    Bones: No acute fracture.                                       X-Ray Chest AP Portable (Final result)  Result time 11/28/22 23:43:29      Final result by Jan Chen MD (11/28/22 23:43:29)                   Impression:      Satisfactory position of endotracheal and enteric tube  tips.    Bilateral interstitial opacities.  No focal consolidation.      Electronically signed by: Jan Chen MD  Date:    11/28/2022  Time:    23:43               Narrative:    EXAMINATION:  XR CHEST AP PORTABLE; XR NON-RADIOLOGIST PERFORMED NG/GASTRIC TUBE CHECK    CLINICAL HISTORY:  nasogastric tube; Shortness of breath    TECHNIQUE:  Single frontal view of the chest and abdomen were performed.    COMPARISON:  03/13/2022.    FINDINGS:  The endotracheal tube tip is 4.5 cm rosamaria.  The enteric tube tip is in the left upper abdominal quadrant.  The side port is below the GE junction.  Monitoring EKG leads are present.    The cardiomediastinal silhouette is within normal limits.  There is no evidence of free air beneath the hemidiaphragms.  There are no pleural effusions.  There is no evidence of a pneumothorax.  There is no evidence of pneumomediastinum.  There are bilateral interstitial opacities.  There is no focal consolidation.    The stomach is nondistended.  The visualized upper abdominal structures are within normal limits.    The osseous structures are unremarkable.                                       XR Non-Rad Performed NG/Gastric Tube Check (Final result)  Result time 11/28/22 23:43:29      Final result by Jan Chen MD (11/28/22 23:43:29)                   Impression:      Satisfactory position of endotracheal and enteric tube tips.    Bilateral interstitial opacities.  No focal consolidation.      Electronically signed by: Jan Chen MD  Date:    11/28/2022  Time:    23:43               Narrative:    EXAMINATION:  XR CHEST AP PORTABLE; XR NON-RADIOLOGIST PERFORMED NG/GASTRIC TUBE CHECK    CLINICAL HISTORY:  nasogastric tube; Shortness of breath    TECHNIQUE:  Single frontal view of the chest and abdomen were performed.    COMPARISON:  03/13/2022.    FINDINGS:  The endotracheal tube tip is 4.5 cm rosamaria.  The enteric tube tip is in the left upper abdominal quadrant.  The side port is below the GE  junction.  Monitoring EKG leads are present.    The cardiomediastinal silhouette is within normal limits.  There is no evidence of free air beneath the hemidiaphragms.  There are no pleural effusions.  There is no evidence of a pneumothorax.  There is no evidence of pneumomediastinum.  There are bilateral interstitial opacities.  There is no focal consolidation.    The stomach is nondistended.  The visualized upper abdominal structures are within normal limits.    The osseous structures are unremarkable.                                    X-Rays:   Independently Interpreted Readings:   Other Readings:  CXR bilat interstitial opacities, ETT in appropriate position.  Medications   heparin 25,000 units in dextrose 5% 250 mL (100 units/mL) infusion LOW INTENSITY nomogram - [facility] (12 Units/kg/hr × 84.1 kg (Adjusted) Intravenous Verify Only 11/29/22 0500)   heparin 25,000 units in dextrose 5% (100 units/ml) IV bolus from bag - ADDITIONAL PRN BOLUS - 60 units/kg (max bolus 4000 units) (has no administration in time range)   heparin 25,000 units in dextrose 5% (100 units/ml) IV bolus from bag - ADDITIONAL PRN BOLUS - 30 units/kg (max bolus 4000 units) (has no administration in time range)   propofol (DIPRIVAN) 10 mg/mL infusion (25 mcg/kg/min × 104.3 kg Intravenous Verify Only 11/29/22 0500)   fentaNYL 2500 mcg in 0.9% sodium chloride 250 mL infusion premix (titrating) (has no administration in time range)   hydrALAZINE injection 10 mg (has no administration in time range)   glucagon (human recombinant) injection 1 mg (has no administration in time range)   insulin aspart U-100 pen 0-5 Units (2 Units Subcutaneous Given 11/29/22 0226)   chlorhexidine 0.12 % solution 15 mL (has no administration in time range)   pantoprazole injection 40 mg (has no administration in time range)   atorvastatin tablet 40 mg (40 mg Per OG tube Given 11/29/22 0531)   aspirin chewable tablet 81 mg (has no administration in time range)   losartan  split tablet 12.5 mg (12.5 mg Oral Given 11/29/22 0531)   metoprolol tartrate (LOPRESSOR) tablet 25 mg (25 mg Per OG tube Given 11/29/22 0531)   clopidogreL tablet 75 mg (has no administration in time range)   etomidate injection 30 mg (30 mg Intravenous Given 11/28/22 2249)   rocuronium injection 100 mg ( Intravenous Canceled Entry 11/28/22 2315)   sodium chloride 0.9% bolus 1,000 mL (0 mLs Intravenous Stopped 11/29/22 0029)   sodium chloride 0.9% bolus 1,000 mL (0 mLs Intravenous Stopped 11/29/22 0130)   aspirin tablet 325 mg (325 mg Per OG tube Given 11/29/22 0036)   heparin 25,000 units in dextrose 5% (100 units/ml) IV bolus from bag INITIAL BOLUS (max bolus 4000 units) (4,000 Units Intravenous Bolus from Bag 11/29/22 0043)   metoprolol injection 5 mg (5 mg Intravenous Given 11/29/22 0037)     Medical Decision Making:   History:   Old Medical Records: I decided to obtain old medical records.  Old Records Summarized: records from previous admission(s), records from clinic visits and records from another hospital.  Initial Assessment:   61 y.o. male with DM2 here with reported nausea/vomiting/abdominal pain, hyperglycemia, now s/p ativan with decreased LOC and shortness of breath.    Differential Diagnosis:   Ddx includes DKA, HHS, dehydration, diabetic pancreatitis, aspiration, over-sedation, other.  Independently Interpreted Test(s):   I have ordered and independently interpreted X-rays - see prior notes.  I have ordered and independently interpreted EKG Reading(s) - see prior notes  Clinical Tests:   Lab Tests: Reviewed and Ordered  Radiological Study: Reviewed and Ordered  Medical Tests: Reviewed and Ordered  ED Management:  Patient intubated shortly after ED arrival for GCS 8 with hypoxia to 88% despite bagging; he was successfully bagged up to 90s% prior to intubation. Sedation maintained on propofol.    EKG no STEMI.     CXR bilat interstitial opacities, ETT in appropriate position. OGT placement confirmed  "via XR.    Labs: WBC 13.42. CKD at baseline, BUN 26 / creatinine 1.8. . Troponin 4.313. Ddimer 0.71, mildly elevated over age-adjusted (0.6). Lactate 4.9. Normal beta-hydroxybutyrate (not in DKA). UA without infection.    Patient received IV NS 2L for initial hypotension. He received OGT aspirin 325mg and was started on heparin bolus/drip for NSTEMI.     I considered CTA for elevated ddimer, but my suspicion for PE is low, and if patient does have a clot, this will be treated by heparin. As he has CKD, I feel there is more benefit to avoiding contrast load now, as patient may require coronary angiogram in AM.    Noncon head CT reassuring.    Noncon panscan shows "Bilateral airspace infiltrates, atelectasis and effusions." I doubt infection and suspect these are related to cardiac etiology.    Patient requires ICU admission for ventilator monitoring and other high level care.    I discussed patient for admission with nocturnist for [facility] medicine Dr. Robert Partida.    I updated sister Mago Clark via telephone. I did ask her whether patient would want coronary cath if cardiology recommends it, and she says he would want everything done.   Other:   I have discussed this case with another health care provider.                        Clinical Impression:   Final diagnoses:  [R06.02] Shortness of breath  [R06.02] Shortness of breath - ETT placement  [R41.82] Altered mental status  [I21.4] NSTEMI (non-ST elevated myocardial infarction) (Primary)  [J96.00] Acute respiratory failure, unspecified whether with hypoxia or hypercapnia  [R09.02] Hypoxia  [R73.9] Hyperglycemia  [R03.0] Elevated blood pressure reading  [R00.0] Tachycardia  [N18.9] Chronic kidney disease, unspecified CKD stage        ED Disposition Condition    Admit Stable                Elizabeth Payton MD  11/29/22 0559    "

## 2022-11-29 NOTE — CONSULTS
[facility] - Intensive Care  Cardiology  Consult Note    Patient Name: Carlos Cifuentes Jr.  MRN: [id number]  Admission Date: 11/28/2022  Hospital Length of Stay: 0 days  Code Status: Prior   Attending Provider: Pranay Michaels MD   Consulting Provider: Bobby Haridng MD  Primary Care Physician: Miguel Lux MD  Principal Problem:NSTEMI, initial episode of care    Patient information was obtained from ER records.     Consults  Subjective:     Chief Complaint:  NSTEMI     HPI:   Carlos Cifuentes Jr. is a 61 y.o. male who has a past medical history of Arthritis, Back pain, Bulging lumbar disc, C. difficile diarrhea, Chronic back pain, Diabetes mellitus, Diabetes mellitus type II, DM, type 2, uncontrolled, Hepatitis C, MSSA septicemia, Neuromuscular disorder, Neuropathy, and Staph aureus infection, presented to the ED with CC of SOB, abdominal pain, nausea with vomiting, and Headache.  Patient was intubated upon arrival to ED after receiving 2 mg of IV Ativan for anxiety, as he became obtunded and had difficulty breathing; therefore, HPI from EMS and ED report:      Initial EMS call was for abdominal pain with vomiting and hyperglycemia.  EMS found patient with vomiting but also anxiety. EMS said that patient would not allow them to check vitals, so EMS administered IV Ativan 2 mg, and patient then became obtunded and had difficulty breathing.  EMS reports  and /93 s/p ativan.  EMS states patient administered SC insulin 28 units prior to their arrival, but it is unclear which insulin this is. Patient's sister arrived to ER later and supplemented HPI.  She reports that patient had substernal acid reflux like chest pain with radiation to his left arm yesterday Sunday 11/27/22 and went to the ER at [facility].  However he was in the lobby for several hours and was not seen.  He then left. His chest pain recurred tonight Monday 11/28/22, he called his sister to tell her that he felt bad and was short of  breath.  She urged to call 911, which he did.  She suspects that patient became anxious and agitated when EMS strapped him to their gurney, as patient has a history of claustrophobia relating to a childhood incident in which he was papoosed to get staples in his scalp.     In the ED, patient was intubated and sedated with propofol.  His blood pressure was still 200/100, despite propofol use.  His troponin was 4 without ST elevation and lactic acid 4.9, he was started on NSTEMI protocol with heparin drip and given fluids.  Mild reactive leukocytosis corrected with fluids only.      On vent in ICU  Troponin 4.3 trending down  EKG sinus tachycardia 117 NSSTT changes  Hx bilateral AKA    Echo 11/29/22   The left ventricle is normal in size with concentric hypertrophy and mildly decreased systolic function.   The estimated ejection fraction is 40%.   Grade I left ventricular diastolic dysfunction.   Normal right ventricular size with normal right ventricular systolic function.   TDS    Echo 2014    1 - Hyperdynamic left ventricular systolic function (EF 60-65%).     2 - Cannot fully r/o but no obvious vegetation       Past Medical History:   Diagnosis Date    Arthritis     Back pain     Bulging lumbar disc     C. difficile diarrhea     Chronic back pain 10/14/2014    Diabetes mellitus     Diabetes mellitus type II     DM (diabetes mellitus), type 2, uncontrolled 3/12/2013    Hepatitis C 7/3/2013    MSSA (methicillin susceptible Staphylococcus aureus) septicemia     Neuromuscular disorder     Neuropathy     Staph aureus infection        Past Surgical History:   Procedure Laterality Date    ABOVE-KNEE AMPUTATION Bilateral     ANGIOGRAPHY OF LOWER EXTREMITY Left 06/14/2018    Procedure: Angiogram LEFT LOWER Extremity with US guided access from right side;  Surgeon: Sony Srinivasan MD;  Location: [facility] OR [address] [place] [address];  Service: Peripheral Vascular;  Laterality: Left;  local: 16ml  contrast: 20ml   fluoro:  2.8  427.73mGy    APPENDECTOMY      JOINT REPLACEMENT      left knee surgery      left leg surgery      broken bone    LEG SURGERY  right     Right arm boil      Right great toe amputation      TOE AMPUTATION Right        Review of patient's allergies indicates:   Allergen Reactions    Codeine Rash    Lyrica [pregabalin]      Feet turned Red    Vicodin [hydrocodone-acetaminophen] Rash       No current facility-administered medications on file prior to encounter.     Current Outpatient Medications on File Prior to Encounter   Medication Sig    gabapentin (NEURONTIN) 300 MG capsule TAKE 1 CAPSULE BY MOUTH THREE TIMES A DAY    glimepiride (AMARYL) 2 MG tablet Take 1 tablet (2 mg total) by mouth every morning.    LANTUS U-100 INSULIN 100 unit/mL injection INJECT TEN UNITS INTO SKIN AT BEDTIME    metFORMIN (GLUCOPHAGE) 1000 MG tablet TAKE ONE TABLET BY MOUTH TWICE DAILY    mupirocin (BACTROBAN) 2 % ointment Apply to affected area 3 times daily    pantoprazole (PROTONIX) 40 MG tablet TAKE ONE TABLET BY MOUTH EVERY DAY    silver sulfADIAZINE 1% (SILVADENE) 1 % cream Apply 1 application topically once daily. Apply to affected area    TRUE METRIX GLUCOSE METER Misc AS DIRECTED    TRUE METRIX GLUCOSE TEST STRIP Strp Twice daily blood sugar checks     Family History       Problem Relation (Age of Onset)    Arthritis Mother, Sister, Brother    Diabetes Sister          Tobacco Use    Smoking status: Every Day     Packs/day: 1.00     Types: Cigarettes    Smokeless tobacco: Never   Substance and Sexual Activity    Alcohol use: No    Drug use: No    Sexual activity: Never     Review of Systems   Unable to perform ROS: Intubated   Objective:     Vital Signs (Most Recent):  Temp: 96.9 °F (36.1 °C) (11/29/22 0710)  Pulse: 71 (11/29/22 1045)  Resp: 16.2 (11/29/22 1045)  BP: 117/60 (11/29/22 1045)  SpO2: 96 % (11/29/22 1045) Vital Signs (24h Range):  Temp:  [96.9 °F (36.1 °C)-98.5 °F (36.9 °C)] 96.9 °F (36.1  °C)  Pulse:  [] 71  Resp:  [15.9-40] 16.2  SpO2:  [85 %-100 %] 96 %  BP: (105-204)/() 117/60     Weight: 106.4 kg (234 lb 9.1 oz)  Body mass index is 34.64 kg/m².    SpO2: 96 %  O2 Device (Oxygen Therapy): ventilator      Intake/Output Summary (Last 24 hours) at 11/29/2022 1109  Last data filed at 11/29/2022 1000  Gross per 24 hour   Intake 2352.53 ml   Output 660 ml   Net 1692.53 ml       Lines/Drains/Airways       Drain  Duration                  NG/OG Tube 11/28/22 2256 16 Fr. Left mouth <1 day         Urethral Catheter 11/28/22 2245 16 Fr. <1 day              Airway  Duration                  Airway - Non-Surgical 11/28/22 2251 Endotracheal Tube <1 day              Peripheral Intravenous Line  Duration                  Peripheral IV - Single Lumen 11/28/22 20 G Left Antecubital 1 day         Peripheral IV - Single Lumen 11/28/22 2247 18 G Right Antecubital <1 day         Peripheral IV - Single Lumen 11/29/22 20 G Anterior;Right Wrist <1 day                    Physical Exam  Constitutional:       Appearance: He is well-developed.   HENT:      Head: Normocephalic and atraumatic.   Eyes:      Conjunctiva/sclera: Conjunctivae normal.      Pupils: Pupils are equal, round, and reactive to light.   Cardiovascular:      Rate and Rhythm: Normal rate.      Pulses: Intact distal pulses.      Heart sounds: Normal heart sounds.   Pulmonary:      Effort: Pulmonary effort is normal.      Breath sounds: Normal breath sounds.   Abdominal:      General: Bowel sounds are normal.      Palpations: Abdomen is soft.   Musculoskeletal:         General: Normal range of motion.      Cervical back: Normal range of motion and neck supple.   Skin:     General: Skin is warm and dry.   Intubated and sedated    Significant Labs: All pertinent lab results from the last 24 hours have been reviewed.    Significant Imaging: Echocardiogram: 2D echo with color flow doppler:   Results for orders placed or performed during the hospital  encounter of 12/15/14   2D echo with color flow doppler   Result Value Ref Range    EF + QEF 60 55 - 65    Mitral Valve Regurgitation TRIVIAL     Diastolic Dysfunction No     Est. PA Systolic Pressure 11.07     Pericardial Effusion NONE     Tricuspid Valve Regurgitation TRIVIAL     Narrative    TEST DESCRIPTION   Technical Quality: This is a technically challenging study.     Aorta: The aortic root is normal in size, measuring 3.0 cm at sinotubular junction and 3.3 cm at Sinuses of Valsalva.     Left Atrium: The left atrium is normal in size, measuring 3.4 cm across in the parasternal view.     Left Ventricle: The left ventricle is normal in size, with an end-diastolic diameter of 4.4 cm, and an end-systolic diameter of 2.9 cm. LV wall thickness is normal, with the septum measuring 1.0 cm and the posterior wall measuring 0.9 cm across. Relative   wall thickness was normal at 0.41, and the LV mass index was 84.4 g/m2 consistent with normal left ventricular mass. Global left ventricular systolic function appears hyperdynamic. Visually estimated ejection fraction is 60-65%. The LV Doppler derived   stroke volume equals 56.0 ccs.   The E/e'(lat) is 8, consistent with normal diastolic function.     Right Atrium: The right atrium is normal in size.     Right Ventricle: The right ventricle is normal in size. Global right ventricular systolic function appears normal. Tricuspid annular plane systolic excursion (TAPSE) is 2.4 cm. The estimated PA systolic pressure is 11 mmHg.     Mitral Valve:  There is trivial mitral regurgitation.     Tricuspid Valve:  There is trivial tricuspid regurgitation.     Pericardium: There is no evidence of pericardial effusion.      IVC: IVC is normal in size and collapses > 50% with a sniff, suggesting normal right atrial pressure of 3 mmHg.     Intracavitary: There is no evidence of intracavity mass, thrombi, or vegetation.         CONCLUSIONS     1 - Hyperdynamic left ventricular systolic  function (EF 60-65%).     2 - Cannot fully r/o but no obvious vegetation        This document has been electronically    SIGNED BY: Modesto Marcus MD On: 12/18/2014 15:07     Assessment and Plan:     * NSTEMI, initial episode of care  Troponin 4 trending down. Chart notes CP 2 days ago. No prior documentation of CAD in chart. EF 40% on echo. Discussed LHC with sister Radha Messer - risks/benefits ecplained and she agrees to proceed. On ASA/plavix and heparin    Acute hypoxemic respiratory failure  Per pulmonary. Would prefer to do LHC prior to extubation    Type II diabetes mellitus with neurological manifestations  Per primary        VTE Risk Mitigation (From admission, onward)         Ordered     Reason for No Pharmacological VTE Prophylaxis  Once        Question:  Reasons:  Answer:  Physician Provided (leave comment)  Comment:  On heparin drip    11/29/22 0243     IP VTE HIGH RISK PATIENT  Once         11/29/22 0243     Place sequential compression device  Until discontinued         11/29/22 0243     heparin 25,000 units in dextrose 5% (100 units/ml) IV bolus from bag - ADDITIONAL PRN BOLUS - 60 units/kg (max bolus 4000 units)  As needed (PRN)        Question:  Heparin Infusion Adjustment (DO NOT MODIFY ANSWER)  Answer:  \\[email or web address]\epic\Images\Pharmacy\HeparinInfusions\heparin LOW INTENSITY nomogram for [facility] [id number]    11/29/22 0026     heparin 25,000 units in dextrose 5% (100 units/ml) IV bolus from bag - ADDITIONAL PRN BOLUS - 30 units/kg (max bolus 4000 units)  As needed (PRN)        Question:  Heparin Infusion Adjustment (DO NOT MODIFY ANSWER)  Answer:  \\[email or web address]\epic\Images\Pharmacy\HeparinInfusions\heparin LOW INTENSITY nomogram for [facility] [id number].pdf    11/29/22 0026     heparin 25,000 units in dextrose 5% 250 mL (100 units/mL) infusion LOW INTENSITY nomogram - [facility]  Continuous        Question Answer Comment   Heparin Infusion Adjustment (DO NOT MODIFY ANSWER)  \\[email or web address]\epic\Images\Pharmacy\HeparinInfusions\heparin LOW INTENSITY nomogram for [facility] [id number].pdf    Begin at (in units/kg/hr) 12        11/29/22 0026              35 minutes spent with patient in ICU    Thank you for your consult. I will follow-up with patient. Please contact us if you have any additional questions.    Bobby Harding MD  Cardiology   [facility] [place] - Intensive Care

## 2022-11-29 NOTE — ASSESSMENT & PLAN NOTE
NSTEMI with known CAD  - continue heparin infusion  - asa, plavix, statin  - cardiology following, plan for C today

## 2022-11-29 NOTE — PLAN OF CARE
[facility]  Initial Discharge Assessment       Primary Care Provider: Miguel Lux MD    Admission Diagnosis: Shortness of breath [R06.02]  Altered mental status [R41.82]  Elevated blood pressure reading [R03.0]  Tachycardia [R00.0]  Hyperglycemia [R73.9]  Hypoxia [R09.02]  NSTEMI (non-ST elevated myocardial infarction) [I21.4]  Troponin level elevated [R77.8]  Acute respiratory failure, unspecified whether with hypoxia or hypercapnia [J96.00]  Chronic kidney disease, unspecified CKD stage [N18.9]    Admission Date: 11/28/2022  Expected Discharge Date:          Payor: MEDICAID / Plan: HE[place]Y [place]UE [place] / Product Type: Managed Medicaid /     Extended Emergency Contact Information  Primary Emergency Contact: Elvia Verdin   [facility]  Home Phone: [phone]  Relation: Sister  Secondary Emergency Contact: Radha Messer   [place]  Mobile Phone: [phone]  Relation: Sister    Discharge Plan A:  (tbd)  Discharge Plan B:  (tbd)      [facility] - [place] - [address]  [address]  [place] [address]  Phone: [phone] Fax: [phone]    [id number] - [place] - [address] AT [facility] [address]  [address]  [place] [address]  Phone: [phone] Fax: [phone]      Initial Assessment (most recent)       Adult Discharge Assessment - 11/29/22 1205          Discharge Assessment    Assessment Type Discharge Planning Assessment     Confirmed/corrected address, phone number and insurance Yes     Confirmed Demographics Correct on Facesheet     Source of Information family   Elvia Verdin [phone]    If unable to respond/provide information was family/caregiver contacted? Yes     Contact Name/Number Elvia Verdin [phone]     When was your last doctors appointment? 11/22/22     Communicated BEBA with patient/caregiver Date not available/Unable to determine     Lives With alone   Sisters  live within a few blocks of one another    Do you expect to return to your current living situation? Yes     Do you have help at home or someone to help you manage your care at home? Yes     Prior to hospitilization cognitive status: Alert/Oriented     Current cognitive status: Coma/Sedated/Intubated     Walking or Climbing Stairs Difficulty none     Dressing/Bathing Difficulty none     Home Accessibility wheelchair accessible     Home Layout Able to live on 1st floor     Equipment Currently Used at Home prosthesis;walker, rolling;wheelchair     Readmission within 30 days? No     Patient currently being followed by outpatient case management? No     Do you currently have service(s) that help you manage your care at home? No     Do you take prescription medications? Yes     Do you have prescription coverage? Yes     Coverage Health Blue     Do you have any problems affording any of your prescribed medications? No     Is the patient taking medications as prescribed? yes     Who is going to help you get home at discharge? Sisters     How do you get to doctors appointments? car, drives self     Are you on dialysis? No     Do you take coumadin? No     Discharge Plan A --   tbd    Discharge Plan B --   tbd    DME Needed Upon Discharge  --   tbd

## 2022-11-29 NOTE — PLAN OF CARE
Problem: Adult Inpatient Plan of Care  Goal: Plan of Care Review  Outcome: Ongoing, Progressing  Goal: Patient-Specific Goal (Individualized)  Outcome: Ongoing, Progressing  Goal: Absence of Hospital-Acquired Illness or Injury  Outcome: Ongoing, Progressing  Goal: Optimal Comfort and Wellbeing  Outcome: Ongoing, Progressing  Goal: Readiness for Transition of Care  Outcome: Ongoing, Progressing     PT post cath procedure Right fem dressing CDI. No bleeding or hematoma noted. Distal pulses present and skin warm. Pt to be flat till 8PM. Propofol infusing for sedation. Wound care consult completed. Restraints remain in place with no injuries. Sister has been to bedside. Pt afebrile monitoring accuchecks Q 4 hours. Pt remains NPO. No falls, injuries, or skin breakdown.

## 2022-11-29 NOTE — ASSESSMENT & PLAN NOTE
Likely 2/2 NSTEMI/ HF + benzo administration. CT chest with bilateral airspace opacities and bilateral effusions concerning for volume overload. TTE with EF 40% and DD. . Afebrile, mild leukocytosis that resolved, and normal procal; unlikely infection.   - maintain LPV  - wean for SpO2 >92%  - diurese as tolerated  - sputum culture ordered  - daily evaluation for SAT/SBT; will hold off for now as [facility] planned today

## 2022-11-29 NOTE — SUBJECTIVE & OBJECTIVE
Past Medical History:   Diagnosis Date    Arthritis     Back pain     Bulging lumbar disc     C. difficile diarrhea     Chronic back pain 10/14/2014    Diabetes mellitus     Diabetes mellitus type II     DM (diabetes mellitus), type 2, uncontrolled 3/12/2013    Hepatitis C 7/3/2013    MSSA (methicillin susceptible Staphylococcus aureus) septicemia     Neuromuscular disorder     Neuropathy     Staph aureus infection        Past Surgical History:   Procedure Laterality Date    ABOVE-KNEE AMPUTATION Bilateral     ANGIOGRAPHY OF LOWER EXTREMITY Left 06/14/2018    Procedure: Angiogram LEFT LOWER Extremity with US guided access from right side;  Surgeon: Sony Srinivasan MD;  Location: [facility] OR [address] [place] [address];  Service: Peripheral Vascular;  Laterality: Left;  local: 16ml  contrast: 20ml   fluoro: 2.8  427.73mGy    APPENDECTOMY      JOINT REPLACEMENT      left knee surgery      left leg surgery      broken bone    LEG SURGERY  right     Right arm boil      Right great toe amputation      TOE AMPUTATION Right        Review of patient's allergies indicates:   Allergen Reactions    Codeine Rash    Lyrica [pregabalin]      Feet turned Red    Vicodin [hydrocodone-acetaminophen] Rash       No current facility-administered medications on file prior to encounter.     Current Outpatient Medications on File Prior to Encounter   Medication Sig    gabapentin (NEURONTIN) 300 MG capsule TAKE 1 CAPSULE BY MOUTH THREE TIMES A DAY    glimepiride (AMARYL) 2 MG tablet Take 1 tablet (2 mg total) by mouth every morning.    LANTUS U-100 INSULIN 100 unit/mL injection INJECT TEN UNITS INTO SKIN AT BEDTIME    metFORMIN (GLUCOPHAGE) 1000 MG tablet TAKE ONE TABLET BY MOUTH TWICE DAILY    mupirocin (BACTROBAN) 2 % ointment Apply to affected area 3 times daily    pantoprazole (PROTONIX) 40 MG tablet TAKE ONE TABLET BY MOUTH EVERY DAY    silver sulfADIAZINE 1% (SILVADENE) 1 % cream Apply 1 application topically once daily. Apply to affected area    TRUE  METRIX GLUCOSE METER Misc AS DIRECTED    TRUE METRIX GLUCOSE TEST STRIP Strp Twice daily blood sugar checks     Family History       Problem Relation (Age of Onset)    Arthritis Mother, Sister, Brother    Diabetes Sister          Tobacco Use    Smoking status: Every Day     Packs/day: 1.00     Types: Cigarettes    Smokeless tobacco: Never   Substance and Sexual Activity    Alcohol use: No    Drug use: No    Sexual activity: Never     Review of Systems   Unable to perform ROS: Intubated   Objective:     Vital Signs (Most Recent):  Temp: 96.9 °F (36.1 °C) (11/29/22 0710)  Pulse: 71 (11/29/22 1045)  Resp: 16.2 (11/29/22 1045)  BP: 117/60 (11/29/22 1045)  SpO2: 96 % (11/29/22 1045) Vital Signs (24h Range):  Temp:  [96.9 °F (36.1 °C)-98.5 °F (36.9 °C)] 96.9 °F (36.1 °C)  Pulse:  [] 71  Resp:  [15.9-40] 16.2  SpO2:  [85 %-100 %] 96 %  BP: (105-204)/() 117/60     Weight: 106.4 kg (234 lb 9.1 oz)  Body mass index is 34.64 kg/m².    SpO2: 96 %  O2 Device (Oxygen Therapy): ventilator      Intake/Output Summary (Last 24 hours) at 11/29/2022 1109  Last data filed at 11/29/2022 1000  Gross per 24 hour   Intake 2352.53 ml   Output 660 ml   Net 1692.53 ml       Lines/Drains/Airways       Drain  Duration                  NG/OG Tube 11/28/22 2256 16 Fr. Left mouth <1 day         Urethral Catheter 11/28/22 2245 16 Fr. <1 day              Airway  Duration                  Airway - Non-Surgical 11/28/22 2251 Endotracheal Tube <1 day              Peripheral Intravenous Line  Duration                  Peripheral IV - Single Lumen 11/28/22 20 G Left Antecubital 1 day         Peripheral IV - Single Lumen 11/28/22 2247 18 G Right Antecubital <1 day         Peripheral IV - Single Lumen 11/29/22 20 G Anterior;Right Wrist <1 day                    Physical Exam  Constitutional:       Appearance: He is well-developed.   HENT:      Head: Normocephalic and atraumatic.   Eyes:      Conjunctiva/sclera: Conjunctivae normal.      Pupils:  Pupils are equal, round, and reactive to light.   Cardiovascular:      Rate and Rhythm: Normal rate.      Pulses: Intact distal pulses.      Heart sounds: Normal heart sounds.   Pulmonary:      Effort: Pulmonary effort is normal.      Breath sounds: Normal breath sounds.   Abdominal:      General: Bowel sounds are normal.      Palpations: Abdomen is soft.   Musculoskeletal:         General: Normal range of motion.      Cervical back: Normal range of motion and neck supple.   Skin:     General: Skin is warm and dry.   Intubated and sedated    Significant Labs: All pertinent lab results from the last 24 hours have been reviewed.    Significant Imaging: Echocardiogram: 2D echo with color flow doppler:   Results for orders placed or performed during the hospital encounter of 12/15/14   2D echo with color flow doppler   Result Value Ref Range    EF + QEF 60 55 - 65    Mitral Valve Regurgitation TRIVIAL     Diastolic Dysfunction No     Est. PA Systolic Pressure 11.07     Pericardial Effusion NONE     Tricuspid Valve Regurgitation TRIVIAL     Narrative    TEST DESCRIPTION   Technical Quality: This is a technically challenging study.     Aorta: The aortic root is normal in size, measuring 3.0 cm at sinotubular junction and 3.3 cm at Sinuses of Valsalva.     Left Atrium: The left atrium is normal in size, measuring 3.4 cm across in the parasternal view.     Left Ventricle: The left ventricle is normal in size, with an end-diastolic diameter of 4.4 cm, and an end-systolic diameter of 2.9 cm. LV wall thickness is normal, with the septum measuring 1.0 cm and the posterior wall measuring 0.9 cm across. Relative   wall thickness was normal at 0.41, and the LV mass index was 84.4 g/m2 consistent with normal left ventricular mass. Global left ventricular systolic function appears hyperdynamic. Visually estimated ejection fraction is 60-65%. The LV Doppler derived   stroke volume equals 56.0 ccs.   The E/e'(lat) is 8, consistent  with normal diastolic function.     Right Atrium: The right atrium is normal in size.     Right Ventricle: The right ventricle is normal in size. Global right ventricular systolic function appears normal. Tricuspid annular plane systolic excursion (TAPSE) is 2.4 cm. The estimated PA systolic pressure is 11 mmHg.     Mitral Valve:  There is trivial mitral regurgitation.     Tricuspid Valve:  There is trivial tricuspid regurgitation.     Pericardium: There is no evidence of pericardial effusion.      IVC: IVC is normal in size and collapses > 50% with a sniff, suggesting normal right atrial pressure of 3 mmHg.     Intracavitary: There is no evidence of intracavity mass, thrombi, or vegetation.         CONCLUSIONS     1 - Hyperdynamic left ventricular systolic function (EF 60-65%).     2 - Cannot fully r/o but no obvious vegetation        This document has been electronically    SIGNED BY: Modesto Marcus MD On: 12/18/2014 15:07

## 2022-11-29 NOTE — NURSING
[facility]  Nurses Note -- 4 Eyes      11/29/2022       Skin assessed on: Admit      [x] No Pressure Injuries Present    [x]Prevention Measures Documented    [] Yes LDA  for Pressure Injury Previously documented     [] Yes New Pressure Injury Discovered   [] LDA for New Pressure Injury Added      Attending RN:  BRANDEN Dia      Second RN:  BRANDEN Bhat

## 2022-11-29 NOTE — RESPIRATORY THERAPY
Pt was transported to cath lab on transport vent. Pt is back in 273 on vent settings PRVC/12/550/+5/30% sats 96%.

## 2022-11-29 NOTE — PROCEDURES
"Carlos Cifuentes Jr. is a 61 y.o. male patient.    Temp: 97.7 °F (36.5 °C) (11/29/22 1130)  Pulse: 73 (11/29/22 1200)  Resp: 16.8 (11/29/22 1200)  BP: 125/66 (11/29/22 1200)  SpO2: 96 % (11/29/22 1200)  Weight: 106.4 kg (234 lb 9.1 oz) (11/29/22 0203)  Height: 5' 9" (175.3 cm) (11/29/22 0203)       Procedures    [facility]   Dr Harding  Pre-op Dx NSTEMI  Post-op Dx same  Specimen none  EBL < 50 cc    11/29/22 [facility] - EDP 25, LAD 90% mid - diffuse severe mid-distal disease, Cx- OM1 large - multiple 70-80% mid lesions with diffuse disease, RCA 50% mid, PDA 80% proximal - diffuse distal disease, PL 80%    Reviewed with Dr Adams - medical Rx for CAD  Manual sheath removal when ACT < 160    11/29/2022    "

## 2022-11-29 NOTE — ASSESSMENT & PLAN NOTE
· On propofol and fentanyl currently, with PO metoprolol and losartan, and IV PRNs- BP stable  · Add PO meds when extubated

## 2022-11-29 NOTE — CONSULTS
[facility] - Intensive Care  Pulmonology  Consult Note    Patient Name: Carlos Cifuentes Jr.  MRN: [id number]  Admission Date: 11/28/2022  Hospital Length of Stay: 0 days  Code Status: Prior  Attending Physician: Pranay Michaels MD  Primary Care Provider: Miguel Lux MD   Principal Problem: NSTEMI, initial episode of care    [unfilled]  Subjective:     HPI:  Mr. Carlos Cifuentes is a 60yo male with a PMHx of DM 2 with neuropathy and history of foot ulcers, cirrhosis, hepatitis-C, tobacco abuse, osteomyelitis, peripheral arterial disease, bilateral carotid artery stenosis, GERD, hypertension, left BKA who presented to the ED via EMS for reported abdominal pain. Per EMS and patient's sister, he was experiencing chest pain with radiation to his left arm on Sunday and went to [facility] ER, but left before being seen. He then experienced this pain again Monday evening and called EMS. On their arrival, he was extremely anxious and vomiting and would not allow them to check his vitals. He received 2mg Ativan per EMS and was noted to be hyperglycemic. On arrival to the ED, he was obtunded and had difficulty breathing so he was intubated.  His blood pressure was still 200/100, despite propofol use. His troponin was 4 without ST elevation and lactic acid 4.9. He was started on NSTEMI protocol with heparin drip and given fluids. Pulmonary was consulted for ventilator management.         Past Medical History:   Diagnosis Date    Arthritis     Back pain     Bulging lumbar disc     C. difficile diarrhea     Chronic back pain 10/14/2014    Diabetes mellitus     Diabetes mellitus type II     DM (diabetes mellitus), type 2, uncontrolled 3/12/2013    Hepatitis C 7/3/2013    MSSA (methicillin susceptible Staphylococcus aureus) septicemia     Neuromuscular disorder     Neuropathy     Staph aureus infection        Past Surgical History:   Procedure Laterality Date    ABOVE-KNEE AMPUTATION Bilateral     ANGIOGRAPHY OF  LOWER EXTREMITY Left 06/14/2018    Procedure: Angiogram LEFT LOWER Extremity with US guided access from right side;  Surgeon: Sony Srinivasan MD;  Location: [facility] OR [address] [place] [address];  Service: Peripheral Vascular;  Laterality: Left;  local: 16ml  contrast: 20ml   fluoro: 2.8  427.73mGy    APPENDECTOMY      JOINT REPLACEMENT      left knee surgery      left leg surgery      broken bone    LEG SURGERY  right     Right arm boil      Right great toe amputation      TOE AMPUTATION Right        Review of patient's allergies indicates:   Allergen Reactions    Codeine Rash    Lyrica [pregabalin]      Feet turned Red    Vicodin [hydrocodone-acetaminophen] Rash       Family History       Problem Relation (Age of Onset)    Arthritis Mother, Sister, Brother    Diabetes Sister          Tobacco Use    Smoking status: Every Day     Packs/day: 1.00     Types: Cigarettes    Smokeless tobacco: Never   Substance and Sexual Activity    Alcohol use: No    Drug use: No    Sexual activity: Never         Review of Systems   Unable to perform ROS: Intubated   Objective:     Vital Signs (Most Recent):  Temp: 96.9 °F (36.1 °C) (11/29/22 0710)  Pulse: 71 (11/29/22 1045)  Resp: 16.2 (11/29/22 1045)  BP: 117/60 (11/29/22 1045)  SpO2: 96 % (11/29/22 1045) Vital Signs (24h Range):  Temp:  [96.9 °F (36.1 °C)-98.5 °F (36.9 °C)] 96.9 °F (36.1 °C)  Pulse:  [] 71  Resp:  [15.9-40] 16.2  SpO2:  [85 %-100 %] 96 %  BP: (105-204)/() 117/60     Weight: 106.4 kg (234 lb 9.1 oz)  Body mass index is 34.64 kg/m².      Intake/Output Summary (Last 24 hours) at 11/29/2022 1053  Last data filed at 11/29/2022 1000  Gross per 24 hour   Intake 2352.53 ml   Output 660 ml   Net 1692.53 ml       Physical Exam  Vitals and nursing note reviewed.   Constitutional:       General: He is not in acute distress.     Appearance: He is obese. He is ill-appearing. He is not toxic-appearing.      Interventions: He is sedated, intubated and restrained.   HENT:       Head: Normocephalic and atraumatic.      Nose: Nose normal. No rhinorrhea.   Eyes:      Pupils: Pupils are equal, round, and reactive to light.   Cardiovascular:      Rate and Rhythm: Normal rate.      Pulses: Normal pulses.      Heart sounds: No murmur heard.  Pulmonary:      Effort: No respiratory distress. He is intubated.      Breath sounds: Rales present. No wheezing or rhonchi.   Abdominal:      General: Abdomen is flat. There is no distension.      Tenderness: There is no abdominal tenderness.      Comments: Abdominal wound dehiscence, photo in media tab   Genitourinary:     Comments: Bhatt in place  Musculoskeletal:         General: No deformity. Normal range of motion.      Cervical back: No rigidity.      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      Left Lower Extremity: Left leg is amputated below knee.   Skin:     General: Skin is dry.      Capillary Refill: Capillary refill takes less than 2 seconds.   Neurological:      Mental Status: He is easily aroused.      Comments: PERRL. Follows commands.        Vents:  Vent Mode: PRVC (11/29/22 0728)  Ventilator Initiated: Yes (11/28/22 2301)  Set Rate: 20 BPM (11/29/22 0728)  Vt Set: 550 mL (11/29/22 0728)  PEEP/CPAP: 5 cmH20 (11/29/22 0728)  Oxygen Concentration (%): 30 (11/29/22 1045)  Peak Airway Pressure: 25.2 cmH20 (11/29/22 0728)  Total Ve: 9.67 L/m (11/29/22 0728)  F/VT Ratio<105 (RSBI): (!) 39.69 (11/29/22 0728)    Lines/Drains/Airways       Drain  Duration                  NG/OG Tube 11/28/22 2256 16 Fr. Left mouth <1 day         Urethral Catheter 11/28/22 2245 16 Fr. <1 day              Airway  Duration                  Airway - Non-Surgical 11/28/22 2251 Endotracheal Tube <1 day              Peripheral Intravenous Line  Duration                  Peripheral IV - Single Lumen 11/28/22 20 G Left Antecubital 1 day         Peripheral IV - Single Lumen 11/28/22 2247 18 G Right Antecubital <1 day         Peripheral IV - Single Lumen  11/29/22 20 G Anterior;Right Wrist <1 day                    Significant Labs:    CBC/Anemia Profile:  Recent Labs   Lab 11/28/22 2308 11/29/22  0340   WBC 13.42* 5.67   HGB 11.6* 9.7*   HCT 31.7* 26.1*    122*   * 118*   RDW 14.5 14.0        Chemistries:  Recent Labs   Lab 11/28/22 2308 11/29/22  0340   * 134*   K 3.8 4.6    106   CO2 19* 20*   BUN 26* 24*   CREATININE 1.8* 1.4   CALCIUM 8.8 8.3*   ALBUMIN 3.5 3.0*   PROT 7.4 6.2   BILITOT 0.6 0.5   ALKPHOS 77 50*   ALT 23 20   AST 28 22   MG 1.8 1.7   PHOS 3.5 2.5*       All pertinent labs within the past 24 hours have been reviewed.    Significant Imaging:   I have reviewed all pertinent imaging results/findings within the past 24 hours.      ABG  Recent Labs   Lab 11/29/22  0728   PH 7.438   PO2 78*   PCO2 32.6*   HCO3 22.1*   BE -2     Assessment/Plan:     * NSTEMI, initial episode of care  NSTEMI with known CAD  - continue heparin infusion  - asa, plavix, statin  - cardiology following, plan for [facility] today    Acute hypoxemic respiratory failure  Likely 2/2 NSTEMI/ HF + benzo administration. CT chest with bilateral airspace opacities and bilateral effusions concerning for volume overload. TTE with EF 40% and DD. . Afebrile, mild leukocytosis that resolved, and normal procal; unlikely infection.   - maintain LPV  - wean for SpO2 >92%  - diurese as tolerated  - sputum culture ordered  - daily evaluation for SAT/SBT; will hold off for now as [facility] planned today     Wound dehiscence  Small area of surgical wound dehiscence on abdomen. Photo in media tab.  - wound care consult    Essential hypertension  - antihypertensives per primary/cardiology     CKD stage 3 due to type 2 diabetes mellitus  - CKD noted. Creatinine back at baseline    Type II diabetes mellitus with neurological manifestations  - SSI   - ICU BG goal 140-180    Plan discussed with Dr. Gonzalez.    Critical Care Time: 50 minutes  Critical care was time spent personally by  me on the following activities: development of treatment plan with patient or surrogate and bedside caregivers, discussions with consultants, evaluation of patient's response to treatment, examination of patient, ordering and performing treatments and interventions, ordering and review of laboratory studies, ordering and review of radiographic studies, pulse oximetry, re-evaluation of patient's condition. This critical care time did not overlap with that of any other provider or involve time for any procedures.      Thank you for your consult. I will follow-up with patient. Please contact us if you have any additional questions.     Aviva Moran, ERIN  Pulmonology  [facility] [place] - Intensive Care

## 2022-11-29 NOTE — CONSULTS
[facility] [place]  Wound Care    Patient Name:  Carlos Cifuentes Jr.   MRN:  [id number]  Date: 11/29/2022  Diagnosis: NSTEMI, initial episode of care    History:     Past Medical History:   Diagnosis Date    Arthritis     Back pain     Bulging lumbar disc     C. difficile diarrhea     Chronic back pain 10/14/2014    Diabetes mellitus     Diabetes mellitus type II     DM (diabetes mellitus), type 2, uncontrolled 3/12/2013    Hepatitis C 7/3/2013    MSSA (methicillin susceptible Staphylococcus aureus) septicemia     Neuromuscular disorder     Neuropathy     Staph aureus infection        Social History     Socioeconomic History    Marital status: Single   Tobacco Use    Smoking status: Every Day     Packs/day: 1.00     Types: Cigarettes    Smokeless tobacco: Never   Substance and Sexual Activity    Alcohol use: No    Drug use: No    Sexual activity: Never   Social History Narrative    ** Merged History Encounter **            Precautions:     Allergies as of 11/28/2022 - Reviewed 11/28/2022   Allergen Reaction Noted    Codeine Rash 03/11/2013    Lyrica [pregabalin]  03/22/2018    Vicodin [hydrocodone-acetaminophen] Rash 02/18/2018   Consulted for wounds-surgical wound dehiscence umbilical area  A 61 year old male admitted 11/28/22 from home with NSTEMI; acute hypoxemic respiratory failure; CKD Stage 3 due to DM II; lactic acidosis; essential hypertension; DM II with neurological manifestations  S/P Bilateral BKA- healed incisions  Family reports to nursing chronic abdominal wound- closes and reopens; site of surgery many years ago  Treatment at  Wound Care 2018-2021 11/29 WBC 5.67 Hgb 9.7 Hct 26.1 Alb 3.0 Weight 234 lb  11/28 A1C 10.9   On Isolibrium mattress; Luiz score 12; smoker  Assessment:  Photodocumentation    Chronic abdominal wound- Scar with hardened darkened area 2 cm surrounding opening. Opening pink 2 cm slit with scant serous drainage.   Treatment:  Cleansed wound with Vashe. Applied 3M Cavilon  No Sting Film Barrier to periwound skin. Placed edil cross strips of hydrocolloid over opening to stabilize then covered with hydrocolloid dressing.   Nursing to change dressing every 3 days and prn.   Reconsult WOC nurse as needed  Orders placed.     11/29/2022

## 2022-11-29 NOTE — PLAN OF CARE
Pt in ICU vented and sedated. VSS this shift. Labs improving. Heparin gtt continued as ordered, aPTT to be drawn at 0600 per nomogram. Bhatt in place with good UOP. Wound by umbilicus cleaned with wound cleanser. No falls, injuries or skin breakdown this shift, precautions maintained for all.

## 2022-11-29 NOTE — NURSING
Pt arrived to ICU via stretcher with ED RN x2 and RT on portable vent. VSS. Transferred over to ICU bed, placed on ICU monitors. Heparin and Propofol infusing. Per ED RN, all belonging sent home with pt sister. Skin assessment competed, wound noted to abdomin near umbilicus. Bed in lowest position, restraints placed per order HOB 45%. Will continue to monitor.

## 2022-11-29 NOTE — ASSESSMENT & PLAN NOTE
· Lactic acid 4.9 admission, given fluids and good oxygenation/ventilation while intubated, LA improved to 2.2

## 2022-11-29 NOTE — H&P
[facility] Medicine  History & Physical    Patient Name: Carlos Cifuentes Jr.  MRN: [id number]  Patient Class: IP- Inpatient  Admission Date: 11/28/2022  Attending Physician: Darron Beach MD   Primary Care Provider: Miguel Lux MD         Patient information was obtained from EMS personnel, past medical records and ER records.     Subjective:     Principal Problem:NSTEMI, initial episode of care    Chief Complaint:   Chief Complaint   Patient presents with    Respiratory Distress     Brought in by [facility] responsive to painful stimuli with assist bag valve mask reports being called out for abdominal pain accompanied by vomiting and anxiety, Ativan 2 mg given IV        HPI:   Carlos Cifuentes Jr. is a 61 y.o. male who has a past medical history of Arthritis, Back pain, Bulging lumbar disc, C. difficile diarrhea, Chronic back pain, Diabetes mellitus, Diabetes mellitus type II, DM, type 2, uncontrolled, Hepatitis C, MSSA septicemia, Neuromuscular disorder, Neuropathy, and Staph aureus infection, presented to the ED with CC of SOB, abdominal pain, nausea with vomiting, and Headache.  Patient was intubated upon arrival to ED after receiving 2 mg of IV Ativan for anxiety, as he became obtunded and had difficulty breathing; therefore, HPI from EMS and ED report:     Initial EMS call was for abdominal pain with vomiting and hyperglycemia.  EMS found patient with vomiting but also anxiety. EMS said that patient would not allow them to check vitals, so EMS administered IV Ativan 2 mg, and patient then became obtunded and had difficulty breathing.  EMS reports  and /93 s/p ativan.  EMS states patient administered SC insulin 28 units prior to their arrival, but it is unclear which insulin this is. Patient's sister arrived to ER later and supplemented HPI.  She reports that patient had substernal acid reflux like chest pain with radiation to his left arm yesterday Sunday 11/27/22 and went  to the ER at [facility].  However he was in the lobby for several hours and was not seen.  He then left. His chest pain recurred tonight Monday 11/28/22, he called his sister to tell her that he felt bad and was short of breath.  She urged to call 911, which he did.  She suspects that patient became anxious and agitated when EMS strapped him to their gurney, as patient has a history of claustrophobia relating to a childhood incident in which he was papoosed to get staples in his scalp.    In the ED, patient was intubated and sedated with propofol.  His blood pressure was still 200/100, despite propofol use.  His troponin was 4 without ST elevation and lactic acid 4.9, he was started on NSTEMI protocol with heparin drip and given fluids.  Mild reactive leukocytosis corrected with fluids only.        Past Medical History:   Diagnosis Date    Arthritis     Back pain     Bulging lumbar disc     C. difficile diarrhea     Chronic back pain 10/14/2014    Diabetes mellitus     Diabetes mellitus type II     DM (diabetes mellitus), type 2, uncontrolled 3/12/2013    Hepatitis C 7/3/2013    MSSA (methicillin susceptible Staphylococcus aureus) septicemia     Neuromuscular disorder     Neuropathy     Staph aureus infection        Past Surgical History:   Procedure Laterality Date    ABOVE-KNEE AMPUTATION Bilateral     ANGIOGRAPHY OF LOWER EXTREMITY Left 06/14/2018    Procedure: Angiogram LEFT LOWER Extremity with US guided access from right side;  Surgeon: Sony Srinivasan MD;  Location: [facility] OR [address] [place] [address];  Service: Peripheral Vascular;  Laterality: Left;  local: 16ml  contrast: 20ml   fluoro: 2.8  427.73mGy    APPENDECTOMY      JOINT REPLACEMENT      left knee surgery      left leg surgery      broken bone    LEG SURGERY  right     Right arm boil      Right great toe amputation      TOE AMPUTATION Right        Review of patient's allergies indicates:   Allergen Reactions    Codeine Rash    Lyrica [pregabalin]      Feet turned  Red    Vicodin [hydrocodone-acetaminophen] Rash       No current facility-administered medications on file prior to encounter.     Current Outpatient Medications on File Prior to Encounter   Medication Sig    gabapentin (NEURONTIN) 300 MG capsule TAKE 1 CAPSULE BY MOUTH THREE TIMES A DAY    glimepiride (AMARYL) 2 MG tablet Take 1 tablet (2 mg total) by mouth every morning.    LANTUS U-100 INSULIN 100 unit/mL injection INJECT TEN UNITS INTO SKIN AT BEDTIME    metFORMIN (GLUCOPHAGE) 1000 MG tablet TAKE ONE TABLET BY MOUTH TWICE DAILY    mupirocin (BACTROBAN) 2 % ointment Apply to affected area 3 times daily    pantoprazole (PROTONIX) 40 MG tablet TAKE ONE TABLET BY MOUTH EVERY DAY    silver sulfADIAZINE 1% (SILVADENE) 1 % cream Apply 1 application topically once daily. Apply to affected area    TRUE METRIX GLUCOSE METER Misc AS DIRECTED    TRUE METRIX GLUCOSE TEST STRIP Strp Twice daily blood sugar checks     Family History       Problem Relation (Age of Onset)    Arthritis Mother, Sister, Brother    Diabetes Sister          Tobacco Use    Smoking status: Every Day     Packs/day: 1.00     Types: Cigarettes    Smokeless tobacco: Never   Substance and Sexual Activity    Alcohol use: No    Drug use: No    Sexual activity: Never     Review of Systems   Unable to perform ROS: Intubated   Constitutional:  Positive for activity change and fatigue.   Musculoskeletal:  Positive for gait problem.   Neurological:  Positive for headaches.   Objective:     Vital Signs (Most Recent):  Temp: 98.4 °F (36.9 °C) (11/29/22 0315)  Pulse: 75 (11/29/22 0430)  Resp: 20 (11/29/22 0430)  BP: (!) 145/74 (11/29/22 0430)  SpO2: 100 % (11/29/22 0430)   Vital Signs (24h Range):  Temp:  [98.3 °F (36.8 °C)-98.5 °F (36.9 °C)] 98.4 °F (36.9 °C)  Pulse:  [] 75  Resp:  [20-40] 20  SpO2:  [85 %-100 %] 100 %  BP: (110-204)/() 145/74     Weight: 106.4 kg (234 lb 9.1 oz)  Body mass index is 34.64 kg/m².    Physical Exam  Vitals and nursing note  reviewed.   Constitutional:       General: He is in acute distress.      Appearance: He is ill-appearing and toxic-appearing.   HENT:      Head: Normocephalic and atraumatic.      Nose: Nose normal. No rhinorrhea.   Eyes:      Pupils: Pupils are equal, round, and reactive to light.   Cardiovascular:      Rate and Rhythm: Normal rate.      Pulses: Normal pulses.      Heart sounds: No murmur heard.  Pulmonary:      Effort: Respiratory distress present.      Breath sounds: Rales present. No wheezing.      Comments:   Intubated  Abdominal:      General: Abdomen is flat. There is no distension.      Tenderness: There is no abdominal tenderness.   Genitourinary:     Penis: No discharge or swelling.       Comments:   Bhatt in place  Musculoskeletal:         General: No deformity. Normal range of motion.      Cervical back: No rigidity.      Right lower leg: No edema.      Left lower leg: No edema.   Lymphadenopathy:      Cervical: No cervical adenopathy.   Skin:     General: Skin is dry.      Capillary Refill: Capillary refill takes less than 2 seconds.   Neurological:      Comments:   Sedated         CRANIAL NERVES     CN III, IV, VI   Pupils are equal, round, and reactive to light.         Recent Results (from the past 24 hour(s))   POCT glucose    Collection Time: 11/28/22 10:44 PM   Result Value Ref Range    POCT Glucose 373 (H) 70 - 110 mg/dL   CBC auto differential    Collection Time: 11/28/22 11:08 PM   Result Value Ref Range    WBC 13.42 (H) 3.90 - 12.70 K/uL    RBC 2.61 (L) 4.60 - 6.20 M/uL    Hemoglobin 11.6 (L) 14.0 - 18.0 g/dL    Hematocrit 31.7 (L) 40.0 - 54.0 %     (H) 82 - 98 fL    MCH 44.4 (H) 27.0 - 31.0 pg    MCHC 36.6 (H) 32.0 - 36.0 g/dL    RDW 14.5 11.5 - 14.5 %    Platelets 201 150 - 450 K/uL    MPV 10.3 9.2 - 12.9 fL    Immature Granulocytes 1.0 (H) 0.0 - 0.5 %    Gran # (ANC) 6.5 1.8 - 7.7 K/uL    Immature Grans (Abs) 0.14 (H) 0.00 - 0.04 K/uL    Lymph # 5.5 (H) 1.0 - 4.8 K/uL    Mono # 0.8  0.3 - 1.0 K/uL    Eos # 0.5 0.0 - 0.5 K/uL    Baso # 0.08 0.00 - 0.20 K/uL    nRBC 0 0 /100 WBC    Gran % 48.2 38.0 - 73.0 %    Lymph % 41.1 18.0 - 48.0 %    Mono % 5.7 4.0 - 15.0 %    Eosinophil % 3.4 0.0 - 8.0 %    Basophil % 0.6 0.0 - 1.9 %    Differential Method Automated    Comprehensive metabolic panel    Collection Time: 11/28/22 11:08 PM   Result Value Ref Range    Sodium 135 (L) 136 - 145 mmol/L    Potassium 3.8 3.5 - 5.1 mmol/L    Chloride 103 95 - 110 mmol/L    CO2 19 (L) 23 - 29 mmol/L    Glucose 445 (H) 70 - 110 mg/dL    BUN 26 (H) 6 - 20 mg/dL    Creatinine 1.8 (H) 0.5 - 1.4 mg/dL    Calcium 8.8 8.7 - 10.5 mg/dL    Total Protein 7.4 6.0 - 8.4 g/dL    Albumin 3.5 3.5 - 5.2 g/dL    Total Bilirubin 0.6 0.1 - 1.0 mg/dL    Alkaline Phosphatase 77 55 - 135 U/L    AST 28 10 - 40 U/L    ALT 23 10 - 44 U/L    Anion Gap 13 8 - 16 mmol/L    eGFR 43 (A) >60 mL/min/1.73 m^2   Brain natriuretic peptide    Collection Time: 11/28/22 11:08 PM   Result Value Ref Range     (H) 0 - 99 pg/mL   Lipase    Collection Time: 11/28/22 11:08 PM   Result Value Ref Range    Lipase 12 4 - 60 U/L   Lactic acid, plasma    Collection Time: 11/28/22 11:08 PM   Result Value Ref Range    Lactate (Lactic Acid) 4.9 (HH) 0.5 - 2.2 mmol/L   Magnesium    Collection Time: 11/28/22 11:08 PM   Result Value Ref Range    Magnesium 1.8 1.6 - 2.6 mg/dL   Phosphorus    Collection Time: 11/28/22 11:08 PM   Result Value Ref Range    Phosphorus 3.5 2.7 - 4.5 mg/dL   Procalcitonin    Collection Time: 11/28/22 11:08 PM   Result Value Ref Range    Procalcitonin 0.10 <0.25 ng/mL   Troponin I    Collection Time: 11/28/22 11:08 PM   Result Value Ref Range    Troponin I 4.313 (H) 0.000 - 0.026 ng/mL   TSH    Collection Time: 11/28/22 11:08 PM   Result Value Ref Range    TSH 5.519 (H) 0.400 - 4.000 uIU/mL   Protime-INR    Collection Time: 11/28/22 11:08 PM   Result Value Ref Range    Prothrombin Time 10.7 9.0 - 12.5 sec    INR 1.0 0.8 - 1.2   APTT     Collection Time: 11/28/22 11:08 PM   Result Value Ref Range    aPTT 25.4 21.0 - 32.0 sec   Ethanol    Collection Time: 11/28/22 11:08 PM   Result Value Ref Range    Alcohol, Serum <10 <10 mg/dL   T4, Free    Collection Time: 11/28/22 11:08 PM   Result Value Ref Range    Free T4 0.84 0.71 - 1.51 ng/dL   C-Reactive Protein    Collection Time: 11/28/22 11:08 PM   Result Value Ref Range    CRP 23.1 (H) 0.0 - 8.2 mg/L   Drug screen panel, emergency    Collection Time: 11/28/22 11:12 PM   Result Value Ref Range    Benzodiazepines Negative Negative    Methadone metabolites Negative Negative    Cocaine (Metab.) Negative Negative    Opiate Scrn, Ur Negative Negative    Barbiturate Screen, Ur Negative Negative    Amphetamine Screen, Ur Negative Negative    THC Negative Negative    Phencyclidine Negative Negative    Creatinine, Urine 108.6 23.0 - 375.0 mg/dL    Toxicology Information SEE COMMENT    D dimer, quantitative    Collection Time: 11/28/22 11:13 PM   Result Value Ref Range    D-Dimer 0.71 (H) <0.50 mg/L FEU   POCT Influenza A/B Molecular    Collection Time: 11/28/22 11:14 PM   Result Value Ref Range    POC Molecular Influenza A Ag Negative Negative, Not Reported    POC Molecular Influenza B Ag Negative Negative, Not Reported     Acceptable Yes    Ammonia    Collection Time: 11/28/22 11:15 PM   Result Value Ref Range    Ammonia 25 10 - 50 umol/L   Beta - Hydroxybutyrate, Serum    Collection Time: 11/28/22 11:15 PM   Result Value Ref Range    Beta-Hydroxybutyrate 0.1 0.0 - 0.5 mmol/L   POCT COVID-19 Rapid Screening    Collection Time: 11/28/22 11:15 PM   Result Value Ref Range    POC Rapid COVID Negative Negative     Acceptable Yes    ISTAT PROCEDURE    Collection Time: 11/28/22 11:19 PM   Result Value Ref Range    POC PH 7.316 (L) 7.35 - 7.45    POC PCO2 42.2 35 - 45 mmHg    POC PO2 142 (H) 80 - 100 mmHg    POC HCO3 21.6 (L) 24 - 28 mmol/L    POC BE -4 -2 to 2 mmol/L    POC SATURATED O2 99  95 - 100 %    POC TCO2 23 23 - 27 mmol/L    Rate 20     Sample ARTERIAL     Site RB     Allens Test N/A     DelSys Adult Vent     Mode AC/PRVC     Vt 550     PEEP 5     PiP 27     FiO2 100     Min Vol 10.8     Sp02 99    Urinalysis, Reflex to Urine Culture    Collection Time: 11/28/22 11:20 PM   Result Value Ref Range    Specimen UA Urine, Clean Catch     Color, UA Yellow Yellow, Straw, Cici    Appearance, UA Clear Clear    pH, UA 6.0 5.0 - 8.0    Specific Gravity, UA 1.020 1.005 - 1.030    Protein, UA 2+ (A) Negative    Glucose, UA 4+ (A) Negative    Ketones, UA Negative Negative    Bilirubin (UA) Negative Negative    Occult Blood UA Trace (A) Negative    Nitrite, UA Negative Negative    Urobilinogen, UA Negative <2.0 EU/dL    Leukocytes, UA Negative Negative   Urinalysis Microscopic    Collection Time: 11/28/22 11:20 PM   Result Value Ref Range    RBC, UA 0 0 - 4 /hpf    WBC, UA 5 0 - 5 /hpf    Bacteria None None-Occ /hpf    Yeast, UA None None    Squam Epithel, UA 0 /hpf    Non-Squam Epith 0 <1/hpf /hpf    Hyaline Casts, UA 6 (A) 0-1/lpf /lpf    Microscopic Comment SEE COMMENT    POCT glucose    Collection Time: 11/29/22  2:22 AM   Result Value Ref Range    POCT Glucose 305 (H) 70 - 110 mg/dL   APTT    Collection Time: 11/29/22  3:40 AM   Result Value Ref Range    aPTT 35.7 (H) 21.0 - 32.0 sec   CBC Auto Differential    Collection Time: 11/29/22  3:40 AM   Result Value Ref Range    WBC 5.67 3.90 - 12.70 K/uL    RBC 2.21 (L) 4.60 - 6.20 M/uL    Hemoglobin 9.7 (L) 14.0 - 18.0 g/dL    Hematocrit 26.1 (L) 40.0 - 54.0 %     (H) 82 - 98 fL    MCH 43.9 (H) 27.0 - 31.0 pg    MCHC 37.2 (H) 32.0 - 36.0 g/dL    RDW 14.0 11.5 - 14.5 %    Platelets 122 (L) 150 - 450 K/uL    MPV 10.4 9.2 - 12.9 fL    Immature Granulocytes 0.7 (H) 0.0 - 0.5 %    Gran # (ANC) 4.2 1.8 - 7.7 K/uL    Immature Grans (Abs) 0.04 0.00 - 0.04 K/uL    Lymph # 1.0 1.0 - 4.8 K/uL    Mono # 0.3 0.3 - 1.0 K/uL    Eos # 0.1 0.0 - 0.5 K/uL    Baso #  0.02 0.00 - 0.20 K/uL    nRBC 0 0 /100 WBC    Gran % 74.8 (H) 38.0 - 73.0 %    Lymph % 16.9 (L) 18.0 - 48.0 %    Mono % 5.8 4.0 - 15.0 %    Eosinophil % 1.4 0.0 - 8.0 %    Basophil % 0.4 0.0 - 1.9 %    Platelet Estimate Decreased (A)     Aniso Slight     Poly Occasional     Differential Method Automated    Lactic Acid, Plasma    Collection Time: 11/29/22  3:40 AM   Result Value Ref Range    Lactate (Lactic Acid) 2.2 0.5 - 2.2 mmol/L   Comprehensive Metabolic Panel    Collection Time: 11/29/22  3:40 AM   Result Value Ref Range    Sodium 134 (L) 136 - 145 mmol/L    Potassium 4.6 3.5 - 5.1 mmol/L    Chloride 106 95 - 110 mmol/L    CO2 20 (L) 23 - 29 mmol/L    Glucose 269 (H) 70 - 110 mg/dL    BUN 24 (H) 6 - 20 mg/dL    Creatinine 1.4 0.5 - 1.4 mg/dL    Calcium 8.3 (L) 8.7 - 10.5 mg/dL    Total Protein 6.2 6.0 - 8.4 g/dL    Albumin 3.0 (L) 3.5 - 5.2 g/dL    Total Bilirubin 0.5 0.1 - 1.0 mg/dL    Alkaline Phosphatase 50 (L) 55 - 135 U/L    AST 22 10 - 40 U/L    ALT 20 10 - 44 U/L    Anion Gap 8 8 - 16 mmol/L    eGFR 58 (A) >60 mL/min/1.73 m^2   Magnesium    Collection Time: 11/29/22  3:40 AM   Result Value Ref Range    Magnesium 1.7 1.6 - 2.6 mg/dL   Phosphorus    Collection Time: 11/29/22  3:40 AM   Result Value Ref Range    Phosphorus 2.5 (L) 2.7 - 4.5 mg/dL   Troponin I    Collection Time: 11/29/22  3:40 AM   Result Value Ref Range    Troponin I 3.528 (H) 0.000 - 0.026 ng/mL   ISTAT PROCEDURE    Collection Time: 11/29/22  3:50 AM   Result Value Ref Range    POC PH 7.386 7.35 - 7.45    POC PCO2 41.4 35 - 45 mmHg    POC PO2 39 (L) 40 - 60 mmHg    POC HCO3 24.9 24 - 28 mmol/L    POC BE 0 -2 to 2 mmol/L    POC SATURATED O2 73 (L) 95 - 100 %    POC TCO2 26 24 - 29 mmol/L    Rate 20     Sample VENOUS     Site Other     Allens Test N/A     DelSys Adult Vent     Mode AC/PRVC     Vt 550     PEEP 5     FiO2 50        Microbiology Results (last 7 days)       Procedure Component Value Units Date/Time    Blood culture #2 **CANNOT  BE ORDERED STAT** [[id number]] Collected: 11/28/22 2324    Order Status: Sent Specimen: Blood from Peripheral, Antecubital, Left Updated: 11/28/22 2328    Blood culture #1 **CANNOT BE ORDERED STAT** [[id number]] Collected: 11/28/22 2308    Order Status: Sent Specimen: Blood from Peripheral, Antecubital, Right Updated: 11/28/22 2315             Imaging Results              CT Head Without Contrast (Final result)  Result time 11/29/22 01:22:39      Final result by Michelle Collins MD (11/29/22 01:22:39)                   Impression:      No acute intracranial abnormality detected.    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  Mild chronic small vessel ischemic changes.      Electronically signed by: Michelle Collins  Date:    11/29/2022  Time:    01:22               Narrative:    EXAMINATION:  CT OF THE HEAD WITHOUT    CLINICAL HISTORY:  Mental status change, unknown cause;    TECHNIQUE:  5 mm unenhanced axial images were obtained from the skull base to the vertex.    COMPARISON:  09/17/2014 at and 03/15/2022    FINDINGS:  The ventricles, basal cisterns, and cortical sulci are within normal limits for patient's stated age.  Chronic small vessel ischemic changes are present.  There is a curvilinear hypodense area at the right external capsule, which may severe just a remote infarct.  There is no acute intracranial hemorrhage, territorial infarct or mass effect, or midline shift. In the visualized paranasal sinuses, there is mild inferior bilateral frontal, bilateral anterior ethmoid and right sphenoid sinus mucoperiosteal thickening.                                       CT Chest Abdomen Pelvis Without Contrast (XPD) (Final result)  Result time 11/29/22 01:58:57      Final result by Abhinav Brown MD (11/29/22 01:58:57)                   Impression:      Bilateral airspace infiltrates, atelectasis and effusions.  Correlate for pneumonia, pulmonary edema with parapneumonic effusion.    Three vessel coronary  artery disease.    No acute finding evident within the abdomen or pelvis.      Electronically signed by: Abhinav Brown  Date:    11/29/2022  Time:    01:58               Narrative:    EXAMINATION:  CT CHEST ABDOMEN PELVIS WITHOUT CONTRAST(XPD)    CLINICAL HISTORY:  Sepsis;substernal chest pain, abdominal pain, nausea/vomiting, CKD;    TECHNIQUE:  Low dose axial images, sagittal and coronal reformations were obtained from the thoracic inlet to the pubic synthesis following the oral administration of 30 mL of Omnipaque 350.    COMPARISON:  None    FINDINGS:  Thoracic soft tissues: No significant abnormality.    Aorta: Normal in course and caliber, without significant atherosclerotic plaque. There are 2 branching vessels at the arch compatible with low bovine arch.    Heart: Normal in size. No pericardial effusion. Three vessel coronary artery calcified disease.    Mayuri/Mediastinum: No significant lymphadenopathy ET tube a cm above the rosamaria and NG tube within the body of the stomach.    Lungs: Patchy airspace disease with focal consolidation and/or atelectasis in the lower lobes bilaterally.  Bilateral pleural effusions    Liver: Normal in size and attenuation, with no focal hepatic lesions.    Gallbladder: No calcified gallstones.    Bile Ducts: No evidence of dilated ducts.    Pancreas: No mass or peripancreatic fat stranding.    Spleen: Unremarkable.    Adrenals: Unremarkable.    Kidneys/ Ureters: Normal in size and location. Normal concentration and excretion of contrast. No hydronephrosis or nephrolithiasis. No ureteral dilatation.    Bladder: No evidence of wall thickening. Bhatt catheter.    Reproductive organs: Unremarkable.    GI Tract/Mesentery: No evidence of bowel obstruction or inflammation.    Peritoneal Space: No ascites. No free air.    Retroperitoneum: No significant adenopathy.    Abdominal wall: Small fat containing periumbilical abdominal wall hernia to the left of midline.    Vasculature: No  significant atherosclerosis or aneurysm.    Bones: No acute fracture.                                       X-Ray Chest AP Portable (Final result)  Result time 11/28/22 23:43:29      Final result by Jan Chen MD (11/28/22 23:43:29)                   Impression:      Satisfactory position of endotracheal and enteric tube tips.    Bilateral interstitial opacities.  No focal consolidation.      Electronically signed by: Jan Chen MD  Date:    11/28/2022  Time:    23:43               Narrative:    EXAMINATION:  XR CHEST AP PORTABLE; XR NON-RADIOLOGIST PERFORMED NG/GASTRIC TUBE CHECK    CLINICAL HISTORY:  nasogastric tube; Shortness of breath    TECHNIQUE:  Single frontal view of the chest and abdomen were performed.    COMPARISON:  03/13/2022.    FINDINGS:  The endotracheal tube tip is 4.5 cm rosamaria.  The enteric tube tip is in the left upper abdominal quadrant.  The side port is below the GE junction.  Monitoring EKG leads are present.    The cardiomediastinal silhouette is within normal limits.  There is no evidence of free air beneath the hemidiaphragms.  There are no pleural effusions.  There is no evidence of a pneumothorax.  There is no evidence of pneumomediastinum.  There are bilateral interstitial opacities.  There is no focal consolidation.    The stomach is nondistended.  The visualized upper abdominal structures are within normal limits.    The osseous structures are unremarkable.                                       XR Non-Rad Performed NG/Gastric Tube Check (Final result)  Result time 11/28/22 23:43:29      Final result by Jan Chen MD (11/28/22 23:43:29)                   Impression:      Satisfactory position of endotracheal and enteric tube tips.    Bilateral interstitial opacities.  No focal consolidation.      Electronically signed by: Jan Chen MD  Date:    11/28/2022  Time:    23:43               Narrative:    EXAMINATION:  XR CHEST AP PORTABLE; XR NON-RADIOLOGIST PERFORMED NG/GASTRIC  TUBE CHECK    CLINICAL HISTORY:  nasogastric tube; Shortness of breath    TECHNIQUE:  Single frontal view of the chest and abdomen were performed.    COMPARISON:  03/13/2022.    FINDINGS:  The endotracheal tube tip is 4.5 cm rosamaria.  The enteric tube tip is in the left upper abdominal quadrant.  The side port is below the GE junction.  Monitoring EKG leads are present.    The cardiomediastinal silhouette is within normal limits.  There is no evidence of free air beneath the hemidiaphragms.  There are no pleural effusions.  There is no evidence of a pneumothorax.  There is no evidence of pneumomediastinum.  There are bilateral interstitial opacities.  There is no focal consolidation.    The stomach is nondistended.  The visualized upper abdominal structures are within normal limits.    The osseous structures are unremarkable.                                          Assessment/Plan:     * NSTEMI, initial episode of care  Patient had chest pain that radiated to his arm with elevated troponin  No ST elevation on EKG  Initial troponin was the peak at 4.3, down trended from there to 3.5  start heparin ACS protocol, and continue for 48 hours  Continue ASA 81mg daily  continue plavix 75 mg daily  lipid panel; atorvastatin 40mg daily  echo to assess LV/RV function  discussed aggressive risk factor modifications  monitor on telemetry  serial ECG and nitro SL as needed for chest pain  consult interventional cardiology for potential cardiac catheterization      Acute hypoxemic respiratory failure  Patient with Hypoxic Respiratory failure which is Acute.  he is not on home oxygen. Supplemental oxygen was provided and noted-     Vent Mode: PRVC  Oxygen Concentration (%):  [] 50  Resp Rate Total:  [20 br/min-21 br/min] 20 br/min  Vt Set:  [550 mL] 550 mL  PEEP/CPAP:  [5 cmH20] 5 cmH20  Mean Airway Pressure:  [10 axA47-85.2 cmH20] 10 cmH20.     Signs/symptoms of respiratory failure include- tachypnea, increased work of  breathing, respiratory distress, use of accessory muscles and lethargy. Contributing diagnoses includes - CHF Labs and images were reviewed. Patient Has recent ABG, which has been reviewed. Will treat underlying causes and adjust management of respiratory failure as follows-     Potentially a primary ACS event with significant anxiety requiring IV benzo, which lead to respiratory depression resulting in Intubation.   Intubated and Sedated  Propofol and Fentanyl  SBT daily  PulmCC consulted    CKD stage 3 due to type 2 diabetes mellitus  Patient's FSGs are uncontrolled due to hyperglycemia on current medication regimen.  Last A1c reviewed-   Lab Results   Component Value Date    LABA1C 11.8 (H) 06/13/2014    HGBA1C 8.4 (H) 11/06/2020     Most recent fingerstick glucose reviewed-   Recent Labs   Lab 11/28/22  2244 11/29/22  0222   POCTGLUCOSE 373* 305*       Antihyperglycemics (From admission, onward)      Start     Stop Route Frequency Ordered    11/29/22 0140  insulin aspart U-100 pen 0-5 Units         -- SubQ Every 6 hours PRN 11/29/22 0040          Hold Oral hypoglycemics while patient is in the hospital.  SSI and q4h testing    Lactic acidosis  Lactic acid 4.9 admission, given fluids and good oxygenation/ventilation while intubated, LA improved to 2.2      Essential hypertension  On propofol and fentanyl currently, with PO metoprolol and losartan, and IV PRNs- BP stable  Add PO meds when extubated    Type II diabetes mellitus with neurological manifestations  See above    VTE Risk Mitigation (From admission, onward)           Ordered     Reason for No Pharmacological VTE Prophylaxis  Once        Question:  Reasons:  Answer:  Physician Provided (leave comment)  Comment:  On heparin drip    11/29/22 0243     IP VTE HIGH RISK PATIENT  Once         11/29/22 0243     Place sequential compression device  Until discontinued         11/29/22 0243     heparin 25,000 units in dextrose 5% (100 units/ml) IV bolus from bag -  ADDITIONAL PRN BOLUS - 60 units/kg (max bolus 4000 units)  As needed (PRN)        Question:  Heparin Infusion Adjustment (DO NOT MODIFY ANSWER)  Answer:  [email or web address]\epic\Images\Pharmacy\HeparinInfusions\heparin LOW INTENSITY nomogram for [facility] [id number]    11/29/22 0026     heparin 25,000 units in dextrose 5% (100 units/ml) IV bolus from bag - ADDITIONAL PRN BOLUS - 30 units/kg (max bolus 4000 units)  As needed (PRN)        Question:  Heparin Infusion Adjustment (DO NOT MODIFY ANSWER)  Answer:  [email or web address]\epic\Images\Pharmacy\HeparinInfusions\heparin LOW INTENSITY nomogram for [facility] [id number]    11/29/22 0026     heparin 25,000 units in dextrose 5% 250 mL (100 units/mL) infusion LOW INTENSITY nomogram - [facility]  Continuous        Question Answer Comment   Heparin Infusion Adjustment (DO NOT MODIFY ANSWER) \\[email or web address]\epic\Images\Pharmacy\HeparinInfusions\heparin LOW INTENSITY nomogram for [facility] [id number]    Begin at (in units/kg/hr) 12        11/29/22 0026                  Critical care time spent on the evaluation and treatment of severe organ dysfunction, review of pertinent labs and imaging studies, discussions with consulting providers and discussions with patient/family: 35 minutes.     Robert Partida MD  Department of Hospital Medicine   [facility] [place] Int[place] Care

## 2022-11-29 NOTE — EICU
EICU BRIEF INITIAL EVALUATION:       HISTORY:      61-year-old male admitted to ICU intubated on heparin and propofol drips with NSTEMI..  History of DM 2 with neuropathy and history of foot ulcers, hepatosplenomegaly, back pain, leg edema, cirrhosis, hepatitis-C, tobacco abuse, osteomyelitis, peripheral arterial disease, bilateral carotid artery stenosis, GERD, hypertension, left BKA    DVT prophylaxis on heparin drip  GI prophylaxis pantoprazole ordered    /67, P 76, RR 20, O2 sat 100   Sedated on ventilator     Chest x-ray with bilateral interstitial prominence.  Endotracheal tube approximately 4 cm from rosamaria.  No effusion or pneumothorax.  Abdominal x-ray with gastric tube tip and side port well into stomach, away from GE junction.    EKG with inverted T-waves laterally, , sinus tach 117 with first-degree AV block      CAMERA ASSESSMENT: Yes      TELEMETRY: Reviewed      NOTES: Reviewed     LABS: Reviewed      IMAGING: Personally reviewed.      DISCUSSED with bedside nurse        ASSESSMENT AND PLAN:     Acute hypoxemic respiratory failure -continue ventilator support.  Ventilator bundle ordered    NSTEMI-continue heparin drip , aspirin, Plavix, statin, beta-blocker.  Management per Cardiology    Diabetes-reportedly took 28 units of insulin of unknown type at home.  Low intensity sliding scale already ordered    Acute on chronic kidney injury- avoid nephrotoxins, renally dose medications.  Monitor renal function and urine output.  Gentle hydration        I have reviewed the plan of care for the patient and agree with the plan of care unless noted otherwise above.      JARED Simeon MD  eICU Attending  [phone]

## 2022-11-29 NOTE — NURSING
Spoke with Dr. Adams to clarify order to hold heparin 4 hours before procedure. Per Dr. Adams ok to hold Heparin gtt on call to cath lab.

## 2022-11-29 NOTE — CARE UPDATE
Patient seen and examined.  See H/P, treatment and plan per Dr. Partida.  60 y/o male presents with acute respiratory failure.  Admitted to ICU on vent.  Pulmonary consulted for vent management.  Elevated troponin consistent with NSTEMI.  Started on ASA, Plavix, Statin and Heparin drip.  Cardiology consulted.  Planning LHC today.  Minimal oxygen requirements.  Will probably be able to get extubated later today after LHC.

## 2022-11-29 NOTE — ASSESSMENT & PLAN NOTE
Patient's FSGs are uncontrolled due to hyperglycemia on current medication regimen.  Last A1c reviewed-   Lab Results   Component Value Date    LABA1C 11.8 (H) 06/13/2014    HGBA1C 8.4 (H) 11/06/2020     Most recent fingerstick glucose reviewed-   Recent Labs   Lab 11/28/22  2244 11/29/22  0222   POCTGLUCOSE 373* 305*       Antihyperglycemics (From admission, onward)    Start     Stop Route Frequency Ordered    11/29/22 0140  insulin aspart U-100 pen 0-5 Units         -- SubQ Every 6 hours PRN 11/29/22 0040        · Hold Oral hypoglycemics while patient is in the hospital.  SSI and q4h testing

## 2022-11-29 NOTE — HPI
Carlos Cifuentes Jr. is a 61 y.o. male who has a past medical history of Arthritis, Back pain, Bulging lumbar disc, C. difficile diarrhea, Chronic back pain, Diabetes mellitus, Diabetes mellitus type II, DM, type 2, uncontrolled, Hepatitis C, MSSA septicemia, Neuromuscular disorder, Neuropathy, and Staph aureus infection, presented to the ED with CC of SOB, abdominal pain, nausea with vomiting, and Headache.  Patient was intubated upon arrival to ED after receiving 2 mg of IV Ativan for anxiety, as he became obtunded and had difficulty breathing; therefore, HPI from EMS and ED report:     Initial EMS call was for abdominal pain with vomiting and hyperglycemia.  EMS found patient with vomiting but also anxiety. EMS said that patient would not allow them to check vitals, so EMS administered IV Ativan 2 mg, and patient then became obtunded and had difficulty breathing.  EMS reports  and /93 s/p ativan.  EMS states patient administered SC insulin 28 units prior to their arrival, but it is unclear which insulin this is. Patient's sister arrived to ER later and supplemented HPI.  She reports that patient had substernal acid reflux like chest pain with radiation to his left arm yesterday Sunday 11/27/22 and went to the ER at [facility].  However he was in the lobby for several hours and was not seen.  He then left. His chest pain recurred tonight Monday 11/28/22, he called his sister to tell her that he felt bad and was short of breath.  She urged to call 911, which he did.  She suspects that patient became anxious and agitated when EMS strapped him to their gurney, as patient has a history of claustrophobia relating to a childhood incident in which he was papoosed to get staples in his scalp.    In the ED, patient was intubated and sedated with propofol.  His blood pressure was still 200/100, despite propofol use.  His troponin was 4 without ST elevation and lactic acid 4.9, he was started on NSTEMI protocol  with heparin drip and given fluids.  Mild reactive leukocytosis corrected with fluids only.

## 2022-11-29 NOTE — ASSESSMENT & PLAN NOTE
· Patient had chest pain that radiated to his arm with elevated troponin  · No ST elevation on EKG  · Initial troponin was the peak at 4.3, down trended from there to 3.5  · start heparin ACS protocol, and continue for 48 hours  · Continue ASA 81mg daily  · continue plavix 75 mg daily  · lipid panel; atorvastatin 40mg daily  · echo to assess LV/RV function  · discussed aggressive risk factor modifications  · monitor on telemetry  · serial ECG and nitro SL as needed for chest pain  · consult interventional cardiology for potential cardiac catheterization

## 2022-11-29 NOTE — RESPIRATORY THERAPY
Patient arrived to ER via EMS to room 17. Dr. Payton at bedside for intubation. Mr. Cifuentes was intubated with 8.0 ETT secured at 25 cm at lip and placed on servoU ventilator with current settings PRVC RATE 20, TIDAL VOLUME 550 ML, PEEP +5, FI02 100% with ventilator alarms on, set and functioning. AMBU bag and mask at bedside. Oral and ETT suctioning done. Will continue with ventilator management.

## 2022-11-29 NOTE — ASSESSMENT & PLAN NOTE
Troponin 4 trending down. Chart notes CP 2 days ago. No prior documentation of CAD in chart. EF 40% on echo. Discussed LHC with sister Radha Messer - risks/benefits ecplained and she agrees to proceed. On ASA/plavix and heparin

## 2022-11-29 NOTE — SUBJECTIVE & OBJECTIVE
Past Medical History:   Diagnosis Date    Arthritis     Back pain     Bulging lumbar disc     C. difficile diarrhea     Chronic back pain 10/14/2014    Diabetes mellitus     Diabetes mellitus type II     DM (diabetes mellitus), type 2, uncontrolled 3/12/2013    Hepatitis C 7/3/2013    MSSA (methicillin susceptible Staphylococcus aureus) septicemia     Neuromuscular disorder     Neuropathy     Staph aureus infection        Past Surgical History:   Procedure Laterality Date    ABOVE-KNEE AMPUTATION Bilateral     ANGIOGRAPHY OF LOWER EXTREMITY Left 06/14/2018    Procedure: Angiogram LEFT LOWER Extremity with US guided access from right side;  Surgeon: Sony Srinivasan MD;  Location: [facility] OR [address] [place] [address];  Service: Peripheral Vascular;  Laterality: Left;  local: 16ml  contrast: 20ml   fluoro: 2.8  427.73mGy    APPENDECTOMY      JOINT REPLACEMENT      left knee surgery      left leg surgery      broken bone    LEG SURGERY  right     Right arm boil      Right great toe amputation      TOE AMPUTATION Right        Review of patient's allergies indicates:   Allergen Reactions    Codeine Rash    Lyrica [pregabalin]      Feet turned Red    Vicodin [hydrocodone-acetaminophen] Rash       No current facility-administered medications on file prior to encounter.     Current Outpatient Medications on File Prior to Encounter   Medication Sig    gabapentin (NEURONTIN) 300 MG capsule TAKE 1 CAPSULE BY MOUTH THREE TIMES A DAY    glimepiride (AMARYL) 2 MG tablet Take 1 tablet (2 mg total) by mouth every morning.    LANTUS U-100 INSULIN 100 unit/mL injection INJECT TEN UNITS INTO SKIN AT BEDTIME    metFORMIN (GLUCOPHAGE) 1000 MG tablet TAKE ONE TABLET BY MOUTH TWICE DAILY    mupirocin (BACTROBAN) 2 % ointment Apply to affected area 3 times daily    pantoprazole (PROTONIX) 40 MG tablet TAKE ONE TABLET BY MOUTH EVERY DAY    silver sulfADIAZINE 1% (SILVADENE) 1 % cream Apply 1 application topically once daily. Apply to affected area    TRUE  METRIX GLUCOSE METER Misc AS DIRECTED    TRUE METRIX GLUCOSE TEST STRIP Strp Twice daily blood sugar checks     Family History       Problem Relation (Age of Onset)    Arthritis Mother, Sister, Brother    Diabetes Sister          Tobacco Use    Smoking status: Every Day     Packs/day: 1.00     Types: Cigarettes    Smokeless tobacco: Never   Substance and Sexual Activity    Alcohol use: No    Drug use: No    Sexual activity: Never     Review of Systems   Unable to perform ROS: Intubated   Constitutional:  Positive for activity change and fatigue.   Musculoskeletal:  Positive for gait problem.   Neurological:  Positive for headaches.   Objective:     Vital Signs (Most Recent):  Temp: 98.4 °F (36.9 °C) (11/29/22 0315)  Pulse: 75 (11/29/22 0430)  Resp: 20 (11/29/22 0430)  BP: (!) 145/74 (11/29/22 0430)  SpO2: 100 % (11/29/22 0430)   Vital Signs (24h Range):  Temp:  [98.3 °F (36.8 °C)-98.5 °F (36.9 °C)] 98.4 °F (36.9 °C)  Pulse:  [] 75  Resp:  [20-40] 20  SpO2:  [85 %-100 %] 100 %  BP: (110-204)/() 145/74     Weight: 106.4 kg (234 lb 9.1 oz)  Body mass index is 34.64 kg/m².    Physical Exam  Vitals and nursing note reviewed.   Constitutional:       General: He is in acute distress.      Appearance: He is ill-appearing and toxic-appearing.   HENT:      Head: Normocephalic and atraumatic.      Nose: Nose normal. No rhinorrhea.   Eyes:      Pupils: Pupils are equal, round, and reactive to light.   Cardiovascular:      Rate and Rhythm: Normal rate.      Pulses: Normal pulses.      Heart sounds: No murmur heard.  Pulmonary:      Effort: Respiratory distress present.      Breath sounds: Rales present. No wheezing.      Comments:   Intubated  Abdominal:      General: Abdomen is flat. There is no distension.      Tenderness: There is no abdominal tenderness.   Genitourinary:     Penis: No discharge or swelling.       Comments:   Bhatt in place  Musculoskeletal:         General: No deformity. Normal range of motion.       Cervical back: No rigidity.      Right lower leg: No edema.      Left lower leg: No edema.   Lymphadenopathy:      Cervical: No cervical adenopathy.   Skin:     General: Skin is dry.      Capillary Refill: Capillary refill takes less than 2 seconds.   Neurological:      Comments:   Sedated         CRANIAL NERVES     CN III, IV, VI   Pupils are equal, round, and reactive to light.         Recent Results (from the past 24 hour(s))   POCT glucose    Collection Time: 11/28/22 10:44 PM   Result Value Ref Range    POCT Glucose 373 (H) 70 - 110 mg/dL   CBC auto differential    Collection Time: 11/28/22 11:08 PM   Result Value Ref Range    WBC 13.42 (H) 3.90 - 12.70 K/uL    RBC 2.61 (L) 4.60 - 6.20 M/uL    Hemoglobin 11.6 (L) 14.0 - 18.0 g/dL    Hematocrit 31.7 (L) 40.0 - 54.0 %     (H) 82 - 98 fL    MCH 44.4 (H) 27.0 - 31.0 pg    MCHC 36.6 (H) 32.0 - 36.0 g/dL    RDW 14.5 11.5 - 14.5 %    Platelets 201 150 - 450 K/uL    MPV 10.3 9.2 - 12.9 fL    Immature Granulocytes 1.0 (H) 0.0 - 0.5 %    Gran # (ANC) 6.5 1.8 - 7.7 K/uL    Immature Grans (Abs) 0.14 (H) 0.00 - 0.04 K/uL    Lymph # 5.5 (H) 1.0 - 4.8 K/uL    Mono # 0.8 0.3 - 1.0 K/uL    Eos # 0.5 0.0 - 0.5 K/uL    Baso # 0.08 0.00 - 0.20 K/uL    nRBC 0 0 /100 WBC    Gran % 48.2 38.0 - 73.0 %    Lymph % 41.1 18.0 - 48.0 %    Mono % 5.7 4.0 - 15.0 %    Eosinophil % 3.4 0.0 - 8.0 %    Basophil % 0.6 0.0 - 1.9 %    Differential Method Automated    Comprehensive metabolic panel    Collection Time: 11/28/22 11:08 PM   Result Value Ref Range    Sodium 135 (L) 136 - 145 mmol/L    Potassium 3.8 3.5 - 5.1 mmol/L    Chloride 103 95 - 110 mmol/L    CO2 19 (L) 23 - 29 mmol/L    Glucose 445 (H) 70 - 110 mg/dL    BUN 26 (H) 6 - 20 mg/dL    Creatinine 1.8 (H) 0.5 - 1.4 mg/dL    Calcium 8.8 8.7 - 10.5 mg/dL    Total Protein 7.4 6.0 - 8.4 g/dL    Albumin 3.5 3.5 - 5.2 g/dL    Total Bilirubin 0.6 0.1 - 1.0 mg/dL    Alkaline Phosphatase 77 55 - 135 U/L    AST 28 10 - 40 U/L    ALT  23 10 - 44 U/L    Anion Gap 13 8 - 16 mmol/L    eGFR 43 (A) >60 mL/min/1.73 m^2   Brain natriuretic peptide    Collection Time: 11/28/22 11:08 PM   Result Value Ref Range     (H) 0 - 99 pg/mL   Lipase    Collection Time: 11/28/22 11:08 PM   Result Value Ref Range    Lipase 12 4 - 60 U/L   Lactic acid, plasma    Collection Time: 11/28/22 11:08 PM   Result Value Ref Range    Lactate (Lactic Acid) 4.9 (HH) 0.5 - 2.2 mmol/L   Magnesium    Collection Time: 11/28/22 11:08 PM   Result Value Ref Range    Magnesium 1.8 1.6 - 2.6 mg/dL   Phosphorus    Collection Time: 11/28/22 11:08 PM   Result Value Ref Range    Phosphorus 3.5 2.7 - 4.5 mg/dL   Procalcitonin    Collection Time: 11/28/22 11:08 PM   Result Value Ref Range    Procalcitonin 0.10 <0.25 ng/mL   Troponin I    Collection Time: 11/28/22 11:08 PM   Result Value Ref Range    Troponin I 4.313 (H) 0.000 - 0.026 ng/mL   TSH    Collection Time: 11/28/22 11:08 PM   Result Value Ref Range    TSH 5.519 (H) 0.400 - 4.000 uIU/mL   Protime-INR    Collection Time: 11/28/22 11:08 PM   Result Value Ref Range    Prothrombin Time 10.7 9.0 - 12.5 sec    INR 1.0 0.8 - 1.2   APTT    Collection Time: 11/28/22 11:08 PM   Result Value Ref Range    aPTT 25.4 21.0 - 32.0 sec   Ethanol    Collection Time: 11/28/22 11:08 PM   Result Value Ref Range    Alcohol, Serum <10 <10 mg/dL   T4, Free    Collection Time: 11/28/22 11:08 PM   Result Value Ref Range    Free T4 0.84 0.71 - 1.51 ng/dL   C-Reactive Protein    Collection Time: 11/28/22 11:08 PM   Result Value Ref Range    CRP 23.1 (H) 0.0 - 8.2 mg/L   Drug screen panel, emergency    Collection Time: 11/28/22 11:12 PM   Result Value Ref Range    Benzodiazepines Negative Negative    Methadone metabolites Negative Negative    Cocaine (Metab.) Negative Negative    Opiate Scrn, Ur Negative Negative    Barbiturate Screen, Ur Negative Negative    Amphetamine Screen, Ur Negative Negative    THC Negative Negative    Phencyclidine Negative  Negative    Creatinine, Urine 108.6 23.0 - 375.0 mg/dL    Toxicology Information SEE COMMENT    D dimer, quantitative    Collection Time: 11/28/22 11:13 PM   Result Value Ref Range    D-Dimer 0.71 (H) <0.50 mg/L FEU   POCT Influenza A/B Molecular    Collection Time: 11/28/22 11:14 PM   Result Value Ref Range    POC Molecular Influenza A Ag Negative Negative, Not Reported    POC Molecular Influenza B Ag Negative Negative, Not Reported     Acceptable Yes    Ammonia    Collection Time: 11/28/22 11:15 PM   Result Value Ref Range    Ammonia 25 10 - 50 umol/L   Beta - Hydroxybutyrate, Serum    Collection Time: 11/28/22 11:15 PM   Result Value Ref Range    Beta-Hydroxybutyrate 0.1 0.0 - 0.5 mmol/L   POCT COVID-19 Rapid Screening    Collection Time: 11/28/22 11:15 PM   Result Value Ref Range    POC Rapid COVID Negative Negative     Acceptable Yes    ISTAT PROCEDURE    Collection Time: 11/28/22 11:19 PM   Result Value Ref Range    POC PH 7.316 (L) 7.35 - 7.45    POC PCO2 42.2 35 - 45 mmHg    POC PO2 142 (H) 80 - 100 mmHg    POC HCO3 21.6 (L) 24 - 28 mmol/L    POC BE -4 -2 to 2 mmol/L    POC SATURATED O2 99 95 - 100 %    POC TCO2 23 23 - 27 mmol/L    Rate 20     Sample ARTERIAL     Site RB     Allens Test N/A     DelSys Adult Vent     Mode AC/PRVC     Vt 550     PEEP 5     PiP 27     FiO2 100     Min Vol 10.8     Sp02 99    Urinalysis, Reflex to Urine Culture    Collection Time: 11/28/22 11:20 PM   Result Value Ref Range    Specimen UA Urine, Clean Catch     Color, UA Yellow Yellow, Straw, Cici    Appearance, UA Clear Clear    pH, UA 6.0 5.0 - 8.0    Specific Gravity, UA 1.020 1.005 - 1.030    Protein, UA 2+ (A) Negative    Glucose, UA 4+ (A) Negative    Ketones, UA Negative Negative    Bilirubin (UA) Negative Negative    Occult Blood UA Trace (A) Negative    Nitrite, UA Negative Negative    Urobilinogen, UA Negative <2.0 EU/dL    Leukocytes, UA Negative Negative   Urinalysis Microscopic     Collection Time: 11/28/22 11:20 PM   Result Value Ref Range    RBC, UA 0 0 - 4 /hpf    WBC, UA 5 0 - 5 /hpf    Bacteria None None-Occ /hpf    Yeast, UA None None    Squam Epithel, UA 0 /hpf    Non-Squam Epith 0 <1/hpf /hpf    Hyaline Casts, UA 6 (A) 0-1/lpf /lpf    Microscopic Comment SEE COMMENT    POCT glucose    Collection Time: 11/29/22  2:22 AM   Result Value Ref Range    POCT Glucose 305 (H) 70 - 110 mg/dL   APTT    Collection Time: 11/29/22  3:40 AM   Result Value Ref Range    aPTT 35.7 (H) 21.0 - 32.0 sec   CBC Auto Differential    Collection Time: 11/29/22  3:40 AM   Result Value Ref Range    WBC 5.67 3.90 - 12.70 K/uL    RBC 2.21 (L) 4.60 - 6.20 M/uL    Hemoglobin 9.7 (L) 14.0 - 18.0 g/dL    Hematocrit 26.1 (L) 40.0 - 54.0 %     (H) 82 - 98 fL    MCH 43.9 (H) 27.0 - 31.0 pg    MCHC 37.2 (H) 32.0 - 36.0 g/dL    RDW 14.0 11.5 - 14.5 %    Platelets 122 (L) 150 - 450 K/uL    MPV 10.4 9.2 - 12.9 fL    Immature Granulocytes 0.7 (H) 0.0 - 0.5 %    Gran # (ANC) 4.2 1.8 - 7.7 K/uL    Immature Grans (Abs) 0.04 0.00 - 0.04 K/uL    Lymph # 1.0 1.0 - 4.8 K/uL    Mono # 0.3 0.3 - 1.0 K/uL    Eos # 0.1 0.0 - 0.5 K/uL    Baso # 0.02 0.00 - 0.20 K/uL    nRBC 0 0 /100 WBC    Gran % 74.8 (H) 38.0 - 73.0 %    Lymph % 16.9 (L) 18.0 - 48.0 %    Mono % 5.8 4.0 - 15.0 %    Eosinophil % 1.4 0.0 - 8.0 %    Basophil % 0.4 0.0 - 1.9 %    Platelet Estimate Decreased (A)     Aniso Slight     Poly Occasional     Differential Method Automated    Lactic Acid, Plasma    Collection Time: 11/29/22  3:40 AM   Result Value Ref Range    Lactate (Lactic Acid) 2.2 0.5 - 2.2 mmol/L   Comprehensive Metabolic Panel    Collection Time: 11/29/22  3:40 AM   Result Value Ref Range    Sodium 134 (L) 136 - 145 mmol/L    Potassium 4.6 3.5 - 5.1 mmol/L    Chloride 106 95 - 110 mmol/L    CO2 20 (L) 23 - 29 mmol/L    Glucose 269 (H) 70 - 110 mg/dL    BUN 24 (H) 6 - 20 mg/dL    Creatinine 1.4 0.5 - 1.4 mg/dL    Calcium 8.3 (L) 8.7 - 10.5 mg/dL    Total  Protein 6.2 6.0 - 8.4 g/dL    Albumin 3.0 (L) 3.5 - 5.2 g/dL    Total Bilirubin 0.5 0.1 - 1.0 mg/dL    Alkaline Phosphatase 50 (L) 55 - 135 U/L    AST 22 10 - 40 U/L    ALT 20 10 - 44 U/L    Anion Gap 8 8 - 16 mmol/L    eGFR 58 (A) >60 mL/min/1.73 m^2   Magnesium    Collection Time: 11/29/22  3:40 AM   Result Value Ref Range    Magnesium 1.7 1.6 - 2.6 mg/dL   Phosphorus    Collection Time: 11/29/22  3:40 AM   Result Value Ref Range    Phosphorus 2.5 (L) 2.7 - 4.5 mg/dL   Troponin I    Collection Time: 11/29/22  3:40 AM   Result Value Ref Range    Troponin I 3.528 (H) 0.000 - 0.026 ng/mL   ISTAT PROCEDURE    Collection Time: 11/29/22  3:50 AM   Result Value Ref Range    POC PH 7.386 7.35 - 7.45    POC PCO2 41.4 35 - 45 mmHg    POC PO2 39 (L) 40 - 60 mmHg    POC HCO3 24.9 24 - 28 mmol/L    POC BE 0 -2 to 2 mmol/L    POC SATURATED O2 73 (L) 95 - 100 %    POC TCO2 26 24 - 29 mmol/L    Rate 20     Sample VENOUS     Site Other     Allens Test N/A     DelSys Adult Vent     Mode AC/PRVC     Vt 550     PEEP 5     FiO2 50        Microbiology Results (last 7 days)       Procedure Component Value Units Date/Time    Blood culture #2 **CANNOT BE ORDERED STAT** [[id number]] Collected: 11/28/22 2324    Order Status: Sent Specimen: Blood from Peripheral, Antecubital, Left Updated: 11/28/22 2328    Blood culture #1 **CANNOT BE ORDERED STAT** [[id number]] Collected: 11/28/22 2308    Order Status: Sent Specimen: Blood from Peripheral, Antecubital, Right Updated: 11/28/22 2315             Imaging Results              CT Head Without Contrast (Final result)  Result time 11/29/22 01:22:39      Final result by Michelle Collins MD (11/29/22 01:22:39)                   Impression:      No acute intracranial abnormality detected.    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  Mild chronic small vessel ischemic changes.      Electronically signed by: Michelle Collins  Date:    11/29/2022  Time:    01:22               Narrative:     EXAMINATION:  CT OF THE HEAD WITHOUT    CLINICAL HISTORY:  Mental status change, unknown cause;    TECHNIQUE:  5 mm unenhanced axial images were obtained from the skull base to the vertex.    COMPARISON:  09/17/2014 at and 03/15/2022    FINDINGS:  The ventricles, basal cisterns, and cortical sulci are within normal limits for patient's stated age.  Chronic small vessel ischemic changes are present.  There is a curvilinear hypodense area at the right external capsule, which may severe just a remote infarct.  There is no acute intracranial hemorrhage, territorial infarct or mass effect, or midline shift. In the visualized paranasal sinuses, there is mild inferior bilateral frontal, bilateral anterior ethmoid and right sphenoid sinus mucoperiosteal thickening.                                       CT Chest Abdomen Pelvis Without Contrast (XPD) (Final result)  Result time 11/29/22 01:58:57      Final result by Abhinav Brown MD (11/29/22 01:58:57)                   Impression:      Bilateral airspace infiltrates, atelectasis and effusions.  Correlate for pneumonia, pulmonary edema with parapneumonic effusion.    Three vessel coronary artery disease.    No acute finding evident within the abdomen or pelvis.      Electronically signed by: Abhinav Brown  Date:    11/29/2022  Time:    01:58               Narrative:    EXAMINATION:  CT CHEST ABDOMEN PELVIS WITHOUT CONTRAST(XPD)    CLINICAL HISTORY:  Sepsis;substernal chest pain, abdominal pain, nausea/vomiting, CKD;    TECHNIQUE:  Low dose axial images, sagittal and coronal reformations were obtained from the thoracic inlet to the pubic synthesis following the oral administration of 30 mL of Omnipaque 350.    COMPARISON:  None    FINDINGS:  Thoracic soft tissues: No significant abnormality.    Aorta: Normal in course and caliber, without significant atherosclerotic plaque. There are 2 branching vessels at the arch compatible with low bovine arch.    Heart: Normal in  size. No pericardial effusion. Three vessel coronary artery calcified disease.    Mayuri/Mediastinum: No significant lymphadenopathy ET tube a cm above the rosamaria and NG tube within the body of the stomach.    Lungs: Patchy airspace disease with focal consolidation and/or atelectasis in the lower lobes bilaterally.  Bilateral pleural effusions    Liver: Normal in size and attenuation, with no focal hepatic lesions.    Gallbladder: No calcified gallstones.    Bile Ducts: No evidence of dilated ducts.    Pancreas: No mass or peripancreatic fat stranding.    Spleen: Unremarkable.    Adrenals: Unremarkable.    Kidneys/ Ureters: Normal in size and location. Normal concentration and excretion of contrast. No hydronephrosis or nephrolithiasis. No ureteral dilatation.    Bladder: No evidence of wall thickening. Bhatt catheter.    Reproductive organs: Unremarkable.    GI Tract/Mesentery: No evidence of bowel obstruction or inflammation.    Peritoneal Space: No ascites. No free air.    Retroperitoneum: No significant adenopathy.    Abdominal wall: Small fat containing periumbilical abdominal wall hernia to the left of midline.    Vasculature: No significant atherosclerosis or aneurysm.    Bones: No acute fracture.                                       X-Ray Chest AP Portable (Final result)  Result time 11/28/22 23:43:29      Final result by Jan Chen MD (11/28/22 23:43:29)                   Impression:      Satisfactory position of endotracheal and enteric tube tips.    Bilateral interstitial opacities.  No focal consolidation.      Electronically signed by: Jan Chen MD  Date:    11/28/2022  Time:    23:43               Narrative:    EXAMINATION:  XR CHEST AP PORTABLE; XR NON-RADIOLOGIST PERFORMED NG/GASTRIC TUBE CHECK    CLINICAL HISTORY:  nasogastric tube; Shortness of breath    TECHNIQUE:  Single frontal view of the chest and abdomen were performed.    COMPARISON:  03/13/2022.    FINDINGS:  The endotracheal tube tip  is 4.5 cm rosamaria.  The enteric tube tip is in the left upper abdominal quadrant.  The side port is below the GE junction.  Monitoring EKG leads are present.    The cardiomediastinal silhouette is within normal limits.  There is no evidence of free air beneath the hemidiaphragms.  There are no pleural effusions.  There is no evidence of a pneumothorax.  There is no evidence of pneumomediastinum.  There are bilateral interstitial opacities.  There is no focal consolidation.    The stomach is nondistended.  The visualized upper abdominal structures are within normal limits.    The osseous structures are unremarkable.                                       XR Non-Rad Performed NG/Gastric Tube Check (Final result)  Result time 11/28/22 23:43:29      Final result by Jan Chen MD (11/28/22 23:43:29)                   Impression:      Satisfactory position of endotracheal and enteric tube tips.    Bilateral interstitial opacities.  No focal consolidation.      Electronically signed by: Jan Chen MD  Date:    11/28/2022  Time:    23:43               Narrative:    EXAMINATION:  XR CHEST AP PORTABLE; XR NON-RADIOLOGIST PERFORMED NG/GASTRIC TUBE CHECK    CLINICAL HISTORY:  nasogastric tube; Shortness of breath    TECHNIQUE:  Single frontal view of the chest and abdomen were performed.    COMPARISON:  03/13/2022.    FINDINGS:  The endotracheal tube tip is 4.5 cm rosamaria.  The enteric tube tip is in the left upper abdominal quadrant.  The side port is below the GE junction.  Monitoring EKG leads are present.    The cardiomediastinal silhouette is within normal limits.  There is no evidence of free air beneath the hemidiaphragms.  There are no pleural effusions.  There is no evidence of a pneumothorax.  There is no evidence of pneumomediastinum.  There are bilateral interstitial opacities.  There is no focal consolidation.    The stomach is nondistended.  The visualized upper abdominal structures are within normal  limits.    The osseous structures are unremarkable.

## 2022-11-29 NOTE — HPI
Mr. Carlos Cifuentes is a 62yo male with a PMHx of DM 2 with neuropathy and history of foot ulcers, cirrhosis, hepatitis-C, tobacco abuse, osteomyelitis, peripheral arterial disease, bilateral carotid artery stenosis, GERD, hypertension, left BKA who presented to the ED via EMS for reported abdominal pain. Per EMS and patient's sister, he was experiencing chest pain with radiation to his left arm on Sunday and went to [facility] ER, but left before being seen. He then experienced this pain again Monday evening and called EMS. On their arrival, he was extremely anxious and vomiting and would not allow them to check his vitals. He received 2mg Ativan per EMS and was noted to be hyperglycemic. On arrival to the ED, he was obtunded and had difficulty breathing so he was intubated.  His blood pressure was still 200/100, despite propofol use. His troponin was 4 without ST elevation and lactic acid 4.9. He was started on NSTEMI protocol with heparin drip and given fluids. Pulmonary was consulted for ventilator management.

## 2022-11-29 NOTE — SUBJECTIVE & OBJECTIVE
Past Medical History:   Diagnosis Date    Arthritis     Back pain     Bulging lumbar disc     C. difficile diarrhea     Chronic back pain 10/14/2014    Diabetes mellitus     Diabetes mellitus type II     DM (diabetes mellitus), type 2, uncontrolled 3/12/2013    Hepatitis C 7/3/2013    MSSA (methicillin susceptible Staphylococcus aureus) septicemia     Neuromuscular disorder     Neuropathy     Staph aureus infection        Past Surgical History:   Procedure Laterality Date    ABOVE-KNEE AMPUTATION Bilateral     ANGIOGRAPHY OF LOWER EXTREMITY Left 06/14/2018    Procedure: Angiogram LEFT LOWER Extremity with US guided access from right side;  Surgeon: Sony Srinivasan MD;  Location: [facility] OR [address] [place] [address];  Service: Peripheral Vascular;  Laterality: Left;  local: 16ml  contrast: 20ml   fluoro: 2.8  427.73mGy    APPENDECTOMY      JOINT REPLACEMENT      left knee surgery      left leg surgery      broken bone    LEG SURGERY  right     Right arm boil      Right great toe amputation      TOE AMPUTATION Right        Review of patient's allergies indicates:   Allergen Reactions    Codeine Rash    Lyrica [pregabalin]      Feet turned Red    Vicodin [hydrocodone-acetaminophen] Rash       Family History       Problem Relation (Age of Onset)    Arthritis Mother, Sister, Brother    Diabetes Sister          Tobacco Use    Smoking status: Every Day     Packs/day: 1.00     Types: Cigarettes    Smokeless tobacco: Never   Substance and Sexual Activity    Alcohol use: No    Drug use: No    Sexual activity: Never         Review of Systems   Unable to perform ROS: Intubated   Objective:     Vital Signs (Most Recent):  Temp: 96.9 °F (36.1 °C) (11/29/22 0710)  Pulse: 71 (11/29/22 1045)  Resp: 16.2 (11/29/22 1045)  BP: 117/60 (11/29/22 1045)  SpO2: 96 % (11/29/22 1045) Vital Signs (24h Range):  Temp:  [96.9 °F (36.1 °C)-98.5 °F (36.9 °C)] 96.9 °F (36.1 °C)  Pulse:  [] 71  Resp:  [15.9-40] 16.2  SpO2:  [85 %-100 %] 96 %  BP:  (105-204)/() 117/60     Weight: 106.4 kg (234 lb 9.1 oz)  Body mass index is 34.64 kg/m².      Intake/Output Summary (Last 24 hours) at 11/29/2022 1053  Last data filed at 11/29/2022 1000  Gross per 24 hour   Intake 2352.53 ml   Output 660 ml   Net 1692.53 ml       Physical Exam  Vitals and nursing note reviewed.   Constitutional:       General: He is not in acute distress.     Appearance: He is obese. He is ill-appearing. He is not toxic-appearing.      Interventions: He is sedated, intubated and restrained.   HENT:      Head: Normocephalic and atraumatic.      Nose: Nose normal. No rhinorrhea.   Eyes:      Pupils: Pupils are equal, round, and reactive to light.   Cardiovascular:      Rate and Rhythm: Normal rate.      Pulses: Normal pulses.      Heart sounds: No murmur heard.  Pulmonary:      Effort: No respiratory distress. He is intubated.      Breath sounds: Rales present. No wheezing or rhonchi.   Abdominal:      General: Abdomen is flat. There is no distension.      Tenderness: There is no abdominal tenderness.      Comments: Abdominal wound dehiscence, photo in media tab   Genitourinary:     Comments: Bhatt in place  Musculoskeletal:         General: No deformity. Normal range of motion.      Cervical back: No rigidity.      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      Left Lower Extremity: Left leg is amputated below knee.   Skin:     General: Skin is dry.      Capillary Refill: Capillary refill takes less than 2 seconds.   Neurological:      Mental Status: He is easily aroused.      Comments: PERRL. Follows commands.        Vents:  Vent Mode: PRVC (11/29/22 0728)  Ventilator Initiated: Yes (11/28/22 2301)  Set Rate: 20 BPM (11/29/22 0728)  Vt Set: 550 mL (11/29/22 0728)  PEEP/CPAP: 5 cmH20 (11/29/22 0728)  Oxygen Concentration (%): 30 (11/29/22 1045)  Peak Airway Pressure: 25.2 cmH20 (11/29/22 0728)  Total Ve: 9.67 L/m (11/29/22 0728)  F/VT Ratio<105 (RSBI): (!) 39.69 (11/29/22  0728)    Lines/Drains/Airways       Drain  Duration                  NG/OG Tube 11/28/22 2256 16 Fr. Left mouth <1 day         Urethral Catheter 11/28/22 2245 16 Fr. <1 day              Airway  Duration                  Airway - Non-Surgical 11/28/22 2251 Endotracheal Tube <1 day              Peripheral Intravenous Line  Duration                  Peripheral IV - Single Lumen 11/28/22 20 G Left Antecubital 1 day         Peripheral IV - Single Lumen 11/28/22 2247 18 G Right Antecubital <1 day         Peripheral IV - Single Lumen 11/29/22 20 G Anterior;Right Wrist <1 day                    Significant Labs:    CBC/Anemia Profile:  Recent Labs   Lab 11/28/22 2308 11/29/22  0340   WBC 13.42* 5.67   HGB 11.6* 9.7*   HCT 31.7* 26.1*    122*   * 118*   RDW 14.5 14.0        Chemistries:  Recent Labs   Lab 11/28/22 2308 11/29/22  0340   * 134*   K 3.8 4.6    106   CO2 19* 20*   BUN 26* 24*   CREATININE 1.8* 1.4   CALCIUM 8.8 8.3*   ALBUMIN 3.5 3.0*   PROT 7.4 6.2   BILITOT 0.6 0.5   ALKPHOS 77 50*   ALT 23 20   AST 28 22   MG 1.8 1.7   PHOS 3.5 2.5*       All pertinent labs within the past 24 hours have been reviewed.    Significant Imaging:   I have reviewed all pertinent imaging results/findings within the past 24 hours.

## 2022-11-29 NOTE — NURSING
[facility]  ICU Shift Summary  Date: 11/29/2022      Prehospitalization: Home  Admit Date / LOS : 11/28/2022/ 0 days    Diagnosis: <principal problem not specified>    Consults:        Active: Cardio and Pulm CC       Needed: Wound Care     Code Status: Prior   Advanced Directive: Received    LDA:  Lines/Drains/Airways       Drain  Duration                  NG/OG Tube 11/28/22 2256 16 Fr. Left mouth <1 day         Urethral Catheter 11/28/22 2245 16 Fr. <1 day              Airway  Duration                  Airway - Non-Surgical 11/28/22 2251 Endotracheal Tube <1 day              Peripheral Intravenous Line  Duration                  Peripheral IV - Single Lumen 11/28/22 20 G Left Antecubital 1 day         Peripheral IV - Single Lumen 11/28/22 2247 18 G Right Antecubital <1 day         Peripheral IV - Single Lumen 11/29/22 20 G Anterior;Right Wrist <1 day                  Central Lines/Site/Justification:Patient Does Not Have Central Line  Urinary Cath/Order/Justification:Critically Ill in the ICU and requiring intensive monitoring    Vasopressors/Infusions:    fentanyl      heparin (porcine) in D5W 12 Units/kg/hr (11/29/22 0500)    propofoL 25 mcg/kg/min (11/29/22 0500)          GOALS: Volume/ Hemodynamic: N/A                     RASS: -3  moderate sedation, movement or eye opening; no eye contact    Pain Management: none       Pain Controlled: yes     Rhythm: NSR    Respiratory Device: Vent    Vent Mode: PRVC  Oxygen Concentration (%):  [] 50  Resp Rate Total:  [20 br/min-21 br/min] 20 br/min  Vt Set:  [550 mL] 550 mL  PEEP/CPAP:  [5 cmH20] 5 cmH20  Mean Airway Pressure:  [10 hwT93-83.2 cmH20] 10 cmH20             Most Recent SBT/ SAT: Did not perform       MOVE Screen: FAIL  ICU Liberation: yes    VTE Prophylaxis: Pharm  Mobility: Bedrest  Stress Ulcer Prophylaxis: Yes    Isolation: No active isolations    Dietary:   Current Diet Order   Procedures    Diet NPO      Tolerance: not applicable   Advancement: no    I & O (24h):    Intake/Output Summary (Last 24 hours) at 11/29/2022 0518  Last data filed at 11/29/2022 0500  Gross per 24 hour   Intake 2165.09 ml   Output 550 ml   Net 1615.09 ml        Restraints: Yes    Significant Dates:  Post Op Date: N/A  Rescue Date: N/A  Imaging/ Diagnostics: N/A    Noteworthy Labs:  none    COVID Test: (--)  CBC/Anemia Labs: Coags:    Recent Labs   Lab 11/28/22 2308 11/29/22 0340   WBC 13.42* 5.67   HGB 11.6* 9.7*   HCT 31.7* 26.1*    122*   * 118*   RDW 14.5 14.0    Recent Labs   Lab 11/28/22 2308 11/29/22 0340   INR 1.0  --    APTT 25.4 35.7*        Chemistries:   Recent Labs   Lab 11/28/22 2308 11/29/22 0340   * 134*   K 3.8 4.6    106   CO2 19* 20*   BUN 26* 24*   CREATININE 1.8* 1.4   CALCIUM 8.8 8.3*   PROT 7.4 6.2   BILITOT 0.6 0.5   ALKPHOS 77 50*   ALT 23 20   AST 28 22   MG 1.8 1.7   PHOS 3.5 2.5*        Cardiac Enzymes: Ejection Fractions:    Recent Labs     11/28/22 2308 11/29/22 0340   TROPONINI 4.313* 3.528*    No results found for: EF     POCT Glucose: HbA1c:    Recent Labs   Lab 11/28/22 2244 11/29/22  0222   POCTGLUCOSE 373* 305*    Hemoglobin A1C   Date Value Ref Range Status   11/06/2020 8.4 (H) <5.7 % Final   03/19/2018 9.1 (H) 4.0 - 5.6 % Final     Comment:     According to ADA guidelines, hemoglobin A1c <7.0% represents  optimal control in non-pregnant diabetic patients. Different  metrics may apply to specific patient populations.   Standards of Medical Care in Diabetes-2016.  For the purpose of screening for the presence of diabetes:  <5.7%     Consistent with the absence of diabetes  5.7-6.4%  Consistent with increasing risk for diabetes   (prediabetes)  >or=6.5%  Consistent with diabetes  Currently, no consensus exists for use of hemoglobin A1c  for diagnosis of diabetes for children.  This Hemoglobin A1c assay has significant interference with fetal   hemoglobin   (HbF). The results are invalid for patients  with abnormal amounts of   HbF,   including those with known Hereditary Persistence   of Fetal Hemoglobin. Heterozygous hemoglobin variants (HbAS, HbAC,   HbAD, HbAE, HbA2) do not significantly interfere with this assay;   however, presence of multiple variants in a sample may impact the %   interference.             ICU LOS 3h  Level of Care: Critical Care    Chart Check: 24 HR Done  Shift Summary/Plan for the shift: none

## 2022-11-29 NOTE — MED STUDENT SUBJECTIVE & OBJECTIVE
Medical Student Subjective & Objective     Principal Problem: NSTEMI, initial episode of care    Chief Complaint:   Chief Complaint   Patient presents with    Respiratory Distress     Brought in by [facility] responsive to painful stimuli with assist bag valve mask reports being called out for abdominal pain accompanied by vomiting and anxiety, Ativan 2 mg given IV       HPI:   Carlos Cifuentes Jr. is a 61 y.o. male who has a past medical history of Arthritis, Back pain, Bulging lumbar disc, C. difficile diarrhea, Chronic back pain, Diabetes mellitus, Diabetes mellitus type II, DM, type 2, uncontrolled, Hepatitis C, MSSA septicemia, Neuromuscular disorder, Neuropathy, and Staph aureus infection, presented to the ED with CC of SOB, abdominal pain, nausea with vomiting, and Headache.  Patient was intubated upon arrival to ED after receiving 2 mg of IV Ativan for anxiety, as he became obtunded and had difficulty breathing; therefore, HPI from EMS and ED report:     Initial EMS call was for abdominal pain with vomiting and hyperglycemia.  EMS found patient with vomiting but also anxiety. EMS said that patient would not allow them to check vitals, so EMS administered IV Ativan 2 mg, and patient then became obtunded and had difficulty breathing.  EMS reports  and /93 s/p ativan.  EMS states patient administered SC insulin 28 units prior to their arrival, but it is unclear which insulin this is. Patient's sister arrived to ER later and supplemented HPI.  She reports that patient had substernal acid reflux like chest pain with radiation to his left arm yesterday Sunday 11/27/22 and went to the ER at [facility].  However he was in the lobby for several hours and was not seen.  He then left. His chest pain recurred tonight Monday 11/28/22, he called his sister to tell her that he felt bad and was short of breath.  She urged to call 911, which he did.  She suspects that patient became anxious and agitated when EMS  "strapped him to their gurney, as patient has a history of claustrophobia relating to a childhood incident in which he was papoosed to get staples in his scalp.    In the ED, patient was intubated and sedated with propofol.  His blood pressure was still 200/100, despite propofol use.  His troponin was 4 without ST elevation and lactic acid 4.9, he was started on NSTEMI protocol with heparin drip and given fluids.  Mild reactive leukocytosis corrected with fluids only.      Hospital Course: 60 y/o male admitted with acute respiratory failure on vent.  Troponin elevated and consistent with NSTEMI.  Started on ASA, Plavix, Statin and Heparin drip.  Pulmonary consulted for vent management.  LHC on 11/29 showing LAD 90% mid - diffuse severe mid-distal disease, Cx- OM1 large - multiple 70-80% mid lesions with diffuse disease, RCA 50% mid, PDA 80% proximal - diffuse distal disease, PL 80%.  Cardiology recommending medical management.  Patient febrile and empirically started on ABX's.  Coag negative Staph on BCx possible contaminant.    Interval History: Failed SBT overnight. Attempted again this morning, successful. Patient now doing well on SBT. Reports he remembers having "panic attack" like symptoms on Sunday.      Past Medical History:   Diagnosis Date    Arthritis     Back pain     Bulging lumbar disc     C. difficile diarrhea     Chronic back pain 10/14/2014    Diabetes mellitus     Diabetes mellitus type II     DM (diabetes mellitus), type 2, uncontrolled 3/12/2013    Hepatitis C 7/3/2013    MSSA (methicillin susceptible Staphylococcus aureus) septicemia     Neuromuscular disorder     Neuropathy     Staph aureus infection        Past Surgical History:   Procedure Laterality Date    ABOVE-KNEE AMPUTATION Bilateral     ANGIOGRAPHY OF LOWER EXTREMITY Left 06/14/2018    Procedure: Angiogram LEFT LOWER Extremity with US guided access from right side;  Surgeon: Sony Srinivasan MD;  Location: [facility] OR [address] [place] [address];  Service: " Peripheral Vascular;  Laterality: Left;  local: 16ml  contrast: 20ml   fluoro: 2.8  427.73mGy    APPENDECTOMY      JOINT REPLACEMENT      left knee surgery      left leg surgery      broken bone    LEG SURGERY  right     Right arm boil      Right great toe amputation      TOE AMPUTATION Right        Review of patient's allergies indicates:   Allergen Reactions    Codeine Rash    Lyrica [pregabalin]      Feet turned Red    Vicodin [hydrocodone-acetaminophen] Rash       No current facility-administered medications on file prior to encounter.     Current Outpatient Medications on File Prior to Encounter   Medication Sig    gabapentin (NEURONTIN) 300 MG capsule TAKE 1 CAPSULE BY MOUTH THREE TIMES A DAY    glimepiride (AMARYL) 2 MG tablet Take 1 tablet (2 mg total) by mouth every morning.    LANTUS U-100 INSULIN 100 unit/mL injection INJECT TEN UNITS INTO SKIN AT BEDTIME    metFORMIN (GLUCOPHAGE) 1000 MG tablet TAKE ONE TABLET BY MOUTH TWICE DAILY    mupirocin (BACTROBAN) 2 % ointment Apply to affected area 3 times daily    pantoprazole (PROTONIX) 40 MG tablet TAKE ONE TABLET BY MOUTH EVERY DAY    silver sulfADIAZINE 1% (SILVADENE) 1 % cream Apply 1 application topically once daily. Apply to affected area    TRUE METRIX GLUCOSE METER Misc AS DIRECTED    TRUE METRIX GLUCOSE TEST STRIP Strp Twice daily blood sugar checks     Family History       Problem Relation (Age of Onset)    Arthritis Mother, Sister, Brother    Diabetes Sister          Tobacco Use    Smoking status: Every Day     Packs/day: 1.00     Types: Cigarettes    Smokeless tobacco: Never   Substance and Sexual Activity    Alcohol use: No    Drug use: No    Sexual activity: Never     Review of Systems   Reason unable to perform ROS: drowsy; post extubation.   Constitutional:  Negative for chills and fever.   HENT:  Positive for sore throat. Negative for congestion.    Respiratory:  Positive for cough.    Gastrointestinal:  Negative for abdominal pain.    Genitourinary:  Negative for dysuria.   Musculoskeletal:  Negative for myalgias.   Neurological:  Negative for headaches.   All other systems reviewed and are negative.  Objective:     Vital Signs (Most Recent):  Temp: 97.7 °F (36.5 °C) (11/29/22 1130)  Pulse: 73 (11/29/22 1200)  Resp: 16.8 (11/29/22 1200)  BP: 125/66 (11/29/22 1200)  SpO2: 96 % (11/29/22 1200) Vital Signs (24h Range):  Temp:  [96.9 °F (36.1 °C)-98.5 °F (36.9 °C)] 97.7 °F (36.5 °C)  Pulse:  [] 73  Resp:  [13.9-40] 16.8  SpO2:  [85 %-100 %] 96 %  BP: (105-204)/() 125/66     Weight: 106.4 kg (234 lb 9.1 oz)  Body mass index is 34.64 kg/m².    Intake/Output Summary (Last 24 hours) at 11/29/2022 1306  Last data filed at 11/29/2022 1200  Gross per 24 hour   Intake 2415.6 ml   Output 810 ml   Net 1605.6 ml      Physical Exam  Vitals and nursing note reviewed.   Constitutional:       General: He is not in acute distress.     Appearance: He is obese. He is ill-appearing. He is not diaphoretic.   HENT:      Head: Normocephalic and atraumatic.      Nose: Nose normal.      Mouth/Throat:      Mouth: Mucous membranes are moist.   Eyes:      Conjunctiva/sclera: Conjunctivae normal.   Cardiovascular:      Rate and Rhythm: Normal rate and regular rhythm.   Pulmonary:      Effort: Pulmonary effort is normal.      Breath sounds: Normal breath sounds.      Comments: intubated  Abdominal:      General: Abdomen is flat. Bowel sounds are normal.      Palpations: Abdomen is soft.      Comments: Wound dehiscence at umbilicus; dressing place   Genitourinary:     Comments: Bhatt in place  Musculoskeletal:      Right Lower Extremity: Right leg is amputated below knee.      Left Lower Extremity: Left leg is amputated below knee.   Skin:     General: Skin is dry.      Capillary Refill: Capillary refill takes less than 2 seconds.       Significant Labs: All pertinent labs within the past 24 hours have been reviewed.  CBC:   Recent Labs   Lab 11/28/22  6232  11/29/22  0340   WBC 13.42* 5.67   HGB 11.6* 9.7*   HCT 31.7* 26.1*    122*     CMP:   Recent Labs   Lab 11/28/22 2308 11/29/22 0340   * 134*   K 3.8 4.6    106   CO2 19* 20*   * 269*   BUN 26* 24*   CREATININE 1.8* 1.4   CALCIUM 8.8 8.3*   PROT 7.4 6.2   ALBUMIN 3.5 3.0*   BILITOT 0.6 0.5   ALKPHOS 77 50*   AST 28 22   ALT 23 20   ANIONGAP 13 8     Cardiac Markers:   Recent Labs   Lab 11/28/22 2308   *     ABG      Recent Labs   Lab 11/29/22 0728   PH 7.438   PO2 78*   PCO2 32.6*   HCO3 22.1*   BE -2       Significant Imaging: I have reviewed all pertinent imaging results/findings within the past 24 hours.    Medical Student Subjective & Objective

## 2022-11-30 PROBLEM — R79.89 ELEVATED TROPONIN: Status: RESOLVED | Noted: 2022-11-29 | Resolved: 2022-11-30

## 2022-11-30 PROBLEM — I50.41 ACUTE COMBINED SYSTOLIC AND DIASTOLIC CONGESTIVE HEART FAILURE: Status: ACTIVE | Noted: 2022-11-30

## 2022-11-30 LAB
ALBUMIN SERPL BCP-MCNC: 2.7 G/DL (ref 3.5–5.2)
ALLENS TEST: ABNORMAL
ALP SERPL-CCNC: 47 U/L (ref 55–135)
ALT SERPL W/O P-5'-P-CCNC: 16 U/L (ref 10–44)
ANION GAP SERPL CALC-SCNC: 9 MMOL/L (ref 8–16)
AST SERPL-CCNC: 17 U/L (ref 10–40)
BASOPHILS # BLD AUTO: 0.01 K/UL (ref 0–0.2)
BASOPHILS NFR BLD: 0.2 % (ref 0–1.9)
BILIRUB SERPL-MCNC: 0.7 MG/DL (ref 0.1–1)
BUN SERPL-MCNC: 28 MG/DL (ref 6–20)
CALCIUM SERPL-MCNC: 8.3 MG/DL (ref 8.7–10.5)
CHLORIDE SERPL-SCNC: 101 MMOL/L (ref 95–110)
CO2 SERPL-SCNC: 22 MMOL/L (ref 23–29)
CREAT SERPL-MCNC: 1.5 MG/DL (ref 0.5–1.4)
DELSYS: ABNORMAL
DIFFERENTIAL METHOD: ABNORMAL
EOSINOPHIL # BLD AUTO: 0.1 K/UL (ref 0–0.5)
EOSINOPHIL NFR BLD: 1.7 % (ref 0–8)
ERYTHROCYTE [DISTWIDTH] IN BLOOD BY AUTOMATED COUNT: 14.4 % (ref 11.5–14.5)
EST. GFR  (NO RACE VARIABLE): 53 ML/MIN/1.73 M^2
GLUCOSE SERPL-MCNC: 221 MG/DL (ref 70–110)
HCO3 UR-SCNC: 23.3 MMOL/L (ref 24–28)
HCT VFR BLD AUTO: 24.1 % (ref 40–54)
HGB BLD-MCNC: 8.9 G/DL (ref 14–18)
IMM GRANULOCYTES # BLD AUTO: 0.03 K/UL (ref 0–0.04)
IMM GRANULOCYTES NFR BLD AUTO: 0.6 % (ref 0–0.5)
LYMPHOCYTES # BLD AUTO: 1.1 K/UL (ref 1–4.8)
LYMPHOCYTES NFR BLD: 20.3 % (ref 18–48)
MAGNESIUM SERPL-MCNC: 1.7 MG/DL (ref 1.6–2.6)
MCH RBC QN AUTO: 44.1 PG (ref 27–31)
MCHC RBC AUTO-ENTMCNC: 36.9 G/DL (ref 32–36)
MCV RBC AUTO: 119 FL (ref 82–98)
MONOCYTES # BLD AUTO: 0.3 K/UL (ref 0.3–1)
MONOCYTES NFR BLD: 5.7 % (ref 4–15)
NEUTROPHILS # BLD AUTO: 3.9 K/UL (ref 1.8–7.7)
NEUTROPHILS NFR BLD: 71.5 % (ref 38–73)
NRBC BLD-RTO: 0 /100 WBC
PCO2 BLDA: 37.7 MMHG (ref 35–45)
PH SMN: 7.4 [PH] (ref 7.35–7.45)
PLATELET # BLD AUTO: 118 K/UL (ref 150–450)
PMV BLD AUTO: 10.5 FL (ref 9.2–12.9)
PO2 BLDA: 70 MMHG (ref 80–100)
POC BE: -1 MMOL/L
POC SATURATED O2: 94 % (ref 95–100)
POC TCO2: 24 MMOL/L (ref 23–27)
POCT GLUCOSE: 222 MG/DL (ref 70–110)
POCT GLUCOSE: 239 MG/DL (ref 70–110)
POCT GLUCOSE: 259 MG/DL (ref 70–110)
POCT GLUCOSE: 264 MG/DL (ref 70–110)
POCT GLUCOSE: 296 MG/DL (ref 70–110)
POCT GLUCOSE: 321 MG/DL (ref 70–110)
POTASSIUM SERPL-SCNC: 4.4 MMOL/L (ref 3.5–5.1)
PROT SERPL-MCNC: 5.8 G/DL (ref 6–8.4)
RBC # BLD AUTO: 2.02 M/UL (ref 4.6–6.2)
SAMPLE: ABNORMAL
SITE: ABNORMAL
SODIUM SERPL-SCNC: 132 MMOL/L (ref 136–145)
WBC # BLD AUTO: 5.41 K/UL (ref 3.9–12.7)

## 2022-11-30 PROCEDURE — 25000003 PHARM REV CODE 250: Performed by: HOSPITALIST

## 2022-11-30 PROCEDURE — 96372 THER/PROPH/DIAG INJ SC/IM: CPT | Performed by: HOSPITALIST

## 2022-11-30 PROCEDURE — 25000003 PHARM REV CODE 250: Performed by: NURSE PRACTITIONER

## 2022-11-30 PROCEDURE — 36415 COLL VENOUS BLD VENIPUNCTURE: CPT | Performed by: STUDENT IN AN ORGANIZED HEALTH CARE EDUCATION/TRAINING PROGRAM

## 2022-11-30 PROCEDURE — 63600175 PHARM REV CODE 636 W HCPCS: Performed by: NURSE PRACTITIONER

## 2022-11-30 PROCEDURE — C9113 INJ PANTOPRAZOLE SODIUM, VIA: HCPCS | Performed by: SURGERY

## 2022-11-30 PROCEDURE — 99291 PR CRITICAL CARE, E/M 30-74 MINUTES: ICD-10-PCS | Mod: ,,, | Performed by: NURSE PRACTITIONER

## 2022-11-30 PROCEDURE — 63600175 PHARM REV CODE 636 W HCPCS: Performed by: HOSPITALIST

## 2022-11-30 PROCEDURE — 36600 WITHDRAWAL OF ARTERIAL BLOOD: CPT

## 2022-11-30 PROCEDURE — 25000003 PHARM REV CODE 250: Performed by: STUDENT IN AN ORGANIZED HEALTH CARE EDUCATION/TRAINING PROGRAM

## 2022-11-30 PROCEDURE — 83735 ASSAY OF MAGNESIUM: CPT | Performed by: STUDENT IN AN ORGANIZED HEALTH CARE EDUCATION/TRAINING PROGRAM

## 2022-11-30 PROCEDURE — 85025 COMPLETE CBC W/AUTO DIFF WBC: CPT | Performed by: STUDENT IN AN ORGANIZED HEALTH CARE EDUCATION/TRAINING PROGRAM

## 2022-11-30 PROCEDURE — 99900035 HC TECH TIME PER 15 MIN (STAT)

## 2022-11-30 PROCEDURE — 94003 VENT MGMT INPAT SUBQ DAY: CPT

## 2022-11-30 PROCEDURE — 99291 CRITICAL CARE FIRST HOUR: CPT | Mod: ,,, | Performed by: NURSE PRACTITIONER

## 2022-11-30 PROCEDURE — G0378 HOSPITAL OBSERVATION PER HR: HCPCS

## 2022-11-30 PROCEDURE — 99233 PR SUBSEQUENT HOSPITAL CARE,LEVL III: ICD-10-PCS | Mod: ,,, | Performed by: INTERNAL MEDICINE

## 2022-11-30 PROCEDURE — 27000221 HC OXYGEN, UP TO 24 HOURS

## 2022-11-30 PROCEDURE — 82803 BLOOD GASES ANY COMBINATION: CPT

## 2022-11-30 PROCEDURE — 63600175 PHARM REV CODE 636 W HCPCS: Performed by: STUDENT IN AN ORGANIZED HEALTH CARE EDUCATION/TRAINING PROGRAM

## 2022-11-30 PROCEDURE — 36415 COLL VENOUS BLD VENIPUNCTURE: CPT | Performed by: NURSE PRACTITIONER

## 2022-11-30 PROCEDURE — 63600175 PHARM REV CODE 636 W HCPCS: Performed by: SURGERY

## 2022-11-30 PROCEDURE — 25000003 PHARM REV CODE 250: Performed by: SURGERY

## 2022-11-30 PROCEDURE — 99233 SBSQ HOSP IP/OBS HIGH 50: CPT | Mod: ,,, | Performed by: INTERNAL MEDICINE

## 2022-11-30 PROCEDURE — 20000000 HC ICU ROOM

## 2022-11-30 PROCEDURE — C9113 INJ PANTOPRAZOLE SODIUM, VIA: HCPCS | Performed by: HOSPITALIST

## 2022-11-30 PROCEDURE — 87040 BLOOD CULTURE FOR BACTERIA: CPT | Mod: 59 | Performed by: NURSE PRACTITIONER

## 2022-11-30 PROCEDURE — 94761 N-INVAS EAR/PLS OXIMETRY MLT: CPT

## 2022-11-30 PROCEDURE — 80053 COMPREHEN METABOLIC PANEL: CPT | Performed by: STUDENT IN AN ORGANIZED HEALTH CARE EDUCATION/TRAINING PROGRAM

## 2022-11-30 RX ORDER — ACETAMINOPHEN 325 MG/1
650 TABLET ORAL EVERY 4 HOURS PRN
Status: DISCONTINUED | OUTPATIENT
Start: 2022-11-30 | End: 2022-12-06 | Stop reason: HOSPADM

## 2022-11-30 RX ORDER — QUETIAPINE FUMARATE 25 MG/1
25 TABLET, FILM COATED ORAL ONCE
Status: COMPLETED | OUTPATIENT
Start: 2022-11-30 | End: 2022-11-30

## 2022-11-30 RX ORDER — DEXMEDETOMIDINE HYDROCHLORIDE 4 UG/ML
0-1.4 INJECTION, SOLUTION INTRAVENOUS CONTINUOUS
Status: DISCONTINUED | OUTPATIENT
Start: 2022-11-30 | End: 2022-12-03

## 2022-11-30 RX ORDER — HALOPERIDOL 1 MG/1
2 TABLET ORAL ONCE
Status: DISCONTINUED | OUTPATIENT
Start: 2022-11-30 | End: 2022-12-01

## 2022-11-30 RX ORDER — INSULIN ASPART 100 [IU]/ML
1-10 INJECTION, SOLUTION INTRAVENOUS; SUBCUTANEOUS
Status: DISCONTINUED | OUTPATIENT
Start: 2022-11-30 | End: 2022-12-06 | Stop reason: HOSPADM

## 2022-11-30 RX ORDER — TRAMADOL HYDROCHLORIDE 50 MG/1
50 TABLET ORAL EVERY 6 HOURS PRN
Status: DISCONTINUED | OUTPATIENT
Start: 2022-11-30 | End: 2022-12-03

## 2022-11-30 RX ADMIN — QUETIAPINE FUMARATE 25 MG: 25 TABLET ORAL at 04:11

## 2022-11-30 RX ADMIN — METOPROLOL TARTRATE 25 MG: 25 TABLET, FILM COATED ORAL at 08:11

## 2022-11-30 RX ADMIN — CLOPIDOGREL BISULFATE 75 MG: 75 TABLET ORAL at 08:11

## 2022-11-30 RX ADMIN — INSULIN DETEMIR 5 UNITS: 100 INJECTION, SOLUTION SUBCUTANEOUS at 09:11

## 2022-11-30 RX ADMIN — LOSARTAN POTASSIUM 12.5 MG: 25 TABLET, FILM COATED ORAL at 08:11

## 2022-11-30 RX ADMIN — INSULIN ASPART 1 UNITS: 100 INJECTION, SOLUTION INTRAVENOUS; SUBCUTANEOUS at 05:11

## 2022-11-30 RX ADMIN — VANCOMYCIN HYDROCHLORIDE 1000 MG: 1 INJECTION, POWDER, LYOPHILIZED, FOR SOLUTION INTRAVENOUS at 09:11

## 2022-11-30 RX ADMIN — CHLORHEXIDINE GLUCONATE 0.12% ORAL RINSE 15 ML: 1.2 LIQUID ORAL at 08:11

## 2022-11-30 RX ADMIN — MUPIROCIN: 20 OINTMENT TOPICAL at 08:11

## 2022-11-30 RX ADMIN — DEXMEDETOMIDINE HYDROCHLORIDE 0.2 MCG/KG/HR: 4 INJECTION, SOLUTION INTRAVENOUS at 04:11

## 2022-11-30 RX ADMIN — INSULIN ASPART 1 UNITS: 100 INJECTION, SOLUTION INTRAVENOUS; SUBCUTANEOUS at 12:11

## 2022-11-30 RX ADMIN — INSULIN ASPART 8 UNITS: 100 INJECTION, SOLUTION INTRAVENOUS; SUBCUTANEOUS at 04:11

## 2022-11-30 RX ADMIN — METOPROLOL TARTRATE 25 MG: 25 TABLET, FILM COATED ORAL at 03:11

## 2022-11-30 RX ADMIN — ACETAMINOPHEN 650 MG: 325 TABLET ORAL at 09:11

## 2022-11-30 RX ADMIN — ATORVASTATIN CALCIUM 40 MG: 40 TABLET, FILM COATED ORAL at 08:11

## 2022-11-30 RX ADMIN — ASPIRIN 81 MG CHEWABLE TABLET 81 MG: 81 TABLET CHEWABLE at 08:11

## 2022-11-30 RX ADMIN — PROPOFOL 40 MCG/KG/MIN: 10 INJECTION, EMULSION INTRAVENOUS at 05:11

## 2022-11-30 RX ADMIN — PIPERACILLIN SODIUM, TAZOBACTAM SODIUM 4.5 G: 4; .5 INJECTION, POWDER, LYOPHILIZED, FOR SOLUTION INTRAVENOUS at 12:11

## 2022-11-30 RX ADMIN — INSULIN ASPART 2 UNITS: 100 INJECTION, SOLUTION INTRAVENOUS; SUBCUTANEOUS at 07:11

## 2022-11-30 RX ADMIN — MUPIROCIN: 20 OINTMENT TOPICAL at 09:11

## 2022-11-30 RX ADMIN — PANTOPRAZOLE SODIUM 40 MG: 40 INJECTION, POWDER, FOR SOLUTION INTRAVENOUS at 08:11

## 2022-11-30 RX ADMIN — PROPOFOL 40 MCG/KG/MIN: 10 INJECTION, EMULSION INTRAVENOUS at 01:11

## 2022-11-30 RX ADMIN — INSULIN ASPART 3 UNITS: 100 INJECTION, SOLUTION INTRAVENOUS; SUBCUTANEOUS at 09:11

## 2022-11-30 RX ADMIN — TRAMADOL HYDROCHLORIDE 50 MG: 50 TABLET, COATED ORAL at 03:11

## 2022-11-30 RX ADMIN — PIPERACILLIN SODIUM, TAZOBACTAM SODIUM 4.5 G: 4; .5 INJECTION, POWDER, LYOPHILIZED, FOR SOLUTION INTRAVENOUS at 09:11

## 2022-11-30 NOTE — HOSPITAL COURSE
62 y/o male admitted with acute respiratory failure on vent.  Troponin elevated and consistent with NSTEMI.  Started on ASA, Plavix, Statin and Heparin drip.  Pulmonary consulted for vent management.  LHC on 11/29 showing LAD 90% mid - diffuse severe mid-distal disease, Cx- OM1 large - multiple 70-80% mid lesions with diffuse disease, RCA 50% mid, PDA 80% proximal - diffuse distal disease, PL 80%.  Cardiology recommending medical management.  Patient febrile and empirically started on ABX's.  Coag negative Staph on BCx possible contaminant.  Patient extubated on 11/30.  Did well oxygenation wise, but with significant anxiety and restlessness.  Placed on Precedex drip.  Also with episodes of hypotension that responded to IVF's. Patient stepped down from the ICU on 12/4/22. PT/OT evaluated the patient and recommend out patient PT with DME. Patient was discharged to home on 12/6/22. Activity as tolerated. Diet- low NA. Follow up with PCP and cards in 1-2 weeks

## 2022-11-30 NOTE — ASSESSMENT & PLAN NOTE
Likely 2/2 NSTEMI/ HF + benzo administration. CT chest with bilateral airspace opacities and bilateral effusions concerning for volume overload. TTE with EF 40% and DD. . Afebrile, mild leukocytosis that resolved, and normal procal; unlikely infection.   - wean for SpO2 >92%  - diurese as tolerated  - follow up sputum culture  - extubated to NC, wean to room air as tolerated

## 2022-11-30 NOTE — ASSESSMENT & PLAN NOTE
Troponin 4 trending down. Chart notes CP 2 days ago. No prior documentation of CAD in chart. EF 40% on echo. Discussed [facility] with sister Radha Messer - risks/benefits ecplained and she agrees to proceed. On ASA/plavix and heparin    11/30/22 [facility] with diffuse distal 3 vessel disease. Continue medical Rx

## 2022-11-30 NOTE — HOSPITAL COURSE
11/30/22 Extubated this AM.Temp 101    11/29/22 LHC - EDP 25, LAD 90% mid - diffuse severe mid-distal disease, Cx- OM1 large - multiple 70-80% mid lesions with diffuse disease, RCA 50% mid, PDA 80% proximal - diffuse distal disease, PL 80%  Medical Rx    Echo 11/29/22  The left ventricle is normal in size with concentric hypertrophy and mildly decreased systolic function.  The estimated ejection fraction is 40%.  Grade I left ventricular diastolic dysfunction.  Normal right ventricular size with normal right ventricular systolic function.  TDS

## 2022-11-30 NOTE — PROGRESS NOTES
Vancomycin consult follow-up:    Patient reviewed, renal function stable, no new levels, continue current therapy; Next levels due: trough due 12/1/2022 at 0830

## 2022-11-30 NOTE — SUBJECTIVE & OBJECTIVE
Interval History: doing well on SBT.    Review of Systems   HENT:  Negative for ear discharge and ear pain.    Eyes:  Negative for discharge and itching.   Endocrine: Negative for cold intolerance and heat intolerance.   Neurological:  Negative for seizures and syncope.   Objective:     Vital Signs (Most Recent):  Temp: 100.3 °F (37.9 °C) (11/30/22 1130)  Pulse: 77 (11/30/22 1400)  Resp: (!) 22 (11/30/22 1400)  BP: (!) 120/54 (11/30/22 1400)  SpO2: 96 % (11/30/22 1400)   Vital Signs (24h Range):  Temp:  [98.8 °F (37.1 °C)-101.2 °F (38.4 °C)] 100.3 °F (37.9 °C)  Pulse:  [73-86] 77  Resp:  [13-27] 22  SpO2:  [90 %-100 %] 96 %  BP: ()/(44-73) 120/54     Weight: 106.4 kg (234 lb 9.1 oz)  Body mass index is 34.64 kg/m².    Intake/Output Summary (Last 24 hours) at 11/30/2022 1417  Last data filed at 11/30/2022 1400  Gross per 24 hour   Intake 959.52 ml   Output 2201 ml   Net -1241.48 ml      Physical Exam  Vitals and nursing note reviewed.   Constitutional:       General: He is not in acute distress.     Appearance: He is obese. He is ill-appearing. He is not toxic-appearing.      Interventions: He is sedated, intubated and restrained.   HENT:      Head: Normocephalic and atraumatic.      Nose: Nose normal. No rhinorrhea.   Eyes:      Pupils: Pupils are equal, round, and reactive to light.   Cardiovascular:      Rate and Rhythm: Normal rate.      Pulses: Normal pulses.      Heart sounds: No murmur heard.  Pulmonary:      Effort: No respiratory distress. He is intubated.      Breath sounds: No wheezing, rhonchi or rales.   Abdominal:      General: Abdomen is flat. There is no distension.      Tenderness: There is no abdominal tenderness.      Comments: Abdominal wound dehiscence.  No erythema or drainage   Genitourinary:     Comments: Bhatt in place  Musculoskeletal:         General: No deformity. Normal range of motion.      Cervical back: No rigidity.      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       Left Lower Extremity: Left leg is amputated below knee.   Skin:     General: Skin is dry.      Capillary Refill: Capillary refill takes less than 2 seconds.   Neurological:      Mental Status: He is easily aroused.      Comments: PERRL. Follows commands.        Significant Labs: All pertinent labs within the past 24 hours have been reviewed.  BMP:   Recent Labs   Lab 11/30/22  0401   *   *   K 4.4      CO2 22*   BUN 28*   CREATININE 1.5*   CALCIUM 8.3*   MG 1.7     CBC:   Recent Labs   Lab 11/28/22  2308 11/29/22  0340 11/30/22  0401   WBC 13.42* 5.67 5.41   HGB 11.6* 9.7* 8.9*   HCT 31.7* 26.1* 24.1*    122* 118*       Significant Imaging: I have reviewed all pertinent imaging results/findings within the past 24 hours.

## 2022-11-30 NOTE — ASSESSMENT & PLAN NOTE
"· Patient had chest pain that radiated to his arm with elevated troponin  · No ST elevation on EKG  · Initial troponin was the peak at 4.3, down trended from there to 3.5  · Started ASA, Plavix, Statin and Heparin drip.  Cardiology consulted.  [facility] showing "LAD 90% mid - diffuse severe mid-distal disease, Cx- OM1 large - multiple 70-80% mid lesions with diffuse disease, RCA 50% mid, PDA 80% proximal - diffuse distal disease, PL 80%"  Cards recommending medical management.  Continue ASA, Plavix and Statin.  "

## 2022-11-30 NOTE — ASSESSMENT & PLAN NOTE
Coag negative Staph on BCX.  Most likely contaminant, but patient is febrile.  Empirically started on Vanc and Zosyn.  Possible aspiration pneumonia.  Repeat BCx.

## 2022-11-30 NOTE — ASSESSMENT & PLAN NOTE
EF 40% on echo. LHC with diffuse 3 vessel CAD. Diuresis and afterload reduction as tolerated    Echo    Interpretation Summary  · The left ventricle is normal in size with concentric hypertrophy and mildly decreased systolic function.  · The estimated ejection fraction is 40%.  · Grade I left ventricular diastolic dysfunction.  · Normal right ventricular size with normal right ventricular systolic function.  · TDS    Recent Labs   Lab 11/28/22  2308   *   .

## 2022-11-30 NOTE — PROGRESS NOTES
[facility] Medicine  Progress Note    Patient Name: Carlos Cifuentes Jr.  MRN: [id number]  Patient Class: IP- Inpatient   Admission Date: 11/28/2022  Length of Stay: 1 days  Attending Physician: Pranay Michaels MD  Primary Care Provider: Miguel Lux MD        Subjective:     Principal Problem:NSTEMI, initial episode of care        HPI:    Carlos Cifuentes Jr. is a 61 y.o. male who has a past medical history of Arthritis, Back pain, Bulging lumbar disc, C. difficile diarrhea, Chronic back pain, Diabetes mellitus, Diabetes mellitus type II, DM, type 2, uncontrolled, Hepatitis C, MSSA septicemia, Neuromuscular disorder, Neuropathy, and Staph aureus infection, presented to the ED with CC of SOB, abdominal pain, nausea with vomiting, and Headache.  Patient was intubated upon arrival to ED after receiving 2 mg of IV Ativan for anxiety, as he became obtunded and had difficulty breathing; therefore, HPI from EMS and ED report:     Initial EMS call was for abdominal pain with vomiting and hyperglycemia.  EMS found patient with vomiting but also anxiety. EMS said that patient would not allow them to check vitals, so EMS administered IV Ativan 2 mg, and patient then became obtunded and had difficulty breathing.  EMS reports  and /93 s/p ativan.  EMS states patient administered SC insulin 28 units prior to their arrival, but it is unclear which insulin this is. Patient's sister arrived to ER later and supplemented HPI.  She reports that patient had substernal acid reflux like chest pain with radiation to his left arm yesterday Sunday 11/27/22 and went to the ER at [facility].  However he was in the lobby for several hours and was not seen.  He then left. His chest pain recurred tonight Monday 11/28/22, he called his sister to tell her that he felt bad and was short of breath.  She urged to call 911, which he did.  She suspects that patient became anxious and agitated when EMS strapped him  to their gurney, as patient has a history of claustrophobia relating to a childhood incident in which he was papoosed to get staples in his scalp.    In the ED, patient was intubated and sedated with propofol.  His blood pressure was still 200/100, despite propofol use.  His troponin was 4 without ST elevation and lactic acid 4.9, he was started on NSTEMI protocol with heparin drip and given fluids.  Mild reactive leukocytosis corrected with fluids only.        Overview/Hospital Course:  60 y/o male admitted with acute respiratory failure on vent.  Troponin elevated and consistent with NSTEMI.  Started on ASA, Plavix, Statin and Heparin drip.  Pulmonary consulted for vent management.  LHC on 11/29 showing LAD 90% mid - diffuse severe mid-distal disease, Cx- OM1 large - multiple 70-80% mid lesions with diffuse disease, RCA 50% mid, PDA 80% proximal - diffuse distal disease, PL 80%.  Cardiology recommending medical management.  Patient febrile and empirically started on ABX's.  Coag negative Staph on BCx possible contaminant.      Interval History: doing well on SBT.    Review of Systems   HENT:  Negative for ear discharge and ear pain.    Eyes:  Negative for discharge and itching.   Endocrine: Negative for cold intolerance and heat intolerance.   Neurological:  Negative for seizures and syncope.   Objective:     Vital Signs (Most Recent):  Temp: 100.3 °F (37.9 °C) (11/30/22 1130)  Pulse: 77 (11/30/22 1400)  Resp: (!) 22 (11/30/22 1400)  BP: (!) 120/54 (11/30/22 1400)  SpO2: 96 % (11/30/22 1400)   Vital Signs (24h Range):  Temp:  [98.8 °F (37.1 °C)-101.2 °F (38.4 °C)] 100.3 °F (37.9 °C)  Pulse:  [73-86] 77  Resp:  [13-27] 22  SpO2:  [90 %-100 %] 96 %  BP: ()/(44-73) 120/54     Weight: 106.4 kg (234 lb 9.1 oz)  Body mass index is 34.64 kg/m².    Intake/Output Summary (Last 24 hours) at 11/30/2022 1417  Last data filed at 11/30/2022 1400  Gross per 24 hour   Intake 959.52 ml   Output 2201 ml   Net -1241.48 ml       Physical Exam  Vitals and nursing note reviewed.   Constitutional:       General: He is not in acute distress.     Appearance: He is obese. He is ill-appearing. He is not toxic-appearing.      Interventions: He is sedated, intubated and restrained.   HENT:      Head: Normocephalic and atraumatic.      Nose: Nose normal. No rhinorrhea.   Eyes:      Pupils: Pupils are equal, round, and reactive to light.   Cardiovascular:      Rate and Rhythm: Normal rate.      Pulses: Normal pulses.      Heart sounds: No murmur heard.  Pulmonary:      Effort: No respiratory distress. He is intubated.      Breath sounds: No wheezing, rhonchi or rales.   Abdominal:      General: Abdomen is flat. There is no distension.      Tenderness: There is no abdominal tenderness.      Comments: Abdominal wound dehiscence.  No erythema or drainage   Genitourinary:     Comments: Bhatt in place  Musculoskeletal:         General: No deformity. Normal range of motion.      Cervical back: No rigidity.      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      Left Lower Extremity: Left leg is amputated below knee.   Skin:     General: Skin is dry.      Capillary Refill: Capillary refill takes less than 2 seconds.   Neurological:      Mental Status: He is easily aroused.      Comments: PERRL. Follows commands.        Significant Labs: All pertinent labs within the past 24 hours have been reviewed.  BMP:   Recent Labs   Lab 11/30/22  0401   *   *   K 4.4      CO2 22*   BUN 28*   CREATININE 1.5*   CALCIUM 8.3*   MG 1.7     CBC:   Recent Labs   Lab 11/28/22  2308 11/29/22  0340 11/30/22  0401   WBC 13.42* 5.67 5.41   HGB 11.6* 9.7* 8.9*   HCT 31.7* 26.1* 24.1*    122* 118*       Significant Imaging: I have reviewed all pertinent imaging results/findings within the past 24 hours.      Assessment/Plan:      * NSTEMI, initial episode of care  · Patient had chest pain that radiated to his arm with elevated troponin  · No ST  "elevation on EKG  · Initial troponin was the peak at 4.3, down trended from there to 3.5  · Started ASA, Plavix, Statin and Heparin drip.  Cardiology consulted.  LHC showing "LAD 90% mid - diffuse severe mid-distal disease, Cx- OM1 large - multiple 70-80% mid lesions with diffuse disease, RCA 50% mid, PDA 80% proximal - diffuse distal disease, PL 80%"  Cards recommending medical management.  Continue ASA, Plavix and Statin.    Acute combined systolic and diastolic congestive heart failure  Currently seems euvolemic.      Lactic acidosis  · Lactic acid 4.9 admission, given fluids and good oxygenation/ventilation while intubated, LA improved to 2.2      Acute hypoxemic respiratory failure  Patient with Hypoxic Respiratory failure which is Acute.  he is not on home oxygen. Supplemental oxygen was provided and noted-     Vent Mode: PRVC  Oxygen Concentration (%):  [30-60] 40  Resp Rate Total:  [15 br/min-16 br/min] 16 br/min  Vt Set:  [550 mL] 550 mL  PEEP/CPAP:  [5 cmH20] 5 cmH20  Mean Airway Pressure:  [9.4 cmH20-10.3 cmH20] 9.6 cmH20.     Signs/symptoms of respiratory failure include- tachypnea, increased work of breathing, respiratory distress, use of accessory muscles and lethargy. Contributing diagnoses includes - CHF Labs and images were reviewed. Patient Has recent ABG, which has been reviewed. Will treat underlying causes and adjust management of respiratory failure as follows-     Potentially a primary ACS event with significant anxiety requiring IV benzo, which lead to respiratory depression resulting in Intubation.   · Intubated and Sedated  · Propofol and Fentanyl  · Pulmonary following.  · Doing well on SBT and should get extubated today.    Wound dehiscence  Wound care      Essential hypertension  · Started on ARB and B blocker.  · Continue as tolerated    CKD stage 3 due to type 2 diabetes mellitus  Patient's FSGs are uncontrolled due to hyperglycemia on current medication regimen.  Last A1c reviewed- "   Lab Results   Component Value Date    LABA1C 11.8 (H) 06/13/2014    HGBA1C 10.9 (H) 11/28/2022     Most recent fingerstick glucose reviewed-   Recent Labs   Lab 11/29/22  2030 11/30/22  0023 11/30/22  0501 11/30/22  0746   POCTGLUCOSE 256* 296* 222* 239*       Antihyperglycemics (From admission, onward)    Start     Stop Route Frequency Ordered    11/30/22 1410  insulin aspart U-100 pen 1-10 Units         -- SubQ Before meals & nightly PRN 11/30/22 1310    11/29/22 2100  insulin detemir U-100 pen 5 Units         -- SubQ Nightly 11/29/22 1224        · Hold Oral hypoglycemics while patient is in the hospital.  SSI and q4h testing    Bacteremia  Coag negative Staph on BCX.  Most likely contaminant, but patient is febrile.  Empirically started on Vanc and Zosyn.  Possible aspiration pneumonia.  Repeat BCx.      Type II diabetes mellitus with neurological manifestations  Continue insulin sliding scale.      VTE Risk Mitigation (From admission, onward)         Ordered     Reason for No Pharmacological VTE Prophylaxis  Once        Question:  Reasons:  Answer:  Physician Provided (leave comment)  Comment:  On heparin drip    11/29/22 0243     IP VTE HIGH RISK PATIENT  Once         11/29/22 0243     Place sequential compression device  Until discontinued         11/29/22 0243                Discharge Planning   BEBA:      Code Status: Full Code   Is the patient medically ready for discharge?:     Reason for patient still in hospital (select all that apply): Patient unstable  Discharge Plan A:  (tbd)            Critical care time spent on the evaluation and treatment of severe organ dysfunction, review of pertinent labs and imaging studies, discussions with consulting providers and discussions with patient/family: 40 minutes.      Pranay Michaels MD  Department of Hospital Medicine   [facility] - [place] - Intensive Care

## 2022-11-30 NOTE — CONSULTS
[place] - Intensive Care  Adult Nutrition  Consult Note    SUMMARY     Recommendations    1) SLP to evaluate pt for swallow and texture safety/appropriateness s/p extubation.   2) Continue Diabetic Cardiac Diet as tolerated - Texture per SLP   - Transition from cardiac to renal if renal fx continues to decline.   3) Monitor PO intake - < 50 % Addition of [place] BID to assist in meeting needs.  4) Monitor Nutrition related labs    Goals:   1) Patient to meet > 75% EEN/EPN via PO/ONS intake  2) Monitored labs to trend toward target ranges    Nutrition Goal Status: new  Communication of RD Recs:  (POC)    Assessment and Plan  Nutrition Problem  Inadequate Oral Intake    Related to (etiology):   NSTEMI    Signs and Symptoms (as evidenced by):   S/p Recent extubation, PO diet just implemented - no intake documented    Interventions/Recommendations (treatment strategy):  Texture Modified Diet   Fat and Mineral Modified Diet  Commercial Beverage  Collaboration of care with other providers    Nutrition Diagnosis Status:   New    Reason for Assessment    Reason For Assessment: consult, new tube feeding (Pt now extubated)  Diagnosis:  (NSTEMI)  Relevant Medical History:   Patient Active Problem List   Diagnosis    Hepatosplenomegaly    Lumbar herniated disc - Large central disk extrusion at the L4-L5 level with significant central canal and bilateral neuroforaminal narrowing    Obesity    Type II diabetes mellitus with neurological manifestations    Chronic back pain    Tinea cruris    Bilateral leg edema    Back pain    Bacteremia    Cord compression    Pain of right thigh    Anemia of other chronic disease    Primary insomnia    Anxiety disorder due to general medical condition    Diabetic polyneuropathy associated with type 2 diabetes mellitus    Neck muscle spasm    Cirrhosis of liver without ascites; OVERDUE FOR f/u in hepatology and HCC screening     Right knee pain    Diabetic ulcer of toe of right foot associated  with type 2 diabetes mellitus, with fat layer exposed    Tobacco abuse    Cellulitis of right foot    Toe osteomyelitis, right    Peripheral arterial disease    Bilateral carotid artery stenosis    Osteomyelitis of right foot    Septic arthritis of interphalangeal joint of toe of right foot    Slow transit constipation    Gastroesophageal reflux disease    Male hypogonadism    History of hepatitis C; S/p RX with SVR 12 documented 09/2017    DM type 2, uncontrolled, with neuropathy    CKD stage 3 due to type 2 diabetes mellitus    Hypogonadism in male    Smoking    Chronic pain syndrome    Uncomplicated opioid dependence    Diabetic foot infection    Dry gangrene    Left foot pain    Essential hypertension    GERD without esophagitis    Abscess or cellulitis of foot    Dry gangrene    Type 2 diabetes mellitus with diabetic foot infection    HTN, goal below 140/90    Diabetic ulcer of left midfoot associated with diabetes mellitus due to underlying condition, with necrosis of muscle    Amputation of left foot with complication    Osteomyelitis    Diabetic ulcer of left midfoot associated with type 2 diabetes mellitus    Wound dehiscence    Non-healing wound of lower extremity    Diabetic ulcer of lower leg    Hx of BKA, left    Phantom limb    S/P BKA (below knee amputation) unilateral, left    Phantom limb syndrome    Acute hypoxemic respiratory failure    NSTEMI, initial episode of care    Lactic acidosis    Acute combined systolic and diastolic congestive heart failure     General Information Comments:   11/30 - Pt presented to ED via EMS already intubated and sedated on propofol on arrival. Reports of abdominal pain, vomiting, and hyperglycemia to ems. Elevated BP still in ED. Admitted with acute resp failure, Elevated Troponin consistent with NSTEMI. Pt medically managed - SBT performed this AM and ultimately able to be extubated. Diet advanced - will continue to monitor.      Nutrition Discharge Planning:  "Pending Medical Course    Nutrition Risk Screen    Nutrition Risk Screen: no indicators present    Nutrition/Diet History    Food Allergies: NKFA  Factors Affecting Nutritional Intake: difficulty/impaired swallowing (pt < 6 hrs s/p extubation)    Anthropometrics    Temp: 100.3 °F (37.9 °C)  Height Method: Estimated  Height: 5' 9" (175.3 cm)  Height (inches): 69 in  Weight Method: Bed Scale  Weight: 106.4 kg (234 lb 9.1 oz)  Weight (lb): 234.57 lb  Ideal Body Weight (IBW), Male: 160 lb  % Ideal Body Weight, Male (lb): 146.61 %  BMI (Calculated): 34.6  BMI Grade: 30 - 34.9- obesity - grade I  Weight Loss: other (see comments) (Pt had stated weight of 250# in march during hospital encounter. Loss of 16#, 6% x 8 mos)       Lab/Procedures/Meds    Pertinent Labs Reviewed: reviewed  CBC:  Recent Labs   Lab 11/30/22  0401   WBC 5.41   HGB 8.9*   HCT 24.1*   *     CMP:  Recent Labs   Lab 11/30/22  0401   CALCIUM 8.3*   ALBUMIN 2.7*   PROT 5.8*   *   K 4.4   CO2 22*      BUN 28*   CREATININE 1.5*   ALKPHOS 47*   ALT 16   AST 17   BILITOT 0.7     Pertinent Medications Reviewed: reviewed  Scheduled Meds:   aspirin  81 mg Per OG tube Daily    atorvastatin  40 mg Per OG tube Daily    clopidogreL  75 mg Per OG tube Daily    insulin detemir U-100  5 Units Subcutaneous QHS    losartan  12.5 mg Oral Daily    metoprolol tartrate  25 mg Per OG tube TID    mupirocin   Nasal BID    pantoprazole  40 mg Intravenous Daily    piperacillin-tazobactam (ZOSYN) IVPB  4.5 g Intravenous Q8H    vancomycin (VANCOCIN) IVPB  1,000 mg Intravenous Q12H     Continuous Infusions:  PRN Meds:.acetaminophen, glucagon (human recombinant), hydrALAZINE, insulin aspart U-100, Pharmacy to dose Vancomycin consult **AND** vancomycin - pharmacy to dose    Estimated/Assessed Needs    Weight Used For Calorie Calculations: 106.4 kg (234 lb 9.1 oz)  Energy Calorie Requirements (kcal): 1864  Energy Need Method: [place]-St Jeor (x no AF per BMI > " 30)  Protein Requirements: 64 - 85g (0.6 - 0.8g/kg per DM w/ CKD 3 non-dialysis)  Weight Used For Protein Calculations: 106.4 kg (234 lb 9.1 oz)  Fluid Requirements (mL): 1 mL/kcal  Estimated Fluid Requirement Method: RDA Method (or per MD)  RDA Method (mL): 1864  CHO Requirement: 233g      Nutrition Prescription Ordered    Current Diet Order: IDDSI 5: Diabetic Cardiac  Nutrition Order Comments: 2000 kcal    Evaluation of Received Nutrient/Fluid Intake    I/O: - 1.7 L since admit  Energy Calories Required: not meeting needs  Protein Required: not meeting needs  Fluid Required: not meeting needs  Comments: LBM 11/30  % Intake of Estimated Energy Needs: 0 - 25 %  % Meal Intake: 0 - 25 %    Intake/Output - Last 3 Shifts         11/28 0700 11/29 0659 11/29 0700 11/30 0659 11/30 0700  12/01 0659    I.V. (mL/kg) 153.2 (1.4) 596.8 (5.6) 84.7 (0.8)    IV Piggyback 2039 528.8     Total Intake(mL/kg) 2192.2 (20.6) 1125.5 (10.6) 84.7 (0.8)    Urine (mL/kg/hr) 660 2456 (1) 345 (0.4)    Stool  0 0    Total Output 660 2456 345    Net +1532.2 -1330.5 -260.4           Stool Occurrence  0 x 1 x            Nutrition Risk    Level of Risk/Frequency of Follow-up:  (1-2x/week)       Monitor and Evaluation    Food and Nutrient Intake: energy intake, food and beverage intake  Food and Nutrient Adminstration: diet order  Knowledge/Beliefs/Attitudes: beliefs and attitudes  Physical Activity and Function: nutrition-related ADLs and IADLs  Anthropometric Measurements: weight, weight change  Biochemical Data, Medical Tests and Procedures: electrolyte and renal panel, gastrointestinal profile, glucose/endocrine profile, inflammatory profile, lipid profile  Nutrition-Focused Physical Findings: overall appearance       Nutrition Follow-Up    RD Follow-up?: Yes  Carmen Ruff, BS, RDN, LDN

## 2022-11-30 NOTE — MEDICAL/APP STUDENT
[facility] [place] - Intensive Care  Progress Note    Patient Name: Carlos Cifuentes Jr.  MRN: [id number]  Patient Class: IP- Inpatient   Admission Date: 11/28/2022  Length of Stay: 1 days  Attending Physician: Pranay Michaels MD  Primary Care Provider: Miguel Lux MD    Subjective:     Principal Problem: NSTEMI, initial episode of care    Chief Complaint:   Chief Complaint   Patient presents with    Respiratory Distress     Brought in by [facility] responsive to painful stimuli with assist bag valve mask reports being called out for abdominal pain accompanied by vomiting and anxiety, Ativan 2 mg given IV       HPI:   Carlos Cifuentes Jr. is a 61 y.o. male who has a past medical history of Arthritis, Back pain, Bulging lumbar disc, C. difficile diarrhea, Chronic back pain, Diabetes mellitus, Diabetes mellitus type II, DM, type 2, uncontrolled, Hepatitis C, MSSA septicemia, Neuromuscular disorder, Neuropathy, and Staph aureus infection, presented to the ED with CC of SOB, abdominal pain, nausea with vomiting, and Headache.  Patient was intubated upon arrival to ED after receiving 2 mg of IV Ativan for anxiety, as he became obtunded and had difficulty breathing; therefore, HPI from EMS and ED report:     Initial EMS call was for abdominal pain with vomiting and hyperglycemia.  EMS found patient with vomiting but also anxiety. EMS said that patient would not allow them to check vitals, so EMS administered IV Ativan 2 mg, and patient then became obtunded and had difficulty breathing.  EMS reports  and /93 s/p ativan.  EMS states patient administered SC insulin 28 units prior to their arrival, but it is unclear which insulin this is. Patient's sister arrived to ER later and supplemented HPI.  She reports that patient had substernal acid reflux like chest pain with radiation to his left arm yesterday Sunday 11/27/22 and went to the ER at [facility].  However he was in the lobby for several hours and was not seen.  He then  "left. His chest pain recurred tonight Monday 11/28/22, he called his sister to tell her that he felt bad and was short of breath.  She urged to call 911, which he did.  She suspects that patient became anxious and agitated when EMS strapped him to their gurney, as patient has a history of claustrophobia relating to a childhood incident in which he was papoosed to get staples in his scalp.    In the ED, patient was intubated and sedated with propofol.  His blood pressure was still 200/100, despite propofol use.  His troponin was 4 without ST elevation and lactic acid 4.9, he was started on NSTEMI protocol with heparin drip and given fluids.  Mild reactive leukocytosis corrected with fluids only.      Hospital Course: 60 y/o male admitted with acute respiratory failure on vent.  Troponin elevated and consistent with NSTEMI.  Started on ASA, Plavix, Statin and Heparin drip.  Pulmonary consulted for vent management.  LHC on 11/29 showing LAD 90% mid - diffuse severe mid-distal disease, Cx- OM1 large - multiple 70-80% mid lesions with diffuse disease, RCA 50% mid, PDA 80% proximal - diffuse distal disease, PL 80%.  Cardiology recommending medical management.  Patient febrile and empirically started on ABX's.  Coag negative Staph on BCx possible contaminant.    Interval History: Failed SBT overnight. Attempted again this morning, successful. Patient now doing well on SBT. Reports he remembers having "panic attack" like symptoms on Sunday.      Past Medical History:   Diagnosis Date    Arthritis     Back pain     Bulging lumbar disc     C. difficile diarrhea     Chronic back pain 10/14/2014    Diabetes mellitus     Diabetes mellitus type II     DM (diabetes mellitus), type 2, uncontrolled 3/12/2013    Hepatitis C 7/3/2013    MSSA (methicillin susceptible Staphylococcus aureus) septicemia     Neuromuscular disorder     Neuropathy     Staph aureus infection        Past Surgical History:   Procedure Laterality Date    " ABOVE-KNEE AMPUTATION Bilateral     ANGIOGRAPHY OF LOWER EXTREMITY Left 06/14/2018    Procedure: Angiogram LEFT LOWER Extremity with US guided access from right side;  Surgeon: Sony Srinivasan MD;  Location: [facility] OR [address] [place] [address];  Service: Peripheral Vascular;  Laterality: Left;  local: 16ml  contrast: 20ml   fluoro: 2.8  427.73mGy    APPENDECTOMY      JOINT REPLACEMENT      left knee surgery      left leg surgery      broken bone    LEG SURGERY  right     Right arm boil      Right great toe amputation      TOE AMPUTATION Right        Review of patient's allergies indicates:   Allergen Reactions    Codeine Rash    Lyrica [pregabalin]      Feet turned Red    Vicodin [hydrocodone-acetaminophen] Rash       No current facility-administered medications on file prior to encounter.     Current Outpatient Medications on File Prior to Encounter   Medication Sig    gabapentin (NEURONTIN) 300 MG capsule TAKE 1 CAPSULE BY MOUTH THREE TIMES A DAY    glimepiride (AMARYL) 2 MG tablet Take 1 tablet (2 mg total) by mouth every morning.    LANTUS U-100 INSULIN 100 unit/mL injection INJECT TEN UNITS INTO SKIN AT BEDTIME    metFORMIN (GLUCOPHAGE) 1000 MG tablet TAKE ONE TABLET BY MOUTH TWICE DAILY    mupirocin (BACTROBAN) 2 % ointment Apply to affected area 3 times daily    pantoprazole (PROTONIX) 40 MG tablet TAKE ONE TABLET BY MOUTH EVERY DAY    silver sulfADIAZINE 1% (SILVADENE) 1 % cream Apply 1 application topically once daily. Apply to affected area    TRUE METRIX GLUCOSE METER Misc AS DIRECTED    TRUE METRIX GLUCOSE TEST STRIP Strp Twice daily blood sugar checks     Family History       Problem Relation (Age of Onset)    Arthritis Mother, Sister, Brother    Diabetes Sister          Tobacco Use    Smoking status: Every Day     Packs/day: 1.00     Types: Cigarettes    Smokeless tobacco: Never   Substance and Sexual Activity    Alcohol use: No    Drug use: No    Sexual activity: Never     Review of Systems   Reason unable to  perform ROS: drowsy; post extubation.   Constitutional:  Negative for chills and fever.   HENT:  Positive for sore throat. Negative for congestion.    Respiratory:  Positive for cough.    Gastrointestinal:  Negative for abdominal pain.   Genitourinary:  Negative for dysuria.   Musculoskeletal:  Negative for myalgias.   Neurological:  Negative for headaches.   All other systems reviewed and are negative.  Objective:     Vital Signs (Most Recent):  Temp: 97.7 °F (36.5 °C) (11/29/22 1130)  Pulse: 73 (11/29/22 1200)  Resp: 16.8 (11/29/22 1200)  BP: 125/66 (11/29/22 1200)  SpO2: 96 % (11/29/22 1200) Vital Signs (24h Range):  Temp:  [96.9 °F (36.1 °C)-98.5 °F (36.9 °C)] 97.7 °F (36.5 °C)  Pulse:  [] 73  Resp:  [13.9-40] 16.8  SpO2:  [85 %-100 %] 96 %  BP: (105-204)/() 125/66     Weight: 106.4 kg (234 lb 9.1 oz)  Body mass index is 34.64 kg/m².    Intake/Output Summary (Last 24 hours) at 11/29/2022 1306  Last data filed at 11/29/2022 1200  Gross per 24 hour   Intake 2415.6 ml   Output 810 ml   Net 1605.6 ml      Physical Exam  Vitals and nursing note reviewed.   Constitutional:       General: He is not in acute distress.     Appearance: He is obese. He is ill-appearing. He is not diaphoretic.   HENT:      Head: Normocephalic and atraumatic.      Nose: Nose normal.      Mouth/Throat:      Mouth: Mucous membranes are moist.   Eyes:      Conjunctiva/sclera: Conjunctivae normal.   Cardiovascular:      Rate and Rhythm: Normal rate and regular rhythm.   Pulmonary:      Effort: Pulmonary effort is normal.      Breath sounds: Normal breath sounds.      Comments: intubated  Abdominal:      General: Abdomen is flat. Bowel sounds are normal.      Palpations: Abdomen is soft.      Comments: Wound dehiscence at umbilicus; dressing place   Genitourinary:     Comments: Bhatt in place  Musculoskeletal:      Right Lower Extremity: Right leg is amputated below knee.      Left Lower Extremity: Left leg is amputated below knee.    Skin:     General: Skin is dry.      Capillary Refill: Capillary refill takes less than 2 seconds.       Significant Labs: All pertinent labs within the past 24 hours have been reviewed.  CBC:   Recent Labs   Lab 11/28/22 2308 11/29/22  0340   WBC 13.42* 5.67   HGB 11.6* 9.7*   HCT 31.7* 26.1*    122*     CMP:   Recent Labs   Lab 11/28/22 2308 11/29/22  0340   * 134*   K 3.8 4.6    106   CO2 19* 20*   * 269*   BUN 26* 24*   CREATININE 1.8* 1.4   CALCIUM 8.8 8.3*   PROT 7.4 6.2   ALBUMIN 3.5 3.0*   BILITOT 0.6 0.5   ALKPHOS 77 50*   AST 28 22   ALT 23 20   ANIONGAP 13 8     Cardiac Markers:   Recent Labs   Lab 11/28/22 2308   *     ABG      Recent Labs   Lab 11/29/22  0728   PH 7.438   PO2 78*   PCO2 32.6*   HCO3 22.1*   BE -2       Significant Imaging: I have reviewed all pertinent imaging results/findings within the past 24 hours.        Assessment/Plan:     NSTEMI, initial episode of care  -Per EMS/family pt initially had chest pain radiating to jaw 11/28, [facility].  Found to have elevated troponin (4.3) without ST elevations on EKG. Troponin now 3.6.      TTE 11/29/22  The left ventricle is normal in size with concentric hypertrophy and mildly decreased systolic function.  The estimated ejection fraction is 40%.  Grade I left ventricular diastolic dysfunction.  Normal right ventricular size with normal right ventricular systolic function.     Cardiology following.   Went to cath lab for angiogram 11/29; rec'd medical management.   Tele monitoring. Continue ASA, Plavix, Heparin, atorvastatin, beta-blocker. Nitro prn for chest pain.      Acute hypoxemic respiratory failure  Pulmonology following. Per EMS, ativan led to respiratory depression and obtunded state and necessitated intubation. Patient intubated currently on minimal vent settings. ABG WNL on vent. Patient extubated today successfully and 96% on 4L NC     Lactic acidosis  Lactic acid 4.9 on  admission, now 2.2.   Resolved     Wound dehiscence  See media tab. Wound care following. Dressing changes q3days     Essential hypertension  Resume home medications     Chronic kidney disease stage 3   Monitor BUN and Cr. Cr elevated today likely due to contrast media from angio   Avoid nephrotoxic meds     Type II diabetes mellitus with neurologic manifestations  Last A1C 8.4 (11/6/22). Glucose not well controlled. Increasing sliding scale insulin and added 5units insulin qHS.       Active Diagnoses:    Diagnosis Date Noted POA    PRINCIPAL PROBLEM:  NSTEMI, initial episode of care [I21.4] 11/29/2022 Yes    Acute combined systolic and diastolic congestive heart failure [I50.41] 11/30/2022 Yes    Acute hypoxemic respiratory failure [J96.01] 11/29/2022 Yes    Lactic acidosis [E87.20] 11/29/2022 Yes    Wound dehiscence [T81.30XA] 06/06/2018 Yes    Essential hypertension [I10] 03/06/2018 Yes    CKD stage 3 due to type 2 diabetes mellitus [E11.22, N18.30] 09/20/2017 Yes    Bacteremia [R78.81] 10/24/2014 Yes    Type II diabetes mellitus with neurological manifestations [E11.49] 10/09/2014 Yes      Problems Resolved During this Admission:    Diagnosis Date Noted Date Resolved POA    Elevated troponin [R77.8] 11/29/2022 11/30/2022 Yes     VTE Risk Mitigation (From admission, onward)           Ordered     Reason for No Pharmacological VTE Prophylaxis  Once        Question:  Reasons:  Answer:  Physician Provided (leave comment)  Comment:  On heparin drip    11/29/22 0243     IP VTE HIGH RISK PATIENT  Once         11/29/22 0243     Place sequential compression device  Until discontinued         11/29/22 0243                    Alfreda Cabral PA-S2  [facility]

## 2022-11-30 NOTE — PROGRESS NOTES
[facility] Intensive Care  Pulmonology  Progress Note    Patient Name: Carlos Cifuentes Jr.  MRN: [id number]  Admission Date: 11/28/2022  Hospital Length of Stay: 1 days  Code Status: Prior  Attending Provider: Pranay Michaels MD  Primary Care Provider: Miguel Lux MD   Principal Problem: NSTEMI, initial episode of care    Subjective:     Interval History: Extubated to NC this morning without incident. Spiking fevers with positive blood cultures with GPCs; on vanc.    Objective:     Vital Signs (Most Recent):  Temp: (!) 101.2 °F (38.4 °C) (11/30/22 1000)  Pulse: 83 (11/30/22 1000)  Resp: (!) 22 (11/30/22 1000)  BP: 132/61 (11/30/22 1000)  SpO2: (!) 93 % (11/30/22 1000)   Vital Signs (24h Range):  Temp:  [97.7 °F (36.5 °C)-101.2 °F (38.4 °C)] 101.2 °F (38.4 °C)  Pulse:  [70-86] 83  Resp:  [13-27] 22  SpO2:  [90 %-100 %] 93 %  BP: (106-147)/(53-73) 132/61     Weight: 106.4 kg (234 lb 9.1 oz)  Body mass index is 34.64 kg/m².      Intake/Output Summary (Last 24 hours) at 11/30/2022 1031  Last data filed at 11/30/2022 1014  Gross per 24 hour   Intake 1049.82 ml   Output 2581 ml   Net -1531.18 ml       Physical Exam  Vitals and nursing note reviewed.   Constitutional:       General: He is not in acute distress.     Appearance: He is obese. He is ill-appearing. He is not toxic-appearing.      Interventions: He is sedated, intubated and restrained.   HENT:      Head: Normocephalic and atraumatic.      Nose: Nose normal. No rhinorrhea.   Eyes:      Pupils: Pupils are equal, round, and reactive to light.   Cardiovascular:      Rate and Rhythm: Normal rate.      Pulses: Normal pulses.      Heart sounds: No murmur heard.  Pulmonary:      Effort: No respiratory distress. He is intubated.      Breath sounds: No wheezing, rhonchi or rales.   Abdominal:      General: Abdomen is flat. There is no distension.      Tenderness: There is no abdominal tenderness.      Comments: Abdominal wound dehiscence, photo in media tab    Genitourinary:     Comments: Bhatt in place  Musculoskeletal:         General: No deformity. Normal range of motion.      Cervical back: No rigidity.      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      Left Lower Extremity: Left leg is amputated below knee.   Skin:     General: Skin is dry.      Capillary Refill: Capillary refill takes less than 2 seconds.   Neurological:      Mental Status: He is easily aroused.      Comments: PERRL. Follows commands.        Vents:  Vent Mode: PRVC (11/30/22 0405)  Ventilator Initiated: Yes (11/28/22 2301)  Set Rate: 12 BPM (11/30/22 0405)  Vt Set: 550 mL (11/30/22 0405)  PEEP/CPAP: 5 cmH20 (11/30/22 0405)  Oxygen Concentration (%): 40 (11/30/22 0950)  Peak Airway Pressure: 22.3 cmH20 (11/30/22 0405)  Total Ve: 7.75 L/m (11/30/22 0405)  F/VT Ratio<105 (RSBI): (!) 26.62 (11/30/22 0405)    Lines/Drains/Airways       Drain  Duration                  Urethral Catheter 11/28/22 2245 16 Fr. 1 day              Peripheral Intravenous Line  Duration                  Peripheral IV - Single Lumen 11/28/22 20 G Left Antecubital 2 days         Peripheral IV - Single Lumen 11/28/22 2247 18 G Right Antecubital 1 day         Peripheral IV - Single Lumen 11/29/22 20 G Anterior;Right Wrist 1 day                    Significant Labs:    CBC/Anemia Profile:  Recent Labs   Lab 11/28/22 2308 11/29/22 0340 11/30/22 0401   WBC 13.42* 5.67 5.41   HGB 11.6* 9.7* 8.9*   HCT 31.7* 26.1* 24.1*    122* 118*   * 118* 119*   RDW 14.5 14.0 14.4   FOLATE 16.0  --   --    [id number] <148*  --   --         Chemistries:  Recent Labs   Lab 11/28/22 2308 11/29/22 0340 11/30/22 0401   * 134* 132*   K 3.8 4.6 4.4    106 101   CO2 19* 20* 22*   BUN 26* 24* 28*   CREATININE 1.8* 1.4 1.5*   CALCIUM 8.8 8.3* 8.3*   ALBUMIN 3.5 3.0* 2.7*   PROT 7.4 6.2 5.8*   BILITOT 0.6 0.5 0.7   ALKPHOS 77 50* 47*   ALT 23 20 16   AST 28 22 17   MG 1.8 1.7 1.7   PHOS 3.5 2.5*  --        All  pertinent labs within the past 24 hours have been reviewed.    Significant Imaging:  I have reviewed all pertinent imaging results/findings within the past 24 hours.      ABG  Recent Labs   Lab 11/29/22  0728   PH 7.438   PO2 78*   PCO2 32.6*   HCO3 22.1*   BE -2     Assessment/Plan:     * NSTEMI, initial episode of care  NSTEMI with known CAD  - asa, plavix, statin  - cardiology following  - [facility] with triple vessel disease; management per cards    Acute hypoxemic respiratory failure  Likely 2/2 NSTEMI/ HF + benzo administration. CT chest with bilateral airspace opacities and bilateral effusions concerning for volume overload. TTE with EF 40% and DD. . Afebrile, mild leukocytosis that resolved, and normal procal; unlikely infection.   - wean for SpO2 >92%  - diurese as tolerated  - follow up sputum culture  - extubated to NC, wean to room air as tolerated      Wound dehiscence  Small area of surgical wound dehiscence on abdomen. Photo in media tab.  - wound care consult    Essential hypertension  - antihypertensives per primary/cardiology     CKD stage 3 due to type 2 diabetes mellitus  - CKD noted. Creatinine back at baseline    Bacteremia  GPC's on 2/4 blood cultures. New fevers today  - continue vanc, add zosyn for now  - repeat cultures today     Type II diabetes mellitus with neurological manifestations  - SSI   - ICU BG goal 140-180    Plan discussed with Dr. Gonzalez.  Sister updated at bedside.     Pulmonary will sign off at this time. Please call with any future questions.     Critical Care Time: 45 minutes  Critical care was time spent personally by me on the following activities: development of treatment plan with patient or surrogate and bedside caregivers, discussions with consultants, evaluation of patient's response to treatment, examination of patient, ordering and performing treatments and interventions, ordering and review of laboratory studies, ordering and review of radiographic studies, pulse  oximetry, re-evaluation of patient's condition. This critical care time did not overlap with that of any other provider or involve time for any procedures.     Aviva Moran NP  Pulmonology  [facility] - Intensive Care

## 2022-11-30 NOTE — SUBJECTIVE & OBJECTIVE
Interval History:     Review of Systems   Unable to perform ROS: Acuity of condition   Objective:     Vital Signs (Most Recent):  Temp: 100.3 °F (37.9 °C) (11/30/22 1130)  Pulse: 79 (11/30/22 1130)  Resp: (!) 22 (11/30/22 1000)  BP: (!) 122/58 (11/30/22 1130)  SpO2: (!) 94 % (11/30/22 1130)   Vital Signs (24h Range):  Temp:  [98.8 °F (37.1 °C)-101.2 °F (38.4 °C)] 100.3 °F (37.9 °C)  Pulse:  [73-86] 79  Resp:  [13-27] 22  SpO2:  [90 %-100 %] 94 %  BP: ()/(44-73) 122/58     Weight: 106.4 kg (234 lb 9.1 oz)  Body mass index is 34.64 kg/m².     SpO2: (!) 94 %  O2 Device (Oxygen Therapy): nasal cannula      Intake/Output Summary (Last 24 hours) at 11/30/2022 1225  Last data filed at 11/30/2022 1014  Gross per 24 hour   Intake 986.75 ml   Output 2431 ml   Net -1444.25 ml       Lines/Drains/Airways       Drain  Duration                  Urethral Catheter 11/28/22 2245 16 Fr. 1 day              Peripheral Intravenous Line  Duration                  Peripheral IV - Single Lumen 11/28/22 20 G Left Antecubital 2 days         Peripheral IV - Single Lumen 11/28/22 2247 18 G Right Antecubital 1 day         Peripheral IV - Single Lumen 11/29/22 20 G Anterior;Right Wrist 1 day                    Physical Exam  Constitutional:       Appearance: He is well-developed.   HENT:      Head: Normocephalic and atraumatic.   Eyes:      Conjunctiva/sclera: Conjunctivae normal.      Pupils: Pupils are equal, round, and reactive to light.   Cardiovascular:      Rate and Rhythm: Normal rate.      Pulses: Intact distal pulses.      Heart sounds: Normal heart sounds.   Pulmonary:      Effort: Pulmonary effort is normal.      Breath sounds: Normal breath sounds.   Abdominal:      General: Bowel sounds are normal.      Palpations: Abdomen is soft.   Musculoskeletal:         General: Normal range of motion.      Cervical back: Normal range of motion and neck supple.   Skin:     General: Skin is warm and dry.       Significant Labs: All  pertinent lab results from the last 24 hours have been reviewed.    Significant Imaging: Echocardiogram: 2D echo with color flow doppler:   Results for orders placed or performed during the hospital encounter of 12/15/14   2D echo with color flow doppler   Result Value Ref Range    EF + QEF 60 55 - 65    Mitral Valve Regurgitation TRIVIAL     Diastolic Dysfunction No     Est. PA Systolic Pressure 11.07     Pericardial Effusion NONE     Tricuspid Valve Regurgitation TRIVIAL     Narrative    TEST DESCRIPTION   Technical Quality: This is a technically challenging study.     Aorta: The aortic root is normal in size, measuring 3.0 cm at sinotubular junction and 3.3 cm at Sinuses of Valsalva.     Left Atrium: The left atrium is normal in size, measuring 3.4 cm across in the parasternal view.     Left Ventricle: The left ventricle is normal in size, with an end-diastolic diameter of 4.4 cm, and an end-systolic diameter of 2.9 cm. LV wall thickness is normal, with the septum measuring 1.0 cm and the posterior wall measuring 0.9 cm across. Relative   wall thickness was normal at 0.41, and the LV mass index was 84.4 g/m2 consistent with normal left ventricular mass. Global left ventricular systolic function appears hyperdynamic. Visually estimated ejection fraction is 60-65%. The LV Doppler derived   stroke volume equals 56.0 ccs.   The E/e'(lat) is 8, consistent with normal diastolic function.     Right Atrium: The right atrium is normal in size.     Right Ventricle: The right ventricle is normal in size. Global right ventricular systolic function appears normal. Tricuspid annular plane systolic excursion (TAPSE) is 2.4 cm. The estimated PA systolic pressure is 11 mmHg.     Mitral Valve:  There is trivial mitral regurgitation.     Tricuspid Valve:  There is trivial tricuspid regurgitation.     Pericardium: There is no evidence of pericardial effusion.      IVC: IVC is normal in size and collapses > 50% with a sniff,  suggesting normal right atrial pressure of 3 mmHg.     Intracavitary: There is no evidence of intracavity mass, thrombi, or vegetation.         CONCLUSIONS     1 - Hyperdynamic left ventricular systolic function (EF 60-65%).     2 - Cannot fully r/o but no obvious vegetation        This document has been electronically    SIGNED BY: Modesto Marcus MD On: 12/18/2014 15:07

## 2022-11-30 NOTE — ASSESSMENT & PLAN NOTE
GPC's on 2/4 blood cultures. New fevers today  - continue vanc, add zosyn for now  - repeat cultures today

## 2022-11-30 NOTE — EICU
eICU Physician Virtual/Remote Brief Evaluation Note      Telephone call RN   Blood culture 1 of 4 bottles positive for Gram-positive cocci  Chart reviewed  Respiratory gram stain also with many Gram-positive cocci in chains and moderate WBCs  Vancomycin ordered        JARED Simeon MD  eICU Attending  [phone]    This report has been created through the use of [employer] dictation software. Typographical and content errors may occur with this process. While efforts are made to detect and correct such errors, in some cases errors will persist. For this reason, wording in this document should be considered in the proper context and not strictly verbatim

## 2022-11-30 NOTE — ASSESSMENT & PLAN NOTE
NSTEMI with known CAD  - asa, plavix, statin  - cardiology following  - [facility] with triple vessel disease; management per cards

## 2022-11-30 NOTE — PLAN OF CARE
Recommendations    1) SLP to evaluate pt for swallow and texture safety/appropriateness s/p extubation.   2) Continue Diabetic Cardiac Diet as tolerated - Texture per SLP   - Transition from cardiac to renal if renal fx continues to decline.   3) Monitor PO intake - < 50 % Addition of [place] BID to assist in meeting needs.  4) Monitor Nutrition related labs    Goals:   1) Patient to meet > 75% EEN/EPN via PO/ONS intake  2) Monitored labs to trend toward target ranges    Nutrition Goal Status: new  Communication of RD Recs:  (POC)

## 2022-11-30 NOTE — NURSING
[facility] Intensive Care  ICU Shift Summary  Date: 11/30/2022      Prehospitalization: Home  Admit Date / LOS : 11/28/2022/ 1 days    Diagnosis: NSTEMI, initial episode of care    Consults:        Active: Cardio and Wound Care and Pulm CC       Needed: N/A     Code Status: Full Code   Advanced Directive: Received    LDA:  Lines/Drains/Airways       Drain  Duration                  Urethral Catheter 11/28/22 2245 16 Fr. 1 day              Peripheral Intravenous Line  Duration                  Peripheral IV - Single Lumen 11/28/22 20 G Left Antecubital 2 days         Peripheral IV - Single Lumen 11/28/22 2247 18 G Right Antecubital 1 day         Peripheral IV - Single Lumen 11/29/22 20 G Anterior;Right Wrist 1 day                  Central Lines/Site/Justification:Patient Does Not Have Central Line  Urinary Cath/Order/Justification:Critically Ill in the ICU and requiring intensive monitoring    Vasopressors/Infusions:    dexmedetomidine (PRECEDEX) infusion 0.2 mcg/kg/hr (11/30/22 6187)          GOALS: Volume/ Hemodynamic: N/A                     RASS: 0  alert and calm    Pain Management: PO       Pain Controlled: yes     Rhythm: NSR    Respiratory Device: Nasal Cannula    Vent Mode: PRVC  Oxygen Concentration (%):  [30-60] 40  Resp Rate Total:  [15 br/min-16 br/min] 16 br/min  Vt Set:  [550 mL] 550 mL  PEEP/CPAP:  [5 cmH20] 5 cmH20  Mean Airway Pressure:  [9.4 cmH20-10.3 cmH20] 9.6 cmH20             Most Recent SBT/ SAT: Pass       MOVE Screen: PT Consult  ICU Liberation: no    VTE Prophylaxis: Pharm  Mobility: Bedrest  Stress Ulcer Prophylaxis: Yes    Isolation: No active isolations    Dietary:   Current Diet Order   Procedures    Diet diabetic [facility]; 2000 Calorie; Cardiac (Low Na/Chol), Dysphagia Mechanical Soft (IDDSI Level 5)     Order Specific Question:   Indicate patient location for additional diet options:     Answer:   [facility]     Order Specific Question:   Total calories:     Answer:    2000 Calorie     Order Specific Question:   Additional Diet Options:     Answer:   Cardiac (Low Na/Chol)     Order Specific Question:   Additional Diet Options:     Answer:   Dysphagia Mechanical Soft (IDDSI Level 5)      Tolerance: yes  Advancement: yes    I & O (24h):    Intake/Output Summary (Last 24 hours) at 11/30/2022 1648  Last data filed at 11/30/2022 1515  Gross per 24 hour   Intake 996.85 ml   Output 2301 ml   Net -1304.15 ml        Restraints: No    Significant Dates:  Post Op Date: N/A  Rescue Date: N/A  Imaging/ Diagnostics: N/A    Noteworthy Labs:  none    COVID Test: (--)  CBC/Anemia Labs: Coags:    Recent Labs   Lab 11/28/22 2308 11/29/22 0340 11/30/22  0401   WBC 13.42* 5.67 5.41   HGB 11.6* 9.7* 8.9*   HCT 31.7* 26.1* 24.1*    122* 118*   * 118* 119*   RDW 14.5 14.0 14.4   FOLATE 16.0  --   --    RLDPAGDG12 <148*  --   --     Recent Labs   Lab 11/28/22 2308 11/29/22 0340 11/29/22  0612   INR 1.0  --   --    APTT 25.4 35.7* 25.4        Chemistries:   Recent Labs   Lab 11/28/22 2308 11/29/22 0340 11/30/22  0401   * 134* 132*   K 3.8 4.6 4.4    106 101   CO2 19* 20* 22*   BUN 26* 24* 28*   CREATININE 1.8* 1.4 1.5*   CALCIUM 8.8 8.3* 8.3*   PROT 7.4 6.2 5.8*   BILITOT 0.6 0.5 0.7   ALKPHOS 77 50* 47*   ALT 23 20 16   AST 28 22 17   MG 1.8 1.7 1.7   PHOS 3.5 2.5*  --         Cardiac Enzymes: Ejection Fractions:    Recent Labs     11/29/22 0340 11/29/22  0906   TROPONINI 3.528* 3.615*    EF   Date Value Ref Range Status   11/29/2022 40 % Final        POCT Glucose: HbA1c:    Recent Labs   Lab 11/30/22  0023 11/30/22  0501 11/30/22  0746   POCTGLUCOSE 296* 222* 239*    Hemoglobin A1C   Date Value Ref Range Status   11/28/2022 10.9 (H) 4.0 - 5.6 % Final     Comment:     ADA Screening Guidelines:  5.7-6.4%  Consistent with prediabetes  >or=6.5%  Consistent with diabetes    High levels of fetal hemoglobin interfere with the HbA1C  assay. Heterozygous hemoglobin variants (HbS,  HgC, etc)do  not significantly interfere with this assay.   However, presence of multiple variants may affect accuracy.             ICU LOS 1d 15h  Level of Care: Critical Care    Chart Check: 12 HR Done  Shift Summary/Plan for the shift: pt remains in ICU. Oriented to place, time and place. Intermittently restless throughout shift. Eventually pt required precedex to prevent harm/pulling medical devices.in NSR, denies chest pain. On 4LNC. Diet advanced, able to swallow safely. Bhatt in place. Pt sister at bedside earlier in shift. Updated on plan of care

## 2022-11-30 NOTE — ASSESSMENT & PLAN NOTE
Patient with Hypoxic Respiratory failure which is Acute.  he is not on home oxygen. Supplemental oxygen was provided and noted-     Vent Mode: PRVC  Oxygen Concentration (%):  [30-60] 40  Resp Rate Total:  [15 br/min-16 br/min] 16 br/min  Vt Set:  [550 mL] 550 mL  PEEP/CPAP:  [5 cmH20] 5 cmH20  Mean Airway Pressure:  [9.4 cmH20-10.3 cmH20] 9.6 cmH20.     Signs/symptoms of respiratory failure include- tachypnea, increased work of breathing, respiratory distress, use of accessory muscles and lethargy. Contributing diagnoses includes - CHF Labs and images were reviewed. Patient Has recent ABG, which has been reviewed. Will treat underlying causes and adjust management of respiratory failure as follows-     Potentially a primary ACS event with significant anxiety requiring IV benzo, which lead to respiratory depression resulting in Intubation.   · Intubated and Sedated  · Propofol and Fentanyl  · Pulmonary following.  · Doing well on SBT and should get extubated today.

## 2022-11-30 NOTE — PLAN OF CARE
S/P angio to right groin. Site clean dry and intact without s/s of hematoma noted. Distal pulses palpable. Remains intubated and sedated. Attempts to pause sedation or decrease resulted in marked agitation and attempting to pull out ETT. Restraints in use for safety. VSS overnight. Attempted to decrease FiO2 to 30% but pt wasn't able to tolerate with O2 sats 85-86%. Large BM overnight. Bhatt catheter in place with low urine output. MD aware. No acute events overnight. Pt remains free of injury or skin breakdown. PUP applied to sacrum.

## 2022-11-30 NOTE — SUBJECTIVE & OBJECTIVE
Interval History: Extubated to NC this morning without incident. Spiking fevers with positive blood cultures with GPCs; on vanc.    Objective:     Vital Signs (Most Recent):  Temp: (!) 101.2 °F (38.4 °C) (11/30/22 1000)  Pulse: 83 (11/30/22 1000)  Resp: (!) 22 (11/30/22 1000)  BP: 132/61 (11/30/22 1000)  SpO2: (!) 93 % (11/30/22 1000)   Vital Signs (24h Range):  Temp:  [97.7 °F (36.5 °C)-101.2 °F (38.4 °C)] 101.2 °F (38.4 °C)  Pulse:  [70-86] 83  Resp:  [13-27] 22  SpO2:  [90 %-100 %] 93 %  BP: (106-147)/(53-73) 132/61     Weight: 106.4 kg (234 lb 9.1 oz)  Body mass index is 34.64 kg/m².      Intake/Output Summary (Last 24 hours) at 11/30/2022 1031  Last data filed at 11/30/2022 1014  Gross per 24 hour   Intake 1049.82 ml   Output 2581 ml   Net -1531.18 ml       Physical Exam  Vitals and nursing note reviewed.   Constitutional:       General: He is not in acute distress.     Appearance: He is obese. He is ill-appearing. He is not toxic-appearing.      Interventions: He is sedated, intubated and restrained.   HENT:      Head: Normocephalic and atraumatic.      Nose: Nose normal. No rhinorrhea.   Eyes:      Pupils: Pupils are equal, round, and reactive to light.   Cardiovascular:      Rate and Rhythm: Normal rate.      Pulses: Normal pulses.      Heart sounds: No murmur heard.  Pulmonary:      Effort: No respiratory distress. He is intubated.      Breath sounds: No wheezing, rhonchi or rales.   Abdominal:      General: Abdomen is flat. There is no distension.      Tenderness: There is no abdominal tenderness.      Comments: Abdominal wound dehiscence, photo in media tab   Genitourinary:     Comments: Bhatt in place  Musculoskeletal:         General: No deformity. Normal range of motion.      Cervical back: No rigidity.      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      Left Lower Extremity: Left leg is amputated below knee.   Skin:     General: Skin is dry.      Capillary Refill: Capillary refill  takes less than 2 seconds.   Neurological:      Mental Status: He is easily aroused.      Comments: PERRL. Follows commands.        Vents:  Vent Mode: PRVC (11/30/22 0405)  Ventilator Initiated: Yes (11/28/22 2301)  Set Rate: 12 BPM (11/30/22 0405)  Vt Set: 550 mL (11/30/22 0405)  PEEP/CPAP: 5 cmH20 (11/30/22 0405)  Oxygen Concentration (%): 40 (11/30/22 0950)  Peak Airway Pressure: 22.3 cmH20 (11/30/22 0405)  Total Ve: 7.75 L/m (11/30/22 0405)  F/VT Ratio<105 (RSBI): (!) 26.62 (11/30/22 0405)    Lines/Drains/Airways       Drain  Duration                  Urethral Catheter 11/28/22 2245 16 Fr. 1 day              Peripheral Intravenous Line  Duration                  Peripheral IV - Single Lumen 11/28/22 20 G Left Antecubital 2 days         Peripheral IV - Single Lumen 11/28/22 2247 18 G Right Antecubital 1 day         Peripheral IV - Single Lumen 11/29/22 20 G Anterior;Right Wrist 1 day                    Significant Labs:    CBC/Anemia Profile:  Recent Labs   Lab 11/28/22 2308 11/29/22 0340 11/30/22 0401   WBC 13.42* 5.67 5.41   HGB 11.6* 9.7* 8.9*   HCT 31.7* 26.1* 24.1*    122* 118*   * 118* 119*   RDW 14.5 14.0 14.4   FOLATE 16.0  --   --    EHRVUUAP31 <148*  --   --         Chemistries:  Recent Labs   Lab 11/28/22 2308 11/29/22 0340 11/30/22 0401   * 134* 132*   K 3.8 4.6 4.4    106 101   CO2 19* 20* 22*   BUN 26* 24* 28*   CREATININE 1.8* 1.4 1.5*   CALCIUM 8.8 8.3* 8.3*   ALBUMIN 3.5 3.0* 2.7*   PROT 7.4 6.2 5.8*   BILITOT 0.6 0.5 0.7   ALKPHOS 77 50* 47*   ALT 23 20 16   AST 28 22 17   MG 1.8 1.7 1.7   PHOS 3.5 2.5*  --        All pertinent labs within the past 24 hours have been reviewed.    Significant Imaging:  I have reviewed all pertinent imaging results/findings within the past 24 hours.

## 2022-11-30 NOTE — PROGRESS NOTES
Pharmacokinetic Initial Assessment: IV Vancomycin    Assessment/Plan:    Initiate intravenous vancomycin with loading dose of 2000 mg once followed by a maintenance dose of vancomycin 1000 mg IV every 12 hours  Desired empiric serum trough concentration is 10 to 20 mcg/mL  Draw vancomycin trough level 60 min prior to fourth dose on 12/1/22 at approximately 0830  Pharmacy will continue to follow and monitor vancomycin.      Please contact pharmacy at extension [phone] with any questions regarding this assessment.     Thank you for the consult,   Elieser Lucia       Patient brief summary:  Carlos Cifuentes Jr. is a 61 y.o. male initiated on antimicrobial therapy with IV Vancomycin for treatment of suspected bacteremia    Drug Allergies:   Review of patient's allergies indicates:   Allergen Reactions    Codeine Rash    Lyrica [pregabalin]      Feet turned Red    Vicodin [hydrocodone-acetaminophen] Rash       Actual Body Weight:   106.4 kg    Renal Function:   Estimated Creatinine Clearance: 67.5 mL/min (based on SCr of 1.4 mg/dL).,     Dialysis Method (if applicable):  N/A    CBC (last 72 hours):  Recent Labs   Lab Result Units 11/28/22 2308 11/29/22  0340   WBC K/uL 13.42* 5.67   Hemoglobin g/dL 11.6* 9.7*   Hemoglobin A1C % 10.9*  --    Hematocrit % 31.7* 26.1*   Platelets K/uL 201 122*   Gran % % 48.2 74.8*   Lymph % % 41.1 16.9*   Mono % % 5.7 5.8   Eosinophil % % 3.4 1.4   Basophil % % 0.6 0.4   Differential Method  Automated Automated       Metabolic Panel (last 72 hours):  Recent Labs   Lab Result Units 11/28/22 2308 11/28/22 2312 11/28/22  2320 11/29/22  0340   Sodium mmol/L 135*  --   --  134*   Potassium mmol/L 3.8  --   --  4.6   Chloride mmol/L 103  --   --  106   CO2 mmol/L 19*  --   --  20*   Glucose mg/dL 445*  --   --  269*   Glucose, UA   --   --  4+*  --    BUN mg/dL 26*  --   --  24*   Creatinine mg/dL 1.8*  --   --  1.4   Creatinine, Urine mg/dL  --  108.6  --   --    Albumin g/dL 3.5  --    --  3.0*   Total Bilirubin mg/dL 0.6  --   --  0.5   Alkaline Phosphatase U/L 77  --   --  50*   AST U/L 28  --   --  22   ALT U/L 23  --   --  20   Magnesium mg/dL 1.8  --   --  1.7   Phosphorus mg/dL 3.5  --   --  2.5*       Drug levels (last 3 results):  No results for input(s): VANCOMYCINRA, VANCORANDOM, VANCOMYCINPE, VANCOPEAK, VANCOMYCINTR, VANCOTROUGH in the last 72 hours.    Microbiologic Results:  Microbiology Results (last 7 days)       Procedure Component Value Units Date/Time    Blood culture #2 **CANNOT BE ORDERED STAT** [[id number]] Collected: 11/28/22 2324    Order Status: Completed Specimen: Blood from Peripheral, Antecubital, Left Updated: 11/29/22 1923     Blood Culture, Routine Gram stain domitila bottle: Gram positive cocci in clusters resembling Staph      Results called to and read back by:kinsey moore 11/29/2022      19:20    Culture, Respiratory with Gram Stain [[id number]] Collected: 11/29/22 1104    Order Status: Completed Specimen: Respiratory from Endotracheal Aspirate Updated: 11/29/22 1344     Gram Stain (Respiratory) Many Gram positive cocci in chains     Gram Stain (Respiratory) Moderate WBC's    Blood culture #1 **CANNOT BE ORDERED STAT** [[id number]] Collected: 11/28/22 2308    Order Status: Completed Specimen: Blood from Peripheral, Antecubital, Right Updated: 11/29/22 0712     Blood Culture, Routine No Growth to date

## 2022-11-30 NOTE — PROGRESS NOTES
[facility] Intensive Care  Cardiology  Progress Note    Patient Name: Carlos Cifuentes Jr.  MRN: [id number]  Admission Date: 11/28/2022  Hospital Length of Stay: 1 days  Code Status: Prior   Attending Physician: Pranay Michaels MD   Primary Care Physician: Miguel Lux MD  Expected Discharge Date:   Principal Problem:NSTEMI, initial episode of care    Subjective:     Hospital Course:   11/30/22 Extubated this AM.Temp 101    11/29/22 LHC - EDP 25, LAD 90% mid - diffuse severe mid-distal disease, Cx- OM1 large - multiple 70-80% mid lesions with diffuse disease, RCA 50% mid, PDA 80% proximal - diffuse distal disease, PL 80%  Medical Rx    Echo 11/29/22   The left ventricle is normal in size with concentric hypertrophy and mildly decreased systolic function.   The estimated ejection fraction is 40%.   Grade I left ventricular diastolic dysfunction.   Normal right ventricular size with normal right ventricular systolic function.   TDS             Interval History:     Review of Systems   Unable to perform ROS: Acuity of condition   Objective:     Vital Signs (Most Recent):  Temp: 100.3 °F (37.9 °C) (11/30/22 1130)  Pulse: 79 (11/30/22 1130)  Resp: (!) 22 (11/30/22 1000)  BP: (!) 122/58 (11/30/22 1130)  SpO2: (!) 94 % (11/30/22 1130)   Vital Signs (24h Range):  Temp:  [98.8 °F (37.1 °C)-101.2 °F (38.4 °C)] 100.3 °F (37.9 °C)  Pulse:  [73-86] 79  Resp:  [13-27] 22  SpO2:  [90 %-100 %] 94 %  BP: ()/(44-73) 122/58     Weight: 106.4 kg (234 lb 9.1 oz)  Body mass index is 34.64 kg/m².     SpO2: (!) 94 %  O2 Device (Oxygen Therapy): nasal cannula      Intake/Output Summary (Last 24 hours) at 11/30/2022 1225  Last data filed at 11/30/2022 1014  Gross per 24 hour   Intake 986.75 ml   Output 2431 ml   Net -1444.25 ml       Lines/Drains/Airways       Drain  Duration                  Urethral Catheter 11/28/22 5835 16 Fr. 1 day              Peripheral Intravenous Line  Duration                  Peripheral IV - Single  Lumen 11/28/22 20 G Left Antecubital 2 days         Peripheral IV - Single Lumen 11/28/22 2247 18 G Right Antecubital 1 day         Peripheral IV - Single Lumen 11/29/22 20 G Anterior;Right Wrist 1 day                    Physical Exam  Constitutional:       Appearance: He is well-developed.   HENT:      Head: Normocephalic and atraumatic.   Eyes:      Conjunctiva/sclera: Conjunctivae normal.      Pupils: Pupils are equal, round, and reactive to light.   Cardiovascular:      Rate and Rhythm: Normal rate.      Pulses: Intact distal pulses.      Heart sounds: Normal heart sounds.   Pulmonary:      Effort: Pulmonary effort is normal.      Breath sounds: Normal breath sounds.   Abdominal:      General: Bowel sounds are normal.      Palpations: Abdomen is soft.   Musculoskeletal:         General: Normal range of motion.      Cervical back: Normal range of motion and neck supple.   Skin:     General: Skin is warm and dry.       Significant Labs: All pertinent lab results from the last 24 hours have been reviewed.    Significant Imaging: Echocardiogram: 2D echo with color flow doppler:   Results for orders placed or performed during the hospital encounter of 12/15/14   2D echo with color flow doppler   Result Value Ref Range    EF + QEF 60 55 - 65    Mitral Valve Regurgitation TRIVIAL     Diastolic Dysfunction No     Est. PA Systolic Pressure 11.07     Pericardial Effusion NONE     Tricuspid Valve Regurgitation TRIVIAL     Narrative    TEST DESCRIPTION   Technical Quality: This is a technically challenging study.     Aorta: The aortic root is normal in size, measuring 3.0 cm at sinotubular junction and 3.3 cm at Sinuses of Valsalva.     Left Atrium: The left atrium is normal in size, measuring 3.4 cm across in the parasternal view.     Left Ventricle: The left ventricle is normal in size, with an end-diastolic diameter of 4.4 cm, and an end-systolic diameter of 2.9 cm. LV wall thickness is normal, with the septum measuring  1.0 cm and the posterior wall measuring 0.9 cm across. Relative   wall thickness was normal at 0.41, and the LV mass index was 84.4 g/m2 consistent with normal left ventricular mass. Global left ventricular systolic function appears hyperdynamic. Visually estimated ejection fraction is 60-65%. The LV Doppler derived   stroke volume equals 56.0 ccs.   The E/e'(lat) is 8, consistent with normal diastolic function.     Right Atrium: The right atrium is normal in size.     Right Ventricle: The right ventricle is normal in size. Global right ventricular systolic function appears normal. Tricuspid annular plane systolic excursion (TAPSE) is 2.4 cm. The estimated PA systolic pressure is 11 mmHg.     Mitral Valve:  There is trivial mitral regurgitation.     Tricuspid Valve:  There is trivial tricuspid regurgitation.     Pericardium: There is no evidence of pericardial effusion.      IVC: IVC is normal in size and collapses > 50% with a sniff, suggesting normal right atrial pressure of 3 mmHg.     Intracavitary: There is no evidence of intracavity mass, thrombi, or vegetation.         CONCLUSIONS     1 - Hyperdynamic left ventricular systolic function (EF 60-65%).     2 - Cannot fully r/o but no obvious vegetation        This document has been electronically    SIGNED BY: Modesto Marcus MD On: 12/18/2014 15:07     Assessment and Plan:     Brief HPI:     * NSTEMI, initial episode of care  Troponin 4 trending down. Chart notes CP 2 days ago. No prior documentation of CAD in chart. EF 40% on echo. Discussed [facility] with sister Radha Messer - risks/benefits ecplained and she agrees to proceed. On ASA/plavix and heparin    11/30/22 [facility] with diffuse distal 3 vessel disease. Continue medical Rx    Acute combined systolic and diastolic congestive heart failure  EF 40% on echo. [facility] with diffuse 3 vessel CAD. Diuresis and afterload reduction as tolerated    Echo    Interpretation Summary  · The left ventricle is normal in size with  concentric hypertrophy and mildly decreased systolic function.  · The estimated ejection fraction is 40%.  · Grade I left ventricular diastolic dysfunction.  · Normal right ventricular size with normal right ventricular systolic function.  · TDS    Recent Labs   Lab 11/28/22 2308   *   .      Acute hypoxemic respiratory failure  Per pulmonary. Would prefer to do LHC prior to extubation    Bacteremia  Fever this AM. Per primary    Type II diabetes mellitus with neurological manifestations  Per primary        VTE Risk Mitigation (From admission, onward)         Ordered     Reason for No Pharmacological VTE Prophylaxis  Once        Question:  Reasons:  Answer:  Physician Provided (leave comment)  Comment:  On heparin drip    11/29/22 0243     IP VTE HIGH RISK PATIENT  Once         11/29/22 0243     Place sequential compression device  Until discontinued         11/29/22 0243                Bobby Harding MD  Cardiology  [facility] [place] - Intensive Care

## 2022-11-30 NOTE — EICU
eICU Physician Virtual/Remote Brief Evaluation Note      Message from RN  Requesting renewal of restraints  Chart reviewed, patient observed, discussed with RN  Patient sedated on ventilator but when stimulated or sedation decreased he reaches for the endotracheal tube and tries to sit up  Bilateral soft wrist restraints renewed for patient safety to prevent pulling lines and self-extubation    JARED Simeon MD  [facility] Attending  [phone]    This report has been created through the use of M-Modal dictation software. Typographical and content errors may occur with this process. While efforts are made to detect and correct such errors, in some cases errors will persist. For this reason, wording in this document should be considered in the proper context and not strictly verbatim

## 2022-12-01 PROBLEM — F41.9 ANXIETY: Status: ACTIVE | Noted: 2022-12-01

## 2022-12-01 PROBLEM — E87.20 LACTIC ACIDOSIS: Status: RESOLVED | Noted: 2022-11-29 | Resolved: 2022-12-01

## 2022-12-01 LAB
ALBUMIN SERPL BCP-MCNC: 2.6 G/DL (ref 3.5–5.2)
ALLENS TEST: ABNORMAL
ALLENS TEST: ABNORMAL
ALP SERPL-CCNC: 44 U/L (ref 55–135)
ALT SERPL W/O P-5'-P-CCNC: 14 U/L (ref 10–44)
ANION GAP SERPL CALC-SCNC: 6 MMOL/L (ref 8–16)
AST SERPL-CCNC: 14 U/L (ref 10–40)
BASOPHILS # BLD AUTO: 0.02 K/UL (ref 0–0.2)
BASOPHILS NFR BLD: 0.4 % (ref 0–1.9)
BILIRUB SERPL-MCNC: 1 MG/DL (ref 0.1–1)
BUN SERPL-MCNC: 36 MG/DL (ref 6–20)
CALCIUM SERPL-MCNC: 8 MG/DL (ref 8.7–10.5)
CHLORIDE SERPL-SCNC: 103 MMOL/L (ref 95–110)
CO2 SERPL-SCNC: 22 MMOL/L (ref 23–29)
CREAT SERPL-MCNC: 1.7 MG/DL (ref 0.5–1.4)
DELSYS: ABNORMAL
DELSYS: ABNORMAL
DIFFERENTIAL METHOD: ABNORMAL
EOSINOPHIL # BLD AUTO: 0.1 K/UL (ref 0–0.5)
EOSINOPHIL NFR BLD: 1.7 % (ref 0–8)
EP: 8
ERYTHROCYTE [DISTWIDTH] IN BLOOD BY AUTOMATED COUNT: 13.7 % (ref 11.5–14.5)
ERYTHROCYTE [SEDIMENTATION RATE] IN BLOOD BY WESTERGREN METHOD: 16 MM/H
EST. GFR  (NO RACE VARIABLE): 46 ML/MIN/1.73 M^2
FIO2: 40
GLUCOSE SERPL-MCNC: 253 MG/DL (ref 70–110)
HCO3 UR-SCNC: 19.6 MMOL/L (ref 24–28)
HCO3 UR-SCNC: 21.8 MMOL/L (ref 24–28)
HCT VFR BLD AUTO: 22.9 % (ref 40–54)
HGB BLD-MCNC: 8.1 G/DL (ref 14–18)
IMM GRANULOCYTES # BLD AUTO: 0.03 K/UL (ref 0–0.04)
IMM GRANULOCYTES NFR BLD AUTO: 0.7 % (ref 0–0.5)
IP: 14
LYMPHOCYTES # BLD AUTO: 1.1 K/UL (ref 1–4.8)
LYMPHOCYTES NFR BLD: 24.8 % (ref 18–48)
MAGNESIUM SERPL-MCNC: 1.8 MG/DL (ref 1.6–2.6)
MCH RBC QN AUTO: 42 PG (ref 27–31)
MCHC RBC AUTO-ENTMCNC: 35.4 G/DL (ref 32–36)
MCV RBC AUTO: 119 FL (ref 82–98)
MIN VOL: 14
MODE: ABNORMAL
MODE: ABNORMAL
MONOCYTES # BLD AUTO: 0.3 K/UL (ref 0.3–1)
MONOCYTES NFR BLD: 6.8 % (ref 4–15)
NEUTROPHILS # BLD AUTO: 3 K/UL (ref 1.8–7.7)
NEUTROPHILS NFR BLD: 65.6 % (ref 38–73)
NRBC BLD-RTO: 0 /100 WBC
PCO2 BLDA: 36.4 MMHG (ref 35–45)
PCO2 BLDA: 48.5 MMHG (ref 35–45)
PH SMN: 7.26 [PH] (ref 7.35–7.45)
PH SMN: 7.34 [PH] (ref 7.35–7.45)
PLATELET # BLD AUTO: 96 K/UL (ref 150–450)
PMV BLD AUTO: 10.2 FL (ref 9.2–12.9)
PO2 BLDA: 130 MMHG (ref 80–100)
PO2 BLDA: 92 MMHG (ref 80–100)
POC BE: -5 MMOL/L
POC BE: -6 MMOL/L
POC SATURATED O2: 96 % (ref 95–100)
POC SATURATED O2: 99 % (ref 95–100)
POC TCO2: 21 MMOL/L (ref 23–27)
POC TCO2: 23 MMOL/L (ref 23–27)
POCT GLUCOSE: 190 MG/DL (ref 70–110)
POCT GLUCOSE: 255 MG/DL (ref 70–110)
POCT GLUCOSE: 298 MG/DL (ref 70–110)
POCT GLUCOSE: 304 MG/DL (ref 70–110)
POTASSIUM SERPL-SCNC: 4.5 MMOL/L (ref 3.5–5.1)
PROT SERPL-MCNC: 5.7 G/DL (ref 6–8.4)
RBC # BLD AUTO: 1.93 M/UL (ref 4.6–6.2)
SAMPLE: ABNORMAL
SAMPLE: ABNORMAL
SITE: ABNORMAL
SITE: ABNORMAL
SODIUM SERPL-SCNC: 131 MMOL/L (ref 136–145)
SP02: 100
SPONT RATE: 17
VANCOMYCIN TROUGH SERPL-MCNC: 21.6 UG/ML (ref 10–22)
WBC # BLD AUTO: 4.59 K/UL (ref 3.9–12.7)

## 2022-12-01 PROCEDURE — 82803 BLOOD GASES ANY COMBINATION: CPT

## 2022-12-01 PROCEDURE — 25000003 PHARM REV CODE 250: Performed by: HOSPITALIST

## 2022-12-01 PROCEDURE — 63600175 PHARM REV CODE 636 W HCPCS: Performed by: HOSPITALIST

## 2022-12-01 PROCEDURE — 80202 ASSAY OF VANCOMYCIN: CPT | Performed by: HOSPITALIST

## 2022-12-01 PROCEDURE — 25000003 PHARM REV CODE 250: Performed by: STUDENT IN AN ORGANIZED HEALTH CARE EDUCATION/TRAINING PROGRAM

## 2022-12-01 PROCEDURE — 36415 COLL VENOUS BLD VENIPUNCTURE: CPT | Performed by: HOSPITALIST

## 2022-12-01 PROCEDURE — C9113 INJ PANTOPRAZOLE SODIUM, VIA: HCPCS | Performed by: HOSPITALIST

## 2022-12-01 PROCEDURE — 63600175 PHARM REV CODE 636 W HCPCS: Performed by: STUDENT IN AN ORGANIZED HEALTH CARE EDUCATION/TRAINING PROGRAM

## 2022-12-01 PROCEDURE — 63600175 PHARM REV CODE 636 W HCPCS: Performed by: NURSE PRACTITIONER

## 2022-12-01 PROCEDURE — 63600175 PHARM REV CODE 636 W HCPCS: Performed by: INTERNAL MEDICINE

## 2022-12-01 PROCEDURE — 99900035 HC TECH TIME PER 15 MIN (STAT)

## 2022-12-01 PROCEDURE — 94660 CPAP INITIATION&MGMT: CPT

## 2022-12-01 PROCEDURE — 80053 COMPREHEN METABOLIC PANEL: CPT | Performed by: STUDENT IN AN ORGANIZED HEALTH CARE EDUCATION/TRAINING PROGRAM

## 2022-12-01 PROCEDURE — 36600 WITHDRAWAL OF ARTERIAL BLOOD: CPT

## 2022-12-01 PROCEDURE — 36415 COLL VENOUS BLD VENIPUNCTURE: CPT | Performed by: STUDENT IN AN ORGANIZED HEALTH CARE EDUCATION/TRAINING PROGRAM

## 2022-12-01 PROCEDURE — 25000003 PHARM REV CODE 250: Performed by: NURSE PRACTITIONER

## 2022-12-01 PROCEDURE — 83735 ASSAY OF MAGNESIUM: CPT | Performed by: STUDENT IN AN ORGANIZED HEALTH CARE EDUCATION/TRAINING PROGRAM

## 2022-12-01 PROCEDURE — 27000190 HC CPAP FULL FACE MASK W/VALVE

## 2022-12-01 PROCEDURE — G0378 HOSPITAL OBSERVATION PER HR: HCPCS

## 2022-12-01 PROCEDURE — 63600175 PHARM REV CODE 636 W HCPCS: Performed by: SURGERY

## 2022-12-01 PROCEDURE — 85025 COMPLETE CBC W/AUTO DIFF WBC: CPT | Performed by: STUDENT IN AN ORGANIZED HEALTH CARE EDUCATION/TRAINING PROGRAM

## 2022-12-01 PROCEDURE — S4991 NICOTINE PATCH NONLEGEND: HCPCS | Performed by: HOSPITALIST

## 2022-12-01 PROCEDURE — 25000003 PHARM REV CODE 250: Performed by: INTERNAL MEDICINE

## 2022-12-01 PROCEDURE — 94761 N-INVAS EAR/PLS OXIMETRY MLT: CPT

## 2022-12-01 PROCEDURE — 20000000 HC ICU ROOM

## 2022-12-01 PROCEDURE — C9113 INJ PANTOPRAZOLE SODIUM, VIA: HCPCS | Performed by: SURGERY

## 2022-12-01 RX ORDER — VANCOMYCIN HCL IN 5 % DEXTROSE 1G/250ML
1000 PLASTIC BAG, INJECTION (ML) INTRAVENOUS
Status: DISCONTINUED | OUTPATIENT
Start: 2022-12-02 | End: 2022-12-02

## 2022-12-01 RX ORDER — FUROSEMIDE 10 MG/ML
80 INJECTION INTRAMUSCULAR; INTRAVENOUS ONCE
Status: COMPLETED | OUTPATIENT
Start: 2022-12-01 | End: 2022-12-01

## 2022-12-01 RX ORDER — IBUPROFEN 200 MG
1 TABLET ORAL DAILY
Status: DISCONTINUED | OUTPATIENT
Start: 2022-12-01 | End: 2022-12-06 | Stop reason: HOSPADM

## 2022-12-01 RX ORDER — HALOPERIDOL 5 MG/ML
5 INJECTION INTRAMUSCULAR ONCE
Status: DISCONTINUED | OUTPATIENT
Start: 2022-12-01 | End: 2022-12-02

## 2022-12-01 RX ORDER — LORAZEPAM 2 MG/ML
0.5 INJECTION INTRAMUSCULAR ONCE
Status: DISCONTINUED | OUTPATIENT
Start: 2022-12-01 | End: 2022-12-02

## 2022-12-01 RX ORDER — QUETIAPINE FUMARATE 25 MG/1
25 TABLET, FILM COATED ORAL NIGHTLY
Status: DISCONTINUED | OUTPATIENT
Start: 2022-12-01 | End: 2022-12-06 | Stop reason: HOSPADM

## 2022-12-01 RX ORDER — OLANZAPINE 10 MG/2ML
5 INJECTION, POWDER, FOR SOLUTION INTRAMUSCULAR EVERY 8 HOURS PRN
Status: DISCONTINUED | OUTPATIENT
Start: 2022-12-01 | End: 2022-12-03

## 2022-12-01 RX ORDER — HALOPERIDOL 0.5 MG/1
2 TABLET ORAL EVERY 8 HOURS PRN
Status: DISCONTINUED | OUTPATIENT
Start: 2022-12-01 | End: 2022-12-06 | Stop reason: HOSPADM

## 2022-12-01 RX ADMIN — INSULIN ASPART 8 UNITS: 100 INJECTION, SOLUTION INTRAVENOUS; SUBCUTANEOUS at 11:12

## 2022-12-01 RX ADMIN — ASPIRIN 81 MG CHEWABLE TABLET 81 MG: 81 TABLET CHEWABLE at 09:12

## 2022-12-01 RX ADMIN — ATORVASTATIN CALCIUM 40 MG: 40 TABLET, FILM COATED ORAL at 09:12

## 2022-12-01 RX ADMIN — MUPIROCIN: 20 OINTMENT TOPICAL at 08:12

## 2022-12-01 RX ADMIN — DEXMEDETOMIDINE HYDROCHLORIDE 0.2 MCG/KG/HR: 4 INJECTION, SOLUTION INTRAVENOUS at 12:12

## 2022-12-01 RX ADMIN — INSULIN ASPART 3 UNITS: 100 INJECTION, SOLUTION INTRAVENOUS; SUBCUTANEOUS at 09:12

## 2022-12-01 RX ADMIN — SODIUM CHLORIDE 1000 ML: 0.9 INJECTION, SOLUTION INTRAVENOUS at 05:12

## 2022-12-01 RX ADMIN — PIPERACILLIN SODIUM, TAZOBACTAM SODIUM 4.5 G: 4; .5 INJECTION, POWDER, LYOPHILIZED, FOR SOLUTION INTRAVENOUS at 08:12

## 2022-12-01 RX ADMIN — INSULIN ASPART 2 UNITS: 100 INJECTION, SOLUTION INTRAVENOUS; SUBCUTANEOUS at 05:12

## 2022-12-01 RX ADMIN — CLOPIDOGREL BISULFATE 75 MG: 75 TABLET ORAL at 09:12

## 2022-12-01 RX ADMIN — PIPERACILLIN SODIUM, TAZOBACTAM SODIUM 4.5 G: 4; .5 INJECTION, POWDER, LYOPHILIZED, FOR SOLUTION INTRAVENOUS at 04:12

## 2022-12-01 RX ADMIN — METOPROLOL TARTRATE 25 MG: 25 TABLET, FILM COATED ORAL at 03:12

## 2022-12-01 RX ADMIN — TRAMADOL HYDROCHLORIDE 50 MG: 50 TABLET, COATED ORAL at 05:12

## 2022-12-01 RX ADMIN — Medication 1 PATCH: at 11:12

## 2022-12-01 RX ADMIN — VANCOMYCIN HYDROCHLORIDE 1000 MG: 1 INJECTION, POWDER, LYOPHILIZED, FOR SOLUTION INTRAVENOUS at 09:12

## 2022-12-01 RX ADMIN — FUROSEMIDE 80 MG: 10 INJECTION, SOLUTION INTRAMUSCULAR; INTRAVENOUS at 07:12

## 2022-12-01 RX ADMIN — MUPIROCIN: 20 OINTMENT TOPICAL at 09:12

## 2022-12-01 RX ADMIN — HALOPERIDOL 2 MG: 1 TABLET ORAL at 01:12

## 2022-12-01 RX ADMIN — QUETIAPINE FUMARATE 25 MG: 25 TABLET ORAL at 09:12

## 2022-12-01 RX ADMIN — PIPERACILLIN SODIUM, TAZOBACTAM SODIUM 4.5 G: 4; .5 INJECTION, POWDER, LYOPHILIZED, FOR SOLUTION INTRAVENOUS at 11:12

## 2022-12-01 RX ADMIN — INSULIN ASPART 6 UNITS: 100 INJECTION, SOLUTION INTRAVENOUS; SUBCUTANEOUS at 08:12

## 2022-12-01 RX ADMIN — PANTOPRAZOLE SODIUM 40 MG: 40 INJECTION, POWDER, FOR SOLUTION INTRAVENOUS at 08:12

## 2022-12-01 NOTE — PROGRESS NOTES
Pharmacokinetic Assessment Follow Up: IV Vancomycin    Vancomycin serum concentration assessment(s):    The trough level was drawn correctly and can be used to guide therapy at this time. The measurement is above the desired definitive target range of 10 to 20 mcg/mL.    Vancomycin Regimen Plan:    Change regimen to Vancomycin 1000 mg IV every 24 hours with next serum trough concentration measured at 0845 prior to 4th dose on 12/4/2022    Drug levels (last 3 results):  Recent Labs   Lab Result Units 12/01/22  0825   Vancomycin-Trough ug/mL 21.6       Pharmacy will continue to follow and monitor vancomycin.    Please contact pharmacy at extension [phone] for questions regarding this assessment.    Thank you for the consult,   Jace Schuler Jr       Patient brief summary:  Carlos Cifuentes Jr. is a 61 y.o. male initiated on antimicrobial therapy with IV Vancomycin for treatment of bacteremia    Drug Allergies:   Review of patient's allergies indicates:   Allergen Reactions    Codeine Rash    Lyrica [pregabalin]      Feet turned Red    Vicodin [hydrocodone-acetaminophen] Rash       Actual Body Weight:   106.4 kg    Renal Function:   Estimated Creatinine Clearance: 55.6 mL/min (A) (based on SCr of 1.7 mg/dL (H)).,     Dialysis Method (if applicable):  N/A    CBC (last 72 hours):  Recent Labs   Lab Result Units 11/28/22 2308 11/29/22 0340 11/30/22  0401 12/01/22  0328   WBC K/uL 13.42* 5.67 5.41 4.59   Hemoglobin g/dL 11.6* 9.7* 8.9* 8.1*   Hemoglobin A1C % 10.9*  --   --   --    Hematocrit % 31.7* 26.1* 24.1* 22.9*   Platelets K/uL 201 122* 118* 96*   Gran % % 48.2 74.8* 71.5 65.6   Lymph % % 41.1 16.9* 20.3 24.8   Mono % % 5.7 5.8 5.7 6.8   Eosinophil % % 3.4 1.4 1.7 1.7   Basophil % % 0.6 0.4 0.2 0.4   Differential Method  Automated Automated Automated Automated       Metabolic Panel (last 72 hours):  Recent Labs   Lab Result Units 11/28/22  2308 11/28/22  2312 11/28/22  2320 11/29/22  0340 11/30/22  0406  12/01/22  0328   Sodium mmol/L 135*  --   --  134* 132* 131*   Potassium mmol/L 3.8  --   --  4.6 4.4 4.5   Chloride mmol/L 103  --   --  106 101 103   CO2 mmol/L 19*  --   --  20* 22* 22*   Glucose mg/dL 445*  --   --  269* 221* 253*   Glucose, UA   --   --  4+*  --   --   --    BUN mg/dL 26*  --   --  24* 28* 36*   Creatinine mg/dL 1.8*  --   --  1.4 1.5* 1.7*   Creatinine, Urine mg/dL  --  108.6  --   --   --   --    Albumin g/dL 3.5  --   --  3.0* 2.7* 2.6*   Total Bilirubin mg/dL 0.6  --   --  0.5 0.7 1.0   Alkaline Phosphatase U/L 77  --   --  50* 47* 44*   AST U/L 28  --   --  22 17 14   ALT U/L 23  --   --  20 16 14   Magnesium mg/dL 1.8  --   --  1.7 1.7 1.8   Phosphorus mg/dL 3.5  --   --  2.5*  --   --        Vancomycin Administrations:  vancomycin given in the last 96 hours                     vancomycin in dextrose 5 % 1 gram/250 mL IVPB 1,000 mg (mg) 1,000 mg New Bag 12/01/22 0934      Restarted 11/30/22 2144     1,000 mg New Bag  2133     1,000 mg New Bag  0913    vancomycin (VANCOCIN) 2,000 mg in dextrose 5 % 500 mL IVPB (mg) 2,000 mg New Bag 11/29/22 2133                    Microbiologic Results:  Microbiology Results (last 7 days)       Procedure Component Value Units Date/Time    Blood culture #2 **CANNOT BE ORDERED STAT** [[id number]]  (Abnormal) Collected: 11/28/22 2324    Order Status: Completed Specimen: Blood from Peripheral, Antecubital, Left Updated: 12/01/22 1002     Blood Culture, Routine Gram stain domitila bottle: Gram positive cocci in clusters resembling Staph      Results called to and read back by:kinsey moore 11/29/2022      19:20      Gram stain aer bottle: Gram positive cocci in clusters resembling Staph      Positive results previously called 11/30/2022  14:35      COAGULASE-NEGATIVE STAPHYLOCOCCUS SPECIES  Identification and susceptibility pending      Culture, Respiratory with Gram Stain [[id number]] Collected: 11/29/22 1104    Order Status: Completed Specimen: Respiratory  from Endotracheal Aspirate Updated: 12/01/22 0948     Respiratory Culture Normal respiratory doretha     Gram Stain (Respiratory) Many Gram positive cocci in chains     Gram Stain (Respiratory) Moderate WBC's    Blood culture #1 **CANNOT BE ORDERED STAT** [[id number]] Collected: 11/28/22 2308    Order Status: Completed Specimen: Blood from Peripheral, Antecubital, Right Updated: 12/01/22 0303     Blood Culture, Routine No Growth to date      No Growth to date      No Growth to date    Blood culture [[id number]] Collected: 11/30/22 1110    Order Status: Completed Specimen: Blood from Peripheral, Left Hand Updated: 11/30/22 1912     Blood Culture, Routine No Growth to date    Narrative:      Blood cultures from 2 different sites. 4 bottles total.  Please draw before starting antibiotics.    Blood culture [[id number]] Collected: 11/30/22 1110    Order Status: Completed Specimen: Blood from Peripheral, Left Wrist Updated: 11/30/22 1912     Blood Culture, Routine No Growth to date    Narrative:      Blood cultures x 2 different sites. 4 bottles total. Please  draw cultures before administering antibiotics.

## 2022-12-01 NOTE — ASSESSMENT & PLAN NOTE
Patient's hemoglobin has dropped over the past 3 days.  No obvious evidence of bleeding.  Will get CT to rule out retroperitoneal bleed post LHC.  Closely monitor.  Transfuse if Hgb<7

## 2022-12-01 NOTE — ASSESSMENT & PLAN NOTE
Coag negative Staph on BCX.  Most likely contaminant, but patient is febrile.  Empirically started on Vanc and Zosyn.  Possible aspiration pneumonia.  Repeat BCx negative.

## 2022-12-01 NOTE — SUBJECTIVE & OBJECTIVE
Interval History: very anxious and restless.  Hypotensive overnight.    Review of Systems   HENT:  Negative for ear discharge and ear pain.    Eyes:  Negative for discharge.   Endocrine: Negative for cold intolerance and heat intolerance.   Neurological:  Negative for seizures and syncope.   Objective:     Vital Signs (Most Recent):  Temp: 98.6 °F (37 °C) (12/01/22 1530)  Pulse: 82 (12/01/22 1542)  Resp: (!) 29 (12/01/22 1500)  BP: 134/63 (12/01/22 1542)  SpO2: (!) 93 % (12/01/22 1500)   Vital Signs (24h Range):  Temp:  [97.7 °F (36.5 °C)-98.9 °F (37.2 °C)] 98.6 °F (37 °C)  Pulse:  [52-91] 82  Resp:  [12-35] 29  SpO2:  [90 %-100 %] 93 %  BP: ()/(43-67) 134/63     Weight: 106.4 kg (234 lb 9.1 oz)  Body mass index is 34.64 kg/m².    Intake/Output Summary (Last 24 hours) at 12/1/2022 1544  Last data filed at 12/1/2022 1500  Gross per 24 hour   Intake 1682.42 ml   Output 1010 ml   Net 672.42 ml      Physical Exam  Vitals and nursing note reviewed.   Constitutional:       General: He is not in acute distress.     Appearance: He is obese. He is ill-appearing. He is not toxic-appearing.      Interventions: He is sedated and restrained.   HENT:      Head: Normocephalic and atraumatic.      Nose: Nose normal. No rhinorrhea.   Eyes:      Pupils: Pupils are equal, round, and reactive to light.   Cardiovascular:      Rate and Rhythm: Normal rate.      Pulses: Normal pulses.      Heart sounds: No murmur heard.  Pulmonary:      Effort: No respiratory distress.      Breath sounds: No wheezing, rhonchi or rales.   Abdominal:      General: Abdomen is flat. There is no distension.      Tenderness: There is no abdominal tenderness.      Comments: Abdominal wound dehiscence.  No erythema or drainage   Genitourinary:     Comments: Bhatt in place  Musculoskeletal:         General: No deformity. Normal range of motion.      Cervical back: No rigidity.      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      Left Lower  Extremity: Left leg is amputated below knee.   Skin:     General: Skin is dry.      Capillary Refill: Capillary refill takes less than 2 seconds.   Neurological:      Mental Status: He is easily aroused.      Comments: PERRL. Follows commands.    Psychiatric:         Attention and Perception: He is inattentive.         Mood and Affect: Mood is anxious.         Behavior: Behavior is uncooperative.       Significant Labs: All pertinent labs within the past 24 hours have been reviewed.  BMP:   Recent Labs   Lab 12/01/22  0328   *   *   K 4.5      CO2 22*   BUN 36*   CREATININE 1.7*   CALCIUM 8.0*   MG 1.8     CBC:   Recent Labs   Lab 11/30/22  0401 12/01/22  0328   WBC 5.41 4.59   HGB 8.9* 8.1*   HCT 24.1* 22.9*   * 96*       Significant Imaging: I have reviewed all pertinent imaging results/findings within the past 24 hours.

## 2022-12-01 NOTE — NURSING
Called eICU to report patient being dusky/pale in appearance, shallow breaths, clammy, swollen lip. . VSS. Precedex has been reduced to 0.4mcg/kg/hr from 0.8mcg/kg/hr. MD to order ABG. Blood glucose checked and was 264. Temp 98.6. Pt OE but does not follow commands.  No acute distress noted @ this time . Will monitor closely. MD aware.

## 2022-12-01 NOTE — ASSESSMENT & PLAN NOTE
· Started on ARB and B blocker.  · Continue as tolerated  · Episodes of hypotension overnight that responded to IVF's.

## 2022-12-01 NOTE — ASSESSMENT & PLAN NOTE
"· Patient had chest pain that radiated to his arm with elevated troponin  · No ST elevation on EKG  · Initial troponin was the peak at 4.3, down trended from there to 3.5  · Started ASA, Plavix, Statin and Heparin drip.  Cardiology consulted.  [facility] showing "LAD 90% mid - diffuse severe mid-distal disease, Cx- OM1 large - multiple 70-80% mid lesions with diffuse disease, RCA 50% mid, PDA 80% proximal - diffuse distal disease, PL 80%"  Cards recommending medical management.  Continue ASA, Plavix and Statin.  "

## 2022-12-01 NOTE — ASSESSMENT & PLAN NOTE
Patient's FSGs are uncontrolled due to hyperglycemia on current medication regimen.  Last A1c reviewed-   Lab Results   Component Value Date    LABA1C 11.8 (H) 06/13/2014    HGBA1C 10.9 (H) 11/28/2022     Most recent fingerstick glucose reviewed-   Recent Labs   Lab 11/30/22  1644 11/30/22  1927 12/01/22  0759 12/01/22  1115   POCTGLUCOSE 321* 264* 255* 304*       Antihyperglycemics (From admission, onward)    Start     Stop Route Frequency Ordered    12/01/22 2100  insulin detemir U-100 pen 10 Units         -- SubQ Nightly 12/01/22 1550    11/30/22 1410  insulin aspart U-100 pen 1-10 Units         -- SubQ Before meals & nightly PRN 11/30/22 1310        · Hold Oral hypoglycemics while patient is in the hospital.  SSI and q4h testing

## 2022-12-01 NOTE — EICU
Patient noted to be poorly responsive,diaphoretic and swollen upper lip.    On camera assessment appears drowsy but awakens and has eye contact attempting to follow commands on precedex 0.5mcg/kg/hour  CT 63,SPO2 96% on NC4L/minute,RR21,/50.    Glucose 264.  Post extubation today 10 hours ago.    Plan:  Check ABG.

## 2022-12-01 NOTE — NURSING
[facility]  ICU Shift Summary  Date: 12/1/2022      Prehospitalization: Home  Admit Date / LOS : 11/28/2022/ 2 days    Diagnosis: NSTEMI, initial episode of care    Consults:        Active: Cardio       Needed: N/A     Code Status: Full Code   Advanced Directive: Received    LDA:  Lines/Drains/Airways       Peripheral Intravenous Line  Duration                  Peripheral IV - Single Lumen 11/29/22 20 G Anterior;Right Wrist 2 days         Peripheral IV - Single Lumen 11/30/22 2200 20 G Right Forearm <1 day                  Central Lines/Site/Justification:Patient Does Not Have Central Line  Urinary Cath/Order/Justification:Patient Does Not Have Urinary Catheter    Vasopressors/Infusions:    dexmedetomidine (PRECEDEX) infusion Stopped (12/01/22 1533)          GOALS: Volume/ Hemodynamic: N/A                     RASS: 0  alert and calm    Pain Management: PO       Pain Controlled: yes     Rhythm: NSR    Respiratory Device: Room Air                  Most Recent SBT/ SAT: N/A       MOVE Screen: FAIL  ICU Liberation: no    VTE Prophylaxis: Pharm  Mobility: Bedrest  Stress Ulcer Prophylaxis: Yes    Isolation: No active isolations    Dietary:   Current Diet Order   Procedures    Diet diabetic [facility]; 2000 Calorie; Cardiac (Low Na/Chol), Dysphagia Mechanical Soft (IDDSI Level 5)     Order Specific Question:   Indicate patient location for additional diet options:     Answer:   [facility]     Order Specific Question:   Total calories:     Answer:   2000 Calorie     Order Specific Question:   Additional Diet Options:     Answer:   Cardiac (Low Na/Chol)     Order Specific Question:   Additional Diet Options:     Answer:   Dysphagia Mechanical Soft (IDDSI Level 5)      Tolerance: yes  Advancement: no    I & O (24h):    Intake/Output Summary (Last 24 hours) at 12/1/2022 1550  Last data filed at 12/1/2022 1544  Gross per 24 hour   Intake 1993.01 ml   Output 1010 ml   Net 983.01 ml        Restraints:  No    Significant Dates:  Post Op Date: N/A  Rescue Date: N/A  Imaging/ Diagnostics: N/A    Noteworthy Labs:  none    COVID Test: (--)  CBC/Anemia Labs: Coags:    Recent Labs   Lab 11/28/22 2308 11/29/22 0340 11/30/22 0401 12/01/22  0328   WBC 13.42*   < > 5.41 4.59   HGB 11.6*   < > 8.9* 8.1*   HCT 31.7*   < > 24.1* 22.9*      < > 118* 96*   *   < > 119* 119*   RDW 14.5   < > 14.4 13.7   FOLATE 16.0  --   --   --    [id number] <148*  --   --   --     < > = values in this interval not displayed.    Recent Labs   Lab 11/28/22 2308 11/29/22 0340 11/29/22  0612   INR 1.0  --   --    APTT 25.4 35.7* 25.4        Chemistries:   Recent Labs   Lab 11/28/22 2308 11/29/22 0340 11/30/22  0401 12/01/22  0328   * 134* 132* 131*   K 3.8 4.6 4.4 4.5    106 101 103   CO2 19* 20* 22* 22*   BUN 26* 24* 28* 36*   CREATININE 1.8* 1.4 1.5* 1.7*   CALCIUM 8.8 8.3* 8.3* 8.0*   PROT 7.4 6.2 5.8* 5.7*   BILITOT 0.6 0.5 0.7 1.0   ALKPHOS 77 50* 47* 44*   ALT 23 20 16 14   AST 28 22 17 14   MG 1.8 1.7 1.7 1.8   PHOS 3.5 2.5*  --   --         Cardiac Enzymes: Ejection Fractions:    Recent Labs     11/29/22 0340 11/29/22  0906   TROPONINI 3.528* 3.615*    EF   Date Value Ref Range Status   11/29/2022 40 % Final        POCT Glucose: HbA1c:    Recent Labs   Lab 11/30/22  1927 12/01/22  0759 12/01/22  1115   POCTGLUCOSE 264* 255* 304*    Hemoglobin A1C   Date Value Ref Range Status   11/28/2022 10.9 (H) 4.0 - 5.6 % Final     Comment:     ADA Screening Guidelines:  5.7-6.4%  Consistent with prediabetes  >or=6.5%  Consistent with diabetes    High levels of fetal hemoglobin interfere with the HbA1C  assay. Heterozygous hemoglobin variants (HbS, HgC, etc)do  not significantly interfere with this assay.   However, presence of multiple variants may affect accuracy.             ICU LOS 2d 14h  Level of Care: Critical Care    Chart Check: 12 HR Done  Shift Summary/Plan for the shift: pt remains in ICU, oriented x4,  follows commands x4. Bilateral BKA. Pt off precedex, PRN medication ordered. Pt intermittently has panic attacks, sister at bedside able to calm/ease pt. BP stable throughout shift, in NSR. Denies SOB/chest pain. On 4LNC. Tolerating PO intake. Bhatt removed. Pt able to reposition self in bed. Bed alarm set. Pt and pt sister updated on plan of care. Informed pt that MD ordered CT scan. Pt refusing despite education provided on importance of CT scan and medication available to ease anxiety. MD informed. Call light within reach

## 2022-12-01 NOTE — PROGRESS NOTES
[facility] Medicine  Progress Note    Patient Name: Carlos Cifuentes Jr.  MRN: [id number]  Patient Class: IP- Inpatient   Admission Date: 11/28/2022  Length of Stay: 2 days  Attending Physician: Pranay Michaels MD  Primary Care Provider: Miguel Lux MD        Subjective:     Principal Problem:NSTEMI, initial episode of care        HPI:    Carlos Cifuentes Jr. is a 61 y.o. male who has a past medical history of Arthritis, Back pain, Bulging lumbar disc, C. difficile diarrhea, Chronic back pain, Diabetes mellitus, Diabetes mellitus type II, DM, type 2, uncontrolled, Hepatitis C, MSSA septicemia, Neuromuscular disorder, Neuropathy, and Staph aureus infection, presented to the ED with CC of SOB, abdominal pain, nausea with vomiting, and Headache.  Patient was intubated upon arrival to ED after receiving 2 mg of IV Ativan for anxiety, as he became obtunded and had difficulty breathing; therefore, HPI from EMS and ED report:     Initial EMS call was for abdominal pain with vomiting and hyperglycemia.  EMS found patient with vomiting but also anxiety. EMS said that patient would not allow them to check vitals, so EMS administered IV Ativan 2 mg, and patient then became obtunded and had difficulty breathing.  EMS reports  and /93 s/p ativan.  EMS states patient administered SC insulin 28 units prior to their arrival, but it is unclear which insulin this is. Patient's sister arrived to ER later and supplemented HPI.  She reports that patient had substernal acid reflux like chest pain with radiation to his left arm yesterday Sunday 11/27/22 and went to the ER at [facility].  However he was in the lobby for several hours and was not seen.  He then left. His chest pain recurred tonight Monday 11/28/22, he called his sister to tell her that he felt bad and was short of breath.  She urged to call 911, which he did.  She suspects that patient became anxious and agitated when EMS strapped him  to their gurney, as patient has a history of claustrophobia relating to a childhood incident in which he was papoosed to get staples in his scalp.    In the ED, patient was intubated and sedated with propofol.  His blood pressure was still 200/100, despite propofol use.  His troponin was 4 without ST elevation and lactic acid 4.9, he was started on NSTEMI protocol with heparin drip and given fluids.  Mild reactive leukocytosis corrected with fluids only.        Overview/Hospital Course:  62 y/o male admitted with acute respiratory failure on vent.  Troponin elevated and consistent with NSTEMI.  Started on ASA, Plavix, Statin and Heparin drip.  Pulmonary consulted for vent management.  LHC on 11/29 showing LAD 90% mid - diffuse severe mid-distal disease, Cx- OM1 large - multiple 70-80% mid lesions with diffuse disease, RCA 50% mid, PDA 80% proximal - diffuse distal disease, PL 80%.  Cardiology recommending medical management.  Patient febrile and empirically started on ABX's.  Coag negative Staph on BCx possible contaminant.  Patient extubated on 11/30.  Did well oxygenation wise, but with significant anxiety and restlessness.  Placed on Precedex drip.  Also with episodes of hypotension that responded to IVF's.      Interval History: very anxious and restless.  Hypotensive overnight.    Review of Systems   HENT:  Negative for ear discharge and ear pain.    Eyes:  Negative for discharge.   Endocrine: Negative for cold intolerance and heat intolerance.   Neurological:  Negative for seizures and syncope.   Objective:     Vital Signs (Most Recent):  Temp: 98.6 °F (37 °C) (12/01/22 1530)  Pulse: 82 (12/01/22 1542)  Resp: (!) 29 (12/01/22 1500)  BP: 134/63 (12/01/22 1542)  SpO2: (!) 93 % (12/01/22 1500)   Vital Signs (24h Range):  Temp:  [97.7 °F (36.5 °C)-98.9 °F (37.2 °C)] 98.6 °F (37 °C)  Pulse:  [52-91] 82  Resp:  [12-35] 29  SpO2:  [90 %-100 %] 93 %  BP: ()/(43-67) 134/63     Weight: 106.4 kg (234 lb 9.1  oz)  Body mass index is 34.64 kg/m².    Intake/Output Summary (Last 24 hours) at 12/1/2022 1544  Last data filed at 12/1/2022 1500  Gross per 24 hour   Intake 1682.42 ml   Output 1010 ml   Net 672.42 ml      Physical Exam  Vitals and nursing note reviewed.   Constitutional:       General: He is not in acute distress.     Appearance: He is obese. He is ill-appearing. He is not toxic-appearing.      Interventions: He is sedated and restrained.   HENT:      Head: Normocephalic and atraumatic.      Nose: Nose normal. No rhinorrhea.   Eyes:      Pupils: Pupils are equal, round, and reactive to light.   Cardiovascular:      Rate and Rhythm: Normal rate.      Pulses: Normal pulses.      Heart sounds: No murmur heard.  Pulmonary:      Effort: No respiratory distress.      Breath sounds: No wheezing, rhonchi or rales.   Abdominal:      General: Abdomen is flat. There is no distension.      Tenderness: There is no abdominal tenderness.      Comments: Abdominal wound dehiscence.  No erythema or drainage   Genitourinary:     Comments: Bhatt in place  Musculoskeletal:         General: No deformity. Normal range of motion.      Cervical back: No rigidity.      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      Left Lower Extremity: Left leg is amputated below knee.   Skin:     General: Skin is dry.      Capillary Refill: Capillary refill takes less than 2 seconds.   Neurological:      Mental Status: He is easily aroused.      Comments: PERRL. Follows commands.    Psychiatric:         Attention and Perception: He is inattentive.         Mood and Affect: Mood is anxious.         Behavior: Behavior is uncooperative.       Significant Labs: All pertinent labs within the past 24 hours have been reviewed.  BMP:   Recent Labs   Lab 12/01/22  0328   *   *   K 4.5      CO2 22*   BUN 36*   CREATININE 1.7*   CALCIUM 8.0*   MG 1.8     CBC:   Recent Labs   Lab 11/30/22  0401 12/01/22  0328   WBC 5.41 4.59   HGB  "8.9* 8.1*   HCT 24.1* 22.9*   * 96*       Significant Imaging: I have reviewed all pertinent imaging results/findings within the past 24 hours.      Assessment/Plan:      * NSTEMI, initial episode of care  · Patient had chest pain that radiated to his arm with elevated troponin  · No ST elevation on EKG  · Initial troponin was the peak at 4.3, down trended from there to 3.5  · Started ASA, Plavix, Statin and Heparin drip.  Cardiology consulted.  [facility] showing "LAD 90% mid - diffuse severe mid-distal disease, Cx- OM1 large - multiple 70-80% mid lesions with diffuse disease, RCA 50% mid, PDA 80% proximal - diffuse distal disease, PL 80%"  Cards recommending medical management.  Continue ASA, Plavix and Statin.    Anxiety  Patient with significant anxiety and restlessness post extubation.  Started on Precedex drip.  Add nightly Seroquel.  Will try to avoid benzo's    Acute combined systolic and diastolic congestive heart failure  Currently seems euvolemic.      Acute hypoxemic respiratory failure  Patient with Hypoxic Respiratory failure which is Acute.  he is not on home oxygen. Supplemental oxygen was provided and noted-      .     Signs/symptoms of respiratory failure include- tachypnea, increased work of breathing, respiratory distress, use of accessory muscles and lethargy. Contributing diagnoses includes - CHF Labs and images were reviewed. Patient Has recent ABG, which has been reviewed. Will treat underlying causes and adjust management of respiratory failure as follows-     Potentially a primary ACS event with significant anxiety requiring IV benzo, which lead to respiratory depression resulting in Intubation.   · Intubated and Sedated  · Propofol and Fentanyl  · Pulmonary following.  · Doing well on SBT and should get extubated on 11/30.  · Continue O2 per NC.    Wound dehiscence  Wound care      Essential hypertension  · Started on ARB and B blocker.  · Continue as tolerated  · Episodes of hypotension " overnight that responded to IVF's.    CKD stage 3 due to type 2 diabetes mellitus  Patient's FSGs are uncontrolled due to hyperglycemia on current medication regimen.  Last A1c reviewed-   Lab Results   Component Value Date    LABA1C 11.8 (H) 06/13/2014    HGBA1C 10.9 (H) 11/28/2022     Most recent fingerstick glucose reviewed-   Recent Labs   Lab 11/30/22  1644 11/30/22  1927 12/01/22  0759 12/01/22  1115   POCTGLUCOSE 321* 264* 255* 304*       Antihyperglycemics (From admission, onward)    Start     Stop Route Frequency Ordered    12/01/22 2100  insulin detemir U-100 pen 10 Units         -- SubQ Nightly 12/01/22 1550    11/30/22 1410  insulin aspart U-100 pen 1-10 Units         -- SubQ Before meals & nightly PRN 11/30/22 1310        · Hold Oral hypoglycemics while patient is in the hospital.  SSI and q4h testing    Anemia of chronic disorder  Patient's hemoglobin has dropped over the past 3 days.  No obvious evidence of bleeding.  Will get CT to rule out retroperitoneal bleed post LHC.  Closely monitor.  Transfuse if Hgb<7      Bacteremia  Coag negative Staph on BCX.  Most likely contaminant, but patient is febrile.  Empirically started on Vanc and Zosyn.  Possible aspiration pneumonia.  Repeat BCx negative.      Type II diabetes mellitus with neurological manifestations  Continue insulin sliding scale.  Has remained hyperglycemic.  Increase nightly Levemir.      VTE Risk Mitigation (From admission, onward)         Ordered     Reason for No Pharmacological VTE Prophylaxis  Once        Question:  Reasons:  Answer:  Physician Provided (leave comment)  Comment:  On heparin drip    11/29/22 0243     IP VTE HIGH RISK PATIENT  Once         11/29/22 0243     Place sequential compression device  Until discontinued         11/29/22 0243                Discharge Planning   BEBA: 12/4/2022     Code Status: Full Code   Is the patient medically ready for discharge?:     Reason for patient still in hospital (select all that  apply): Patient trending condition  Discharge Plan A:  (tbd)            Critical care time spent on the evaluation and treatment of severe organ dysfunction, review of pertinent labs and imaging studies, discussions with consulting providers and discussions with patient/family: 40 minutes.      Pranay Michaels MD  Department of Hospital Medicine   [facility] [place] - Intensive Care

## 2022-12-01 NOTE — CONSULTS
Inpatient consult to Cardiology  Consult performed by: Kevin Cooley MD  Consult ordered by: Robert Partida MD    See Dr. Harding note 11/29/2022.

## 2022-12-01 NOTE — PLAN OF CARE
Problem: Adult Inpatient Plan of Care  Goal: Plan of Care Review  Outcome: Ongoing, Progressing  Goal: Patient-Specific Goal (Individualized)  Outcome: Ongoing, Progressing  Goal: Absence of Hospital-Acquired Illness or Injury  Outcome: Ongoing, Progressing  Goal: Optimal Comfort and Wellbeing  Outcome: Ongoing, Progressing  Goal: Readiness for Transition of Care  Outcome: Ongoing, Progressing     Problem: Fall Injury Risk  Goal: Absence of Fall and Fall-Related Injury  Outcome: Ongoing, Progressing     Problem: Diabetes Comorbidity  Goal: Blood Glucose Level Within Targeted Range  Outcome: Ongoing, Progressing     Problem: Infection  Goal: Absence of Infection Signs and Symptoms  Outcome: Ongoing, Progressing     Problem: Skin and Tissue Injury (Mechanical Ventilation, Invasive)  Goal: Absence of Device-Related Skin and Tissue Injury  Outcome: Ongoing, Progressing

## 2022-12-01 NOTE — ASSESSMENT & PLAN NOTE
Patient with significant anxiety and restlessness post extubation.  Started on Precedex drip.  Add nightly Seroquel.  Will try to avoid benzo's

## 2022-12-01 NOTE — PLAN OF CARE
0992-Called eICU to report low blood pressure. Urine concentrated, minimal PO intake x2 days, and minimal IV intake. 1L bolus given

## 2022-12-01 NOTE — ASSESSMENT & PLAN NOTE
Patient with Hypoxic Respiratory failure which is Acute.  he is not on home oxygen. Supplemental oxygen was provided and noted-      .     Signs/symptoms of respiratory failure include- tachypnea, increased work of breathing, respiratory distress, use of accessory muscles and lethargy. Contributing diagnoses includes - CHF Labs and images were reviewed. Patient Has recent ABG, which has been reviewed. Will treat underlying causes and adjust management of respiratory failure as follows-     Potentially a primary ACS event with significant anxiety requiring IV benzo, which lead to respiratory depression resulting in Intubation.   · Intubated and Sedated  · Propofol and Fentanyl  · Pulmonary following.  · Doing well on SBT and should get extubated on 11/30.  · Continue O2 per NC.

## 2022-12-02 LAB
ALBUMIN SERPL BCP-MCNC: 2.6 G/DL (ref 3.5–5.2)
ALP SERPL-CCNC: 61 U/L (ref 55–135)
ALT SERPL W/O P-5'-P-CCNC: 14 U/L (ref 10–44)
ANION GAP SERPL CALC-SCNC: 11 MMOL/L (ref 8–16)
AST SERPL-CCNC: 20 U/L (ref 10–40)
BACTERIA BLD CULT: ABNORMAL
BACTERIA SPEC AEROBE CULT: NORMAL
BASOPHILS # BLD AUTO: 0.01 K/UL (ref 0–0.2)
BASOPHILS NFR BLD: 0.2 % (ref 0–1.9)
BILIRUB SERPL-MCNC: 0.7 MG/DL (ref 0.1–1)
BUN SERPL-MCNC: 41 MG/DL (ref 6–20)
CALCIUM SERPL-MCNC: 7.8 MG/DL (ref 8.7–10.5)
CHLORIDE SERPL-SCNC: 102 MMOL/L (ref 95–110)
CO2 SERPL-SCNC: 16 MMOL/L (ref 23–29)
CREAT SERPL-MCNC: 1.7 MG/DL (ref 0.5–1.4)
DIFFERENTIAL METHOD: ABNORMAL
EOSINOPHIL # BLD AUTO: 0 K/UL (ref 0–0.5)
EOSINOPHIL NFR BLD: 0.7 % (ref 0–8)
ERYTHROCYTE [DISTWIDTH] IN BLOOD BY AUTOMATED COUNT: 13.6 % (ref 11.5–14.5)
EST. GFR  (NO RACE VARIABLE): 46 ML/MIN/1.73 M^2
GLUCOSE SERPL-MCNC: 261 MG/DL (ref 70–110)
GRAM STN SPEC: NORMAL
GRAM STN SPEC: NORMAL
HCT VFR BLD AUTO: 21 % (ref 40–54)
HGB BLD-MCNC: 7.8 G/DL (ref 14–18)
IMM GRANULOCYTES # BLD AUTO: 0.02 K/UL (ref 0–0.04)
IMM GRANULOCYTES NFR BLD AUTO: 0.5 % (ref 0–0.5)
LYMPHOCYTES # BLD AUTO: 0.5 K/UL (ref 1–4.8)
LYMPHOCYTES NFR BLD: 12 % (ref 18–48)
MAGNESIUM SERPL-MCNC: 1.8 MG/DL (ref 1.6–2.6)
MCH RBC QN AUTO: 43.8 PG (ref 27–31)
MCHC RBC AUTO-ENTMCNC: 37.1 G/DL (ref 32–36)
MCV RBC AUTO: 118 FL (ref 82–98)
MONOCYTES # BLD AUTO: 0.2 K/UL (ref 0.3–1)
MONOCYTES NFR BLD: 3.8 % (ref 4–15)
NEUTROPHILS # BLD AUTO: 3.7 K/UL (ref 1.8–7.7)
NEUTROPHILS NFR BLD: 82.8 % (ref 38–73)
NRBC BLD-RTO: 0 /100 WBC
PLATELET # BLD AUTO: 108 K/UL (ref 150–450)
PMV BLD AUTO: 10.6 FL (ref 9.2–12.9)
POCT GLUCOSE: 175 MG/DL (ref 70–110)
POCT GLUCOSE: 227 MG/DL (ref 70–110)
POCT GLUCOSE: 234 MG/DL (ref 70–110)
POTASSIUM SERPL-SCNC: 4.8 MMOL/L (ref 3.5–5.1)
PROT SERPL-MCNC: 6 G/DL (ref 6–8.4)
RBC # BLD AUTO: 1.78 M/UL (ref 4.6–6.2)
SODIUM SERPL-SCNC: 129 MMOL/L (ref 136–145)
WBC # BLD AUTO: 4.42 K/UL (ref 3.9–12.7)

## 2022-12-02 PROCEDURE — 63600175 PHARM REV CODE 636 W HCPCS: Performed by: HOSPITALIST

## 2022-12-02 PROCEDURE — 94761 N-INVAS EAR/PLS OXIMETRY MLT: CPT

## 2022-12-02 PROCEDURE — 25000003 PHARM REV CODE 250: Performed by: STUDENT IN AN ORGANIZED HEALTH CARE EDUCATION/TRAINING PROGRAM

## 2022-12-02 PROCEDURE — C9113 INJ PANTOPRAZOLE SODIUM, VIA: HCPCS | Performed by: SURGERY

## 2022-12-02 PROCEDURE — 63600175 PHARM REV CODE 636 W HCPCS: Performed by: SURGERY

## 2022-12-02 PROCEDURE — 25000003 PHARM REV CODE 250: Performed by: HOSPITALIST

## 2022-12-02 PROCEDURE — C9113 INJ PANTOPRAZOLE SODIUM, VIA: HCPCS | Performed by: HOSPITALIST

## 2022-12-02 PROCEDURE — 27000221 HC OXYGEN, UP TO 24 HOURS

## 2022-12-02 PROCEDURE — S0166 INJ OLANZAPINE 2.5MG: HCPCS | Performed by: HOSPITALIST

## 2022-12-02 PROCEDURE — 80053 COMPREHEN METABOLIC PANEL: CPT | Performed by: STUDENT IN AN ORGANIZED HEALTH CARE EDUCATION/TRAINING PROGRAM

## 2022-12-02 PROCEDURE — S4991 NICOTINE PATCH NONLEGEND: HCPCS | Performed by: HOSPITALIST

## 2022-12-02 PROCEDURE — G0378 HOSPITAL OBSERVATION PER HR: HCPCS

## 2022-12-02 PROCEDURE — 99900035 HC TECH TIME PER 15 MIN (STAT)

## 2022-12-02 PROCEDURE — 25000003 PHARM REV CODE 250: Performed by: NURSE PRACTITIONER

## 2022-12-02 PROCEDURE — 20000000 HC ICU ROOM

## 2022-12-02 PROCEDURE — 36415 COLL VENOUS BLD VENIPUNCTURE: CPT | Performed by: STUDENT IN AN ORGANIZED HEALTH CARE EDUCATION/TRAINING PROGRAM

## 2022-12-02 PROCEDURE — 85025 COMPLETE CBC W/AUTO DIFF WBC: CPT | Performed by: STUDENT IN AN ORGANIZED HEALTH CARE EDUCATION/TRAINING PROGRAM

## 2022-12-02 PROCEDURE — 83735 ASSAY OF MAGNESIUM: CPT | Performed by: STUDENT IN AN ORGANIZED HEALTH CARE EDUCATION/TRAINING PROGRAM

## 2022-12-02 PROCEDURE — 63600175 PHARM REV CODE 636 W HCPCS: Performed by: NURSE PRACTITIONER

## 2022-12-02 RX ORDER — FUROSEMIDE 20 MG/1
20 TABLET ORAL DAILY
Status: DISCONTINUED | OUTPATIENT
Start: 2022-12-02 | End: 2022-12-06 | Stop reason: HOSPADM

## 2022-12-02 RX ORDER — NAPROXEN SODIUM 220 MG/1
81 TABLET, FILM COATED ORAL DAILY
Status: DISCONTINUED | OUTPATIENT
Start: 2022-12-03 | End: 2022-12-06 | Stop reason: HOSPADM

## 2022-12-02 RX ORDER — ATORVASTATIN CALCIUM 40 MG/1
40 TABLET, FILM COATED ORAL DAILY
Status: DISCONTINUED | OUTPATIENT
Start: 2022-12-03 | End: 2022-12-06 | Stop reason: HOSPADM

## 2022-12-02 RX ORDER — METOPROLOL TARTRATE 25 MG/1
12.5 TABLET ORAL 2 TIMES DAILY
Status: DISCONTINUED | OUTPATIENT
Start: 2022-12-02 | End: 2022-12-03

## 2022-12-02 RX ORDER — METOPROLOL TARTRATE 25 MG/1
12.5 TABLET ORAL 2 TIMES DAILY
Status: DISCONTINUED | OUTPATIENT
Start: 2022-12-02 | End: 2022-12-02

## 2022-12-02 RX ORDER — FUROSEMIDE 20 MG/1
20 TABLET ORAL DAILY
Status: DISCONTINUED | OUTPATIENT
Start: 2022-12-02 | End: 2022-12-02

## 2022-12-02 RX ORDER — CLOPIDOGREL BISULFATE 75 MG/1
75 TABLET ORAL DAILY
Status: DISCONTINUED | OUTPATIENT
Start: 2022-12-03 | End: 2022-12-06 | Stop reason: HOSPADM

## 2022-12-02 RX ADMIN — LOSARTAN POTASSIUM 12.5 MG: 25 TABLET, FILM COATED ORAL at 08:12

## 2022-12-02 RX ADMIN — DEXMEDETOMIDINE HYDROCHLORIDE 0.8 MCG/KG/HR: 4 INJECTION, SOLUTION INTRAVENOUS at 08:12

## 2022-12-02 RX ADMIN — MUPIROCIN: 20 OINTMENT TOPICAL at 08:12

## 2022-12-02 RX ADMIN — FUROSEMIDE 20 MG: 20 TABLET ORAL at 09:12

## 2022-12-02 RX ADMIN — INSULIN ASPART 2 UNITS: 100 INJECTION, SOLUTION INTRAVENOUS; SUBCUTANEOUS at 04:12

## 2022-12-02 RX ADMIN — DEXMEDETOMIDINE HYDROCHLORIDE 0.2 MCG/KG/HR: 4 INJECTION, SOLUTION INTRAVENOUS at 01:12

## 2022-12-02 RX ADMIN — PIPERACILLIN SODIUM, TAZOBACTAM SODIUM 4.5 G: 4; .5 INJECTION, POWDER, LYOPHILIZED, FOR SOLUTION INTRAVENOUS at 04:12

## 2022-12-02 RX ADMIN — ATORVASTATIN CALCIUM 40 MG: 40 TABLET, FILM COATED ORAL at 08:12

## 2022-12-02 RX ADMIN — VANCOMYCIN HYDROCHLORIDE 1000 MG: 1 INJECTION, POWDER, LYOPHILIZED, FOR SOLUTION INTRAVENOUS at 09:12

## 2022-12-02 RX ADMIN — METOPROLOL TARTRATE 12.5 MG: 25 TABLET, FILM COATED ORAL at 08:12

## 2022-12-02 RX ADMIN — PIPERACILLIN SODIUM, TAZOBACTAM SODIUM 4.5 G: 4; .5 INJECTION, POWDER, LYOPHILIZED, FOR SOLUTION INTRAVENOUS at 12:12

## 2022-12-02 RX ADMIN — Medication 1 PATCH: at 08:12

## 2022-12-02 RX ADMIN — PANTOPRAZOLE SODIUM 40 MG: 40 INJECTION, POWDER, FOR SOLUTION INTRAVENOUS at 08:12

## 2022-12-02 RX ADMIN — CLOPIDOGREL BISULFATE 75 MG: 75 TABLET ORAL at 08:12

## 2022-12-02 RX ADMIN — INSULIN ASPART 4 UNITS: 100 INJECTION, SOLUTION INTRAVENOUS; SUBCUTANEOUS at 11:12

## 2022-12-02 RX ADMIN — DEXMEDETOMIDINE HYDROCHLORIDE 1.4 MCG/KG/HR: 4 INJECTION, SOLUTION INTRAVENOUS at 11:12

## 2022-12-02 RX ADMIN — INSULIN ASPART 4 UNITS: 100 INJECTION, SOLUTION INTRAVENOUS; SUBCUTANEOUS at 07:12

## 2022-12-02 RX ADMIN — PIPERACILLIN SODIUM, TAZOBACTAM SODIUM 4.5 G: 4; .5 INJECTION, POWDER, LYOPHILIZED, FOR SOLUTION INTRAVENOUS at 07:12

## 2022-12-02 RX ADMIN — OLANZAPINE 5 MG: 10 INJECTION, POWDER, LYOPHILIZED, FOR SOLUTION INTRAMUSCULAR at 08:12

## 2022-12-02 RX ADMIN — INSULIN ASPART 3 UNITS: 100 INJECTION, SOLUTION INTRAVENOUS; SUBCUTANEOUS at 08:12

## 2022-12-02 RX ADMIN — ASPIRIN 81 MG CHEWABLE TABLET 81 MG: 81 TABLET CHEWABLE at 08:12

## 2022-12-02 RX ADMIN — QUETIAPINE FUMARATE 25 MG: 25 TABLET ORAL at 08:12

## 2022-12-02 NOTE — ASSESSMENT & PLAN NOTE
Patient with significant anxiety and restlessness post extubation.  Started on Precedex drip.  Added nightly Seroquel.  Will try to avoid benzo's  Better today

## 2022-12-02 NOTE — CARE UPDATE
[facility] Intensive Care  ICU Shift Summary  Date: 12/2/2022      Prehospitalization: Home  Admit Date / LOS : 11/28/2022/ 3 days    Diagnosis: NSTEMI, initial episode of care    Consults:        Active: Cardio and Pulm CC       Needed: N/A     Code Status: Full Code   Advanced Directive: Received    LDA:  Lines/Drains/Airways       Peripheral Intravenous Line  Duration                  Peripheral IV - Single Lumen 11/29/22 20 G Anterior;Right Wrist 3 days         Peripheral IV - Single Lumen 11/30/22 2200 20 G Right Forearm 1 day                  Central Lines/Site/Justification:Patient Does Not Have Central Line  Urinary Cath/Order/Justification:Patient Does Not Have Urinary Catheter    Vasopressors/Infusions:    dexmedetomidine (PRECEDEX) infusion 0.4 mcg/kg/hr (12/02/22 1421)          GOALS: Volume/ Hemodynamic: N/A                     RASS: 0  alert and calm    Pain Management: PO       Pain Controlled: yes     Rhythm: NSR and SB    Respiratory Device: Nasal Cannula    Oxygen Concentration (%):  [] 40             Most Recent SBT/ SAT: N/A       MOVE Screen: PASS  ICU Liberation: not applicable    VTE Prophylaxis: Ambulation  Mobility: Bedrest  Stress Ulcer Prophylaxis: Yes    Isolation: No active isolations    Dietary:   Current Diet Order   Procedures    Diet diabetic [facility]; 2000 Calorie; Cardiac (Low Na/Chol)     Order Specific Question:   Indicate patient location for additional diet options:     Answer:   [facility]     Order Specific Question:   Total calories:     Answer:   2000 Calorie     Order Specific Question:   Additional Diet Options:     Answer:   Cardiac (Low Na/Chol)      Tolerance: yes  Advancement: @ goal    I & O (24h):    Intake/Output Summary (Last 24 hours) at 12/2/2022 1525  Last data filed at 12/2/2022 1501  Gross per 24 hour   Intake 969.36 ml   Output 1925 ml   Net -955.64 ml        Restraints: No    Significant Dates:  Post Op Date: N/A  Rescue Date:  N/A  Imaging/ Diagnostics: N/A    Noteworthy Labs:  see below    COVID Test: (--)  CBC/Anemia Labs: Coags:    Recent Labs   Lab 11/28/22 2308 11/29/22 0340 12/01/22 0328 12/02/22 0328   WBC 13.42*   < > 4.59 4.42   HGB 11.6*   < > 8.1* 7.8*   HCT 31.7*   < > 22.9* 21.0*      < > 96* 108*   *   < > 119* 118*   RDW 14.5   < > 13.7 13.6   FOLATE 16.0  --   --   --    [id number] <148*  --   --   --     < > = values in this interval not displayed.    Recent Labs   Lab 11/28/22 2308 11/29/22 0340 11/29/22  0612   INR 1.0  --   --    APTT 25.4 35.7* 25.4        Chemistries:   Recent Labs   Lab 11/28/22 2308 11/29/22 0340 11/30/22  0401 12/01/22 0328 12/02/22 0328   * 134*   < > 131* 129*   K 3.8 4.6   < > 4.5 4.8    106   < > 103 102   CO2 19* 20*   < > 22* 16*   BUN 26* 24*   < > 36* 41*   CREATININE 1.8* 1.4   < > 1.7* 1.7*   CALCIUM 8.8 8.3*   < > 8.0* 7.8*   PROT 7.4 6.2   < > 5.7* 6.0   BILITOT 0.6 0.5   < > 1.0 0.7   ALKPHOS 77 50*   < > 44* 61   ALT 23 20   < > 14 14   AST 28 22   < > 14 20   MG 1.8 1.7   < > 1.8 1.8   PHOS 3.5 2.5*  --   --   --     < > = values in this interval not displayed.        Cardiac Enzymes: Ejection Fractions:    No results for input(s): CPK, CPKMB, MB, TROPONINI in the last 72 hours. EF   Date Value Ref Range Status   11/29/2022 40 % Final        POCT Glucose: HbA1c:    Recent Labs   Lab 12/01/22 2047 12/02/22  0721 12/02/22  1106   POCTGLUCOSE 298* 234* 227*    Hemoglobin A1C   Date Value Ref Range Status   11/28/2022 10.9 (H) 4.0 - 5.6 % Final     Comment:     ADA Screening Guidelines:  5.7-6.4%  Consistent with prediabetes  >or=6.5%  Consistent with diabetes    High levels of fetal hemoglobin interfere with the HbA1C  assay. Heterozygous hemoglobin variants (HbS, HgC, etc)do  not significantly interfere with this assay.   However, presence of multiple variants may affect accuracy.             ICU LOS 3d 13h  Level of Care: Critical Care    Chart  Check: 12 HR Done  Shift Summary/Plan for the shift: see care plan note

## 2022-12-02 NOTE — ASSESSMENT & PLAN NOTE
Coag negative Staph on BCX.  Most likely contaminant, but patient was febrile.  Empirically started on Vanc and Zosyn.  Possible aspiration pneumonia.  Repeat BCx negative.  Stop Vanc.

## 2022-12-02 NOTE — PROGRESS NOTES
Vancomycin consult follow-up:    Patient reviewed, renal function stable, no new levels, continue current therapy; Next levels due: trough due 12/4/2022 at 0845

## 2022-12-02 NOTE — PROGRESS NOTES
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     His respiratory distress has only worsened since then.   He was on the NRBM when I was called to see the pt.   Some agitation.   On precedex at 0.2.     Requested to start STAT BIPAP 14/8  Aspiration precautions.   Max his precedex as tolerated.     STAT denise placement.   S/p lasix 80mg IV x 1 45mins ago.     /100  HR low 100s    Suggesting Ativan 0.5mg IV and Haldol 5mg IV only if still agitated.     Would have a very low threshold to intubate this pt.     F/up UOP.   Will f/up shortly.   D/w bedside staff in detail via camera.     Follow up note:  - Pt is relatively much better now.     S/p 600ml UOP.   HD improved.     ABG later was 7.34/36/130 on 16/8@ 40%.   Agree w/ break from BIPAP.     NC O2 as needed, sats goal 90-92%.

## 2022-12-02 NOTE — NURSING
1900-assumed care while pt in respiratory distress, accessory muscle breathing, RR 40-50, diaphoretic, anxious, pulling at lines, coarse breath sounds throughout, unable to be redirected, thrashing in bed @ times. Pt was on 100% non re breather with O2 sats 90%. HR 105bpm and BP elevated to 188/100. Per MD Lasix Iv push was given. Precedex infusing.   1925- MD @ BS for continued resp. Distress. And eICU contacted as well. Per MD placed denise catheter, precedex increased, bipap placed and patient appears more comfortable. RR down to 30bpm, no longer accessory muscle breathing, gasping, or agitated. When denise placed 225 clear yellow urine drained. Will continue to monitor pt and resp status closely.  2045-md called to check status of patient. Respiratory status improved. Resting comfortably on Bipap. No acute distress noted at this time. Per MD FiO2 decreased to 40% from 100%. Updated respiratory of change and informed them MD wants ABG 2 hours post change. Tolerating change in FiO2 well.   2345-ABG done. Resting comfortably on Bipap. Attempts to remove at times but able to reorient.  0115-Pt c/o hunger and inability to tolerate BiPap anymore. Wants to have it removed. Respiratory symtoms improved so Bipap was temporarily removed for pt comfort and placed on 4LNC. Spoke with eICU MD to notify of Bipap removal. MD ok with removing Bipap for an hour or two and then Bipap needs to be reapplied.explained to pt. States understanding.

## 2022-12-02 NOTE — ASSESSMENT & PLAN NOTE
Patient with Hypoxic Respiratory failure which is Acute.  he is not on home oxygen. Supplemental oxygen was provided and noted-     Oxygen Concentration (%):  [] 40.     Signs/symptoms of respiratory failure include- tachypnea, increased work of breathing, respiratory distress, use of accessory muscles and lethargy. Contributing diagnoses includes - CHF Labs and images were reviewed. Patient Has recent ABG, which has been reviewed. Will treat underlying causes and adjust management of respiratory failure as follows-     Potentially a primary ACS event with significant anxiety requiring IV benzo, which lead to respiratory depression resulting in Intubation.   · Intubated and Sedated  · Propofol and Fentanyl  · Pulmonary following.  · Doing well on SBT and should get extubated on 11/30.  · Continue O2 per NC.

## 2022-12-02 NOTE — CARE UPDATE
[facility] [place]  Nurses Note -- 4 Eyes      12/2/2022       Skin assessed on: Q Shift      [x] No Pressure Injuries Present    [x]Prevention Measures Documented    [] Yes LDA  for Pressure Injury Previously documented     [] Yes New Pressure Injury Discovered   [] LDA for New Pressure Injury Added      Attending RN:  Louise Palencia RN     Second RN:  Donavan Theodore RN

## 2022-12-02 NOTE — ASSESSMENT & PLAN NOTE
"· Patient had chest pain that radiated to his arm with elevated troponin  · No ST elevation on EKG  · Initial troponin was the peak at 4.3, down trended from there to 3.5  · Started ASA, Plavix, Statin and Heparin drip.  Cardiology consulted.  [facility] showing "LAD 90% mid - diffuse severe mid-distal disease, Cx- OM1 large - multiple 70-80% mid lesions with diffuse disease, RCA 50% mid, PDA 80% proximal - diffuse distal disease, PL 80%"  Cards recommending medical management.  Continue ASA, Plavix and Statin.  "

## 2022-12-02 NOTE — ASSESSMENT & PLAN NOTE
Patient's FSGs are uncontrolled due to hyperglycemia on current medication regimen.  Last A1c reviewed-   Lab Results   Component Value Date    LABA1C 11.8 (H) 06/13/2014    HGBA1C 10.9 (H) 11/28/2022     Most recent fingerstick glucose reviewed-   Recent Labs   Lab 12/01/22  1708 12/01/22  2047 12/02/22  0721 12/02/22  1106   POCTGLUCOSE 190* 298* 234* 227*       Antihyperglycemics (From admission, onward)    Start     Stop Route Frequency Ordered    12/01/22 2100  insulin detemir U-100 pen 10 Units         -- SubQ Nightly 12/01/22 1550    11/30/22 1410  insulin aspart U-100 pen 1-10 Units         -- SubQ Before meals & nightly PRN 11/30/22 1310        · Hold Oral hypoglycemics while patient is in the hospital.  SSI and q4h testing

## 2022-12-02 NOTE — ASSESSMENT & PLAN NOTE
Patient's hemoglobin has dropped over the past 3 days.  No obvious evidence of bleeding.  Will get CT to rule out retroperitoneal bleed post LHC.  Closely monitor.  Transfuse if Hgb<7  Patient refused CT

## 2022-12-02 NOTE — CARE UPDATE
Will adjust lopressor to 12.5 bid. Continue losartan at current dose. Will add low dose lasix. Monitor creatinine.

## 2022-12-02 NOTE — PLAN OF CARE
Patient on nasal cannula most of shift with oxygen sats 95-98%.    Denies pain all shift.   Tolerating diet but doesn't like the taste of the food.   Remains on low dose precedex, crying off and on but is easily distracted.    Sister visit.

## 2022-12-02 NOTE — NURSING
1820: Dr. Lang notified pt tachypneic, labored breathing, diaphoretic. MD orders for stat ABGs and stat CXR.   1832: notified Dr. Lang pt with audible crackles, on non-rebreather, issues getting O2 sats. MD informed RN he will be en route to see pt  1845: informed MD of ABG results and CXR completed. MD to place orders for lasix

## 2022-12-02 NOTE — SUBJECTIVE & OBJECTIVE
Interval History: feeling better.  Much less anxious.    Review of Systems   HENT:  Negative for ear discharge and ear pain.    Eyes:  Negative for discharge.   Endocrine: Negative for cold intolerance and heat intolerance.   Neurological:  Negative for seizures and syncope.   Objective:     Vital Signs (Most Recent):  Temp: 97.6 °F (36.4 °C) (12/02/22 1501)  Pulse: (!) 58 (12/02/22 1501)  Resp: 19 (12/02/22 1501)  BP: (!) 120/57 (12/02/22 1500)  SpO2: (!) 93 % (12/02/22 1501)   Vital Signs (24h Range):  Temp:  [97.5 °F (36.4 °C)-98.7 °F (37.1 °C)] 97.6 °F (36.4 °C)  Pulse:  [] 58  Resp:  [5-65] 19  SpO2:  [9 %-100 %] 93 %  BP: ()/(46-94) 120/57     Weight: 106.4 kg (234 lb 9.1 oz)  Body mass index is 34.64 kg/m².    Intake/Output Summary (Last 24 hours) at 12/2/2022 1558  Last data filed at 12/2/2022 1501  Gross per 24 hour   Intake 658.77 ml   Output 1925 ml   Net -1266.23 ml        Physical Exam  Vitals and nursing note reviewed.   Constitutional:       General: He is not in acute distress.     Appearance: He is obese. He is ill-appearing. He is not toxic-appearing.      Interventions: He is sedated and restrained.   HENT:      Head: Normocephalic and atraumatic.      Nose: Nose normal. No rhinorrhea.   Eyes:      Pupils: Pupils are equal, round, and reactive to light.   Cardiovascular:      Rate and Rhythm: Normal rate.      Pulses: Normal pulses.      Heart sounds: No murmur heard.  Pulmonary:      Effort: No respiratory distress.      Breath sounds: No wheezing, rhonchi or rales.   Abdominal:      General: Abdomen is flat. There is no distension.      Tenderness: There is no abdominal tenderness.      Comments: Abdominal wound dehiscence.  No erythema or drainage   Musculoskeletal:         General: No deformity. Normal range of motion.      Cervical back: No rigidity.      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      Left Lower Extremity: Left leg is amputated below knee.    Skin:     General: Skin is dry.      Capillary Refill: Capillary refill takes less than 2 seconds.   Neurological:      Mental Status: He is easily aroused.      Comments: PERRL. Follows commands.    Psychiatric:         Mood and Affect: Mood is anxious.       Significant Labs: All pertinent labs within the past 24 hours have been reviewed.  BMP:   Recent Labs   Lab 12/02/22 0328   *   *   K 4.8      CO2 16*   BUN 41*   CREATININE 1.7*   CALCIUM 7.8*   MG 1.8       CBC:   Recent Labs   Lab 12/01/22 0328 12/02/22 0328   WBC 4.59 4.42   HGB 8.1* 7.8*   HCT 22.9* 21.0*   PLT 96* 108*         Significant Imaging: I have reviewed all pertinent imaging results/findings within the past 24 hours.

## 2022-12-02 NOTE — CARE UPDATE
12/01/22 1941   Patient Assessment/Suction   Level of Consciousness (AVPU) responds to pain   Respiratory Effort Mild;Labored;Short of breath   Expansion/Accessory Muscles/Retractions abdominal muscle use   Rhythm/Pattern, Respiratory shortness of breath;tachypneic;labored   Cough Frequency no cough   Skin Integrity   $ Wound Care Tech Time 15 min   Area Observed Cheek;Bridge of nose   Skin Appearance without discoloration   PRE-TX-O2   O2 Device (Oxygen Therapy) BiPAP   Oxygen Concentration (%) 100   SpO2 (!) 93 %   Pulse Oximetry Type Continuous   $ Pulse Oximetry - Multiple Charge Pulse Oximetry - Multiple   Pulse 108   Resp (!) 32   BP (!) 175/74   Preset Conventional Ventilator Settings   Set Rate 5736.1 BPM   Conventional Ventilator Alarms   Press High Alarm 12383 cmH2O   Ready to Wean/Extubation Screen   FIO2<=50 (chart decimal) (!) 1   Preset CPAP/BiPAP Settings   Mode Of Delivery BiPAP   $ CPAP/BiPAP Daily Charge BiPAP/CPAP Daily   $ Initial CPAP/BiPAP Setup? Yes   $ Is patient using? Yes   Size of Mask Large   Sized Appropriately? Yes   Equipment Type V60   Airway Device Type large full face mask   Ipap 14   EPAP (cm H2O) 8   Pressure Support (cm H2O) 6   Set Rate (Breaths/Min) 16   ITime (sec) 1   Rise Time (sec) 3   Patient CPAP/BiPAP Settings   RR Total (Breaths/Min) 24   Tidal Volume (mL) 752   VE Minute Ventilation (L/min) 18.3 L/min   Peak Inspiratory Pressure (cm H2O) 15   TiTOT (%) 27   Total Leak (L/Min) 10   Patient Trigger - ST Mode Only (%) 100     Placed patient on bipap 14/8 backup rate of 16 fio2 100% per Dr. Lang. Will draw follow up ABG in one hour per verbal order from Dr. Lang.

## 2022-12-03 PROBLEM — J96.01 ACUTE HYPOXEMIC RESPIRATORY FAILURE: Status: RESOLVED | Noted: 2022-11-29 | Resolved: 2022-12-03

## 2022-12-03 LAB
ALBUMIN SERPL BCP-MCNC: 2.6 G/DL (ref 3.5–5.2)
ALP SERPL-CCNC: 57 U/L (ref 55–135)
ALT SERPL W/O P-5'-P-CCNC: 15 U/L (ref 10–44)
AMMONIA PLAS-SCNC: 29 UMOL/L (ref 10–50)
ANION GAP SERPL CALC-SCNC: 8 MMOL/L (ref 8–16)
AST SERPL-CCNC: 16 U/L (ref 10–40)
BACTERIA BLD CULT: NORMAL
BASOPHILS # BLD AUTO: 0 K/UL (ref 0–0.2)
BASOPHILS NFR BLD: 0 % (ref 0–1.9)
BILIRUB SERPL-MCNC: 0.9 MG/DL (ref 0.1–1)
BUN SERPL-MCNC: 36 MG/DL (ref 8–23)
CALCIUM SERPL-MCNC: 7.9 MG/DL (ref 8.7–10.5)
CHLORIDE SERPL-SCNC: 102 MMOL/L (ref 95–110)
CO2 SERPL-SCNC: 22 MMOL/L (ref 23–29)
CREAT SERPL-MCNC: 1.5 MG/DL (ref 0.5–1.4)
DIFFERENTIAL METHOD: ABNORMAL
EOSINOPHIL # BLD AUTO: 0.1 K/UL (ref 0–0.5)
EOSINOPHIL NFR BLD: 2.3 % (ref 0–8)
ERYTHROCYTE [DISTWIDTH] IN BLOOD BY AUTOMATED COUNT: 13.9 % (ref 11.5–14.5)
EST. GFR  (NO RACE VARIABLE): 53 ML/MIN/1.73 M^2
GLUCOSE SERPL-MCNC: 245 MG/DL (ref 70–110)
HCT VFR BLD AUTO: 22.1 % (ref 40–54)
HGB BLD-MCNC: 8 G/DL (ref 14–18)
IMM GRANULOCYTES # BLD AUTO: 0.02 K/UL (ref 0–0.04)
IMM GRANULOCYTES NFR BLD AUTO: 0.6 % (ref 0–0.5)
LYMPHOCYTES # BLD AUTO: 0.6 K/UL (ref 1–4.8)
LYMPHOCYTES NFR BLD: 16.9 % (ref 18–48)
MAGNESIUM SERPL-MCNC: 1.9 MG/DL (ref 1.6–2.6)
MCH RBC QN AUTO: 43.7 PG (ref 27–31)
MCHC RBC AUTO-ENTMCNC: 36.2 G/DL (ref 32–36)
MCV RBC AUTO: 121 FL (ref 82–98)
MONOCYTES # BLD AUTO: 0.3 K/UL (ref 0.3–1)
MONOCYTES NFR BLD: 7.4 % (ref 4–15)
NEUTROPHILS # BLD AUTO: 2.6 K/UL (ref 1.8–7.7)
NEUTROPHILS NFR BLD: 72.8 % (ref 38–73)
NRBC BLD-RTO: 0 /100 WBC
PLATELET # BLD AUTO: 111 K/UL (ref 150–450)
PMV BLD AUTO: 10.5 FL (ref 9.2–12.9)
POCT GLUCOSE: 169 MG/DL (ref 70–110)
POCT GLUCOSE: 179 MG/DL (ref 70–110)
POCT GLUCOSE: 192 MG/DL (ref 70–110)
POCT GLUCOSE: 224 MG/DL (ref 70–110)
POCT GLUCOSE: 295 MG/DL (ref 70–110)
POTASSIUM SERPL-SCNC: 4.9 MMOL/L (ref 3.5–5.1)
PROT SERPL-MCNC: 6.3 G/DL (ref 6–8.4)
RBC # BLD AUTO: 1.83 M/UL (ref 4.6–6.2)
SODIUM SERPL-SCNC: 132 MMOL/L (ref 136–145)
WBC # BLD AUTO: 3.5 K/UL (ref 3.9–12.7)

## 2022-12-03 PROCEDURE — 99900035 HC TECH TIME PER 15 MIN (STAT)

## 2022-12-03 PROCEDURE — 94660 CPAP INITIATION&MGMT: CPT

## 2022-12-03 PROCEDURE — S4991 NICOTINE PATCH NONLEGEND: HCPCS | Performed by: HOSPITALIST

## 2022-12-03 PROCEDURE — 25000003 PHARM REV CODE 250: Performed by: NURSE PRACTITIONER

## 2022-12-03 PROCEDURE — 25000003 PHARM REV CODE 250: Performed by: HOSPITALIST

## 2022-12-03 PROCEDURE — 27000221 HC OXYGEN, UP TO 24 HOURS

## 2022-12-03 PROCEDURE — 63600175 PHARM REV CODE 636 W HCPCS: Performed by: HOSPITALIST

## 2022-12-03 PROCEDURE — 83735 ASSAY OF MAGNESIUM: CPT | Performed by: STUDENT IN AN ORGANIZED HEALTH CARE EDUCATION/TRAINING PROGRAM

## 2022-12-03 PROCEDURE — G0378 HOSPITAL OBSERVATION PER HR: HCPCS

## 2022-12-03 PROCEDURE — 85025 COMPLETE CBC W/AUTO DIFF WBC: CPT | Performed by: STUDENT IN AN ORGANIZED HEALTH CARE EDUCATION/TRAINING PROGRAM

## 2022-12-03 PROCEDURE — 82140 ASSAY OF AMMONIA: CPT | Performed by: HOSPITALIST

## 2022-12-03 PROCEDURE — 94761 N-INVAS EAR/PLS OXIMETRY MLT: CPT

## 2022-12-03 PROCEDURE — 63600175 PHARM REV CODE 636 W HCPCS: Performed by: NURSE PRACTITIONER

## 2022-12-03 PROCEDURE — 20000000 HC ICU ROOM

## 2022-12-03 PROCEDURE — 36415 COLL VENOUS BLD VENIPUNCTURE: CPT | Performed by: HOSPITALIST

## 2022-12-03 PROCEDURE — 36415 COLL VENOUS BLD VENIPUNCTURE: CPT | Performed by: STUDENT IN AN ORGANIZED HEALTH CARE EDUCATION/TRAINING PROGRAM

## 2022-12-03 PROCEDURE — C9113 INJ PANTOPRAZOLE SODIUM, VIA: HCPCS | Performed by: SURGERY

## 2022-12-03 PROCEDURE — 80053 COMPREHEN METABOLIC PANEL: CPT | Performed by: STUDENT IN AN ORGANIZED HEALTH CARE EDUCATION/TRAINING PROGRAM

## 2022-12-03 PROCEDURE — 63600175 PHARM REV CODE 636 W HCPCS: Performed by: SURGERY

## 2022-12-03 PROCEDURE — C9113 INJ PANTOPRAZOLE SODIUM, VIA: HCPCS | Performed by: HOSPITALIST

## 2022-12-03 RX ORDER — ESCITALOPRAM OXALATE 10 MG/1
10 TABLET ORAL DAILY
Status: DISCONTINUED | OUTPATIENT
Start: 2022-12-03 | End: 2022-12-06 | Stop reason: HOSPADM

## 2022-12-03 RX ORDER — TAMSULOSIN HYDROCHLORIDE 0.4 MG/1
0.4 CAPSULE ORAL DAILY
Status: DISCONTINUED | OUTPATIENT
Start: 2022-12-03 | End: 2022-12-06 | Stop reason: HOSPADM

## 2022-12-03 RX ORDER — MORPHINE SULFATE 4 MG/ML
1 INJECTION, SOLUTION INTRAMUSCULAR; INTRAVENOUS EVERY 4 HOURS PRN
Status: DISCONTINUED | OUTPATIENT
Start: 2022-12-03 | End: 2022-12-06 | Stop reason: HOSPADM

## 2022-12-03 RX ORDER — GABAPENTIN 100 MG/1
100 CAPSULE ORAL 3 TIMES DAILY
Status: DISCONTINUED | OUTPATIENT
Start: 2022-12-03 | End: 2022-12-06 | Stop reason: HOSPADM

## 2022-12-03 RX ADMIN — INSULIN ASPART 1 UNITS: 100 INJECTION, SOLUTION INTRAVENOUS; SUBCUTANEOUS at 08:12

## 2022-12-03 RX ADMIN — ASPIRIN 81 MG CHEWABLE TABLET 81 MG: 81 TABLET CHEWABLE at 09:12

## 2022-12-03 RX ADMIN — GABAPENTIN 100 MG: 100 CAPSULE ORAL at 02:12

## 2022-12-03 RX ADMIN — PIPERACILLIN SODIUM, TAZOBACTAM SODIUM 4.5 G: 4; .5 INJECTION, POWDER, LYOPHILIZED, FOR SOLUTION INTRAVENOUS at 11:12

## 2022-12-03 RX ADMIN — CLOPIDOGREL BISULFATE 75 MG: 75 TABLET ORAL at 09:12

## 2022-12-03 RX ADMIN — ESCITALOPRAM OXALATE 10 MG: 10 TABLET ORAL at 01:12

## 2022-12-03 RX ADMIN — PIPERACILLIN SODIUM, TAZOBACTAM SODIUM 4.5 G: 4; .5 INJECTION, POWDER, LYOPHILIZED, FOR SOLUTION INTRAVENOUS at 07:12

## 2022-12-03 RX ADMIN — ATORVASTATIN CALCIUM 40 MG: 40 TABLET, FILM COATED ORAL at 09:12

## 2022-12-03 RX ADMIN — TAMSULOSIN HYDROCHLORIDE 0.4 MG: 0.4 CAPSULE ORAL at 01:12

## 2022-12-03 RX ADMIN — GABAPENTIN 100 MG: 100 CAPSULE ORAL at 08:12

## 2022-12-03 RX ADMIN — QUETIAPINE FUMARATE 25 MG: 25 TABLET ORAL at 08:12

## 2022-12-03 RX ADMIN — FUROSEMIDE 20 MG: 20 TABLET ORAL at 09:12

## 2022-12-03 RX ADMIN — INSULIN ASPART 6 UNITS: 100 INJECTION, SOLUTION INTRAVENOUS; SUBCUTANEOUS at 08:12

## 2022-12-03 RX ADMIN — PANTOPRAZOLE SODIUM 40 MG: 40 INJECTION, POWDER, FOR SOLUTION INTRAVENOUS at 09:12

## 2022-12-03 RX ADMIN — Medication 1 PATCH: at 09:12

## 2022-12-03 RX ADMIN — LOSARTAN POTASSIUM 12.5 MG: 25 TABLET, FILM COATED ORAL at 10:12

## 2022-12-03 RX ADMIN — INSULIN ASPART 2 UNITS: 100 INJECTION, SOLUTION INTRAVENOUS; SUBCUTANEOUS at 11:12

## 2022-12-03 RX ADMIN — INSULIN ASPART 2 UNITS: 100 INJECTION, SOLUTION INTRAVENOUS; SUBCUTANEOUS at 04:12

## 2022-12-03 RX ADMIN — MUPIROCIN: 20 OINTMENT TOPICAL at 08:12

## 2022-12-03 RX ADMIN — MUPIROCIN: 20 OINTMENT TOPICAL at 09:12

## 2022-12-03 RX ADMIN — PIPERACILLIN SODIUM, TAZOBACTAM SODIUM 4.5 G: 4; .5 INJECTION, POWDER, LYOPHILIZED, FOR SOLUTION INTRAVENOUS at 03:12

## 2022-12-03 RX ADMIN — DEXMEDETOMIDINE HYDROCHLORIDE 1.3 MCG/KG/HR: 4 INJECTION, SOLUTION INTRAVENOUS at 02:12

## 2022-12-03 NOTE — PLAN OF CARE
Patient hard to wake up this am, weaned off Precedex and started to arouse around eleven.   Able to eat lunch, tolerated meds.    Denies pain all shift.   Continued sinus bradycardia most of shift.   On oxygen at 3L/min per nasal cannula.  Sisters visit.   Patient confused in am but oriented in afternoon.

## 2022-12-03 NOTE — CARE UPDATE
[facility] [place]  Nurses Note -- 4 Eyes      12/3/2022       Skin assessed on: Q Shift      [x] No Pressure Injuries Present    [x]Prevention Measures Documented    [] Yes LDA  for Pressure Injury Previously documented     [] Yes New Pressure Injury Discovered   [] LDA for New Pressure Injury Added      Attending RN:  Louise Palencia RN     Second RN:  Donavan Theodore RN

## 2022-12-03 NOTE — CARE UPDATE
[facility] Intensive Care  ICU Shift Summary  Date: 12/3/2022      Prehospitalization: Home  Admit Date / LOS : 11/28/2022/ 4 days    Diagnosis: NSTEMI, initial episode of care    Consults:        Active: Cardio and Pulm CC       Needed: OT and PT     Code Status: Full Code   Advanced Directive: Received    LDA:  Lines/Drains/Airways       Peripheral Intravenous Line  Duration                  Peripheral IV - Single Lumen 11/29/22 20 G Anterior;Right Wrist 4 days         Peripheral IV - Single Lumen 11/30/22 2200 20 G Right Forearm 2 days                  Central Lines/Site/Justification:Patient Does Not Have Central Line  Urinary Cath/Order/Justification:Critically Ill in the ICU and requiring intensive monitoring    Vasopressors/Infusions:        GOALS: Volume/ Hemodynamic:  N/A                     RASS: 0  alert and calm    Pain Management: PO       Pain Controlled: yes     Rhythm: NSR and SB    Respiratory Device: Nasal Cannula    Oxygen Concentration (%):  [40] 40             Most Recent SBT/ SAT: Does not meet criteria       MOVE Screen: PASS  ICU Liberation: not applicable    VTE Prophylaxis: Ambulation  Mobility: Bedrest  Stress Ulcer Prophylaxis: Yes    Isolation: No active isolations    Dietary:   Current Diet Order   Procedures    Diet diabetic [facility]; 2000 Calorie; Cardiac (Low Na/Chol)     Order Specific Question:   Indicate patient location for additional diet options:     Answer:   [facility]     Order Specific Question:   Total calories:     Answer:   2000 Calorie     Order Specific Question:   Additional Diet Options:     Answer:   Cardiac (Low Na/Chol)      Tolerance: yes  Advancement: yes    I & O (24h):    Intake/Output Summary (Last 24 hours) at 12/3/2022 1534  Last data filed at 12/3/2022 1300  Gross per 24 hour   Intake 823.25 ml   Output 1780 ml   Net -956.75 ml        Restraints: No    Significant Dates:  Post Op Date: N/A  Rescue Date: N/A  Imaging/ Diagnostics:  N/A    Noteworthy Labs:  see below    COVID Test: (--)  CBC/Anemia Labs: Coags:    Recent Labs   Lab 11/28/22 2308 11/29/22 0340 12/02/22 0328 12/03/22 0329   WBC 13.42*   < > 4.42 3.50*   HGB 11.6*   < > 7.8* 8.0*   HCT 31.7*   < > 21.0* 22.1*      < > 108* 111*   *   < > 118* 121*   RDW 14.5   < > 13.6 13.9   FOLATE 16.0  --   --   --    [id number] <148*  --   --   --     < > = values in this interval not displayed.    Recent Labs   Lab 11/28/22 2308 11/29/22 0340 11/29/22  0612   INR 1.0  --   --    APTT 25.4 35.7* 25.4        Chemistries:   Recent Labs   Lab 11/28/22 2308 11/29/22 0340 11/30/22  0401 12/02/22 0328 12/03/22 0329   * 134*   < > 129* 132*   K 3.8 4.6   < > 4.8 4.9    106   < > 102 102   CO2 19* 20*   < > 16* 22*   BUN 26* 24*   < > 41* 36*   CREATININE 1.8* 1.4   < > 1.7* 1.5*   CALCIUM 8.8 8.3*   < > 7.8* 7.9*   PROT 7.4 6.2   < > 6.0 6.3   BILITOT 0.6 0.5   < > 0.7 0.9   ALKPHOS 77 50*   < > 61 57   ALT 23 20   < > 14 15   AST 28 22   < > 20 16   MG 1.8 1.7   < > 1.8 1.9   PHOS 3.5 2.5*  --   --   --     < > = values in this interval not displayed.        Cardiac Enzymes: Ejection Fractions:    No results for input(s): CPK, CPKMB, MB, TROPONINI in the last 72 hours. EF   Date Value Ref Range Status   11/29/2022 40 % Final        POCT Glucose: HbA1c:    Recent Labs   Lab 12/02/22  1929 12/03/22  0812 12/03/22  1102   POCTGLUCOSE 224* 295* 192*    Hemoglobin A1C   Date Value Ref Range Status   11/28/2022 10.9 (H) 4.0 - 5.6 % Final     Comment:     ADA Screening Guidelines:  5.7-6.4%  Consistent with prediabetes  >or=6.5%  Consistent with diabetes    High levels of fetal hemoglobin interfere with the HbA1C  assay. Heterozygous hemoglobin variants (HbS, HgC, etc)do  not significantly interfere with this assay.   However, presence of multiple variants may affect accuracy.             ICU LOS 4d 13h  Level of Care: OK to Transfer    Chart Check: 12 HR Done  Shift  Summary/Plan for the shift: see care plan note

## 2022-12-03 NOTE — ASSESSMENT & PLAN NOTE
Patient with significant anxiety and restlessness post extubation.  Started on Precedex drip.  Added nightly Seroquel.  Will try to avoid benzo's  Patient very lethargic and confused today.  Wean off Precedex.  Avoid sedatives.  Per PCP home meds, seems to be on Lactulose.  Check Ammonia level.

## 2022-12-03 NOTE — SUBJECTIVE & OBJECTIVE
Interval History: drowsy and confused today.    Review of Systems   HENT:  Negative for ear discharge and ear pain.    Eyes:  Negative for discharge.   Endocrine: Negative for cold intolerance and heat intolerance.   Neurological:  Negative for seizures and syncope.   Objective:     Vital Signs (Most Recent):  Temp: 97.6 °F (36.4 °C) (12/03/22 1101)  Pulse: (!) 57 (12/03/22 1101)  Resp: (!) 26 (12/03/22 1101)  BP: (!) 117/49 (12/03/22 1101)  SpO2: (!) 75 % (12/03/22 1101)   Vital Signs (24h Range):  Temp:  [97.6 °F (36.4 °C)-98.3 °F (36.8 °C)] 97.6 °F (36.4 °C)  Pulse:  [49-92] 57  Resp:  [16-35] 26  SpO2:  [75 %-100 %] 75 %  BP: (112-151)/(49-78) 117/49     Weight: 106.4 kg (234 lb 9.1 oz)  Body mass index is 34.64 kg/m².    Intake/Output Summary (Last 24 hours) at 12/3/2022 1302  Last data filed at 12/3/2022 0701  Gross per 24 hour   Intake 261.43 ml   Output 1600 ml   Net -1338.57 ml        Physical Exam  Vitals and nursing note reviewed.   Constitutional:       General: He is not in acute distress.     Appearance: He is obese. He is ill-appearing. He is not toxic-appearing.      Interventions: He is sedated and restrained.   HENT:      Head: Normocephalic and atraumatic.      Nose: Nose normal. No rhinorrhea.   Eyes:      Pupils: Pupils are equal, round, and reactive to light.   Cardiovascular:      Rate and Rhythm: Normal rate.      Pulses: Normal pulses.      Heart sounds: No murmur heard.  Pulmonary:      Effort: No respiratory distress.      Breath sounds: No wheezing, rhonchi or rales.   Abdominal:      General: Abdomen is flat. There is no distension.      Tenderness: There is no abdominal tenderness.      Comments: Abdominal wound dehiscence.  No erythema or drainage   Musculoskeletal:         General: No deformity. Normal range of motion.      Cervical back: No rigidity.      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      Left Lower Extremity: Left leg is amputated below knee.   Skin:      General: Skin is dry.      Capillary Refill: Capillary refill takes less than 2 seconds.   Neurological:      Mental Status: He is easily aroused.      Comments: Drowsy  Oriented only to person today       Significant Labs: All pertinent labs within the past 24 hours have been reviewed.  BMP:   Recent Labs   Lab 12/03/22 0329   *   *   K 4.9      CO2 22*   BUN 36*   CREATININE 1.5*   CALCIUM 7.9*   MG 1.9       CBC:   Recent Labs   Lab 12/02/22 0328 12/03/22 0329   WBC 4.42 3.50*   HGB 7.8* 8.0*   HCT 21.0* 22.1*   * 111*         Significant Imaging: I have reviewed all pertinent imaging results/findings within the past 24 hours.

## 2022-12-03 NOTE — ASSESSMENT & PLAN NOTE
Patient's hemoglobin has dropped over the past 3 days.  No obvious evidence of bleeding.  Will get CT to rule out retroperitoneal bleed post LHC.  Closely monitor.  Transfuse if Hgb<7  Patient refused CT.  H/H now improving.

## 2022-12-03 NOTE — ASSESSMENT & PLAN NOTE
Coag negative Staph on BCX.  Most likely contaminant, but patient was febrile.  Empirically started on Vanc and Zosyn.  Possible aspiration pneumonia.  Repeat BCx negative.  Stopped Vanc.

## 2022-12-03 NOTE — ASSESSMENT & PLAN NOTE
Patient with Hypoxic Respiratory failure which is Acute.  he is not on home oxygen. Supplemental oxygen was provided and noted-     Oxygen Concentration (%):  [40] 40.     Signs/symptoms of respiratory failure include- tachypnea, increased work of breathing, respiratory distress, use of accessory muscles and lethargy. Contributing diagnoses includes - CHF Labs and images were reviewed. Patient Has recent ABG, which has been reviewed. Will treat underlying causes and adjust management of respiratory failure as follows-     Potentially a primary ACS event with significant anxiety requiring IV benzo, which lead to respiratory depression resulting in Intubation.   · Intubated and Sedated  · Propofol and Fentanyl  · Pulmonary following.  · Doing well on SBT and should get extubated on 11/30.  · Continue O2 per NC.

## 2022-12-03 NOTE — ASSESSMENT & PLAN NOTE
Patient's FSGs are uncontrolled due to hyperglycemia on current medication regimen.  Last A1c reviewed-   Lab Results   Component Value Date    LABA1C 11.8 (H) 06/13/2014    HGBA1C 10.9 (H) 11/28/2022     Most recent fingerstick glucose reviewed-   Recent Labs   Lab 12/02/22  1620 12/02/22  1929 12/03/22  0812 12/03/22  1102   POCTGLUCOSE 175* 224* 295* 192*       Antihyperglycemics (From admission, onward)    Start     Stop Route Frequency Ordered    12/01/22 2100  insulin detemir U-100 pen 10 Units         -- SubQ Nightly 12/01/22 1550    11/30/22 1410  insulin aspart U-100 pen 1-10 Units         -- SubQ Before meals & nightly PRN 11/30/22 1310        · Hold Oral hypoglycemics while patient is in the hospital.  SSI and q4h testing

## 2022-12-03 NOTE — PROGRESS NOTES
[facility]  Progress Note    Patient Name: Carlos Cifuentes Jr.  MRN: [id number]  Patient Class: IP- Inpatient   Admission Date: 11/28/2022  Length of Stay: 4 days  Attending Physician: Pranay Michaels MD  Primary Care Provider: Miguel Lux MD        Subjective:     Principal Problem:NSTEMI, initial episode of care        HPI:    Carlos Cifuentes Jr. is a 61 y.o. male who has a past medical history of Arthritis, Back pain, Bulging lumbar disc, C. difficile diarrhea, Chronic back pain, Diabetes mellitus, Diabetes mellitus type II, DM, type 2, uncontrolled, Hepatitis C, MSSA septicemia, Neuromuscular disorder, Neuropathy, and Staph aureus infection, presented to the ED with CC of SOB, abdominal pain, nausea with vomiting, and Headache.  Patient was intubated upon arrival to ED after receiving 2 mg of IV Ativan for anxiety, as he became obtunded and had difficulty breathing; therefore, HPI from EMS and ED report:     Initial EMS call was for abdominal pain with vomiting and hyperglycemia.  EMS found patient with vomiting but also anxiety. EMS said that patient would not allow them to check vitals, so EMS administered IV Ativan 2 mg, and patient then became obtunded and had difficulty breathing.  EMS reports  and /93 s/p ativan.  EMS states patient administered SC insulin 28 units prior to their arrival, but it is unclear which insulin this is. Patient's sister arrived to ER later and supplemented HPI.  She reports that patient had substernal acid reflux like chest pain with radiation to his left arm yesterday Sunday 11/27/22 and went to the ER at [facility].  However he was in the lobby for several hours and was not seen.  He then left. His chest pain recurred tonight Monday 11/28/22, he called his sister to tell her that he felt bad and was short of breath.  She urged to call 911, which he did.  She suspects that patient became anxious and agitated when EMS strapped him  to their gurney, as patient has a history of claustrophobia relating to a childhood incident in which he was papoosed to get staples in his scalp.    In the ED, patient was intubated and sedated with propofol.  His blood pressure was still 200/100, despite propofol use.  His troponin was 4 without ST elevation and lactic acid 4.9, he was started on NSTEMI protocol with heparin drip and given fluids.  Mild reactive leukocytosis corrected with fluids only.        Overview/Hospital Course:  60 y/o male admitted with acute respiratory failure on vent.  Troponin elevated and consistent with NSTEMI.  Started on ASA, Plavix, Statin and Heparin drip.  Pulmonary consulted for vent management.  LHC on 11/29 showing LAD 90% mid - diffuse severe mid-distal disease, Cx- OM1 large - multiple 70-80% mid lesions with diffuse disease, RCA 50% mid, PDA 80% proximal - diffuse distal disease, PL 80%.  Cardiology recommending medical management.  Patient febrile and empirically started on ABX's.  Coag negative Staph on BCx possible contaminant.  Patient extubated on 11/30.  Did well oxygenation wise, but with significant anxiety and restlessness.  Placed on Precedex drip.  Also with episodes of hypotension that responded to IVF's.      Interval History: drowsy and confused today.    Review of Systems   HENT:  Negative for ear discharge and ear pain.    Eyes:  Negative for discharge.   Endocrine: Negative for cold intolerance and heat intolerance.   Neurological:  Negative for seizures and syncope.   Objective:     Vital Signs (Most Recent):  Temp: 97.6 °F (36.4 °C) (12/03/22 1101)  Pulse: (!) 57 (12/03/22 1101)  Resp: (!) 26 (12/03/22 1101)  BP: (!) 117/49 (12/03/22 1101)  SpO2: (!) 75 % (12/03/22 1101)   Vital Signs (24h Range):  Temp:  [97.6 °F (36.4 °C)-98.3 °F (36.8 °C)] 97.6 °F (36.4 °C)  Pulse:  [49-92] 57  Resp:  [16-35] 26  SpO2:  [75 %-100 %] 75 %  BP: (112-151)/(49-78) 117/49     Weight: 106.4 kg (234 lb 9.1 oz)  Body mass  index is 34.64 kg/m².    Intake/Output Summary (Last 24 hours) at 12/3/2022 1302  Last data filed at 12/3/2022 0701  Gross per 24 hour   Intake 261.43 ml   Output 1600 ml   Net -1338.57 ml        Physical Exam  Vitals and nursing note reviewed.   Constitutional:       General: He is not in acute distress.     Appearance: He is obese. He is ill-appearing. He is not toxic-appearing.      Interventions: He is sedated and restrained.   HENT:      Head: Normocephalic and atraumatic.      Nose: Nose normal. No rhinorrhea.   Eyes:      Pupils: Pupils are equal, round, and reactive to light.   Cardiovascular:      Rate and Rhythm: Normal rate.      Pulses: Normal pulses.      Heart sounds: No murmur heard.  Pulmonary:      Effort: No respiratory distress.      Breath sounds: No wheezing, rhonchi or rales.   Abdominal:      General: Abdomen is flat. There is no distension.      Tenderness: There is no abdominal tenderness.      Comments: Abdominal wound dehiscence.  No erythema or drainage   Musculoskeletal:         General: No deformity. Normal range of motion.      Cervical back: No rigidity.      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      Left Lower Extremity: Left leg is amputated below knee.   Skin:     General: Skin is dry.      Capillary Refill: Capillary refill takes less than 2 seconds.   Neurological:      Mental Status: He is easily aroused.      Comments: Drowsy  Oriented only to person today       Significant Labs: All pertinent labs within the past 24 hours have been reviewed.  BMP:   Recent Labs   Lab 12/03/22  0329   *   *   K 4.9      CO2 22*   BUN 36*   CREATININE 1.5*   CALCIUM 7.9*   MG 1.9       CBC:   Recent Labs   Lab 12/02/22  0328 12/03/22  0329   WBC 4.42 3.50*   HGB 7.8* 8.0*   HCT 21.0* 22.1*   * 111*         Significant Imaging: I have reviewed all pertinent imaging results/findings within the past 24 hours.      Assessment/Plan:      * NSTEMI,  "initial episode of care  · Patient had chest pain that radiated to his arm with elevated troponin  · No ST elevation on EKG  · Initial troponin was the peak at 4.3, down trended from there to 3.5  · Started ASA, Plavix, Statin and Heparin drip.  Cardiology consulted.  [facility] showing "LAD 90% mid - diffuse severe mid-distal disease, Cx- OM1 large - multiple 70-80% mid lesions with diffuse disease, RCA 50% mid, PDA 80% proximal - diffuse distal disease, PL 80%"  Cards recommending medical management.  Continue ASA, Plavix and Statin.    Anxiety  Patient with significant anxiety and restlessness post extubation.  Started on Precedex drip.  Added nightly Seroquel.  Will try to avoid benzo's  Patient very lethargic and confused today.  Wean off Precedex.  Avoid sedatives.  Per PCP home meds, seems to be on Lactulose.  Check Ammonia level.    Acute combined systolic and diastolic congestive heart failure  Currently seems euvolemic.      Wound dehiscence  Wound care      Essential hypertension  · Started on ARB and B blocker.  · Continue as tolerated  · Episodes of hypotension overnight that responded to IVF's.    CKD stage 3 due to type 2 diabetes mellitus  Patient's FSGs are uncontrolled due to hyperglycemia on current medication regimen.  Last A1c reviewed-   Lab Results   Component Value Date    LABA1C 11.8 (H) 06/13/2014    HGBA1C 10.9 (H) 11/28/2022     Most recent fingerstick glucose reviewed-   Recent Labs   Lab 12/02/22  1620 12/02/22  1929 12/03/22  0812 12/03/22  1102   POCTGLUCOSE 175* 224* 295* 192*       Antihyperglycemics (From admission, onward)    Start     Stop Route Frequency Ordered    12/01/22 2100  insulin detemir U-100 pen 10 Units         -- SubQ Nightly 12/01/22 1550    11/30/22 1410  insulin aspart U-100 pen 1-10 Units         -- SubQ Before meals & nightly PRN 11/30/22 1310        · Hold Oral hypoglycemics while patient is in the hospital.  SSI and q4h testing    Anemia of chronic " disorder  Patient's hemoglobin has dropped over the past 3 days.  No obvious evidence of bleeding.  Will get CT to rule out retroperitoneal bleed post [facility].  Closely monitor.  Transfuse if Hgb<7  Patient refused CT.  H/H now improving.      Bacteremia  Coag negative Staph on BCX.  Most likely contaminant, but patient was febrile.  Empirically started on Vanc and Zosyn.  Possible aspiration pneumonia.  Repeat BCx negative.  Stopped Vanc.      Type II diabetes mellitus with neurological manifestations  Continue insulin sliding scale.  Has remained hyperglycemic.  Increased nightly Levemir.      VTE Risk Mitigation (From admission, onward)         Ordered     Reason for No Pharmacological VTE Prophylaxis  Once        Question:  Reasons:  Answer:  Physician Provided (leave comment)  Comment:  On heparin drip    11/29/22 0243     IP VTE HIGH RISK PATIENT  Once         11/29/22 0243     Place sequential compression device  Until discontinued         11/29/22 0243                Discharge Planning   BEBA: 12/4/2022     Code Status: Full Code   Is the patient medically ready for discharge?:     Reason for patient still in hospital (select all that apply): Patient trending condition  Discharge Plan A:  (tbd)            Pranay Michaels MD  Department of Hospital Medicine   [facility] [place] - Intensive Care

## 2022-12-03 NOTE — ASSESSMENT & PLAN NOTE
"· Patient had chest pain that radiated to his arm with elevated troponin  · No ST elevation on EKG  · Initial troponin was the peak at 4.3, down trended from there to 3.5  · Started ASA, Plavix, Statin and Heparin drip.  Cardiology consulted.  [facility] showing "LAD 90% mid - diffuse severe mid-distal disease, Cx- OM1 large - multiple 70-80% mid lesions with diffuse disease, RCA 50% mid, PDA 80% proximal - diffuse distal disease, PL 80%"  Cards recommending medical management.  Continue ASA, Plavix and Statin.  "

## 2022-12-04 LAB
ALBUMIN SERPL BCP-MCNC: 2.8 G/DL (ref 3.5–5.2)
ALP SERPL-CCNC: 65 U/L (ref 55–135)
ALT SERPL W/O P-5'-P-CCNC: 15 U/L (ref 10–44)
ANION GAP SERPL CALC-SCNC: 8 MMOL/L (ref 8–16)
AST SERPL-CCNC: 18 U/L (ref 10–40)
BACTERIA BLD CULT: NORMAL
BACTERIA BLD CULT: NORMAL
BASOPHILS # BLD AUTO: 0.01 K/UL (ref 0–0.2)
BASOPHILS NFR BLD: 0.2 % (ref 0–1.9)
BILIRUB SERPL-MCNC: 1 MG/DL (ref 0.1–1)
BUN SERPL-MCNC: 33 MG/DL (ref 8–23)
CALCIUM SERPL-MCNC: 8.5 MG/DL (ref 8.7–10.5)
CHLORIDE SERPL-SCNC: 105 MMOL/L (ref 95–110)
CO2 SERPL-SCNC: 24 MMOL/L (ref 23–29)
CREAT SERPL-MCNC: 1.4 MG/DL (ref 0.5–1.4)
DIFFERENTIAL METHOD: ABNORMAL
EOSINOPHIL # BLD AUTO: 0.1 K/UL (ref 0–0.5)
EOSINOPHIL NFR BLD: 2.5 % (ref 0–8)
ERYTHROCYTE [DISTWIDTH] IN BLOOD BY AUTOMATED COUNT: 13.9 % (ref 11.5–14.5)
EST. GFR  (NO RACE VARIABLE): 57 ML/MIN/1.73 M^2
GLUCOSE SERPL-MCNC: 167 MG/DL (ref 70–110)
HCT VFR BLD AUTO: 23.2 % (ref 40–54)
HGB BLD-MCNC: 8.6 G/DL (ref 14–18)
IMM GRANULOCYTES # BLD AUTO: 0.02 K/UL (ref 0–0.04)
IMM GRANULOCYTES NFR BLD AUTO: 0.5 % (ref 0–0.5)
LYMPHOCYTES # BLD AUTO: 1 K/UL (ref 1–4.8)
LYMPHOCYTES NFR BLD: 24.2 % (ref 18–48)
MAGNESIUM SERPL-MCNC: 1.9 MG/DL (ref 1.6–2.6)
MCH RBC QN AUTO: 43.4 PG (ref 27–31)
MCHC RBC AUTO-ENTMCNC: 37.1 G/DL (ref 32–36)
MCV RBC AUTO: 117 FL (ref 82–98)
MONOCYTES # BLD AUTO: 0.3 K/UL (ref 0.3–1)
MONOCYTES NFR BLD: 7 % (ref 4–15)
NEUTROPHILS # BLD AUTO: 2.6 K/UL (ref 1.8–7.7)
NEUTROPHILS NFR BLD: 65.6 % (ref 38–73)
NRBC BLD-RTO: 0 /100 WBC
OB PNL STL: NEGATIVE
PLATELET # BLD AUTO: 135 K/UL (ref 150–450)
PMV BLD AUTO: 10.2 FL (ref 9.2–12.9)
POCT GLUCOSE: 182 MG/DL (ref 70–110)
POCT GLUCOSE: 213 MG/DL (ref 70–110)
POCT GLUCOSE: 231 MG/DL (ref 70–110)
POCT GLUCOSE: 294 MG/DL (ref 70–110)
POTASSIUM SERPL-SCNC: 4.3 MMOL/L (ref 3.5–5.1)
PROT SERPL-MCNC: 6.4 G/DL (ref 6–8.4)
RBC # BLD AUTO: 1.98 M/UL (ref 4.6–6.2)
SODIUM SERPL-SCNC: 137 MMOL/L (ref 136–145)
WBC # BLD AUTO: 4.01 K/UL (ref 3.9–12.7)

## 2022-12-04 PROCEDURE — 63600175 PHARM REV CODE 636 W HCPCS: Performed by: NURSE PRACTITIONER

## 2022-12-04 PROCEDURE — 80053 COMPREHEN METABOLIC PANEL: CPT | Performed by: STUDENT IN AN ORGANIZED HEALTH CARE EDUCATION/TRAINING PROGRAM

## 2022-12-04 PROCEDURE — 85025 COMPLETE CBC W/AUTO DIFF WBC: CPT | Performed by: STUDENT IN AN ORGANIZED HEALTH CARE EDUCATION/TRAINING PROGRAM

## 2022-12-04 PROCEDURE — 25000003 PHARM REV CODE 250: Performed by: HOSPITALIST

## 2022-12-04 PROCEDURE — 63600175 PHARM REV CODE 636 W HCPCS: Performed by: HOSPITALIST

## 2022-12-04 PROCEDURE — S4991 NICOTINE PATCH NONLEGEND: HCPCS | Performed by: HOSPITALIST

## 2022-12-04 PROCEDURE — C9113 INJ PANTOPRAZOLE SODIUM, VIA: HCPCS | Performed by: SURGERY

## 2022-12-04 PROCEDURE — 83735 ASSAY OF MAGNESIUM: CPT | Performed by: STUDENT IN AN ORGANIZED HEALTH CARE EDUCATION/TRAINING PROGRAM

## 2022-12-04 PROCEDURE — 36415 COLL VENOUS BLD VENIPUNCTURE: CPT | Performed by: STUDENT IN AN ORGANIZED HEALTH CARE EDUCATION/TRAINING PROGRAM

## 2022-12-04 PROCEDURE — 82272 OCCULT BLD FECES 1-3 TESTS: CPT | Performed by: HOSPITALIST

## 2022-12-04 PROCEDURE — 25000003 PHARM REV CODE 250: Performed by: NURSE PRACTITIONER

## 2022-12-04 PROCEDURE — 63600175 PHARM REV CODE 636 W HCPCS: Performed by: SURGERY

## 2022-12-04 PROCEDURE — G0378 HOSPITAL OBSERVATION PER HR: HCPCS

## 2022-12-04 PROCEDURE — C9113 INJ PANTOPRAZOLE SODIUM, VIA: HCPCS | Performed by: HOSPITALIST

## 2022-12-04 PROCEDURE — 20000000 HC ICU ROOM

## 2022-12-04 RX ORDER — AMOXICILLIN AND CLAVULANATE POTASSIUM 875; 125 MG/1; MG/1
1 TABLET, FILM COATED ORAL EVERY 12 HOURS
Status: COMPLETED | OUTPATIENT
Start: 2022-12-04 | End: 2022-12-05

## 2022-12-04 RX ORDER — METOPROLOL TARTRATE 25 MG/1
12.5 TABLET ORAL 2 TIMES DAILY
Status: DISCONTINUED | OUTPATIENT
Start: 2022-12-04 | End: 2022-12-06 | Stop reason: HOSPADM

## 2022-12-04 RX ADMIN — AMOXICILLIN AND CLAVULANATE POTASSIUM 1 TABLET: 875; 125 TABLET, FILM COATED ORAL at 10:12

## 2022-12-04 RX ADMIN — ESCITALOPRAM OXALATE 10 MG: 10 TABLET ORAL at 08:12

## 2022-12-04 RX ADMIN — METOPROLOL TARTRATE 12.5 MG: 25 TABLET, FILM COATED ORAL at 09:12

## 2022-12-04 RX ADMIN — AMOXICILLIN AND CLAVULANATE POTASSIUM 1 TABLET: 875; 125 TABLET, FILM COATED ORAL at 09:12

## 2022-12-04 RX ADMIN — TAMSULOSIN HYDROCHLORIDE 0.4 MG: 0.4 CAPSULE ORAL at 08:12

## 2022-12-04 RX ADMIN — INSULIN ASPART 2 UNITS: 100 INJECTION, SOLUTION INTRAVENOUS; SUBCUTANEOUS at 07:12

## 2022-12-04 RX ADMIN — GABAPENTIN 100 MG: 100 CAPSULE ORAL at 08:12

## 2022-12-04 RX ADMIN — PANTOPRAZOLE SODIUM 40 MG: 40 INJECTION, POWDER, FOR SOLUTION INTRAVENOUS at 08:12

## 2022-12-04 RX ADMIN — INSULIN ASPART 2 UNITS: 100 INJECTION, SOLUTION INTRAVENOUS; SUBCUTANEOUS at 09:12

## 2022-12-04 RX ADMIN — LOSARTAN POTASSIUM 12.5 MG: 25 TABLET, FILM COATED ORAL at 08:12

## 2022-12-04 RX ADMIN — QUETIAPINE FUMARATE 25 MG: 25 TABLET ORAL at 09:12

## 2022-12-04 RX ADMIN — ATORVASTATIN CALCIUM 40 MG: 40 TABLET, FILM COATED ORAL at 08:12

## 2022-12-04 RX ADMIN — Medication 1 PATCH: at 08:12

## 2022-12-04 RX ADMIN — INSULIN ASPART 6 UNITS: 100 INJECTION, SOLUTION INTRAVENOUS; SUBCUTANEOUS at 04:12

## 2022-12-04 RX ADMIN — PIPERACILLIN SODIUM, TAZOBACTAM SODIUM 4.5 G: 4; .5 INJECTION, POWDER, LYOPHILIZED, FOR SOLUTION INTRAVENOUS at 03:12

## 2022-12-04 RX ADMIN — FUROSEMIDE 20 MG: 20 TABLET ORAL at 08:12

## 2022-12-04 RX ADMIN — GABAPENTIN 100 MG: 100 CAPSULE ORAL at 03:12

## 2022-12-04 RX ADMIN — CLOPIDOGREL BISULFATE 75 MG: 75 TABLET ORAL at 08:12

## 2022-12-04 RX ADMIN — METOPROLOL TARTRATE 12.5 MG: 25 TABLET, FILM COATED ORAL at 10:12

## 2022-12-04 RX ADMIN — GABAPENTIN 100 MG: 100 CAPSULE ORAL at 09:12

## 2022-12-04 RX ADMIN — ASPIRIN 81 MG CHEWABLE TABLET 81 MG: 81 TABLET CHEWABLE at 08:12

## 2022-12-04 RX ADMIN — INSULIN ASPART 4 UNITS: 100 INJECTION, SOLUTION INTRAVENOUS; SUBCUTANEOUS at 11:12

## 2022-12-04 NOTE — ASSESSMENT & PLAN NOTE
Patient with significant anxiety and restlessness post extubation.  Started on Precedex drip.  Added nightly Seroquel.  Will try to avoid benzo's  Patient very lethargic and confused.  Weaned off Precedex.  Avoid sedatives.  Doing much better today.

## 2022-12-04 NOTE — PROVIDER TRANSFER
Transfer Note      60 y/o male admitted with acute respiratory failure on vent (possibly related to sedation for anxiety/agitation).  Troponin elevated and consistent with NSTEMI.  Started on ASA, Plavix, Statin and Heparin drip.  Pulmonary consulted for vent management.  LHC on 11/29 showing LAD 90% mid - diffuse severe mid-distal disease, Cx- OM1 large - multiple 70-80% mid lesions with diffuse disease, RCA 50% mid, PDA 80% proximal - diffuse distal disease, PL 80%.  Cardiology recommending medical management.  Patient febrile and empirically started on ABX's.  Possible aspiration pneumonia.  Coag negative Staph on BCx possible contaminant.  Patient extubated on 11/30.  Did well oxygenation wise, but with significant anxiety and restlessness.  Placed on Precedex drip.  Also with episodes of hypotension that responded to IVF's.  Mentation much improved.  Off Precedex drip.  Hemodynamically stable.  Changed ABx's to PO.  Probably ok to go home in 1-2 days.

## 2022-12-04 NOTE — ASSESSMENT & PLAN NOTE
"· Patient had chest pain that radiated to his arm with elevated troponin  · No ST elevation on EKG  · Initial troponin was the peak at 4.3, down trended from there to 3.5  · Started ASA, Plavix, Statin and Heparin drip.  Cardiology consulted.  [facility] showing "LAD 90% mid - diffuse severe mid-distal disease, Cx- OM1 large - multiple 70-80% mid lesions with diffuse disease, RCA 50% mid, PDA 80% proximal - diffuse distal disease, PL 80%"  Cards recommending medical management.  Continue ASA, Plavix and Statin.  "

## 2022-12-04 NOTE — CARE UPDATE
[facility]  Nurses Note -- 4 Eyes      12/4/2022       Skin assessed on: Q Shift      [x] No Pressure Injuries Present    [x]Prevention Measures Documented    [] Yes LDA  for Pressure Injury Previously documented     [] Yes New Pressure Injury Discovered   [] LDA for New Pressure Injury Added      Attending RN:  Louise Palencia RN     Second RN:  Kristine Moser RN

## 2022-12-04 NOTE — ASSESSMENT & PLAN NOTE
Patient's FSGs are uncontrolled due to hyperglycemia on current medication regimen.  Last A1c reviewed-   Lab Results   Component Value Date    LABA1C 11.8 (H) 06/13/2014    HGBA1C 10.9 (H) 11/28/2022     Most recent fingerstick glucose reviewed-   Recent Labs   Lab 12/03/22  1102 12/03/22  1615 12/03/22  1922 12/04/22  0739   POCTGLUCOSE 192* 169* 179* 182*       Antihyperglycemics (From admission, onward)    Start     Stop Route Frequency Ordered    12/01/22 2100  insulin detemir U-100 pen 10 Units         -- SubQ Nightly 12/01/22 1550    11/30/22 1410  insulin aspart U-100 pen 1-10 Units         -- SubQ Before meals & nightly PRN 11/30/22 1310        · Hold Oral hypoglycemics while patient is in the hospital.  SSI and q4h testing

## 2022-12-04 NOTE — PLAN OF CARE
Patient more awake and oriented x 4 today.   Transfer orders placed, awaits room on telemetry.   No complaints of pain.   Coughs up a lot of thick phlegm.   Remains on antibiotics.   Attempted to stand at side of bed with prosthesis on,  but very wobbley---PT/OT consulted.   Sisters visit.

## 2022-12-04 NOTE — PROGRESS NOTES
[facility] Medicine  Progress Note    Patient Name: Carlos Cifuentes Jr.  MRN: [id number]  Patient Class: IP- Inpatient   Admission Date: 11/28/2022  Length of Stay: 5 days  Attending Physician: Pranay Michaels MD  Primary Care Provider: Miguel Lux MD        Subjective:     Principal Problem:NSTEMI, initial episode of care        HPI:    Carlos Cifuentes Jr. is a 61 y.o. male who has a past medical history of Arthritis, Back pain, Bulging lumbar disc, C. difficile diarrhea, Chronic back pain, Diabetes mellitus, Diabetes mellitus type II, DM, type 2, uncontrolled, Hepatitis C, MSSA septicemia, Neuromuscular disorder, Neuropathy, and Staph aureus infection, presented to the ED with CC of SOB, abdominal pain, nausea with vomiting, and Headache.  Patient was intubated upon arrival to ED after receiving 2 mg of IV Ativan for anxiety, as he became obtunded and had difficulty breathing; therefore, HPI from EMS and ED report:     Initial EMS call was for abdominal pain with vomiting and hyperglycemia.  EMS found patient with vomiting but also anxiety. EMS said that patient would not allow them to check vitals, so EMS administered IV Ativan 2 mg, and patient then became obtunded and had difficulty breathing.  EMS reports  and /93 s/p ativan.  EMS states patient administered SC insulin 28 units prior to their arrival, but it is unclear which insulin this is. Patient's sister arrived to ER later and supplemented HPI.  She reports that patient had substernal acid reflux like chest pain with radiation to his left arm yesterday Sunday 11/27/22 and went to the ER at [facility].  However he was in the lobby for several hours and was not seen.  He then left. His chest pain recurred tonight Monday 11/28/22, he called his sister to tell her that he felt bad and was short of breath.  She urged to call 911, which he did.  She suspects that patient became anxious and agitated when EMS strapped him  to their gurney, as patient has a history of claustrophobia relating to a childhood incident in which he was papoosed to get staples in his scalp.    In the ED, patient was intubated and sedated with propofol.  His blood pressure was still 200/100, despite propofol use.  His troponin was 4 without ST elevation and lactic acid 4.9, he was started on NSTEMI protocol with heparin drip and given fluids.  Mild reactive leukocytosis corrected with fluids only.        Overview/Hospital Course:  62 y/o male admitted with acute respiratory failure on vent.  Troponin elevated and consistent with NSTEMI.  Started on ASA, Plavix, Statin and Heparin drip.  Pulmonary consulted for vent management.  LHC on 11/29 showing LAD 90% mid - diffuse severe mid-distal disease, Cx- OM1 large - multiple 70-80% mid lesions with diffuse disease, RCA 50% mid, PDA 80% proximal - diffuse distal disease, PL 80%.  Cardiology recommending medical management.  Patient febrile and empirically started on ABX's.  Coag negative Staph on BCx possible contaminant.  Patient extubated on 11/30.  Did well oxygenation wise, but with significant anxiety and restlessness.  Placed on Precedex drip.  Also with episodes of hypotension that responded to IVF's.      Interval History: doing much better today.    Review of Systems   HENT:  Negative for ear discharge and ear pain.    Eyes:  Negative for discharge.   Endocrine: Negative for cold intolerance and heat intolerance.   Neurological:  Negative for seizures and syncope.   Objective:     Vital Signs (Most Recent):  Temp: 98.5 °F (36.9 °C) (12/04/22 0701)  Pulse: 88 (12/04/22 1014)  Resp: (!) 30 (12/04/22 1014)  BP: 136/77 (12/04/22 1014)  SpO2: 100 % (12/04/22 1014)   Vital Signs (24h Range):  Temp:  [97.6 °F (36.4 °C)-98.5 °F (36.9 °C)] 98.5 °F (36.9 °C)  Pulse:  [] 88  Resp:  [17-37] 30  SpO2:  [74 %-100 %] 100 %  BP: (117-146)/(49-77) 136/77     Weight: 106.4 kg (234 lb 9.1 oz)  Body mass index is 34.64  kg/m².    Intake/Output Summary (Last 24 hours) at 12/4/2022 1039  Last data filed at 12/4/2022 0400  Gross per 24 hour   Intake 242.13 ml   Output 2005 ml   Net -1762.87 ml        Physical Exam  Vitals and nursing note reviewed.   Constitutional:       General: He is not in acute distress.     Appearance: He is obese. He is ill-appearing. He is not toxic-appearing.      Interventions: He is sedated and restrained.   HENT:      Head: Normocephalic and atraumatic.      Nose: Nose normal. No rhinorrhea.   Eyes:      Pupils: Pupils are equal, round, and reactive to light.   Cardiovascular:      Rate and Rhythm: Normal rate.      Pulses: Normal pulses.      Heart sounds: No murmur heard.  Pulmonary:      Effort: No respiratory distress.      Breath sounds: No wheezing, rhonchi or rales.   Abdominal:      General: Abdomen is flat. There is no distension.      Tenderness: There is no abdominal tenderness.      Comments: Abdominal wound dehiscence.  No erythema or drainage   Musculoskeletal:         General: No deformity. Normal range of motion.      Cervical back: No rigidity.      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      Left Lower Extremity: Left leg is amputated below knee.   Skin:     General: Skin is dry.      Capillary Refill: Capillary refill takes less than 2 seconds.   Neurological:      Mental Status: He is easily aroused. Mental status is at baseline.      Cranial Nerves: No cranial nerve deficit.       Significant Labs: All pertinent labs within the past 24 hours have been reviewed.  BMP:   Recent Labs   Lab 12/04/22  0418   *      K 4.3      CO2 24   BUN 33*   CREATININE 1.4   CALCIUM 8.5*   MG 1.9       CBC:   Recent Labs   Lab 12/03/22  0329 12/04/22  0418   WBC 3.50* 4.01   HGB 8.0* 8.6*   HCT 22.1* 23.2*   * 135*         Significant Imaging: I have reviewed all pertinent imaging results/findings within the past 24 hours.      Assessment/Plan:      * NSTEMI,  "initial episode of care  · Patient had chest pain that radiated to his arm with elevated troponin  · No ST elevation on EKG  · Initial troponin was the peak at 4.3, down trended from there to 3.5  · Started ASA, Plavix, Statin and Heparin drip.  Cardiology consulted.  [facility] showing "LAD 90% mid - diffuse severe mid-distal disease, Cx- OM1 large - multiple 70-80% mid lesions with diffuse disease, RCA 50% mid, PDA 80% proximal - diffuse distal disease, PL 80%"  Cards recommending medical management.  Continue ASA, Plavix and Statin.    Anxiety  Patient with significant anxiety and restlessness post extubation.  Started on Precedex drip.  Added nightly Seroquel.  Will try to avoid benzo's  Patient very lethargic and confused.  Weaned off Precedex.  Avoid sedatives.  Doing much better today.    Acute combined systolic and diastolic congestive heart failure  Currently seems euvolemic.  Continue oral Lasix.      Wound dehiscence  Wound care      Essential hypertension  · Started on ARB and B blocker.  · Continue as tolerated    CKD stage 3 due to type 2 diabetes mellitus  Patient's FSGs are uncontrolled due to hyperglycemia on current medication regimen.  Last A1c reviewed-   Lab Results   Component Value Date    LABA1C 11.8 (H) 06/13/2014    HGBA1C 10.9 (H) 11/28/2022     Most recent fingerstick glucose reviewed-   Recent Labs   Lab 12/03/22  1102 12/03/22  1615 12/03/22  1922 12/04/22  0739   POCTGLUCOSE 192* 169* 179* 182*       Antihyperglycemics (From admission, onward)    Start     Stop Route Frequency Ordered    12/01/22 2100  insulin detemir U-100 pen 10 Units         -- SubQ Nightly 12/01/22 1550    11/30/22 1410  insulin aspart U-100 pen 1-10 Units         -- SubQ Before meals & nightly PRN 11/30/22 1310        · Hold Oral hypoglycemics while patient is in the hospital.  SSI and q4h testing    Anemia of chronic disorder  Patient's hemoglobin initially dropped.  No obvious evidence of bleeding.  Will get CT to rule " out retroperitoneal bleed post [facility].  Closely monitor.  Transfuse if Hgb<7  Patient refused CT.  H/H now improving.      Bacteremia  Coag negative Staph on BCX.  Most likely contaminant, but patient was febrile.  Empirically started on Vanc and Zosyn.  Possible aspiration pneumonia.  Repeat BCx negative.  Stopped Vanc.  5 days of Zosyn.  Change to Augmentin for 2 more days.      Type II diabetes mellitus with neurological manifestations  Continue insulin sliding scale.  Continue current insulin regimen.      VTE Risk Mitigation (From admission, onward)         Ordered     Reason for No Pharmacological VTE Prophylaxis  Once        Question:  Reasons:  Answer:  Physician Provided (leave comment)  Comment:  On heparin drip    11/29/22 0243     IP VTE HIGH RISK PATIENT  Once         11/29/22 0243     Place sequential compression device  Until discontinued         11/29/22 0243                Discharge Planning   BEBA: 12/4/2022     Code Status: Full Code   Is the patient medically ready for discharge?:     Reason for patient still in hospital (select all that apply): Patient trending condition  Discharge Plan A:  (tbd)            Pranay Michaels MD  Department of Hospital Medicine   [facility] [place] - Intensive Care

## 2022-12-04 NOTE — ASSESSMENT & PLAN NOTE
Patient's hemoglobin initially dropped.  No obvious evidence of bleeding.  Will get CT to rule out retroperitoneal bleed post [facility].  Closely monitor.  Transfuse if Hgb<7  Patient refused CT.  H/H now improving.

## 2022-12-04 NOTE — ASSESSMENT & PLAN NOTE
Coag negative Staph on BCX.  Most likely contaminant, but patient was febrile.  Empirically started on Vanc and Zosyn.  Possible aspiration pneumonia.  Repeat BCx negative.  Stopped Vanc.  5 days of Zosyn.  Change to Augmentin for 2 more days.

## 2022-12-04 NOTE — CARE UPDATE
[facility] Intensive Care  ICU Shift Summary  Date: 12/4/2022      Prehospitalization: Home  Admit Date / LOS : 11/28/2022/ 5 days    Diagnosis: NSTEMI, initial episode of care    Consults:        Active: Cardio, OT, and PT       Needed: SW     Code Status: Full Code   Advanced Directive: Received    LDA:  Lines/Drains/Airways       Peripheral Intravenous Line  Duration                  Peripheral IV - Single Lumen 12/04/22 0925 22 G Anterior;Distal;Left Forearm <1 day                  Central Lines/Site/Justification:Patient Does Not Have Central Line  Urinary Cath/Order/Justification:Patient Does Not Have Urinary Catheter    Vasopressors/Infusions:        GOALS: Volume/ Hemodynamic: SBP >                     RASS: 0  alert and calm    Pain Management: PO       Pain Controlled: yes     Rhythm: NSR    Respiratory Device: Room Air                  Most Recent SBT/ SAT: N/A       MOVE Screen: FAIL  ICU Liberation: not applicable    VTE Prophylaxis: Ambulation  Mobility: Bedrest  Stress Ulcer Prophylaxis: Yes    Isolation: No active isolations    Dietary:   Current Diet Order   Procedures    Diet diabetic [facility]; 2000 Calorie; Cardiac (Low Na/Chol)     Order Specific Question:   Indicate patient location for additional diet options:     Answer:   [facility]     Order Specific Question:   Total calories:     Answer:   2000 Calorie     Order Specific Question:   Additional Diet Options:     Answer:   Cardiac (Low Na/Chol)      Tolerance: yes  Advancement: @ goal    I & O (24h):    Intake/Output Summary (Last 24 hours) at 12/4/2022 1447  Last data filed at 12/4/2022 1219  Gross per 24 hour   Intake 240 ml   Output 1525 ml   Net -1285 ml        Restraints: No    Significant Dates:  Post Op Date: N/A  Rescue Date: N/A  Imaging/ Diagnostics: N/A    Noteworthy Labs:  see below    COVID Test: (--)  CBC/Anemia Labs: Coags:    Recent Labs   Lab 11/28/22  2308 11/29/22  0340 12/03/22  0329 12/04/22  0418  12/04/22  1018   WBC 13.42*   < > 3.50* 4.01  --    HGB 11.6*   < > 8.0* 8.6*  --    HCT 31.7*   < > 22.1* 23.2*  --       < > 111* 135*  --    *   < > 121* 117*  --    RDW 14.5   < > 13.9 13.9  --    FOLATE 16.0  --   --   --   --    [id number] <148*  --   --   --   --    OCCULTBLOOD  --   --   --   --  Negative    < > = values in this interval not displayed.    Recent Labs   Lab 11/28/22 2308 11/29/22 0340 11/29/22  0612   INR 1.0  --   --    APTT 25.4 35.7* 25.4        Chemistries:   Recent Labs   Lab 11/28/22 2308 11/29/22 0340 11/30/22  0401 12/03/22  0329 12/04/22  0418   * 134*   < > 132* 137   K 3.8 4.6   < > 4.9 4.3    106   < > 102 105   CO2 19* 20*   < > 22* 24   BUN 26* 24*   < > 36* 33*   CREATININE 1.8* 1.4   < > 1.5* 1.4   CALCIUM 8.8 8.3*   < > 7.9* 8.5*   PROT 7.4 6.2   < > 6.3 6.4   BILITOT 0.6 0.5   < > 0.9 1.0   ALKPHOS 77 50*   < > 57 65   ALT 23 20   < > 15 15   AST 28 22   < > 16 18   MG 1.8 1.7   < > 1.9 1.9   PHOS 3.5 2.5*  --   --   --     < > = values in this interval not displayed.        Cardiac Enzymes: Ejection Fractions:    No results for input(s): CPK, CPKMB, MB, TROPONINI in the last 72 hours. EF   Date Value Ref Range Status   11/29/2022 40 % Final        POCT Glucose: HbA1c:    Recent Labs   Lab 12/03/22  1922 12/04/22  0739 12/04/22  1058   POCTGLUCOSE 179* 182* 231*    Hemoglobin A1C   Date Value Ref Range Status   11/28/2022 10.9 (H) 4.0 - 5.6 % Final     Comment:     ADA Screening Guidelines:  5.7-6.4%  Consistent with prediabetes  >or=6.5%  Consistent with diabetes    High levels of fetal hemoglobin interfere with the HbA1C  assay. Heterozygous hemoglobin variants (HbS, HgC, etc)do  not significantly interfere with this assay.   However, presence of multiple variants may affect accuracy.             ICU LOS 5d 13h  Level of Care: OK to Transfer    Chart Check: 12 HR Done  Shift Summary/Plan for the shift: see care plan note

## 2022-12-04 NOTE — SUBJECTIVE & OBJECTIVE
Interval History: doing much better today.    Review of Systems   HENT:  Negative for ear discharge and ear pain.    Eyes:  Negative for discharge.   Endocrine: Negative for cold intolerance and heat intolerance.   Neurological:  Negative for seizures and syncope.   Objective:     Vital Signs (Most Recent):  Temp: 98.5 °F (36.9 °C) (12/04/22 0701)  Pulse: 88 (12/04/22 1014)  Resp: (!) 30 (12/04/22 1014)  BP: 136/77 (12/04/22 1014)  SpO2: 100 % (12/04/22 1014)   Vital Signs (24h Range):  Temp:  [97.6 °F (36.4 °C)-98.5 °F (36.9 °C)] 98.5 °F (36.9 °C)  Pulse:  [] 88  Resp:  [17-37] 30  SpO2:  [74 %-100 %] 100 %  BP: (117-146)/(49-77) 136/77     Weight: 106.4 kg (234 lb 9.1 oz)  Body mass index is 34.64 kg/m².    Intake/Output Summary (Last 24 hours) at 12/4/2022 1039  Last data filed at 12/4/2022 0400  Gross per 24 hour   Intake 242.13 ml   Output 2005 ml   Net -1762.87 ml        Physical Exam  Vitals and nursing note reviewed.   Constitutional:       General: He is not in acute distress.     Appearance: He is obese. He is ill-appearing. He is not toxic-appearing.      Interventions: He is sedated and restrained.   HENT:      Head: Normocephalic and atraumatic.      Nose: Nose normal. No rhinorrhea.   Eyes:      Pupils: Pupils are equal, round, and reactive to light.   Cardiovascular:      Rate and Rhythm: Normal rate.      Pulses: Normal pulses.      Heart sounds: No murmur heard.  Pulmonary:      Effort: No respiratory distress.      Breath sounds: No wheezing, rhonchi or rales.   Abdominal:      General: Abdomen is flat. There is no distension.      Tenderness: There is no abdominal tenderness.      Comments: Abdominal wound dehiscence.  No erythema or drainage   Musculoskeletal:         General: No deformity. Normal range of motion.      Cervical back: No rigidity.      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      Left Lower Extremity: Left leg is amputated below knee.   Skin:     General:  Skin is dry.      Capillary Refill: Capillary refill takes less than 2 seconds.   Neurological:      Mental Status: He is easily aroused. Mental status is at baseline.      Cranial Nerves: No cranial nerve deficit.       Significant Labs: All pertinent labs within the past 24 hours have been reviewed.  BMP:   Recent Labs   Lab 12/04/22 0418   *      K 4.3      CO2 24   BUN 33*   CREATININE 1.4   CALCIUM 8.5*   MG 1.9       CBC:   Recent Labs   Lab 12/03/22  0329 12/04/22 0418   WBC 3.50* 4.01   HGB 8.0* 8.6*   HCT 22.1* 23.2*   * 135*         Significant Imaging: I have reviewed all pertinent imaging results/findings within the past 24 hours.

## 2022-12-05 LAB
ALBUMIN SERPL BCP-MCNC: 2.7 G/DL (ref 3.5–5.2)
ALP SERPL-CCNC: 53 U/L (ref 55–135)
ALT SERPL W/O P-5'-P-CCNC: 11 U/L (ref 10–44)
ANION GAP SERPL CALC-SCNC: 6 MMOL/L (ref 8–16)
AST SERPL-CCNC: 16 U/L (ref 10–40)
BASOPHILS # BLD AUTO: 0.02 K/UL (ref 0–0.2)
BASOPHILS NFR BLD: 0.5 % (ref 0–1.9)
BILIRUB SERPL-MCNC: 0.8 MG/DL (ref 0.1–1)
BUN SERPL-MCNC: 26 MG/DL (ref 8–23)
CALCIUM SERPL-MCNC: 8.4 MG/DL (ref 8.7–10.5)
CHLORIDE SERPL-SCNC: 106 MMOL/L (ref 95–110)
CO2 SERPL-SCNC: 26 MMOL/L (ref 23–29)
CREAT SERPL-MCNC: 1.2 MG/DL (ref 0.5–1.4)
DIFFERENTIAL METHOD: ABNORMAL
EOSINOPHIL # BLD AUTO: 0.1 K/UL (ref 0–0.5)
EOSINOPHIL NFR BLD: 2.2 % (ref 0–8)
ERYTHROCYTE [DISTWIDTH] IN BLOOD BY AUTOMATED COUNT: 13.8 % (ref 11.5–14.5)
EST. GFR  (NO RACE VARIABLE): >60 ML/MIN/1.73 M^2
GLUCOSE SERPL-MCNC: 163 MG/DL (ref 70–110)
HCT VFR BLD AUTO: 22.4 % (ref 40–54)
HGB BLD-MCNC: 8.2 G/DL (ref 14–18)
IMM GRANULOCYTES # BLD AUTO: 0.05 K/UL (ref 0–0.04)
IMM GRANULOCYTES NFR BLD AUTO: 1.4 % (ref 0–0.5)
LYMPHOCYTES # BLD AUTO: 1.3 K/UL (ref 1–4.8)
LYMPHOCYTES NFR BLD: 34.6 % (ref 18–48)
MAGNESIUM SERPL-MCNC: 1.9 MG/DL (ref 1.6–2.6)
MCH RBC QN AUTO: 42.7 PG (ref 27–31)
MCHC RBC AUTO-ENTMCNC: 36.6 G/DL (ref 32–36)
MCV RBC AUTO: 117 FL (ref 82–98)
MONOCYTES # BLD AUTO: 0.3 K/UL (ref 0.3–1)
MONOCYTES NFR BLD: 7 % (ref 4–15)
NEUTROPHILS # BLD AUTO: 2 K/UL (ref 1.8–7.7)
NEUTROPHILS NFR BLD: 54.3 % (ref 38–73)
NRBC BLD-RTO: 0 /100 WBC
PLATELET # BLD AUTO: 133 K/UL (ref 150–450)
PMV BLD AUTO: 9.6 FL (ref 9.2–12.9)
POCT GLUCOSE: 176 MG/DL (ref 70–110)
POCT GLUCOSE: 254 MG/DL (ref 70–110)
POCT GLUCOSE: 282 MG/DL (ref 70–110)
POCT GLUCOSE: 333 MG/DL (ref 70–110)
POTASSIUM SERPL-SCNC: 4.3 MMOL/L (ref 3.5–5.1)
PROT SERPL-MCNC: 6 G/DL (ref 6–8.4)
RBC # BLD AUTO: 1.92 M/UL (ref 4.6–6.2)
SODIUM SERPL-SCNC: 138 MMOL/L (ref 136–145)
WBC # BLD AUTO: 3.7 K/UL (ref 3.9–12.7)

## 2022-12-05 PROCEDURE — S4991 NICOTINE PATCH NONLEGEND: HCPCS | Performed by: HOSPITALIST

## 2022-12-05 PROCEDURE — 36415 COLL VENOUS BLD VENIPUNCTURE: CPT | Performed by: HOSPITALIST

## 2022-12-05 PROCEDURE — 21400001 HC TELEMETRY ROOM

## 2022-12-05 PROCEDURE — C9113 INJ PANTOPRAZOLE SODIUM, VIA: HCPCS | Performed by: HOSPITALIST

## 2022-12-05 PROCEDURE — 97535 SELF CARE MNGMENT TRAINING: CPT

## 2022-12-05 PROCEDURE — G0378 HOSPITAL OBSERVATION PER HR: HCPCS

## 2022-12-05 PROCEDURE — 97530 THERAPEUTIC ACTIVITIES: CPT

## 2022-12-05 PROCEDURE — 97165 OT EVAL LOW COMPLEX 30 MIN: CPT

## 2022-12-05 PROCEDURE — 63600175 PHARM REV CODE 636 W HCPCS: Performed by: HOSPITALIST

## 2022-12-05 PROCEDURE — 85025 COMPLETE CBC W/AUTO DIFF WBC: CPT | Performed by: HOSPITALIST

## 2022-12-05 PROCEDURE — 83735 ASSAY OF MAGNESIUM: CPT | Performed by: HOSPITALIST

## 2022-12-05 PROCEDURE — 80053 COMPREHEN METABOLIC PANEL: CPT | Performed by: HOSPITALIST

## 2022-12-05 PROCEDURE — 25000003 PHARM REV CODE 250: Performed by: HOSPITALIST

## 2022-12-05 PROCEDURE — 27000221 HC OXYGEN, UP TO 24 HOURS

## 2022-12-05 PROCEDURE — 97161 PT EVAL LOW COMPLEX 20 MIN: CPT

## 2022-12-05 RX ORDER — PANTOPRAZOLE SODIUM 40 MG/1
40 TABLET, DELAYED RELEASE ORAL DAILY
Status: DISCONTINUED | OUTPATIENT
Start: 2022-12-06 | End: 2022-12-06 | Stop reason: HOSPADM

## 2022-12-05 RX ADMIN — INSULIN ASPART 2 UNITS: 100 INJECTION, SOLUTION INTRAVENOUS; SUBCUTANEOUS at 09:12

## 2022-12-05 RX ADMIN — METOPROLOL TARTRATE 12.5 MG: 25 TABLET, FILM COATED ORAL at 09:12

## 2022-12-05 RX ADMIN — GABAPENTIN 100 MG: 100 CAPSULE ORAL at 03:12

## 2022-12-05 RX ADMIN — GABAPENTIN 100 MG: 100 CAPSULE ORAL at 08:12

## 2022-12-05 RX ADMIN — QUETIAPINE FUMARATE 25 MG: 25 TABLET ORAL at 08:12

## 2022-12-05 RX ADMIN — CLOPIDOGREL BISULFATE 75 MG: 75 TABLET ORAL at 09:12

## 2022-12-05 RX ADMIN — PANTOPRAZOLE SODIUM 40 MG: 40 INJECTION, POWDER, FOR SOLUTION INTRAVENOUS at 09:12

## 2022-12-05 RX ADMIN — FUROSEMIDE 20 MG: 20 TABLET ORAL at 09:12

## 2022-12-05 RX ADMIN — TAMSULOSIN HYDROCHLORIDE 0.4 MG: 0.4 CAPSULE ORAL at 09:12

## 2022-12-05 RX ADMIN — ASPIRIN 81 MG CHEWABLE TABLET 81 MG: 81 TABLET CHEWABLE at 09:12

## 2022-12-05 RX ADMIN — Medication 1 PATCH: at 09:12

## 2022-12-05 RX ADMIN — INSULIN ASPART 6 UNITS: 100 INJECTION, SOLUTION INTRAVENOUS; SUBCUTANEOUS at 06:12

## 2022-12-05 RX ADMIN — ATORVASTATIN CALCIUM 40 MG: 40 TABLET, FILM COATED ORAL at 09:12

## 2022-12-05 RX ADMIN — AMOXICILLIN AND CLAVULANATE POTASSIUM 1 TABLET: 875; 125 TABLET, FILM COATED ORAL at 08:12

## 2022-12-05 RX ADMIN — INSULIN ASPART 6 UNITS: 100 INJECTION, SOLUTION INTRAVENOUS; SUBCUTANEOUS at 12:12

## 2022-12-05 RX ADMIN — METOPROLOL TARTRATE 12.5 MG: 25 TABLET, FILM COATED ORAL at 08:12

## 2022-12-05 RX ADMIN — LOSARTAN POTASSIUM 12.5 MG: 25 TABLET, FILM COATED ORAL at 10:12

## 2022-12-05 RX ADMIN — GABAPENTIN 100 MG: 100 CAPSULE ORAL at 09:12

## 2022-12-05 RX ADMIN — INSULIN ASPART 4 UNITS: 100 INJECTION, SOLUTION INTRAVENOUS; SUBCUTANEOUS at 08:12

## 2022-12-05 RX ADMIN — AMOXICILLIN AND CLAVULANATE POTASSIUM 1 TABLET: 875; 125 TABLET, FILM COATED ORAL at 09:12

## 2022-12-05 RX ADMIN — ESCITALOPRAM OXALATE 10 MG: 10 TABLET ORAL at 09:12

## 2022-12-05 NOTE — NURSING
[facility] [place]  Nurses Note -- 4 Eyes      12/5/2022       Skin assessed on: Q Shift      [x] No Pressure Injuries Present    [x]Prevention Measures Documented    [] Yes LDA  for Pressure Injury Previously documented     [] Yes New Pressure Injury Discovered   [] LDA for New Pressure Injury Added      Attending RN:  BRANDEN Portillo    Second RN:

## 2022-12-05 NOTE — PROGRESS NOTES
Pharmacist Intervention IV to PO Note    Carlos Cifuentes Jr. is a 62 y.o. male being treated with IV medication pantoprazole    Patient Data:    Vital Signs (Most Recent):  Temp: 97.2 °F (36.2 °C) (12/05/22 1100)  Pulse: 67 (12/05/22 1100)  Resp: 18 (12/05/22 1100)  BP: (!) 107/51 (12/05/22 1100)  SpO2: 98 % (12/05/22 1100)   Vital Signs (72h Range):  Temp:  [97.2 °F (36.2 °C)-99 °F (37.2 °C)]   Pulse:  []   Resp:  [14-37]   BP: (105-173)/(49-96)   SpO2:  [74 %-100 %]      CBC:  Recent Labs   Lab 12/03/22  0329 12/04/22 0418 12/05/22  0358   WBC 3.50* 4.01 3.70*   RBC 1.83* 1.98* 1.92*   HGB 8.0* 8.6* 8.2*   HCT 22.1* 23.2* 22.4*   * 135* 133*   * 117* 117*   MCH 43.7* 43.4* 42.7*   MCHC 36.2* 37.1* 36.6*     CMP:     Recent Labs   Lab 12/03/22  0329 12/04/22 0418 12/05/22  0358   * 167* 163*   CALCIUM 7.9* 8.5* 8.4*   ALBUMIN 2.6* 2.8* 2.7*   PROT 6.3 6.4 6.0   * 137 138   K 4.9 4.3 4.3   CO2 22* 24 26    105 106   BUN 36* 33* 26*   CREATININE 1.5* 1.4 1.2   ALKPHOS 57 65 53*   ALT 15 15 11   AST 16 18 16   BILITOT 0.9 1.0 0.8       Dietary Orders:  Diet Orders            Diet diabetic [facility]; 2000 Calorie; Cardiac (Low Na/Chol): Diabetic starting at 12/02 1224            Based on the following criteria, this patient qualifies for intravenous to oral conversion:  [x] The patients gastrointestinal tract is functioning (tolerating medications via oral or enteral route for 24 hours and tolerating food or enteral feeds for 24 hours).  [x] The patient is hemodynamically stable for 24 hours (heart rate <100 beats per minute, systolic blood pressure >99 mm Hg, and respiratory rate <20 breaths per minute).  [x] The patient shows clinical improvement (afebrile for at least 24 hours and white blood cell count downtrending or normalized). Additionally, the patient must be non-neutropenic (absolute neutrophil count >500 cells/mm3).  [x] For antimicrobials, the patient has  received IV therapy for at least 24 hours.    IV medication Pantoprazole 40 mg IV daily will be changed to Protonix 40 mg PO daily    Pharmacist's Name: Jace Schuler Jr  Pharmacist's Extension: [phone]

## 2022-12-05 NOTE — PLAN OF CARE
[place] - Telemetry  Discharge Reassessment    Primary Care Provider: Miguel Lux MD    Expected Discharge Date: 12/4/2022    SW discussed discharge planning with patient. Patient stated that he wears a prosthetic on both legs. Patient stated that he lives alone but his sisters and brother are his help at home. Patient informed SW that he has a wheelchair but it is in need of repairs. Patient stated that he has been trying to get in touch with [facility] for 3 months. SW informed patient that outpatient PT has been recommended and inquired if he can get  ride there weekly. Patient said he drives to doctor's appointments and he can go to outpatient physical therapy. SW inquired if patient wanted the [facility]. Patient said yes.     Reassessment (most recent)       Discharge Reassessment - 12/05/22 1410          Discharge Reassessment    Assessment Type Discharge Planning Reassessment     Did the patient's condition or plan change since previous assessment? No     Discharge Plan discussed with: Patient     Communicated BEBA with patient/caregiver Date not available/Unable to determine     Discharge Plan A Home with family     Discharge Plan B Home Health     DME Needed Upon Discharge  bedside commode     Discharge Barriers Identified None     Why the patient remains in the hospital Requires continued medical care        Post-Acute Status    Post-Acute Authorization Other     Coverage [employer]     Other Status No Post-Acute Service Needs     Discharge Delays None known at this time

## 2022-12-05 NOTE — PROGRESS NOTES
[facility] Medicine  Progress Note    Patient Name: Carlos Cifuentes Jr.  MRN: [id number]  Patient Class: IP- Inpatient   Admission Date: 11/28/2022  Length of Stay: 6 days  Attending Physician: John Desai MD  Primary Care Provider: Miguel Lux MD        Subjective:     Principal Problem:NSTEMI, initial episode of care        HPI:    Carlos Cifuentes Jr. is a 61 y.o. male who has a past medical history of Arthritis, Back pain, Bulging lumbar disc, C. difficile diarrhea, Chronic back pain, Diabetes mellitus, Diabetes mellitus type II, DM, type 2, uncontrolled, Hepatitis C, MSSA septicemia, Neuromuscular disorder, Neuropathy, and Staph aureus infection, presented to the ED with CC of SOB, abdominal pain, nausea with vomiting, and Headache.  Patient was intubated upon arrival to ED after receiving 2 mg of IV Ativan for anxiety, as he became obtunded and had difficulty breathing; therefore, HPI from EMS and ED report:     Initial EMS call was for abdominal pain with vomiting and hyperglycemia.  EMS found patient with vomiting but also anxiety. EMS said that patient would not allow them to check vitals, so EMS administered IV Ativan 2 mg, and patient then became obtunded and had difficulty breathing.  EMS reports  and /93 s/p ativan.  EMS states patient administered SC insulin 28 units prior to their arrival, but it is unclear which insulin this is. Patient's sister arrived to ER later and supplemented HPI.  She reports that patient had substernal acid reflux like chest pain with radiation to his left arm yesterday Sunday 11/27/22 and went to the ER at [facility].  However he was in the lobby for several hours and was not seen.  He then left. His chest pain recurred tonight Monday 11/28/22, he called his sister to tell her that he felt bad and was short of breath.  She urged to call 911, which he did.  She suspects that patient became anxious and agitated when EMS strapped him  to their gurney, as patient has a history of claustrophobia relating to a childhood incident in which he was papoosed to get staples in his scalp.    In the ED, patient was intubated and sedated with propofol.  His blood pressure was still 200/100, despite propofol use.  His troponin was 4 without ST elevation and lactic acid 4.9, he was started on NSTEMI protocol with heparin drip and given fluids.  Mild reactive leukocytosis corrected with fluids only.        Overview/Hospital Course:  60 y/o male admitted with acute respiratory failure on vent.  Troponin elevated and consistent with NSTEMI.  Started on ASA, Plavix, Statin and Heparin drip.  Pulmonary consulted for vent management.  LHC on 11/29 showing LAD 90% mid - diffuse severe mid-distal disease, Cx- OM1 large - multiple 70-80% mid lesions with diffuse disease, RCA 50% mid, PDA 80% proximal - diffuse distal disease, PL 80%.  Cardiology recommending medical management.  Patient febrile and empirically started on ABX's.  Coag negative Staph on BCx possible contaminant.  Patient extubated on 11/30.  Did well oxygenation wise, but with significant anxiety and restlessness.  Placed on Precedex drip.  Also with episodes of hypotension that responded to IVF's. Patient stepped down from the ICU on 12/4/22.       Interval History: No new issues.     Review of Systems   Constitutional:  Negative for activity change, appetite change, chills and diaphoresis.   HENT:  Negative for congestion and dental problem.    Eyes:  Negative for discharge and itching.   Respiratory:  Negative for apnea and chest tightness.    Cardiovascular:  Negative for chest pain.   Gastrointestinal:  Negative for abdominal pain.   Endocrine: Negative for cold intolerance.   Genitourinary:  Negative for difficulty urinating and dysuria.   Musculoskeletal:  Negative for arthralgias and back pain.   Neurological:  Negative for dizziness and facial asymmetry.   Psychiatric/Behavioral:  Negative for  "agitation and behavioral problems.    Objective:     Vital Signs (Most Recent):  Temp: 97.9 °F (36.6 °C) (12/05/22 0759)  Pulse: 76 (12/05/22 0759)  Resp: 18 (12/05/22 0759)  BP: (!) 149/66 (12/05/22 0759)  SpO2: (!) 93 % (12/05/22 0759)   Vital Signs (24h Range):  Temp:  [97.9 °F (36.6 °C)-99 °F (37.2 °C)] 97.9 °F (36.6 °C)  Pulse:  [60-95] 76  Resp:  [14-31] 18  SpO2:  [90 %-100 %] 93 %  BP: (105-173)/(57-96) 149/66     Weight: 106.4 kg (234 lb 9.1 oz)  Body mass index is 34.64 kg/m².    Intake/Output Summary (Last 24 hours) at 12/5/2022 1024  Last data filed at 12/4/2022 1945  Gross per 24 hour   Intake --   Output 675 ml   Net -675 ml      Physical Exam  Vitals and nursing note reviewed.   Constitutional:       General: He is not in acute distress.     Appearance: Normal appearance. He is not ill-appearing, toxic-appearing or diaphoretic.   HENT:      Head: Normocephalic and atraumatic.   Eyes:      Conjunctiva/sclera: Conjunctivae normal.   Cardiovascular:      Rate and Rhythm: Normal rate and regular rhythm.   Pulmonary:      Effort: Pulmonary effort is normal. No respiratory distress.   Neurological:      Mental Status: He is oriented to person, place, and time.   Psychiatric:         Behavior: Behavior normal.       Significant Labs: All pertinent labs within the past 24 hours have been reviewed.  BMP:   Recent Labs   Lab 12/05/22  0358   *      K 4.3      CO2 26   BUN 26*   CREATININE 1.2   CALCIUM 8.4*   MG 1.9     CBC:   Recent Labs   Lab 12/04/22  0418 12/05/22  0358   WBC 4.01 3.70*   HGB 8.6* 8.2*   HCT 23.2* 22.4*   * 133*       Significant Imaging      Assessment/Plan:      * NSTEMI, initial episode of care  · Patient had chest pain that radiated to his arm with elevated troponin  · No ST elevation on EKG  · Initial troponin was the peak at 4.3, down trended from there to 3.5  · Started ASA, Plavix, Statin and Heparin drip.  Cardiology consulted.  C showing "LAD 90% mid - " "diffuse severe mid-distal disease, Cx- OM1 large - multiple 70-80% mid lesions with diffuse disease, RCA 50% mid, PDA 80% proximal - diffuse distal disease, PL 80%"  Cards recommending medical management.  Continue ASA, Plavix and Statin.    Anxiety  Patient with significant anxiety and restlessness post extubation.  Started on Precedex drip.  Added nightly Seroquel.  Will try to avoid benzo's  Patient very lethargic and confused.  Weaned off Precedex.  Avoid sedatives.  Doing much better today.    Acute combined systolic and diastolic congestive heart failure  Currently seems euvolemic.  Continue oral Lasix.      Wound dehiscence  Wound care      Essential hypertension  · Started on ARB and B blocker.  · Continue as tolerated    CKD stage 3 due to type 2 diabetes mellitus  Patient's FSGs are uncontrolled due to hyperglycemia on current medication regimen.  Last A1c reviewed-   Lab Results   Component Value Date    LABA1C 11.8 (H) 06/13/2014    HGBA1C 10.9 (H) 11/28/2022     Most recent fingerstick glucose reviewed-   Recent Labs   Lab 12/03/22  1102 12/03/22  1615 12/03/22  1922 12/04/22  0739   POCTGLUCOSE 192* 169* 179* 182*       Antihyperglycemics (From admission, onward)    Start     Stop Route Frequency Ordered    12/01/22 2100  insulin detemir U-100 pen 10 Units         -- SubQ Nightly 12/01/22 1550    11/30/22 1410  insulin aspart U-100 pen 1-10 Units         -- SubQ Before meals & nightly PRN 11/30/22 1310        · Hold Oral hypoglycemics while patient is in the hospital.  SSI and q4h testing    Anemia of chronic disorder  Patient's hemoglobin initially dropped.  No obvious evidence of bleeding.  Will get CT to rule out retroperitoneal bleed post LHC.  Closely monitor.  Transfuse if Hgb<7  Patient refused CT.  H/H now improving.      Bacteremia  Coag negative Staph on BCX.  Most likely contaminant, but patient was febrile.  Empirically started on Vanc and Zosyn.  Possible aspiration pneumonia.  Repeat BCx " negative.  Stopped Vanc.  5 days of Zosyn.  Change to Augmentin for 2 more days.      Type II diabetes mellitus with neurological manifestations  Continue insulin sliding scale.  Continue current insulin regimen.      VTE Risk Mitigation (From admission, onward)         Ordered     Reason for No Pharmacological VTE Prophylaxis  Once        Question:  Reasons:  Answer:  Physician Provided (leave comment)  Comment:  On heparin drip    11/29/22 0243     IP VTE HIGH RISK PATIENT  Once         11/29/22 0243     Place sequential compression device  Until discontinued         11/29/22 0243                Discharge Planning   BEBA: 12/4/2022     Code Status: Full Code   Is the patient medically ready for discharge?:     Reason for patient still in hospital (select all that apply): Patient unstable  Discharge Plan A:  (tbd)                  John Espinosa MD  Department of Hospital Medicine   [facility]

## 2022-12-05 NOTE — PT/OT/SLP EVAL
Physical Therapy Evaluation    Patient Name:  Carlos Cifuentes Jr.   MRN:  [id number]    Recommendations:     Discharge Recommendations: outpatient PT (FAmily support)   Discharge Equipment Recommendations: walker, rolling, wheelchair (Wide BSC)   Barriers to discharge:  Pt is not functioning at baseline and is at increased risk for falls and readmission if he returns home at present time    Assessment:     Carlos Cifuentes Jr. is a 62 y.o. male admitted with a medical diagnosis of NSTEMI, initial episode of care.  He presents with the following impairments/functional limitations: weakness, impaired endurance, decreased coordination, impaired coordination, impaired self care skills, decreased upper extremity function, impaired functional mobility, decreased lower extremity function, gait instability, decreased safety awareness, impaired balance, impaired cardiopulmonary response to activity .    Rehab Prognosis: Good; patient would benefit from acute skilled PT services to address these deficits and reach maximum level of function.    Recent Surgery: Procedure(s) (LRB):  ANGIOGRAM, CORONARY ARTERY (N/A) 6 Days Post-Op    Plan:     During this hospitalization, patient to be seen  (3-5x/wk) to address the identified rehab impairments via gait training, therapeutic activities, wheelchair management/training and progress toward the following goals:    Plan of Care Expires:  12/19/22    Subjective     Chief Complaint: BSC is too narrow to urinate sitting down.  W/C is in disrepair  Patient/Family Comments/goals: to stand up and urinate  Pain/Comfort:  Pain Rating 1: 0/10    Patients cultural, spiritual, Hoahaoism conflicts given the current situation: no    Living Environment:  Pt lives alone in a in a [facility] with ramp entry.     Prior to admission, patients level of function was Modified Independent with HH ambulation per pt.  Equipment used at home: cane, straight, wheelchair.  DME owned (not currently used): none.  Upon  discharge, patient will have assistance from Sister.    Objective:     Communicated with nsg prior to session.  Patient found HOB elevated with peripheral IV, telemetry  upon PT entry to room.    General Precautions: Standard, fall  Orthopedic Precautions:N/A   Braces:  (B LE prostheses)  Respiratory Status: Nasal cannula, flow 4 L/min    Exams:  Cognitive Exam:  Patient is oriented to Person, Place, and Time  Gross Motor Coordination:  impaired 2/2 gen weakness and deconditioning  Postural Exam:  Patient presented with the following abnormalities:    -       Rounded shoulders  -       Forward head  Sensation:    -       Intact  light/touch B Le's  Skin Integrity/Edema:      -       Skin integrity: Visible skin intact  -       Edema: None noted    RLE ROM: WFL  RLE Strength: WFL  LLE ROM: WFL  LLE Strength: WFL    Functional Mobility:  Bed Mobility:     Scooting: contact guard assistance  Supine to Sit: moderate assistance and with VC/TC for hand placement and body mechanics  Transfers:     Sit to Stand:  contact guard assistance and with Vc/TC for hand placement and forward weight shift over JOAQUIM with rolling walker from bed and from BSC  Toilet Transfer: minimum assistance and with VC/TC for hand placement and safety with  no AD  using  Stand Pivot  Gait: Gait training with RW and Min A approx 5' from BSC to chair  Balance: Fair+ sit, fair stand      AM-PAC 6 CLICK MOBILITY  Total Score:14       Treatment & Education:  Dangle protocol: SPO2 89% sitting at EOB with SBA.  Educated pt to perform Pursed lip breathing.  Needs reinforcement.  Pt sat at EOB and donned B LE protheses with SBA/Set-up assist    Patient left up in chair with all lines intact, call button in reach, and nsg notified.    GOALS:   Multidisciplinary Problems       Physical Therapy Goals          Problem: Physical Therapy    Goal Priority Disciplines Outcome Goal Variances Interventions   Physical Therapy Goal     PT, PT/OT Ongoing, Progressing      Description: Goals to be met by: 22     Patient will increase functional independence with mobility by performin. Supine to sit with Modified [place]  2. Rolling to Left and Right with Modified [place].  3. Sit to stand transfer with Modified [place]  4. Bed to chair transfer with Modified [place]    5 Gait  x 10-15 feet with Modified [place] using RW   6. Wheelchair propulsion x50 feet with Modified [place] using bilateral uppper extremities  7. Lower extremity exercise program x10 reps per handout, with supervision                         History:     Past Medical History:   Diagnosis Date    Arthritis     Back pain     Bulging lumbar disc     C. difficile diarrhea     Chronic back pain 10/14/2014    Diabetes mellitus     Diabetes mellitus type II     DM (diabetes mellitus), type 2, uncontrolled 3/12/2013    Hepatitis C 7/3/2013    MSSA (methicillin susceptible Staphylococcus aureus) septicemia     Neuromuscular disorder     Neuropathy     Staph aureus infection        Past Surgical History:   Procedure Laterality Date    ABOVE-KNEE AMPUTATION Bilateral     ANGIOGRAPHY OF LOWER EXTREMITY Left 2018    Procedure: Angiogram LEFT LOWER Extremity with US guided access from right side;  Surgeon: Sony Srinivasan MD;  Location: [facility] OR [address] [place] [address];  Service: Peripheral Vascular;  Laterality: Left;  local: 16ml  contrast: 20ml   fluoro: 2.8  427.73mGy    APPENDECTOMY      CORONARY ANGIOGRAPHY N/A 2022    Procedure: ANGIOGRAM, CORONARY ARTERY;  Surgeon: Bobby Harding MD;  Location: [facility] CATH LAB;  Service: Cardiology;  Laterality: N/A;    JOINT REPLACEMENT      left knee surgery      left leg surgery      broken bone    LEG SURGERY  right     Right arm boil      Right great toe amputation      TOE AMPUTATION Right        Time Tracking:     PT Received On: 22  PT Start Time: 1054     PT Stop Time: 1124  PT Total Time (min): 30 min     Billable Minutes:  Evaluation 15 and Therapeutic Activity 15      12/05/2022

## 2022-12-05 NOTE — PLAN OF CARE
Problem: Physical Therapy  Goal: Physical Therapy Goal  Description: Goals to be met by: 22     Patient will increase functional independence with mobility by performin. Supine to sit with Modified [place]  2. Rolling to Left and Right with Modified [place].  3. Sit to stand transfer with Modified [place]  4. Bed to chair transfer with Modified [place]    5 Gait  x 10-15 feet with Modified [place] using RW   6. Wheelchair propulsion x50 feet with Modified [place] using bilateral uppper extremities  7. Lower extremity exercise program x10 reps per handout, with supervision    Outcome: Ongoing, Progressing   Initial PT evaluation performed today.  Pt could benefit from Skilled PT services 3-5x/wk in order to maximize function prior to D/C.  Outpatient PT, W/C, RW, and Wide BSC recommended.   no

## 2022-12-05 NOTE — SUBJECTIVE & OBJECTIVE
Interval History: No new issues.     Review of Systems   Constitutional:  Negative for activity change, appetite change, chills and diaphoresis.   HENT:  Negative for congestion and dental problem.    Eyes:  Negative for discharge and itching.   Respiratory:  Negative for apnea and chest tightness.    Cardiovascular:  Negative for chest pain.   Gastrointestinal:  Negative for abdominal pain.   Endocrine: Negative for cold intolerance.   Genitourinary:  Negative for difficulty urinating and dysuria.   Musculoskeletal:  Negative for arthralgias and back pain.   Neurological:  Negative for dizziness and facial asymmetry.   Psychiatric/Behavioral:  Negative for agitation and behavioral problems.    Objective:     Vital Signs (Most Recent):  Temp: 97.9 °F (36.6 °C) (12/05/22 0759)  Pulse: 76 (12/05/22 0759)  Resp: 18 (12/05/22 0759)  BP: (!) 149/66 (12/05/22 0759)  SpO2: (!) 93 % (12/05/22 0759)   Vital Signs (24h Range):  Temp:  [97.9 °F (36.6 °C)-99 °F (37.2 °C)] 97.9 °F (36.6 °C)  Pulse:  [60-95] 76  Resp:  [14-31] 18  SpO2:  [90 %-100 %] 93 %  BP: (105-173)/(57-96) 149/66     Weight: 106.4 kg (234 lb 9.1 oz)  Body mass index is 34.64 kg/m².    Intake/Output Summary (Last 24 hours) at 12/5/2022 1024  Last data filed at 12/4/2022 1945  Gross per 24 hour   Intake --   Output 675 ml   Net -675 ml      Physical Exam  Vitals and nursing note reviewed.   Constitutional:       General: He is not in acute distress.     Appearance: Normal appearance. He is not ill-appearing, toxic-appearing or diaphoretic.   HENT:      Head: Normocephalic and atraumatic.   Eyes:      Conjunctiva/sclera: Conjunctivae normal.   Cardiovascular:      Rate and Rhythm: Normal rate and regular rhythm.   Pulmonary:      Effort: Pulmonary effort is normal. No respiratory distress.   Neurological:      Mental Status: He is oriented to person, place, and time.   Psychiatric:         Behavior: Behavior normal.       Significant Labs: All pertinent labs  within the past 24 hours have been reviewed.  BMP:   Recent Labs   Lab 12/05/22 0358   *      K 4.3      CO2 26   BUN 26*   CREATININE 1.2   CALCIUM 8.4*   MG 1.9     CBC:   Recent Labs   Lab 12/04/22  0418 12/05/22 0358   WBC 4.01 3.70*   HGB 8.6* 8.2*   HCT 23.2* 22.4*   * 133*       Significant Imaging

## 2022-12-05 NOTE — PT/OT/SLP EVAL
Occupational Therapy   Evaluation    Name: Carlos Cifuentes Jr.  MRN: [id number]  Admitting Diagnosis: NSTEMI, initial episode of care  Recent Surgery: Procedure(s) (LRB):  ANGIOGRAM, CORONARY ARTERY (N/A) 6 Days Post-Op    Recommendations:     Discharge Recommendations: outpatient OT  Discharge Equipment Recommendations:  walker, rolling, wheelchair (wide [facility])  Barriers to discharge:  None    Assessment:     Carlos Cifuentes Jr. is a 62 y.o. male with a medical diagnosis of NSTEMI, initial episode of care.  Performance deficits affecting function: weakness, impaired endurance, impaired self care skills, gait instability, impaired balance, impaired functional mobility, decreased upper extremity function, decreased lower extremity function, decreased safety awareness, decreased coordination, decreased ROM, edema.       Pt pleasant and willing to participate in tx session this date; pt w/ general weakness but was able to perform bed mobility w/ CGA-SBA to stand and transfer to surfaces w/ min A-CGA and use of RW. Pt requried close CGA for balance to perform standing ADLs w/ unulateral support on RW. Pt w/ SPO2 89% on 3L O2 NC but recovered w/ PLB and rest breaks. Pt tolerated tx session well and will continue to benefit from skilled acute OT services to maximize functional capacity for safe performance w/ ADLs and functional mobility.     Rehab Prognosis: Good; patient would benefit from acute skilled OT services to address these deficits and reach maximum level of function.       Plan:     Patient to be seen 5 x/week to address the above listed problems via self-care/home management, therapeutic activities, therapeutic exercises  Plan of Care Expires: 12/19/22  Plan of Care Reviewed with: patient    Subjective     Chief Complaint: None stated   Patient/Family Comments/goals: Wanting to t/f to [facility]    Occupational Profile:  Living Environment: Pt lives alone in [facility] w/ ramp to enter; pt has access to t/s combo w/ bath bench.    Previous level of function: Pt was mod I w/ ADLs and functional mobility; sister provides assistance w/ transportation and hose duties. Pt reports he was transferring/ambulating w/ use of a SPC only.   Roles and Routines: Pt was seeing a prosthetist PTA but not receiving therapy services.   Equipment Used at Home: prosthesis, cane, straight, wheelchair, bath bench(old W/C; pt needs new one)  Assistance upon Discharge: Sister    Pain/Comfort:  Pain Rating 1: 0/10  Pain Rating Post-Intervention 1: 0/10    Patients cultural, spiritual, Oriental orthodox conflicts given the current situation: no    Objective:     Communicated with: Nurse prior to session.  Patient found HOB elevated with peripheral IV, telemetry upon OT entry to room.    General Precautions: Standard, fall  Orthopedic Precautions:  (old bilateral BKAs)  Braces:  (BLE prosthetics)  Respiratory Status: Nasal cannula, flow 3 L/min    Occupational Performance:    Bed Mobility:    Patient completed Scooting hips to EOB with stand by assistance and contact guard assistance  Patient completed Supine to Sit with stand by assistance and contact guard assistance    Functional Mobility/Transfers:  Patient completed Sit <> Stand Transfer with contact guard assistance and minimum assistance  with  no assistive device x 1 trial from EOB; x 1 trial from BSC w/ CGA and use of RW  Patient completed Bed <> BSC Transfer using Step Transfer technique with contact guard assistance-min A  with no assistive device  Patient completed BSC <> Chair Step Transfer technique with contact guard assistance with rolling walker  Pt w/ mild instability and decreased balance but no LOB noted    Activities of Daily Living:  Grooming: set-up assist for using hand      Upper Body Dressing: minimum assistance for donning gown over back   Lower Body Dressing: stand by assistance for donning liners and prosthetics while sitting at EOB unsupported; pt was then able to partially stand w/  close CGA to allow liners to lock in place  Toileting: contact guard assistance for balance while pt used urinal and performed front mayuri-care      Cognitive/Visual Perceptual:  Cognitive/Psychosocial Skills:     -       Oriented to: Person, Place, Time, and Situation   -       Follows Commands/attention:Follows multistep  commands  -       Communication: clear/fluent  -       Memory: Poor immediate recall  -       Safety awareness/insight to disability: impaired     Physical Exam:  Balance:    -       good sitting balance; fair standing   Postural examination/scapula alignment:    -       Rounded shoulders  -       Forward head  Skin integrity: Visible skin intact  Edema:  None noted  Sensation:    -       Intact  Upper Extremity Range of Motion:     -       Right Upper Extremity: WFL  -       Left Upper Extremity: WFL  Upper Extremity Strength:    -       Right Upper Extremity: WFL  -       Left Upper Extremity: WFL   Strength:    -       Right Upper Extremity: WFL  -       Left Upper Extremity: WFL    AMPAC 6 Click ADL:  AMPAC Total Score: 21    Treatment & Education:  -Initial eval complete   -Pt educated on safe functional mobility and ADL performance.   -Pt educates on importance of OOB activities to prevent debility and weakness due to excessive bed rest.   -Questions and concerns addressed within OT scope.     Patient left up in chair with all lines intact and call button in reach    GOALS:   Multidisciplinary Problems       Occupational Therapy Goals          Problem: Occupational Therapy    Goal Priority Disciplines Outcome Interventions   Occupational Therapy Goal     OT, PT/OT Ongoing, Progressing    Description: Goals to be met by: 12/19/22     Patient will increase functional independence with ADLs by performing:    LE Dressing with Modified [place].  Grooming while seated with Modified [place].  Toileting from bedside commode with Modified [place] for hygiene and clothing  management.  Sit to stand with Modified [place] and use of RW.    Step transfer with Modified [place] and use of RW.   Toilet transfer to bedside commode with Modified [place] and use of RW.  Upper extremity exercise program x15 reps per handout, with independence.                         History:     Past Medical History:   Diagnosis Date    Arthritis     Back pain     Bulging lumbar disc     C. difficile diarrhea     Chronic back pain 10/14/2014    Diabetes mellitus     Diabetes mellitus type II     DM (diabetes mellitus), type 2, uncontrolled 3/12/2013    Hepatitis C 7/3/2013    MSSA (methicillin susceptible Staphylococcus aureus) septicemia     Neuromuscular disorder     Neuropathy     Staph aureus infection          Past Surgical History:   Procedure Laterality Date    ABOVE-KNEE AMPUTATION Bilateral     ANGIOGRAPHY OF LOWER EXTREMITY Left 06/14/2018    Procedure: Angiogram LEFT LOWER Extremity with US guided access from right side;  Surgeon: Sony Srinivasan MD;  Location: [facility] OR [address] [place] [address];  Service: Peripheral Vascular;  Laterality: Left;  local: 16ml  contrast: 20ml   fluoro: 2.8  427.73mGy    APPENDECTOMY      CORONARY ANGIOGRAPHY N/A 11/29/2022    Procedure: ANGIOGRAM, CORONARY ARTERY;  Surgeon: Bobby Harding MD;  Location: [facility] CATH LAB;  Service: Cardiology;  Laterality: N/A;    JOINT REPLACEMENT      left knee surgery      left leg surgery      broken bone    LEG SURGERY  right     Right arm boil      Right great toe amputation      TOE AMPUTATION Right        Time Tracking:     OT Date of Treatment: 12/05/22  OT Start Time: 1058  OT Stop Time: 1124  OT Total Time (min): 26 min    Billable Minutes:Evaluation 15  Self Care/Home Management 11  Total Time 26 (co-eval w/ PT)     12/5/2022

## 2022-12-05 NOTE — PLAN OF CARE
Pt remains in ICU with transfer orders to the [place] floor. Pt alert and oriented. HR running NSR on monitor. BP stable. On 2L NC sating above 95%. Normothermic. PO medication swallowed without difficulty. Blood sugar monitored per order. Insulin given per sliding scale. Pt had no complaints this shift. Updated on plan of care. No new falls, injuries, or skin breakdown this shift.

## 2022-12-05 NOTE — ASSESSMENT & PLAN NOTE
"· Patient had chest pain that radiated to his arm with elevated troponin  · No ST elevation on EKG  · Initial troponin was the peak at 4.3, down trended from there to 3.5  · Started ASA, Plavix, Statin and Heparin drip.  Cardiology consulted.  [facility] showing "LAD 90% mid - diffuse severe mid-distal disease, Cx- OM1 large - multiple 70-80% mid lesions with diffuse disease, RCA 50% mid, PDA 80% proximal - diffuse distal disease, PL 80%"  Cards recommending medical management.  Continue ASA, Plavix and Statin.  "

## 2022-12-05 NOTE — NURSING
[facility] Intensive Care  ICU Shift Summary  Date: 12/5/2022      Prehospitalization: Home  Admit Date / LOS : 11/28/2022/ 6 days    Diagnosis: NSTEMI, initial episode of care    Consults:        Active: Cardio, OT, and PT       Needed: N/A     Code Status: Full Code   Advanced Directive: Received    LDA:  Lines/Drains/Airways       Peripheral Intravenous Line  Duration                  Peripheral IV - Single Lumen 12/04/22 0925 22 G Anterior;Distal;Left Forearm <1 day                  Central Lines/Site/Justification:Patient Does Not Have Central Line  Urinary Cath/Order/Justification:Patient Does Not Have Urinary Catheter    Vasopressors/Infusions:        GOALS: Volume/ Hemodynamic: N/A                     RASS: N/A    Pain Management: none       Pain Controlled: yes     Rhythm: NSR    Respiratory Device: Room Air and Nasal Cannula    Oxygen Concentration (%):  [] 90             Most Recent SBT/ SAT: N/A       MOVE Screen: PASS  ICU Liberation: yes    VTE Prophylaxis: Pharm  Mobility: Bedrest  Stress Ulcer Prophylaxis: No    Isolation: No active isolations    Dietary:   Current Diet Order   Procedures    Diet diabetic [facility]; 2000 Calorie; Cardiac (Low Na/Chol)     Order Specific Question:   Indicate patient location for additional diet options:     Answer:   [facility]     Order Specific Question:   Total calories:     Answer:   2000 Calorie     Order Specific Question:   Additional Diet Options:     Answer:   Cardiac (Low Na/Chol)      Tolerance: yes  Advancement: @ goal    I & O (24h):    Intake/Output Summary (Last 24 hours) at 12/5/2022 0544  Last data filed at 12/4/2022 1945  Gross per 24 hour   Intake --   Output 675 ml   Net -675 ml        Restraints: No    Significant Dates:  Post Op Date: N/A  Rescue Date: N/A  Imaging/ Diagnostics: N/A    Noteworthy Labs:  see chart below    COVID Test: (--)  CBC/Anemia Labs: Coags:    Recent Labs   Lab 11/28/22  1710 11/29/22  5287  12/04/22 0418 12/04/22  1018 12/05/22  0358   WBC 13.42*   < > 4.01  --  3.70*   HGB 11.6*   < > 8.6*  --  8.2*   HCT 31.7*   < > 23.2*  --  22.4*      < > 135*  --  133*   *   < > 117*  --  117*   RDW 14.5   < > 13.9  --  13.8   FOLATE 16.0  --   --   --   --    [id number] <148*  --   --   --   --    OCCULTBLOOD  --   --   --  Negative  --     < > = values in this interval not displayed.    Recent Labs   Lab 11/28/22 2308 11/29/22 0340 11/29/22 0612   INR 1.0  --   --    APTT 25.4 35.7* 25.4        Chemistries:   Recent Labs   Lab 11/28/22 2308 11/29/22 0340 11/30/22  0401 12/04/22 0418 12/05/22  0358   * 134*   < > 137 138   K 3.8 4.6   < > 4.3 4.3    106   < > 105 106   CO2 19* 20*   < > 24 26   BUN 26* 24*   < > 33* 26*   CREATININE 1.8* 1.4   < > 1.4 1.2   CALCIUM 8.8 8.3*   < > 8.5* 8.4*   PROT 7.4 6.2   < > 6.4 6.0   BILITOT 0.6 0.5   < > 1.0 0.8   ALKPHOS 77 50*   < > 65 53*   ALT 23 20   < > 15 11   AST 28 22   < > 18 16   MG 1.8 1.7   < > 1.9 1.9   PHOS 3.5 2.5*  --   --   --     < > = values in this interval not displayed.        Cardiac Enzymes: Ejection Fractions:    No results for input(s): CPK, CPKMB, MB, TROPONINI in the last 72 hours. EF   Date Value Ref Range Status   11/29/2022 40 % Final        POCT Glucose: HbA1c:    Recent Labs   Lab 12/04/22  1058 12/04/22  1619 12/04/22 2112   POCTGLUCOSE 231* 294* 213*    Hemoglobin A1C   Date Value Ref Range Status   11/28/2022 10.9 (H) 4.0 - 5.6 % Final     Comment:     ADA Screening Guidelines:  5.7-6.4%  Consistent with prediabetes  >or=6.5%  Consistent with diabetes    High levels of fetal hemoglobin interfere with the HbA1C  assay. Heterozygous hemoglobin variants (HbS, HgC, etc)do  not significantly interfere with this assay.   However, presence of multiple variants may affect accuracy.             ICU LOS 6d 4h  Level of Care: OK to Transfer    Chart Check: 24 HR Done  Shift Summary/Plan for the shift: see care plan  summary

## 2022-12-05 NOTE — PLAN OF CARE
Problem: Occupational Therapy  Goal: Occupational Therapy Goal  Description: Goals to be met by: 12/19/22     Patient will increase functional independence with ADLs by performing:    LE Dressing with Modified [place].  Grooming while seated with Modified [place].  Toileting from bedside commode with Modified [place] for hygiene and clothing management.  Sit to stand with Modified [place] and use of RW.    Step transfer with Modified [place] and use of RW.   Toilet transfer to bedside commode with Modified [place] and use of RW.  Upper extremity exercise program x15 reps per handout, with independence.    12/5/2022 1210 by Bassam Proctor OT  Outcome: Ongoing, Progressing

## 2022-12-06 VITALS
DIASTOLIC BLOOD PRESSURE: 65 MMHG | SYSTOLIC BLOOD PRESSURE: 145 MMHG | HEART RATE: 77 BPM | WEIGHT: 234.56 LBS | BODY MASS INDEX: 34.74 KG/M2 | HEIGHT: 69 IN | OXYGEN SATURATION: 96 % | RESPIRATION RATE: 22 BRPM | TEMPERATURE: 98 F

## 2022-12-06 PROBLEM — I21.4 NSTEMI, INITIAL EPISODE OF CARE: Status: RESOLVED | Noted: 2022-11-29 | Resolved: 2022-12-06

## 2022-12-06 PROBLEM — T81.30XA WOUND DEHISCENCE: Status: RESOLVED | Noted: 2018-06-06 | Resolved: 2022-12-06

## 2022-12-06 PROBLEM — I50.41 ACUTE COMBINED SYSTOLIC AND DIASTOLIC CONGESTIVE HEART FAILURE: Status: RESOLVED | Noted: 2022-11-30 | Resolved: 2022-12-06

## 2022-12-06 LAB
ALBUMIN SERPL BCP-MCNC: 2.6 G/DL (ref 3.5–5.2)
ALP SERPL-CCNC: 64 U/L (ref 55–135)
ALT SERPL W/O P-5'-P-CCNC: 13 U/L (ref 10–44)
ANION GAP SERPL CALC-SCNC: 8 MMOL/L (ref 8–16)
AST SERPL-CCNC: 12 U/L (ref 10–40)
BASOPHILS # BLD AUTO: 0.02 K/UL (ref 0–0.2)
BASOPHILS NFR BLD: 0.4 % (ref 0–1.9)
BILIRUB SERPL-MCNC: 0.7 MG/DL (ref 0.1–1)
BUN SERPL-MCNC: 22 MG/DL (ref 8–23)
CALCIUM SERPL-MCNC: 8.3 MG/DL (ref 8.7–10.5)
CHLORIDE SERPL-SCNC: 106 MMOL/L (ref 95–110)
CO2 SERPL-SCNC: 26 MMOL/L (ref 23–29)
CREAT SERPL-MCNC: 1.3 MG/DL (ref 0.5–1.4)
DIFFERENTIAL METHOD: ABNORMAL
EOSINOPHIL # BLD AUTO: 0.2 K/UL (ref 0–0.5)
EOSINOPHIL NFR BLD: 3.7 % (ref 0–8)
ERYTHROCYTE [DISTWIDTH] IN BLOOD BY AUTOMATED COUNT: 14 % (ref 11.5–14.5)
EST. GFR  (NO RACE VARIABLE): >60 ML/MIN/1.73 M^2
GLUCOSE SERPL-MCNC: 292 MG/DL (ref 70–110)
HCT VFR BLD AUTO: 21.7 % (ref 40–54)
HGB BLD-MCNC: 8.1 G/DL (ref 14–18)
IMM GRANULOCYTES # BLD AUTO: 0.07 K/UL (ref 0–0.04)
IMM GRANULOCYTES NFR BLD AUTO: 1.5 % (ref 0–0.5)
LYMPHOCYTES # BLD AUTO: 1.4 K/UL (ref 1–4.8)
LYMPHOCYTES NFR BLD: 29.6 % (ref 18–48)
MAGNESIUM SERPL-MCNC: 1.8 MG/DL (ref 1.6–2.6)
MCH RBC QN AUTO: 44.3 PG (ref 27–31)
MCHC RBC AUTO-ENTMCNC: 37.3 G/DL (ref 32–36)
MCV RBC AUTO: 119 FL (ref 82–98)
MONOCYTES # BLD AUTO: 0.3 K/UL (ref 0.3–1)
MONOCYTES NFR BLD: 6.8 % (ref 4–15)
NEUTROPHILS # BLD AUTO: 2.7 K/UL (ref 1.8–7.7)
NEUTROPHILS NFR BLD: 58 % (ref 38–73)
NRBC BLD-RTO: 0 /100 WBC
PLATELET # BLD AUTO: 169 K/UL (ref 150–450)
PMV BLD AUTO: 9.6 FL (ref 9.2–12.9)
POCT GLUCOSE: 277 MG/DL (ref 70–110)
POCT GLUCOSE: 278 MG/DL (ref 70–110)
POTASSIUM SERPL-SCNC: 4.5 MMOL/L (ref 3.5–5.1)
PROT SERPL-MCNC: 5.9 G/DL (ref 6–8.4)
RBC # BLD AUTO: 1.83 M/UL (ref 4.6–6.2)
SODIUM SERPL-SCNC: 140 MMOL/L (ref 136–145)
WBC # BLD AUTO: 4.59 K/UL (ref 3.9–12.7)

## 2022-12-06 PROCEDURE — 25000003 PHARM REV CODE 250: Performed by: INTERNAL MEDICINE

## 2022-12-06 PROCEDURE — 97530 THERAPEUTIC ACTIVITIES: CPT

## 2022-12-06 PROCEDURE — 36415 COLL VENOUS BLD VENIPUNCTURE: CPT | Performed by: HOSPITALIST

## 2022-12-06 PROCEDURE — S4991 NICOTINE PATCH NONLEGEND: HCPCS | Performed by: HOSPITALIST

## 2022-12-06 PROCEDURE — 83735 ASSAY OF MAGNESIUM: CPT | Performed by: HOSPITALIST

## 2022-12-06 PROCEDURE — 25000003 PHARM REV CODE 250: Performed by: HOSPITALIST

## 2022-12-06 PROCEDURE — 80053 COMPREHEN METABOLIC PANEL: CPT | Performed by: HOSPITALIST

## 2022-12-06 PROCEDURE — G0378 HOSPITAL OBSERVATION PER HR: HCPCS

## 2022-12-06 PROCEDURE — 97110 THERAPEUTIC EXERCISES: CPT

## 2022-12-06 PROCEDURE — 97535 SELF CARE MNGMENT TRAINING: CPT

## 2022-12-06 PROCEDURE — 85025 COMPLETE CBC W/AUTO DIFF WBC: CPT | Performed by: HOSPITALIST

## 2022-12-06 RX ORDER — FUROSEMIDE 20 MG/1
20 TABLET ORAL DAILY
Qty: 30 TABLET | Refills: 5 | Status: ON HOLD | OUTPATIENT
Start: 2022-12-07 | End: 2022-12-10 | Stop reason: HOSPADM

## 2022-12-06 RX ORDER — ESCITALOPRAM OXALATE 10 MG/1
10 TABLET ORAL DAILY
Qty: 30 TABLET | Refills: 5 | Status: SHIPPED | OUTPATIENT
Start: 2022-12-07 | End: 2023-04-10 | Stop reason: CLARIF

## 2022-12-06 RX ORDER — CLOPIDOGREL BISULFATE 75 MG/1
75 TABLET ORAL DAILY
Qty: 30 TABLET | Refills: 5 | Status: SHIPPED | OUTPATIENT
Start: 2022-12-07 | End: 2023-12-07

## 2022-12-06 RX ORDER — METOPROLOL TARTRATE 25 MG/1
12.5 TABLET, FILM COATED ORAL 2 TIMES DAILY
Qty: 30 TABLET | Refills: 5 | Status: SHIPPED | OUTPATIENT
Start: 2022-12-06 | End: 2023-04-10 | Stop reason: CLARIF

## 2022-12-06 RX ORDER — QUETIAPINE FUMARATE 25 MG/1
25 TABLET, FILM COATED ORAL NIGHTLY
Qty: 30 TABLET | Refills: 5 | Status: SHIPPED | OUTPATIENT
Start: 2022-12-06 | End: 2023-04-10 | Stop reason: CLARIF

## 2022-12-06 RX ORDER — TAMSULOSIN HYDROCHLORIDE 0.4 MG/1
0.4 CAPSULE ORAL DAILY
Qty: 30 CAPSULE | Refills: 5 | Status: SHIPPED | OUTPATIENT
Start: 2022-12-07 | End: 2023-04-10 | Stop reason: CLARIF

## 2022-12-06 RX ORDER — ATORVASTATIN CALCIUM 40 MG/1
40 TABLET, FILM COATED ORAL DAILY
Qty: 60 TABLET | Refills: 5 | Status: SHIPPED | OUTPATIENT
Start: 2022-12-07 | End: 2023-12-07

## 2022-12-06 RX ORDER — IBUPROFEN 200 MG
1 TABLET ORAL DAILY
Qty: 28 PATCH | Refills: 5 | Status: SHIPPED | OUTPATIENT
Start: 2022-12-07 | End: 2023-04-10 | Stop reason: CLARIF

## 2022-12-06 RX ORDER — NAPROXEN SODIUM 220 MG/1
81 TABLET, FILM COATED ORAL DAILY
Refills: 0
Start: 2022-12-07 | End: 2023-12-07

## 2022-12-06 RX ORDER — LOSARTAN POTASSIUM 25 MG/1
12.5 TABLET ORAL DAILY
Qty: 15 TABLET | Refills: 0 | Status: SHIPPED | OUTPATIENT
Start: 2022-12-07 | End: 2023-01-06

## 2022-12-06 RX ADMIN — PANTOPRAZOLE SODIUM 40 MG: 40 TABLET, DELAYED RELEASE ORAL at 09:12

## 2022-12-06 RX ADMIN — CLOPIDOGREL BISULFATE 75 MG: 75 TABLET ORAL at 09:12

## 2022-12-06 RX ADMIN — LOSARTAN POTASSIUM 12.5 MG: 25 TABLET, FILM COATED ORAL at 09:12

## 2022-12-06 RX ADMIN — ATORVASTATIN CALCIUM 40 MG: 40 TABLET, FILM COATED ORAL at 09:12

## 2022-12-06 RX ADMIN — Medication 1 PATCH: at 09:12

## 2022-12-06 RX ADMIN — ESCITALOPRAM OXALATE 10 MG: 10 TABLET ORAL at 09:12

## 2022-12-06 RX ADMIN — TAMSULOSIN HYDROCHLORIDE 0.4 MG: 0.4 CAPSULE ORAL at 09:12

## 2022-12-06 RX ADMIN — FUROSEMIDE 20 MG: 20 TABLET ORAL at 09:12

## 2022-12-06 RX ADMIN — ASPIRIN 81 MG CHEWABLE TABLET 81 MG: 81 TABLET CHEWABLE at 09:12

## 2022-12-06 RX ADMIN — INSULIN ASPART 6 UNITS: 100 INJECTION, SOLUTION INTRAVENOUS; SUBCUTANEOUS at 12:12

## 2022-12-06 RX ADMIN — GABAPENTIN 100 MG: 100 CAPSULE ORAL at 09:12

## 2022-12-06 RX ADMIN — METOPROLOL TARTRATE 12.5 MG: 25 TABLET, FILM COATED ORAL at 09:12

## 2022-12-06 RX ADMIN — INSULIN ASPART 6 UNITS: 100 INJECTION, SOLUTION INTRAVENOUS; SUBCUTANEOUS at 09:12

## 2022-12-06 NOTE — SUBJECTIVE & OBJECTIVE
Interval History: No new issues. Would like to go home     Review of Systems   Constitutional:  Negative for activity change, appetite change, chills and diaphoresis.   HENT:  Negative for congestion and dental problem.    Eyes:  Negative for discharge and itching.   Respiratory:  Negative for apnea and chest tightness.    Cardiovascular:  Negative for chest pain.   Gastrointestinal:  Negative for abdominal pain.   Endocrine: Negative for cold intolerance.   Genitourinary:  Negative for difficulty urinating and dysuria.   Musculoskeletal:  Negative for arthralgias and back pain.   Neurological:  Negative for dizziness and facial asymmetry.   Psychiatric/Behavioral:  Negative for agitation and behavioral problems.    Objective:     Vital Signs (Most Recent):  Temp: 97.6 °F (36.4 °C) (12/06/22 0407)  Pulse: 69 (12/06/22 0407)  Resp: 18 (12/06/22 0407)  BP: 132/64 (12/06/22 0407)  SpO2: 95 % (12/06/22 0407) Vital Signs (24h Range):  Temp:  [97.2 °F (36.2 °C)-98.5 °F (36.9 °C)] 97.6 °F (36.4 °C)  Pulse:  [67-81] 69  Resp:  [18] 18  SpO2:  [93 %-98 %] 95 %  BP: (107-163)/(51-70) 132/64     Weight: 106.4 kg (234 lb 9.1 oz)  Body mass index is 34.64 kg/m².    Intake/Output Summary (Last 24 hours) at 12/6/2022 0737  Last data filed at 12/5/2022 1721  Gross per 24 hour   Intake 720 ml   Output --   Net 720 ml      Physical Exam  Vitals and nursing note reviewed.   Constitutional:       General: He is not in acute distress.     Appearance: Normal appearance. He is not ill-appearing, toxic-appearing or diaphoretic.   HENT:      Head: Normocephalic and atraumatic.   Eyes:      Conjunctiva/sclera: Conjunctivae normal.   Cardiovascular:      Rate and Rhythm: Normal rate and regular rhythm.   Pulmonary:      Effort: Pulmonary effort is normal. No respiratory distress.   Neurological:      Mental Status: He is oriented to person, place, and time.   Psychiatric:         Behavior: Behavior normal.       Significant Labs: All  pertinent labs within the past 24 hours have been reviewed.  BMP:   Recent Labs   Lab 12/06/22  0429   *      K 4.5      CO2 26   BUN 22   CREATININE 1.3   CALCIUM 8.3*   MG 1.8     CBC:   Recent Labs   Lab 12/05/22  0358 12/06/22  0429   WBC 3.70* 4.59   HGB 8.2* 8.1*   HCT 22.4* 21.7*   * 169       Significant Imaging:

## 2022-12-06 NOTE — PT/OT/SLP PROGRESS
Physical Therapy Treatment/Discharge Summary     Patient Name:  Carlos Cifuentes Jr.   MRN:  [id number]    Recommendations:     Discharge Recommendations: outpatient PT  Discharge Equipment Recommendations:  (Wide BSC, RW, W/C)  Barriers to discharge:  None from PT standpoint    Assessment:     Carlos Cifuentes Jr. is a 62 y.o. male admitted with a medical diagnosis of NSTEMI, initial episode of care.  He presents with the following impairments/functional limitations:  (generalized weakness and deconditoning) Goals.not met, but adequate for D/C home with family support    Rehab Prognosis: Good; patient would benefit from Outpatient PT services to address these deficits and reach maximum level of function.    Recent Surgery: Procedure(s) (LRB):  ANGIOGRAM, CORONARY ARTERY (N/A) 7 Days Post-Op    Plan:     During this hospitalization, patient to be seen  (N/A, pt to be D/C'd today.) to address the identified rehab impairments via  (N/A) and progress toward the following goals:    Plan of Care Expires:  12/06/22    Subjective     Chief Complaint: No c/o  Patient/Family Comments/goals: to go home and proceed with Outpatient PT  Pain/Comfort:  Pain Rating 1: 0/10      Objective:     Communicated with nsg prior to session.  Patient found sitting edge of bed with telemetry upon PT entry to room.     General Precautions: Standard, fall  Orthopedic Precautions: N/A  Braces:  (B LE Prostheses)  Respiratory Status: Room air     Functional Mobility:  N/A  Balance Good sitting at EOB with B Prostheses donned      AM-PAC 6 CLICK MOBILITY  Turning over in bed (including adjusting bedclothes, sheets and blankets)?: 3  Sitting down on and standing up from a chair with arms (e.g., wheelchair, bedside commode, etc.): 3  Moving from lying on back to sitting on the side of the bed?: 3  Moving to and from a bed to a chair (including a wheelchair)?: 3  Need to walk in hospital room?: 3  Climbing 3-5 steps with a railing?: 2  Basic Mobility  Total Score: 17       Treatment & Education:  Handout provided to and reviewed with pt for: B FAQ's, Hip Adduction squeeze, and marching to be performed in sitting and TKE's, Modified bridging, SLR, Hip ABd, GS, and QS to be performed in supine/reclined position.  Pt verbalized understanding from previous HEP.  Educated pt and Sister on Outpatient PT and DME recs.  Both verbalized understanding      Patient left sitting edge of bed with all lines intact, call button in reach, and sister present..    GOALS:   Multidisciplinary Problems       Physical Therapy Goals          Problem: Physical Therapy    Goal Priority Disciplines Outcome Goal Variances Interventions   Physical Therapy Goal     PT, PT/OT Adequate for Care Transition     Description: Goals to be met by: 22     Patient will increase functional independence with mobility by performin. Supine to sit with Modified [place]  2. Rolling to Left and Right with Modified [place].  3. Sit to stand transfer with Modified [place]  4. Bed to chair transfer with Modified [place]    5 Gait  x 10-15 feet with Modified [place] using RW   6. Wheelchair propulsion x50 feet with Modified [place] using bilateral uppper extremities  7. Lower extremity exercise program x10 reps per handout, with supervision                         Time Tracking:     PT Received On: 22  PT Start Time: 1055     PT Stop Time: 1105  PT Total Time (min): 10 min     Billable Minutes: Therapeutic Activity 10    Treatment Type: Treatment  PT/PTA: PT     PTA Visit Number: 0     2022

## 2022-12-06 NOTE — PT/OT/SLP PROGRESS
Occupational Therapy   Treatment    Name: Carlos Cifuentes Jr.  MRN: [id number]  Admitting Diagnosis:  NSTEMI, initial episode of care  7 Days Post-Op    Recommendations:     Discharge Recommendations: home health OT (w/ family assist)  Discharge Equipment Recommendations:  walker, rolling, wheelchair (wide BSC)  Barriers to discharge:  None    Assessment:     Carlos Cifuentes Jr. is a 62 y.o. male with a medical diagnosis of NSTEMI, initial episode of care.Performance deficits affecting function are weakness, impaired endurance, impaired self care skills, impaired functional mobility, gait instability, impaired balance, decreased upper extremity function, decreased safety awareness, decreased coordination, decreased lower extremity function, orthopedic precautions.     Pt very pleasant and willing to participate in tx session this date. Pt demonstrated good strength and endurance this date as he was able to complete seated/standing ADLs and ambulate household/community distances w/ SBA. Pt w/ SPO2 90-91% on RA w/ exertion but was able to recover to 95% w/ rest and PLB. Pt tolerated tx session very well and will continue to benefit from skilled acute OT services to maximize functional capacity for safe performance w/ ADLs and functional mobility.     SPO2 vitals are as follows:   -2L at rest: 95%   -RA at rest: 91-95%  -2L w/ ambulation: 94-95%   -RA w/ ambulation: 89-91%   -RA w/ recovery: 95%     Rehab Prognosis:  Good; patient would benefit from acute skilled OT services to address these deficits and reach maximum level of function.       Plan:     Patient to be seen 3 x/week, 5 x/week to address the above listed problems via self-care/home management, therapeutic activities, therapeutic exercises  Plan of Care Expires: 12/19/22  Plan of Care Reviewed with: patient    Subjective     Pain/Comfort:  Pain Rating 1: 0/10  Pain Rating Post-Intervention 1: 0/10    Objective:     Communicated with: Nurse prior to session.   Patient found sitting edge of bed, preparing to get dressed, with peripheral IV, telemetry upon OT entry to room.    General Precautions: Standard, fall    Orthopedic Precautions: (old bilateral BKAs)  Braces:  (BLE prosthetics)  Respiratory Status: Room air     Occupational Performance:     Bed Mobility:    Pt found/left sitting EOB      Functional Mobility/Transfers:  Patient completed Sit <> Stand Transfer x 2 trials from EOB with stand by assistance  with  rolling walker  Pt was able to jenny for 1st trial to allow liners to insert into prosthetics; pt stood for 2nd trial to manage boxers/shorts over hips; RW in front to ensure safety   Functional Mobility: Pt was able to ambulate functional household/community distances in room and in hallway w/ SBA and use of RW; pt required x 1 brief seated rest break in waiting area before returning to room. Pt w/o LOB throughout.     Activities of Daily Living:  Upper Body Dressing: stand by assistance for donning pullover t-shirt in standing w/ RW in front for safely   Lower Body Dressing: supervision for donning liners and prosthetics while sitting EOB; SBA for standing w/ RW to manage LB clothing over hips       [facility] 6 Click ADL: 21    Treatment & Education:  -Pt performed ADLs and functional mobility as noted above.   -Pt educated on importance of energy conservation and PLB as needed to prevent over-exertion.   -Pt educated on home safety strategies to prevent risk of falls once at home.   -All questions and concerns addressed within OT scope.     Patient left sitting edge of bed with all lines intact and call button in reach    GOALS:   Multidisciplinary Problems       Occupational Therapy Goals          Problem: Occupational Therapy    Goal Priority Disciplines Outcome Interventions   Occupational Therapy Goal     OT, PT/OT Ongoing, Progressing    Description: Goals to be met by: 12/19/22     Patient will increase functional independence with ADLs by  performing:    LE Dressing with Modified [place].  Grooming while seated with Modified [place].  Toileting from bedside commode with Modified [place] for hygiene and clothing management.  Sit to stand with Modified [place] and use of RW.    Step transfer with Modified [place] and use of RW.   Toilet transfer to bedside commode with Modified [place] and use of RW.  Upper extremity exercise program x15 reps per handout, with independence.                         Time Tracking:     OT Date of Treatment: 12/06/22  OT Start Time: 1000  OT Stop Time: 1026  OT Total Time (min): 26 min    Billable Minutes:Self Care/Home Management 15  Therapeutic Activity 11  Total Time 26    OT/CANDIDA: OT          12/6/2022

## 2022-12-06 NOTE — DISCHARGE SUMMARY
[facility] Medicine  Discharge Summary      Patient Name: Carlos Cifuentes Jr.  MRN: [id number]  [facility]: [id number]  Patient Class: IP- Inpatient  Admission Date: 11/28/2022  Hospital Length of Stay: 7 days  Discharge Date and Time:  12/06/2022 10:49 AM  Attending Physician: John Desai MD   Discharging Provider: John Desai MD  Primary Care Provider: Miguel Lux MD    Primary Care Team: Networked reference to record PCT     HPI:     Carlos Cifuentes Jr. is a 61 y.o. male who has a past medical history of Arthritis, Back pain, Bulging lumbar disc, C. difficile diarrhea, Chronic back pain, Diabetes mellitus, Diabetes mellitus type II, DM, type 2, uncontrolled, Hepatitis C, MSSA septicemia, Neuromuscular disorder, Neuropathy, and Staph aureus infection, presented to the ED with CC of SOB, abdominal pain, nausea with vomiting, and Headache.  Patient was intubated upon arrival to ED after receiving 2 mg of IV Ativan for anxiety, as he became obtunded and had difficulty breathing; therefore, HPI from EMS and ED report:     Initial EMS call was for abdominal pain with vomiting and hyperglycemia.  EMS found patient with vomiting but also anxiety. EMS said that patient would not allow them to check vitals, so EMS administered IV Ativan 2 mg, and patient then became obtunded and had difficulty breathing.  EMS reports  and /93 s/p ativan.  EMS states patient administered SC insulin 28 units prior to their arrival, but it is unclear which insulin this is. Patient's sister arrived to ER later and supplemented HPI.  She reports that patient had substernal acid reflux like chest pain with radiation to his left arm yesterday Sunday 11/27/22 and went to the ER at [facility].  However he was in the lobby for several hours and was not seen.  He then left. His chest pain recurred tonight Monday 11/28/22, he called his sister to tell her that he felt bad and was short of breath.  She  urged to call 911, which he did.  She suspects that patient became anxious and agitated when EMS strapped him to their gurney, as patient has a history of claustrophobia relating to a childhood incident in which he was papoosed to get staples in his scalp.    In the ED, patient was intubated and sedated with propofol.  His blood pressure was still 200/100, despite propofol use.  His troponin was 4 without ST elevation and lactic acid 4.9, he was started on NSTEMI protocol with heparin drip and given fluids.  Mild reactive leukocytosis corrected with fluids only.        Procedure(s) (LRB):  ANGIOGRAM, CORONARY ARTERY (N/A)      Hospital Course:   62 y/o male admitted with acute respiratory failure on vent.  Troponin elevated and consistent with NSTEMI.  Started on ASA, Plavix, Statin and Heparin drip.  Pulmonary consulted for vent management.  LHC on 11/29 showing LAD 90% mid - diffuse severe mid-distal disease, Cx- OM1 large - multiple 70-80% mid lesions with diffuse disease, RCA 50% mid, PDA 80% proximal - diffuse distal disease, PL 80%.  Cardiology recommending medical management.  Patient febrile and empirically started on ABX's.  Coag negative Staph on BCx possible contaminant.  Patient extubated on 11/30.  Did well oxygenation wise, but with significant anxiety and restlessness.  Placed on Precedex drip.  Also with episodes of hypotension that responded to IVF's. Patient stepped down from the ICU on 12/4/22. PT/OT evaluated the patient and recommend out patient PT with DME. Patient was discharged to home on 12/6/22. Activity as tolerated. Diet- low NA. Follow up with PCP and cards in 1-2 weeks        Goals of Care Treatment Preferences:  Code Status: Full Code    Living Will: Yes              Consults:   Consults (From admission, onward)        Status Ordering Provider     Inpatient consult to Pulmonology  Once        Provider:  (Not yet assigned)    MICHEL Diaz A     Inpatient consult to Cardiology   Once        Provider:  (Not yet assigned)    Completed MICHEL GARDNER A     Inpatient consult to Registered Dietitian/Nutritionist  Once        Provider:  (Not yet assigned)    Completed ELVIN BASSETT          No new Assessment & Plan notes have been filed under this hospital service since the last note was generated.  Service: Hospital Medicine    Final Active Diagnoses:    Diagnosis Date Noted POA    Anxiety [F41.9] 12/01/2022 Yes    Essential hypertension [I10] 03/06/2018 Yes    CKD stage 3 due to type 2 diabetes mellitus [E11.22, N18.30] 09/20/2017 Yes    Anemia of chronic disorder [D63.8] 05/05/2015 Yes    Type II diabetes mellitus with neurological manifestations [E11.49] 10/09/2014 Yes      Problems Resolved During this Admission:    Diagnosis Date Noted Date Resolved POA    PRINCIPAL PROBLEM:  NSTEMI, initial episode of care [I21.4] 11/29/2022 12/06/2022 Yes    Acute combined systolic and diastolic congestive heart failure [I50.41] 11/30/2022 12/06/2022 Yes    Acute hypoxemic respiratory failure [J96.01] 11/29/2022 12/03/2022 Yes    Elevated troponin [R77.8] 11/29/2022 11/30/2022 Yes    Lactic acidosis [E87.20] 11/29/2022 12/01/2022 Yes    Wound dehiscence [T81.30XA] 06/06/2018 12/06/2022 Yes    Bacteremia [R78.81] 10/24/2014 12/06/2022 Yes       Discharged Condition: good    Disposition: Home or Self Care    Follow Up:   Follow-up Information     Miguel Lux MD Follow up.    Specialties: Internal Medicine, Oncology, Hematology and Oncology  Contact information:  [address] [address]  [phone]             Miguel Lux MD Follow up in 1 week(s).    Specialties: Internal Medicine, Oncology, Hematology and Oncology  Contact information:  [address] [address]  [phone]                       Patient Instructions:      WHEELCHAIR FOR HOME USE     Order Specific Question Answer Comments   Hours in W/C per day: 16    Type of  "Wheelchair: Standard    Size(Width): 18"(STD adult)    Leg Support: STD footrests    Lap Belt: Velcro    Accessories: Front brakes    Cushion: Basic    Height: 5' 9" (1.753 m)    Weight: 106.4 kg (234 lb 9.1 oz)    Does patient have medical equipment at home? cane, straight    Does patient have medical equipment at home? wheelchair    Length of need (1-99 months): 99    Please check all that apply: Caregiver is capable and willing to operate wheelchair safely.    Please check all that apply: Patient's upper body strength is sufficient for propulsion.      COMMODE FOR HOME USE     Order Specific Question Answer Comments   Type: Standard    Height: 5' 9" (1.753 m)    Weight: 106.4 kg (234 lb 9.1 oz)    Does patient have medical equipment at home? cane, straight    Does patient have medical equipment at home? wheelchair    Length of need (1-99 months): 99      WALKER FOR HOME USE     Order Specific Question Answer Comments   Type of Walker: Adult (5'4"-6'6")    With wheels? Yes    Height: 5' 9" (1.753 m)    Weight: 106.4 kg (234 lb 9.1 oz)    Length of need (1-99 months): 99    Does patient have medical equipment at home? cane, straight    Does patient have medical equipment at home? wheelchair    Please check all that apply: Patient's condition impairs ambulation.    Please check all that apply: Patient is unable to safely ambulate without equipment.      Ambulatory referral/consult to Physical/Occupational Therapy   Standing Status: Future   Referral Priority: Routine Referral Type: Physical Medicine   Referral Reason: Specialty Services Required   Number of Visits Requested: 1       Significant Diagnostic Studies:}    Pending Diagnostic Studies:     None         Medications:  Reconciled Home Medications:      Medication List      START taking these medications    aspirin 81 MG Chew  Take 1 tablet (81 mg total) by mouth once daily.  Start taking on: December 7, 2022     atorvastatin 40 MG tablet  Commonly known as: " LIPITOR  Take 1 tablet (40 mg total) by mouth once daily.  Start taking on: December 7, 2022     clopidogreL 75 mg tablet  Commonly known as: PLAVIX  Take 1 tablet (75 mg total) by mouth once daily.  Start taking on: December 7, 2022     EScitalopram oxalate 10 MG tablet  Commonly known as: LEXAPRO  Take 1 tablet (10 mg total) by mouth once daily.  Start taking on: December 7, 2022     furosemide 20 MG tablet  Commonly known as: LASIX  Take 1 tablet (20 mg total) by mouth once daily.  Start taking on: December 7, 2022     losartan 25 MG tablet  Commonly known as: COZAAR  Take 0.5 tablets (12.5 mg total) by mouth once daily.  Start taking on: December 7, 2022     metoprolol tartrate 25 MG tablet  Commonly known as: LOPRESSOR  Take 0.5 tablets (12.5 mg total) by mouth 2 (two) times daily.     nicotine 21 mg/24 hr  Commonly known as: NICODERM CQ  Place 1 patch onto the skin once daily.  Start taking on: December 7, 2022     QUEtiapine 25 MG Tab  Commonly known as: SEROQUEL  Take 1 tablet (25 mg total) by mouth every evening.     tamsulosin 0.4 mg Cap  Commonly known as: FLOMAX  Take 1 capsule (0.4 mg total) by mouth once daily.  Start taking on: December 7, 2022        CONTINUE taking these medications    gabapentin 300 MG capsule  Commonly known as: NEURONTIN  TAKE 1 CAPSULE BY MOUTH THREE TIMES A DAY     glimepiride 2 MG tablet  Commonly known as: AmaryL  Take 1 tablet (2 mg total) by mouth every morning.     LANTUS U-100 INSULIN 100 unit/mL injection  Generic drug: insulin glargine  INJECT TEN UNITS INTO SKIN AT BEDTIME     metFORMIN 1000 MG tablet  Commonly known as: GLUCOPHAGE  TAKE ONE TABLET BY MOUTH TWICE DAILY     mupirocin 2 % ointment  Commonly known as: BACTROBAN  Apply to affected area 3 times daily     pantoprazole 40 MG tablet  Commonly known as: PROTONIX  TAKE ONE TABLET BY MOUTH EVERY DAY     silver sulfADIAZINE 1% 1 % cream  Commonly known as: SILVADENE  Apply 1 application topically once daily. Apply  to affected area     TRUE METRIX GLUCOSE METER Misc  Generic drug: blood-glucose meter  AS DIRECTED     TRUE METRIX GLUCOSE TEST STRIP Strp  Generic drug: blood sugar diagnostic  Twice daily blood sugar checks            Indwelling Lines/Drains at time of discharge:   Lines/Drains/Airways     None                 Time spent on the discharge of patient:  > 30 minutes         John Espinosa MD  Department of Hospital Medicine  [facility]

## 2022-12-06 NOTE — PROGRESS NOTES
[facility] Medicine  Progress Note    Patient Name: Carlos Cifuentes Jr.  MRN: [id number]  Patient Class: IP- Inpatient   Admission Date: 11/28/2022  Length of Stay: 7 days  Attending Physician: John Desai MD  Primary Care Provider: Miguel Lux MD        Subjective:     Principal Problem:NSTEMI, initial episode of care        HPI:    Carlos Cifuentes Jr. is a 61 y.o. male who has a past medical history of Arthritis, Back pain, Bulging lumbar disc, C. difficile diarrhea, Chronic back pain, Diabetes mellitus, Diabetes mellitus type II, DM, type 2, uncontrolled, Hepatitis C, MSSA septicemia, Neuromuscular disorder, Neuropathy, and Staph aureus infection, presented to the ED with CC of SOB, abdominal pain, nausea with vomiting, and Headache.  Patient was intubated upon arrival to ED after receiving 2 mg of IV Ativan for anxiety, as he became obtunded and had difficulty breathing; therefore, HPI from EMS and ED report:     Initial EMS call was for abdominal pain with vomiting and hyperglycemia.  EMS found patient with vomiting but also anxiety. EMS said that patient would not allow them to check vitals, so EMS administered IV Ativan 2 mg, and patient then became obtunded and had difficulty breathing.  EMS reports  and /93 s/p ativan.  EMS states patient administered SC insulin 28 units prior to their arrival, but it is unclear which insulin this is. Patient's sister arrived to ER later and supplemented HPI.  She reports that patient had substernal acid reflux like chest pain with radiation to his left arm yesterday Sunday 11/27/22 and went to the ER at [facility].  However he was in the lobby for several hours and was not seen.  He then left. His chest pain recurred tonight Monday 11/28/22, he called his sister to tell her that he felt bad and was short of breath.  She urged to call 911, which he did.  She suspects that patient became anxious and agitated when EMS strapped him  to their gurney, as patient has a history of claustrophobia relating to a childhood incident in which he was papoosed to get staples in his scalp.    In the ED, patient was intubated and sedated with propofol.  His blood pressure was still 200/100, despite propofol use.  His troponin was 4 without ST elevation and lactic acid 4.9, he was started on NSTEMI protocol with heparin drip and given fluids.  Mild reactive leukocytosis corrected with fluids only.        Overview/Hospital Course:  60 y/o male admitted with acute respiratory failure on vent.  Troponin elevated and consistent with NSTEMI.  Started on ASA, Plavix, Statin and Heparin drip.  Pulmonary consulted for vent management.  LHC on 11/29 showing LAD 90% mid - diffuse severe mid-distal disease, Cx- OM1 large - multiple 70-80% mid lesions with diffuse disease, RCA 50% mid, PDA 80% proximal - diffuse distal disease, PL 80%.  Cardiology recommending medical management.  Patient febrile and empirically started on ABX's.  Coag negative Staph on BCx possible contaminant.  Patient extubated on 11/30.  Did well oxygenation wise, but with significant anxiety and restlessness.  Placed on Precedex drip.  Also with episodes of hypotension that responded to IVF's. Patient stepped down from the ICU on 12/4/22. PT/OT evaluated the patient and recommend out patient PT with DME       Interval History: No new issues. Would like to go home     Review of Systems   Constitutional:  Negative for activity change, appetite change, chills and diaphoresis.   HENT:  Negative for congestion and dental problem.    Eyes:  Negative for discharge and itching.   Respiratory:  Negative for apnea and chest tightness.    Cardiovascular:  Negative for chest pain.   Gastrointestinal:  Negative for abdominal pain.   Endocrine: Negative for cold intolerance.   Genitourinary:  Negative for difficulty urinating and dysuria.   Musculoskeletal:  Negative for arthralgias and back pain.   Neurological:   Negative for dizziness and facial asymmetry.   Psychiatric/Behavioral:  Negative for agitation and behavioral problems.    Objective:     Vital Signs (Most Recent):  Temp: 97.6 °F (36.4 °C) (12/06/22 0407)  Pulse: 69 (12/06/22 0407)  Resp: 18 (12/06/22 0407)  BP: 132/64 (12/06/22 0407)  SpO2: 95 % (12/06/22 0407) Vital Signs (24h Range):  Temp:  [97.2 °F (36.2 °C)-98.5 °F (36.9 °C)] 97.6 °F (36.4 °C)  Pulse:  [67-81] 69  Resp:  [18] 18  SpO2:  [93 %-98 %] 95 %  BP: (107-163)/(51-70) 132/64     Weight: 106.4 kg (234 lb 9.1 oz)  Body mass index is 34.64 kg/m².    Intake/Output Summary (Last 24 hours) at 12/6/2022 0737  Last data filed at 12/5/2022 1721  Gross per 24 hour   Intake 720 ml   Output --   Net 720 ml      Physical Exam  Vitals and nursing note reviewed.   Constitutional:       General: He is not in acute distress.     Appearance: Normal appearance. He is not ill-appearing, toxic-appearing or diaphoretic.   HENT:      Head: Normocephalic and atraumatic.   Eyes:      Conjunctiva/sclera: Conjunctivae normal.   Cardiovascular:      Rate and Rhythm: Normal rate and regular rhythm.   Pulmonary:      Effort: Pulmonary effort is normal. No respiratory distress.   Neurological:      Mental Status: He is oriented to person, place, and time.   Psychiatric:         Behavior: Behavior normal.       Significant Labs: All pertinent labs within the past 24 hours have been reviewed.  BMP:   Recent Labs   Lab 12/06/22  0429   *      K 4.5      CO2 26   BUN 22   CREATININE 1.3   CALCIUM 8.3*   MG 1.8     CBC:   Recent Labs   Lab 12/05/22  0358 12/06/22  0429   WBC 3.70* 4.59   HGB 8.2* 8.1*   HCT 22.4* 21.7*   * 169       Significant Imaging:       Assessment/Plan:      * NSTEMI, initial episode of care  · Patient had chest pain that radiated to his arm with elevated troponin  · No ST elevation on EKG  · Initial troponin was the peak at 4.3, down trended from there to 3.5  · Started ASA, Plavix,  "Statin and Heparin drip.  Cardiology consulted.  [facility] showing "LAD 90% mid - diffuse severe mid-distal disease, Cx- OM1 large - multiple 70-80% mid lesions with diffuse disease, RCA 50% mid, PDA 80% proximal - diffuse distal disease, PL 80%"  Cards recommending medical management.  Continue ASA, Plavix and Statin.    Anxiety  Patient with significant anxiety and restlessness post extubation.  Started on Precedex drip.  Added nightly Seroquel.  Will try to avoid benzo's  Patient very lethargic and confused.  Weaned off Precedex.  Avoid sedatives.  Doing much better today.    Acute combined systolic and diastolic congestive heart failure  Currently seems euvolemic.  Continue oral Lasix.      Wound dehiscence  Wound care      Essential hypertension  · Started on ARB and B blocker.  · Continue as tolerated    CKD stage 3 due to type 2 diabetes mellitus  Patient's FSGs are uncontrolled due to hyperglycemia on current medication regimen.  Last A1c reviewed-   Lab Results   Component Value Date    LABA1C 11.8 (H) 06/13/2014    HGBA1C 10.9 (H) 11/28/2022     Most recent fingerstick glucose reviewed-   Recent Labs   Lab 12/03/22  1102 12/03/22  1615 12/03/22  1922 12/04/22  0739   POCTGLUCOSE 192* 169* 179* 182*       Antihyperglycemics (From admission, onward)    Start     Stop Route Frequency Ordered    12/01/22 2100  insulin detemir U-100 pen 10 Units         -- SubQ Nightly 12/01/22 1550    11/30/22 1410  insulin aspart U-100 pen 1-10 Units         -- SubQ Before meals & nightly PRN 11/30/22 1310        · Hold Oral hypoglycemics while patient is in the hospital.  SSI and q4h testing    Anemia of chronic disorder  Patient's hemoglobin initially dropped.  No obvious evidence of bleeding.  Will get CT to rule out retroperitoneal bleed post [facility].  Closely monitor.  Transfuse if Hgb<7  Patient refused CT.  H/H now improving.      Bacteremia  Coag negative Staph on BCX.  Most likely contaminant, but patient was " febrile.  Empirically started on Vanc and Zosyn.  Possible aspiration pneumonia.  Repeat BCx negative.  Stopped Vanc.  5 days of Zosyn.  Change to Augmentin for 2 more days.      Type II diabetes mellitus with neurological manifestations  Continue insulin sliding scale.  Continue current insulin regimen.      VTE Risk Mitigation (From admission, onward)         Ordered     Reason for No Pharmacological VTE Prophylaxis  Once        Question:  Reasons:  Answer:  Physician Provided (leave comment)  Comment:  On heparin drip    11/29/22 0243     IP VTE HIGH RISK PATIENT  Once         11/29/22 0243     Place sequential compression device  Until discontinued         11/29/22 0243                Discharge Planning   BEBA: 12/4/2022     Code Status: Full Code   Is the patient medically ready for discharge?:     Reason for patient still in hospital (select all that apply):   Discharge Plan A: Home with family   Discharge Delays: None known at this time    Checking 02 sats. If ok on RA- will d/c to home.            John sEpinosa MD  Department of Hospital Medicine   [facility]

## 2022-12-06 NOTE — NURSING
Testing for Home O2    O2 Sats on RA at rest =91%    O2 sats on RA with exercise  =89%    O2 sats on 2L O2 with exercise = 95%

## 2022-12-06 NOTE — PLAN OF CARE
Problem: Occupational Therapy  Goal: Occupational Therapy Goal  Description: Goals to be met by: 12/19/22     Patient will increase functional independence with ADLs by performing:    LE Dressing with Modified [place].  Grooming while seated with Modified [place].  Toileting from bedside commode with Modified [place] for hygiene and clothing management.  Sit to stand with Modified [place] and use of RW.    Step transfer with Modified [place] and use of RW.   Toilet transfer to bedside commode with Modified [place] and use of RW.  Upper extremity exercise program x15 reps per handout, with independence.    Outcome: Ongoing, Progressing

## 2022-12-06 NOTE — PLAN OF CARE
[facility] [place] - Telemetry  Discharge Final Note    Primary Care Provider: Miguel Lux MD    Expected Discharge Date: 12/6/2022    All CM needs met. Per DME, pt has a commode, wheelchair and walker CM verified with pt. . Pt stated he is waiting on parts for wheelchair with [employer]. Outpatient PT/OT will contact pt with an appointment.CM notified nurse, Milo that pt is ready for discharge from CM standpoint.    Final Discharge Note (most recent)       Final Note - 12/06/22 1140          Final Note    Assessment Type Final Discharge Note (P)      Anticipated Discharge Disposition Home or Self Care (P)      What phone number can be called within the next 1-3 days to see how you are doing after discharge? [phone] (P)         Post-Acute Status    Coverage Healthy Blue (P)      Discharge Delays None known at this time (P)                      Important Message from Medicare             Contact Info       Miguel Lux MD   Specialty: Internal Medicine, Oncology, Hematology and Oncology   Relationship: PCP - General    [address] [place] [address]  [address]  [facility] [address]   Phone: [phone]       Next Steps: Call    Instructions: To schedule hospital follow up appointment within 7 days.    [facility]   Specialty: Physical Therapy    [address] [facility] [address]   Phone: [phone]       Next Steps: Follow up    Instructions: [place] office will call patient. Referral in system.

## 2022-12-06 NOTE — NURSING
Bedside shift report received from day shift nurse.  Patient denies any needs or wants at this time.  Call light within reach.  Will continue to monitor.

## 2022-12-06 NOTE — PLAN OF CARE
Problem: Physical Therapy  Goal: Physical Therapy Goal  Description: Goals to be met by: 22     Patient will increase functional independence with mobility by performin. Supine to sit with Modified [place]  2. Rolling to Left and Right with Modified [place].  3. Sit to stand transfer with Modified [place]  4. Bed to chair transfer with Modified [place]    5 Gait  x 10-15 feet with Modified [place] using RW   6. Wheelchair propulsion x50 feet with Modified [place] using bilateral uppper extremities  7. Lower extremity exercise program x10 reps per handout, with supervision    Outcome: Adequate for Care Transition   Goals not met, but adequate for D/C home with family support.  PT to sign off.  Outpatient PT, W/C, Wide BSC, and RW recommended.

## 2022-12-07 ENCOUNTER — PATIENT OUTREACH (OUTPATIENT)
Dept: ADMINISTRATIVE | Facility: CLINIC | Age: 62
End: 2022-12-07
Payer: MEDICAID

## 2022-12-07 ENCOUNTER — PATIENT MESSAGE (OUTPATIENT)
Dept: ADMINISTRATIVE | Facility: CLINIC | Age: 62
End: 2022-12-07
Payer: MEDICAID

## 2022-12-07 NOTE — PROGRESS NOTES
C3 nurse attempted to contact Carlos Cifuentes Jr.  for a TCC post hospital discharge follow up call. No answer. Left voicemail with callback information. The patient does not have a scheduled HOSFU appointment. Message sent to PCP staff for assistance with scheduling visit with patient.

## 2022-12-07 NOTE — PROGRESS NOTES
C3 nurse spoke with Carlos Cifuentes Jr.  for a TCC post hospital discharge follow up call. The patient has a scheduled HOSFU appointment with Miguel Lux MD  on 12/13/2022 @ [facility].

## 2022-12-08 ENCOUNTER — HOSPITAL ENCOUNTER (OUTPATIENT)
Facility: HOSPITAL | Age: 62
Discharge: HOME HEALTH | End: 2022-12-10
Attending: EMERGENCY MEDICINE | Admitting: EMERGENCY MEDICINE
Payer: MEDICAID

## 2022-12-08 DIAGNOSIS — R06.02 SHORTNESS OF BREATH: ICD-10-CM

## 2022-12-08 DIAGNOSIS — I50.9 HEART FAILURE: ICD-10-CM

## 2022-12-08 DIAGNOSIS — I50.33 ACUTE ON CHRONIC DIASTOLIC HEART FAILURE: ICD-10-CM

## 2022-12-08 PROBLEM — E78.5 HYPERLIPIDEMIA ASSOCIATED WITH TYPE 2 DIABETES MELLITUS: Status: ACTIVE | Noted: 2022-12-08

## 2022-12-08 PROBLEM — I25.2 HISTORY OF NON-ST ELEVATION MYOCARDIAL INFARCTION (NSTEMI): Status: ACTIVE | Noted: 2022-12-08

## 2022-12-08 PROBLEM — N18.30 CKD STAGE 3 DUE TO TYPE 2 DIABETES MELLITUS: Chronic | Status: ACTIVE | Noted: 2017-09-20

## 2022-12-08 PROBLEM — E11.22 CKD STAGE 3 DUE TO TYPE 2 DIABETES MELLITUS: Chronic | Status: ACTIVE | Noted: 2017-09-20

## 2022-12-08 PROBLEM — E11.69 HYPERLIPIDEMIA ASSOCIATED WITH TYPE 2 DIABETES MELLITUS: Status: ACTIVE | Noted: 2022-12-08

## 2022-12-08 LAB
ALBUMIN SERPL BCP-MCNC: 3.4 G/DL (ref 3.5–5.2)
ALP SERPL-CCNC: 74 U/L (ref 55–135)
ALT SERPL W/O P-5'-P-CCNC: 15 U/L (ref 10–44)
ANION GAP SERPL CALC-SCNC: 7 MMOL/L (ref 8–16)
AORTIC ROOT ANNULUS: 1.9 CM
AST SERPL-CCNC: 15 U/L (ref 10–40)
AV PEAK GRADIENT: 7 MMHG
AV VELOCITY RATIO: 0.55
BASOPHILS # BLD AUTO: 0.03 K/UL (ref 0–0.2)
BASOPHILS NFR BLD: 0.5 % (ref 0–1.9)
BILIRUB SERPL-MCNC: 0.9 MG/DL (ref 0.1–1)
BNP SERPL-MCNC: 542 PG/ML (ref 0–99)
BSA FOR ECHO PROCEDURE: 2.2 M2
BUN SERPL-MCNC: 26 MG/DL (ref 8–23)
CALCIUM SERPL-MCNC: 9.3 MG/DL (ref 8.7–10.5)
CHLORIDE SERPL-SCNC: 98 MMOL/L (ref 95–110)
CO2 SERPL-SCNC: 25 MMOL/L (ref 23–29)
CREAT SERPL-MCNC: 1.6 MG/DL (ref 0.5–1.4)
CTP QC/QA: YES
CTP QC/QA: YES
CV ECHO LV RWT: 0.43 CM
DIFFERENTIAL METHOD: ABNORMAL
DOP CALC AO PEAK VEL: 1.31 M/S
DOP CALC LVOT AREA: 2.9 CM2
DOP CALC LVOT DIAMETER: 1.93 CM
DOP CALC LVOT PEAK VEL: 0.72 M/S
DOP CALC LVOT STROKE VOLUME: 39.18 CM3
DOP CALCLVOT PEAK VEL VTI: 13.4 CM
E WAVE DECELERATION TIME: 192.59 MSEC
E/A RATIO: 1.94
E/E' RATIO: 20.33 M/S
ECHO LV POSTERIOR WALL: 1.19 CM (ref 0.6–1.1)
EJECTION FRACTION: 45 %
EOSINOPHIL # BLD AUTO: 0 K/UL (ref 0–0.5)
EOSINOPHIL NFR BLD: 0.5 % (ref 0–8)
ERYTHROCYTE [DISTWIDTH] IN BLOOD BY AUTOMATED COUNT: 14.2 % (ref 11.5–14.5)
EST. GFR  (NO RACE VARIABLE): 49 ML/MIN/1.73 M^2
FRACTIONAL SHORTENING: 28 % (ref 28–44)
GLUCOSE SERPL-MCNC: 441 MG/DL (ref 70–110)
HCT VFR BLD AUTO: 25.4 % (ref 40–54)
HGB BLD-MCNC: 9.5 G/DL (ref 14–18)
IMM GRANULOCYTES # BLD AUTO: 0.17 K/UL (ref 0–0.04)
IMM GRANULOCYTES NFR BLD AUTO: 2.9 % (ref 0–0.5)
INTERVENTRICULAR SEPTUM: 0.97 CM (ref 0.6–1.1)
IVRT: 100.35 MSEC
LA MAJOR: 6.22 CM
LA MINOR: 4.5 CM
LA WIDTH: 5 CM
LACTATE SERPL-SCNC: 2 MMOL/L (ref 0.5–2.2)
LEFT ATRIUM SIZE: 5.49 CM
LEFT ATRIUM VOLUME INDEX: 56.7 ML/M2
LEFT ATRIUM VOLUME: 121.84 CM3
LEFT INTERNAL DIMENSION IN SYSTOLE: 4 CM (ref 2.1–4)
LEFT VENTRICLE DIASTOLIC VOLUME INDEX: 69.97 ML/M2
LEFT VENTRICLE DIASTOLIC VOLUME: 150.43 ML
LEFT VENTRICLE MASS INDEX: 111 G/M2
LEFT VENTRICLE SYSTOLIC VOLUME INDEX: 32.5 ML/M2
LEFT VENTRICLE SYSTOLIC VOLUME: 69.97 ML
LEFT VENTRICULAR INTERNAL DIMENSION IN DIASTOLE: 5.55 CM (ref 3.5–6)
LEFT VENTRICULAR MASS: 239.68 G
LV LATERAL E/E' RATIO: 20.33 M/S
LV SEPTAL E/E' RATIO: 20.33 M/S
LVOT MG: 0.96 MMHG
LVOT MV: 0.46 CM/S
LYMPHOCYTES # BLD AUTO: 0.8 K/UL (ref 1–4.8)
LYMPHOCYTES NFR BLD: 12.7 % (ref 18–48)
MAGNESIUM SERPL-MCNC: 1.7 MG/DL (ref 1.6–2.6)
MCH RBC QN AUTO: 43.8 PG (ref 27–31)
MCHC RBC AUTO-ENTMCNC: 37.4 G/DL (ref 32–36)
MCV RBC AUTO: 117 FL (ref 82–98)
MONOCYTES # BLD AUTO: 0.3 K/UL (ref 0.3–1)
MONOCYTES NFR BLD: 4.9 % (ref 4–15)
MV PEAK A VEL: 0.63 M/S
MV PEAK E VEL: 1.22 M/S
NEUTROPHILS # BLD AUTO: 4.6 K/UL (ref 1.8–7.7)
NEUTROPHILS NFR BLD: 78.5 % (ref 38–73)
NRBC BLD-RTO: 0 /100 WBC
PISA TR MAX VEL: 1.94 M/S
PLATELET # BLD AUTO: 182 K/UL (ref 150–450)
PMV BLD AUTO: 10 FL (ref 9.2–12.9)
POC MOLECULAR INFLUENZA A AGN: NEGATIVE
POC MOLECULAR INFLUENZA B AGN: NEGATIVE
POCT GLUCOSE: 324 MG/DL (ref 70–110)
POCT GLUCOSE: 369 MG/DL (ref 70–110)
POCT GLUCOSE: 386 MG/DL (ref 70–110)
POCT GLUCOSE: 445 MG/DL (ref 70–110)
POTASSIUM SERPL-SCNC: 5.1 MMOL/L (ref 3.5–5.1)
PROT SERPL-MCNC: 7.5 G/DL (ref 6–8.4)
RA MAJOR: 4.77 CM
RA PRESSURE: 3 MMHG
RA WIDTH: 3.5 CM
RBC # BLD AUTO: 2.17 M/UL (ref 4.6–6.2)
RIGHT VENTRICULAR END-DIASTOLIC DIMENSION: 3.47 CM
SARS-COV-2 RDRP RESP QL NAA+PROBE: NEGATIVE
SINUS: 3.2 CM
SODIUM SERPL-SCNC: 130 MMOL/L (ref 136–145)
STJ: 2.62 CM
T4 FREE SERPL-MCNC: 0.87 NG/DL (ref 0.71–1.51)
TDI LATERAL: 0.06 M/S
TDI SEPTAL: 0.06 M/S
TDI: 0.06 M/S
TR MAX PG: 15 MMHG
TRICUSPID ANNULAR PLANE SYSTOLIC EXCURSION: 2.3 CM
TROPONIN I SERPL DL<=0.01 NG/ML-MCNC: 0.13 NG/ML (ref 0–0.03)
TROPONIN I SERPL DL<=0.01 NG/ML-MCNC: 0.15 NG/ML (ref 0–0.03)
TROPONIN I SERPL DL<=0.01 NG/ML-MCNC: 0.19 NG/ML (ref 0–0.03)
TSH SERPL DL<=0.005 MIU/L-ACNC: 4.25 UIU/ML (ref 0.4–4)
TV REST PULMONARY ARTERY PRESSURE: 18 MMHG
WBC # BLD AUTO: 5.9 K/UL (ref 3.9–12.7)

## 2022-12-08 PROCEDURE — 99214 PR OFFICE/OUTPT VISIT, EST, LEVL IV, 30-39 MIN: ICD-10-PCS | Mod: 25,,, | Performed by: INTERNAL MEDICINE

## 2022-12-08 PROCEDURE — 27000221 HC OXYGEN, UP TO 24 HOURS

## 2022-12-08 PROCEDURE — 96376 TX/PRO/DX INJ SAME DRUG ADON: CPT

## 2022-12-08 PROCEDURE — G0378 HOSPITAL OBSERVATION PER HR: HCPCS

## 2022-12-08 PROCEDURE — 82962 GLUCOSE BLOOD TEST: CPT

## 2022-12-08 PROCEDURE — 83880 ASSAY OF NATRIURETIC PEPTIDE: CPT | Performed by: EMERGENCY MEDICINE

## 2022-12-08 PROCEDURE — 63600175 PHARM REV CODE 636 W HCPCS: Performed by: HOSPITALIST

## 2022-12-08 PROCEDURE — 93010 ELECTROCARDIOGRAM REPORT: CPT | Mod: ,,, | Performed by: INTERNAL MEDICINE

## 2022-12-08 PROCEDURE — 80053 COMPREHEN METABOLIC PANEL: CPT | Performed by: EMERGENCY MEDICINE

## 2022-12-08 PROCEDURE — 84439 ASSAY OF FREE THYROXINE: CPT | Performed by: EMERGENCY MEDICINE

## 2022-12-08 PROCEDURE — 96374 THER/PROPH/DIAG INJ IV PUSH: CPT

## 2022-12-08 PROCEDURE — 25000003 PHARM REV CODE 250: Performed by: HOSPITALIST

## 2022-12-08 PROCEDURE — 87635 SARS-COV-2 COVID-19 AMP PRB: CPT | Performed by: EMERGENCY MEDICINE

## 2022-12-08 PROCEDURE — 99285 EMERGENCY DEPT VISIT HI MDM: CPT | Mod: 25

## 2022-12-08 PROCEDURE — 83036 HEMOGLOBIN GLYCOSYLATED A1C: CPT | Performed by: HOSPITALIST

## 2022-12-08 PROCEDURE — 83605 ASSAY OF LACTIC ACID: CPT | Performed by: EMERGENCY MEDICINE

## 2022-12-08 PROCEDURE — 96372 THER/PROPH/DIAG INJ SC/IM: CPT | Performed by: HOSPITALIST

## 2022-12-08 PROCEDURE — 99214 OFFICE O/P EST MOD 30 MIN: CPT | Mod: 25,,, | Performed by: INTERNAL MEDICINE

## 2022-12-08 PROCEDURE — 25000003 PHARM REV CODE 250: Performed by: NURSE PRACTITIONER

## 2022-12-08 PROCEDURE — S4991 NICOTINE PATCH NONLEGEND: HCPCS | Performed by: NURSE PRACTITIONER

## 2022-12-08 PROCEDURE — 93005 ELECTROCARDIOGRAM TRACING: CPT

## 2022-12-08 PROCEDURE — 85025 COMPLETE CBC W/AUTO DIFF WBC: CPT | Performed by: EMERGENCY MEDICINE

## 2022-12-08 PROCEDURE — 25000003 PHARM REV CODE 250: Performed by: EMERGENCY MEDICINE

## 2022-12-08 PROCEDURE — 84484 ASSAY OF TROPONIN QUANT: CPT | Mod: 91 | Performed by: HOSPITALIST

## 2022-12-08 PROCEDURE — 84484 ASSAY OF TROPONIN QUANT: CPT | Performed by: EMERGENCY MEDICINE

## 2022-12-08 PROCEDURE — 96372 THER/PROPH/DIAG INJ SC/IM: CPT | Performed by: EMERGENCY MEDICINE

## 2022-12-08 PROCEDURE — 87502 INFLUENZA DNA AMP PROBE: CPT

## 2022-12-08 PROCEDURE — 63600175 PHARM REV CODE 636 W HCPCS: Performed by: EMERGENCY MEDICINE

## 2022-12-08 PROCEDURE — 83735 ASSAY OF MAGNESIUM: CPT | Performed by: HOSPITALIST

## 2022-12-08 PROCEDURE — 93010 EKG 12-LEAD: ICD-10-PCS | Mod: ,,, | Performed by: INTERNAL MEDICINE

## 2022-12-08 PROCEDURE — 84443 ASSAY THYROID STIM HORMONE: CPT | Performed by: EMERGENCY MEDICINE

## 2022-12-08 RX ORDER — NAPROXEN SODIUM 220 MG/1
81 TABLET, FILM COATED ORAL DAILY
Status: DISCONTINUED | OUTPATIENT
Start: 2022-12-08 | End: 2022-12-10 | Stop reason: HOSPADM

## 2022-12-08 RX ORDER — FUROSEMIDE 10 MG/ML
40 INJECTION INTRAMUSCULAR; INTRAVENOUS
Status: COMPLETED | OUTPATIENT
Start: 2022-12-08 | End: 2022-12-08

## 2022-12-08 RX ORDER — LIDOCAINE 50 MG/G
1 PATCH TOPICAL
Status: DISCONTINUED | OUTPATIENT
Start: 2022-12-08 | End: 2022-12-08 | Stop reason: ALTCHOICE

## 2022-12-08 RX ORDER — ESCITALOPRAM OXALATE 10 MG/1
10 TABLET ORAL DAILY
Status: DISCONTINUED | OUTPATIENT
Start: 2022-12-08 | End: 2022-12-10 | Stop reason: HOSPADM

## 2022-12-08 RX ORDER — LIDOCAINE 50 MG/G
1 PATCH TOPICAL
Status: DISCONTINUED | OUTPATIENT
Start: 2022-12-08 | End: 2022-12-10 | Stop reason: HOSPADM

## 2022-12-08 RX ORDER — SODIUM CHLORIDE 0.9 % (FLUSH) 0.9 %
10 SYRINGE (ML) INJECTION
Status: DISCONTINUED | OUTPATIENT
Start: 2022-12-08 | End: 2022-12-10 | Stop reason: HOSPADM

## 2022-12-08 RX ORDER — FUROSEMIDE 10 MG/ML
20 INJECTION INTRAMUSCULAR; INTRAVENOUS 2 TIMES DAILY WITH MEALS
Status: DISCONTINUED | OUTPATIENT
Start: 2022-12-09 | End: 2022-12-09

## 2022-12-08 RX ORDER — ENOXAPARIN SODIUM 100 MG/ML
40 INJECTION SUBCUTANEOUS EVERY 24 HOURS
Status: DISCONTINUED | OUTPATIENT
Start: 2022-12-08 | End: 2022-12-10 | Stop reason: HOSPADM

## 2022-12-08 RX ORDER — QUETIAPINE FUMARATE 25 MG/1
25 TABLET, FILM COATED ORAL NIGHTLY
Status: DISCONTINUED | OUTPATIENT
Start: 2022-12-08 | End: 2022-12-10 | Stop reason: HOSPADM

## 2022-12-08 RX ORDER — CLOPIDOGREL BISULFATE 75 MG/1
75 TABLET ORAL DAILY
Status: DISCONTINUED | OUTPATIENT
Start: 2022-12-08 | End: 2022-12-10 | Stop reason: HOSPADM

## 2022-12-08 RX ORDER — TAMSULOSIN HYDROCHLORIDE 0.4 MG/1
0.4 CAPSULE ORAL DAILY
Status: DISCONTINUED | OUTPATIENT
Start: 2022-12-08 | End: 2022-12-10 | Stop reason: HOSPADM

## 2022-12-08 RX ORDER — IBUPROFEN 200 MG
1 TABLET ORAL DAILY
Status: DISCONTINUED | OUTPATIENT
Start: 2022-12-09 | End: 2022-12-08

## 2022-12-08 RX ORDER — ACETAMINOPHEN 325 MG/1
650 TABLET ORAL EVERY 6 HOURS PRN
Status: DISCONTINUED | OUTPATIENT
Start: 2022-12-08 | End: 2022-12-10 | Stop reason: HOSPADM

## 2022-12-08 RX ORDER — IBUPROFEN 200 MG
16 TABLET ORAL
Status: DISCONTINUED | OUTPATIENT
Start: 2022-12-08 | End: 2022-12-10 | Stop reason: HOSPADM

## 2022-12-08 RX ORDER — ATORVASTATIN CALCIUM 40 MG/1
40 TABLET, FILM COATED ORAL DAILY
Status: DISCONTINUED | OUTPATIENT
Start: 2022-12-08 | End: 2022-12-10 | Stop reason: HOSPADM

## 2022-12-08 RX ORDER — ONDANSETRON 2 MG/ML
4 INJECTION INTRAMUSCULAR; INTRAVENOUS EVERY 8 HOURS PRN
Status: DISCONTINUED | OUTPATIENT
Start: 2022-12-08 | End: 2022-12-10 | Stop reason: HOSPADM

## 2022-12-08 RX ORDER — METOPROLOL TARTRATE 25 MG/1
12.5 TABLET ORAL 2 TIMES DAILY
Status: DISCONTINUED | OUTPATIENT
Start: 2022-12-08 | End: 2022-12-09

## 2022-12-08 RX ORDER — GABAPENTIN 300 MG/1
300 CAPSULE ORAL 3 TIMES DAILY
Status: DISCONTINUED | OUTPATIENT
Start: 2022-12-08 | End: 2022-12-10 | Stop reason: HOSPADM

## 2022-12-08 RX ORDER — IBUPROFEN 200 MG
1 TABLET ORAL DAILY
Status: DISCONTINUED | OUTPATIENT
Start: 2022-12-08 | End: 2022-12-10 | Stop reason: HOSPADM

## 2022-12-08 RX ORDER — IBUPROFEN 200 MG
24 TABLET ORAL
Status: DISCONTINUED | OUTPATIENT
Start: 2022-12-08 | End: 2022-12-10 | Stop reason: HOSPADM

## 2022-12-08 RX ORDER — FUROSEMIDE 10 MG/ML
20 INJECTION INTRAMUSCULAR; INTRAVENOUS 2 TIMES DAILY WITH MEALS
Status: DISCONTINUED | OUTPATIENT
Start: 2022-12-08 | End: 2022-12-08

## 2022-12-08 RX ORDER — POLYETHYLENE GLYCOL 3350 17 G/17G
17 POWDER, FOR SOLUTION ORAL DAILY
Status: DISCONTINUED | OUTPATIENT
Start: 2022-12-08 | End: 2022-12-10 | Stop reason: HOSPADM

## 2022-12-08 RX ORDER — INSULIN ASPART 100 [IU]/ML
1-10 INJECTION, SOLUTION INTRAVENOUS; SUBCUTANEOUS
Status: DISCONTINUED | OUTPATIENT
Start: 2022-12-08 | End: 2022-12-10 | Stop reason: HOSPADM

## 2022-12-08 RX ORDER — GLUCAGON 1 MG
1 KIT INJECTION
Status: DISCONTINUED | OUTPATIENT
Start: 2022-12-08 | End: 2022-12-10 | Stop reason: HOSPADM

## 2022-12-08 RX ADMIN — TAMSULOSIN HYDROCHLORIDE 0.4 MG: 0.4 CAPSULE ORAL at 04:12

## 2022-12-08 RX ADMIN — LIDOCAINE 1 PATCH: 50 PATCH CUTANEOUS at 12:12

## 2022-12-08 RX ADMIN — INSULIN HUMAN 8 UNITS: 100 INJECTION, SOLUTION PARENTERAL at 02:12

## 2022-12-08 RX ADMIN — QUETIAPINE FUMARATE 25 MG: 25 TABLET ORAL at 08:12

## 2022-12-08 RX ADMIN — INSULIN DETEMIR 10 UNITS: 100 INJECTION, SOLUTION SUBCUTANEOUS at 08:12

## 2022-12-08 RX ADMIN — FUROSEMIDE 40 MG: 10 INJECTION, SOLUTION INTRAMUSCULAR; INTRAVENOUS at 12:12

## 2022-12-08 RX ADMIN — ASPIRIN 81 MG CHEWABLE TABLET 81 MG: 81 TABLET CHEWABLE at 04:12

## 2022-12-08 RX ADMIN — FUROSEMIDE 40 MG: 10 INJECTION, SOLUTION INTRAVENOUS at 05:12

## 2022-12-08 RX ADMIN — ENOXAPARIN SODIUM 40 MG: 40 INJECTION SUBCUTANEOUS at 05:12

## 2022-12-08 RX ADMIN — Medication 1 PATCH: at 08:12

## 2022-12-08 RX ADMIN — ESCITALOPRAM OXALATE 10 MG: 10 TABLET ORAL at 04:12

## 2022-12-08 RX ADMIN — ATORVASTATIN CALCIUM 40 MG: 40 TABLET, FILM COATED ORAL at 04:12

## 2022-12-08 RX ADMIN — METOPROLOL TARTRATE 12.5 MG: 25 TABLET, FILM COATED ORAL at 08:12

## 2022-12-08 RX ADMIN — INSULIN ASPART 5 UNITS: 100 INJECTION, SOLUTION INTRAVENOUS; SUBCUTANEOUS at 06:12

## 2022-12-08 RX ADMIN — INSULIN ASPART 4 UNITS: 100 INJECTION, SOLUTION INTRAVENOUS; SUBCUTANEOUS at 08:12

## 2022-12-08 RX ADMIN — LIDOCAINE 1 PATCH: 50 PATCH TOPICAL at 08:12

## 2022-12-08 RX ADMIN — CLOPIDOGREL BISULFATE 75 MG: 75 TABLET ORAL at 04:12

## 2022-12-08 NOTE — ED PROVIDER NOTES
"Encounter Date: 12/8/2022    SCRIBE #1 NOTE: I, Slime Li, am scribing for, and in the presence of,  Jose Stewart Jr., MD. I have scribed the following portions of the note - Other sections scribed: HPI, ROS.     History     Chief Complaint   Patient presents with    Shortness of Breath     EMS called to 61yo male that has a hx of recent NSTEMI and called due to feeling SOB and "having a panic attack." Denied any chest pain     Carlos Cifuentes Jr. is a 62 y.o. male with a PMHx of DM, arthritis, hepatitis C, MRSA, neuropathy, who presents to the ED c/o sudden onset of SOB and "having a panic attack" that began around 0430 today. Pt reports being treated recently for an NSTEMI. Pt states that he had diaphoresis last night. Pt has the associated symptom of epigastric pain that radiates up to his chest during the episode, chest pain, vomiting 2x this morning, dry mouth, and cough. Pt notes that the pain was relieved after eating, but returned after awhile. Pt reports difficulty sleeping due to the panic attacks. Pt notes chronic bilateral arm pain due to prolonged wheelchair use. Per sister, who states that she is his caretaker, the pt has an 80-90% heart block, but is in denial. After the recent NSTEMI visit, pt was prescribed new medications including Plavix, ASA, and acid reflux medication. Pt's sister notes 5 witnessed episodes of panic attacks.No other exacerbating or alleviating factors. Denies any hx of heart failure, COPD, or stents. Denies home oxygen use.    The history is provided by the patient and a relative (sister). No  was used.   Review of patient's allergies indicates:   Allergen Reactions    Codeine Rash    Lyrica [pregabalin]      Feet turned Red    Vicodin [hydrocodone-acetaminophen] Rash     Past Medical History:   Diagnosis Date    Arthritis     Back pain     Bulging lumbar disc     C. difficile diarrhea     Chronic back pain 10/14/2014    Cigarette smoker     " Diabetes mellitus     Diabetes mellitus type II     DM (diabetes mellitus), type 2, uncontrolled 03/12/2013    Hepatitis C 07/03/2013    MSSA (methicillin susceptible Staphylococcus aureus) septicemia     Neuromuscular disorder     Neuropathy     NSTEMI (non-ST elevated myocardial infarction) 11/28/2022    Staph aureus infection     Status post bilateral below knee amputation      Past Surgical History:   Procedure Laterality Date    ANGIOGRAPHY OF LOWER EXTREMITY Left 06/14/2018    Procedure: Angiogram LEFT LOWER Extremity with US guided access from right side;  Surgeon: Sony Srinivasan MD;  Location: [facility] OR [address] [place] [address];  Service: Peripheral Vascular;  Laterality: Left;  local: 16ml  contrast: 20ml   fluoro: 2.8  427.73mGy    APPENDECTOMY      BELOW KNEE AMPUTATION OF LOWER EXTREMITY Bilateral     CORONARY ANGIOGRAPHY N/A 11/29/2022    Procedure: ANGIOGRAM, CORONARY ARTERY;  Surgeon: Bobby Harding MD;  Location: [facility] CATH LAB;  Service: Cardiology;  Laterality: N/A;    JOINT REPLACEMENT      left knee surgery      left leg surgery      broken bone    LEG SURGERY  right     Right arm boil      Right great toe amputation      TOE AMPUTATION Right      Family History   Problem Relation Age of Onset    Arthritis Mother     Arthritis Sister     Diabetes Sister     Arthritis Brother      Social History     Tobacco Use    Smoking status: Every Day     Packs/day: 1.00     Types: Cigarettes    Smokeless tobacco: Never   Substance Use Topics    Alcohol use: No    Drug use: No     Review of Systems   Constitutional:  Positive for diaphoresis. Negative for chills and fever.   HENT:  Negative for congestion, rhinorrhea and sore throat.         + dry mouth     Eyes:  Negative for visual disturbance.   Respiratory:  Positive for cough and shortness of breath (since 0430 today).    Cardiovascular:  Positive for chest pain.   Gastrointestinal:  Positive for abdominal pain (radiates up to chest). Negative for diarrhea, nausea  and vomiting.   Genitourinary:  Negative for dysuria, frequency and hematuria.   Musculoskeletal:  Negative for back pain.        + chronic bilateral arm pain   Skin:  Negative for rash.   Neurological:  Negative for dizziness, weakness and headaches.   Psychiatric/Behavioral:  Positive for sleep disturbance (due to panic attacks).      Physical Exam     Initial Vitals [12/08/22 1146]   BP Pulse Resp Temp SpO2   (!) 152/70 70 18 97.7 °F (36.5 °C) (!) 94 %      MAP       --         Physical Exam              Initial Vitals [12/08/22 1146]   BP Pulse Resp Temp SpO2   (!) 152/70 70 18 97.7 °F (36.5 °C) (!) 94 %       MAP           --              Physical Exam     Nursing note and vitals reviewed.  Constitutional: He appears well-developed and well-nourished. He is not diaphoretic. No distress.   HENT:   Head: Normocephalic and atraumatic.   Right Ear: External ear normal.   Left Ear: External ear normal.   Nose: Nose normal.   Mouth/Throat: Oropharynx is clear and moist.   Eyes: Conjunctivae and EOM are normal. Pupils are equal, round, and reactive to light. Right eye exhibits no discharge. Left eye exhibits no discharge. No scleral icterus.   Neck: Neck supple. No JVD present.   Normal range of motion.  Cardiovascular:  Normal rate, regular rhythm, normal heart sounds and intact distal pulses.     Exam reveals no gallop and no friction rub.       No murmur heard.  Pulmonary/Chest: No stridor. No respiratory distress. He has no wheezes. He has no rhonchi. He has no rales. He exhibits no tenderness.    Decreased breath sounds bilaterally.  Normal work of breathing.  Coarse breath sounds bilaterally.   Abdominal: Abdomen is soft. Bowel sounds are normal. He exhibits distension. He exhibits no mass. There is no abdominal tenderness.   Chronic appearing wound of the umbilicus.  Abdomen distended but soft.  No tenderness. There is no rebound and no guarding.   Musculoskeletal:         General: No tenderness or edema.  "Normal range of motion.      Cervical back: Normal range of motion and neck supple.      Neurological: He is alert and oriented to person, place, and time. He has normal strength. No cranial nerve deficit or sensory deficit.   Skin: Skin is warm and dry. No rash noted. No erythema. No pallor.   Psychiatric: He has a normal mood and affect. His behavior is normal. Judgment and thought content normal.         ED Course   Procedures  Labs Reviewed   CBC W/ AUTO DIFFERENTIAL   COMPREHENSIVE METABOLIC PANEL   TROPONIN I   B-TYPE NATRIURETIC PEPTIDE   TSH   SARS-COV-2 RDRP GENE   POCT INFLUENZA A/B MOLECULAR      EKG Readings: (Independently Interpreted)   Initial Reading: No STEMI. Ectopy: No Ectopy.   EKG reviewed and interpreted by me shows sinus rhythm at a rate of 75 beats per minute.  The [place], QRS, QTC intervals are within normal limits.  There is normal axis.  There is normal R-wave progression.  There are no ST segment or T-wave ischemic findings.        Imaging Results    None            Medications - No data to display  Medical Decision Making:   ED Management:  This is the emergent evaluation of a 62-year-old male presents emergency department for evaluation of episodic pain to the lower sternum/epigastric region of the stomach as well as sweating and feeling short of breath and "having a panic attack". He had 2 episodes of vomiting  this will be.  Patient has decrease and rough coarse breath sounds bilaterally.  He likely is fluid overloaded.  He does have a new oxygen requirement.  He is not on oxygen at home but was requiring 2 L to keep him in the low 90 percentile range.  When this was stopped, he had an desaturation to 87%.  Chest x-ray consistent with pulmonary edema.  He does have an elevated troponin but lower than his last level.  This may still be coming down from prior.  He has no new ischemia on EKG.  I have given him a dose of 40 mg of IV Lasix.  Patient was seen by Dr. Cooley in the emergency " department in consultation after he was placed on the observation service.  Dr. Cooley recommended giving the patient another 40 of IV Lasix which I ordered.  The patient's sister was at the bedside during initial evaluation and patient agreed to let me call her and discuss plan of care with her.  I called her to let her know that the patient will be placed in the hospital for diuresis and further evaluation.             Clinical Impression:   Final diagnoses:  [R06.02] Shortness of breath       ED Course   Procedures  Labs Reviewed   CBC W/ AUTO DIFFERENTIAL - Abnormal; Notable for the following components:       Result Value    RBC 2.17 (*)     Hemoglobin 9.5 (*)     Hematocrit 25.4 (*)      (*)     MCH 43.8 (*)     MCHC 37.4 (*)     Immature Granulocytes 2.9 (*)     Immature Grans (Abs) 0.17 (*)     Lymph # 0.8 (*)     Gran % 78.5 (*)     Lymph % 12.7 (*)     All other components within normal limits   COMPREHENSIVE METABOLIC PANEL - Abnormal; Notable for the following components:    Sodium 130 (*)     Glucose 441 (*)     BUN 26 (*)     Creatinine 1.6 (*)     Albumin 3.4 (*)     Anion Gap 7 (*)     eGFR 49 (*)     All other components within normal limits   TROPONIN I - Abnormal; Notable for the following components:    Troponin I 0.133 (*)     All other components within normal limits   B-TYPE NATRIURETIC PEPTIDE - Abnormal; Notable for the following components:     (*)     All other components within normal limits   TSH - Abnormal; Notable for the following components:    TSH 4.251 (*)     All other components within normal limits   POCT GLUCOSE - Abnormal; Notable for the following components:    POCT Glucose 445 (*)     All other components within normal limits   LACTIC ACID, PLASMA   T4, FREE   HEMOGLOBIN A1C   MAGNESIUM   TROPONIN I   TROPONIN I   SARS-COV-2 RDRP GENE   POCT INFLUENZA A/B MOLECULAR          Imaging Results              X-Ray Chest AP Portable (Final result)  Result time  12/08/22 12:42:51      Final result by Perry Guerrero III, MD (12/08/22 12:42:51)                   Impression:      Improving CHF/pulmonary edema.  Pneumonia may be less likely.      Electronically signed by: Perry Guerrero MD  Date:    12/08/2022  Time:    12:42               Narrative:    EXAMINATION:  XR CHEST AP PORTABLE    CLINICAL HISTORY:  Shortness of breath    FINDINGS:  Heart size is normal.  There are bilateral pulmonary infiltrates slightly improving from 12/01/2022.                                    X-Rays:   Independently Interpreted Readings:   Other Readings:  Chest x-ray reveals pulmonary edema.  Medications   LIDOcaine 5 % patch 1 patch (1 patch Transdermal Patch Applied 12/8/22 1252)   aspirin chewable tablet 81 mg (81 mg Oral Given 12/8/22 1618)   atorvastatin tablet 40 mg (40 mg Oral Given 12/8/22 1617)   clopidogreL tablet 75 mg (75 mg Oral Given 12/8/22 1617)   EScitalopram oxalate tablet 10 mg (has no administration in time range)   gabapentin capsule 300 mg (300 mg Oral Not Given 12/8/22 1600)   metoprolol tartrate (LOPRESSOR) split tablet 12.5 mg (has no administration in time range)   QUEtiapine tablet 25 mg (has no administration in time range)   tamsulosin 24 hr capsule 0.4 mg (0.4 mg Oral Given 12/8/22 1620)   sodium chloride 0.9% flush 10 mL (has no administration in time range)   enoxaparin injection 40 mg (has no administration in time range)   ondansetron injection 4 mg (has no administration in time range)   polyethylene glycol packet 17 g (17 g Oral Not Given 12/8/22 1600)   acetaminophen tablet 650 mg (has no administration in time range)   furosemide injection 20 mg (has no administration in time range)   furosemide injection 40 mg (has no administration in time range)   furosemide injection 40 mg (40 mg Intravenous Given 12/8/22 1251)   insulin regular injection 8 Units 0.08 mL (8 Units Subcutaneous Given 12/8/22 1405)              Scribe Attestation:   Scribe #1: I  performed the above scribed service and the documentation accurately describes the services I performed. I attest to the accuracy of the note.                   Clinical Impression:   Final diagnoses:  [R06.02] Shortness of breath        ED Disposition Condition    Observation              I, Jose Stewart MD, personally performed the services described in this documentation. All medical record entries made by the scribe were at my direction and in my presence. I have reviewed the chart and agree that the record reflects my personal performance and is accurate and complete.       Jose Stewart Jr., MD  12/08/22 [id number]       Jose Stewart Jr., MD  12/08/22 [id number]

## 2022-12-08 NOTE — HPI
Carlos Cifuentes Jr. is a 62y.o. male with recent admission where he was intubated secondary to respiratory failure.  Hypertensive emergency. NSTEMI. During that hospitalization he underwent cardiac catheterization by Dr. Harding that revealed distal disease in all 3 vessels.  Medical management recommended.  His ejection fraction was roughly 40%.  Repeat today shows similar, 45%.  He states that since discharge he has become progressively short of breath.  MAR shows that he is on Lasix 20 mg at home.  Chest x-ray shows pulmonary edema.  Sodium 130.  Creatinine 1.6.  .  Troponin 0.13.  EKG shows normal sinus rhythm.    Echocardiogram 12/08/2022:    Technically difficult study.  The left ventricle is normal in size with concentric remodeling and mildly decreased systolic function.  The estimated ejection fraction is 45%.  Severe left atrial enlargement.  Grade II left ventricular diastolic dysfunction.  Normal right ventricular size with normal right ventricular systolic function.  Normal central venous pressure (3 mmHg).  The estimated PA systolic pressure is 18 mmHg.  There is no pulmonary hypertension.    Cardiac catheterization 11/29/2022:      There was three vessel coronary artery disease. Diffuse distal disease in all 3 vessels    LVEDP 25    Echocardiogram 11/29/2022:    The left ventricle is normal in size with concentric hypertrophy and mildly decreased systolic function.  The estimated ejection fraction is 40%.  Grade I left ventricular diastolic dysfunction.  Normal right ventricular size with normal right ventricular systolic function.  TDS       Echo 2014    1 - Hyperdynamic left ventricular systolic function (EF 60-65%).     2 - Cannot fully r/o but no obvious vegetation

## 2022-12-08 NOTE — HPI
62 y.o. male with obesity, DM2, anemia of chronic disease, cirrhosis of liver, insomnia, nicotine dependence, CKD stage III, GERD, HTN, s/p BKA with phantom limb syndrome presents with a complaint of shortness of breath.  Acute onset, duration 1 day, associated with chest tightness, dry mouth, and emesis x2, no known exacerbating or alleviating factors, rated as severe.  Denies fever, chills, palpitations, dizziness, syncope, diarrhea.  In the ED, found to be hypoxic on room air, sats 87%, BNP elevated and CXR shows pulmonary edema.  Troponin elevated but improving.  Supplemental oxygen and IV lasix initiated.  Placed in observation.

## 2022-12-08 NOTE — ED PROVIDER NOTES
"Encounter Date: 12/8/2022       History     Chief Complaint   Patient presents with    Shortness of Breath     EMS called to 63yo male that has a hx of recent NSTEMI and called due to feeling SOB and "having a panic attack." Denied any chest pain     HPI  Review of patient's allergies indicates:   Allergen Reactions    Codeine Rash    Lyrica [pregabalin]      Feet turned Red    Vicodin [hydrocodone-acetaminophen] Rash     Past Medical History:   Diagnosis Date    Arthritis     Back pain     Bulging lumbar disc     C. difficile diarrhea     Chronic back pain 10/14/2014    Diabetes mellitus     Diabetes mellitus type II     DM (diabetes mellitus), type 2, uncontrolled 3/12/2013    Hepatitis C 7/3/2013    MSSA (methicillin susceptible Staphylococcus aureus) septicemia     Neuromuscular disorder     Neuropathy     Staph aureus infection      Past Surgical History:   Procedure Laterality Date    ABOVE-KNEE AMPUTATION Bilateral     ANGIOGRAPHY OF LOWER EXTREMITY Left 06/14/2018    Procedure: Angiogram LEFT LOWER Extremity with US guided access from right side;  Surgeon: Sony Srinivasan MD;  Location: [facility] OR [address] [place] [address];  Service: Peripheral Vascular;  Laterality: Left;  local: 16ml  contrast: 20ml   fluoro: 2.8  427.73mGy    APPENDECTOMY      CORONARY ANGIOGRAPHY N/A 11/29/2022    Procedure: ANGIOGRAM, CORONARY ARTERY;  Surgeon: Bobby Harding MD;  Location[facility] CATH LAB;  Service: Cardiology;  Laterality: N/A;    JOINT REPLACEMENT      left knee surgery      left leg surgery      broken bone    LEG SURGERY  right     Right arm boil      Right great toe amputation      TOE AMPUTATION Right      Family History   Problem Relation Age of Onset    Arthritis Mother     Arthritis Sister     Diabetes Sister     Arthritis Brother      Social History     Tobacco Use    Smoking status: Every Day     Packs/day: 1.00     Types: Cigarettes    Smokeless tobacco: Never   Substance Use Topics    Alcohol use: No    Drug use: No "     Review of Systems    Physical Exam     Initial Vitals [12/08/22 1146]   BP Pulse Resp Temp SpO2   (!) 152/70 70 18 97.7 °F (36.5 °C) (!) 94 %      MAP       --         Physical Exam    Nursing note and vitals reviewed.  Constitutional: He appears well-developed and well-nourished. He is not diaphoretic. No distress.   HENT:   Head: Normocephalic and atraumatic.   Right Ear: External ear normal.   Left Ear: External ear normal.   Nose: Nose normal.   Mouth/Throat: Oropharynx is clear and moist.   Eyes: Conjunctivae and EOM are normal. Pupils are equal, round, and reactive to light. Right eye exhibits no discharge. Left eye exhibits no discharge. No scleral icterus.   Neck: Neck supple. No JVD present.   Normal range of motion.  Cardiovascular:  Normal rate, regular rhythm, normal heart sounds and intact distal pulses.     Exam reveals no gallop and no friction rub.       No murmur heard.  Pulmonary/Chest: No stridor. No respiratory distress. He has no wheezes. He has no rhonchi. He has no rales. He exhibits no tenderness.    Decreased breath sounds bilaterally.  Normal work of breathing.  Coarse breath sounds bilaterally.   Abdominal: Abdomen is soft. Bowel sounds are normal. He exhibits distension. He exhibits no mass. There is no abdominal tenderness.   Chronic appearing wound of the umbilicus.  Abdomen distended but soft.  No tenderness. There is no rebound and no guarding.   Musculoskeletal:         General: No tenderness or edema. Normal range of motion.      Cervical back: Normal range of motion and neck supple.     Neurological: He is alert and oriented to person, place, and time. He has normal strength. No cranial nerve deficit or sensory deficit.   Skin: Skin is warm and dry. No rash noted. No erythema. No pallor.   Psychiatric: He has a normal mood and affect. His behavior is normal. Judgment and thought content normal.       ED Course   Procedures  Labs Reviewed   CBC W/ AUTO DIFFERENTIAL  "  COMPREHENSIVE METABOLIC PANEL   TROPONIN I   B-TYPE NATRIURETIC PEPTIDE   TSH   SARS-COV-2 RDRP GENE   POCT INFLUENZA A/B MOLECULAR     EKG Readings: (Independently Interpreted)   Initial Reading: No STEMI. Ectopy: No Ectopy.   EKG reviewed and interpreted by me shows sinus rhythm at a rate of 75 beats per minute.  The [place], QRS, QTC intervals are within normal limits.  There is normal axis.  There is normal R-wave progression.  There are no ST segment or T-wave ischemic findings.       Imaging Results    None          Medications - No data to display  Medical Decision Making:   ED Management:  This is the emergent evaluation of a 62-year-old male presents emergency department for evaluation of episodic pain to the lower sternum/epigastric region of the stomach as well as sweating and feeling short of breath and "having a panic attack". He had 2 episodes of vomiting  this morning.                        Clinical Impression:   Final diagnoses:  [R06.02] Shortness of breath            "

## 2022-12-08 NOTE — CONSULTS
[facility] [place] [facility]  Cardiology  Consult Note    Patient Name: Carlos Cifuentes Jr.  MRN: [id number]  Admission Date: 12/8/2022  Hospital Length of Stay: 0 days  Code Status: Full Code   Attending Provider: Darron Beach MD   Consulting Provider: Kevin Cooley MD  Primary Care Physician: Miguel Lux MD  Principal Problem:<principal problem not specified>    Patient information was obtained from patient, past medical records and ER records.     Inpatient consult to Cardiology  Consult performed by: Kevin Cooley MD  Consult ordered by: Phan Cuevas Jr., NP        Subjective:     Chief Complaint:  Shortness of breath     HPI:   Carlos Cifuentes Jr. is a 62y.o. male with recent admission where he was intubated secondary to respiratory failure.  Hypertensive emergency. NSTEMI. During that hospitalization he underwent cardiac catheterization by Dr. Harding that revealed distal disease in all 3 vessels.  Medical management recommended.  His ejection fraction was roughly 40%.  Repeat today shows similar, 45%.  He states that since discharge he has become progressively short of breath.  MAR shows that he is on Lasix 20 mg at home.  Chest x-ray shows pulmonary edema.  Sodium 130.  Creatinine 1.6.  .  Troponin 0.13.  EKG shows normal sinus rhythm.    Echocardiogram 12/08/2022:     Technically difficult study.   The left ventricle is normal in size with concentric remodeling and mildly decreased systolic function.   The estimated ejection fraction is 45%.   Severe left atrial enlargement.   Grade II left ventricular diastolic dysfunction.   Normal right ventricular size with normal right ventricular systolic function.   Normal central venous pressure (3 mmHg).   The estimated PA systolic pressure is 18 mmHg.   There is no pulmonary hypertension.    Cardiac catheterization 11/29/2022:      There was three vessel coronary artery disease. Diffuse distal disease in all 3 vessels    LVEDP  25    Echocardiogram 11/29/2022:     The left ventricle is normal in size with concentric hypertrophy and mildly decreased systolic function.   The estimated ejection fraction is 40%.   Grade I left ventricular diastolic dysfunction.   Normal right ventricular size with normal right ventricular systolic function.   TDS       Echo 2014    1 - Hyperdynamic left ventricular systolic function (EF 60-65%).     2 - Cannot fully r/o but no obvious vegetation       Past Medical History:   Diagnosis Date    Arthritis     Back pain     Bulging lumbar disc     C. difficile diarrhea     Chronic back pain 10/14/2014    Cigarette smoker     Diabetes mellitus     Diabetes mellitus type II     DM (diabetes mellitus), type 2, uncontrolled 03/12/2013    Hepatitis C 07/03/2013    MSSA (methicillin susceptible Staphylococcus aureus) septicemia     Neuromuscular disorder     Neuropathy     NSTEMI (non-ST elevated myocardial infarction) 11/28/2022    Staph aureus infection     Status post bilateral below knee amputation        Past Surgical History:   Procedure Laterality Date    ANGIOGRAPHY OF LOWER EXTREMITY Left 06/14/2018    Procedure: Angiogram LEFT LOWER Extremity with US guided access from right side;  Surgeon: Sony Srinivasan MD;  Location: [facility] OR [address] [place] [address];  Service: Peripheral Vascular;  Laterality: Left;  local: 16ml  contrast: 20ml   fluoro: 2.8  427.73mGy    APPENDECTOMY      BELOW KNEE AMPUTATION OF LOWER EXTREMITY Bilateral     CORONARY ANGIOGRAPHY N/A 11/29/2022    Procedure: ANGIOGRAM, CORONARY ARTERY;  Surgeon: Bobby Harding MD;  Location: [facility] CATH LAB;  Service: Cardiology;  Laterality: N/A;    JOINT REPLACEMENT      left knee surgery      left leg surgery      broken bone    LEG SURGERY  right     Right arm boil      Right great toe amputation      TOE AMPUTATION Right        Review of patient's allergies indicates:   Allergen Reactions    Codeine Rash    Lyrica [pregabalin]       Feet turned Red    Vicodin [hydrocodone-acetaminophen] Rash       No current facility-administered medications on file prior to encounter.     Current Outpatient Medications on File Prior to Encounter   Medication Sig    aspirin 81 MG Chew Take 1 tablet (81 mg total) by mouth once daily.    atorvastatin (LIPITOR) 40 MG tablet Take 1 tablet (40 mg total) by mouth once daily.    clopidogreL (PLAVIX) 75 mg tablet Take 1 tablet (75 mg total) by mouth once daily.    EScitalopram oxalate (LEXAPRO) 10 MG tablet Take 1 tablet (10 mg total) by mouth once daily.    furosemide (LASIX) 20 MG tablet Take 1 tablet (20 mg total) by mouth once daily.    LANTUS U-100 INSULIN 100 unit/mL injection INJECT TEN UNITS INTO SKIN AT BEDTIME    losartan (COZAAR) 25 MG tablet Take 0.5 tablets (12.5 mg total) by mouth once daily.    metoprolol tartrate (LOPRESSOR) 25 MG tablet Take 0.5 tablets (12.5 mg total) by mouth 2 (two) times daily.    mupirocin (BACTROBAN) 2 % ointment Apply to affected area 3 times daily    nicotine (NICODERM CQ) 21 mg/24 hr Place 1 patch onto the skin once daily.    pantoprazole (PROTONIX) 40 MG tablet TAKE ONE TABLET BY MOUTH EVERY DAY    tamsulosin (FLOMAX) 0.4 mg Cap Take 1 capsule (0.4 mg total) by mouth once daily.    TRUE METRIX GLUCOSE METER Misc AS DIRECTED    TRUE METRIX GLUCOSE TEST STRIP Strp Twice daily blood sugar checks    gabapentin (NEURONTIN) 300 MG capsule TAKE 1 CAPSULE BY MOUTH THREE TIMES A DAY    glimepiride (AMARYL) 2 MG tablet Take 1 tablet (2 mg total) by mouth every morning.    metFORMIN (GLUCOPHAGE) 1000 MG tablet TAKE ONE TABLET BY MOUTH TWICE DAILY    QUEtiapine (SEROQUEL) 25 MG Tab Take 1 tablet (25 mg total) by mouth every evening.    silver sulfADIAZINE 1% (SILVADENE) 1 % cream Apply 1 application topically once daily. Apply to affected area     Family History       Problem Relation (Age of Onset)    Arthritis Mother, Sister, Brother    Diabetes Sister           Tobacco Use    Smoking status: Every Day     Packs/day: 1.00     Types: Cigarettes    Smokeless tobacco: Never   Substance and Sexual Activity    Alcohol use: No    Drug use: No    Sexual activity: Not on file     Review of Systems   Constitutional: Negative for diaphoresis and malaise/fatigue.   HENT:  Negative for congestion, hearing loss, hoarse voice, nosebleeds, odynophagia, stridor and tinnitus.    Eyes:  Negative for blurred vision, double vision, photophobia, visual disturbance and visual halos.   Cardiovascular:  Negative for chest pain, dyspnea on exertion, irregular heartbeat, leg swelling, near-syncope, orthopnea, palpitations, paroxysmal nocturnal dyspnea and syncope.   Respiratory:  Positive for shortness of breath. Negative for sputum production and wheezing.    Musculoskeletal:  Negative for falls, joint pain, muscle cramps, muscle weakness, myalgias, neck pain and stiffness.   Gastrointestinal:  Negative for abdominal pain, heartburn, hematemesis and melena.   Neurological:  Negative for disturbances in coordination, dizziness, focal weakness, headaches, light-headedness, loss of balance, numbness, paresthesias, seizures, sensory change, tremors, vertigo and weakness.   Psychiatric/Behavioral:  Negative for altered mental status, depression, hallucinations and memory loss. The patient does not have insomnia and is not nervous/anxious.    Objective:     Vital Signs (Most Recent):  Temp: 97.7 °F (36.5 °C) (12/08/22 1146)  Pulse: 71 (12/08/22 1430)  Resp: 20 (12/08/22 1207)  BP: (!) 132/58 (12/08/22 1422)  SpO2: 96 % (12/08/22 1430)   Vital Signs (24h Range):  Temp:  [97.7 °F (36.5 °C)] 97.7 °F (36.5 °C)  Pulse:  [67-76] 71  Resp:  [18-20] 20  SpO2:  [87 %-97 %] 96 %  BP: (132-169)/(58-80) 132/58     Weight: 99.8 kg (220 lb)  Body mass index is 32.49 kg/m².    SpO2: 96 %  O2 Device (Oxygen Therapy): nasal cannula    No intake or output data in the 24 hours ending 12/08/22  1636    Lines/Drains/Airways       Peripheral Intravenous Line  Duration                  Peripheral IV - Single Lumen 12/08/22 1213 20 G Right Antecubital <1 day                    Physical Exam  Vitals reviewed.   Constitutional:       General: He is not in acute distress.     Appearance: He is obese. He is ill-appearing. He is not diaphoretic.   Neck:      Vascular: No carotid bruit or JVD.   Cardiovascular:      Rate and Rhythm: Normal rate and regular rhythm.      Pulses: Normal pulses.   Pulmonary:      Effort: Tachypnea present.      Breath sounds: Rales present.   Abdominal:      General: Bowel sounds are normal. There is distension.      Palpations: Abdomen is soft.      Tenderness: There is no abdominal tenderness.      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      Left Lower Extremity: Left leg is amputated below knee.   Neurological:      Mental Status: He is alert and oriented to person, place, and time.   Psychiatric:         Speech: Speech normal.         Behavior: Behavior normal.       Significant Labs: CMP   Recent Labs   Lab 12/08/22  1224   *   K 5.1   CL 98   CO2 25   *   BUN 26*   CREATININE 1.6*   CALCIUM 9.3   PROT 7.5   ALBUMIN 3.4*   BILITOT 0.9   ALKPHOS 74   AST 15   ALT 15   ANIONGAP 7*   , CBC   Recent Labs   Lab 12/08/22  1224   WBC 5.90   HGB 9.5*   HCT 25.4*      , Lipid Panel No results for input(s): CHOL, HDL, LDLCALC, TRIG, CHOLHDL in the last 48 hours., and Troponin   Recent Labs   Lab 12/08/22  1224   TROPONINI 0.133*       Significant Imaging: Echocardiogram: Transthoracic echo (TTE) complete (Cupid Only):   Results for orders placed or performed during the hospital encounter of 12/08/22   Echo   Result Value Ref Range    BSA 2.2 m2    TDI SEPTAL 0.06 m/s    LV LATERAL E/E' RATIO 20.33 m/s    LV SEPTAL E/E' RATIO 20.33 m/s    LA WIDTH 5.00 cm    Left Ventricular Outflow Tract Mean  Velocity 0.46 cm/s    Left Ventricular Outflow Tract Mean Gradient 0.96 mmHg    TDI LATERAL 0.06 m/s    LVIDd 5.55 3.5 - 6.0 cm    IVS 0.97 0.6 - 1.1 cm    Posterior Wall 1.19 (A) 0.6 - 1.1 cm    Ao root annulus 1.90 cm    LVIDs 4.00 2.1 - 4.0 cm    FS 28 28 - 44 %    LA volume 121.84 cm3    Sinus 3.20 cm    STJ 2.62 cm    LV mass 239.68 g    LA size 5.49 cm    RVDD 3.47 cm    TAPSE 2.30 cm    Left Ventricle Relative Wall Thickness 0.43 cm    AV Velocity Ratio 0.55     E/A ratio 1.94     Mean e' 0.06 m/s    E wave deceleration time 192.59 msec    IVRT 100.35 msec    LVOT diameter 1.93 cm    LVOT area 2.9 cm2    LVOT peak hector 0.72 m/s    LVOT peak VTI 13.40 cm    Ao peak hector 1.31 m/s    LVOT stroke volume 39.18 cm3    AV peak gradient 7 mmHg    E/E' ratio 20.33 m/s    MV Peak E Hector 1.22 m/s    TR Max Hector 1.94 m/s    MV Peak A Hector 0.63 m/s    LV Systolic Volume 69.97 mL    LV Systolic Volume Index 32.5 mL/m2    LV Diastolic Volume 150.43 mL    LV Diastolic Volume Index 69.97 mL/m2    LA Volume Index 56.7 mL/m2    LV Mass Index 111 g/m2    RA Major Axis 4.77 cm    Left Atrium Minor Axis 4.50 cm    Left Atrium Major Axis 6.22 cm    Triscuspid Valve Regurgitation Peak Gradient 15 mmHg    RA Width 3.50 cm    Right Atrial Pressure (from IVC) 3 mmHg    EF 45 %    TV rest pulmonary artery pressure 18 mmHg    Narrative    · Technically difficult study.  · The left ventricle is normal in size with concentric remodeling and   mildly decreased systolic function.  · The estimated ejection fraction is 45%.  · Severe left atrial enlargement.  · Grade II left ventricular diastolic dysfunction.  · Normal right ventricular size with normal right ventricular systolic   function.  · Normal central venous pressure (3 mmHg).  · The estimated PA systolic pressure is 18 mmHg.  · There is no pulmonary hypertension.        Assessment and Plan:     Hyperlipidemia associated with type 2 diabetes mellitus  12/08/2022-continue statin.    History  of non-ST elevation myocardial infarction (NSTEMI)  12/08/2022-recent non ST elevation myocardial infarction.  Cardiac catheterization revealed distal disease, multivessel.  Medical management.  Continue aspirin/Plavix.    Acute combined systolic and diastolic heart failure  12/08/2022-ejection fraction roughly 40-45%.  Evidence of volume overload on exam.  Chest x-ray shows pulmonary edema.  Elevated BNP.  Lasix IV.  Monitor creatinine.    Primary hypertension  12/08/2022-resume home regimen.  Monitor.        VTE Risk Mitigation (From admission, onward)         Ordered     enoxaparin injection 40 mg  Daily         12/08/22 1449     IP VTE HIGH RISK PATIENT  Once         12/08/22 1449     Place sequential compression device  Until discontinued         12/08/22 1449                Thank you for your consult. I will follow-up with patient. Please contact us if you have any additional questions.    Kevin Cooley MD  Cardiology   [facility] - Emergency Dept

## 2022-12-08 NOTE — SUBJECTIVE & OBJECTIVE
Past Medical History:   Diagnosis Date    Arthritis     Back pain     Bulging lumbar disc     C. difficile diarrhea     Chronic back pain 10/14/2014    Cigarette smoker     Diabetes mellitus     Diabetes mellitus type II     DM (diabetes mellitus), type 2, uncontrolled 03/12/2013    Hepatitis C 07/03/2013    MSSA (methicillin susceptible Staphylococcus aureus) septicemia     Neuromuscular disorder     Neuropathy     NSTEMI (non-ST elevated myocardial infarction) 11/28/2022    Staph aureus infection     Status post bilateral below knee amputation        Past Surgical History:   Procedure Laterality Date    ANGIOGRAPHY OF LOWER EXTREMITY Left 06/14/2018    Procedure: Angiogram LEFT LOWER Extremity with US guided access from right side;  Surgeon: Sony Srinivasan MD;  Location: [facility] OR [address] [place] [address];  Service: Peripheral Vascular;  Laterality: Left;  local: 16ml  contrast: 20ml   fluoro: 2.8  427.73mGy    APPENDECTOMY      BELOW KNEE AMPUTATION OF LOWER EXTREMITY Bilateral     CORONARY ANGIOGRAPHY N/A 11/29/2022    Procedure: ANGIOGRAM, CORONARY ARTERY;  Surgeon: Bobby Harding MD;  Location: [facility] CATH LAB;  Service: Cardiology;  Laterality: N/A;    JOINT REPLACEMENT      left knee surgery      left leg surgery      broken bone    LEG SURGERY  right     Right arm boil      Right great toe amputation      TOE AMPUTATION Right        Review of patient's allergies indicates:   Allergen Reactions    Codeine Rash    Lyrica [pregabalin]      Feet turned Red    Vicodin [hydrocodone-acetaminophen] Rash       No current facility-administered medications on file prior to encounter.     Current Outpatient Medications on File Prior to Encounter   Medication Sig    aspirin 81 MG Chew Take 1 tablet (81 mg total) by mouth once daily.    atorvastatin (LIPITOR) 40 MG tablet Take 1 tablet (40 mg total) by mouth once daily.    clopidogreL (PLAVIX) 75 mg tablet Take 1 tablet (75 mg total) by mouth once daily.    EScitalopram oxalate  (LEXAPRO) 10 MG tablet Take 1 tablet (10 mg total) by mouth once daily.    furosemide (LASIX) 20 MG tablet Take 1 tablet (20 mg total) by mouth once daily.    LANTUS U-100 INSULIN 100 unit/mL injection INJECT TEN UNITS INTO SKIN AT BEDTIME    losartan (COZAAR) 25 MG tablet Take 0.5 tablets (12.5 mg total) by mouth once daily.    metoprolol tartrate (LOPRESSOR) 25 MG tablet Take 0.5 tablets (12.5 mg total) by mouth 2 (two) times daily.    mupirocin (BACTROBAN) 2 % ointment Apply to affected area 3 times daily    nicotine (NICODERM CQ) 21 mg/24 hr Place 1 patch onto the skin once daily.    pantoprazole (PROTONIX) 40 MG tablet TAKE ONE TABLET BY MOUTH EVERY DAY    tamsulosin (FLOMAX) 0.4 mg Cap Take 1 capsule (0.4 mg total) by mouth once daily.    TRUE METRIX GLUCOSE METER Misc AS DIRECTED    TRUE METRIX GLUCOSE TEST STRIP Strp Twice daily blood sugar checks    gabapentin (NEURONTIN) 300 MG capsule TAKE 1 CAPSULE BY MOUTH THREE TIMES A DAY    glimepiride (AMARYL) 2 MG tablet Take 1 tablet (2 mg total) by mouth every morning.    metFORMIN (GLUCOPHAGE) 1000 MG tablet TAKE ONE TABLET BY MOUTH TWICE DAILY    QUEtiapine (SEROQUEL) 25 MG Tab Take 1 tablet (25 mg total) by mouth every evening.    silver sulfADIAZINE 1% (SILVADENE) 1 % cream Apply 1 application topically once daily. Apply to affected area     Family History       Problem Relation (Age of Onset)    Arthritis Mother, Sister, Brother    Diabetes Sister          Tobacco Use    Smoking status: Every Day     Packs/day: 1.00     Types: Cigarettes    Smokeless tobacco: Never   Substance and Sexual Activity    Alcohol use: No    Drug use: No    Sexual activity: Not on file     Review of Systems   Constitutional: Negative for diaphoresis and malaise/fatigue.   HENT:  Negative for congestion, hearing loss, hoarse voice, nosebleeds, odynophagia, stridor and tinnitus.    Eyes:  Negative for blurred vision, double vision, photophobia, visual disturbance and visual halos.    Cardiovascular:  Negative for chest pain, dyspnea on exertion, irregular heartbeat, leg swelling, near-syncope, orthopnea, palpitations, paroxysmal nocturnal dyspnea and syncope.   Respiratory:  Positive for shortness of breath. Negative for sputum production and wheezing.    Musculoskeletal:  Negative for falls, joint pain, muscle cramps, muscle weakness, myalgias, neck pain and stiffness.   Gastrointestinal:  Negative for abdominal pain, heartburn, hematemesis and melena.   Neurological:  Negative for disturbances in coordination, dizziness, focal weakness, headaches, light-headedness, loss of balance, numbness, paresthesias, seizures, sensory change, tremors, vertigo and weakness.   Psychiatric/Behavioral:  Negative for altered mental status, depression, hallucinations and memory loss. The patient does not have insomnia and is not nervous/anxious.    Objective:     Vital Signs (Most Recent):  Temp: 97.7 °F (36.5 °C) (12/08/22 1146)  Pulse: 71 (12/08/22 1430)  Resp: 20 (12/08/22 1207)  BP: (!) 132/58 (12/08/22 1422)  SpO2: 96 % (12/08/22 1430)   Vital Signs (24h Range):  Temp:  [97.7 °F (36.5 °C)] 97.7 °F (36.5 °C)  Pulse:  [67-76] 71  Resp:  [18-20] 20  SpO2:  [87 %-97 %] 96 %  BP: (132-169)/(58-80) 132/58     Weight: 99.8 kg (220 lb)  Body mass index is 32.49 kg/m².    SpO2: 96 %  O2 Device (Oxygen Therapy): nasal cannula    No intake or output data in the 24 hours ending 12/08/22 1636    Lines/Drains/Airways       Peripheral Intravenous Line  Duration                  Peripheral IV - Single Lumen 12/08/22 1213 20 G Right Antecubital <1 day                    Physical Exam  Vitals reviewed.   Constitutional:       General: He is not in acute distress.     Appearance: He is obese. He is ill-appearing. He is not diaphoretic.   Neck:      Vascular: No carotid bruit or JVD.   Cardiovascular:      Rate and Rhythm: Normal rate and regular rhythm.      Pulses: Normal pulses.   Pulmonary:      Effort: Tachypnea  present.      Breath sounds: Rales present.   Abdominal:      General: Bowel sounds are normal. There is distension.      Palpations: Abdomen is soft.      Tenderness: There is no abdominal tenderness.      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      Left Lower Extremity: Left leg is amputated below knee.   Neurological:      Mental Status: He is alert and oriented to person, place, and time.   Psychiatric:         Speech: Speech normal.         Behavior: Behavior normal.       Significant Labs: CMP   Recent Labs   Lab 12/08/22  1224   *   K 5.1   CL 98   CO2 25   *   BUN 26*   CREATININE 1.6*   CALCIUM 9.3   PROT 7.5   ALBUMIN 3.4*   BILITOT 0.9   ALKPHOS 74   AST 15   ALT 15   ANIONGAP 7*   , CBC   Recent Labs   Lab 12/08/22  1224   WBC 5.90   HGB 9.5*   HCT 25.4*      , Lipid Panel No results for input(s): CHOL, HDL, LDLCALC, TRIG, CHOLHDL in the last 48 hours., and Troponin   Recent Labs   Lab 12/08/22  1224   TROPONINI 0.133*       Significant Imaging: Echocardiogram: Transthoracic echo (TTE) complete (Cupid Only):   Results for orders placed or performed during the hospital encounter of 12/08/22   Echo   Result Value Ref Range    BSA 2.2 m2    TDI SEPTAL 0.06 m/s    LV LATERAL E/E' RATIO 20.33 m/s    LV SEPTAL E/E' RATIO 20.33 m/s    LA WIDTH 5.00 cm    Left Ventricular Outflow Tract Mean Velocity 0.46 cm/s    Left Ventricular Outflow Tract Mean Gradient 0.96 mmHg    TDI LATERAL 0.06 m/s    LVIDd 5.55 3.5 - 6.0 cm    IVS 0.97 0.6 - 1.1 cm    Posterior Wall 1.19 (A) 0.6 - 1.1 cm    Ao root annulus 1.90 cm    LVIDs 4.00 2.1 - 4.0 cm    FS 28 28 - 44 %    LA volume 121.84 cm3    Sinus 3.20 cm    STJ 2.62 cm    LV mass 239.68 g    LA size 5.49 cm    RVDD 3.47 cm    TAPSE 2.30 cm    Left Ventricle Relative Wall Thickness 0.43 cm    AV Velocity Ratio 0.55     E/A ratio 1.94     Mean e' 0.06 m/s    E wave deceleration  time 192.59 msec    IVRT 100.35 msec    LVOT diameter 1.93 cm    LVOT area 2.9 cm2    LVOT peak hector 0.72 m/s    LVOT peak VTI 13.40 cm    Ao peak hector 1.31 m/s    LVOT stroke volume 39.18 cm3    AV peak gradient 7 mmHg    E/E' ratio 20.33 m/s    MV Peak E Hector 1.22 m/s    TR Max Hector 1.94 m/s    MV Peak A Hector 0.63 m/s    LV Systolic Volume 69.97 mL    LV Systolic Volume Index 32.5 mL/m2    LV Diastolic Volume 150.43 mL    LV Diastolic Volume Index 69.97 mL/m2    LA Volume Index 56.7 mL/m2    LV Mass Index 111 g/m2    RA Major Axis 4.77 cm    Left Atrium Minor Axis 4.50 cm    Left Atrium Major Axis 6.22 cm    Triscuspid Valve Regurgitation Peak Gradient 15 mmHg    RA Width 3.50 cm    Right Atrial Pressure (from IVC) 3 mmHg    EF 45 %    TV rest pulmonary artery pressure 18 mmHg    Narrative    · Technically difficult study.  · The left ventricle is normal in size with concentric remodeling and   mildly decreased systolic function.  · The estimated ejection fraction is 45%.  · Severe left atrial enlargement.  · Grade II left ventricular diastolic dysfunction.  · Normal right ventricular size with normal right ventricular systolic   function.  · Normal central venous pressure (3 mmHg).  · The estimated PA systolic pressure is 18 mmHg.  · There is no pulmonary hypertension.

## 2022-12-08 NOTE — PHARMACY MED REC
"Admission Medication History     The home medication history was taken by Rachna Tinoco CPhT.      You may go to "Admission" then "Reconcile Home Medications" tabs to review and/or act upon these items.     The home medication list has been updated by the Pharmacy department.   Please read ALL comments highlighted in yellow.   Please address this information as you see fit.    Feel free to contact us if you have any questions or require assistance.        Medications listed below were obtained from: Patient/family and Analytic software- [employer]  (Not in a hospital admission)      Rachna Tinoco CPhT.  [phone]                  .        "

## 2022-12-08 NOTE — ED TRIAGE NOTES
61 yo male presents to the ED via EMS for nausea/emesis that began last night. Reports emesis x2 last night. +SOB w/ intermittent chest pain that began last night. Chest pain feels like a knot and radiates into left chest. AAOX4

## 2022-12-08 NOTE — ASSESSMENT & PLAN NOTE
12/08/2022-ejection fraction roughly 40-45%.  Evidence of volume overload on exam.  Chest x-ray shows pulmonary edema.  Elevated BNP.  Lasix IV.  Monitor creatinine.

## 2022-12-08 NOTE — ED NOTES
Assisted pt from bathroom back to bedside. Pt stated he wanted to stay in the wheelchair for the time being. Connected pt back to SPO2 and cardiac monitoring. Placed on O2 via NC at 3 lpm.

## 2022-12-09 LAB
ANION GAP SERPL CALC-SCNC: 8 MMOL/L (ref 8–16)
BUN SERPL-MCNC: 33 MG/DL (ref 8–23)
CALCIUM SERPL-MCNC: 9.3 MG/DL (ref 8.7–10.5)
CHLORIDE SERPL-SCNC: 99 MMOL/L (ref 95–110)
CO2 SERPL-SCNC: 28 MMOL/L (ref 23–29)
CREAT SERPL-MCNC: 1.4 MG/DL (ref 0.5–1.4)
EST. GFR  (NO RACE VARIABLE): 57 ML/MIN/1.73 M^2
ESTIMATED AVG GLUCOSE: 249 MG/DL (ref 68–131)
GLUCOSE SERPL-MCNC: 143 MG/DL (ref 70–110)
HBA1C MFR BLD: 10.3 % (ref 4–5.6)
POCT GLUCOSE: 169 MG/DL (ref 70–110)
POCT GLUCOSE: 172 MG/DL (ref 70–110)
POCT GLUCOSE: 209 MG/DL (ref 70–110)
POCT GLUCOSE: 280 MG/DL (ref 70–110)
POTASSIUM SERPL-SCNC: 4.3 MMOL/L (ref 3.5–5.1)
SODIUM SERPL-SCNC: 135 MMOL/L (ref 136–145)

## 2022-12-09 PROCEDURE — 99226 PR SUBSEQUENT OBSERVATION CARE,LEVEL III: ICD-10-PCS | Mod: ,,, | Performed by: INTERNAL MEDICINE

## 2022-12-09 PROCEDURE — 96376 TX/PRO/DX INJ SAME DRUG ADON: CPT

## 2022-12-09 PROCEDURE — 36415 COLL VENOUS BLD VENIPUNCTURE: CPT | Performed by: HOSPITALIST

## 2022-12-09 PROCEDURE — S4991 NICOTINE PATCH NONLEGEND: HCPCS | Performed by: NURSE PRACTITIONER

## 2022-12-09 PROCEDURE — G0378 HOSPITAL OBSERVATION PER HR: HCPCS

## 2022-12-09 PROCEDURE — 63600175 PHARM REV CODE 636 W HCPCS: Performed by: HOSPITALIST

## 2022-12-09 PROCEDURE — 99226 PR SUBSEQUENT OBSERVATION CARE,LEVEL III: CPT | Mod: ,,, | Performed by: INTERNAL MEDICINE

## 2022-12-09 PROCEDURE — 80048 BASIC METABOLIC PNL TOTAL CA: CPT | Performed by: HOSPITALIST

## 2022-12-09 PROCEDURE — 96372 THER/PROPH/DIAG INJ SC/IM: CPT | Performed by: HOSPITALIST

## 2022-12-09 PROCEDURE — 63600175 PHARM REV CODE 636 W HCPCS: Performed by: NURSE PRACTITIONER

## 2022-12-09 PROCEDURE — 25000003 PHARM REV CODE 250: Performed by: NURSE PRACTITIONER

## 2022-12-09 PROCEDURE — 25000003 PHARM REV CODE 250: Performed by: HOSPITALIST

## 2022-12-09 RX ORDER — FUROSEMIDE 10 MG/ML
40 INJECTION INTRAMUSCULAR; INTRAVENOUS
Status: DISCONTINUED | OUTPATIENT
Start: 2022-12-09 | End: 2022-12-10 | Stop reason: HOSPADM

## 2022-12-09 RX ADMIN — ASPIRIN 81 MG CHEWABLE TABLET 81 MG: 81 TABLET CHEWABLE at 11:12

## 2022-12-09 RX ADMIN — ENOXAPARIN SODIUM 40 MG: 40 INJECTION SUBCUTANEOUS at 04:12

## 2022-12-09 RX ADMIN — FUROSEMIDE 40 MG: 10 INJECTION, SOLUTION INTRAMUSCULAR; INTRAVENOUS at 09:12

## 2022-12-09 RX ADMIN — INSULIN ASPART 2 UNITS: 100 INJECTION, SOLUTION INTRAVENOUS; SUBCUTANEOUS at 09:12

## 2022-12-09 RX ADMIN — ESCITALOPRAM OXALATE 10 MG: 10 TABLET ORAL at 11:12

## 2022-12-09 RX ADMIN — INSULIN ASPART 2 UNITS: 100 INJECTION, SOLUTION INTRAVENOUS; SUBCUTANEOUS at 11:12

## 2022-12-09 RX ADMIN — INSULIN ASPART 3 UNITS: 100 INJECTION, SOLUTION INTRAVENOUS; SUBCUTANEOUS at 09:12

## 2022-12-09 RX ADMIN — ATORVASTATIN CALCIUM 40 MG: 40 TABLET, FILM COATED ORAL at 11:12

## 2022-12-09 RX ADMIN — LIDOCAINE 1 PATCH: 50 PATCH TOPICAL at 09:12

## 2022-12-09 RX ADMIN — FUROSEMIDE 20 MG: 10 INJECTION, SOLUTION INTRAMUSCULAR; INTRAVENOUS at 07:12

## 2022-12-09 RX ADMIN — QUETIAPINE FUMARATE 25 MG: 25 TABLET ORAL at 09:12

## 2022-12-09 RX ADMIN — CLOPIDOGREL BISULFATE 75 MG: 75 TABLET ORAL at 11:12

## 2022-12-09 RX ADMIN — INSULIN DETEMIR 10 UNITS: 100 INJECTION, SOLUTION SUBCUTANEOUS at 09:12

## 2022-12-09 RX ADMIN — TAMSULOSIN HYDROCHLORIDE 0.4 MG: 0.4 CAPSULE ORAL at 11:12

## 2022-12-09 RX ADMIN — INSULIN ASPART 4 UNITS: 100 INJECTION, SOLUTION INTRAVENOUS; SUBCUTANEOUS at 04:12

## 2022-12-09 RX ADMIN — Medication 1 PATCH: at 09:12

## 2022-12-09 NOTE — PROGRESS NOTES
[facility] Medicine  Progress Note    Patient Name: Carlos Cifuentes Jr.  MRN: [id number]  Patient Class: OP- Observation   Admission Date: 12/8/2022  Length of Stay: 0 days  Attending Physician: Darron Beach MD  Primary Care Provider: Miguel Lux MD        Subjective:     Principal Problem:Acute combined systolic and diastolic heart failure        HPI:  62 y.o. male with obesity, DM2, anemia of chronic disease, cirrhosis of liver, insomnia, nicotine dependence, CKD stage III, GERD, HTN, s/p BKA with phantom limb syndrome presents with a complaint of shortness of breath.  Acute onset, duration 1 day, associated with chest tightness, dry mouth, and emesis x2, no known exacerbating or alleviating factors, rated as severe.  Denies fever, chills, palpitations, dizziness, syncope, diarrhea.  In the ED, found to be hypoxic on room air, sats 87%, BNP elevated and CXR shows pulmonary edema.  Troponin elevated but improving.  Supplemental oxygen and IV lasix initiated.  Placed in observation.      Overview/Hospital Course:  Admission to hospital for acute on chronic systolic diastolic heart failure EF 45%. Shortness of breath improved overnight with IV diuresis.   Recent admission for NSTEMI 11/29/2022 [facility] showed 3 vessel disease- LAD 90% mid mid to distal disease, CX 50-70% lesions, RCA 50% mid/ 90% distal, RPDA 90% proximal/diffuse distal disease  and at that time plan for medical management.     12/9/2022, SOB improving. 02 sat RA at rest  94%, activity 84% then recovered on 2 L O2 sat 93%. Lung mid to lower inspiratory crackles. Troponin down from 3 one week ago trending flat pattern. Continue diuresis. Encourage stop smoking.       Interval History: feeling better today. Doesn't follow a low salt diet or fluid restriction    Review of Systems   Constitutional:  Positive for activity change and fatigue. Negative for diaphoresis.   Respiratory:  Positive for shortness of breath. Negative for chest  tightness, wheezing and stridor.    Cardiovascular:  Negative for chest pain, palpitations and leg swelling.   Gastrointestinal: Negative.    Objective:     Vital Signs (Most Recent):  Temp: 97.8 °F (36.6 °C) (12/09/22 1116)  Pulse: 71 (12/09/22 1116)  Resp: 20 (12/09/22 1116)  BP: 131/62 (12/09/22 1116)  SpO2: 100 % (12/09/22 1116) Vital Signs (24h Range):  Temp:  [97.5 °F (36.4 °C)-97.9 °F (36.6 °C)] 97.8 °F (36.6 °C)  Pulse:  [64-76] 71  Resp:  [16-20] 20  SpO2:  [87 %-100 %] 100 %  BP: (101-169)/(53-80) 131/62     Weight: 102.4 kg (225 lb 12 oz)  Body mass index is 33.34 kg/m².    Intake/Output Summary (Last 24 hours) at 12/9/2022 1132  Last data filed at 12/9/2022 0520  Gross per 24 hour   Intake --   Output 775 ml   Net -775 ml      Physical Exam  Constitutional:       Appearance: Normal appearance. He is obese. He is not ill-appearing.   Cardiovascular:      Rate and Rhythm: Normal rate and regular rhythm.   Pulmonary:      Breath sounds: No wheezing.      Comments: Inspiratory crackles mid to lower bases  Musculoskeletal:      Right lower leg: No edema.      Left lower leg: No edema.      Comments: Bilateral BKA   Neurological:      Mental Status: He is alert.       Significant Labs: All pertinent labs within the past 24 hours have been reviewed.    Significant Imaging: I have reviewed all pertinent imaging results/findings within the past 24 hours.      Assessment/Plan:      * Acute combined systolic and diastolic heart failure  Complaint of dyspnea with elevated BNP and evidence of pulmonary edema, continue diuresis with IV lasix and afterload reduction as tolerated, I/O's, daily weights.    Admission to hospital for acute on chronic systolic diastolic heart failure EF 45%. Shortness of breath improved overnight with IV diuresis.       12/9/2022, SOB improving. 02 sat RA at rest  94%, activity 84% then recovered on 2 L O2 sat 93%. Lung mid to lower inspiratory crackles.   Continue diuresis. Encourage stop  smoking  Consult RD    History of non-ST elevation myocardial infarction (NSTEMI)  Admission to hospital for acute on chronic systolic diastolic heart failure EF 45%. Shortness of breath improved overnight with IV diuresis.   Recent admission for NSTEMI 11/29/2022 [facility] showed 3 vessel disease- LAD 90% mid mid to distal disease, CX 50-70% lesions, RCA 50% mid/ 90% distal, RPDA 90% proximal/diffuse distal disease  and at that time plan for medical management.  ASA, plavix, statin  Encourage stop smoking    Hyperlipidemia associated with type 2 diabetes mellitus  Continue statin    Primary hypertension  Well controlled, continue home medications and monitor blood pressure, adjust as needed.     CKD stage 3 due to type 2 diabetes mellitus  Stable, at baseline, maintain euvolemic state, strict I/O's, monitor renal function and electrolytes, avoid nephrotoxic agents.     Tobacco abuse  Encourage smoking cessation. Sister threw his cigarettes away. Nicotine patch      Diabetic polyneuropathy associated with type 2 diabetes mellitus  Patient's FSGs are uncontrolled due to hyperglycemia on current medication regimen.  Last A1c reviewed-   Lab Results   Component Value Date    LABA1C 11.8 (H) 06/13/2014    HGBA1C 10.3 (H) 12/08/2022     Most recent fingerstick glucose reviewed-   Recent Labs   Lab 12/08/22  1908 12/08/22  1935 12/09/22  0755 12/09/22  1113   POCTGLUCOSE 386* 324* 169* 172*     Current correctional scale  Medium  Maintain anti-hyperglycemic dose as follows-   Antihyperglycemics (From admission, onward)    Start     Stop Route Frequency Ordered    12/08/22 2100  insulin detemir U-100 pen 10 Units         -- SubQ Nightly 12/08/22 1821    12/08/22 1921  insulin aspart U-100 pen 1-10 Units         -- SubQ Before meals & nightly PRN 12/08/22 1821        Hold Oral hypoglycemics while patient is in the hospital.      VTE Risk Mitigation (From admission, onward)         Ordered     enoxaparin injection 40 mg  Daily          12/08/22 1449     IP VTE HIGH RISK PATIENT  Once         12/08/22 1449     Place sequential compression device  Until discontinued         12/08/22 1449                Discharge Planning   BEBA: 12/9/2022     Code Status: Full Code   Is the patient medically ready for discharge?:     Reason for patient still in hospital (select all that apply): Treatment  Discharge Plan A: Home          Page Li NP  Department of [facility] Medicine   [facility]

## 2022-12-09 NOTE — PLAN OF CARE
[facility] - Telemetry  Discharge Assessment    SW completed brief assessment and discussed discharge planning with patient at his bedside. Patient stated that he lives alone but has 2 sisters Marii and Elvia that are his main support they help him when needed. Patient shared that he recently discharge from hospital with outpatient OT/PT. Patient has medicaid and he is not qualified for HH. Patient's sister Marii will provide transportation for him when discharge from the hospital.    Primary Care Provider: Miguel Lux MD     Discharge Assessment (most recent)       BRIEF DISCHARGE ASSESSMENT - 12/08/22 2048          Discharge Planning    Assessment Type Discharge Planning Brief Assessment     Resource/Environmental Concerns none     Support Systems Family members     Equipment Currently Used at Home bedside commode;bath bench;glucometer;prosthesis;wheelchair;walker, rolling     Current Living Arrangements home     Patient/Family Anticipates Transition to home     Patient/Family Anticipated Services at Transition none     DME Needed Upon Discharge  none     Discharge Plan A Home     Discharge Plan B Home

## 2022-12-09 NOTE — PLAN OF CARE
Patient c/o burning with urination due to an old catheter insertion. Patient requiring supplemental oxygen overnight. O2 sats noted to be in the low to mid 80s w/o oxygen supplementation.    Problem: Skin Injury Risk Increased  Goal: Skin Health and Integrity  Outcome: Ongoing, Progressing     Problem: Adult Inpatient Plan of Care  Goal: Plan of Care Review  Outcome: Ongoing, Progressing  Goal: Patient-Specific Goal (Individualized)  Outcome: Ongoing, Progressing  Goal: Absence of Hospital-Acquired Illness or Injury  Outcome: Ongoing, Progressing  Goal: Optimal Comfort and Wellbeing  Outcome: Ongoing, Progressing  Goal: Readiness for Transition of Care  Outcome: Ongoing, Progressing     Problem: Fall Injury Risk  Goal: Absence of Fall and Fall-Related Injury  Outcome: Ongoing, Progressing     Problem: Diabetes Comorbidity  Goal: Blood Glucose Level Within Targeted Range  Outcome: Ongoing, Progressing

## 2022-12-09 NOTE — PLAN OF CARE
[facility] - Telemetry  Discharge Assessment    SW joseussed discharge planning with patient at his bedside. Patient stated that he lives alone, but his sister Elvia is his main support and she help him when needed. Patient shared that he also has a friend Marii that help. Patient shared that he recently was admitted to the hospital and was discharge with outpatient OT/PT. Patient's friend Marii will provide transportation for him to get home when discharge from the hospital.     Primary Care Provider: Miguel Lux MD     Discharge Assessment (most recent)       BRIEF DISCHARGE ASSESSMENT - 12/08/22 1926          Discharge Planning    Assessment Type Discharge Planning Brief Assessment     Resource/Environmental Concerns none     Support Systems Family members     Equipment Currently Used at Home walker, rolling;wheelchair;glucometer;prosthesis;bath bench;bedside commode     Current Living Arrangements home     DME Needed Upon Discharge  none     Discharge Plan A Group Home;Home with family     Discharge Plan B Home Health

## 2022-12-09 NOTE — ASSESSMENT & PLAN NOTE
Patient's FSGs are uncontrolled due to hyperglycemia on current medication regimen.  Last A1c reviewed-   Lab Results   Component Value Date    LABA1C 11.8 (H) 06/13/2014    HGBA1C 10.3 (H) 12/08/2022     Most recent fingerstick glucose reviewed-   Recent Labs   Lab 12/08/22  1908 12/08/22  1935 12/09/22  0755 12/09/22  1113   POCTGLUCOSE 386* 324* 169* 172*     Current correctional scale  Medium  Maintain anti-hyperglycemic dose as follows-   Antihyperglycemics (From admission, onward)    Start     Stop Route Frequency Ordered    12/08/22 2100  insulin detemir U-100 pen 10 Units         -- SubQ Nightly 12/08/22 1821    12/08/22 1921  insulin aspart U-100 pen 1-10 Units         -- SubQ Before meals & nightly PRN 12/08/22 1821        Hold Oral hypoglycemics while patient is in the hospital.

## 2022-12-09 NOTE — SUBJECTIVE & OBJECTIVE
Interval History: feeling better today. Doesn't follow a low salt diet or fluid restriction    Review of Systems   Constitutional:  Positive for activity change and fatigue. Negative for diaphoresis.   Respiratory:  Positive for shortness of breath. Negative for chest tightness, wheezing and stridor.    Cardiovascular:  Negative for chest pain, palpitations and leg swelling.   Gastrointestinal: Negative.    Objective:     Vital Signs (Most Recent):  Temp: 97.8 °F (36.6 °C) (12/09/22 1116)  Pulse: 71 (12/09/22 1116)  Resp: 20 (12/09/22 1116)  BP: 131/62 (12/09/22 1116)  SpO2: 100 % (12/09/22 1116) Vital Signs (24h Range):  Temp:  [97.5 °F (36.4 °C)-97.9 °F (36.6 °C)] 97.8 °F (36.6 °C)  Pulse:  [64-76] 71  Resp:  [16-20] 20  SpO2:  [87 %-100 %] 100 %  BP: (101-169)/(53-80) 131/62     Weight: 102.4 kg (225 lb 12 oz)  Body mass index is 33.34 kg/m².    Intake/Output Summary (Last 24 hours) at 12/9/2022 1132  Last data filed at 12/9/2022 0520  Gross per 24 hour   Intake --   Output 775 ml   Net -775 ml      Physical Exam  Constitutional:       Appearance: Normal appearance. He is obese. He is not ill-appearing.   Cardiovascular:      Rate and Rhythm: Normal rate and regular rhythm.   Pulmonary:      Breath sounds: No wheezing.      Comments: Inspiratory crackles mid to lower bases  Musculoskeletal:      Right lower leg: No edema.      Left lower leg: No edema.      Comments: Bilateral BKA   Neurological:      Mental Status: He is alert.       Significant Labs: All pertinent labs within the past 24 hours have been reviewed.    Significant Imaging: I have reviewed all pertinent imaging results/findings within the past 24 hours.

## 2022-12-09 NOTE — ASSESSMENT & PLAN NOTE
Complaint of dyspnea with elevated BNP and evidence of pulmonary edema, continue diuresis with IV lasix and afterload reduction as tolerated, I/O's, daily weights.    Admission to hospital for acute on chronic systolic diastolic heart failure EF 45%. Shortness of breath improved overnight with IV diuresis.       12/9/2022, SOB improving. 02 sat RA at rest  94%, activity 84% then recovered on 2 L O2 sat 93%. Lung mid to lower inspiratory crackles.   Continue diuresis. Encourage stop smoking  Consult RD

## 2022-12-09 NOTE — H&P
"[facility] Medicine  History & Physical    Patient Name: Carlos Cifuentes Jr.  MRN: [id number]  Patient Class: OP- Observation  Admission Date: 12/8/2022  Attending Physician: Darron Beach MD   Primary Care Provider: Miguel Lux MD         Patient information was obtained from patient, past medical records and ER records.     Subjective:     Principal Problem:Acute combined systolic and diastolic heart failure    Chief Complaint:   Chief Complaint   Patient presents with    Shortness of Breath     EMS called to 61yo male that has a hx of recent NSTEMI and called due to feeling SOB and "having a panic attack." Denied any chest pain        HPI: 62 y.o. male with obesity, DM2, anemia of chronic disease, cirrhosis of liver, insomnia, nicotine dependence, CKD stage III, GERD, HTN, s/p BKA with phantom limb syndrome presents with a complaint of shortness of breath.  Acute onset, duration 1 day, associated with chest tightness, dry mouth, and emesis x2, no known exacerbating or alleviating factors, rated as severe.  Denies fever, chills, palpitations, dizziness, syncope, diarrhea.  In the ED, found to be hypoxic on room air, sats 87%, BNP elevated and CXR shows pulmonary edema.  Troponin elevated but improving.  Supplemental oxygen and IV lasix initiated.  Placed in observation.      Past Medical History:   Diagnosis Date    Arthritis     Back pain     Bulging lumbar disc     C. difficile diarrhea     Chronic back pain 10/14/2014    Cigarette smoker     Diabetes mellitus     Diabetes mellitus type II     DM (diabetes mellitus), type 2, uncontrolled 03/12/2013    Hepatitis C 07/03/2013    MSSA (methicillin susceptible Staphylococcus aureus) septicemia     Neuromuscular disorder     Neuropathy     NSTEMI (non-ST elevated myocardial infarction) 11/28/2022    Staph aureus infection     Status post bilateral below knee amputation        Past Surgical History:   Procedure Laterality Date "    ANGIOGRAPHY OF LOWER EXTREMITY Left 06/14/2018    Procedure: Angiogram LEFT LOWER Extremity with US guided access from right side;  Surgeon: Sony Srinivasan MD;  Location: [facility] OR [address] [place] [address];  Service: Peripheral Vascular;  Laterality: Left;  local: 16ml  contrast: 20ml   fluoro: 2.8  427.73mGy    APPENDECTOMY      BELOW KNEE AMPUTATION OF LOWER EXTREMITY Bilateral     CORONARY ANGIOGRAPHY N/A 11/29/2022    Procedure: ANGIOGRAM, CORONARY ARTERY;  Surgeon: Bobby Harding MD;  Location: [facility] CATH LAB;  Service: Cardiology;  Laterality: N/A;    JOINT REPLACEMENT      left knee surgery      left leg surgery      broken bone    LEG SURGERY  right     Right arm boil      Right great toe amputation      TOE AMPUTATION Right        Review of patient's allergies indicates:   Allergen Reactions    Codeine Rash    Lyrica [pregabalin]      Feet turned Red    Vicodin [hydrocodone-acetaminophen] Rash       No current facility-administered medications on file prior to encounter.     Current Outpatient Medications on File Prior to Encounter   Medication Sig    aspirin 81 MG Chew Take 1 tablet (81 mg total) by mouth once daily.    atorvastatin (LIPITOR) 40 MG tablet Take 1 tablet (40 mg total) by mouth once daily.    clopidogreL (PLAVIX) 75 mg tablet Take 1 tablet (75 mg total) by mouth once daily.    EScitalopram oxalate (LEXAPRO) 10 MG tablet Take 1 tablet (10 mg total) by mouth once daily.    furosemide (LASIX) 20 MG tablet Take 1 tablet (20 mg total) by mouth once daily.    LANTUS U-100 INSULIN 100 unit/mL injection INJECT TEN UNITS INTO SKIN AT BEDTIME    losartan (COZAAR) 25 MG tablet Take 0.5 tablets (12.5 mg total) by mouth once daily.    metoprolol tartrate (LOPRESSOR) 25 MG tablet Take 0.5 tablets (12.5 mg total) by mouth 2 (two) times daily.    mupirocin (BACTROBAN) 2 % ointment Apply to affected area 3 times daily    nicotine (NICODERM CQ) 21 mg/24 hr Place 1 patch onto the skin once daily.     pantoprazole (PROTONIX) 40 MG tablet TAKE ONE TABLET BY MOUTH EVERY DAY    tamsulosin (FLOMAX) 0.4 mg Cap Take 1 capsule (0.4 mg total) by mouth once daily.    TRUE METRIX GLUCOSE METER Misc AS DIRECTED    TRUE METRIX GLUCOSE TEST STRIP Strp Twice daily blood sugar checks    gabapentin (NEURONTIN) 300 MG capsule TAKE 1 CAPSULE BY MOUTH THREE TIMES A DAY    glimepiride (AMARYL) 2 MG tablet Take 1 tablet (2 mg total) by mouth every morning.    metFORMIN (GLUCOPHAGE) 1000 MG tablet TAKE ONE TABLET BY MOUTH TWICE DAILY    QUEtiapine (SEROQUEL) 25 MG Tab Take 1 tablet (25 mg total) by mouth every evening.    silver sulfADIAZINE 1% (SILVADENE) 1 % cream Apply 1 application topically once daily. Apply to affected area     Family History       Problem Relation (Age of Onset)    Arthritis Mother, Sister, Brother    Diabetes Sister          Tobacco Use    Smoking status: Every Day     Packs/day: 1.00     Types: Cigarettes    Smokeless tobacco: Never   Substance and Sexual Activity    Alcohol use: No    Drug use: No    Sexual activity: Not on file     Review of Systems   Constitutional:  Negative for chills and fever.   HENT:  Negative for congestion and rhinorrhea.    Eyes:  Negative for photophobia and visual disturbance.   Respiratory:  Positive for cough, chest tightness and shortness of breath.    Cardiovascular:  Positive for chest pain and leg swelling. Negative for palpitations.   Gastrointestinal:  Positive for nausea and vomiting. Negative for abdominal pain and diarrhea.   Genitourinary:  Negative for frequency, hematuria and urgency.   Skin:  Negative for pallor, rash and wound.   Neurological:  Negative for light-headedness and headaches.   Psychiatric/Behavioral:  Negative for confusion and decreased concentration.    Objective:     Vital Signs (Most Recent):  Temp: 97.7 °F (36.5 °C) (12/08/22 1146)  Pulse: 68 (12/08/22 1731)  Resp: 20 (12/08/22 1731)  BP: (!) 143/80 (12/08/22 1705)  SpO2: 97 %  (12/08/22 1196)   Vital Signs (24h Range):  Temp:  [97.7 °F (36.5 °C)] 97.7 °F (36.5 °C)  Pulse:  [66-76] 68  Resp:  [18-20] 20  SpO2:  [87 %-97 %] 97 %  BP: (132-169)/(58-80) 143/80     Weight: 99.8 kg (220 lb)  Body mass index is 32.49 kg/m².    Physical Exam  Vitals and nursing note reviewed.   Constitutional:       General: He is not in acute distress.     Appearance: He is well-developed.   HENT:      Head: Normocephalic and atraumatic.      Right Ear: External ear normal.      Left Ear: External ear normal.      Nose: Nose normal.   Eyes:      Conjunctiva/sclera: Conjunctivae normal.      Pupils: Pupils are equal, round, and reactive to light.   Cardiovascular:      Rate and Rhythm: Normal rate and regular rhythm.   Pulmonary:      Effort: Pulmonary effort is normal. No respiratory distress.      Breath sounds: Normal breath sounds. No wheezing or rales.   Abdominal:      General: Bowel sounds are normal. There is no distension.      Palpations: Abdomen is soft.      Tenderness: There is no abdominal tenderness.      Comments: No palpable hepatomegaly or splenomegaly   Musculoskeletal:         General: No tenderness. Normal range of motion.      Cervical back: Normal range of motion and neck supple.   Skin:     General: Skin is warm and dry.   Neurological:      Mental Status: He is alert and oriented to person, place, and time.   Psychiatric:         Thought Content: Thought content normal.         CRANIAL NERVES     CN III, IV, VI   Pupils are equal, round, and reactive to light.     Significant Labs: All pertinent labs within the past 24 hours have been reviewed.    Significant Imaging: I have reviewed all pertinent imaging results/findings within the past 24 hours.    Assessment/Plan:     * Acute combined systolic and diastolic heart failure  Complaint of dyspnea with elevated BNP and evidence of pulmonary edema, continue diuresis with IV lasix and afterload reduction as tolerated, I/O's, daily  weights.    Hyperlipidemia associated with type 2 diabetes mellitus  Continue statin    History of non-ST elevation myocardial infarction (NSTEMI)  Continue med regimen, troponin improving, Cardiology consult.    Primary hypertension  Well controlled, continue home medications and monitor blood pressure, adjust as needed.     CKD stage 3 due to type 2 diabetes mellitus  Stable, at baseline, maintain euvolemic state, strict I/O's, monitor renal function and electrolytes, avoid nephrotoxic agents.     Diabetic polyneuropathy associated with type 2 diabetes mellitus  Patient's FSGs are uncontrolled due to hyperglycemia on current medication regimen.  Last A1c reviewed-   Lab Results   Component Value Date    LABA1C 11.8 (H) 06/13/2014    HGBA1C 10.9 (H) 11/28/2022     Most recent fingerstick glucose reviewed-   Recent Labs   Lab 12/08/22  1402 12/08/22  1813 12/08/22  1908   POCTGLUCOSE 445* 369* 386*     Current correctional scale  Medium  Maintain anti-hyperglycemic dose as follows-   Antihyperglycemics (From admission, onward)    Start     Stop Route Frequency Ordered    12/08/22 2100  insulin detemir U-100 pen 10 Units         -- SubQ Nightly 12/08/22 1821    12/08/22 1921  insulin aspart U-100 pen 1-10 Units         -- SubQ Before meals & nightly PRN 12/08/22 1821        Hold Oral hypoglycemics while patient is in the hospital.      VTE Risk Mitigation (From admission, onward)         Ordered     enoxaparin injection 40 mg  Daily         12/08/22 1449     IP VTE HIGH RISK PATIENT  Once         12/08/22 1449     Place sequential compression device  Until discontinued         12/08/22 1449               As clarification, on 12/08/2022, patient should be placed in hospital observation services under my care in collaboration with MD Phan Thomason Jr., APRN, AGACNP-BC  Hospitalist - Department of Hospital Medicine  [facility] [address] [place] [address]  Office  #: [phone]; Pager #: [phone]    English

## 2022-12-09 NOTE — PLAN OF CARE
[facility] - Telemetry  Discharge Assessment    SW discussed discharge planning with patient at his bedside. Patient stated that he lives alone but his sister Elvia and Marii are his main support and they help him as needed. Patient shared that he was recently discharge from the hospital with outpatient OT/PT. Marii will provide transportation for patient to get home when discharge from the hospital.     Primary Care Provider: Miguel Lux MD     Discharge Assessment (most recent)       BRIEF DISCHARGE ASSESSMENT - 12/08/22 1926          Discharge Planning    Assessment Type Discharge Planning Brief Assessment     Resource/Environmental Concerns none     Support Systems Family members     Equipment Currently Used at Home walker, rolling;wheelchair;glucometer;prosthesis;bath bench;bedside commode     Current Living Arrangements home     DME Needed Upon Discharge  none     Discharge Plan A Group Home;Home with family     Discharge Plan B Home Health

## 2022-12-09 NOTE — CONSULTS
Thank you for your consult to [facility]. We have reviewed the patient chart. This patient does not meet criteria for [facility] service at this time due to Patient has a condition likely to benefit from bedside evaluation since patient is currently on IV diuretics.. Will not assume care of the patient at this time

## 2022-12-09 NOTE — PROGRESS NOTES
[facility]  Cardiology  Progress Note    Patient Name: Carlos Cifuentes Jr.  MRN: [id number]  Admission Date: 12/8/2022  Hospital Length of Stay: 0 days  Code Status: Full Code   Attending Physician: Darron Beach MD   Primary Care Physician: Miguel Lux MD  Expected Discharge Date:   Principal Problem:Acute combined systolic and diastolic heart failure    Subjective:       Interval History:  Chest pain-free.  Breathing getting better, although not back to baseline.    Review of Systems   Constitutional: Positive for malaise/fatigue.   HENT: Negative.     Eyes: Negative.    Cardiovascular:  Positive for dyspnea on exertion.   Respiratory: Negative.     Endocrine: Negative.    Hematologic/Lymphatic: Negative.    Skin: Negative.    Musculoskeletal: Negative.    Gastrointestinal: Negative.    Genitourinary: Negative.    Neurological: Negative.    Psychiatric/Behavioral: Negative.     Allergic/Immunologic: Negative.    Objective:     Vital Signs (Most Recent):  Temp: 97.5 °F (36.4 °C) (12/09/22 0757)  Pulse: 64 (12/09/22 0757)  Resp: 19 (12/09/22 0757)  BP: (!) 101/53 (12/09/22 0757)  SpO2: (!) 93 % (12/09/22 0757)   Vital Signs (24h Range):  Temp:  [97.5 °F (36.4 °C)-97.9 °F (36.6 °C)] 97.5 °F (36.4 °C)  Pulse:  [64-76] 64  Resp:  [16-20] 19  SpO2:  [87 %-98 %] 93 %  BP: (101-169)/(53-80) 101/53     Weight: 102.4 kg (225 lb 12 oz)  Body mass index is 33.34 kg/m².     SpO2: (!) 93 %  (RETIRED) O2 Device (Oxygen Therapy): nasal cannula      Intake/Output Summary (Last 24 hours) at 12/9/2022 1025  Last data filed at 12/9/2022 0520  Gross per 24 hour   Intake --   Output 775 ml   Net -775 ml       Lines/Drains/Airways       Peripheral Intravenous Line  Duration                  Peripheral IV - Single Lumen 12/08/22 1213 20 G Right Antecubital <1 day                    Physical Exam  Vitals reviewed.   Constitutional:       General: He is not in acute distress.     Appearance: He is obese. He is ill-appearing.  He is not diaphoretic.   Neck:      Vascular: No carotid bruit or JVD.   Cardiovascular:      Rate and Rhythm: Normal rate and regular rhythm.      Pulses: Normal pulses.   Pulmonary:      Effort: Tachypnea present.      Breath sounds: Rales present.   Abdominal:      General: Bowel sounds are normal. There is distension.      Palpations: Abdomen is soft.      Tenderness: There is no abdominal tenderness.      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      Left Lower Extremity: Left leg is amputated below knee.   Neurological:      Mental Status: He is alert and oriented to person, place, and time.   Psychiatric:         Speech: Speech normal.         Behavior: Behavior normal.       Significant Labs: BMP:   Recent Labs   Lab 12/08/22  1224 12/08/22  1657 12/09/22  0510   *  --  143*   *  --  135*   K 5.1  --  4.3   CL 98  --  99   CO2 25  --  28   BUN 26*  --  33*   CREATININE 1.6*  --  1.4   CALCIUM 9.3  --  9.3   MG  --  1.7  --    , CMP   Recent Labs   Lab 12/08/22  1224 12/09/22  0510   * 135*   K 5.1 4.3   CL 98 99   CO2 25 28   * 143*   BUN 26* 33*   CREATININE 1.6* 1.4   CALCIUM 9.3 9.3   PROT 7.5  --    ALBUMIN 3.4*  --    BILITOT 0.9  --    ALKPHOS 74  --    AST 15  --    ALT 15  --    ANIONGAP 7* 8   , CBC   Recent Labs   Lab 12/08/22  1224   WBC 5.90   HGB 9.5*   HCT 25.4*      , INR No results for input(s): INR, PROTIME in the last 48 hours., Lipid Panel No results for input(s): CHOL, HDL, LDLCALC, TRIG, CHOLHDL in the last 48 hours., Troponin   Recent Labs   Lab 12/08/22  1224 12/08/22  1657 12/08/22  2140   TROPONINI 0.133* 0.148* 0.191*   , and All pertinent lab results from the last 24 hours have been reviewed.    Significant Imaging: Echocardiogram: Transthoracic echo (TTE) complete (Cupid Only):   Results for orders placed or performed during the hospital encounter of 12/08/22   Echo    Result Value Ref Range    BSA 2.2 m2    TDI SEPTAL 0.06 m/s    LV LATERAL E/E' RATIO 20.33 m/s    LV SEPTAL E/E' RATIO 20.33 m/s    LA WIDTH 5.00 cm    Left Ventricular Outflow Tract Mean Velocity 0.46 cm/s    Left Ventricular Outflow Tract Mean Gradient 0.96 mmHg    TDI LATERAL 0.06 m/s    LVIDd 5.55 3.5 - 6.0 cm    IVS 0.97 0.6 - 1.1 cm    Posterior Wall 1.19 (A) 0.6 - 1.1 cm    Ao root annulus 1.90 cm    LVIDs 4.00 2.1 - 4.0 cm    FS 28 28 - 44 %    LA volume 121.84 cm3    Sinus 3.20 cm    STJ 2.62 cm    LV mass 239.68 g    LA size 5.49 cm    RVDD 3.47 cm    TAPSE 2.30 cm    Left Ventricle Relative Wall Thickness 0.43 cm    AV Velocity Ratio 0.55     E/A ratio 1.94     Mean e' 0.06 m/s    E wave deceleration time 192.59 msec    IVRT 100.35 msec    LVOT diameter 1.93 cm    LVOT area 2.9 cm2    LVOT peak hector 0.72 m/s    LVOT peak VTI 13.40 cm    Ao peak hector 1.31 m/s    LVOT stroke volume 39.18 cm3    AV peak gradient 7 mmHg    E/E' ratio 20.33 m/s    MV Peak E Hector 1.22 m/s    TR Max Hector 1.94 m/s    MV Peak A Hector 0.63 m/s    LV Systolic Volume 69.97 mL    LV Systolic Volume Index 32.5 mL/m2    LV Diastolic Volume 150.43 mL    LV Diastolic Volume Index 69.97 mL/m2    LA Volume Index 56.7 mL/m2    LV Mass Index 111 g/m2    RA Major Axis 4.77 cm    Left Atrium Minor Axis 4.50 cm    Left Atrium Major Axis 6.22 cm    Triscuspid Valve Regurgitation Peak Gradient 15 mmHg    RA Width 3.50 cm    Right Atrial Pressure (from IVC) 3 mmHg    EF 45 %    TV rest pulmonary artery pressure 18 mmHg    Narrative    · Technically difficult study.  · The left ventricle is normal in size with concentric remodeling and   mildly decreased systolic function.  · The estimated ejection fraction is 45%.  · Severe left atrial enlargement.  · Grade II left ventricular diastolic dysfunction.  · Normal right ventricular size with normal right ventricular systolic   function.  · Normal central venous pressure (3 mmHg).  · The estimated PA  systolic pressure is 18 mmHg.  · There is no pulmonary hypertension.        Assessment and Plan:     Brief HPI:     * Acute combined systolic and diastolic heart failure  12/08/2022-ejection fraction roughly 40-45%.  Evidence of volume overload on exam.  Chest x-ray shows pulmonary edema.  Elevated BNP.  Lasix IV.  Monitor creatinine.    12/9:  Breathing better.  Continue IV Lasix.  Transition to oral when close to euvolemia.  Monitor creatinine    Hyperlipidemia associated with type 2 diabetes mellitus   12/08/2022-continue statin.    History of non-ST elevation myocardial infarction (NSTEMI)  recent non ST elevation myocardial infarction.  Cardiac catheterization revealed distal disease, multivessel.  Medical management.  Continue aspirin/Plavix.  Currently chest pain-free    Primary hypertension  Titrate as needed    CKD stage 3 due to type 2 diabetes mellitus        Diabetic polyneuropathy associated with type 2 diabetes mellitus            VTE Risk Mitigation (From admission, onward)         Ordered     enoxaparin injection 40 mg  Daily         12/08/22 1449     IP VTE HIGH RISK PATIENT  Once         12/08/22 1449     Place sequential compression device  Until discontinued         12/08/22 1449                Audi Talley MD  Cardiology  [facility] - Telemetry

## 2022-12-09 NOTE — ASSESSMENT & PLAN NOTE
recent non ST elevation myocardial infarction.  Cardiac catheterization revealed distal disease, multivessel.  Medical management.  Continue aspirin/Plavix.  Currently chest pain-free

## 2022-12-09 NOTE — ASSESSMENT & PLAN NOTE
12/08/2022-ejection fraction roughly 40-45%.  Evidence of volume overload on exam.  Chest x-ray shows pulmonary edema.  Elevated BNP.  Lasix IV.  Monitor creatinine.    12/9:  Breathing better.  Continue IV Lasix.  Transition to oral when close to euvolemia.  Monitor creatinine

## 2022-12-09 NOTE — PROGRESS NOTES
Testing for Home O2    O2 Sats on RA at rest =94%    O2 sats on RA with exercise  =84%    O2 sats on _2_L O2 with exercise = 93%

## 2022-12-09 NOTE — SUBJECTIVE & OBJECTIVE
Past Medical History:   Diagnosis Date    Arthritis     Back pain     Bulging lumbar disc     C. difficile diarrhea     Chronic back pain 10/14/2014    Cigarette smoker     Diabetes mellitus     Diabetes mellitus type II     DM (diabetes mellitus), type 2, uncontrolled 03/12/2013    Hepatitis C 07/03/2013    MSSA (methicillin susceptible Staphylococcus aureus) septicemia     Neuromuscular disorder     Neuropathy     NSTEMI (non-ST elevated myocardial infarction) 11/28/2022    Staph aureus infection     Status post bilateral below knee amputation        Past Surgical History:   Procedure Laterality Date    ANGIOGRAPHY OF LOWER EXTREMITY Left 06/14/2018    Procedure: Angiogram LEFT LOWER Extremity with US guided access from right side;  Surgeon: Sony Srinivasan MD;  Location: [facility] OR [address] [place] [address];  Service: Peripheral Vascular;  Laterality: Left;  local: 16ml  contrast: 20ml   fluoro: 2.8  427.73mGy    APPENDECTOMY      BELOW KNEE AMPUTATION OF LOWER EXTREMITY Bilateral     CORONARY ANGIOGRAPHY N/A 11/29/2022    Procedure: ANGIOGRAM, CORONARY ARTERY;  Surgeon: Bobby Harding MD;  Location: [facility] CATH LAB;  Service: Cardiology;  Laterality: N/A;    JOINT REPLACEMENT      left knee surgery      left leg surgery      broken bone    LEG SURGERY  right     Right arm boil      Right great toe amputation      TOE AMPUTATION Right        Review of patient's allergies indicates:   Allergen Reactions    Codeine Rash    Lyrica [pregabalin]      Feet turned Red    Vicodin [hydrocodone-acetaminophen] Rash       No current facility-administered medications on file prior to encounter.     Current Outpatient Medications on File Prior to Encounter   Medication Sig    aspirin 81 MG Chew Take 1 tablet (81 mg total) by mouth once daily.    atorvastatin (LIPITOR) 40 MG tablet Take 1 tablet (40 mg total) by mouth once daily.    clopidogreL (PLAVIX) 75 mg tablet Take 1 tablet (75 mg total) by mouth once daily.    EScitalopram oxalate  (LEXAPRO) 10 MG tablet Take 1 tablet (10 mg total) by mouth once daily.    furosemide (LASIX) 20 MG tablet Take 1 tablet (20 mg total) by mouth once daily.    LANTUS U-100 INSULIN 100 unit/mL injection INJECT TEN UNITS INTO SKIN AT BEDTIME    losartan (COZAAR) 25 MG tablet Take 0.5 tablets (12.5 mg total) by mouth once daily.    metoprolol tartrate (LOPRESSOR) 25 MG tablet Take 0.5 tablets (12.5 mg total) by mouth 2 (two) times daily.    mupirocin (BACTROBAN) 2 % ointment Apply to affected area 3 times daily    nicotine (NICODERM CQ) 21 mg/24 hr Place 1 patch onto the skin once daily.    pantoprazole (PROTONIX) 40 MG tablet TAKE ONE TABLET BY MOUTH EVERY DAY    tamsulosin (FLOMAX) 0.4 mg Cap Take 1 capsule (0.4 mg total) by mouth once daily.    TRUE METRIX GLUCOSE METER Misc AS DIRECTED    TRUE METRIX GLUCOSE TEST STRIP Strp Twice daily blood sugar checks    gabapentin (NEURONTIN) 300 MG capsule TAKE 1 CAPSULE BY MOUTH THREE TIMES A DAY    glimepiride (AMARYL) 2 MG tablet Take 1 tablet (2 mg total) by mouth every morning.    metFORMIN (GLUCOPHAGE) 1000 MG tablet TAKE ONE TABLET BY MOUTH TWICE DAILY    QUEtiapine (SEROQUEL) 25 MG Tab Take 1 tablet (25 mg total) by mouth every evening.    silver sulfADIAZINE 1% (SILVADENE) 1 % cream Apply 1 application topically once daily. Apply to affected area     Family History       Problem Relation (Age of Onset)    Arthritis Mother, Sister, Brother    Diabetes Sister          Tobacco Use    Smoking status: Every Day     Packs/day: 1.00     Types: Cigarettes    Smokeless tobacco: Never   Substance and Sexual Activity    Alcohol use: No    Drug use: No    Sexual activity: Not on file     Review of Systems   Constitutional:  Negative for chills and fever.   HENT:  Negative for congestion and rhinorrhea.    Eyes:  Negative for photophobia and visual disturbance.   Respiratory:  Positive for cough, chest tightness and shortness of breath.    Cardiovascular:  Positive for chest pain  and leg swelling. Negative for palpitations.   Gastrointestinal:  Positive for nausea and vomiting. Negative for abdominal pain and diarrhea.   Genitourinary:  Negative for frequency, hematuria and urgency.   Skin:  Negative for pallor, rash and wound.   Neurological:  Negative for light-headedness and headaches.   Psychiatric/Behavioral:  Negative for confusion and decreased concentration.    Objective:     Vital Signs (Most Recent):  Temp: 97.7 °F (36.5 °C) (12/08/22 1146)  Pulse: 68 (12/08/22 1731)  Resp: 20 (12/08/22 1731)  BP: (!) 143/80 (12/08/22 1705)  SpO2: 97 % (12/08/22 1731)   Vital Signs (24h Range):  Temp:  [97.7 °F (36.5 °C)] 97.7 °F (36.5 °C)  Pulse:  [66-76] 68  Resp:  [18-20] 20  SpO2:  [87 %-97 %] 97 %  BP: (132-169)/(58-80) 143/80     Weight: 99.8 kg (220 lb)  Body mass index is 32.49 kg/m².    Physical Exam  Vitals and nursing note reviewed.   Constitutional:       General: He is not in acute distress.     Appearance: He is well-developed.   HENT:      Head: Normocephalic and atraumatic.      Right Ear: External ear normal.      Left Ear: External ear normal.      Nose: Nose normal.   Eyes:      Conjunctiva/sclera: Conjunctivae normal.      Pupils: Pupils are equal, round, and reactive to light.   Cardiovascular:      Rate and Rhythm: Normal rate and regular rhythm.   Pulmonary:      Effort: Pulmonary effort is normal. No respiratory distress.      Breath sounds: Normal breath sounds. No wheezing or rales.   Abdominal:      General: Bowel sounds are normal. There is no distension.      Palpations: Abdomen is soft.      Tenderness: There is no abdominal tenderness.      Comments: No palpable hepatomegaly or splenomegaly   Musculoskeletal:         General: No tenderness. Normal range of motion.      Cervical back: Normal range of motion and neck supple.   Skin:     General: Skin is warm and dry.   Neurological:      Mental Status: He is alert and oriented to person, place, and time.   Psychiatric:          Thought Content: Thought content normal.         CRANIAL NERVES     CN III, IV, VI   Pupils are equal, round, and reactive to light.     Significant Labs: All pertinent labs within the past 24 hours have been reviewed.    Significant Imaging: I have reviewed all pertinent imaging results/findings within the past 24 hours.

## 2022-12-09 NOTE — CONSULTS
Consult was addressed. SW discussed discharge planning with patient at his bedside. Patient stated that he lives alone but his sister Elvia and Marii are his main support and they help him as needed. Patient shared that he was recently discharge from the hospital with outpatient OT/PT.  Patient has Medicaid and not a good candidate for HH. Marii will provide transportation for patient to get home when discharge from the hospital.

## 2022-12-09 NOTE — HOSPITAL COURSE
Admission to hospital for acute on chronic systolic diastolic heart failure EF 45%. Shortness of breath improved overnight with IV diuresis. Troponin down from 3 one week ago trending flat pattern. Recent admission for NSTEMI 11/29/2022 [facility] showed 3 vessel disease- LAD 90% mid mid to distal disease, CX 50-70% lesions, RCA 50% mid/ 90% distal, RPDA 90% proximal/diffuse distal disease  and at that time plan for medical management. SOB improved back to baseline with continue diuresis. 02 sat RA at rest  94%, activity 84% then recovered on 2 L O2 sat 93% and repeat today similar readings. Patient have been smoking since age 11 and likely underlying COPD. Discuss low salt diet, fluid intake, and daily weights. Increase lasix 20 to 40 mg bid.  Encourage stop smoking. Discharge home with home oxygen. Ambulatory refer to [facility].

## 2022-12-09 NOTE — ASSESSMENT & PLAN NOTE
Patient's FSGs are uncontrolled due to hyperglycemia on current medication regimen.  Last A1c reviewed-   Lab Results   Component Value Date    LABA1C 11.8 (H) 06/13/2014    HGBA1C 10.9 (H) 11/28/2022     Most recent fingerstick glucose reviewed-   Recent Labs   Lab 12/08/22  1402 12/08/22  1813 12/08/22  1908   POCTGLUCOSE 445* 369* 386*     Current correctional scale  Medium  Maintain anti-hyperglycemic dose as follows-   Antihyperglycemics (From admission, onward)    Start     Stop Route Frequency Ordered    12/08/22 2100  insulin detemir U-100 pen 10 Units         -- SubQ Nightly 12/08/22 1821    12/08/22 1921  insulin aspart U-100 pen 1-10 Units         -- SubQ Before meals & nightly PRN 12/08/22 1821        Hold Oral hypoglycemics while patient is in the hospital.

## 2022-12-09 NOTE — ASSESSMENT & PLAN NOTE
Admission to hospital for acute on chronic systolic diastolic heart failure EF 45%. Shortness of breath improved overnight with IV diuresis.   Recent admission for NSTEMI 11/29/2022 [facility] showed 3 vessel disease- LAD 90% mid mid to distal disease, CX 50-70% lesions, RCA 50% mid/ 90% distal, RPDA 90% proximal/diffuse distal disease  and at that time plan for medical management.  ASA, plavix, statin  Encourage stop smoking

## 2022-12-09 NOTE — ASSESSMENT & PLAN NOTE
Complaint of dyspnea with elevated BNP and evidence of pulmonary edema, continue diuresis with IV lasix and afterload reduction as tolerated, I/O's, daily weights.

## 2022-12-10 VITALS
HEIGHT: 69 IN | WEIGHT: 226 LBS | SYSTOLIC BLOOD PRESSURE: 144 MMHG | HEART RATE: 72 BPM | BODY MASS INDEX: 33.47 KG/M2 | OXYGEN SATURATION: 93 % | TEMPERATURE: 98 F | DIASTOLIC BLOOD PRESSURE: 66 MMHG | RESPIRATION RATE: 19 BRPM

## 2022-12-10 LAB
ANION GAP SERPL CALC-SCNC: 10 MMOL/L (ref 8–16)
BUN SERPL-MCNC: 31 MG/DL (ref 8–23)
CALCIUM SERPL-MCNC: 8.9 MG/DL (ref 8.7–10.5)
CHLORIDE SERPL-SCNC: 99 MMOL/L (ref 95–110)
CO2 SERPL-SCNC: 28 MMOL/L (ref 23–29)
CREAT SERPL-MCNC: 1.3 MG/DL (ref 0.5–1.4)
EST. GFR  (NO RACE VARIABLE): >60 ML/MIN/1.73 M^2
GLUCOSE SERPL-MCNC: 177 MG/DL (ref 70–110)
POCT GLUCOSE: 183 MG/DL (ref 70–110)
POCT GLUCOSE: 270 MG/DL (ref 70–110)
POTASSIUM SERPL-SCNC: 3.9 MMOL/L (ref 3.5–5.1)
SODIUM SERPL-SCNC: 137 MMOL/L (ref 136–145)

## 2022-12-10 PROCEDURE — 99226 PR SUBSEQUENT OBSERVATION CARE,LEVEL III: ICD-10-PCS | Mod: ,,, | Performed by: INTERNAL MEDICINE

## 2022-12-10 PROCEDURE — 25000003 PHARM REV CODE 250: Performed by: HOSPITALIST

## 2022-12-10 PROCEDURE — 94761 N-INVAS EAR/PLS OXIMETRY MLT: CPT

## 2022-12-10 PROCEDURE — 99226 PR SUBSEQUENT OBSERVATION CARE,LEVEL III: CPT | Mod: ,,, | Performed by: INTERNAL MEDICINE

## 2022-12-10 PROCEDURE — 96376 TX/PRO/DX INJ SAME DRUG ADON: CPT

## 2022-12-10 PROCEDURE — G0378 HOSPITAL OBSERVATION PER HR: HCPCS

## 2022-12-10 PROCEDURE — S4991 NICOTINE PATCH NONLEGEND: HCPCS | Performed by: NURSE PRACTITIONER

## 2022-12-10 PROCEDURE — 63600175 PHARM REV CODE 636 W HCPCS: Performed by: NURSE PRACTITIONER

## 2022-12-10 PROCEDURE — 96372 THER/PROPH/DIAG INJ SC/IM: CPT

## 2022-12-10 PROCEDURE — 36415 COLL VENOUS BLD VENIPUNCTURE: CPT | Performed by: HOSPITALIST

## 2022-12-10 PROCEDURE — 25000003 PHARM REV CODE 250: Performed by: NURSE PRACTITIONER

## 2022-12-10 PROCEDURE — 80048 BASIC METABOLIC PNL TOTAL CA: CPT | Performed by: HOSPITALIST

## 2022-12-10 RX ORDER — ALBUTEROL SULFATE 90 UG/1
2 AEROSOL, METERED RESPIRATORY (INHALATION) EVERY 6 HOURS PRN
Qty: 18 G | Refills: 0 | Status: SHIPPED | OUTPATIENT
Start: 2022-12-10 | End: 2023-12-10

## 2022-12-10 RX ORDER — FUROSEMIDE 40 MG/1
40 TABLET ORAL 2 TIMES DAILY
Qty: 60 TABLET | Refills: 3 | Status: SHIPPED | OUTPATIENT
Start: 2022-12-10 | End: 2023-12-10

## 2022-12-10 RX ADMIN — ATORVASTATIN CALCIUM 40 MG: 40 TABLET, FILM COATED ORAL at 09:12

## 2022-12-10 RX ADMIN — INSULIN ASPART 6 UNITS: 100 INJECTION, SOLUTION INTRAVENOUS; SUBCUTANEOUS at 12:12

## 2022-12-10 RX ADMIN — TAMSULOSIN HYDROCHLORIDE 0.4 MG: 0.4 CAPSULE ORAL at 09:12

## 2022-12-10 RX ADMIN — ESCITALOPRAM OXALATE 10 MG: 10 TABLET ORAL at 09:12

## 2022-12-10 RX ADMIN — FUROSEMIDE 40 MG: 10 INJECTION, SOLUTION INTRAMUSCULAR; INTRAVENOUS at 09:12

## 2022-12-10 RX ADMIN — INSULIN ASPART 2 UNITS: 100 INJECTION, SOLUTION INTRAVENOUS; SUBCUTANEOUS at 09:12

## 2022-12-10 RX ADMIN — Medication 1 PATCH: at 09:12

## 2022-12-10 RX ADMIN — CLOPIDOGREL BISULFATE 75 MG: 75 TABLET ORAL at 09:12

## 2022-12-10 RX ADMIN — ASPIRIN 81 MG CHEWABLE TABLET 81 MG: 81 TABLET CHEWABLE at 09:12

## 2022-12-10 NOTE — SUBJECTIVE & OBJECTIVE
Interval History:  Breathing back to baseline    Review of Systems   Constitutional: Positive for malaise/fatigue.   HENT: Negative.     Eyes: Negative.    Respiratory: Negative.     Endocrine: Negative.    Hematologic/Lymphatic: Negative.    Skin: Negative.    Musculoskeletal: Negative.    Gastrointestinal: Negative.    Genitourinary: Negative.    Neurological: Negative.    Psychiatric/Behavioral: Negative.     Allergic/Immunologic: Negative.    Objective:     Vital Signs (Most Recent):  Temp: 97.7 °F (36.5 °C) (12/10/22 1125)  Pulse: 72 (12/10/22 1125)  Resp: 19 (12/10/22 1125)  BP: (!) 144/66 (12/10/22 1125)  SpO2: (!) 93 % (12/10/22 1125)   Vital Signs (24h Range):  Temp:  [97.6 °F (36.4 °C)-98.7 °F (37.1 °C)] 97.7 °F (36.5 °C)  Pulse:  [65-78] 72  Resp:  [17-20] 19  SpO2:  [93 %-97 %] 93 %  BP: (111-144)/(57-79) 144/66     Weight: 102.5 kg (225 lb 15.5 oz)  Body mass index is 33.37 kg/m².     SpO2: (!) 93 %  (RETIRED) O2 Device (Oxygen Therapy): nasal cannula      Intake/Output Summary (Last 24 hours) at 12/10/2022 1348  Last data filed at 12/10/2022 1152  Gross per 24 hour   Intake 840 ml   Output 2150 ml   Net -1310 ml       Lines/Drains/Airways       Peripheral Intravenous Line  Duration                  Peripheral IV - Single Lumen 12/08/22 1213 20 G Right Antecubital 2 days                    Physical Exam  Vitals reviewed.   Constitutional:       General: He is not in acute distress.     Appearance: He is obese. He is not diaphoretic.   Neck:      Vascular: No carotid bruit or JVD.   Cardiovascular:      Rate and Rhythm: Normal rate and regular rhythm.      Pulses: Normal pulses.   Abdominal:      General: Bowel sounds are normal.      Palpations: Abdomen is soft.      Tenderness: There is no abdominal tenderness.      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      Left Lower Extremity: Left leg is amputated below knee.    Neurological:      Mental Status: He is alert and oriented to person, place, and time.   Psychiatric:         Speech: Speech normal.         Behavior: Behavior normal.       Significant Labs: BMP:   Recent Labs   Lab 12/08/22 1657 12/09/22  0510 12/10/22  0358   GLU  --  143* 177*   NA  --  135* 137   K  --  4.3 3.9   CL  --  99 99   CO2  --  28 28   BUN  --  33* 31*   CREATININE  --  1.4 1.3   CALCIUM  --  9.3 8.9   MG 1.7  --   --    , CMP   Recent Labs   Lab 12/09/22  0510 12/10/22  0358   * 137   K 4.3 3.9   CL 99 99   CO2 28 28   * 177*   BUN 33* 31*   CREATININE 1.4 1.3   CALCIUM 9.3 8.9   ANIONGAP 8 10   , CBC No results for input(s): WBC, HGB, HCT, PLT in the last 48 hours., INR No results for input(s): INR, PROTIME in the last 48 hours., Lipid Panel No results for input(s): CHOL, HDL, LDLCALC, TRIG, CHOLHDL in the last 48 hours., Troponin   Recent Labs   Lab 12/08/22 1657 12/08/22  2140   TROPONINI 0.148* 0.191*   , and All pertinent lab results from the last 24 hours have been reviewed.    Significant Imaging: Echocardiogram: Transthoracic echo (TTE) complete (Cupid Only):   Results for orders placed or performed during the hospital encounter of 12/08/22   Echo   Result Value Ref Range    BSA 2.2 m2    TDI SEPTAL 0.06 m/s    LV LATERAL E/E' RATIO 20.33 m/s    LV SEPTAL E/E' RATIO 20.33 m/s    LA WIDTH 5.00 cm    Left Ventricular Outflow Tract Mean Velocity 0.46 cm/s    Left Ventricular Outflow Tract Mean Gradient 0.96 mmHg    TDI LATERAL 0.06 m/s    LVIDd 5.55 3.5 - 6.0 cm    IVS 0.97 0.6 - 1.1 cm    Posterior Wall 1.19 (A) 0.6 - 1.1 cm    Ao root annulus 1.90 cm    LVIDs 4.00 2.1 - 4.0 cm    FS 28 28 - 44 %    LA volume 121.84 cm3    Sinus 3.20 cm    STJ 2.62 cm    LV mass 239.68 g    LA size 5.49 cm    RVDD 3.47 cm    TAPSE 2.30 cm    Left Ventricle Relative Wall Thickness 0.43 cm    AV Velocity Ratio 0.55     E/A ratio 1.94     Mean e' 0.06 m/s    E wave deceleration time 192.59 msec     IVRT 100.35 msec    LVOT diameter 1.93 cm    LVOT area 2.9 cm2    LVOT peak hector 0.72 m/s    LVOT peak VTI 13.40 cm    Ao peak hector 1.31 m/s    LVOT stroke volume 39.18 cm3    AV peak gradient 7 mmHg    E/E' ratio 20.33 m/s    MV Peak E Hector 1.22 m/s    TR Max Hector 1.94 m/s    MV Peak A Hector 0.63 m/s    LV Systolic Volume 69.97 mL    LV Systolic Volume Index 32.5 mL/m2    LV Diastolic Volume 150.43 mL    LV Diastolic Volume Index 69.97 mL/m2    LA Volume Index 56.7 mL/m2    LV Mass Index 111 g/m2    RA Major Axis 4.77 cm    Left Atrium Minor Axis 4.50 cm    Left Atrium Major Axis 6.22 cm    Triscuspid Valve Regurgitation Peak Gradient 15 mmHg    RA Width 3.50 cm    Right Atrial Pressure (from IVC) 3 mmHg    EF 45 %    TV rest pulmonary artery pressure 18 mmHg    Narrative    · Technically difficult study.  · The left ventricle is normal in size with concentric remodeling and   mildly decreased systolic function.  · The estimated ejection fraction is 45%.  · Severe left atrial enlargement.  · Grade II left ventricular diastolic dysfunction.  · Normal right ventricular size with normal right ventricular systolic   function.  · Normal central venous pressure (3 mmHg).  · The estimated PA systolic pressure is 18 mmHg.  · There is no pulmonary hypertension.

## 2022-12-10 NOTE — RESPIRATORY THERAPY
Full oxygen tank delivered to pt and education provided for tank use. Literature left at the bedside with pt. Pt did not express concern, nor have any questions.   Pt waiting for sister's arrival for discharge.

## 2022-12-10 NOTE — DISCHARGE SUMMARY
[facility] Medicine  Discharge Summary      Patient Name: Carlos Cifuentes Jr.  MRN: [id number]  BARBRA: [id number]  Patient Class: OP- Observation  Admission Date: 12/8/2022  Hospital Length of Stay: 0 days  Discharge Date and Time:  12/10/2022 2:51 PM  Attending Physician: Elida Eller MD   Discharging Provider: Page Harding NP  Primary Care Provider: Miguel Lux MD    Primary Care Team: PGAE HARDING    HPI:   62 y.o. male with obesity, DM2, anemia of chronic disease, cirrhosis of liver, insomnia, nicotine dependence, CKD stage III, GERD, HTN, s/p BKA with phantom limb syndrome presents with a complaint of shortness of breath.  Acute onset, duration 1 day, associated with chest tightness, dry mouth, and emesis x2, no known exacerbating or alleviating factors, rated as severe.  Denies fever, chills, palpitations, dizziness, syncope, diarrhea.  In the ED, found to be hypoxic on room air, sats 87%, BNP elevated and CXR shows pulmonary edema.  Troponin elevated but improving.  Supplemental oxygen and IV lasix initiated.  Placed in observation.      * No surgery found *      Hospital Course:   Admission to hospital for acute on chronic systolic diastolic heart failure EF 45%. Shortness of breath improved overnight with IV diuresis. Troponin down from 3 one week ago trending flat pattern. Recent admission for NSTEMI 11/29/2022 [facility] showed 3 vessel disease- LAD 90% mid mid to distal disease, CX 50-70% lesions, RCA 50% mid/ 90% distal, RPDA 90% proximal/diffuse distal disease  and at that time plan for medical management. SOB improved back to baseline with continue diuresis. 02 sat RA at rest  94%, activity 84% then recovered on 2 L O2 sat 93% and repeat today similar readings. Patient have been smoking since age 11 and likely underlying COPD. Discuss low salt diet, fluid intake, and daily weights. Increase lasix 20 to 40 mg bid.  Encourage stop smoking. Discharge home with home oxygen.  Ambulatory refer to [facility].        Goals of Care Treatment Preferences:  Code Status: Full Code    Living Will: Yes              Consults:   Consults (From admission, onward)        Status Ordering Provider     Inpatient virtual consult to Hospital Medicine  Once        Provider:  (Not yet assigned)    Completed LUZMA HARDING     Inpatient consult to Cardiology  Once        Provider:  Bobby Harding MD    Completed BELINDA GUPTA JR     Inpatient consult to Social Work/Case Management  Once        Provider:  (Not yet assigned)    Completed BELINDA GUPTA JR     Inpatient consult to Registered Dietitian/Nutritionist  Once        Provider:  (Not yet assigned)    Completed BELINDA GUPTA JR          No new Assessment & Plan notes have been filed under this hospital service since the last note was generated.  Service: Hospital Medicine    Final Active Diagnoses:    Diagnosis Date Noted POA    PRINCIPAL PROBLEM:  Acute combined systolic and diastolic heart failure [I50.41] 11/30/2022 Yes    History of non-ST elevation myocardial infarction (NSTEMI) [I25.2] 12/08/2022 Not Applicable    Hyperlipidemia associated with type 2 diabetes mellitus [E11.69, E78.5] 12/08/2022 Yes    Primary hypertension [I10] 03/06/2018 Yes    CKD stage 3 due to type 2 diabetes mellitus [E11.22, N18.30] 09/20/2017 Yes     Chronic    Tobacco abuse [Z72.0] 04/08/2017 Yes    Diabetic polyneuropathy associated with type 2 diabetes mellitus [E11.42] 04/22/2016 Yes     Chronic      Problems Resolved During this Admission:       Discharged Condition: good    Disposition:     Follow Up:   Follow-up Information     Miguel Lux MD. Schedule an appointment as soon as possible for a visit in 1 week(s).    Specialties: Internal Medicine, Oncology, Hematology and Oncology  Contact information:  [address] [facility]  [address]  [facility] [address]  [phone]             [facility].    Specialty: DME Provider  Why: DME:  "please call with any questions/concerns with home oxygen.  Contact information:  [address]  [facility] [id number] [phone]                       Patient Instructions:      OXYGEN FOR HOME USE     Order Specific Question Answer Comments   Liter Flow 2    Duration With activity    Qualifying Test Performed at: Activity    Oxygen saturation at rest 95    Oxygen saturation with activity 85    Oxygen saturation with activity on oxygen 93    Portable mode: continuous    Route nasal cannula    Device: home concentrator with portable concentrator    Length of need (in months): 99 mos    Patient condition with qualifying saturation CHF    Height: 5' 9" (1.753 m)    Weight: 102.5 kg (225 lb 15.5 oz)    Alternative treatment measures have been tried or considered and deemed clinically ineffective. Yes      Ambulatory referral/consult to [facility]   Standing Status: Future   Referral Priority: Routine Referral Type: Consultation   Referral Reason: Specialty Services Required   Number of Visits Requested: 1       Significant Diagnostic Studies: Labs: All labs within the past 24 hours have been reviewed    Pending Diagnostic Studies:     None         Medications:  Reconciled Home Medications:      Medication List      START taking these medications    albuterol 90 mcg/actuation inhaler  Commonly known as: VENTOLIN HFA  Inhale 2 puffs into the lungs every 6 (six) hours as needed for Wheezing. Rescue        CHANGE how you take these medications    furosemide 40 MG tablet  Commonly known as: LASIX  Take 1 tablet (40 mg total) by mouth 2 (two) times daily.  What changed:   · medication strength  · how much to take  · when to take this        CONTINUE taking these medications    aspirin 81 MG Chew  Take 1 tablet (81 mg total) by mouth once daily.     atorvastatin 40 MG tablet  Commonly known as: LIPITOR  Take 1 tablet (40 mg total) by mouth once daily.     clopidogreL 75 mg " tablet  Commonly known as: PLAVIX  Take 1 tablet (75 mg total) by mouth once daily.     EScitalopram oxalate 10 MG tablet  Commonly known as: LEXAPRO  Take 1 tablet (10 mg total) by mouth once daily.     gabapentin 300 MG capsule  Commonly known as: NEURONTIN  TAKE 1 CAPSULE BY MOUTH THREE TIMES A DAY     glimepiride 2 MG tablet  Commonly known as: AmaryL  Take 1 tablet (2 mg total) by mouth every morning.     LANTUS U-100 INSULIN 100 unit/mL injection  Generic drug: insulin glargine  INJECT TEN UNITS INTO SKIN AT BEDTIME     losartan 25 MG tablet  Commonly known as: COZAAR  Take 0.5 tablets (12.5 mg total) by mouth once daily.     metFORMIN 1000 MG tablet  Commonly known as: GLUCOPHAGE  TAKE ONE TABLET BY MOUTH TWICE DAILY     metoprolol tartrate 25 MG tablet  Commonly known as: LOPRESSOR  Take 0.5 tablets (12.5 mg total) by mouth 2 (two) times daily.     mupirocin 2 % ointment  Commonly known as: BACTROBAN  Apply to affected area 3 times daily     nicotine 21 mg/24 hr  Commonly known as: NICODERM CQ  Place 1 patch onto the skin once daily.     pantoprazole 40 MG tablet  Commonly known as: PROTONIX  TAKE ONE TABLET BY MOUTH EVERY DAY     QUEtiapine 25 MG Tab  Commonly known as: SEROQUEL  Take 1 tablet (25 mg total) by mouth every evening.     silver sulfADIAZINE 1% 1 % cream  Commonly known as: SILVADENE  Apply 1 application topically once daily. Apply to affected area     tamsulosin 0.4 mg Cap  Commonly known as: FLOMAX  Take 1 capsule (0.4 mg total) by mouth once daily.     TRUE METRIX GLUCOSE METER Misc  Generic drug: blood-glucose meter  AS DIRECTED     TRUE METRIX GLUCOSE TEST STRIP Strp  Generic drug: blood sugar diagnostic  Twice daily blood sugar checks            Indwelling Lines/Drains at time of discharge:   Lines/Drains/Airways     None                 Time spent on the discharge of patient: 35 minutes         Page Li NP  Department of Hospital Medicine  [facility]

## 2022-12-10 NOTE — PROGRESS NOTES
[facility] Telemetry  Cardiology  Progress Note    Patient Name: Carlos Cifuentes Jr.  MRN: [id number]  Admission Date: 12/8/2022  Hospital Length of Stay: 0 days  Code Status: Full Code   Attending Physician: Elida Eller MD   Primary Care Physician: Miguel Lux MD  Expected Discharge Date: 12/10/2022  Principal Problem:Acute combined systolic and diastolic heart failure    Subjective:       Interval History:  Breathing back to baseline    Review of Systems   Constitutional: Positive for malaise/fatigue.   HENT: Negative.     Eyes: Negative.    Respiratory: Negative.     Endocrine: Negative.    Hematologic/Lymphatic: Negative.    Skin: Negative.    Musculoskeletal: Negative.    Gastrointestinal: Negative.    Genitourinary: Negative.    Neurological: Negative.    Psychiatric/Behavioral: Negative.     Allergic/Immunologic: Negative.    Objective:     Vital Signs (Most Recent):  Temp: 97.7 °F (36.5 °C) (12/10/22 1125)  Pulse: 72 (12/10/22 1125)  Resp: 19 (12/10/22 1125)  BP: (!) 144/66 (12/10/22 1125)  SpO2: (!) 93 % (12/10/22 1125)   Vital Signs (24h Range):  Temp:  [97.6 °F (36.4 °C)-98.7 °F (37.1 °C)] 97.7 °F (36.5 °C)  Pulse:  [65-78] 72  Resp:  [17-20] 19  SpO2:  [93 %-97 %] 93 %  BP: (111-144)/(57-79) 144/66     Weight: 102.5 kg (225 lb 15.5 oz)  Body mass index is 33.37 kg/m².     SpO2: (!) 93 %  (RETIRED) O2 Device (Oxygen Therapy): nasal cannula      Intake/Output Summary (Last 24 hours) at 12/10/2022 1348  Last data filed at 12/10/2022 1152  Gross per 24 hour   Intake 840 ml   Output 2150 ml   Net -1310 ml       Lines/Drains/Airways       Peripheral Intravenous Line  Duration                  Peripheral IV - Single Lumen 12/08/22 1213 20 G Right Antecubital 2 days                    Physical Exam  Vitals reviewed.   Constitutional:       General: He is not in acute distress.     Appearance: He is obese. He is not diaphoretic.   Neck:      Vascular: No carotid bruit or JVD.   Cardiovascular:      Rate  and Rhythm: Normal rate and regular rhythm.      Pulses: Normal pulses.   Abdominal:      General: Bowel sounds are normal.      Palpations: Abdomen is soft.      Tenderness: There is no abdominal tenderness.      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      Left Lower Extremity: Left leg is amputated below knee.   Neurological:      Mental Status: He is alert and oriented to person, place, and time.   Psychiatric:         Speech: Speech normal.         Behavior: Behavior normal.       Significant Labs: BMP:   Recent Labs   Lab 12/08/22 1657 12/09/22  0510 12/10/22  0358   GLU  --  143* 177*   NA  --  135* 137   K  --  4.3 3.9   CL  --  99 99   CO2  --  28 28   BUN  --  33* 31*   CREATININE  --  1.4 1.3   CALCIUM  --  9.3 8.9   MG 1.7  --   --    , CMP   Recent Labs   Lab 12/09/22  0510 12/10/22  0358   * 137   K 4.3 3.9   CL 99 99   CO2 28 28   * 177*   BUN 33* 31*   CREATININE 1.4 1.3   CALCIUM 9.3 8.9   ANIONGAP 8 10   , CBC No results for input(s): WBC, HGB, HCT, PLT in the last 48 hours., INR No results for input(s): INR, PROTIME in the last 48 hours., Lipid Panel No results for input(s): CHOL, HDL, LDLCALC, TRIG, CHOLHDL in the last 48 hours., Troponin   Recent Labs   Lab 12/08/22 1657 12/08/22  2140   TROPONINI 0.148* 0.191*   , and All pertinent lab results from the last 24 hours have been reviewed.    Significant Imaging: Echocardiogram: Transthoracic echo (TTE) complete (Cupid Only):   Results for orders placed or performed during the hospital encounter of 12/08/22   Echo   Result Value Ref Range    BSA 2.2 m2    TDI SEPTAL 0.06 m/s    LV LATERAL E/E' RATIO 20.33 m/s    LV SEPTAL E/E' RATIO 20.33 m/s    LA WIDTH 5.00 cm    Left Ventricular Outflow Tract Mean Velocity 0.46 cm/s    Left Ventricular Outflow Tract Mean Gradient 0.96 mmHg    TDI LATERAL 0.06 m/s    LVIDd 5.55 3.5 - 6.0 cm    IVS 0.97 0.6 - 1.1 cm     Posterior Wall 1.19 (A) 0.6 - 1.1 cm    Ao root annulus 1.90 cm    LVIDs 4.00 2.1 - 4.0 cm    FS 28 28 - 44 %    LA volume 121.84 cm3    Sinus 3.20 cm    STJ 2.62 cm    LV mass 239.68 g    LA size 5.49 cm    RVDD 3.47 cm    TAPSE 2.30 cm    Left Ventricle Relative Wall Thickness 0.43 cm    AV Velocity Ratio 0.55     E/A ratio 1.94     Mean e' 0.06 m/s    E wave deceleration time 192.59 msec    IVRT 100.35 msec    LVOT diameter 1.93 cm    LVOT area 2.9 cm2    LVOT peak hector 0.72 m/s    LVOT peak VTI 13.40 cm    Ao peak hector 1.31 m/s    LVOT stroke volume 39.18 cm3    AV peak gradient 7 mmHg    E/E' ratio 20.33 m/s    MV Peak E Hector 1.22 m/s    TR Max Hector 1.94 m/s    MV Peak A Hector 0.63 m/s    LV Systolic Volume 69.97 mL    LV Systolic Volume Index 32.5 mL/m2    LV Diastolic Volume 150.43 mL    LV Diastolic Volume Index 69.97 mL/m2    LA Volume Index 56.7 mL/m2    LV Mass Index 111 g/m2    RA Major Axis 4.77 cm    Left Atrium Minor Axis 4.50 cm    Left Atrium Major Axis 6.22 cm    Triscuspid Valve Regurgitation Peak Gradient 15 mmHg    RA Width 3.50 cm    Right Atrial Pressure (from IVC) 3 mmHg    EF 45 %    TV rest pulmonary artery pressure 18 mmHg    Narrative    · Technically difficult study.  · The left ventricle is normal in size with concentric remodeling and   mildly decreased systolic function.  · The estimated ejection fraction is 45%.  · Severe left atrial enlargement.  · Grade II left ventricular diastolic dysfunction.  · Normal right ventricular size with normal right ventricular systolic   function.  · Normal central venous pressure (3 mmHg).  · The estimated PA systolic pressure is 18 mmHg.  · There is no pulmonary hypertension.        Assessment and Plan:     Brief HPI:     * Acute combined systolic and diastolic heart failure  12/08/2022-ejection fraction roughly 40-45%.  Evidence of volume overload on exam.  Chest x-ray shows pulmonary edema.  Elevated BNP.  Lasix IV.  Monitor creatinine.    12/9:   Breathing better.  Continue IV Lasix.  Transition to oral when close to euvolemia.  Monitor creatinine    12/10:  Breathing back to baseline.    Hyperlipidemia associated with type 2 diabetes mellitus   12/08/2022-continue statin.    History of non-ST elevation myocardial infarction (NSTEMI)  recent non ST elevation myocardial infarction.  Cardiac catheterization revealed distal disease, multivessel.  Medical management.  Continue aspirin/Plavix.  Currently chest pain-free    Primary hypertension  Titrate as needed    CKD stage 3 due to type 2 diabetes mellitus        Diabetic polyneuropathy associated with type 2 diabetes mellitus            VTE Risk Mitigation (From admission, onward)         Ordered     enoxaparin injection 40 mg  Daily         12/08/22 1449     IP VTE HIGH RISK PATIENT  Once         12/08/22 1449     Place sequential compression device  Until discontinued         12/08/22 1449                Audi Talley MD  Cardiology  [facility] - Telemetry      Will sign off.  Follow-up in Cardiology Clinic

## 2022-12-10 NOTE — PLAN OF CARE
No significant events overnight.    Problem: Skin Injury Risk Increased  Goal: Skin Health and Integrity  Outcome: Ongoing, Progressing     Problem: Adult Inpatient Plan of Care  Goal: Plan of Care Review  Outcome: Ongoing, Progressing  Goal: Patient-Specific Goal (Individualized)  Outcome: Ongoing, Progressing  Goal: Absence of Hospital-Acquired Illness or Injury  Outcome: Ongoing, Progressing  Goal: Optimal Comfort and Wellbeing  Outcome: Ongoing, Progressing  Goal: Readiness for Transition of Care  Outcome: Ongoing, Progressing     Problem: Fall Injury Risk  Goal: Absence of Fall and Fall-Related Injury  Outcome: Ongoing, Progressing     Problem: Diabetes Comorbidity  Goal: Blood Glucose Level Within Targeted Range  Outcome: Ongoing, Progressing

## 2022-12-10 NOTE — PLAN OF CARE
[place] - Telemetry  Discharge Final Note    Primary Care Provider: Miguel Lux MD    Expected Discharge Date: 12/10/2022    Final Discharge Note (most recent)       Final Note - 12/10/22 0848          Final Note    Assessment Type Final Discharge Note     Anticipated Discharge Disposition Home or Self Care     What phone number can be called within the next 1-3 days to see how you are doing after discharge? [phone]     Hospital Resources/Appts/Education Provided Appointments scheduled and added to AVS;Appointments scheduled by Navigator/Coordinator        Post-Acute Status    Discharge Delays None known at this time                     Important Message from Medicare             Contact Info       Miguel Lux MD   Specialty: Internal Medicine, Oncology, Hematology and Oncology   Relationship: PCP - General    [address] [place] [address]  [address]  [facility] [address]   Phone: [phone]       Next Steps: Schedule an appointment as soon as possible for a visit in 1 week(s)          [facility] secure chat nurse Melinda that patient cleared from case management standpoint.       Patient qualified for home oxygen.   [facility] [place]'s home O2. [facility] contact respiratory for delivery of home O2.

## 2022-12-10 NOTE — PROGRESS NOTES
Monitored Q1-2 hrs this shift. No signs of distress. Ambulatory sats charted earlier this shift. Report given to oncoming shift.

## 2022-12-10 NOTE — NURSING
Patient is discharged, on 2L nc, no distress noted. Tele and IV removed with tip intact. Instructions reviewed with patient, questions asked and answered. Understanding verbalized. O2 tank delivered at bedside. Bed in lowest position, wheels locked, call light in reach. Sitting up in bed, Waiting for ride home.

## 2022-12-10 NOTE — NURSING
Testing for Home O2    O2 Sats on RA at rest= 95%    O2 sats on RA with exercise= 85%    O2 sats on 2L O2 exercise = 93%

## 2022-12-13 ENCOUNTER — PES CALL (OUTPATIENT)
Dept: ADMINISTRATIVE | Facility: CLINIC | Age: 62
End: 2022-12-13
Payer: MEDICAID

## 2022-12-15 ENCOUNTER — PES CALL (OUTPATIENT)
Dept: ADMINISTRATIVE | Facility: CLINIC | Age: 62
End: 2022-12-15
Payer: MEDICAID

## 2022-12-20 NOTE — PHYSICIAN QUERY
PT Name: Carlos Cifuentes Jr.  MR #: [id number]     DOCUMENTATION CLARIFICATION      CDS/: Memo Tesfaye Jr RN CCDS              Contact information:[email or web address]  This form is a permanent document in the medical record.    Query Date: December 20, 2022    By submitting this query, we are merely seeking further clarification of documentation to reflect the severity of illness of your patient. Please utilize your independent clinical judgment when addressing the question(s) below.     The Medical Record contains the following:   Indicators   Supporting Clinical Findings Location in Medical Record   x Chest Pain, Angina His chest pain recurred tonight Monday 11/28/22, he called his sister to tell her that he felt bad and was short of breath 11/29 H&P    Coronary Artery Disease     x EKG Sinus tachycardia with 1st degree A-V block   ST and T wave abnormality, consider inferior ischemia   ST and T wave abnormality, consider anterolateral ischemia   Abnormal ECG    11/28 EKG Report   x Troponin 4.313-->3.528-->3.615   11/28-11/29 Labs   x Echo Results Echo  Interpretation Summary  · The left ventricle is normal in size with concentric hypertrophy and mildly decreased systolic function.  · The estimated ejection fraction is 40%.  · Grade I left ventricular diastolic dysfunction.  · Normal right ventricular size with normal right ventricular systolic function.  · TDS   11/30 Cardiology Progress Note   x Angiography 11/30/22 [facility] with diffuse distal 3 vessel disease. Continue medical Rx   11/30 Cardiology Progress Note   x Documentation of acute cardiac condition Patient with NSTEMI and hypoxemic respiratory failure requiring intubation    Acute combined systolic and diastolic congestive heart failure  EF 40% on echo. [facility] with diffuse 3 vessel CAD. Diuresis and afterload reduction as tolerated   11/29 Pulmonology Consult       11/30 Cardiology Progress Note   x Medication/Treatment He was started on NSTEMI  protocol with heparin drip and given fluids   11/29 Pulmonology Consult    x Other Acute hypoxemic respiratory failure 11/29 H&P      Provider, Specify the                       NSTEMI              type    [  x ] NSTEMI,Type Unspecified    [   ] NSTEMI/Myocardial Infarction Type 2 due to Acute Hypoxic Respiratory Failure   [   ] NSTEMI/Myocardial Infarction Type 2 due to (please specify):    [   ] Other NSTEMI Type (please specify):   [   ] Clinically Undetermined         Please document in your progress notes daily for the duration of treatment until resolved, and include in your discharge summary.  Form No. [id number]

## 2023-04-10 ENCOUNTER — HOSPITAL ENCOUNTER (OUTPATIENT)
Facility: HOSPITAL | Age: 63
Discharge: HOME | End: 2023-04-11
Attending: EMERGENCY MEDICINE | Admitting: EMERGENCY MEDICINE
Payer: MEDICAID

## 2023-04-10 DIAGNOSIS — I25.2 HISTORY OF NON-ST ELEVATION MYOCARDIAL INFARCTION (NSTEMI): ICD-10-CM

## 2023-04-10 DIAGNOSIS — I50.43 ACUTE ON CHRONIC COMBINED SYSTOLIC AND DIASTOLIC HEART FAILURE: ICD-10-CM

## 2023-04-10 DIAGNOSIS — Z89.511 S/P BILATERAL BKA (BELOW KNEE AMPUTATION): Primary | ICD-10-CM

## 2023-04-10 DIAGNOSIS — Z89.512 S/P BILATERAL BKA (BELOW KNEE AMPUTATION): Primary | ICD-10-CM

## 2023-04-10 DIAGNOSIS — N18.30 CKD STAGE 3 DUE TO TYPE 2 DIABETES MELLITUS: Chronic | ICD-10-CM

## 2023-04-10 DIAGNOSIS — R07.9 CHEST PAIN: ICD-10-CM

## 2023-04-10 DIAGNOSIS — I50.9 ACUTE ON CHRONIC CONGESTIVE HEART FAILURE, UNSPECIFIED HEART FAILURE TYPE: ICD-10-CM

## 2023-04-10 DIAGNOSIS — E11.22 CKD STAGE 3 DUE TO TYPE 2 DIABETES MELLITUS: Chronic | ICD-10-CM

## 2023-04-10 DIAGNOSIS — R06.02 SHORTNESS OF BREATH: ICD-10-CM

## 2023-04-10 PROBLEM — E11.65 HYPERGLYCEMIA DUE TO DIABETES MELLITUS: Status: ACTIVE | Noted: 2023-04-10

## 2023-04-10 PROBLEM — J96.21 ACUTE ON CHRONIC RESPIRATORY FAILURE WITH HYPOXIA: Status: ACTIVE | Noted: 2023-04-10

## 2023-04-10 PROBLEM — R53.81 PHYSICAL DEBILITY: Status: ACTIVE | Noted: 2023-04-10

## 2023-04-10 LAB
ALBUMIN SERPL BCP-MCNC: 3.5 G/DL (ref 3.5–5.2)
ALLENS TEST: ABNORMAL
ALP SERPL-CCNC: 96 U/L (ref 55–135)
ALT SERPL W/O P-5'-P-CCNC: 26 U/L (ref 10–44)
ANION GAP SERPL CALC-SCNC: 14 MMOL/L (ref 8–16)
AST SERPL-CCNC: 21 U/L (ref 10–40)
B-OH-BUTYR BLD STRIP-SCNC: 0.3 MMOL/L (ref 0–0.5)
BASOPHILS # BLD AUTO: 0.11 K/UL (ref 0–0.2)
BASOPHILS NFR BLD: 1 % (ref 0–1.9)
BILIRUB SERPL-MCNC: 0.5 MG/DL (ref 0.1–1)
BNP SERPL-MCNC: 129 PG/ML (ref 0–99)
BUN SERPL-MCNC: 30 MG/DL (ref 8–23)
CALCIUM SERPL-MCNC: 9.6 MG/DL (ref 8.7–10.5)
CHLORIDE SERPL-SCNC: 99 MMOL/L (ref 95–110)
CO2 SERPL-SCNC: 18 MMOL/L (ref 23–29)
CREAT SERPL-MCNC: 1.4 MG/DL (ref 0.5–1.4)
DELSYS: ABNORMAL
DIFFERENTIAL METHOD: ABNORMAL
EOSINOPHIL # BLD AUTO: 0.4 K/UL (ref 0–0.5)
EOSINOPHIL NFR BLD: 3.6 % (ref 0–8)
EP: 8
ERYTHROCYTE [DISTWIDTH] IN BLOOD BY AUTOMATED COUNT: 16.6 % (ref 11.5–14.5)
ERYTHROCYTE [SEDIMENTATION RATE] IN BLOOD BY WESTERGREN METHOD: 14 MM/H
EST. GFR  (NO RACE VARIABLE): 57 ML/MIN/1.73 M^2
FIO2: 100
GLUCOSE SERPL-MCNC: 438 MG/DL (ref 70–110)
HCO3 UR-SCNC: 24.6 MMOL/L (ref 24–28)
HCT VFR BLD AUTO: 40.2 % (ref 40–54)
HGB BLD-MCNC: 13.7 G/DL (ref 14–18)
IMM GRANULOCYTES # BLD AUTO: 0.12 K/UL (ref 0–0.04)
IMM GRANULOCYTES NFR BLD AUTO: 1.1 % (ref 0–0.5)
IP: 18
LYMPHOCYTES # BLD AUTO: 3.5 K/UL (ref 1–4.8)
LYMPHOCYTES NFR BLD: 31.6 % (ref 18–48)
MCH RBC QN AUTO: 33.3 PG (ref 27–31)
MCHC RBC AUTO-ENTMCNC: 34.1 G/DL (ref 32–36)
MCV RBC AUTO: 98 FL (ref 82–98)
MODE: ABNORMAL
MONOCYTES # BLD AUTO: 0.9 K/UL (ref 0.3–1)
MONOCYTES NFR BLD: 8.4 % (ref 4–15)
NEUTROPHILS # BLD AUTO: 6.1 K/UL (ref 1.8–7.7)
NEUTROPHILS NFR BLD: 54.3 % (ref 38–73)
NRBC BLD-RTO: 0 /100 WBC
PCO2 BLDA: 66 MMHG (ref 35–45)
PH SMN: 7.18 [PH] (ref 7.35–7.45)
PLATELET # BLD AUTO: 203 K/UL (ref 150–450)
PMV BLD AUTO: 9.8 FL (ref 9.2–12.9)
PO2 BLDA: 25 MMHG (ref 40–60)
POC BE: -5 MMOL/L
POC SATURATED O2: 31 % (ref 95–100)
POC TCO2: 27 MMOL/L (ref 24–29)
POCT GLUCOSE: 389 MG/DL (ref 70–110)
POCT GLUCOSE: 398 MG/DL (ref 70–110)
POCT GLUCOSE: 412 MG/DL (ref 70–110)
POCT GLUCOSE: 413 MG/DL (ref 70–110)
POCT GLUCOSE: 450 MG/DL (ref 70–110)
POTASSIUM SERPL-SCNC: 4.1 MMOL/L (ref 3.5–5.1)
PROT SERPL-MCNC: 7.6 G/DL (ref 6–8.4)
RBC # BLD AUTO: 4.12 M/UL (ref 4.6–6.2)
SAMPLE: ABNORMAL
SITE: ABNORMAL
SODIUM SERPL-SCNC: 131 MMOL/L (ref 136–145)
SPONT RATE: 24
TROPONIN I SERPL DL<=0.01 NG/ML-MCNC: <0.006 NG/ML (ref 0–0.03)
WBC # BLD AUTO: 11.18 K/UL (ref 3.9–12.7)

## 2023-04-10 PROCEDURE — 84484 ASSAY OF TROPONIN QUANT: CPT | Performed by: EMERGENCY MEDICINE

## 2023-04-10 PROCEDURE — 96374 THER/PROPH/DIAG INJ IV PUSH: CPT

## 2023-04-10 PROCEDURE — 99900035 HC TECH TIME PER 15 MIN (STAT)

## 2023-04-10 PROCEDURE — 36415 COLL VENOUS BLD VENIPUNCTURE: CPT

## 2023-04-10 PROCEDURE — 96372 THER/PROPH/DIAG INJ SC/IM: CPT | Mod: 59

## 2023-04-10 PROCEDURE — G0378 HOSPITAL OBSERVATION PER HR: HCPCS

## 2023-04-10 PROCEDURE — 63600175 PHARM REV CODE 636 W HCPCS

## 2023-04-10 PROCEDURE — 82803 BLOOD GASES ANY COMBINATION: CPT

## 2023-04-10 PROCEDURE — 82962 GLUCOSE BLOOD TEST: CPT

## 2023-04-10 PROCEDURE — 82010 KETONE BODYS QUAN: CPT | Performed by: EMERGENCY MEDICINE

## 2023-04-10 PROCEDURE — 80053 COMPREHEN METABOLIC PANEL: CPT | Performed by: EMERGENCY MEDICINE

## 2023-04-10 PROCEDURE — 85025 COMPLETE CBC W/AUTO DIFF WBC: CPT | Performed by: EMERGENCY MEDICINE

## 2023-04-10 PROCEDURE — 63600175 PHARM REV CODE 636 W HCPCS: Performed by: EMERGENCY MEDICINE

## 2023-04-10 PROCEDURE — 25000003 PHARM REV CODE 250

## 2023-04-10 PROCEDURE — 27000190 HC CPAP FULL FACE MASK W/VALVE

## 2023-04-10 PROCEDURE — 96375 TX/PRO/DX INJ NEW DRUG ADDON: CPT

## 2023-04-10 PROCEDURE — 83036 HEMOGLOBIN GLYCOSYLATED A1C: CPT

## 2023-04-10 PROCEDURE — 96376 TX/PRO/DX INJ SAME DRUG ADON: CPT

## 2023-04-10 PROCEDURE — 99285 EMERGENCY DEPT VISIT HI MDM: CPT | Mod: 25

## 2023-04-10 PROCEDURE — 93005 ELECTROCARDIOGRAM TRACING: CPT

## 2023-04-10 PROCEDURE — 93010 EKG 12-LEAD: ICD-10-PCS | Mod: ,,, | Performed by: INTERNAL MEDICINE

## 2023-04-10 PROCEDURE — 94760 N-INVAS EAR/PLS OXIMETRY 1: CPT

## 2023-04-10 PROCEDURE — 93010 ELECTROCARDIOGRAM REPORT: CPT | Mod: ,,, | Performed by: INTERNAL MEDICINE

## 2023-04-10 PROCEDURE — 94660 CPAP INITIATION&MGMT: CPT

## 2023-04-10 PROCEDURE — 83880 ASSAY OF NATRIURETIC PEPTIDE: CPT | Performed by: EMERGENCY MEDICINE

## 2023-04-10 PROCEDURE — 25000003 PHARM REV CODE 250: Performed by: EMERGENCY MEDICINE

## 2023-04-10 RX ORDER — IBUPROFEN 200 MG
16 TABLET ORAL
Status: DISCONTINUED | OUTPATIENT
Start: 2023-04-10 | End: 2023-04-11 | Stop reason: HOSPADM

## 2023-04-10 RX ORDER — LOSARTAN POTASSIUM 25 MG/1
25 TABLET ORAL
Status: COMPLETED | OUTPATIENT
Start: 2023-04-10 | End: 2023-04-10

## 2023-04-10 RX ORDER — IBUPROFEN 600 MG/1
600 TABLET ORAL EVERY 6 HOURS PRN
Status: DISCONTINUED | OUTPATIENT
Start: 2023-04-10 | End: 2023-04-11 | Stop reason: HOSPADM

## 2023-04-10 RX ORDER — GLUCAGON 1 MG
1 KIT INJECTION
Status: DISCONTINUED | OUTPATIENT
Start: 2023-04-10 | End: 2023-04-11 | Stop reason: HOSPADM

## 2023-04-10 RX ORDER — LABETALOL HYDROCHLORIDE 5 MG/ML
10 INJECTION, SOLUTION INTRAVENOUS
Status: COMPLETED | OUTPATIENT
Start: 2023-04-10 | End: 2023-04-10

## 2023-04-10 RX ORDER — INSULIN LISPRO 100 [IU]/ML
INJECTION, SOLUTION INTRAVENOUS; SUBCUTANEOUS
Status: ON HOLD | COMMUNITY
Start: 2023-03-17 | End: 2023-04-11 | Stop reason: SDUPTHER

## 2023-04-10 RX ORDER — GABAPENTIN 300 MG/1
300 CAPSULE ORAL
Status: COMPLETED | OUTPATIENT
Start: 2023-04-10 | End: 2023-04-10

## 2023-04-10 RX ORDER — LOSARTAN POTASSIUM 25 MG/1
25 TABLET ORAL DAILY
COMMUNITY

## 2023-04-10 RX ORDER — LOSARTAN POTASSIUM 25 MG/1
25 TABLET ORAL DAILY
Status: DISCONTINUED | OUTPATIENT
Start: 2023-04-11 | End: 2023-04-11 | Stop reason: HOSPADM

## 2023-04-10 RX ORDER — PANTOPRAZOLE SODIUM 40 MG/1
40 TABLET, DELAYED RELEASE ORAL DAILY
Status: DISCONTINUED | OUTPATIENT
Start: 2023-04-10 | End: 2023-04-11 | Stop reason: HOSPADM

## 2023-04-10 RX ORDER — NALOXONE HCL 0.4 MG/ML
0.02 VIAL (ML) INJECTION
Status: DISCONTINUED | OUTPATIENT
Start: 2023-04-10 | End: 2023-04-11 | Stop reason: HOSPADM

## 2023-04-10 RX ORDER — ATORVASTATIN CALCIUM 40 MG/1
40 TABLET, FILM COATED ORAL DAILY
Status: DISCONTINUED | OUTPATIENT
Start: 2023-04-10 | End: 2023-04-11 | Stop reason: HOSPADM

## 2023-04-10 RX ORDER — INSULIN GLARGINE 100 [IU]/ML
10 INJECTION, SOLUTION SUBCUTANEOUS NIGHTLY
COMMUNITY
Start: 2023-03-26 | End: 2023-04-10

## 2023-04-10 RX ORDER — INSULIN ASPART 100 [IU]/ML
1-10 INJECTION, SOLUTION INTRAVENOUS; SUBCUTANEOUS
Status: DISCONTINUED | OUTPATIENT
Start: 2023-04-10 | End: 2023-04-11 | Stop reason: HOSPADM

## 2023-04-10 RX ORDER — PROCHLORPERAZINE EDISYLATE 5 MG/ML
5 INJECTION INTRAMUSCULAR; INTRAVENOUS EVERY 6 HOURS PRN
Status: DISCONTINUED | OUTPATIENT
Start: 2023-04-10 | End: 2023-04-11 | Stop reason: HOSPADM

## 2023-04-10 RX ORDER — ENOXAPARIN SODIUM 100 MG/ML
40 INJECTION SUBCUTANEOUS EVERY 24 HOURS
Status: DISCONTINUED | OUTPATIENT
Start: 2023-04-10 | End: 2023-04-11 | Stop reason: HOSPADM

## 2023-04-10 RX ORDER — FUROSEMIDE 10 MG/ML
40 INJECTION INTRAMUSCULAR; INTRAVENOUS
Status: DISCONTINUED | OUTPATIENT
Start: 2023-04-10 | End: 2023-04-11 | Stop reason: HOSPADM

## 2023-04-10 RX ORDER — FUROSEMIDE 10 MG/ML
40 INJECTION INTRAMUSCULAR; INTRAVENOUS
Status: COMPLETED | OUTPATIENT
Start: 2023-04-10 | End: 2023-04-10

## 2023-04-10 RX ORDER — LIDOCAINE 50 MG/G
1 PATCH TOPICAL
Status: DISCONTINUED | OUTPATIENT
Start: 2023-04-10 | End: 2023-04-11 | Stop reason: HOSPADM

## 2023-04-10 RX ORDER — POLYETHYLENE GLYCOL 3350 17 G/17G
17 POWDER, FOR SOLUTION ORAL DAILY
Status: DISCONTINUED | OUTPATIENT
Start: 2023-04-10 | End: 2023-04-11 | Stop reason: HOSPADM

## 2023-04-10 RX ORDER — MAG HYDROX/ALUMINUM HYD/SIMETH 200-200-20
30 SUSPENSION, ORAL (FINAL DOSE FORM) ORAL 4 TIMES DAILY PRN
Status: DISCONTINUED | OUTPATIENT
Start: 2023-04-10 | End: 2023-04-11 | Stop reason: HOSPADM

## 2023-04-10 RX ORDER — TALC
6 POWDER (GRAM) TOPICAL NIGHTLY PRN
Status: DISCONTINUED | OUTPATIENT
Start: 2023-04-10 | End: 2023-04-11 | Stop reason: HOSPADM

## 2023-04-10 RX ORDER — IBUPROFEN 200 MG
24 TABLET ORAL
Status: DISCONTINUED | OUTPATIENT
Start: 2023-04-10 | End: 2023-04-11 | Stop reason: HOSPADM

## 2023-04-10 RX ORDER — CLOPIDOGREL BISULFATE 75 MG/1
75 TABLET ORAL DAILY
Status: DISCONTINUED | OUTPATIENT
Start: 2023-04-10 | End: 2023-04-11 | Stop reason: HOSPADM

## 2023-04-10 RX ORDER — ONDANSETRON 8 MG/1
8 TABLET, ORALLY DISINTEGRATING ORAL EVERY 8 HOURS PRN
Status: DISCONTINUED | OUTPATIENT
Start: 2023-04-10 | End: 2023-04-11 | Stop reason: HOSPADM

## 2023-04-10 RX ORDER — NAPROXEN SODIUM 220 MG/1
81 TABLET, FILM COATED ORAL DAILY
Status: DISCONTINUED | OUTPATIENT
Start: 2023-04-10 | End: 2023-04-11 | Stop reason: HOSPADM

## 2023-04-10 RX ADMIN — ENOXAPARIN SODIUM 40 MG: 40 INJECTION SUBCUTANEOUS at 04:04

## 2023-04-10 RX ADMIN — ATORVASTATIN CALCIUM 40 MG: 40 TABLET, FILM COATED ORAL at 04:04

## 2023-04-10 RX ADMIN — CLOPIDOGREL BISULFATE 75 MG: 75 TABLET ORAL at 04:04

## 2023-04-10 RX ADMIN — LABETALOL HYDROCHLORIDE 10 MG: 5 INJECTION INTRAVENOUS at 10:04

## 2023-04-10 RX ADMIN — INSULIN HUMAN 10 UNITS: 100 INJECTION, SOLUTION PARENTERAL at 10:04

## 2023-04-10 RX ADMIN — INSULIN HUMAN 6 UNITS: 100 INJECTION, SOLUTION PARENTERAL at 08:04

## 2023-04-10 RX ADMIN — GABAPENTIN 300 MG: 300 CAPSULE ORAL at 11:04

## 2023-04-10 RX ADMIN — LIDOCAINE 1 PATCH: 50 PATCH TOPICAL at 11:04

## 2023-04-10 RX ADMIN — FUROSEMIDE 40 MG: 10 INJECTION, SOLUTION INTRAMUSCULAR; INTRAVENOUS at 04:04

## 2023-04-10 RX ADMIN — LOSARTAN POTASSIUM 25 MG: 25 TABLET, FILM COATED ORAL at 10:04

## 2023-04-10 RX ADMIN — INSULIN DETEMIR 17 UNITS: 100 INJECTION, SOLUTION SUBCUTANEOUS at 09:04

## 2023-04-10 RX ADMIN — PANTOPRAZOLE SODIUM 40 MG: 40 TABLET, DELAYED RELEASE ORAL at 04:04

## 2023-04-10 RX ADMIN — ASPIRIN 81 MG CHEWABLE TABLET 81 MG: 81 TABLET CHEWABLE at 04:04

## 2023-04-10 RX ADMIN — FUROSEMIDE 40 MG: 10 INJECTION, SOLUTION INTRAMUSCULAR; INTRAVENOUS at 07:04

## 2023-04-10 RX ADMIN — INSULIN ASPART 5 UNITS: 100 INJECTION, SOLUTION INTRAVENOUS; SUBCUTANEOUS at 09:04

## 2023-04-10 NOTE — NURSING
Patient arrived to the floor from the ED.  Patient noted AAOX4, denies chest pain or SOB at the present time.  Noted on 2L/NC, sats 95%.  Tele monitor applied.  Acclimated to the room and call bell use.  Will continue to monitor.

## 2023-04-10 NOTE — ASSESSMENT & PLAN NOTE
Patient with Hypoxic Respiratory failure which is Acute.  he is not on home oxygen. Supplemental oxygen was provided and noted- Oxygen Concentration (%):  [80] 80 and is requiring supplementation with with 4-5L oxygen via NC.     Signs/symptoms of respiratory failure include- increased work of breathing and use of accessory muscles. Contributing diagnoses includes - Obesity Hypoventilation and Anxiety Labs and images were reviewed. Patient Has recent ABG, which has been reviewed. Will treat underlying causes and adjust management of respiratory failure as follows-   Wean off of NC as tolerated  Continue with Oxygen supplementation  Home Oxygen Qualifying walk test.

## 2023-04-10 NOTE — H&P
[facility] Medicine  History & Physical    Patient Name: Cralos Cifuentes Jr.  MRN: [id number]  Patient Class: OP- Observation  Admission Date: 4/10/2023  Attending Physician: Robert Partida MD   Primary Care Provider: Miguel Lux MD         Patient information was obtained from patient, relative(s), past medical records and ER records.     Subjective:     Principal Problem:Hyperglycemia due to diabetes mellitus    Chief Complaint:   Chief Complaint   Patient presents with    Shortness of Breath        HPI: Carlos Cifuentes Jr. is a 62 y.o. male with PMHx of hypertension, diabetes, CHF presents to the ED via EMS complaining of shortness of breath 1 hour prior to arrival.  Patient states that this morning he woke up to a loud noise outside his house, which caused him to have a panic attack.  He states that following that he was not able to catch his breath and activated EMS.  Patient endorses compliance with his home medications, but states he does not know the names or the doses of his medicines because his sister is the one who helps him with his medications.  Sister has stated that patient is highly noncompliant with his medication regimen.  He endorses a headache and diarrhea but denies any kind of changes to his vision, chest pain, shortness of breath, abdominal pain, dysuria, hematuria, hematochezia or any other associated symptoms.  Patient denies being on oxygen at home throughout the day, but endorses 2 L of oxygen use via nasal cannula at nighttime.    In the ED:  Patient is afebrile, without leukocytosis, glucose:  438, , troponin negative, beta hydroxybutyrate 0.3, ABG: PH 7.18, pCO2 66, PO2 25.  Patient was initially placed on continuous BiPAP, and was weaned to nasal cannula.  Patient is noted to have oxygen saturation of 95-97% via 2 L NC.    Carlos Cifuentes Jr. Will be placed under observation for management of hyperglycemia.      Past Medical History:   Diagnosis Date     Arthritis     Back pain     Bulging lumbar disc     C. difficile diarrhea     Chronic back pain 10/14/2014    Cigarette smoker     Diabetes mellitus     Diabetes mellitus type II     DM (diabetes mellitus), type 2, uncontrolled 03/12/2013    Hepatitis C 07/03/2013    MSSA (methicillin susceptible Staphylococcus aureus) septicemia     Neuromuscular disorder     Neuropathy     NSTEMI (non-ST elevated myocardial infarction) 11/28/2022    Staph aureus infection     Status post bilateral below knee amputation        Past Surgical History:   Procedure Laterality Date    ANGIOGRAPHY OF LOWER EXTREMITY Left 06/14/2018    Procedure: Angiogram LEFT LOWER Extremity with US guided access from right side;  Surgeon: Sony Srinivasan MD;  Location: [facility] OR [address] [place] [address];  Service: Peripheral Vascular;  Laterality: Left;  local: 16ml  contrast: 20ml   fluoro: 2.8  427.73mGy    APPENDECTOMY      BELOW KNEE AMPUTATION OF LOWER EXTREMITY Bilateral     CORONARY ANGIOGRAPHY N/A 11/29/2022    Procedure: ANGIOGRAM, CORONARY ARTERY;  Surgeon: Bobby Harding MD;  Location: [facility] CATH LAB;  Service: Cardiology;  Laterality: N/A;    JOINT REPLACEMENT      left knee surgery      left leg surgery      broken bone    LEG SURGERY  right     Right arm boil      Right great toe amputation      TOE AMPUTATION Right        Review of patient's allergies indicates:   Allergen Reactions    Codeine Rash    Lyrica [pregabalin]      Feet turned Red    Vicodin [hydrocodone-acetaminophen] Rash       No current facility-administered medications on file prior to encounter.     Current Outpatient Medications on File Prior to Encounter   Medication Sig    albuterol (VENTOLIN HFA) 90 mcg/actuation inhaler Inhale 2 puffs into the lungs every 6 (six) hours as needed for Wheezing. Rescue    aspirin 81 MG Chew Take 1 tablet (81 mg total) by mouth once daily.    atorvastatin (LIPITOR) 40 MG tablet Take 1 tablet (40 mg total) by mouth  once daily.    clopidogreL (PLAVIX) 75 mg tablet Take 1 tablet (75 mg total) by mouth once daily.    furosemide (LASIX) 40 MG tablet Take 1 tablet (40 mg total) by mouth 2 (two) times daily.    HUMALOG U-100 INSULIN 100 unit/mL injection SMARTSI-25 Unit(s) SUB-Q 3 Times Daily    LANTUS U-100 INSULIN 100 unit/mL injection INJECT TEN UNITS INTO SKIN AT BEDTIME    losartan (COZAAR) 25 MG tablet Take 25 mg by mouth once daily.    pantoprazole (PROTONIX) 40 MG tablet TAKE ONE TABLET BY MOUTH EVERY DAY    TRUE METRIX GLUCOSE METER Misc AS DIRECTED    TRUE METRIX GLUCOSE TEST STRIP Strp Twice daily blood sugar checks    glimepiride (AMARYL) 2 MG tablet Take 1 tablet (2 mg total) by mouth every morning.    metFORMIN (GLUCOPHAGE) 1000 MG tablet TAKE ONE TABLET BY MOUTH TWICE DAILY (Patient not taking: Reported on 4/10/2023)    [DISCONTINUED] EScitalopram oxalate (LEXAPRO) 10 MG tablet Take 1 tablet (10 mg total) by mouth once daily.    [DISCONTINUED] gabapentin (NEURONTIN) 300 MG capsule TAKE 1 CAPSULE BY MOUTH THREE TIMES A DAY    [DISCONTINUED] LANTUS SOLOSTAR U-100 INSULIN glargine 100 units/mL SubQ pen Inject 10 Units into the skin every evening.    [DISCONTINUED] metoprolol tartrate (LOPRESSOR) 25 MG tablet Take 0.5 tablets (12.5 mg total) by mouth 2 (two) times daily.    [DISCONTINUED] mupirocin (BACTROBAN) 2 % ointment Apply to affected area 3 times daily    [DISCONTINUED] nicotine (NICODERM CQ) 21 mg/24 hr Place 1 patch onto the skin once daily.    [DISCONTINUED] QUEtiapine (SEROQUEL) 25 MG Tab Take 1 tablet (25 mg total) by mouth every evening.    [DISCONTINUED] silver sulfADIAZINE 1% (SILVADENE) 1 % cream Apply 1 application topically once daily. Apply to affected area    [DISCONTINUED] tamsulosin (FLOMAX) 0.4 mg Cap Take 1 capsule (0.4 mg total) by mouth once daily.     Family History       Problem Relation (Age of Onset)    Arthritis Mother, Sister, Brother    Diabetes Sister           Tobacco Use    Smoking status: Former     Packs/day: 1.00     Types: Cigarettes    Smokeless tobacco: Never   Substance and Sexual Activity    Alcohol use: No    Drug use: No    Sexual activity: Not on file     Review of Systems   Constitutional:  Negative for activity change, appetite change, chills, fatigue and fever.   HENT:  Negative for congestion, rhinorrhea, sinus pressure, sinus pain, sneezing, sore throat and trouble swallowing.    Eyes:  Negative for pain, redness and visual disturbance.   Respiratory:  Positive for shortness of breath. Negative for cough, choking, chest tightness and wheezing.    Cardiovascular:  Negative for chest pain, palpitations and leg swelling.   Gastrointestinal:  Negative for abdominal distention, abdominal pain, diarrhea, nausea and vomiting.   Genitourinary:  Negative for difficulty urinating, dysuria, hematuria and urgency.   Musculoskeletal:  Positive for arthralgias, gait problem and myalgias.   Neurological:  Negative for dizziness, syncope, speech difficulty and headaches.   Objective:     Vital Signs (Most Recent):  Temp: 97.8 °F (36.6 °C) (04/10/23 1408)  Pulse: 80 (04/10/23 1408)  Resp: 20 (04/10/23 1408)  BP: (!) 155/74 (04/10/23 1408)  SpO2: 95 % (04/10/23 1408)   Vital Signs (24h Range):  Temp:  [97.8 °F (36.6 °C)] 97.8 °F (36.6 °C)  Pulse:  [] 80  Resp:  [20-40] 20  SpO2:  [95 %-99 %] 95 %  BP: (119-174)/(62-80) 155/74     Weight: 108.9 kg (240 lb)  Body mass index is 35.44 kg/m².    Physical Exam  Vitals reviewed.   Constitutional:       General: He is not in acute distress.     Appearance: Normal appearance. He is obese. He is not ill-appearing.      Interventions: Nasal cannula in place.      Comments: Patient noted to be eating lunch during exam without any issues or complaints while on 2 L of oxygen via nasal cannula.   HENT:      Head: Normocephalic and atraumatic.      Right Ear: External ear normal.      Left Ear: External ear normal.       Nose: Nose normal.      Mouth/Throat:      Mouth: Mucous membranes are moist.      Pharynx: Oropharynx is clear.   Eyes:      General:         Right eye: No discharge.         Left eye: No discharge.      Extraocular Movements: Extraocular movements intact.      Pupils: Pupils are equal, round, and reactive to light.   Cardiovascular:      Rate and Rhythm: Normal rate and regular rhythm.      Pulses: Normal pulses.      Heart sounds: Normal heart sounds. No murmur heard.    No friction rub. No gallop.      Comments: Admission EKG reviewed and noted to show Sinus tachycardia with first-degree AV block.  Pulmonary:      Effort: Accessory muscle usage present. No respiratory distress.      Breath sounds: Normal breath sounds. No wheezing.      Comments: Patient noted to be speaking in full, complete sentences without any issues.  Receiving supplemental oxygenation via 2L nasal cannula.  Abdominal:      General: Bowel sounds are normal. There is no distension.      Palpations: Abdomen is soft.      Tenderness: There is no abdominal tenderness. There is no guarding.   Musculoskeletal:         General: No tenderness.      Cervical back: Normal range of motion.      Comments: Bilateral BKA noted on exam.      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      Left Lower Extremity: Left leg is amputated below knee.   Skin:     General: Skin is warm.      Findings: Wound present.      Comments: Wound noted to patient's umbilicus with surrounding bruising.   Neurological:      General: No focal deficit present.      Mental Status: He is alert and oriented to person, place, and time. Mental status is at baseline.      Motor: No weakness.   Psychiatric:         Mood and Affect: Mood normal.         Behavior: Behavior normal.         CRANIAL NERVES     CN III, IV, VI   Pupils are equal, round, and reactive to light.     Significant Labs: All pertinent labs within the past 24 hours have been reviewed.  A1C:   Recent  Labs   Lab 11/28/22  2308 12/08/22  1657   HGBA1C 10.9* 10.3*     ABGs:   Recent Labs   Lab 04/10/23  0652   PH 7.180*   PCO2 66.0*   HCO3 24.6   POCSATURATED 31*   BE -5   PO2 25*     Bilirubin:   Recent Labs   Lab 04/10/23  0656   BILITOT 0.5     BMP:   Recent Labs   Lab 04/10/23  0656   *   *   K 4.1   CL 99   CO2 18*   BUN 30*   CREATININE 1.4   CALCIUM 9.6     CBC:   Recent Labs   Lab 04/10/23  0656   WBC 11.18   HGB 13.7*   HCT 40.2        CMP:   Recent Labs   Lab 04/10/23  0656   *   K 4.1   CL 99   CO2 18*   *   BUN 30*   CREATININE 1.4   CALCIUM 9.6   PROT 7.6   ALBUMIN 3.5   BILITOT 0.5   ALKPHOS 96   AST 21   ALT 26   ANIONGAP 14     Cardiac Markers:   Recent Labs   Lab 04/10/23  0656   *     POCT Glucose:   Recent Labs   Lab 04/10/23  0655 04/10/23  0908 04/10/23  1141   POCTGLUCOSE 412* 450* 389*     Troponin:   Recent Labs   Lab 04/10/23  0656   TROPONINI <0.006       Significant Imaging: I have reviewed all pertinent imaging results/findings within the past 24 hours.    Assessment/Plan:     * Hyperglycemia due to diabetes mellitus  Patient's FSGs are uncontrolled due to hyperglycemia on current medication regimen.  Last A1c reviewed-   Lab Results   Component Value Date    LABA1C 11.8 (H) 06/13/2014    HGBA1C 10.3 (H) 12/08/2022     Most recent fingerstick glucose reviewed-   Recent Labs   Lab 04/10/23  0655 04/10/23  0908 04/10/23  1141   POCTGLUCOSE 412* 450* 389*     Current correctional scale  Medium  Maintain anti-hyperglycemic dose as follows-   Antihyperglycemics (From admission, onward)    Start     Stop Route Frequency Ordered    04/10/23 2100  insulin detemir U-100 (Levemir) pen 17 Units         -- SubQ Nightly 04/10/23 1304    04/10/23 1403  insulin aspart U-100 pen 1-10 Units         -- SubQ Before meals & nightly PRN 04/10/23 1304      Diabetic Diet  A1C ordered  Hold Oral hypoglycemics while patient is in the hospital.    S/P bilateral BKA (below  knee amputation)  Noted on exam, due to diabetes complications.  Prosthesis noted at bedside.    Acute on chronic combined systolic and diastolic heart failure  Patient is identified as having Combined Systolic and Diastolic heart failure that is Acute on chronic. CHF is currently controlled. Latest ECHO performed and demonstrates- Results for orders placed during the hospital encounter of 12/08/22    Echo    Interpretation Summary  · Technically difficult study.  · The left ventricle is normal in size with concentric remodeling and mildly decreased systolic function.  · The estimated ejection fraction is 45%.  · Severe left atrial enlargement.  · Grade II left ventricular diastolic dysfunction.  · Normal right ventricular size with normal right ventricular systolic function.  · Normal central venous pressure (3 mmHg).  · The estimated PA systolic pressure is 18 mmHg.  · There is no pulmonary hypertension.    . Continue ACE/ARB and Furosemide and monitor clinical status closely. Monitor on telemetry. Patient is off CHF pathway.  Monitor strict Is&Os and daily weights.  Place on fluid restriction of 1.5 L. Continue to stress to patient importance of self efficacy and  on diet for CHF. Last BNP reviewed- and noted below   Recent Labs   Lab 04/10/23  0656   *     Continue IV Lasix    Physical debility  Patient with bilateral below-the-knee amputations.  Prosthesis at bedside.    Acute on chronic respiratory failure with hypoxia  Patient with Hypoxic Respiratory failure which is Acute.  he is not on home oxygen. Supplemental oxygen was provided and noted- Oxygen Concentration (%):  [80] 80 and is requiring supplementation with with 4-5L oxygen via NC.     Signs/symptoms of respiratory failure include- increased work of breathing and use of accessory muscles. Contributing diagnoses includes - Obesity Hypoventilation and Anxiety Labs and images were reviewed. Patient Has recent ABG, which has been reviewed.  Will treat underlying causes and adjust management of respiratory failure as follows-   Wean off of NC as tolerated  Continue with Oxygen supplementation  Home Oxygen Qualifying walk test.    Anxiety disorder due to general medical condition  Patient states he had a panic attack this morning, which caused him to be short of breath.  Continue home medications    Obesity  Body mass index is 35.44 kg/m². Morbid obesity complicates all aspects of disease management from diagnostic modalities to treatment.   Weight loss encouraged and health benefits explained to patient.   I personally spent 8 minutes with the patient discussing weight loss and proper dietary changes.  Nutrition Consult      VTE Risk Mitigation (From admission, onward)         Ordered     enoxaparin injection 40 mg  Daily         04/10/23 1304     IP VTE HIGH RISK PATIENT  Once         04/10/23 1304     Place sequential compression device  Until discontinued         04/10/23 1304              As clarification, on 04/10/2023, patient should be placed in hospital observation services under my care in collaboration with Robert Partida MD.      Aubrey Monaco PA-C  Department of Hospital Medicine  [facility]  04/10/2023

## 2023-04-10 NOTE — HPI
Carlos Cifuentes Jr. is a 62 y.o. male with PMHx of hypertension, diabetes, CHF presents to the ED via EMS complaining of shortness of breath 1 hour prior to arrival.  Patient states that this morning he woke up to a loud noise outside his house, which caused him to have a panic attack.  He states that following that he was not able to catch his breath and activated EMS.  Patient endorses compliance with his home medications, but states he does not know the names or the doses of his medicines because his sister is the one who helps him with his medications.  Sister has stated that patient is highly noncompliant with his medication regimen.  He endorses a headache and diarrhea but denies any kind of changes to his vision, chest pain, shortness of breath, abdominal pain, dysuria, hematuria, hematochezia or any other associated symptoms.  Patient denies being on oxygen at home throughout the day, but endorses 2 L of oxygen use via nasal cannula at nighttime.    In the ED:  Patient is afebrile, without leukocytosis, glucose:  438, , troponin negative, beta hydroxybutyrate 0.3, ABG: PH 7.18, pCO2 66, PO2 25.  Patient was initially placed on continuous BiPAP, and was weaned to nasal cannula.  Patient is noted to have oxygen saturation of 95-97% via 2 L NC.    Carlos Cifuentes Jr. Will be placed under observation for management of hyperglycemia.

## 2023-04-10 NOTE — ED PROVIDER NOTES
"Encounter Date: 4/10/2023    SCRIBE #1 NOTE: IIan, am scribing for, and in the presence of,  Kesha Garcia MD.     History     Chief Complaint   Patient presents with    Shortness of Breath     This is a 62 y.o. male, with a PMHx of DM, HTN, NSTEMI, CHF, who presents to the Emergency Department via EMS complaining of shortness of breath for 1 hr. EMS was unable to obtain SpO2 or bp en route; IV started. Patient notes that he has been having diarrhea for several days. He describes his shortness of breath as an "anxiety attack". He also endorses "some" abdominal pain. No attempted treatment en route or PTA. He denies chest pain or any other associated symptoms. Patient has been noncompliant with all medications, including ASA, clopidogrel, atorvastatin, furosemide, gabapentin, metformin, and others, for 2 days.     The history is provided by the patient and the EMS personnel. No  was used.   Review of patient's allergies indicates:   Allergen Reactions    Codeine Rash    Lyrica [pregabalin]      Feet turned Red    Vicodin [hydrocodone-acetaminophen] Rash     Past Medical History:   Diagnosis Date    Arthritis     Back pain     Bulging lumbar disc     C. difficile diarrhea     Chronic back pain 10/14/2014    Cigarette smoker     Diabetes mellitus     Diabetes mellitus type II     DM (diabetes mellitus), type 2, uncontrolled 03/12/2013    Hepatitis C 07/03/2013    MSSA (methicillin susceptible Staphylococcus aureus) septicemia     Neuromuscular disorder     Neuropathy     NSTEMI (non-ST elevated myocardial infarction) 11/28/2022    Staph aureus infection     Status post bilateral below knee amputation      Past Surgical History:   Procedure Laterality Date    ANGIOGRAPHY OF LOWER EXTREMITY Left 06/14/2018    Procedure: Angiogram LEFT LOWER Extremity with US guided access from right side;  Surgeon: Sony Srinivasan MD;  Location: [facility] OR [address] [place] [address];  Service: Peripheral Vascular;  " Laterality: Left;  local: 16ml  contrast: 20ml   fluoro: 2.8  427.73mGy    APPENDECTOMY      BELOW KNEE AMPUTATION OF LOWER EXTREMITY Bilateral     CORONARY ANGIOGRAPHY N/A 11/29/2022    Procedure: ANGIOGRAM, CORONARY ARTERY;  Surgeon: Bobby Harding MD;  Location: [facility] CATH LAB;  Service: Cardiology;  Laterality: N/A;    JOINT REPLACEMENT      left knee surgery      left leg surgery      broken bone    LEG SURGERY  right     Right arm boil      Right great toe amputation      TOE AMPUTATION Right      Family History   Problem Relation Age of Onset    Arthritis Mother     Arthritis Sister     Diabetes Sister     Arthritis Brother      Social History     Tobacco Use    Smoking status: Former     Packs/day: 1.00     Types: Cigarettes    Smokeless tobacco: Never   Substance Use Topics    Alcohol use: No    Drug use: No     Review of Systems   Constitutional:  Negative for fever.   HENT:  Negative for facial swelling and sore throat.    Eyes:  Negative for pain and discharge.   Respiratory:  Positive for shortness of breath. Negative for choking.    Cardiovascular:  Negative for chest pain.   Gastrointestinal:  Positive for abdominal pain and diarrhea. Negative for nausea and vomiting.   Genitourinary:  Negative for dysuria and frequency.   Musculoskeletal:  Negative for back pain.   Skin:  Negative for rash.   Neurological:  Negative for weakness and numbness.     Physical Exam     Initial Vitals   BP Pulse Resp Temp SpO2   04/10/23 0652 04/10/23 0652 04/10/23 0652 04/10/23 0829 04/10/23 0652   119/69 (!) 122 (!) 40 97.8 °F (36.6 °C) 97 %      MAP       --                Physical Exam    Nursing note and vitals reviewed.  Constitutional: He is diaphoretic. He appears distressed.   HENT:   Head: Normocephalic and atraumatic.   Protecting airway   Eyes: Conjunctivae and EOM are normal. No scleral icterus.   Neck: Neck supple. No tracheal deviation present.   Normal range of motion.  Cardiovascular:  Regular rhythm and  intact distal pulses.   Tachycardia present.         Pulmonary/Chest: No stridor. Tachypnea noted. He is in respiratory distress. He has rales.   Abdominal: Abdomen is soft. He exhibits no distension. There is no abdominal tenderness.   Musculoskeletal:         General: No tenderness or edema.      Cervical back: Normal range of motion and neck supple.      Comments: Bilateral BKA.      Neurological: He is alert. He has normal strength. No cranial nerve deficit or sensory deficit.   Skin: Skin is warm.   Erythema with drainage around umbilicus.    Psychiatric: He has a normal mood and affect.       ED Course   Procedures  Labs Reviewed   CBC W/ AUTO DIFFERENTIAL - Abnormal; Notable for the following components:       Result Value    RBC 4.12 (*)     Hemoglobin 13.7 (*)     MCH 33.3 (*)     RDW 16.6 (*)     Immature Granulocytes 1.1 (*)     Immature Grans (Abs) 0.12 (*)     All other components within normal limits   COMPREHENSIVE METABOLIC PANEL - Abnormal; Notable for the following components:    Sodium 131 (*)     CO2 18 (*)     Glucose 438 (*)     BUN 30 (*)     eGFR 57 (*)     All other components within normal limits   B-TYPE NATRIURETIC PEPTIDE - Abnormal; Notable for the following components:     (*)     All other components within normal limits   POCT GLUCOSE - Abnormal; Notable for the following components:    POCT Glucose 412 (*)     All other components within normal limits   ISTAT PROCEDURE - Abnormal; Notable for the following components:    POC PH 7.180 (*)     POC PCO2 66.0 (*)     POC PO2 25 (*)     POC SATURATED O2 31 (*)     All other components within normal limits   POCT GLUCOSE - Abnormal; Notable for the following components:    POCT Glucose 450 (*)     All other components within normal limits   POCT GLUCOSE - Abnormal; Notable for the following components:    POCT Glucose 389 (*)     All other components within normal limits   TROPONIN I   BETA - HYDROXYBUTYRATE, SERUM   URINALYSIS,  REFLEX TO URINE CULTURE     EKG Readings: (Independently Interpreted)   Initial Reading: No STEMI. Rhythm: Sinus Tachycardia. Heart Rate: 127. Ectopy: No Ectopy. Conduction: 1st Degree AV Block. ST Segments: Normal ST Segments. T Waves Flipped: I and AVL. Axis: Normal.     Imaging Results              X-Ray Chest AP Portable (Final result)  Result time 04/10/23 07:23:02      Final result by Douglas Nix MD (04/10/23 07:23:02)                   Impression:      Patchy opacities in bilateral mid and lower lung zones could relate to edema versus multifocal infection or noninfectious inflammatory process.      Electronically signed by: Douglas Nix  Date:    04/10/2023  Time:    07:23               Narrative:    EXAMINATION:  XR CHEST AP PORTABLE    CLINICAL HISTORY:  CHF;    TECHNIQUE:  Single frontal view of the chest was performed.    COMPARISON:  Chest radiograph 12/10/2022    FINDINGS:  Monitoring leads overlie the chest.  Allowing for difference in patient positioning likely no substantial change in appearance of the cardiomediastinal contours.    Patchy opacities are noted in bilateral mid lower lung zones.  Question small right pleural effusion.  No definite pneumothorax.    No definite pneumothorax.    No acute findings within the visualized abdomen.    Osseous and soft tissue structures appear without definite acute abnormality.                                       Medications   furosemide injection 40 mg (40 mg Intravenous Given 4/10/23 0759)   insulin regular injection 6 Units 0.06 mL (6 Units Intravenous Given 4/10/23 0806)   losartan tablet 25 mg (25 mg Oral Given 4/10/23 1009)   labetaloL injection 10 mg (10 mg Intravenous Given 4/10/23 1010)   insulin regular injection 10 Units 0.1 mL (10 Units Intravenous Given 4/10/23 1037)   gabapentin capsule 300 mg (300 mg Oral Given 4/10/23 1119)     Medical Decision Making:   History:   Old Medical Records: I decided to obtain old medical records.  Old  Records Summarized: records from previous admission(s).       <> Summary of Records: 01/15/2023-01/20/2023: Jason Reese MD, Jaleel Whipple MD, Miguel Lux MD; Hospital admission for SOB,GI bleed, Hyperglycemia, LIZ, Symptomatic anemia  Differential Diagnosis:   Includes but is not limited to: CHF, ACS, pneumonia, medication noncompliance, renal failure  Independently Interpreted Test(s):   I have ordered and independently interpreted EKG Reading(s) - see prior notes  Clinical Tests:   Lab Tests: Ordered and Reviewed  Radiological Study: Ordered and Reviewed  Medical Tests: Ordered and Reviewed  ED Management:  Patient arrives diaphoretic in respiratory distress.  He has rales on auscultation.  Patient placed on BiPAP on arrival.  Labs without leukocytosis.  Chemistry with hyperglycemia, pseudo hyponatremia, BUN 30, creatinine 1.4.  BNP is mildly elevated at 129.  Troponin is within normal ranges.  Chest x-ray with patchy bilateral opacities in the mid and lower lung zones concerning for edema versus multifocal infection versus noninfectious inflammatory process.  Patient given Lasix in the emergency department.  On reassessment he is tolerating BiPAP and reports some improvement in symptoms.  Patient able to be transition from BiPAP to his usual dose of home oxygen and is saturating adequately.  Patient to be admitted to placed in observation for further evaluation and management of acute onset shortness of breath possible CHF exacerbation.    Please put in 60 minutes of critical care due to patient having a high risk of respiratory failure.   Separate from teaching and exclusive of procedure and ekg time  Includes:  Time at bedside  Time reviewing test results  Time discussing case with staff  Time documenting the medical record  Time spent with family members  Time spent with consults  Management   This chart was completed using dictation software, as a result there may be some transcription  errors.           Scribe Attestation:   Scribe #1: I performed the above scribed service and the documentation accurately describes the services I performed. I attest to the accuracy of the note.            I, Kesha Garcia , personally performed the services described in this documentation.  All medical record entries made by the scribe were at my direction and in my presence.  I have reviewed the chart and agree that the record reflects my personal performance and is accurate and complete.       Clinical Impression:   Final diagnoses:  [R06.02] Shortness of breath  [I50.9] Acute on chronic congestive heart failure, unspecified heart failure type        ED Disposition Condition    Observation Stable                Kesha Garcia MD  04/11/23 4660

## 2023-04-10 NOTE — ASSESSMENT & PLAN NOTE
Body mass index is 35.44 kg/m². Morbid obesity complicates all aspects of disease management from diagnostic modalities to treatment.   Weight loss encouraged and health benefits explained to patient.   I personally spent 8 minutes with the patient discussing weight loss and proper dietary changes.  Nutrition Consult

## 2023-04-10 NOTE — ASSESSMENT & PLAN NOTE
Patient states he had a panic attack this morning, which caused him to be short of breath.  Continue home medications

## 2023-04-10 NOTE — ED NOTES
"Pt to ED via EMS c/o SOB x 1 day. Pt is on 15 LPM NRB  with obvious resp distress. Pt states " I think I just had an anxiety attack and it started all this".Per EMS they were unable to obtain a pulse ox or BP prior to arrival. Dr Garcia at bedside. Pt placed on BIPAP and is tolerating well. See assessments for further.  "

## 2023-04-10 NOTE — ASSESSMENT & PLAN NOTE
Patient's FSGs are uncontrolled due to hyperglycemia on current medication regimen.  Last A1c reviewed-   Lab Results   Component Value Date    LABA1C 11.8 (H) 06/13/2014    HGBA1C 10.3 (H) 12/08/2022     Most recent fingerstick glucose reviewed-   Recent Labs   Lab 04/10/23  0655 04/10/23  0908 04/10/23  1141   POCTGLUCOSE 412* 450* 389*     Current correctional scale  Medium  Maintain anti-hyperglycemic dose as follows-   Antihyperglycemics (From admission, onward)    Start     Stop Route Frequency Ordered    04/10/23 2100  insulin detemir U-100 (Levemir) pen 17 Units         -- SubQ Nightly 04/10/23 1304    04/10/23 1403  insulin aspart U-100 pen 1-10 Units         -- SubQ Before meals & nightly PRN 04/10/23 1304      Diabetic Diet  A1C ordered  Hold Oral hypoglycemics while patient is in the hospital.

## 2023-04-10 NOTE — PLAN OF CARE
[facility] Emergency Dept  Discharge Assessment    Primary Care Provider: Miguel Lux MD     [facility]:  [facility]    Help @ Home:  Sister, Jeimy Nunez, assists by grocery shopping and preparing meals    Transport @ Discharge:  SisterJeimy.    Patient from home alone.  He is independent with assistive devices.  Patient is bilateral amputee and wears prosthesis.  Patient stated that he is able to drive.  He reported that he has a wheelchair that is broken and is asking if he might qualify for a new one.  He has had this current wheelchair for 5 years.  He needs use of a  wheelchair when he goes into the community.  Patient uses home O2 and he reports hx of anxiety.  Patient does not feel that he needs home health at this time.    Patient reported that he has had at least to hospital admits within the last 30 days.    Discharge Needs:  Followup with PCP.    Discharge Assessment (most recent)       BRIEF DISCHARGE ASSESSMENT - 04/10/23 2979          Discharge Planning    Assessment Type Discharge Planning Brief Assessment     Resource/Environmental Concerns none     Support Systems Family members     Assistance Needed Patient is a bilateral BKA.  Needs assistance with mobility.  Patient reported that his wheelchair is broken.  He stated that he has had the w/c for five years and would like to know if he qualifies for a replacement.     Equipment Currently Used at Home prosthesis;wheelchair;walker, rolling;glucometer;bath bench     Current Living Arrangements home     Care Facility Name n/a     Patient/Family Anticipates Transition to home     Patient/Family Anticipated Services at Transition outpatient care   PCP followup    DME Needed Upon Discharge  wheelchair   Replacement/new wheelchair    Discharge Plan A Home     Discharge Plan B Home

## 2023-04-10 NOTE — NURSING
[facility] [place]  Nurses Note -- 4 Eyes      4/10/2023       Skin assessed on: Admit      [x] No Pressure Injuries Present    [x]Prevention Measures Documented    [] Yes LDA  for Pressure Injury Previously documented     [] Yes New Pressure Injury Discovered   [] LDA for New Pressure Injury Added      Attending RN:  Kwan Gary RN     Second RN:  Dianna Ramirez RN

## 2023-04-10 NOTE — SUBJECTIVE & OBJECTIVE
Past Medical History:   Diagnosis Date    Arthritis     Back pain     Bulging lumbar disc     C. difficile diarrhea     Chronic back pain 10/14/2014    Cigarette smoker     Diabetes mellitus     Diabetes mellitus type II     DM (diabetes mellitus), type 2, uncontrolled 03/12/2013    Hepatitis C 07/03/2013    MSSA (methicillin susceptible Staphylococcus aureus) septicemia     Neuromuscular disorder     Neuropathy     NSTEMI (non-ST elevated myocardial infarction) 11/28/2022    Staph aureus infection     Status post bilateral below knee amputation        Past Surgical History:   Procedure Laterality Date    ANGIOGRAPHY OF LOWER EXTREMITY Left 06/14/2018    Procedure: Angiogram LEFT LOWER Extremity with US guided access from right side;  Surgeon: Sony Srinivasan MD;  Location: [facility] OR [address] [place] [address];  Service: Peripheral Vascular;  Laterality: Left;  local: 16ml  contrast: 20ml   fluoro: 2.8  427.73mGy    APPENDECTOMY      BELOW KNEE AMPUTATION OF LOWER EXTREMITY Bilateral     CORONARY ANGIOGRAPHY N/A 11/29/2022    Procedure: ANGIOGRAM, CORONARY ARTERY;  Surgeon: Bobby Harding MD;  Location: [facility] CATH LAB;  Service: Cardiology;  Laterality: N/A;    JOINT REPLACEMENT      left knee surgery      left leg surgery      broken bone    LEG SURGERY  right     Right arm boil      Right great toe amputation      TOE AMPUTATION Right        Review of patient's allergies indicates:   Allergen Reactions    Codeine Rash    Lyrica [pregabalin]      Feet turned Red    Vicodin [hydrocodone-acetaminophen] Rash       No current facility-administered medications on file prior to encounter.     Current Outpatient Medications on File Prior to Encounter   Medication Sig    albuterol (VENTOLIN HFA) 90 mcg/actuation inhaler Inhale 2 puffs into the lungs every 6 (six) hours as needed for Wheezing. Rescue    aspirin 81 MG Chew Take 1 tablet (81 mg total) by mouth once daily.    atorvastatin (LIPITOR) 40 MG tablet Take 1 tablet (40 mg total) by  mouth once daily.    clopidogreL (PLAVIX) 75 mg tablet Take 1 tablet (75 mg total) by mouth once daily.    furosemide (LASIX) 40 MG tablet Take 1 tablet (40 mg total) by mouth 2 (two) times daily.    HUMALOG U-100 INSULIN 100 unit/mL injection SMARTSI-25 Unit(s) SUB-Q 3 Times Daily    LANTUS U-100 INSULIN 100 unit/mL injection INJECT TEN UNITS INTO SKIN AT BEDTIME    losartan (COZAAR) 25 MG tablet Take 25 mg by mouth once daily.    pantoprazole (PROTONIX) 40 MG tablet TAKE ONE TABLET BY MOUTH EVERY DAY    TRUE METRIX GLUCOSE METER Misc AS DIRECTED    TRUE METRIX GLUCOSE TEST STRIP Strp Twice daily blood sugar checks    glimepiride (AMARYL) 2 MG tablet Take 1 tablet (2 mg total) by mouth every morning.    metFORMIN (GLUCOPHAGE) 1000 MG tablet TAKE ONE TABLET BY MOUTH TWICE DAILY (Patient not taking: Reported on 4/10/2023)    [DISCONTINUED] EScitalopram oxalate (LEXAPRO) 10 MG tablet Take 1 tablet (10 mg total) by mouth once daily.    [DISCONTINUED] gabapentin (NEURONTIN) 300 MG capsule TAKE 1 CAPSULE BY MOUTH THREE TIMES A DAY    [DISCONTINUED] LANTUS SOLOSTAR U-100 INSULIN glargine 100 units/mL SubQ pen Inject 10 Units into the skin every evening.    [DISCONTINUED] metoprolol tartrate (LOPRESSOR) 25 MG tablet Take 0.5 tablets (12.5 mg total) by mouth 2 (two) times daily.    [DISCONTINUED] mupirocin (BACTROBAN) 2 % ointment Apply to affected area 3 times daily    [DISCONTINUED] nicotine (NICODERM CQ) 21 mg/24 hr Place 1 patch onto the skin once daily.    [DISCONTINUED] QUEtiapine (SEROQUEL) 25 MG Tab Take 1 tablet (25 mg total) by mouth every evening.    [DISCONTINUED] silver sulfADIAZINE 1% (SILVADENE) 1 % cream Apply 1 application topically once daily. Apply to affected area    [DISCONTINUED] tamsulosin (FLOMAX) 0.4 mg Cap Take 1 capsule (0.4 mg total) by mouth once daily.     Family History       Problem Relation (Age of Onset)    Arthritis Mother, Sister, Brother    Diabetes Sister          Tobacco Use     Smoking status: Former     Packs/day: 1.00     Types: Cigarettes    Smokeless tobacco: Never   Substance and Sexual Activity    Alcohol use: No    Drug use: No    Sexual activity: Not on file     Review of Systems   Constitutional:  Negative for activity change, appetite change, chills, fatigue and fever.   HENT:  Negative for congestion, rhinorrhea, sinus pressure, sinus pain, sneezing, sore throat and trouble swallowing.    Eyes:  Negative for pain, redness and visual disturbance.   Respiratory:  Positive for shortness of breath. Negative for cough, choking, chest tightness and wheezing.    Cardiovascular:  Negative for chest pain, palpitations and leg swelling.   Gastrointestinal:  Negative for abdominal distention, abdominal pain, diarrhea, nausea and vomiting.   Genitourinary:  Negative for difficulty urinating, dysuria, hematuria and urgency.   Musculoskeletal:  Positive for arthralgias, gait problem and myalgias.   Neurological:  Negative for dizziness, syncope, speech difficulty and headaches.   Objective:     Vital Signs (Most Recent):  Temp: 97.8 °F (36.6 °C) (04/10/23 1408)  Pulse: 80 (04/10/23 1408)  Resp: 20 (04/10/23 1408)  BP: (!) 155/74 (04/10/23 1408)  SpO2: 95 % (04/10/23 1408)   Vital Signs (24h Range):  Temp:  [97.8 °F (36.6 °C)] 97.8 °F (36.6 °C)  Pulse:  [] 80  Resp:  [20-40] 20  SpO2:  [95 %-99 %] 95 %  BP: (119-174)/(62-80) 155/74     Weight: 108.9 kg (240 lb)  Body mass index is 35.44 kg/m².    Physical Exam  Vitals reviewed.   Constitutional:       General: He is not in acute distress.     Appearance: Normal appearance. He is obese. He is not ill-appearing.      Interventions: Nasal cannula in place.      Comments: Patient noted to be eating lunch during exam without any issues or complaints while on 2 L of oxygen via nasal cannula.   HENT:      Head: Normocephalic and atraumatic.      Right Ear: External ear normal.      Left Ear: External ear normal.      Nose: Nose normal.       Mouth/Throat:      Mouth: Mucous membranes are moist.      Pharynx: Oropharynx is clear.   Eyes:      General:         Right eye: No discharge.         Left eye: No discharge.      Extraocular Movements: Extraocular movements intact.      Pupils: Pupils are equal, round, and reactive to light.   Cardiovascular:      Rate and Rhythm: Normal rate and regular rhythm.      Pulses: Normal pulses.      Heart sounds: Normal heart sounds. No murmur heard.    No friction rub. No gallop.      Comments: Admission EKG reviewed and noted to show Sinus tachycardia with first-degree AV block.  Pulmonary:      Effort: Accessory muscle usage present. No respiratory distress.      Breath sounds: Normal breath sounds. No wheezing.      Comments: Patient noted to be speaking in full, complete sentences without any issues.  Receiving supplemental oxygenation via 2L nasal cannula.  Abdominal:      General: Bowel sounds are normal. There is no distension.      Palpations: Abdomen is soft.      Tenderness: There is no abdominal tenderness. There is no guarding.   Musculoskeletal:         General: No tenderness.      Cervical back: Normal range of motion.      Comments: Bilateral BKA noted on exam.      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      Left Lower Extremity: Left leg is amputated below knee.   Skin:     General: Skin is warm.      Findings: Wound present.      Comments: Wound noted to patient's umbilicus with surrounding bruising.   Neurological:      General: No focal deficit present.      Mental Status: He is alert and oriented to person, place, and time. Mental status is at baseline.      Motor: No weakness.   Psychiatric:         Mood and Affect: Mood normal.         Behavior: Behavior normal.         CRANIAL NERVES     CN III, IV, VI   Pupils are equal, round, and reactive to light.     Significant Labs: All pertinent labs within the past 24 hours have been reviewed.  A1C:   Recent Labs   Lab 11/28/22  2300  12/08/22  1657   HGBA1C 10.9* 10.3*     ABGs:   Recent Labs   Lab 04/10/23  0652   PH 7.180*   PCO2 66.0*   HCO3 24.6   POCSATURATED 31*   BE -5   PO2 25*     Bilirubin:   Recent Labs   Lab 04/10/23  0656   BILITOT 0.5     BMP:   Recent Labs   Lab 04/10/23  0656   *   *   K 4.1   CL 99   CO2 18*   BUN 30*   CREATININE 1.4   CALCIUM 9.6     CBC:   Recent Labs   Lab 04/10/23  0656   WBC 11.18   HGB 13.7*   HCT 40.2        CMP:   Recent Labs   Lab 04/10/23  0656   *   K 4.1   CL 99   CO2 18*   *   BUN 30*   CREATININE 1.4   CALCIUM 9.6   PROT 7.6   ALBUMIN 3.5   BILITOT 0.5   ALKPHOS 96   AST 21   ALT 26   ANIONGAP 14     Cardiac Markers:   Recent Labs   Lab 04/10/23  0656   *     POCT Glucose:   Recent Labs   Lab 04/10/23  0655 04/10/23  0908 04/10/23  1141   POCTGLUCOSE 412* 450* 389*     Troponin:   Recent Labs   Lab 04/10/23  0656   TROPONINI <0.006       Significant Imaging: I have reviewed all pertinent imaging results/findings within the past 24 hours.

## 2023-04-10 NOTE — ASSESSMENT & PLAN NOTE
Patient is identified as having Combined Systolic and Diastolic heart failure that is Acute on chronic. CHF is currently controlled. Latest ECHO performed and demonstrates- Results for orders placed during the hospital encounter of 12/08/22    Echo    Interpretation Summary  · Technically difficult study.  · The left ventricle is normal in size with concentric remodeling and mildly decreased systolic function.  · The estimated ejection fraction is 45%.  · Severe left atrial enlargement.  · Grade II left ventricular diastolic dysfunction.  · Normal right ventricular size with normal right ventricular systolic function.  · Normal central venous pressure (3 mmHg).  · The estimated PA systolic pressure is 18 mmHg.  · There is no pulmonary hypertension.    . Continue ACE/ARB and Furosemide and monitor clinical status closely. Monitor on telemetry. Patient is off CHF pathway.  Monitor strict Is&Os and daily weights.  Place on fluid restriction of 1.5 L. Continue to stress to patient importance of self efficacy and  on diet for CHF. Last BNP reviewed- and noted below   Recent Labs   Lab 04/10/23  0656   *     Continue IV Lasix

## 2023-04-11 VITALS
SYSTOLIC BLOOD PRESSURE: 144 MMHG | BODY MASS INDEX: 35.59 KG/M2 | DIASTOLIC BLOOD PRESSURE: 65 MMHG | HEART RATE: 72 BPM | RESPIRATION RATE: 20 BRPM | TEMPERATURE: 98 F | OXYGEN SATURATION: 96 % | WEIGHT: 240.31 LBS | HEIGHT: 69 IN

## 2023-04-11 LAB
ALBUMIN SERPL BCP-MCNC: 3 G/DL (ref 3.5–5.2)
ALP SERPL-CCNC: 61 U/L (ref 55–135)
ALT SERPL W/O P-5'-P-CCNC: 24 U/L (ref 10–44)
ANION GAP SERPL CALC-SCNC: 10 MMOL/L (ref 8–16)
AST SERPL-CCNC: 32 U/L (ref 10–40)
BASOPHILS # BLD AUTO: 0.03 K/UL (ref 0–0.2)
BASOPHILS NFR BLD: 0.7 % (ref 0–1.9)
BILIRUB SERPL-MCNC: 0.5 MG/DL (ref 0.1–1)
BUN SERPL-MCNC: 40 MG/DL (ref 8–23)
CALCIUM SERPL-MCNC: 9 MG/DL (ref 8.7–10.5)
CHLORIDE SERPL-SCNC: 99 MMOL/L (ref 95–110)
CO2 SERPL-SCNC: 26 MMOL/L (ref 23–29)
CREAT SERPL-MCNC: 1.6 MG/DL (ref 0.5–1.4)
DIFFERENTIAL METHOD: ABNORMAL
EOSINOPHIL # BLD AUTO: 0.1 K/UL (ref 0–0.5)
EOSINOPHIL NFR BLD: 3.1 % (ref 0–8)
ERYTHROCYTE [DISTWIDTH] IN BLOOD BY AUTOMATED COUNT: 16.8 % (ref 11.5–14.5)
EST. GFR  (NO RACE VARIABLE): 49 ML/MIN/1.73 M^2
ESTIMATED AVG GLUCOSE: 275 MG/DL (ref 68–131)
GLUCOSE SERPL-MCNC: 291 MG/DL (ref 70–110)
HBA1C MFR BLD: 11.2 % (ref 4–5.6)
HCT VFR BLD AUTO: 31.9 % (ref 40–54)
HGB BLD-MCNC: 10.8 G/DL (ref 14–18)
IMM GRANULOCYTES # BLD AUTO: 0.02 K/UL (ref 0–0.04)
IMM GRANULOCYTES NFR BLD AUTO: 0.4 % (ref 0–0.5)
LYMPHOCYTES # BLD AUTO: 1 K/UL (ref 1–4.8)
LYMPHOCYTES NFR BLD: 21.7 % (ref 18–48)
MCH RBC QN AUTO: 32.6 PG (ref 27–31)
MCHC RBC AUTO-ENTMCNC: 33.9 G/DL (ref 32–36)
MCV RBC AUTO: 96 FL (ref 82–98)
MONOCYTES # BLD AUTO: 0.4 K/UL (ref 0.3–1)
MONOCYTES NFR BLD: 9.2 % (ref 4–15)
NEUTROPHILS # BLD AUTO: 3 K/UL (ref 1.8–7.7)
NEUTROPHILS NFR BLD: 64.9 % (ref 38–73)
NRBC BLD-RTO: 0 /100 WBC
PLATELET # BLD AUTO: 120 K/UL (ref 150–450)
PMV BLD AUTO: 9.7 FL (ref 9.2–12.9)
POCT GLUCOSE: 237 MG/DL (ref 70–110)
POCT GLUCOSE: 305 MG/DL (ref 70–110)
POTASSIUM SERPL-SCNC: 4.8 MMOL/L (ref 3.5–5.1)
PROT SERPL-MCNC: 6.4 G/DL (ref 6–8.4)
RBC # BLD AUTO: 3.31 M/UL (ref 4.6–6.2)
SODIUM SERPL-SCNC: 135 MMOL/L (ref 136–145)
WBC # BLD AUTO: 4.56 K/UL (ref 3.9–12.7)

## 2023-04-11 PROCEDURE — 36415 COLL VENOUS BLD VENIPUNCTURE: CPT

## 2023-04-11 PROCEDURE — 96376 TX/PRO/DX INJ SAME DRUG ADON: CPT

## 2023-04-11 PROCEDURE — 80053 COMPREHEN METABOLIC PANEL: CPT

## 2023-04-11 PROCEDURE — 25000003 PHARM REV CODE 250

## 2023-04-11 PROCEDURE — 27000221 HC OXYGEN, UP TO 24 HOURS

## 2023-04-11 PROCEDURE — 85025 COMPLETE CBC W/AUTO DIFF WBC: CPT

## 2023-04-11 PROCEDURE — 63600175 PHARM REV CODE 636 W HCPCS

## 2023-04-11 PROCEDURE — 99900035 HC TECH TIME PER 15 MIN (STAT)

## 2023-04-11 PROCEDURE — G0378 HOSPITAL OBSERVATION PER HR: HCPCS

## 2023-04-11 PROCEDURE — 94761 N-INVAS EAR/PLS OXIMETRY MLT: CPT

## 2023-04-11 RX ORDER — INSULIN LISPRO 100 [IU]/ML
5 INJECTION, SOLUTION INTRAVENOUS; SUBCUTANEOUS
Start: 2023-04-11

## 2023-04-11 RX ADMIN — LOSARTAN POTASSIUM 25 MG: 25 TABLET, FILM COATED ORAL at 09:04

## 2023-04-11 RX ADMIN — ATORVASTATIN CALCIUM 40 MG: 40 TABLET, FILM COATED ORAL at 09:04

## 2023-04-11 RX ADMIN — INSULIN ASPART 8 UNITS: 100 INJECTION, SOLUTION INTRAVENOUS; SUBCUTANEOUS at 11:04

## 2023-04-11 RX ADMIN — CLOPIDOGREL BISULFATE 75 MG: 75 TABLET ORAL at 09:04

## 2023-04-11 RX ADMIN — ASPIRIN 81 MG CHEWABLE TABLET 81 MG: 81 TABLET CHEWABLE at 09:04

## 2023-04-11 RX ADMIN — FUROSEMIDE 40 MG: 10 INJECTION, SOLUTION INTRAMUSCULAR; INTRAVENOUS at 02:04

## 2023-04-11 RX ADMIN — IBUPROFEN 600 MG: 600 TABLET ORAL at 11:04

## 2023-04-11 RX ADMIN — IBUPROFEN 600 MG: 600 TABLET ORAL at 12:04

## 2023-04-11 NOTE — NURSING
Patient cleared for discharge by case management, Hue and ERIN Abel. AVS/discharge instructions printed, reviewed with and given to patient. Patient verbalizes understanding of instructions. Bilateral AC PIVs removed , catheter tips intact,  pressure dressings placed. Telemetry monitor removed and brought to telemetry room. Transport requested for patient. Patient's sister planned to transport patient home. Patient endorses he uses O2 at home at night and is on room air during the day.

## 2023-04-11 NOTE — PLAN OF CARE
Problem: Skin Injury Risk Increased  Goal: Skin Health and Integrity  Outcome: Ongoing, Progressing     Problem: Adult Inpatient Plan of Care  Goal: Plan of Care Review  Outcome: Ongoing, Progressing     Problem: Diabetes Comorbidity  Goal: Blood Glucose Level Within Targeted Range  Outcome: Ongoing, Progressing     Problem: Impaired Wound Healing  Goal: Optimal Wound Healing  Outcome: Ongoing, Progressing     Problem: Fall Injury Risk  Goal: Absence of Fall and Fall-Related Injury  Outcome: Ongoing, Progressing

## 2023-04-11 NOTE — NURSING
[facility]  Nurses Note -- 4 Eyes      4/11/2023       Skin assessed on: Q Shift      [x] No Pressure Injuries Present    [x]Prevention Measures Documented    [] Yes LDA  for Pressure Injury Previously documented     [] Yes New Pressure Injury Discovered   [] LDA for New Pressure Injury Added      Attending RN:  Pricila Powell RN     Second RN:  Anabel NUNEZ RN

## 2023-04-11 NOTE — PLAN OF CARE
Problem: Skin Injury Risk Increased  Goal: Skin Health and Integrity  4/11/2023 1130 by Pricila Powell RN  Outcome: Met  4/11/2023 1113 by Pricila Powell RN  Outcome: Ongoing, Progressing     Problem: Adult Inpatient Plan of Care  Goal: Plan of Care Review  4/11/2023 1130 by Pricila Powell RN  Outcome: Met  4/11/2023 1113 by Pricila Powell RN  Outcome: Ongoing, Progressing  Goal: Patient-Specific Goal (Individualized)  4/11/2023 1130 by Pricila Powell RN  Outcome: Met  4/11/2023 1113 by Pricila Powell RN  Outcome: Ongoing, Progressing  Goal: Absence of Hospital-Acquired Illness or Injury  4/11/2023 1130 by Pricila Powell RN  Outcome: Met  4/11/2023 1113 by Pricila Powell RN  Outcome: Ongoing, Progressing  Goal: Optimal Comfort and Wellbeing  4/11/2023 1130 by Pricila Powell RN  Outcome: Met  4/11/2023 1113 by Pricila Powell RN  Outcome: Ongoing, Progressing  Goal: Readiness for Transition of Care  4/11/2023 1130 by Pricila Powell RN  Outcome: Met  4/11/2023 1113 by Pricila Powell RN  Outcome: Ongoing, Progressing     Problem: Infection  Goal: Absence of Infection Signs and Symptoms  4/11/2023 1130 by Pricila Powell RN  Outcome: Met  4/11/2023 1113 by Pricila Powell RN  Outcome: Ongoing, Progressing     Problem: Diabetes Comorbidity  Goal: Blood Glucose Level Within Targeted Range  4/11/2023 1130 by Pricila Powell RN  Outcome: Met  4/11/2023 1113 by Pricila Powell RN  Outcome: Ongoing, Progressing     Problem: Impaired Wound Healing  Goal: Optimal Wound Healing  4/11/2023 1130 by Pricila Powell RN  Outcome: Met  4/11/2023 1113 by Pricila Powell RN  Outcome: Ongoing, Progressing     Problem: Fall Injury Risk  Goal: Absence of Fall and Fall-Related Injury  4/11/2023 1130 by Pricila Powell RN  Outcome: Met  4/11/2023 1113 by Pricila Powell RN  Outcome: Ongoing, Progressing

## 2023-04-11 NOTE — PLAN OF CARE
04/11/23 1011   Final Note   Assessment Type Final Discharge Note   Anticipated Discharge Disposition Home   Hospital Resources/Appts/Education Provided Appointments scheduled and added to AVS   Post-Acute Status   Post-Acute Authorization Other   Other Status No Post-Acute Service Needs     Pts nurse Mcnulty notified that the pt can d/c from CM standpoint

## 2023-04-11 NOTE — PLAN OF CARE
Wound care consult placed for PTA umbilical wound.  Educated patient on infection control, and importance of handwashing.   Problem: Skin Injury Risk Increased  Goal: Skin Health and Integrity  Outcome: Ongoing, Progressing     Problem: Adult Inpatient Plan of Care  Goal: Plan of Care Review  Outcome: Ongoing, Progressing  Goal: Absence of Hospital-Acquired Illness or Injury  Outcome: Ongoing, Progressing

## 2023-04-11 NOTE — DISCHARGE SUMMARY
"[facility]  Discharge Summary      Patient Name: Carlos Cifuentes Jr.  MRN: [id number]  BARBRA: [id number]  Patient Class: OP- Observation  Admission Date: 4/10/2023  Hospital Length of Stay: 0 days  Discharge Date and Time:  04/11/2023 3:50 PM  Attending Physician: No att. providers found   Discharging Provider: Page Harding NP  Primary Care Provider: Miguel Lux MD    Primary Care Team: PAGE HARDING    HPI:   Carlos Cifuentes Jr. is a 62 y.o. male with PMHx of hypertension, diabetes, CHF presents to the ED via EMS complaining of shortness of breath 1 hour prior to arrival.  Patient states that this morning he woke up to a loud noise outside his house, which caused him to have a panic attack.  He states that following that he was not able to catch his breath and activated EMS.  Patient endorses compliance with his home medications, but states he does not know the names or the doses of his medicines because his sister is the one who helps him with his medications.  Sister has stated that patient is highly noncompliant with his medication regimen.  He endorses a headache and diarrhea but denies any kind of changes to his vision, chest pain, shortness of breath, abdominal pain, dysuria, hematuria, hematochezia or any other associated symptoms.  Patient denies being on oxygen at home throughout the day, but endorses 2 L of oxygen use via nasal cannula at nighttime.    In the ED:  Patient is afebrile, without leukocytosis, glucose:  438, , troponin negative, beta hydroxybutyrate 0.3, ABG: PH 7.18, pCO2 66, PO2 25.  Patient was initially placed on continuous BiPAP, and was weaned to nasal cannula.  Patient is noted to have oxygen saturation of 95-97% via 2 L NC.    Carlos Cifuentes Jr. Will be placed under observation for management of hyperglycemia.      * No surgery found *      Hospital Course:   Admission to observation for hyperglycemia. He initially presented to ED for "panic " "attack." EKG   on arrival to ED that resolved shortly in ED. Troponin negative. No longer have shortness of breath. Blood sugar improved with insulin. Patient admits to not taking insulin with meals as would drop his blood sugars to "80's" which cause him to feel bad.  He admits not taking insulin if his blood sugar is not more than 250. Discuss decreasing meal time insulin. Instruct to log blood sugars and bring to next PCP office visit for continue insulin adjustment. Patient ready for discharge home. All findings and plan were explained to the patient. All questions and concerns were answered. Patient verbalized understanding. Patient is in stable condition to d/c home and has been informed to follow up with PCP within the next 7-10 days to discuss PCP.        Goals of Care Treatment Preferences:  Code Status: Full Code    Living Will: Yes              Consults:     No new Assessment & Plan notes have been filed under this hospital service since the last note was generated.  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 Go on 4/17/2023.    Specialties: Internal Medicine, Oncology, Hematology and Oncology  Why: Appointment scheduled for 11:30am  Contact information:  [address]  [place] [address]  [phone]                       Patient Instructions:   No discharge procedures on file.    Significant Diagnostic Studies: N/A    Pending Diagnostic Studies:     None         Medications:  Reconciled Home Medications:      Medication List      CHANGE how you take these medications    HumaLOG U-100 Insulin 100 unit/mL injection  Generic drug: insulin lispro  Inject 5 Units into the skin 3 (three) times daily before meals.  What changed: See the new instructions.        CONTINUE taking these medications    albuterol 90 mcg/actuation inhaler  Commonly known as: VENTOLIN HFA  Inhale 2 puffs into the lungs every 6 (six) hours as needed for Wheezing. Rescue     aspirin 81 MG Chew  Take 1 tablet (81 mg total) by mouth once daily.     atorvastatin 40 MG tablet  Commonly known as: LIPITOR  Take 1 tablet (40 mg total) by mouth once daily.     clopidogreL 75 mg tablet  Commonly known as: PLAVIX  Take 1 tablet (75 mg total) by mouth once daily.     furosemide 40 MG tablet  Commonly known as: LASIX  Take 1 tablet (40 mg total) by mouth 2 (two) times daily.     glimepiride 2 MG tablet  Commonly known as: AmaryL  Take 1 tablet (2 mg total) by mouth every morning.     LANTUS U-100 INSULIN 100 unit/mL injection  Generic drug: insulin glargine  INJECT TEN UNITS INTO SKIN AT BEDTIME     losartan 25 MG tablet  Commonly known as: COZAAR  Take 25 mg by mouth once daily.     pantoprazole 40 MG tablet  Commonly known as: PROTONIX  TAKE ONE TABLET BY MOUTH EVERY DAY     TRUE METRIX GLUCOSE METER Misc  Generic drug: blood-glucose meter  AS DIRECTED     TRUE METRIX GLUCOSE TEST STRIP Strp  Generic drug: blood sugar diagnostic  Twice daily blood sugar checks        ASK your doctor about these medications    metFORMIN 1000 MG tablet  Commonly known as: GLUCOPHAGE  TAKE ONE TABLET BY MOUTH TWICE DAILY            Indwelling Lines/Drains at time of discharge:   Lines/Drains/Airways     None                 Time spent on  the discharge of patient: 35 minutes         Page Li NP  Department of Hospital Medicine  [facility] - Telemetry

## 2023-04-11 NOTE — NURSING
Report given to oncoming nurse.  Patient remains AAOX4, sitting on edge of bed.  No acute distress noted.  Remains on 2L/NC, call bell in reach Will continue to monitor.

## 2023-04-11 NOTE — PLAN OF CARE
pLAN OF CARE IS ONGOING.      Problem: Skin Injury Risk Increased  Goal: Skin Health and Integrity  Outcome: Ongoing, Progressing     Problem: Adult Inpatient Plan of Care  Goal: Plan of Care Review  Outcome: Ongoing, Progressing  Goal: Patient-Specific Goal (Individualized)  Outcome: Ongoing, Progressing  Goal: Absence of Hospital-Acquired Illness or Injury  Outcome: Ongoing, Progressing  Goal: Optimal Comfort and Wellbeing  Outcome: Ongoing, Progressing  Goal: Readiness for Transition of Care  Outcome: Ongoing, Progressing     Problem: Infection  Goal: Absence of Infection Signs and Symptoms  Outcome: Ongoing, Progressing     Problem: Diabetes Comorbidity  Goal: Blood Glucose Level Within Targeted Range  Outcome: Ongoing, Progressing     Problem: Impaired Wound Healing  Goal: Optimal Wound Healing  Outcome: Ongoing, Progressing     Problem: Fall Injury Risk  Goal: Absence of Fall and Fall-Related Injury  Outcome: Ongoing, Progressing

## 2023-04-11 NOTE — NURSING
[facility]  Nurses Note -- 4 Eyes      4/10/2023       Skin assessed on: Q Shift      [x] No Pressure Injuries Present    [x]Prevention Measures Documented    [] Yes LDA  for Pressure Injury Previously documented     [] Yes New Pressure Injury Discovered   [] LDA for New Pressure Injury Added      Attending RN:  Anabel Casey RN     Second RN:  Kwan OTERO

## 2023-04-11 NOTE — HOSPITAL COURSE
"Admission to observation for hyperglycemia. He initially presented to ED for "panic attack." EKG   on arrival to ED that resolved shortly in ED. Troponin negative. No longer have shortness of breath. Blood sugar improved with insulin. Patient admits to not taking insulin with meals as would drop his blood sugars to "80's" which cause him to feel bad.  He admits not taking insulin if his blood sugar is not more than 250. Discuss decreasing meal time insulin. Instruct to log blood sugars and bring to next PCP office visit for continue insulin adjustment. Patient ready for discharge home. All findings and plan were explained to the patient. All questions and concerns were answered. Patient verbalized understanding. Patient is in stable condition to d/c home and has been informed to follow up with PCP within the next 7-10 days to discuss PCP.   "

## 2023-05-15 ENCOUNTER — HOSPITAL ENCOUNTER (INPATIENT)
Facility: HOSPITAL | Age: 63
LOS: 4 days | Discharge: HOME | DRG: 300 | End: 2023-05-19
Attending: EMERGENCY MEDICINE | Admitting: EMERGENCY MEDICINE
Payer: MEDICAID

## 2023-05-15 DIAGNOSIS — S61.409A: ICD-10-CM

## 2023-05-15 DIAGNOSIS — R07.9 CHEST PAIN: ICD-10-CM

## 2023-05-15 DIAGNOSIS — S61.401A NECROTIC WOUND OF RIGHT HAND, INITIAL ENCOUNTER: ICD-10-CM

## 2023-05-15 DIAGNOSIS — I96 GANGRENE OF FINGER OF RIGHT HAND: Primary | ICD-10-CM

## 2023-05-15 DIAGNOSIS — R73.9 HYPERGLYCEMIA: ICD-10-CM

## 2023-05-15 DIAGNOSIS — I96 NECROTIC WOUND OF RIGHT HAND, INITIAL ENCOUNTER: ICD-10-CM

## 2023-05-15 DIAGNOSIS — I96: ICD-10-CM

## 2023-05-15 DIAGNOSIS — I73.9 PERIPHERAL VASCULAR DISEASE OF EXTREMITY: ICD-10-CM

## 2023-05-15 LAB
ALBUMIN SERPL BCP-MCNC: 3.4 G/DL (ref 3.5–5.2)
ALLENS TEST: ABNORMAL
ALP SERPL-CCNC: 111 U/L (ref 55–135)
ALT SERPL W/O P-5'-P-CCNC: 18 U/L (ref 10–44)
ANION GAP SERPL CALC-SCNC: 9 MMOL/L (ref 8–16)
AST SERPL-CCNC: 16 U/L (ref 10–40)
B-OH-BUTYR BLD STRIP-SCNC: 0.1 MMOL/L (ref 0–0.5)
BASOPHILS # BLD AUTO: 0.02 K/UL (ref 0–0.2)
BASOPHILS NFR BLD: 0.4 % (ref 0–1.9)
BILIRUB SERPL-MCNC: 0.5 MG/DL (ref 0.1–1)
BUN SERPL-MCNC: 41 MG/DL (ref 8–23)
CALCIUM SERPL-MCNC: 9.2 MG/DL (ref 8.7–10.5)
CHLORIDE SERPL-SCNC: 98 MMOL/L (ref 95–110)
CO2 SERPL-SCNC: 24 MMOL/L (ref 23–29)
CREAT SERPL-MCNC: 1.9 MG/DL (ref 0.5–1.4)
DELSYS: ABNORMAL
DIFFERENTIAL METHOD: ABNORMAL
EOSINOPHIL # BLD AUTO: 0.1 K/UL (ref 0–0.5)
EOSINOPHIL NFR BLD: 2.4 % (ref 0–8)
ERYTHROCYTE [DISTWIDTH] IN BLOOD BY AUTOMATED COUNT: 18.2 % (ref 11.5–14.5)
EST. GFR  (NO RACE VARIABLE): 40 ML/MIN/1.73 M^2
ETHANOL SERPL-MCNC: <10 MG/DL
GLUCOSE SERPL-MCNC: 484 MG/DL (ref 70–110)
HCO3 UR-SCNC: 24.9 MMOL/L (ref 24–28)
HCT VFR BLD AUTO: 26.7 % (ref 40–54)
HGB BLD-MCNC: 9.7 G/DL (ref 14–18)
IMM GRANULOCYTES # BLD AUTO: 0.05 K/UL (ref 0–0.04)
IMM GRANULOCYTES NFR BLD AUTO: 1 % (ref 0–0.5)
LACTATE SERPL-SCNC: 1.7 MMOL/L (ref 0.5–2.2)
LYMPHOCYTES # BLD AUTO: 0.8 K/UL (ref 1–4.8)
LYMPHOCYTES NFR BLD: 15.1 % (ref 18–48)
MCH RBC QN AUTO: 35.4 PG (ref 27–31)
MCHC RBC AUTO-ENTMCNC: 36.3 G/DL (ref 32–36)
MCV RBC AUTO: 97 FL (ref 82–98)
MONOCYTES # BLD AUTO: 0.3 K/UL (ref 0.3–1)
MONOCYTES NFR BLD: 6 % (ref 4–15)
NEUTROPHILS # BLD AUTO: 3.7 K/UL (ref 1.8–7.7)
NEUTROPHILS NFR BLD: 75.1 % (ref 38–73)
NRBC BLD-RTO: 0 /100 WBC
PCO2 BLDA: 41.4 MMHG (ref 35–45)
PH SMN: 7.39 [PH] (ref 7.35–7.45)
PLATELET # BLD AUTO: 121 K/UL (ref 150–450)
PMV BLD AUTO: 10.2 FL (ref 9.2–12.9)
PO2 BLDA: 49 MMHG (ref 40–60)
POC BE: 0 MMOL/L
POC SATURATED O2: 84 % (ref 95–100)
POC TCO2: 26 MMOL/L (ref 24–29)
POCT GLUCOSE: >500 MG/DL (ref 70–110)
POTASSIUM SERPL-SCNC: 5 MMOL/L (ref 3.5–5.1)
PROT SERPL-MCNC: 7.1 G/DL (ref 6–8.4)
RBC # BLD AUTO: 2.74 M/UL (ref 4.6–6.2)
SAMPLE: ABNORMAL
SITE: ABNORMAL
SODIUM SERPL-SCNC: 131 MMOL/L (ref 136–145)
TROPONIN I SERPL DL<=0.01 NG/ML-MCNC: <0.006 NG/ML (ref 0–0.03)
WBC # BLD AUTO: 4.97 K/UL (ref 3.9–12.7)

## 2023-05-15 PROCEDURE — 12000002 HC ACUTE/MED SURGE SEMI-PRIVATE ROOM

## 2023-05-15 PROCEDURE — 84484 ASSAY OF TROPONIN QUANT: CPT | Performed by: EMERGENCY MEDICINE

## 2023-05-15 PROCEDURE — 80053 COMPREHEN METABOLIC PANEL: CPT | Performed by: EMERGENCY MEDICINE

## 2023-05-15 PROCEDURE — 25000003 PHARM REV CODE 250: Performed by: EMERGENCY MEDICINE

## 2023-05-15 PROCEDURE — 85025 COMPLETE CBC W/AUTO DIFF WBC: CPT | Performed by: EMERGENCY MEDICINE

## 2023-05-15 PROCEDURE — 81000 URINALYSIS NONAUTO W/SCOPE: CPT | Performed by: EMERGENCY MEDICINE

## 2023-05-15 PROCEDURE — 93010 EKG 12-LEAD: ICD-10-PCS | Mod: ,,, | Performed by: INTERNAL MEDICINE

## 2023-05-15 PROCEDURE — 82010 KETONE BODYS QUAN: CPT | Performed by: EMERGENCY MEDICINE

## 2023-05-15 PROCEDURE — 96365 THER/PROPH/DIAG IV INF INIT: CPT

## 2023-05-15 PROCEDURE — 96367 TX/PROPH/DG ADDL SEQ IV INF: CPT

## 2023-05-15 PROCEDURE — 87040 BLOOD CULTURE FOR BACTERIA: CPT | Mod: 59 | Performed by: EMERGENCY MEDICINE

## 2023-05-15 PROCEDURE — 82962 GLUCOSE BLOOD TEST: CPT

## 2023-05-15 PROCEDURE — 83605 ASSAY OF LACTIC ACID: CPT | Performed by: EMERGENCY MEDICINE

## 2023-05-15 PROCEDURE — 99900035 HC TECH TIME PER 15 MIN (STAT)

## 2023-05-15 PROCEDURE — 93010 ELECTROCARDIOGRAM REPORT: CPT | Mod: ,,, | Performed by: INTERNAL MEDICINE

## 2023-05-15 PROCEDURE — 82803 BLOOD GASES ANY COMBINATION: CPT

## 2023-05-15 PROCEDURE — 93005 ELECTROCARDIOGRAM TRACING: CPT

## 2023-05-15 PROCEDURE — 63600175 PHARM REV CODE 636 W HCPCS: Performed by: EMERGENCY MEDICINE

## 2023-05-15 PROCEDURE — 99285 EMERGENCY DEPT VISIT HI MDM: CPT | Mod: 25

## 2023-05-15 PROCEDURE — 82077 ASSAY SPEC XCP UR&BREATH IA: CPT | Performed by: EMERGENCY MEDICINE

## 2023-05-15 PROCEDURE — 96375 TX/PRO/DX INJ NEW DRUG ADDON: CPT

## 2023-05-15 RX ORDER — MORPHINE SULFATE 4 MG/ML
2 INJECTION, SOLUTION INTRAMUSCULAR; INTRAVENOUS
Status: COMPLETED | OUTPATIENT
Start: 2023-05-15 | End: 2023-05-15

## 2023-05-15 RX ORDER — ONDANSETRON 2 MG/ML
4 INJECTION INTRAMUSCULAR; INTRAVENOUS
Status: COMPLETED | OUTPATIENT
Start: 2023-05-15 | End: 2023-05-15

## 2023-05-15 RX ADMIN — SODIUM CHLORIDE 1000 ML: 9 INJECTION, SOLUTION INTRAVENOUS at 08:05

## 2023-05-15 RX ADMIN — VANCOMYCIN HYDROCHLORIDE 1750 MG: 500 INJECTION, POWDER, LYOPHILIZED, FOR SOLUTION INTRAVENOUS at 10:05

## 2023-05-15 RX ADMIN — PIPERACILLIN AND TAZOBACTAM 4.5 G: 4; .5 INJECTION, POWDER, LYOPHILIZED, FOR SOLUTION INTRAVENOUS; PARENTERAL at 09:05

## 2023-05-15 RX ADMIN — MORPHINE SULFATE 2 MG: 4 INJECTION, SOLUTION INTRAMUSCULAR; INTRAVENOUS at 10:05

## 2023-05-15 RX ADMIN — ONDANSETRON 4 MG: 2 INJECTION INTRAMUSCULAR; INTRAVENOUS at 11:05

## 2023-05-16 ENCOUNTER — DOCUMENTATION ONLY (OUTPATIENT)
Dept: RADIOLOGY | Facility: HOSPITAL | Age: 63
End: 2023-05-16
Payer: MEDICAID

## 2023-05-16 PROBLEM — I96: Status: RESOLVED | Noted: 2023-05-16 | Resolved: 2023-05-16

## 2023-05-16 PROBLEM — I96: Status: ACTIVE | Noted: 2023-05-16

## 2023-05-16 PROBLEM — S61.409A: Status: RESOLVED | Noted: 2023-05-16 | Resolved: 2023-05-16

## 2023-05-16 PROBLEM — R73.9 HYPERGLYCEMIA: Status: ACTIVE | Noted: 2023-04-10

## 2023-05-16 PROBLEM — S61.409A: Status: ACTIVE | Noted: 2023-05-16

## 2023-05-16 PROBLEM — E11.69 HYPERLIPIDEMIA ASSOCIATED WITH TYPE 2 DIABETES MELLITUS: Status: RESOLVED | Noted: 2022-12-08 | Resolved: 2023-05-16

## 2023-05-16 PROBLEM — E78.5 HYPERLIPIDEMIA ASSOCIATED WITH TYPE 2 DIABETES MELLITUS: Status: RESOLVED | Noted: 2022-12-08 | Resolved: 2023-05-16

## 2023-05-16 LAB
ALBUMIN SERPL BCP-MCNC: 3.1 G/DL (ref 3.5–5.2)
ALP SERPL-CCNC: 78 U/L (ref 55–135)
ALT SERPL W/O P-5'-P-CCNC: 18 U/L (ref 10–44)
ANION GAP SERPL CALC-SCNC: 8 MMOL/L (ref 8–16)
AST SERPL-CCNC: 14 U/L (ref 10–40)
BACTERIA #/AREA URNS HPF: NORMAL /HPF
BACTERIA #/AREA URNS HPF: NORMAL /HPF
BASOPHILS # BLD AUTO: 0.01 K/UL (ref 0–0.2)
BASOPHILS NFR BLD: 0.2 % (ref 0–1.9)
BILIRUB SERPL-MCNC: 0.6 MG/DL (ref 0.1–1)
BILIRUB UR QL STRIP: NEGATIVE
BILIRUB UR QL STRIP: NEGATIVE
BUN SERPL-MCNC: 37 MG/DL (ref 8–23)
CALCIUM SERPL-MCNC: 8.5 MG/DL (ref 8.7–10.5)
CHLORIDE SERPL-SCNC: 99 MMOL/L (ref 95–110)
CLARITY UR: CLEAR
CLARITY UR: CLEAR
CO2 SERPL-SCNC: 24 MMOL/L (ref 23–29)
COLOR UR: COLORLESS
COLOR UR: YELLOW
CREAT SERPL-MCNC: 1.6 MG/DL (ref 0.5–1.4)
CRP SERPL-MCNC: 40 MG/L (ref 0–8.2)
DIFFERENTIAL METHOD: ABNORMAL
EOSINOPHIL # BLD AUTO: 0.1 K/UL (ref 0–0.5)
EOSINOPHIL NFR BLD: 2.9 % (ref 0–8)
ERYTHROCYTE [DISTWIDTH] IN BLOOD BY AUTOMATED COUNT: 18 % (ref 11.5–14.5)
ERYTHROCYTE [SEDIMENTATION RATE] IN BLOOD BY WESTERGREN METHOD: 84 MM/HR (ref 0–10)
EST. GFR  (NO RACE VARIABLE): 49 ML/MIN/1.73 M^2
ESTIMATED AVG GLUCOSE: 289 MG/DL (ref 68–131)
GLUCOSE SERPL-MCNC: 424 MG/DL (ref 70–110)
GLUCOSE UR QL STRIP: ABNORMAL
GLUCOSE UR QL STRIP: ABNORMAL
HBA1C MFR BLD: 11.7 % (ref 4–5.6)
HCT VFR BLD AUTO: 26.3 % (ref 40–54)
HGB BLD-MCNC: 9.5 G/DL (ref 14–18)
HGB UR QL STRIP: NEGATIVE
HGB UR QL STRIP: NEGATIVE
HYALINE CASTS #/AREA URNS LPF: 1 /LPF
IMM GRANULOCYTES # BLD AUTO: 0.03 K/UL (ref 0–0.04)
IMM GRANULOCYTES NFR BLD AUTO: 0.7 % (ref 0–0.5)
KETONES UR QL STRIP: NEGATIVE
KETONES UR QL STRIP: NEGATIVE
LEUKOCYTE ESTERASE UR QL STRIP: NEGATIVE
LEUKOCYTE ESTERASE UR QL STRIP: NEGATIVE
LYMPHOCYTES # BLD AUTO: 0.7 K/UL (ref 1–4.8)
LYMPHOCYTES NFR BLD: 16.2 % (ref 18–48)
MCH RBC QN AUTO: 36.1 PG (ref 27–31)
MCHC RBC AUTO-ENTMCNC: 36.1 G/DL (ref 32–36)
MCV RBC AUTO: 100 FL (ref 82–98)
MICROSCOPIC COMMENT: NORMAL
MICROSCOPIC COMMENT: NORMAL
MONOCYTES # BLD AUTO: 0.3 K/UL (ref 0.3–1)
MONOCYTES NFR BLD: 7.3 % (ref 4–15)
NEUTROPHILS # BLD AUTO: 3 K/UL (ref 1.8–7.7)
NEUTROPHILS NFR BLD: 72.7 % (ref 38–73)
NITRITE UR QL STRIP: NEGATIVE
NITRITE UR QL STRIP: NEGATIVE
NRBC BLD-RTO: 0 /100 WBC
PH UR STRIP: 6 [PH] (ref 5–8)
PH UR STRIP: 6 [PH] (ref 5–8)
PLATELET # BLD AUTO: 101 K/UL (ref 150–450)
PMV BLD AUTO: 9.7 FL (ref 9.2–12.9)
POCT GLUCOSE: 245 MG/DL (ref 70–110)
POCT GLUCOSE: 311 MG/DL (ref 70–110)
POCT GLUCOSE: 369 MG/DL (ref 70–110)
POCT GLUCOSE: 383 MG/DL (ref 70–110)
POCT GLUCOSE: 421 MG/DL (ref 70–110)
POCT GLUCOSE: 476 MG/DL (ref 70–110)
POCT GLUCOSE: 478 MG/DL (ref 70–110)
POTASSIUM SERPL-SCNC: 4.9 MMOL/L (ref 3.5–5.1)
PROT SERPL-MCNC: 6.4 G/DL (ref 6–8.4)
PROT UR QL STRIP: ABNORMAL
PROT UR QL STRIP: ABNORMAL
RBC # BLD AUTO: 2.63 M/UL (ref 4.6–6.2)
RBC #/AREA URNS HPF: 2 /HPF (ref 0–4)
SODIUM SERPL-SCNC: 131 MMOL/L (ref 136–145)
SP GR UR STRIP: 1.01 (ref 1–1.03)
SP GR UR STRIP: 1.01 (ref 1–1.03)
TSH SERPL DL<=0.005 MIU/L-ACNC: 3.93 UIU/ML (ref 0.4–4)
URN SPEC COLLECT METH UR: ABNORMAL
URN SPEC COLLECT METH UR: ABNORMAL
UROBILINOGEN UR STRIP-ACNC: NEGATIVE EU/DL
UROBILINOGEN UR STRIP-ACNC: NEGATIVE EU/DL
WBC # BLD AUTO: 4.13 K/UL (ref 3.9–12.7)
WBC #/AREA URNS HPF: 3 /HPF (ref 0–5)
WBC #/AREA URNS HPF: 5 /HPF (ref 0–5)
WBC CLUMPS URNS QL MICRO: NORMAL
YEAST URNS QL MICRO: NORMAL
YEAST URNS QL MICRO: NORMAL

## 2023-05-16 PROCEDURE — 36415 COLL VENOUS BLD VENIPUNCTURE: CPT | Performed by: ORTHOPAEDIC SURGERY

## 2023-05-16 PROCEDURE — 25000003 PHARM REV CODE 250: Performed by: STUDENT IN AN ORGANIZED HEALTH CARE EDUCATION/TRAINING PROGRAM

## 2023-05-16 PROCEDURE — 81000 URINALYSIS NONAUTO W/SCOPE: CPT | Performed by: STUDENT IN AN ORGANIZED HEALTH CARE EDUCATION/TRAINING PROGRAM

## 2023-05-16 PROCEDURE — 63600175 PHARM REV CODE 636 W HCPCS: Performed by: STUDENT IN AN ORGANIZED HEALTH CARE EDUCATION/TRAINING PROGRAM

## 2023-05-16 PROCEDURE — 36415 COLL VENOUS BLD VENIPUNCTURE: CPT | Performed by: STUDENT IN AN ORGANIZED HEALTH CARE EDUCATION/TRAINING PROGRAM

## 2023-05-16 PROCEDURE — 85652 RBC SED RATE AUTOMATED: CPT | Performed by: ORTHOPAEDIC SURGERY

## 2023-05-16 PROCEDURE — 99900035 HC TECH TIME PER 15 MIN (STAT)

## 2023-05-16 PROCEDURE — 85025 COMPLETE CBC W/AUTO DIFF WBC: CPT | Performed by: STUDENT IN AN ORGANIZED HEALTH CARE EDUCATION/TRAINING PROGRAM

## 2023-05-16 PROCEDURE — 21400001 HC TELEMETRY ROOM

## 2023-05-16 PROCEDURE — 80053 COMPREHEN METABOLIC PANEL: CPT | Performed by: STUDENT IN AN ORGANIZED HEALTH CARE EDUCATION/TRAINING PROGRAM

## 2023-05-16 PROCEDURE — 83036 HEMOGLOBIN GLYCOSYLATED A1C: CPT | Performed by: STUDENT IN AN ORGANIZED HEALTH CARE EDUCATION/TRAINING PROGRAM

## 2023-05-16 PROCEDURE — 86140 C-REACTIVE PROTEIN: CPT | Performed by: ORTHOPAEDIC SURGERY

## 2023-05-16 PROCEDURE — 84443 ASSAY THYROID STIM HORMONE: CPT | Performed by: STUDENT IN AN ORGANIZED HEALTH CARE EDUCATION/TRAINING PROGRAM

## 2023-05-16 RX ORDER — INSULIN ASPART 100 [IU]/ML
5 INJECTION, SOLUTION INTRAVENOUS; SUBCUTANEOUS
Status: DISCONTINUED | OUTPATIENT
Start: 2023-05-16 | End: 2023-05-17

## 2023-05-16 RX ORDER — OXYCODONE AND ACETAMINOPHEN 5; 325 MG/1; MG/1
1 TABLET ORAL EVERY 4 HOURS PRN
Status: DISCONTINUED | OUTPATIENT
Start: 2023-05-16 | End: 2023-05-18

## 2023-05-16 RX ORDER — DEXTROSE 40 %
15 GEL (GRAM) ORAL
Status: DISCONTINUED | OUTPATIENT
Start: 2023-05-16 | End: 2023-05-19 | Stop reason: HOSPADM

## 2023-05-16 RX ORDER — SIMETHICONE 80 MG
1 TABLET,CHEWABLE ORAL 4 TIMES DAILY PRN
Status: DISCONTINUED | OUTPATIENT
Start: 2023-05-16 | End: 2023-05-19 | Stop reason: HOSPADM

## 2023-05-16 RX ORDER — HYDROCODONE BITARTRATE AND ACETAMINOPHEN 5; 325 MG/1; MG/1
1 TABLET ORAL EVERY 6 HOURS PRN
Status: DISCONTINUED | OUTPATIENT
Start: 2023-05-16 | End: 2023-05-16

## 2023-05-16 RX ORDER — FUROSEMIDE 40 MG/1
40 TABLET ORAL 2 TIMES DAILY
Status: DISCONTINUED | OUTPATIENT
Start: 2023-05-16 | End: 2023-05-18

## 2023-05-16 RX ORDER — INSULIN ASPART 100 [IU]/ML
1-10 INJECTION, SOLUTION INTRAVENOUS; SUBCUTANEOUS
Status: DISCONTINUED | OUTPATIENT
Start: 2023-05-16 | End: 2023-05-19 | Stop reason: HOSPADM

## 2023-05-16 RX ORDER — NALOXONE HCL 0.4 MG/ML
0.02 VIAL (ML) INJECTION
Status: DISCONTINUED | OUTPATIENT
Start: 2023-05-16 | End: 2023-05-19 | Stop reason: HOSPADM

## 2023-05-16 RX ORDER — DEXTROSE 40 %
30 GEL (GRAM) ORAL
Status: DISCONTINUED | OUTPATIENT
Start: 2023-05-16 | End: 2023-05-19 | Stop reason: HOSPADM

## 2023-05-16 RX ORDER — ALBUTEROL SULFATE 90 UG/1
2 AEROSOL, METERED RESPIRATORY (INHALATION) EVERY 6 HOURS PRN
Status: DISCONTINUED | OUTPATIENT
Start: 2023-05-16 | End: 2023-05-16

## 2023-05-16 RX ORDER — ATORVASTATIN CALCIUM 40 MG/1
40 TABLET, FILM COATED ORAL DAILY
Status: DISCONTINUED | OUTPATIENT
Start: 2023-05-16 | End: 2023-05-19 | Stop reason: HOSPADM

## 2023-05-16 RX ORDER — GLUCAGON 1 MG
1 KIT INJECTION
Status: DISCONTINUED | OUTPATIENT
Start: 2023-05-16 | End: 2023-05-19 | Stop reason: HOSPADM

## 2023-05-16 RX ORDER — CEFEPIME HYDROCHLORIDE 1 G/50ML
1 INJECTION, SOLUTION INTRAVENOUS
Status: DISCONTINUED | OUTPATIENT
Start: 2023-05-16 | End: 2023-05-18

## 2023-05-16 RX ORDER — CLOPIDOGREL BISULFATE 75 MG/1
75 TABLET ORAL DAILY
Status: DISCONTINUED | OUTPATIENT
Start: 2023-05-16 | End: 2023-05-18

## 2023-05-16 RX ORDER — SODIUM CHLORIDE 0.9 % (FLUSH) 0.9 %
10 SYRINGE (ML) INJECTION
Status: DISCONTINUED | OUTPATIENT
Start: 2023-05-16 | End: 2023-05-19 | Stop reason: HOSPADM

## 2023-05-16 RX ORDER — ALBUTEROL SULFATE 2.5 MG/.5ML
2.5 SOLUTION RESPIRATORY (INHALATION) EVERY 6 HOURS PRN
Status: DISCONTINUED | OUTPATIENT
Start: 2023-05-16 | End: 2023-05-19 | Stop reason: HOSPADM

## 2023-05-16 RX ORDER — NAPROXEN SODIUM 220 MG/1
81 TABLET, FILM COATED ORAL DAILY
Status: DISCONTINUED | OUTPATIENT
Start: 2023-05-16 | End: 2023-05-19 | Stop reason: HOSPADM

## 2023-05-16 RX ORDER — PANTOPRAZOLE SODIUM 40 MG/1
40 TABLET, DELAYED RELEASE ORAL DAILY
Status: DISCONTINUED | OUTPATIENT
Start: 2023-05-16 | End: 2023-05-19 | Stop reason: HOSPADM

## 2023-05-16 RX ORDER — POLYETHYLENE GLYCOL 3350 17 G/17G
17 POWDER, FOR SOLUTION ORAL DAILY
Status: DISCONTINUED | OUTPATIENT
Start: 2023-05-16 | End: 2023-05-19 | Stop reason: HOSPADM

## 2023-05-16 RX ORDER — TALC
6 POWDER (GRAM) TOPICAL NIGHTLY PRN
Status: DISCONTINUED | OUTPATIENT
Start: 2023-05-16 | End: 2023-05-19 | Stop reason: HOSPADM

## 2023-05-16 RX ORDER — HEPARIN SODIUM 5000 [USP'U]/ML
5000 INJECTION, SOLUTION INTRAVENOUS; SUBCUTANEOUS EVERY 8 HOURS
Status: DISCONTINUED | OUTPATIENT
Start: 2023-05-16 | End: 2023-05-18

## 2023-05-16 RX ORDER — ACETAMINOPHEN 325 MG/1
650 TABLET ORAL EVERY 8 HOURS PRN
Status: DISCONTINUED | OUTPATIENT
Start: 2023-05-16 | End: 2023-05-19 | Stop reason: HOSPADM

## 2023-05-16 RX ORDER — LOSARTAN POTASSIUM 25 MG/1
25 TABLET ORAL DAILY
Status: DISCONTINUED | OUTPATIENT
Start: 2023-05-16 | End: 2023-05-19 | Stop reason: HOSPADM

## 2023-05-16 RX ORDER — ONDANSETRON 2 MG/ML
4 INJECTION INTRAMUSCULAR; INTRAVENOUS EVERY 8 HOURS PRN
Status: DISCONTINUED | OUTPATIENT
Start: 2023-05-16 | End: 2023-05-19 | Stop reason: HOSPADM

## 2023-05-16 RX ADMIN — INSULIN ASPART 10 UNITS: 100 INJECTION, SOLUTION INTRAVENOUS; SUBCUTANEOUS at 11:05

## 2023-05-16 RX ADMIN — INSULIN DETEMIR 20 UNITS: 100 INJECTION, SOLUTION SUBCUTANEOUS at 08:05

## 2023-05-16 RX ADMIN — INSULIN ASPART 5 UNITS: 100 INJECTION, SOLUTION INTRAVENOUS; SUBCUTANEOUS at 11:05

## 2023-05-16 RX ADMIN — INSULIN ASPART 5 UNITS: 100 INJECTION, SOLUTION INTRAVENOUS; SUBCUTANEOUS at 05:05

## 2023-05-16 RX ADMIN — CEFEPIME HYDROCHLORIDE 1 G: 1 INJECTION, SOLUTION INTRAVENOUS at 02:05

## 2023-05-16 RX ADMIN — HEPARIN SODIUM 5000 UNITS: 5000 INJECTION INTRAVENOUS; SUBCUTANEOUS at 10:05

## 2023-05-16 RX ADMIN — INSULIN ASPART 4 UNITS: 100 INJECTION, SOLUTION INTRAVENOUS; SUBCUTANEOUS at 05:05

## 2023-05-16 RX ADMIN — ASPIRIN 81 MG 81 MG: 81 TABLET ORAL at 08:05

## 2023-05-16 RX ADMIN — FUROSEMIDE 40 MG: 40 TABLET ORAL at 05:05

## 2023-05-16 RX ADMIN — INSULIN ASPART 10 UNITS: 100 INJECTION, SOLUTION INTRAVENOUS; SUBCUTANEOUS at 04:05

## 2023-05-16 RX ADMIN — OXYCODONE AND ACETAMINOPHEN 1 TABLET: 5; 325 TABLET ORAL at 06:05

## 2023-05-16 RX ADMIN — CEFEPIME HYDROCHLORIDE 1 G: 1 INJECTION, SOLUTION INTRAVENOUS at 08:05

## 2023-05-16 RX ADMIN — PANTOPRAZOLE SODIUM 40 MG: 40 TABLET, DELAYED RELEASE ORAL at 08:05

## 2023-05-16 RX ADMIN — INSULIN ASPART 4 UNITS: 100 INJECTION, SOLUTION INTRAVENOUS; SUBCUTANEOUS at 08:05

## 2023-05-16 RX ADMIN — INSULIN ASPART 5 UNITS: 100 INJECTION, SOLUTION INTRAVENOUS; SUBCUTANEOUS at 08:05

## 2023-05-16 RX ADMIN — ATORVASTATIN CALCIUM 40 MG: 40 TABLET, FILM COATED ORAL at 08:05

## 2023-05-16 RX ADMIN — CEFEPIME HYDROCHLORIDE 1 G: 1 INJECTION, SOLUTION INTRAVENOUS at 05:05

## 2023-05-16 RX ADMIN — VANCOMYCIN HYDROCHLORIDE 1750 MG: 500 INJECTION, POWDER, LYOPHILIZED, FOR SOLUTION INTRAVENOUS at 10:05

## 2023-05-16 RX ADMIN — OXYCODONE AND ACETAMINOPHEN 1 TABLET: 5; 325 TABLET ORAL at 10:05

## 2023-05-16 RX ADMIN — CLOPIDOGREL BISULFATE 75 MG: 75 TABLET ORAL at 08:05

## 2023-05-16 RX ADMIN — HEPARIN SODIUM 5000 UNITS: 5000 INJECTION INTRAVENOUS; SUBCUTANEOUS at 05:05

## 2023-05-16 RX ADMIN — LOSARTAN POTASSIUM 25 MG: 25 TABLET, FILM COATED ORAL at 08:05

## 2023-05-16 RX ADMIN — OXYCODONE AND ACETAMINOPHEN 1 TABLET: 5; 325 TABLET ORAL at 04:05

## 2023-05-16 RX ADMIN — FUROSEMIDE 40 MG: 40 TABLET ORAL at 08:05

## 2023-05-16 RX ADMIN — INSULIN ASPART 10 UNITS: 100 INJECTION, SOLUTION INTRAVENOUS; SUBCUTANEOUS at 08:05

## 2023-05-16 RX ADMIN — HEPARIN SODIUM 5000 UNITS: 5000 INJECTION INTRAVENOUS; SUBCUTANEOUS at 01:05

## 2023-05-16 NOTE — ED PROVIDER NOTES
Encounter Date: 5/15/2023       History     Chief Complaint   Patient presents with    Wound Check     Patient arrives with wound to middle and ring fingers on right hand. Cut hand 3 days ago, now having swelling to these fingers with dark colored wounds. Has been taking amoxicillin at home that was prescribed earlier this year for different infection, but it has been getting worse. His doctor advised him to be seen at the ER.    Blood Sugar Problem     CBG over 500 in triage     63 yo male presenting with wounds to middle and ring finger.  Patient reports he believes he cut them on his prosthetic leg Thursday or Friday.  Reports he has been putting mupirocin on it and taking amoxicillin.  Notes pain swelling and tenderness to middle finger as well as distal thumb and distal tips of other fingers.  Notes swelling and tenderness to hand and arm.  Denies fevers or chills.  Reports compliance with medications but does not know what they are.  History of multiple amputations in the past as well as vasculopathy.    Review of patient's allergies indicates:   Allergen Reactions    Codeine Rash    Lyrica [pregabalin]      Feet turned Red    Vicodin [hydrocodone-acetaminophen] Rash     Past Medical History:   Diagnosis Date    Arthritis     Back pain     Bulging lumbar disc     C. difficile diarrhea     Chronic back pain 10/14/2014    Cigarette smoker     Diabetes mellitus     Diabetes mellitus type II     DM (diabetes mellitus), type 2, uncontrolled 03/12/2013    Hepatitis C 07/03/2013    MSSA (methicillin susceptible Staphylococcus aureus) septicemia     Neuromuscular disorder     Neuropathy     NSTEMI (non-ST elevated myocardial infarction) 11/28/2022    Staph aureus infection     Status post bilateral below knee amputation      Past Surgical History:   Procedure Laterality Date    ANGIOGRAPHY OF LOWER EXTREMITY Left 06/14/2018    Procedure: Angiogram LEFT LOWER Extremity with US guided access from right side;  Surgeon:  Sony Srinivasan MD;  Location: [facility] OR [address] [place] [address];  Service: Peripheral Vascular;  Laterality: Left;  local: 16ml  contrast: 20ml   fluoro: 2.8  427.73mGy    APPENDECTOMY      BELOW KNEE AMPUTATION OF LOWER EXTREMITY Bilateral     CORONARY ANGIOGRAPHY N/A 11/29/2022    Procedure: ANGIOGRAM, CORONARY ARTERY;  Surgeon: Bobby Harding MD;  Location: [facility] CATH LAB;  Service: Cardiology;  Laterality: N/A;    JOINT REPLACEMENT      left knee surgery      left leg surgery      broken bone    LEG SURGERY  right     Right arm boil      Right great toe amputation      TOE AMPUTATION Right      Family History   Problem Relation Age of Onset    Arthritis Mother     Arthritis Sister     Diabetes Sister     Arthritis Brother      Social History     Tobacco Use    Smoking status: Former     Packs/day: 1.00     Types: Cigarettes    Smokeless tobacco: Never   Substance Use Topics    Alcohol use: No    Drug use: No     Review of Systems   Constitutional:  Negative for chills and fever.   HENT:  Negative for sore throat.    Respiratory:  Negative for shortness of breath.    Cardiovascular:  Negative for chest pain.   Gastrointestinal:  Negative for nausea.   Genitourinary:  Negative for dysuria.   Musculoskeletal:  Positive for arthralgias and joint swelling. Negative for back pain.   Skin:  Negative for rash.   Neurological:  Negative for weakness.   Psychiatric/Behavioral:  Negative for confusion.      Physical Exam     Initial Vitals [05/15/23 1935]   BP Pulse Resp Temp SpO2   (!) 141/71 94 18 98.4 °F (36.9 °C) 97 %      MAP       --         Physical Exam    Nursing note and vitals reviewed.  Constitutional: He appears well-developed and well-nourished. He is not diaphoretic. No distress.   Chronically ill-appearing   HENT:   Head: Normocephalic and atraumatic.   Eyes: Conjunctivae and EOM are normal. Pupils are equal, round, and reactive to light. No scleral icterus.   Neck: Neck supple.   Normal range of  motion.  Cardiovascular:  Normal rate, regular rhythm, normal heart sounds and intact distal pulses.     Exam reveals no gallop and no friction rub.       No murmur heard.  Pulmonary/Chest: Breath sounds normal. No stridor. No respiratory distress. He has no wheezes. He has no rhonchi. He has no rales.   Abdominal: Abdomen is soft. Bowel sounds are normal. He exhibits no distension. There is no abdominal tenderness. There is no rebound and no guarding.   Musculoskeletal:         General: Tenderness and edema present. Normal range of motion.      Cervical back: Normal range of motion and neck supple.      Comments: Pale edematous fingertips in hand.  Delayed cap refill of right hand.  Nonpalpable distal pulse in the radial and ulnar positions.  Doppler bowl signal with triphasic waveform.  Scattered areas of subcutaneous hemorrhage.  Pale nail beds.  Medial nail bed with necrotic ischemic appearing tissue on the right distal finger     Neurological: He is alert and oriented to person, place, and time. He has normal strength. No cranial nerve deficit.   Skin: Skin is warm and dry. No rash noted.   Psychiatric: He has a normal mood and affect. His behavior is normal.       ED Course   Procedures  Labs Reviewed   CBC W/ AUTO DIFFERENTIAL - Abnormal; Notable for the following components:       Result Value    RBC 2.74 (*)     Hemoglobin 9.7 (*)     Hematocrit 26.7 (*)     MCH 35.4 (*)     MCHC 36.3 (*)     RDW 18.2 (*)     Platelets 121 (*)     Immature Granulocytes 1.0 (*)     Immature Grans (Abs) 0.05 (*)     Lymph # 0.8 (*)     Gran % 75.1 (*)     Lymph % 15.1 (*)     All other components within normal limits   COMPREHENSIVE METABOLIC PANEL - Abnormal; Notable for the following components:    Sodium 131 (*)     Glucose 484 (*)     BUN 41 (*)     Creatinine 1.9 (*)     Albumin 3.4 (*)     eGFR 40 (*)     All other components within normal limits    Narrative:       Glucose critical result(s) called and verbal readback  obtained from   Kellee Edmondson by HILARIO 05/15/2023 21:09   POCT GLUCOSE - Abnormal; Notable for the following components:    POCT Glucose >500 (*)     All other components within normal limits   ISTAT PROCEDURE - Abnormal; Notable for the following components:    POC SATURATED O2 84 (*)     All other components within normal limits   CULTURE, BLOOD   CULTURE, BLOOD   BETA - HYDROXYBUTYRATE, SERUM   ALCOHOL,MEDICAL (ETHANOL)   TROPONIN I   LACTIC ACID, PLASMA   TROPONIN I   ALCOHOL,MEDICAL (ETHANOL)   URINALYSIS, REFLEX TO URINE CULTURE   POCT GLUCOSE MONITORING CONTINUOUS          Imaging Results              US Upper Extremity Arteries Right (Final result)  Result time 05/15/23 22:48:25      Final result by Michelle Collins MD (05/15/23 22:48:25)                   Impression:      Monophasic flow in the radial and ulnar arteries suggesting significant disease.    Elevated brachial velocities with normal waveforms.      Electronically signed by: Michelle Collins  Date:    05/15/2023  Time:    22:48               Narrative:    EXAMINATION:  US UPPER EXTREMITY ARTERIES RIGHT    CLINICAL HISTORY:  Necrotic hand wound.  Diabetes mellitus status post BKA, shortness of breath, acute on chronic heart failure, chronic kidney disease, and NSTEMI.    TECHNIQUE:  Duplex and color flow Doppler evaluation and dynamic compression was performed of the right upper extremity veins.    COMPARISON:  None    FINDINGS:  Right upper extremity arterial peak systolic velocities in cm/sec:    Subclavian: 81, biphasic    Axillary: 122, triphasic    Proximal brachial: 168, triphasic    Mid brachial: 165, triphasic    Distal brachial: 158, triphasic    Proximal radial: 72, monophasic    Proximal ulnar: 89, monophasic    Distal radial: 93, monophasic    Distal ulna: 101, monophasic                                       X-Ray Hand 3 view Right (Final result)  Result time 05/15/23 21:27:55      Final result by Michelle Collins MD (05/15/23  21:27:55)                   Impression:      No acute bony abnormality detected.      Electronically signed by: Michelle Collins  Date:    05/15/2023  Time:    21:27               Narrative:    EXAMINATION:  THREE VIEWS OF THE RIGHT HAND    CLINICAL HISTORY:  right hand wound;    TECHNIQUE:  AP, lateral, and oblique views of the right hand    COMPARISON:  05/03/2011.    FINDINGS:  Examination is limited by the slightly flexed appearance of the fingers on the PA view.  There is soft tissue defect involving the 3rd digit visualized.  Three views of the right hand demonstrate no acute fracture or dislocation.  There is a healed fracture of the tuft of the ring finger.  No definite osseous destruction is detected.  Advanced vascular calcifications are seen.                                       Medications   vancomycin - pharmacy to dose (has no administration in time range)   vancomycin (VANCOCIN) 1,750 mg in dextrose 5 % (D5W) 500 mL IVPB (1,750 mg Intravenous New Bag 5/15/23 2245)   sodium chloride 0.9% bolus 1,000 mL 1,000 mL (0 mLs Intravenous Stopped 5/15/23 2209)   piperacillin-tazobactam (ZOSYN) 4.5 g in dextrose 5 % in water (D5W) 5 % 100 mL IVPB (MB+) (0 g Intravenous Stopped 5/15/23 2242)   morphine injection 2 mg (2 mg Intravenous Given 5/15/23 2207)   ondansetron injection 4 mg (4 mg Intravenous Given 5/15/23 2302)     Medical Decision Making:   Initial Assessment:   62-year-old male with wounds to right hand.  Noted bilateral BKA days in coronary artery disease with history of vasculopathy.  Nonpalpable pulse.  Doppler used to reveal triphasic waveform in both the radial and ulnar pulse.  Faint pulse to medial side of middle finger.  Significant delayed cap refill with swelling and tenderness.  Unable to fully range of motion.  Concern for dry gangrene of distal finger tip on the lateral side.  Concern for infection on superimposed vasculopathy.  Will get x-ray, labs, start antibiotics.  ED  Management:  X-rays negative.  Does show significant calcification.  Given decreased blood flow, decreased cap refill with tenderness, arterial ultrasound performed which shows significant vascular disease.  Concern for severe PVD of the limb with possible superimposed gangrene as well as cellulitis.  Broad-spectrum antibiotics started.  Patient will be admitted to Hospital Medicine for further evaluation and treatment of limb ischemia, PVD, gangrene and cellulitis.                        Clinical Impression:   Final diagnoses:  [R73.9] Hyperglycemia  [S61.409A, I96] Necrotic hand wound  [I96] Gangrene of finger of right hand (Primary)  [I73.9] Peripheral vascular disease of extremity        ED Disposition Condition    Admit Stable                Israel Feliciano MD  05/15/23 0112

## 2023-05-16 NOTE — RESPIRATORY THERAPY
Latest Reference Range & Units 05/15/23 20:34   Sample  VENOUS   POC PH 7.35 - 7.45  7.387   POC PCO2 35 - 45 mmHg 41.4   POC PO2 40 - 60 mmHg 49   POC HCO3 24 - 28 mmol/L 24.9   POC TCO2 24 - 29 mmol/L 26   POC SATURATED O2 95 - 100 % 84 (L)   Allens Test  N/A   POC BE -2 to 2 mmol/L 0   DelSys  Room Air   Site  Other   (L): Data is abnormally low

## 2023-05-16 NOTE — ED NOTES
Unable to reach MD Kori.  Called MINE Whitfield for orders.  Ms Whitfield states that she will place orders if MD Kori does not.  Floor nurse notified. Pt to be transported to rm [id number].

## 2023-05-16 NOTE — ASSESSMENT & PLAN NOTE
Presented with redness and swelling over the dorsum of the right hand  Gangrene 3rd and 4th fingers of the right hand  Looks subcutaneous, but will need to rule out osteomyelitis with MRI of the finger  Consult infectious disease  Started on broad spectrum antibiotics - vanc and cefepime

## 2023-05-16 NOTE — PLAN OF CARE
Problem: Adult Inpatient Plan of Care  Goal: Plan of Care Review  Outcome: Ongoing, Progressing     Problem: Diabetes Comorbidity  Goal: Blood Glucose Level Within Targeted Range  Outcome: Ongoing, Progressing     Problem: Impaired Wound Healing  Goal: Optimal Wound Healing  Outcome: Ongoing, Progressing     Problem: Fall Injury Risk  Goal: Absence of Fall and Fall-Related Injury  Outcome: Ongoing, Progressing

## 2023-05-16 NOTE — SUBJECTIVE & OBJECTIVE
Past Medical History:   Diagnosis Date    Arthritis     Back pain     Bulging lumbar disc     C. difficile diarrhea     Chronic back pain 10/14/2014    Cigarette smoker     Diabetes mellitus     Diabetes mellitus type II     DM (diabetes mellitus), type 2, uncontrolled 03/12/2013    Hepatitis C 07/03/2013    MSSA (methicillin susceptible Staphylococcus aureus) septicemia     Neuromuscular disorder     Neuropathy     NSTEMI (non-ST elevated myocardial infarction) 11/28/2022    Staph aureus infection     Status post bilateral below knee amputation        Past Surgical History:   Procedure Laterality Date    ANGIOGRAPHY OF LOWER EXTREMITY Left 06/14/2018    Procedure: Angiogram LEFT LOWER Extremity with US guided access from right side;  Surgeon: Sony Srinivasan MD;  Location: [facility] OR [address] [place] [address];  Service: Peripheral Vascular;  Laterality: Left;  local: 16ml  contrast: 20ml   fluoro: 2.8  427.73mGy    APPENDECTOMY      BELOW KNEE AMPUTATION OF LOWER EXTREMITY Bilateral     CORONARY ANGIOGRAPHY N/A 11/29/2022    Procedure: ANGIOGRAM, CORONARY ARTERY;  Surgeon: Bobby Harding MD;  Location: [facility] CATH LAB;  Service: Cardiology;  Laterality: N/A;    JOINT REPLACEMENT      left knee surgery      left leg surgery      broken bone    LEG SURGERY  right     Right arm boil      Right great toe amputation      TOE AMPUTATION Right        Review of patient's allergies indicates:   Allergen Reactions    Codeine Rash    Lyrica [pregabalin]      Feet turned Red    Vicodin [hydrocodone-acetaminophen] Rash       No current facility-administered medications on file prior to encounter.     Current Outpatient Medications on File Prior to Encounter   Medication Sig    albuterol (VENTOLIN HFA) 90 mcg/actuation inhaler Inhale 2 puffs into the lungs every 6 (six) hours as needed for Wheezing. Rescue    aspirin 81 MG Chew Take 1 tablet (81 mg total) by mouth once daily.    atorvastatin (LIPITOR) 40 MG tablet Take 1 tablet (40 mg total) by  mouth once daily.    clopidogreL (PLAVIX) 75 mg tablet Take 1 tablet (75 mg total) by mouth once daily.    furosemide (LASIX) 40 MG tablet Take 1 tablet (40 mg total) by mouth 2 (two) times daily.    glimepiride (AMARYL) 2 MG tablet Take 1 tablet (2 mg total) by mouth every morning.    HUMALOG U-100 INSULIN 100 unit/mL injection Inject 5 Units into the skin 3 (three) times daily before meals.    LANTUS U-100 INSULIN 100 unit/mL injection INJECT TEN UNITS INTO SKIN AT BEDTIME    losartan (COZAAR) 25 MG tablet Take 25 mg by mouth once daily.    metFORMIN (GLUCOPHAGE) 1000 MG tablet TAKE ONE TABLET BY MOUTH TWICE DAILY (Patient not taking: Reported on 4/10/2023)    pantoprazole (PROTONIX) 40 MG tablet TAKE ONE TABLET BY MOUTH EVERY DAY    TRUE METRIX GLUCOSE METER Misc AS DIRECTED    TRUE METRIX GLUCOSE TEST STRIP Strp Twice daily blood sugar checks     Family History       Problem Relation (Age of Onset)    Arthritis Mother, Sister, Brother    Diabetes Sister          Tobacco Use    Smoking status: Former     Packs/day: 1.00     Types: Cigarettes    Smokeless tobacco: Never   Substance and Sexual Activity    Alcohol use: No    Drug use: No    Sexual activity: Not on file     Review of Systems   Constitutional:  Negative for appetite change, chills, diaphoresis and fatigue.   HENT:  Negative for congestion, sore throat and tinnitus.    Eyes:  Negative for pain, discharge and itching.   Respiratory:  Negative for cough, chest tightness, shortness of breath and wheezing.    Gastrointestinal:  Negative for abdominal distention, abdominal pain, blood in stool, nausea and vomiting.   Endocrine: Negative for cold intolerance, heat intolerance and polydipsia.   Genitourinary:  Negative for difficulty urinating, dysuria, flank pain, frequency and hematuria.   Musculoskeletal:  Positive for joint swelling. Negative for arthralgias, back pain, neck pain and neck stiffness.   Neurological:  Negative for dizziness, seizures,  facial asymmetry, light-headedness, numbness and headaches.   Psychiatric/Behavioral:  Negative for agitation, confusion and hallucinations.    Objective:     Vital Signs (Most Recent):  Temp: 97.6 °F (36.4 °C) (05/16/23 0220)  Pulse: 71 (05/16/23 0220)  Resp: 16 (05/16/23 0220)  BP: (!) 135/58 (05/16/23 0220)  SpO2: 100 % (05/16/23 0220) Vital Signs (24h Range):  Temp:  [97.6 °F (36.4 °C)-98.4 °F (36.9 °C)] 97.6 °F (36.4 °C)  Pulse:  [64-94] 71  Resp:  [16-24] 16  SpO2:  [94 %-100 %] 100 %  BP: (124-172)/(58-93) 135/58     Weight: 109.6 kg (241 lb 10 oz)  Body mass index is 35.68 kg/m².     Physical Exam  Constitutional:       Appearance: He is obese.   HENT:      Head: Normocephalic and atraumatic.      Nose: Nose normal. No congestion or rhinorrhea.      Mouth/Throat:      Mouth: Mucous membranes are moist.   Eyes:      Extraocular Movements: Extraocular movements intact.      Conjunctiva/sclera: Conjunctivae normal.      Pupils: Pupils are equal, round, and reactive to light.   Cardiovascular:      Rate and Rhythm: Normal rate and regular rhythm.      Pulses: Normal pulses.      Heart sounds: Normal heart sounds.   Pulmonary:      Effort: Pulmonary effort is normal.      Breath sounds: Normal breath sounds.   Abdominal:      General: Abdomen is flat. Bowel sounds are normal. There is no distension.      Palpations: Abdomen is soft.      Tenderness: There is no abdominal tenderness. There is no right CVA tenderness, left CVA tenderness, guarding or rebound.   Musculoskeletal:         General: Deformity: s/p bilateral BKA. Normal range of motion.      Cervical back: Normal range of motion and neck supple.      Right lower leg: No edema.   Skin:     General: Skin is warm and dry.   Neurological:      General: No focal deficit present.      Mental Status: He is alert and oriented to person, place, and time. Mental status is at baseline.      Cranial Nerves: No cranial nerve deficit.      Sensory: No sensory deficit.    Psychiatric:         Mood and Affect: Mood normal.         Behavior: Behavior normal.         Thought Content: Thought content normal.         Judgment: Judgment normal.            CRANIAL NERVES     CN III, IV, VI   Pupils are equal, round, and reactive to light.     Significant Labs: All pertinent labs within the past 24 hours have been reviewed.  A1C:   Recent Labs   Lab 11/28/22  2308 12/08/22  1657 04/10/23  0656   HGBA1C 10.9* 10.3* 11.2*     CBC:   Recent Labs   Lab 05/15/23  2020   WBC 4.97   HGB 9.7*   HCT 26.7*   *     CMP:   Recent Labs   Lab 05/15/23  2020   *   K 5.0   CL 98   CO2 24   *   BUN 41*   CREATININE 1.9*   CALCIUM 9.2   PROT 7.1   ALBUMIN 3.4*   BILITOT 0.5   ALKPHOS 111   AST 16   ALT 18   ANIONGAP 9     Lactic Acid:   Recent Labs   Lab 05/15/23  2030   LACTATE 1.7     Magnesium: No results for input(s): MG in the last 48 hours.  Troponin:   Recent Labs   Lab 05/15/23  2020   TROPONINI <0.006     TSH:   Recent Labs   Lab 12/08/22  1224   TSH 4.251*     Urine Studies:   Recent Labs   Lab 05/15/23  2338   COLORU Colorless*   APPEARANCEUA Clear   PHUR 6.0   SPECGRAV 1.015   PROTEINUA Trace*   GLUCUA 4+*   KETONESU Negative   BILIRUBINUA Negative   OCCULTUA Negative   NITRITE Negative   UROBILINOGEN Negative   LEUKOCYTESUR Negative   RBCUA 2   WBCUA 5   BACTERIA Occasional       Significant Imaging: I have reviewed all pertinent imaging results/findings within the past 24 hours.  .  Imaging Results              US Upper Extremity Arteries Right (Final result)  Result time 05/15/23 22:48:25      Final result by Michelle Collins MD (05/15/23 22:48:25)                   Impression:      Monophasic flow in the radial and ulnar arteries suggesting significant disease.    Elevated brachial velocities with normal waveforms.      Electronically signed by: Michelle Collins  Date:    05/15/2023  Time:    22:48               Narrative:    EXAMINATION:  US UPPER EXTREMITY ARTERIES  RIGHT    CLINICAL HISTORY:  Necrotic hand wound.  Diabetes mellitus status post BKA, shortness of breath, acute on chronic heart failure, chronic kidney disease, and NSTEMI.    TECHNIQUE:  Duplex and color flow Doppler evaluation and dynamic compression was performed of the right upper extremity veins.    COMPARISON:  None    FINDINGS:  Right upper extremity arterial peak systolic velocities in cm/sec:    Subclavian: 81, biphasic    Axillary: 122, triphasic    Proximal brachial: 168, triphasic    Mid brachial: 165, triphasic    Distal brachial: 158, triphasic    Proximal radial: 72, monophasic    Proximal ulnar: 89, monophasic    Distal radial: 93, monophasic    Distal ulna: 101, monophasic                                       X-Ray Hand 3 view Right (Final result)  Result time 05/15/23 21:27:55      Final result by Michelle Collins MD (05/15/23 21:27:55)                   Impression:      No acute bony abnormality detected.      Electronically signed by: Michelle Collins  Date:    05/15/2023  Time:    21:27               Narrative:    EXAMINATION:  THREE VIEWS OF THE RIGHT HAND    CLINICAL HISTORY:  right hand wound;    TECHNIQUE:  AP, lateral, and oblique views of the right hand    COMPARISON:  05/03/2011.    FINDINGS:  Examination is limited by the slightly flexed appearance of the fingers on the PA view.  There is soft tissue defect involving the 3rd digit visualized.  Three views of the right hand demonstrate no acute fracture or dislocation.  There is a healed fracture of the tuft of the ring finger.  No definite osseous destruction is detected.  Advanced vascular calcifications are seen.

## 2023-05-16 NOTE — PROGRESS NOTES
Attempted MRI exam ordered but patient refused exam said he is claustrophobic and only able to tolerate open MRI.  Patient refused exam even when medication was offered.  Ordering providers contacted and patient sent back to his room.

## 2023-05-16 NOTE — HPI
62-year-old male with medical history significant for type 2 DM,hypertension,hyperlipidemia,peripheral vascular disease,previous bilateral below knee amputation due DM foot ulcer who presented to the ED with 3 days of swelling and gangrene 3rd and 4th right fingers after sustaining a cut across the finger nail curticle,he denied associated fever,chills or rigor,no nausea or vomiting,he decided to return to the ED due to the spreading swelling and redness over the dorsum of his hand.He denied any urinary symptom of dysuria,frequency,urgency,straining at micturition or hematuria, no change in bowel habit,no diarrhea or constipation reported.He's unsure of his last hemoglobin A1c, takes insulin for his type 2 DM.  He was recently managed here for NSTEMI .

## 2023-05-16 NOTE — ED NOTES
. Waiting for orders for blood sugar.    Encounter Date: 2017       History     Chief Complaint   Patient presents with    Contractions     Jeremy Pagan is a 24 y.o. [id number] at 39w3d presents complaining of fluid leakage from vagina that began yesterday. Denies any gush of fluid. She also reports spotting when she wiped after voiding this morning. Patient did have her cervix checked yesterday Patient reports her contractions are once every five minutes.  Patient is scheduled for induction on 11/10 secondary to gestational hypertension. Patient worked up for Pre E yesterday in outpatient setting with normal labs. Her pressures in DARCY today same as yesterday in clinic.   This IUP is uncomplicated.    Fetal Movement: normal.          Review of patient's allergies indicates:   Allergen Reactions    Zithromax [azithromycin] Rash     Past Medical History:   Diagnosis Date    ADHD (attention deficit hyperactivity disorder)      Past Surgical History:   Procedure Laterality Date    TONSILLECTOMY, ADENOIDECTOMY, BILATERAL MYRINGOTOMY AND TUBES       Family History   Problem Relation Age of Onset    Cancer Paternal Grandmother     Thyroid disease Mother     Breast cancer Neg Hx     Colon cancer Neg Hx     Ovarian cancer Neg Hx      Social History   Substance Use Topics    Smoking status: Never Smoker    Smokeless tobacco: Never Used    Alcohol use No     Review of Systems   Constitutional: Negative for chills, fatigue and fever.   HENT: Negative for congestion.    Eyes: Negative for visual disturbance.   Respiratory: Negative for cough and shortness of breath.    Cardiovascular: Negative for chest pain and palpitations.   Gastrointestinal: Positive for abdominal pain. Negative for abdominal distention, constipation, diarrhea, nausea and vomiting.   Genitourinary: Positive for vaginal discharge. Negative for difficulty urinating, dysuria, hematuria and vaginal bleeding.   Skin: Negative for rash.   Neurological: Negative for dizziness,  seizures, light-headedness and headaches.   Hematological: Does not bruise/bleed easily.   Psychiatric/Behavioral: Negative for dysphoric mood. The patient is not nervous/anxious.        Physical Exam       Physical Exam    Vitals reviewed.  Constitutional: She appears well-developed and well-nourished. She is not diaphoretic. No distress.   HENT:   Head: Normocephalic and atraumatic.   Eyes: Conjunctivae are normal.   Neck: Neck supple.   Cardiovascular: Normal rate and regular rhythm.   Pulmonary/Chest: No respiratory distress.   Abdominal: Soft. There is no tenderness. There is no rebound and no guarding.   Gravid, fundus NT       Genitourinary: Vagina normal.   Genitourinary Comments: SSE: Normal external female genitalia, normal urethral meatus, normal vaginal rugae, normal vaginal mucosa, no pooling, no valsalva, negative Nitrazine, negative ferning     Neurological: She is alert and oriented to person, place, and time.   Skin: Skin is warm and dry. No rash noted. No erythema.   Psychiatric: She has a normal mood and affect.     OB LABOR EXAM:   Pre-Term Labor: No.   Membranes ruptured: No.   Method: Sterile vaginal exam per MD and Sterile speculum exam per MD.       Dilatation: 2.   Station: -2.   Effacement: 70%.   Amniotic Fluid Color: no fluid.     Comments: FHT: reassuring, 135 bpm, mod BTB variability, + accels, - decels  TOCO: irritable   Interpretation: reactive and reassuring    ED Course   Procedures  Labs Reviewed - No data to display          Medical Decision Making:   Differential Diagnosis:   GHTN  ED Management:  - Evaluation for ROM was negative. However, patient with elevated diastolic of 94 mmHg. Patient with elevated diastolic pressure yesterday in clinic with normal Pre E labs. Admit for IOL secondary to GHTN  - US: Vertex  - Consents reviewed and signed for vaginal and  delivery  - Blood transfusion consents signed  - Anesthesia alerted  - OB alerted              Attending  Attestation:   Physician Attestation Statement for Resident:  As the supervising MD   Physician Attestation Statement: I have personally seen and examined this patient.   I agree with the above history. -:   As the supervising MD I agree with the above PE.    As the supervising MD I agree with the above treatment, course, plan, and disposition.  I was personally present during the critical portions of the procedure(s) performed by the resident and was immediately available in the ED to provide services and assistance as needed during the entire procedure.  I have reviewed and agree with the residents interpretation of the following: rhythm strips.  I have reviewed the following: old records at this facility.                    ED Course as of Nov 07 0953 Tue Nov 07, 2017   0936 Ms. Pagan presents at 39.3wga with possible ROM.  ROM was ruled out, but she is tereza frequently, and diastolic BP elevated.  Also elevated in clinic yesterday, pre-e labs negative at that time.  However, she now has dx of GHTN, so will move forward with admission and induction/augmentation  [HU]      ED Course User Index  [HU] Kortney Lewis DO     Clinical Impression:   The encounter diagnosis was Pregnancy-induced hypertension in third trimester.  39 weeks gestation  Disposition:   Disposition: Admitted  Condition: Stable                        Mike Daniel MD  Resident  11/07/17 0953       Kortney Lewis DO  11/09/17 7339

## 2023-05-16 NOTE — ASSESSMENT & PLAN NOTE
Resently quit tobacco use after his MI  Encouraged him to continue staying away from tobacco products

## 2023-05-16 NOTE — ASSESSMENT & PLAN NOTE
Came with gangrene of 3rd and 4th right finger, with necrotic tissue  Consult hand specialist/orthopedic surgeon to debride  Started on broad spectrum antibiotics  To consider MRI to rule out osteomyelitis,this will dictate the duration of antibiotics

## 2023-05-16 NOTE — CONSULTS
Consult received. Patient presented with wounds to R middle and ring fingers, believes he sustained a cut at some point. Increasing pain, swelling, redness. Admitted for cellulitis and gangrene. Hx of DM, HTN, HLD, PVD, bilateral BKA. Last Hbg A1C 11.2 last month.    Xrs R hand reviewed. Soft tissue swelling, no obvious bony destructive process.     Afebrile  WBC 4.13    - Agree with MRI R hand to rule out osteomyelitis or deeper abscess. Ordered.   - Already given diet today. NPO at midnight in case of need for surgical intervention.      Joy Li MD  Orthopedic Surgery  [facility]

## 2023-05-16 NOTE — PLAN OF CARE
Problem: Adult Inpatient Plan of Care  Goal: Plan of Care Review  Outcome: Ongoing, Progressing  Goal: Patient-Specific Goal (Individualized)  Outcome: Ongoing, Progressing  Goal: Absence of Hospital-Acquired Illness or Injury  Outcome: Ongoing, Progressing  Goal: Optimal Comfort and Wellbeing  Outcome: Ongoing, Progressing  Goal: Readiness for Transition of Care  Outcome: Ongoing, Progressing     Problem: Skin Injury Risk Increased  Goal: Skin Health and Integrity  Outcome: Ongoing, Progressing     Problem: Impaired Wound Healing  Goal: Optimal Wound Healing  Outcome: Ongoing, Progressing     Problem: Diabetes Comorbidity  Goal: Blood Glucose Level Within Targeted Range  Outcome: Ongoing, Progressing

## 2023-05-16 NOTE — PROGRESS NOTES
Pharmacokinetic Initial Assessment: IV Vancomycin    Assessment/Plan:    Initiate intravenous vancomycin with loading dose of 1750 mg once followed by a maintenance dose of vancomycin 1750 mg IV every 24 hours  Desired empiric serum trough concentration is 10 to 20 mcg/mL  Draw vancomycin trough level 60 min prior to third dose on 5/17/23 at approximately 22:00  Pharmacy will continue to follow and monitor vancomycin.      Please contact pharmacy at extension [phone] with any questions regarding this assessment.     Thank you for the consult,   Poonam Li       Patient brief summary:  Carlos Cifuentes Jr. is a 62 y.o. male initiated on antimicrobial therapy with IV Vancomycin for treatment of suspected skin & soft tissue infection    Drug Allergies:   Review of patient's allergies indicates:   Allergen Reactions    Codeine Rash    Lyrica [pregabalin]      Feet turned Red    Vicodin [hydrocodone-acetaminophen] Rash       Actual Body Weight:   109 kg    Renal Function:   Estimated Creatinine Clearance: 49.7 mL/min (A) (based on SCr of 1.9 mg/dL (H)).,     Dialysis Method (if applicable):  N/A    CBC (last 72 hours):  Recent Labs   Lab Result Units 05/15/23  2020   WBC K/uL 4.97   Hemoglobin g/dL 9.7*   Hematocrit % 26.7*   Platelets K/uL 121*   Gran % % 75.1*   Lymph % % 15.1*   Mono % % 6.0   Eosinophil % % 2.4   Basophil % % 0.4   Differential Method  Automated       Metabolic Panel (last 72 hours):  Recent Labs   Lab Result Units 05/15/23  2020 05/15/23  2338   Sodium mmol/L 131*  --    Potassium mmol/L 5.0  --    Chloride mmol/L 98  --    CO2 mmol/L 24  --    Glucose mg/dL 484*  --    Glucose, UA   --  4+*   BUN mg/dL 41*  --    Creatinine mg/dL 1.9*  --    Albumin g/dL 3.4*  --    Total Bilirubin mg/dL 0.5  --    Alkaline Phosphatase U/L 111  --    AST U/L 16  --    ALT U/L 18  --        Drug levels (last 3 results):  No results for input(s): VANCOMYCINRA, VANCORANDOM, VANCOMYCINPE, VANCOPEAK, VANCOMYCINTR,  [facility] in the last 72 hours.    Microbiologic Results:  Microbiology Results (last 7 days)       Procedure Component Value Units Date/Time    Blood Culture #1 **CANNOT BE ORDERED STAT** [[id number]] Collected: 05/15/23 2000    Order Status: Sent Specimen: Blood from Peripheral, Antecubital, Left Updated: 05/15/23 2050    Blood culture [[id number]] Collected: 05/15/23 2010    Order Status: Sent Specimen: Blood from Peripheral, Hand, Left Updated: 05/15/23 2050

## 2023-05-16 NOTE — PLAN OF CARE
Case Management Assessment     PCP: Miguel uLx MD    Pharmacy:   [id number] [place] [facility] [address]  [address]  [place] [address]  Phone: [phone] Fax: [phone]    [id number] - [place] - [address] AT [facility] [address]  [address]  [place] [address]  Phone: [phone] Fax: [phone]      Patient Arrived From: home  Existing Help at Home: Elvia Verdin (Sister)   [phone] (Home Phone)    Barriers to Discharge: need medical care    Discharge Plan:    A. home   B. Home with home health      Lives alone, has bilateral prosthesis, cane and wheel chair.  Patient is independnet and drives.  Truck is in hospital parking lot.          05/16/23 0824   Discharge Assessment   Assessment Type Discharge Planning Assessment   Confirmed/corrected address, phone number and insurance Yes   Confirmed Demographics Correct on Facesheet   Source of Information patient   Communicated BEBA with patient/caregiver Date not available/Unable to determine   Reason For Admission hyperglycemia and gangrene of finger   People in Home alone   Do you expect to return to your current living situation? Yes   Do you have help at home or someone to help you manage your care at home? Yes   Who are your caregiver(s) and their phone number(s)? Elvia Verdin (Sister)   [phone] (Home Phone)   Prior to hospitilization cognitive status: Alert/Oriented   Current cognitive status: Alert/Oriented   Walking or Climbing Stairs ambulation difficulty, requires equipment   Mobility Management prosthesis=legs and cane   Equipment Currently Used at Home wheelchair;cane, straight;other (see comments);walker, standard  (prosthetic legs)   Readmission within 30 days? No   Patient currently being followed by outpatient case management? Unable to determine (comments)   Do you currently have service(s) that help you  manage your care at home? No   Do you take prescription medications? Yes   Do you have prescription coverage? Yes   Coverage :  MEDICAID - [place] [facility]   Who is going to help you get home at discharge? Elvia Verdin (Sister)   [phone] (Home Phone)   How do you get to doctors appointments? family or friend will provide;car, drives self   Are you on dialysis? No   Do you take coumadin? No   Discharge Plan A Home   Discharge Plan B Home   DME Needed Upon Discharge  none

## 2023-05-16 NOTE — ASSESSMENT & PLAN NOTE
Random BG was 484 at presentation  Started on basal,bolus and correctional insulin  A1c was elevated at 11%  Encourage lifestyle modification

## 2023-05-16 NOTE — ASSESSMENT & PLAN NOTE
Colonoscopy   WHAT YOU NEED TO KNOW:   A colonoscopy is a procedure to examine the inside of your colon (intestine) with a scope  Polyps or tissue growths may have been removed during your colonoscopy  It is normal to feel bloated and to have some abdominal discomfort  You should be passing gas  If you have hemorrhoids or you had polyps removed, you may have a small amount of bleeding  DISCHARGE INSTRUCTIONS:   Seek care immediately if:   You have sudden, severe abdominal pain  You have problems swallowing  You have a large amount of black, sticky bowel movements or blood in your bowel movements  You have sudden trouble breathing  You feel weak, lightheaded, or faint or your heart beats faster than normal for you  Contact your healthcare provider if:   You have a fever and chills  You have nausea or are vomiting  Your abdomen is bloated or feels full and hard  You have abdominal pain  You have black, sticky bowel movements or blood in your bowel movements  You have not had a bowel movement for 3 days after your procedure  You have rash or hives  You have questions or concerns about your procedure  Activity:   Do not lift, strain, or run for 24 hours after your procedure  Rest after your procedure  You have been given medicine to relax you  Do not drive or make important decisions until the day after your procedure  Return to your normal activity as directed  Relieve gas and discomfort from bloating by lying on your right side with a heating pad on your abdomen  You may need to take short walks to help the gas move out  Eat small meals until bloating is relieved  Follow up with your healthcare provider as directed: Write down your questions so you remember to ask them during your visits  If you take a “blood thinner”, please review the specific instructions from your endoscopist about when you should resume it   These can be found in the “Recommendation” Complicating his type 2DM and peripheral vascular disease     and “Your Medication list” sections of this After Visit Summary

## 2023-05-16 NOTE — CONSULTS
[facility] - Telemetry  Orthopedics  Consult Note    Patient Name: Carlos Cifuentes Jr.  MRN: [id number]  Admission Date: 5/15/2023  Hospital Length of Stay: 1 days  Attending Provider: You Connor MD  Primary Care Provider: Miguel Lux MD    Patient information was obtained from patient and primary team.     Consults  Subjective:     Principal Problem:<principal problem not specified>    Chief Complaint:   Chief Complaint   Patient presents with    Wound Check     Patient arrives with wound to middle and ring fingers on right hand. Cut hand 3 days ago, now having swelling to these fingers with dark colored wounds. Has been taking amoxicillin at home that was prescribed earlier this year for different infection, but it has been getting worse. His doctor advised him to be seen at the ER.    Blood Sugar Problem     CBG over 500 in triage        HPI: Patient presents with pain and swelling to the long and ring fingers of the right hand. He cut the medial aspect of the long finger on the fiberglass of his prosthetic leg 4 days ago. He states this was a pretty clean slice. He also had a small cut to the proximal nail fold of the ring finger. He was applying antibiotic ointment to the long finger cut. It progressively developed pain, redness, and swelling. He denies any drainage, states it barely even bled. He denies fevers or chills. He is right hand dominant.     Past Medical History:   Diagnosis Date    Arthritis     Back pain     Bulging lumbar disc     C. difficile diarrhea     Chronic back pain 10/14/2014    Cigarette smoker     Diabetes mellitus     Diabetes mellitus type II     DM (diabetes mellitus), type 2, uncontrolled 03/12/2013    Hepatitis C 07/03/2013    MSSA (methicillin susceptible Staphylococcus aureus) septicemia     Neuromuscular disorder     Neuropathy     NSTEMI (non-ST elevated myocardial infarction) 11/28/2022    Staph aureus infection     Status post bilateral below knee amputation        Past  Surgical History:   Procedure Laterality Date    ANGIOGRAPHY OF LOWER EXTREMITY Left 06/14/2018    Procedure: Angiogram LEFT LOWER Extremity with US guided access from right side;  Surgeon: Sony Srinivasan MD;  Location: [facility] OR [address] [place] [address];  Service: Peripheral Vascular;  Laterality: Left;  local: 16ml  contrast: 20ml   fluoro: 2.8  427.73mGy    APPENDECTOMY      BELOW KNEE AMPUTATION OF LOWER EXTREMITY Bilateral     CORONARY ANGIOGRAPHY N/A 11/29/2022    Procedure: ANGIOGRAM, CORONARY ARTERY;  Surgeon: Bobby Harding MD;  Location: [facility] CATH LAB;  Service: Cardiology;  Laterality: N/A;    JOINT REPLACEMENT      left knee surgery      left leg surgery      broken bone    LEG SURGERY  right     Right arm boil      Right great toe amputation      TOE AMPUTATION Right        Review of patient's allergies indicates:   Allergen Reactions    Codeine Rash    Lyrica [pregabalin]      Feet turned Red    Vicodin [hydrocodone-acetaminophen] Rash       Current Facility-Administered Medications   Medication    acetaminophen tablet 650 mg    albuterol sulfate nebulizer solution 2.5 mg    aspirin chewable tablet 81 mg    atorvastatin tablet 40 mg    cefepime in dextrose 5 % 1 gram/50 mL IVPB 1 g    clopidogreL tablet 75 mg    dextrose 10% bolus 125 mL 125 mL    dextrose 10% bolus 250 mL 250 mL    dextrose 40 % gel 15,000 mg    dextrose 40 % gel 30,000 mg    furosemide tablet 40 mg    glucagon (human recombinant) injection 1 mg    heparin (porcine) injection 5,000 Units    insulin aspart U-100 pen 1-10 Units    insulin aspart U-100 pen 5 Units    insulin detemir U-100 (Levemir) pen 20 Units    losartan tablet 25 mg    melatonin tablet 6 mg    naloxone 0.4 mg/mL injection 0.02 mg    ondansetron injection 4 mg    oxyCODONE-acetaminophen 5-325 mg per tablet 1 tablet    pantoprazole EC tablet 40 mg    polyethylene glycol packet 17 g    potassium bicarbonate disintegrating tablet 60 mEq    simethicone chewable tablet 80 mg    sodium  "chloride 0.9% flush 10 mL    vancomycin (VANCOCIN) 1,750 mg in dextrose 5 % (D5W) 500 mL IVPB    vancomycin - pharmacy to dose     Family History       Problem Relation (Age of Onset)    Arthritis Mother, Sister, Brother    Diabetes Sister          Tobacco Use    Smoking status: Former     Packs/day: 1.00     Types: Cigarettes    Smokeless tobacco: Never   Substance and Sexual Activity    Alcohol use: No    Drug use: No    Sexual activity: Not on file     Review of systems:   Pertinent findings as above.     Objective:     Vital Signs (Most Recent):  Temp: 97.6 °F (36.4 °C) (05/16/23 1643)  Pulse: 73 (05/16/23 1643)  Resp: 17 (05/16/23 1643)  BP: (!) 122/56 (05/16/23 1643)  SpO2: (!) 94 % (05/16/23 1643) Vital Signs (24h Range):  Temp:  [97.6 °F (36.4 °C)-98.5 °F (36.9 °C)] 97.6 °F (36.4 °C)  Pulse:  [64-94] 73  Resp:  [16-24] 17  SpO2:  [94 %-100 %] 94 %  BP: (122-172)/(56-93) 122/56     Weight: 109.6 kg (241 lb 10 oz)  Height: 5' 9" (175.3 cm)  Body mass index is 35.68 kg/m².      Intake/Output Summary (Last 24 hours) at 5/16/2023 1709  Last data filed at 5/16/2023 1436  Gross per 24 hour   Intake 1599 ml   Output 1200 ml   Net 399 ml       Physical Exam:   NAD, AAOx3, nonlabored respirations.   R long finger with tissue defect over the medial aspect of the pulp adjacent to the nail. Eschar v gangrenous material in the base. No drainage. Nail bed and distal tip of the finger are pale. Absent sensation to the tip. Mild erythema more proximally over the finger with tenderness, no fluctuance or fluid collection. Small laceration to the proximal nailfold of the ring finger with mild swelling and erythema extending proximally. Sensation intact to the ring finger.     Significant Labs: CBC:   Recent Labs   Lab 05/15/23  2020 05/16/23  0457   WBC 4.97 4.13   HGB 9.7* 9.5*   HCT 26.7* 26.3*   * 101*     CRP:   Recent Labs   Lab 05/16/23  1100   CRP 40.0*     ESR 84    Significant Imaging:  R hand x-rays without " evidence of bony destruction.     Assessment/Plan:   R long and ring finger lacerations with cellulitis, possible gangrene of long finger.   - No urgent surgical intervention necessary.   - Continue IV antibiotics.   - Wound care ordered for R long finger.   - Will continue to monitor, allow finger to declare itself one way or another. Patient may require amputation of long finger if no signs of healing.     Joy Li MD  Orthopedic Surgery  [facility]

## 2023-05-16 NOTE — NURSING
Nurses Note -- 4 Eyes      5/16/2023         Skin assessed during: Admit      [] No Altered Skin Integrity Present    []Prevention Measures Documented      [x] Yes- Altered Skin Integrity Present or Discovered   [x] LDA Added if Not in Epic (Describe Wound)   [] New Altered Skin Integrity was Present on Admit and Documented in LDA   [] Wound Image Taken    Wound Care Consulted? No    Attending Nurse:  Cb Gonzalez RN     Second RN/Staff Member:  TRACE Martinez

## 2023-05-16 NOTE — ASSESSMENT & PLAN NOTE
His CKD stage 3 likely related to his type 2 DM  HbA1c 11.8, will obtain urine protein cr ratio  Cont low salt diet  Better glycemic control    Patient's FSGs are uncontrolled due to hyperglycemia on current medication regimen.  Last A1c reviewed-   Lab Results   Component Value Date    LABA1C 11.8 (H) 06/13/2014    HGBA1C 11.2 (H) 04/10/2023     Most recent fingerstick glucose reviewed-   Recent Labs   Lab 05/15/23  1939   POCTGLUCOSE >500*     Current correctional scale  Medium  Maintain anti-hyperglycemic dose as follows-   Antihyperglycemics (From admission, onward)    Start     Stop Route Frequency Ordered    05/16/23 0900  insulin detemir U-100 (Levemir) pen 20 Units         -- SubQ Daily 05/16/23 0357    05/16/23 0715  insulin aspart U-100 pen 5 Units         -- SubQ 3 times daily with meals 05/16/23 0357    05/16/23 0455  insulin aspart U-100 pen 1-10 Units         -- SubQ Before meals & nightly PRN 05/16/23 0357        Hold Oral hypoglycemics while patient is in the hospital.

## 2023-05-16 NOTE — ED TRIAGE NOTES
Carlos Cifuentes Jr is a 62 y.o male with PMHx of DM, bilateral below knee amputation, neuromuscular disorder, and chronic back pain to ED c/o pain to right middle and ring finger after hand was cut on prosthetic leg 3 days ago. Gangrene and edema noted to right middle finger and right ring finger.  Worse symptoms to right ring finger. States that he went to ED after initial injury but left due to extended wait time.

## 2023-05-16 NOTE — H&P
[facility] Medicine  History & Physical    Patient Name: Carlos Cifuentes Jr.  MRN: [id number]  Patient Class: IP- Inpatient  Admission Date: 5/15/2023  Attending Physician: Nelly Sheikh DO   Primary Care Provider: Miguel Lux MD         Patient information was obtained from patient, past medical records and ER records.     Subjective:     Principal Problem:<principal problem not specified>    Chief Complaint:   Chief Complaint   Patient presents with    Wound Check     Patient arrives with wound to middle and ring fingers on right hand. Cut hand 3 days ago, now having swelling to these fingers with dark colored wounds. Has been taking amoxicillin at home that was prescribed earlier this year for different infection, but it has been getting worse. His doctor advised him to be seen at the ER.    Blood Sugar Problem     CBG over 500 in triage        HPI: 62-year-old male with medical history significant for type 2 DM,hypertension,hyperlipidemia,peripheral vascular disease,previous bilateral below knee amputation due DM foot ulcer who presented to the ED with 3 days of swelling and gangrene 3rd and 4th right fingers after sustaining a cut across the finger nail curticle,he denied associated fever,chills or rigor,no nausea or vomiting,he decided to return to the ED due to the spreading swelling and redness over the dorsum of his hand.He denied any urinary symptom of dysuria,frequency,urgency,straining at micturition or hematuria, no change in bowel habit,no diarrhea or constipation reported.He's unsure of his last hemoglobin A1c, takes insulin for his type 2 DM.  He was recently managed here for NSTEMI .      Past Medical History:   Diagnosis Date    Arthritis     Back pain     Bulging lumbar disc     C. difficile diarrhea     Chronic back pain 10/14/2014    Cigarette smoker     Diabetes mellitus     Diabetes mellitus type II     DM (diabetes mellitus), type 2, uncontrolled  03/12/2013    Hepatitis C 07/03/2013    MSSA (methicillin susceptible Staphylococcus aureus) septicemia     Neuromuscular disorder     Neuropathy     NSTEMI (non-ST elevated myocardial infarction) 11/28/2022    Staph aureus infection     Status post bilateral below knee amputation        Past Surgical History:   Procedure Laterality Date    ANGIOGRAPHY OF LOWER EXTREMITY Left 06/14/2018    Procedure: Angiogram LEFT LOWER Extremity with US guided access from right side;  Surgeon: Sony Srinivasan MD;  Location: [facility] OR [address] [place] [address];  Service: Peripheral Vascular;  Laterality: Left;  local: 16ml  contrast: 20ml   fluoro: 2.8  427.73mGy    APPENDECTOMY      BELOW KNEE AMPUTATION OF LOWER EXTREMITY Bilateral     CORONARY ANGIOGRAPHY N/A 11/29/2022    Procedure: ANGIOGRAM, CORONARY ARTERY;  Surgeon: Bobby Harding MD;  Location: [facility] CATH LAB;  Service: Cardiology;  Laterality: N/A;    JOINT REPLACEMENT      left knee surgery      left leg surgery      broken bone    LEG SURGERY  right     Right arm boil      Right great toe amputation      TOE AMPUTATION Right        Review of patient's allergies indicates:   Allergen Reactions    Codeine Rash    Lyrica [pregabalin]      Feet turned Red    Vicodin [hydrocodone-acetaminophen] Rash       No current facility-administered medications on file prior to encounter.     Current Outpatient Medications on File Prior to Encounter   Medication Sig    albuterol (VENTOLIN HFA) 90 mcg/actuation inhaler Inhale 2 puffs into the lungs every 6 (six) hours as needed for Wheezing. Rescue    aspirin 81 MG Chew Take 1 tablet (81 mg total) by mouth once daily.    atorvastatin (LIPITOR) 40 MG tablet Take 1 tablet (40 mg total) by mouth once daily.    clopidogreL (PLAVIX) 75 mg tablet Take 1 tablet (75 mg total) by mouth once daily.    furosemide (LASIX) 40 MG tablet Take 1 tablet (40 mg total) by mouth 2 (two) times daily.    glimepiride (AMARYL) 2 MG tablet Take 1  tablet (2 mg total) by mouth every morning.    HUMALOG U-100 INSULIN 100 unit/mL injection Inject 5 Units into the skin 3 (three) times daily before meals.    LANTUS U-100 INSULIN 100 unit/mL injection INJECT TEN UNITS INTO SKIN AT BEDTIME    losartan (COZAAR) 25 MG tablet Take 25 mg by mouth once daily.    metFORMIN (GLUCOPHAGE) 1000 MG tablet TAKE ONE TABLET BY MOUTH TWICE DAILY (Patient not taking: Reported on 4/10/2023)    pantoprazole (PROTONIX) 40 MG tablet TAKE ONE TABLET BY MOUTH EVERY DAY    TRUE METRIX GLUCOSE METER Misc AS DIRECTED    TRUE METRIX GLUCOSE TEST STRIP Strp Twice daily blood sugar checks     Family History       Problem Relation (Age of Onset)    Arthritis Mother, Sister, Brother    Diabetes Sister          Tobacco Use    Smoking status: Former     Packs/day: 1.00     Types: Cigarettes    Smokeless tobacco: Never   Substance and Sexual Activity    Alcohol use: No    Drug use: No    Sexual activity: Not on file     Review of Systems   Constitutional:  Negative for appetite change, chills, diaphoresis and fatigue.   HENT:  Negative for congestion, sore throat and tinnitus.    Eyes:  Negative for pain, discharge and itching.   Respiratory:  Negative for cough, chest tightness, shortness of breath and wheezing.    Gastrointestinal:  Negative for abdominal distention, abdominal pain, blood in stool, nausea and vomiting.   Endocrine: Negative for cold intolerance, heat intolerance and polydipsia.   Genitourinary:  Negative for difficulty urinating, dysuria, flank pain, frequency and hematuria.   Musculoskeletal:  Positive for joint swelling. Negative for arthralgias, back pain, neck pain and neck stiffness.   Neurological:  Negative for dizziness, seizures, facial asymmetry, light-headedness, numbness and headaches.   Psychiatric/Behavioral:  Negative for agitation, confusion and hallucinations.    Objective:     Vital Signs (Most Recent):  Temp: 97.6 °F (36.4 °C) (05/16/23  0220)  Pulse: 71 (05/16/23 0220)  Resp: 16 (05/16/23 0220)  BP: (!) 135/58 (05/16/23 0220)  SpO2: 100 % (05/16/23 0220) Vital Signs (24h Range):  Temp:  [97.6 °F (36.4 °C)-98.4 °F (36.9 °C)] 97.6 °F (36.4 °C)  Pulse:  [64-94] 71  Resp:  [16-24] 16  SpO2:  [94 %-100 %] 100 %  BP: (124-172)/(58-93) 135/58     Weight: 109.6 kg (241 lb 10 oz)  Body mass index is 35.68 kg/m².     Physical Exam  Constitutional:       Appearance: He is obese.   HENT:      Head: Normocephalic and atraumatic.      Nose: Nose normal. No congestion or rhinorrhea.      Mouth/Throat:      Mouth: Mucous membranes are moist.   Eyes:      Extraocular Movements: Extraocular movements intact.      Conjunctiva/sclera: Conjunctivae normal.      Pupils: Pupils are equal, round, and reactive to light.   Cardiovascular:      Rate and Rhythm: Normal rate and regular rhythm.      Pulses: Normal pulses.      Heart sounds: Normal heart sounds.   Pulmonary:      Effort: Pulmonary effort is normal.      Breath sounds: Normal breath sounds.   Abdominal:      General: Abdomen is flat. Bowel sounds are normal. There is no distension.      Palpations: Abdomen is soft.      Tenderness: There is no abdominal tenderness. There is no right CVA tenderness, left CVA tenderness, guarding or rebound.   Musculoskeletal:         General: Deformity: s/p bilateral BKA. Normal range of motion.      Cervical back: Normal range of motion and neck supple.      Right lower leg: No edema.   Skin:     General: Skin is warm and dry.   Neurological:      General: No focal deficit present.      Mental Status: He is alert and oriented to person, place, and time. Mental status is at baseline.      Cranial Nerves: No cranial nerve deficit.      Sensory: No sensory deficit.   Psychiatric:         Mood and Affect: Mood normal.         Behavior: Behavior normal.         Thought Content: Thought content normal.         Judgment: Judgment normal.            CRANIAL NERVES     CN III, IV, VI    Pupils are equal, round, and reactive to light.     Significant Labs: All pertinent labs within the past 24 hours have been reviewed.  A1C:   Recent Labs   Lab 11/28/22  2308 12/08/22  1657 04/10/23  0656   HGBA1C 10.9* 10.3* 11.2*     CBC:   Recent Labs   Lab 05/15/23  2020   WBC 4.97   HGB 9.7*   HCT 26.7*   *     CMP:   Recent Labs   Lab 05/15/23  2020   *   K 5.0   CL 98   CO2 24   *   BUN 41*   CREATININE 1.9*   CALCIUM 9.2   PROT 7.1   ALBUMIN 3.4*   BILITOT 0.5   ALKPHOS 111   AST 16   ALT 18   ANIONGAP 9     Lactic Acid:   Recent Labs   Lab 05/15/23  2030   LACTATE 1.7     Magnesium: No results for input(s): MG in the last 48 hours.  Troponin:   Recent Labs   Lab 05/15/23  2020   TROPONINI <0.006     TSH:   Recent Labs   Lab 12/08/22  1224   TSH 4.251*     Urine Studies:   Recent Labs   Lab 05/15/23  2338   COLORU Colorless*   APPEARANCEUA Clear   PHUR 6.0   SPECGRAV 1.015   PROTEINUA Trace*   GLUCUA 4+*   KETONESU Negative   BILIRUBINUA Negative   OCCULTUA Negative   NITRITE Negative   UROBILINOGEN Negative   LEUKOCYTESUR Negative   RBCUA 2   WBCUA 5   BACTERIA Occasional       Significant Imaging: I have reviewed all pertinent imaging results/findings within the past 24 hours.  .  Imaging Results              US Upper Extremity Arteries Right (Final result)  Result time 05/15/23 22:48:25      Final result by Michelle Collins MD (05/15/23 22:48:25)                   Impression:      Monophasic flow in the radial and ulnar arteries suggesting significant disease.    Elevated brachial velocities with normal waveforms.      Electronically signed by: Michelle Collins  Date:    05/15/2023  Time:    22:48               Narrative:    EXAMINATION:  US UPPER EXTREMITY ARTERIES RIGHT    CLINICAL HISTORY:  Necrotic hand wound.  Diabetes mellitus status post BKA, shortness of breath, acute on chronic heart failure, chronic kidney disease, and NSTEMI.    TECHNIQUE:  Duplex and color flow Doppler  evaluation and dynamic compression was performed of the right upper extremity veins.    COMPARISON:  None    FINDINGS:  Right upper extremity arterial peak systolic velocities in cm/sec:    Subclavian: 81, biphasic    Axillary: 122, triphasic    Proximal brachial: 168, triphasic    Mid brachial: 165, triphasic    Distal brachial: 158, triphasic    Proximal radial: 72, monophasic    Proximal ulnar: 89, monophasic    Distal radial: 93, monophasic    Distal ulna: 101, monophasic                                       X-Ray Hand 3 view Right (Final result)  Result time 05/15/23 21:27:55      Final result by Michelle Collins MD (05/15/23 21:27:55)                   Impression:      No acute bony abnormality detected.      Electronically signed by: Michelle Collins  Date:    05/15/2023  Time:    21:27               Narrative:    EXAMINATION:  THREE VIEWS OF THE RIGHT HAND    CLINICAL HISTORY:  right hand wound;    TECHNIQUE:  AP, lateral, and oblique views of the right hand    COMPARISON:  05/03/2011.    FINDINGS:  Examination is limited by the slightly flexed appearance of the fingers on the PA view.  There is soft tissue defect involving the 3rd digit visualized.  Three views of the right hand demonstrate no acute fracture or dislocation.  There is a healed fracture of the tuft of the ring finger.  No definite osseous destruction is detected.  Advanced vascular calcifications are seen.                                        Assessment/Plan:     Cellulitis of right upper extremity  Presented with redness and swelling over the dorsum of the right hand  Gangrene 3rd and 4th fingers of the right hand  Looks subcutaneous, but will need to rule out osteomyelitis with MRI of the finger  Consult infectious disease  Started on broad spectrum antibiotics - vanc and cefepime    S/P bilateral BKA (below knee amputation)  Complicating his type 2DM and peripheral vascular disease      Acute on chronic combined systolic and diastolic  heart failure  Stable  Continue furosemide as needed for volume overload      Hyperglycemia  Random BG was 484 at presentation  Started on basal,bolus and correctional insulin  A1c was elevated at 11%  Encourage lifestyle modification    Gangrene of finger of right hand  Came with gangrene of 3rd and 4th right finger, with necrotic tissue  Consult hand specialist/orthopedic surgeon to debride  Started on broad spectrum antibiotics  To consider MRI to rule out osteomyelitis,this will dictate the duration of antibiotics      CKD stage 3 due to type 2 diabetes mellitus  His CKD stage 3 likely related to his type 2 DM  HbA1c 11.8, will obtain urine protein cr ratio  Cont low salt diet  Better glycemic control    Patient's FSGs are uncontrolled due to hyperglycemia on current medication regimen.  Last A1c reviewed-   Lab Results   Component Value Date    LABA1C 11.8 (H) 06/13/2014    HGBA1C 11.2 (H) 04/10/2023     Most recent fingerstick glucose reviewed-   Recent Labs   Lab 05/15/23  1939   POCTGLUCOSE >500*     Current correctional scale  Medium  Maintain anti-hyperglycemic dose as follows-   Antihyperglycemics (From admission, onward)    Start     Stop Route Frequency Ordered    05/16/23 0900  insulin detemir U-100 (Levemir) pen 20 Units         -- SubQ Daily 05/16/23 0357    05/16/23 0715  insulin aspart U-100 pen 5 Units         -- SubQ 3 times daily with meals 05/16/23 0357    05/16/23 0455  insulin aspart U-100 pen 1-10 Units         -- SubQ Before meals & nightly PRN 05/16/23 0357        Hold Oral hypoglycemics while patient is in the hospital.    History of hepatitis C; S/p RX with SVR 12 documented 09/2017  Treated.      Gastroesophageal reflux disease  Stable  Cont pantoprazole for now, taper to famotide as tolerated      Peripheral vascular disease of extremity  Prior bilateral BKA  Continue statin,and antihypertensive  Frequent skin checks      Tobacco abuse  Resently quit tobacco use after his  MI  Encouraged him to continue staying away from tobacco products        Chronic back pain  No change in intensity and characteristic of back pain  Continue current pain medication    Obesity  Body mass index is 35.68 kg/m².   Lifestyle modification   Morbid obesity complicates all aspects of disease management from diagnostic modalities to treatment.   Weight loss encouraged and health benefits explained to patient.           VTE Risk Mitigation (From admission, onward)         Ordered     heparin (porcine) injection 5,000 Units  Every 8 hours         05/16/23 0357     IP VTE HIGH RISK PATIENT  Once         05/16/23 0356     Place sequential compression device  Until discontinued         05/16/23 0356                     Patient admitted 5/16/2023 to hospital medicine inpatient under my care      Onel Fisher MD  Department of Hospital Medicine  [facility] [place] [facility]

## 2023-05-16 NOTE — ASSESSMENT & PLAN NOTE
Body mass index is 35.68 kg/m².   Lifestyle modification   Morbid obesity complicates all aspects of disease management from diagnostic modalities to treatment.   Weight loss encouraged and health benefits explained to patient.

## 2023-05-17 ENCOUNTER — TELEPHONE (OUTPATIENT)
Dept: OBSTETRICS AND GYNECOLOGY | Facility: CLINIC | Age: 63
End: 2023-05-17
Payer: MEDICAID

## 2023-05-17 LAB
ALBUMIN SERPL BCP-MCNC: 3 G/DL (ref 3.5–5.2)
ALP SERPL-CCNC: 85 U/L (ref 55–135)
ALT SERPL W/O P-5'-P-CCNC: 18 U/L (ref 10–44)
ANION GAP SERPL CALC-SCNC: 9 MMOL/L (ref 8–16)
AST SERPL-CCNC: 18 U/L (ref 10–40)
BASOPHILS # BLD AUTO: 0.01 K/UL (ref 0–0.2)
BASOPHILS NFR BLD: 0.3 % (ref 0–1.9)
BILIRUB SERPL-MCNC: 0.6 MG/DL (ref 0.1–1)
BUN SERPL-MCNC: 41 MG/DL (ref 8–23)
CALCIUM SERPL-MCNC: 8.4 MG/DL (ref 8.7–10.5)
CHLORIDE SERPL-SCNC: 98 MMOL/L (ref 95–110)
CO2 SERPL-SCNC: 25 MMOL/L (ref 23–29)
CREAT SERPL-MCNC: 1.8 MG/DL (ref 0.5–1.4)
DIFFERENTIAL METHOD: ABNORMAL
EOSINOPHIL # BLD AUTO: 0.1 K/UL (ref 0–0.5)
EOSINOPHIL NFR BLD: 3.7 % (ref 0–8)
ERYTHROCYTE [DISTWIDTH] IN BLOOD BY AUTOMATED COUNT: 18.3 % (ref 11.5–14.5)
EST. GFR  (NO RACE VARIABLE): 42 ML/MIN/1.73 M^2
GLUCOSE SERPL-MCNC: 434 MG/DL (ref 70–110)
HCT VFR BLD AUTO: 25.8 % (ref 40–54)
HGB BLD-MCNC: 8.9 G/DL (ref 14–18)
IMM GRANULOCYTES # BLD AUTO: 0.04 K/UL (ref 0–0.04)
IMM GRANULOCYTES NFR BLD AUTO: 1.1 % (ref 0–0.5)
LYMPHOCYTES # BLD AUTO: 0.7 K/UL (ref 1–4.8)
LYMPHOCYTES NFR BLD: 19.1 % (ref 18–48)
MCH RBC QN AUTO: 34.4 PG (ref 27–31)
MCHC RBC AUTO-ENTMCNC: 34.5 G/DL (ref 32–36)
MCV RBC AUTO: 100 FL (ref 82–98)
MONOCYTES # BLD AUTO: 0.2 K/UL (ref 0.3–1)
MONOCYTES NFR BLD: 6.4 % (ref 4–15)
NEUTROPHILS # BLD AUTO: 2.6 K/UL (ref 1.8–7.7)
NEUTROPHILS NFR BLD: 69.4 % (ref 38–73)
NRBC BLD-RTO: 0 /100 WBC
PLATELET # BLD AUTO: 111 K/UL (ref 150–450)
PMV BLD AUTO: 9.6 FL (ref 9.2–12.9)
POCT GLUCOSE: 266 MG/DL (ref 70–110)
POCT GLUCOSE: 287 MG/DL (ref 70–110)
POCT GLUCOSE: 371 MG/DL (ref 70–110)
POCT GLUCOSE: 466 MG/DL (ref 70–110)
POTASSIUM SERPL-SCNC: 5 MMOL/L (ref 3.5–5.1)
PROT SERPL-MCNC: 6.3 G/DL (ref 6–8.4)
RBC # BLD AUTO: 2.59 M/UL (ref 4.6–6.2)
SODIUM SERPL-SCNC: 132 MMOL/L (ref 136–145)
VANCOMYCIN TROUGH SERPL-MCNC: 14.8 UG/ML (ref 10–22)
WBC # BLD AUTO: 3.76 K/UL (ref 3.9–12.7)

## 2023-05-17 PROCEDURE — 63600175 PHARM REV CODE 636 W HCPCS: Performed by: STUDENT IN AN ORGANIZED HEALTH CARE EDUCATION/TRAINING PROGRAM

## 2023-05-17 PROCEDURE — 25000003 PHARM REV CODE 250: Performed by: HOSPITALIST

## 2023-05-17 PROCEDURE — 80053 COMPREHEN METABOLIC PANEL: CPT | Performed by: STUDENT IN AN ORGANIZED HEALTH CARE EDUCATION/TRAINING PROGRAM

## 2023-05-17 PROCEDURE — 80202 ASSAY OF VANCOMYCIN: CPT | Performed by: STUDENT IN AN ORGANIZED HEALTH CARE EDUCATION/TRAINING PROGRAM

## 2023-05-17 PROCEDURE — 25000003 PHARM REV CODE 250: Performed by: STUDENT IN AN ORGANIZED HEALTH CARE EDUCATION/TRAINING PROGRAM

## 2023-05-17 PROCEDURE — 85025 COMPLETE CBC W/AUTO DIFF WBC: CPT | Performed by: STUDENT IN AN ORGANIZED HEALTH CARE EDUCATION/TRAINING PROGRAM

## 2023-05-17 PROCEDURE — 36415 COLL VENOUS BLD VENIPUNCTURE: CPT | Performed by: STUDENT IN AN ORGANIZED HEALTH CARE EDUCATION/TRAINING PROGRAM

## 2023-05-17 PROCEDURE — 21400001 HC TELEMETRY ROOM

## 2023-05-17 RX ORDER — MAG HYDROX/ALUMINUM HYD/SIMETH 200-200-20
30 SUSPENSION, ORAL (FINAL DOSE FORM) ORAL ONCE
Status: COMPLETED | OUTPATIENT
Start: 2023-05-17 | End: 2023-05-17

## 2023-05-17 RX ORDER — INSULIN ASPART 100 [IU]/ML
10 INJECTION, SOLUTION INTRAVENOUS; SUBCUTANEOUS
Status: DISCONTINUED | OUTPATIENT
Start: 2023-05-17 | End: 2023-05-19 | Stop reason: HOSPADM

## 2023-05-17 RX ORDER — HYDROXYZINE HYDROCHLORIDE 25 MG/1
25 TABLET, FILM COATED ORAL ONCE
Status: COMPLETED | OUTPATIENT
Start: 2023-05-17 | End: 2023-05-17

## 2023-05-17 RX ADMIN — HEPARIN SODIUM 5000 UNITS: 5000 INJECTION INTRAVENOUS; SUBCUTANEOUS at 01:05

## 2023-05-17 RX ADMIN — INSULIN ASPART 10 UNITS: 100 INJECTION, SOLUTION INTRAVENOUS; SUBCUTANEOUS at 04:05

## 2023-05-17 RX ADMIN — LOSARTAN POTASSIUM 25 MG: 25 TABLET, FILM COATED ORAL at 08:05

## 2023-05-17 RX ADMIN — INSULIN ASPART 6 UNITS: 100 INJECTION, SOLUTION INTRAVENOUS; SUBCUTANEOUS at 04:05

## 2023-05-17 RX ADMIN — ASPIRIN 81 MG 81 MG: 81 TABLET ORAL at 08:05

## 2023-05-17 RX ADMIN — FUROSEMIDE 40 MG: 40 TABLET ORAL at 05:05

## 2023-05-17 RX ADMIN — OXYCODONE AND ACETAMINOPHEN 1 TABLET: 5; 325 TABLET ORAL at 02:05

## 2023-05-17 RX ADMIN — PANTOPRAZOLE SODIUM 40 MG: 40 TABLET, DELAYED RELEASE ORAL at 08:05

## 2023-05-17 RX ADMIN — ONDANSETRON 4 MG: 2 INJECTION INTRAMUSCULAR; INTRAVENOUS at 07:05

## 2023-05-17 RX ADMIN — OXYCODONE AND ACETAMINOPHEN 1 TABLET: 5; 325 TABLET ORAL at 12:05

## 2023-05-17 RX ADMIN — INSULIN ASPART 10 UNITS: 100 INJECTION, SOLUTION INTRAVENOUS; SUBCUTANEOUS at 11:05

## 2023-05-17 RX ADMIN — VANCOMYCIN HYDROCHLORIDE 1750 MG: 500 INJECTION, POWDER, LYOPHILIZED, FOR SOLUTION INTRAVENOUS at 10:05

## 2023-05-17 RX ADMIN — INSULIN ASPART 10 UNITS: 100 INJECTION, SOLUTION INTRAVENOUS; SUBCUTANEOUS at 07:05

## 2023-05-17 RX ADMIN — OXYCODONE AND ACETAMINOPHEN 1 TABLET: 5; 325 TABLET ORAL at 08:05

## 2023-05-17 RX ADMIN — HYDROXYZINE HYDROCHLORIDE 25 MG: 25 TABLET ORAL at 07:05

## 2023-05-17 RX ADMIN — CLOPIDOGREL BISULFATE 75 MG: 75 TABLET ORAL at 08:05

## 2023-05-17 RX ADMIN — HEPARIN SODIUM 5000 UNITS: 5000 INJECTION INTRAVENOUS; SUBCUTANEOUS at 09:05

## 2023-05-17 RX ADMIN — CEFEPIME HYDROCHLORIDE 1 G: 1 INJECTION, SOLUTION INTRAVENOUS at 04:05

## 2023-05-17 RX ADMIN — CEFEPIME HYDROCHLORIDE 1 G: 1 INJECTION, SOLUTION INTRAVENOUS at 09:05

## 2023-05-17 RX ADMIN — HEPARIN SODIUM 5000 UNITS: 5000 INJECTION INTRAVENOUS; SUBCUTANEOUS at 05:05

## 2023-05-17 RX ADMIN — OXYCODONE AND ACETAMINOPHEN 1 TABLET: 5; 325 TABLET ORAL at 07:05

## 2023-05-17 RX ADMIN — CEFEPIME HYDROCHLORIDE 1 G: 1 INJECTION, SOLUTION INTRAVENOUS at 12:05

## 2023-05-17 RX ADMIN — Medication 6 MG: at 08:05

## 2023-05-17 RX ADMIN — FUROSEMIDE 40 MG: 40 TABLET ORAL at 08:05

## 2023-05-17 RX ADMIN — ALUMINUM HYDROXIDE, MAGNESIUM HYDROXIDE, AND DIMETHICONE 30 ML: 200; 20; 200 SUSPENSION ORAL at 07:05

## 2023-05-17 RX ADMIN — INSULIN ASPART 3 UNITS: 100 INJECTION, SOLUTION INTRAVENOUS; SUBCUTANEOUS at 08:05

## 2023-05-17 RX ADMIN — ATORVASTATIN CALCIUM 40 MG: 40 TABLET, FILM COATED ORAL at 08:05

## 2023-05-17 RX ADMIN — INSULIN ASPART 5 UNITS: 100 INJECTION, SOLUTION INTRAVENOUS; SUBCUTANEOUS at 07:05

## 2023-05-17 NOTE — PROGRESS NOTES
[facility] Medicine  Progress Note    Patient Name: Carlos Cifuentes Jr.  MRN: [id number]  Patient Class: IP- Inpatient   Admission Date: 5/15/2023  Length of Stay: 2 days  Attending Physician: You Connor MD  Primary Care Provider: Miguel Lux MD        Subjective:     Principal Problem:<principal problem not specified>        HPI:  62-year-old male with medical history significant for type 2 DM,hypertension,hyperlipidemia,peripheral vascular disease,previous bilateral below knee amputation due DM foot ulcer who presented to the ED with 3 days of swelling and gangrene 3rd and 4th right fingers after sustaining a cut across the finger nail curticle,he denied associated fever,chills or rigor,no nausea or vomiting,he decided to return to the ED due to the spreading swelling and redness over the dorsum of his hand.He denied any urinary symptom of dysuria,frequency,urgency,straining at micturition or hematuria, no change in bowel habit,no diarrhea or constipation reported.He's unsure of his last hemoglobin A1c, takes insulin for his type 2 DM.  He was recently managed here for NSTEMI .      Overview/Hospital Course:  No notes on file    Interval History: no new issues, finger stable    Review of Systems  Objective:     Vital Signs (Most Recent):  Temp: 97.9 °F (36.6 °C) (05/17/23 1044)  Pulse: 79 (05/17/23 1044)  Resp: 17 (05/17/23 1044)  BP: 121/71 (05/17/23 1044)  SpO2: 97 % (05/17/23 1044) Vital Signs (24h Range):  Temp:  [97.5 °F (36.4 °C)-98.5 °F (36.9 °C)] 97.9 °F (36.6 °C)  Pulse:  [73-80] 79  Resp:  [16-18] 17  SpO2:  [92 %-97 %] 97 %  BP: (110-151)/(56-96) 121/71     Weight: 109.6 kg (241 lb 10 oz)  Body mass index is 35.68 kg/m².    Intake/Output Summary (Last 24 hours) at 5/17/2023 1102  Last data filed at 5/17/2023 0801  Gross per 24 hour   Intake 530 ml   Output 2900 ml   Net -2370 ml         Physical Exam  Vitals and nursing note reviewed.   Constitutional:       Appearance:  Normal appearance.   Cardiovascular:      Rate and Rhythm: Normal rate and regular rhythm.   Pulmonary:      Effort: Pulmonary effort is normal. No respiratory distress.      Breath sounds: No wheezing.   Abdominal:      General: Bowel sounds are normal. There is no distension.      Palpations: Abdomen is soft.   Musculoskeletal:      Comments: B/l BKA  Right 3rd digit with black eschar to side of nail, pale/white at tip but less swelling/redness   Neurological:      Mental Status: He is alert.           Significant Labs: All pertinent labs within the past 24 hours have been reviewed.    Significant Imaging: I have reviewed all pertinent imaging results/findings within the past 24 hours.      Assessment/Plan:      S/P bilateral BKA (below knee amputation)  Complicating his type 2DM and peripheral vascular disease      Acute on chronic combined systolic and diastolic heart failure  - Stable  - Continue furosemide as needed for volume overload      Hyperglycemia  Random BG was 484 at presentation  Started on basal,bolus and correctional insulin  A1c was elevated at 11%  Encourage lifestyle modification  - patient states his glucose is usually 250-300 at home which I educated him on importance of better control for wound healing    Gangrene of finger of right hand  Presented with redness and swelling over the dorsum of the right hand  Gangrene 3rd and 4th fingers of the right hand  - unable to obtain MRI of fingers due to patient being claustrophobic. He did not want to try medications to help.  - Orthopedic Surgery consulted, plan to monitor for now  - Abnormal arterial ultrasound - plan to have Vascular Surgery evaluate  Started on broad spectrum antibiotics - vanc and cefepime      CKD stage 3 due to type 2 diabetes mellitus  His CKD stage 3 likely related to his type 2 DM  HbA1c 11.8, will obtain urine protein cr ratio  Cont low salt diet  Better glycemic control    Patient's FSGs are uncontrolled due to  hyperglycemia on current medication regimen.  Last A1c reviewed-   Lab Results   Component Value Date    LABA1C 11.8 (H) 06/13/2014    HGBA1C 11.2 (H) 04/10/2023     Most recent fingerstick glucose reviewed-   Recent Labs   Lab 05/15/23  1939   POCTGLUCOSE >500*     Current correctional scale  Medium  Maintain anti-hyperglycemic dose as follows-   Antihyperglycemics (From admission, onward)    Start     Stop Route Frequency Ordered    05/16/23 0900  insulin detemir U-100 (Levemir) pen 20 Units         -- SubQ Daily 05/16/23 0357    05/16/23 0715  insulin aspart U-100 pen 5 Units         -- SubQ 3 times daily with meals 05/16/23 0357    05/16/23 0455  insulin aspart U-100 pen 1-10 Units         -- SubQ Before meals & nightly PRN 05/16/23 0357        Hold Oral hypoglycemics while patient is in the hospital.    History of hepatitis C; S/p RX with SVR 12 documented 09/2017  Treated.      Gastroesophageal reflux disease  Stable  Cont pantoprazole for now, taper to famotide as tolerated      Peripheral vascular disease of extremity  Prior bilateral BKA  Continue statin,and antihypertensive  Frequent skin checks  - plan for vascular evaluation of right upper extremity      Tobacco abuse  Resently quit tobacco use after his MI  Encouraged him to continue staying away from tobacco products        Cellulitis of right upper extremity  Presented with redness and swelling over the dorsum of the right hand  Gangrene 3rd and 4th fingers of the right hand  - unable to obtain MRI of fingers due to patient being claustrophobic. He did not want to try medications to help.  - Orthopedic Surgery consulted, plan to monitor for now  - Abnormal arterial ultrasound - plan to have Vascular Surgery evaluate  Started on broad spectrum antibiotics - vanc and cefepime    Chronic back pain  No change in intensity and characteristic of back pain  Continue current pain medication    Type II diabetes mellitus with neurological  manifestations  Patient's FSGs are uncontrolled due to hyperglycemia on current medication regimen.  Last A1c reviewed-   Lab Results   Component Value Date    LABA1C 11.8 (H) 06/13/2014    HGBA1C 11.7 (H) 05/16/2023     Most recent fingerstick glucose reviewed-   Recent Labs   Lab 05/16/23  1642 05/16/23  1955 05/17/23  0650   POCTGLUCOSE 245* 311* 466*     Current correctional scale  High  Increase anti-hyperglycemic dose as follows-   Antihyperglycemics (From admission, onward)    Start     Stop Route Frequency Ordered    05/17/23 1130  insulin aspart U-100 pen 10 Units         -- SubQ 3 times daily with meals 05/17/23 0825    05/17/23 0830  insulin detemir U-100 (Levemir) pen 35 Units         -- SubQ 2 times daily 05/17/23 0824    05/16/23 0455  insulin aspart U-100 pen 1-10 Units         -- SubQ Before meals & nightly PRN 05/16/23 0357        Hold Oral hypoglycemics while patient is in the hospital.    Obesity  Body mass index is 35.68 kg/m².   Lifestyle modification   Morbid obesity complicates all aspects of disease management from diagnostic modalities to treatment.   Weight loss encouraged and health benefits explained to patient.           VTE Risk Mitigation (From admission, onward)         Ordered     heparin (porcine) injection 5,000 Units  Every 8 hours         05/16/23 0357     IP VTE HIGH RISK PATIENT  Once         05/16/23 0356     Place sequential compression device  Until discontinued         05/16/23 0356                Discharge Planning   BEBA:      Code Status: Full Code   Is the patient medically ready for discharge?:     Reason for patient still in hospital (select all that apply): Treatment  Discharge Plan A: Home                  You Connor MD  Department of Hospital Medicine   [facility] include 234 beds at the [facility]

## 2023-05-17 NOTE — ASSESSMENT & PLAN NOTE
Presented with redness and swelling over the dorsum of the right hand  Gangrene 3rd and 4th fingers of the right hand  - unable to obtain MRI of fingers due to patient being claustrophobic. He did not want to try medications to help.  - Orthopedic Surgery consulted, plan to monitor for now  - Abnormal arterial ultrasound - plan to have Vascular Surgery evaluate  Started on broad spectrum antibiotics - vanc and cefepime

## 2023-05-17 NOTE — NURSING
Report received from night nurse BRANDEN Parrish. Visualized patient and assessed patient's overall condition and appearance. No acute distress noted. Will continue to monitor

## 2023-05-17 NOTE — ASSESSMENT & PLAN NOTE
Patient's FSGs are uncontrolled due to hyperglycemia on current medication regimen.  Last A1c reviewed-   Lab Results   Component Value Date    LABA1C 11.8 (H) 06/13/2014    HGBA1C 11.7 (H) 05/16/2023     Most recent fingerstick glucose reviewed-   Recent Labs   Lab 05/16/23  1642 05/16/23  1955 05/17/23  0650   POCTGLUCOSE 245* 311* 466*     Current correctional scale  High  Increase anti-hyperglycemic dose as follows-   Antihyperglycemics (From admission, onward)    Start     Stop Route Frequency Ordered    05/17/23 1130  insulin aspart U-100 pen 10 Units         -- SubQ 3 times daily with meals 05/17/23 0825    05/17/23 0830  insulin detemir U-100 (Levemir) pen 35 Units         -- SubQ 2 times daily 05/17/23 0824    05/16/23 0455  insulin aspart U-100 pen 1-10 Units         -- SubQ Before meals & nightly PRN 05/16/23 0357        Hold Oral hypoglycemics while patient is in the hospital.

## 2023-05-17 NOTE — CARE UPDATE
H&P reviewed. Patient seen and examined. Updates below.    Mr. Cifuentes is a 63 yo M with hx of PAD, b/l BKA, DM2, HTN admitted with right hand cellulitis and concerns for gangrene after cutting his hand on his prosthesis. He was started on IV antibiotics and MRI was attempted but patient became claustrophobic. Orthopedic Surgery consulted and appreciate recommendations, monitor for now. Glucose control.    You Connor MD  Department of Hospital Medicine  [facility]

## 2023-05-17 NOTE — PLAN OF CARE
Problem: Adult Inpatient Plan of Care  Goal: Plan of Care Review  Outcome: Ongoing, Progressing  Goal: Optimal Comfort and Wellbeing  Outcome: Ongoing, Progressing  Intervention: Monitor Pain and Promote Comfort  Flowsheets (Taken 5/17/2023 7567)  Pain Management Interventions:   care clustered   pillow support provided   position adjusted

## 2023-05-17 NOTE — CONSULTS
[facility] [place] - Telemetry  Wound Care    Patient Name:  Carlos Cifuentes Jr.   MRN:  [id number]  Date: 5/17/2023  Diagnosis: <principal problem not specified>    History:     Past Medical History:   Diagnosis Date    Arthritis     Back pain     Bulging lumbar disc     C. difficile diarrhea     Chronic back pain 10/14/2014    Cigarette smoker     Diabetes mellitus     Diabetes mellitus type II     DM (diabetes mellitus), type 2, uncontrolled 03/12/2013    Hepatitis C 07/03/2013    MSSA (methicillin susceptible Staphylococcus aureus) septicemia     Neuromuscular disorder     Neuropathy     NSTEMI (non-ST elevated myocardial infarction) 11/28/2022    Staph aureus infection     Status post bilateral below knee amputation        Social History     Socioeconomic History    Marital status: Single   Tobacco Use    Smoking status: Former     Packs/day: 1.00     Types: Cigarettes    Smokeless tobacco: Never   Substance and Sexual Activity    Alcohol use: No    Drug use: No   Social History Narrative    ** Merged History Encounter **            Precautions:     Allergies as of 05/15/2023 - Reviewed 05/15/2023   Allergen Reaction Noted    Codeine Rash 03/11/2013    Lyrica [pregabalin]  03/22/2018    Vicodin [hydrocodone-acetaminophen] Rash 02/18/2018       WO Assessment Details/Treatment   Consulted for wounds right long finger  A 62 year old male admitted 5/15/23 from home with cellulitis  of right upper extremity; S/P bilateral BKA; acute on chronic systolic and diastolic heart failure; hyperglycemia; gangrene of finger of right hand; CKD Stage 3 due to DM II; history Hepatitis C S/P Rx with SVR 12; GERD; PVD of extremity; tobacco abuse; chronic back pain; obesity  5/17 WBC 3.76 Hgb 8.9 Hct 25.8 Alb 3.0 Weight 241 lbs 5/16 A1C 11.7  On Isoflex mattress; Luiz score 19  Ortho consult- no urgent surgical intervention necessary, IV antibiotics, wound care, monitor and allow finger to declare  itself  Assessment:  Photodocumentation    Right 3rd and 4th fingers- Edema and erythema 3rd finger with purple color under whitened nail. Black eschar along edge of nail at site of trauma. Patient reports crushing fingers when attempting to put on prosthesis.  Cuticle on 4th finger disrupted from nail with debris and erythema surrounding.  Scant serosanguineous drainage from wounds. Throbbing pain.   Treatment:  Cleansed fingers with Vashe. Left open to air for assessment of fingers and minimal drainage.   Treatment plan discussed with patient and nursing.    05/17/2023

## 2023-05-17 NOTE — ASSESSMENT & PLAN NOTE
Random BG was 484 at presentation  Started on basal,bolus and correctional insulin  A1c was elevated at 11%  Encourage lifestyle modification  - patient states his glucose is usually 250-300 at home which I educated him on importance of better control for wound healing

## 2023-05-17 NOTE — ASSESSMENT & PLAN NOTE
Prior bilateral BKA  Continue statin,and antihypertensive  Frequent skin checks  - plan for vascular evaluation of right upper extremity

## 2023-05-17 NOTE — TELEPHONE ENCOUNTER
THIS IS THE WRONG PT INFO.       ----- Message from Judy Galloway sent at 5/17/2023 12:35 PM CDT -----  Regarding: self [phone]  Type: Patient Call Back    Who called: self     What is the request in detail:pt asked to speak to Rabia, said she is in the ED but no one has come to check on her, to see if baby has a heart beat.     Can the clinic reply by [facility]? no    Would the patient rather a call back or a response via [facility]? Call back     Best call back number: [phone]

## 2023-05-17 NOTE — SUBJECTIVE & OBJECTIVE
Interval History: no new issues, finger stable    Review of Systems  Objective:     Vital Signs (Most Recent):  Temp: 97.9 °F (36.6 °C) (05/17/23 1044)  Pulse: 79 (05/17/23 1044)  Resp: 17 (05/17/23 1044)  BP: 121/71 (05/17/23 1044)  SpO2: 97 % (05/17/23 1044) Vital Signs (24h Range):  Temp:  [97.5 °F (36.4 °C)-98.5 °F (36.9 °C)] 97.9 °F (36.6 °C)  Pulse:  [73-80] 79  Resp:  [16-18] 17  SpO2:  [92 %-97 %] 97 %  BP: (110-151)/(56-96) 121/71     Weight: 109.6 kg (241 lb 10 oz)  Body mass index is 35.68 kg/m².    Intake/Output Summary (Last 24 hours) at 5/17/2023 1102  Last data filed at 5/17/2023 0801  Gross per 24 hour   Intake 530 ml   Output 2900 ml   Net -2370 ml         Physical Exam  Vitals and nursing note reviewed.   Constitutional:       Appearance: Normal appearance.   Cardiovascular:      Rate and Rhythm: Normal rate and regular rhythm.   Pulmonary:      Effort: Pulmonary effort is normal. No respiratory distress.      Breath sounds: No wheezing.   Abdominal:      General: Bowel sounds are normal. There is no distension.      Palpations: Abdomen is soft.   Musculoskeletal:      Comments: B/l BKA  Right 3rd digit with black eschar to side of nail, pale/white at tip but less swelling/redness   Neurological:      Mental Status: He is alert.           Significant Labs: All pertinent labs within the past 24 hours have been reviewed.    Significant Imaging: I have reviewed all pertinent imaging results/findings within the past 24 hours.

## 2023-05-17 NOTE — NURSING
[facility]  Nurses Note -- 4 Eyes      5/16/2023       Skin assessed on: Q Shift      [] No Pressure Injuries Present    []Prevention Measures Documented    [x] Yes LDA  for Pressure Injury Previously documented     [] Yes New Pressure Injury Discovered   [] LDA for New Pressure Injury Added      Attending RN:  Shivani Arshad RN     Second RN:  Tawaan Lu RN

## 2023-05-17 NOTE — NURSING
[facility]  Nurses Note -- 4 Eyes      5/17/2023       Skin assessed on: Q Shift      [] No Pressure Injuries Present    []Prevention Measures Documented    [x] Yes LDA  for Pressure Injury Previously documented     [] Yes New Pressure Injury Discovered   [] LDA for New Pressure Injury Added      Attending RN:  Mer Wesley LPN     Second RN:  BRANDEN Parrish

## 2023-05-18 PROBLEM — I50.42 CHRONIC COMBINED SYSTOLIC AND DIASTOLIC HEART FAILURE: Status: ACTIVE | Noted: 2023-04-10

## 2023-05-18 PROBLEM — S61.401A NECROTIC WOUND OF RIGHT HAND: Status: ACTIVE | Noted: 2023-05-16

## 2023-05-18 LAB
ALBUMIN SERPL BCP-MCNC: 3.1 G/DL (ref 3.5–5.2)
ALP SERPL-CCNC: 78 U/L (ref 55–135)
ALT SERPL W/O P-5'-P-CCNC: 21 U/L (ref 10–44)
ANION GAP SERPL CALC-SCNC: 7 MMOL/L (ref 8–16)
APTT PPP: 28 SEC (ref 21–32)
AST SERPL-CCNC: 19 U/L (ref 10–40)
BASOPHILS # BLD AUTO: 0.01 K/UL (ref 0–0.2)
BASOPHILS # BLD AUTO: 0.02 K/UL (ref 0–0.2)
BASOPHILS NFR BLD: 0.2 % (ref 0–1.9)
BASOPHILS NFR BLD: 0.4 % (ref 0–1.9)
BILIRUB SERPL-MCNC: 0.7 MG/DL (ref 0.1–1)
BUN SERPL-MCNC: 42 MG/DL (ref 8–23)
CALCIUM SERPL-MCNC: 8.7 MG/DL (ref 8.7–10.5)
CHLORIDE SERPL-SCNC: 97 MMOL/L (ref 95–110)
CO2 SERPL-SCNC: 28 MMOL/L (ref 23–29)
CREAT SERPL-MCNC: 1.8 MG/DL (ref 0.5–1.4)
DIFFERENTIAL METHOD: ABNORMAL
DIFFERENTIAL METHOD: ABNORMAL
EOSINOPHIL # BLD AUTO: 0.1 K/UL (ref 0–0.5)
EOSINOPHIL # BLD AUTO: 0.1 K/UL (ref 0–0.5)
EOSINOPHIL NFR BLD: 2.3 % (ref 0–8)
EOSINOPHIL NFR BLD: 2.7 % (ref 0–8)
ERYTHROCYTE [DISTWIDTH] IN BLOOD BY AUTOMATED COUNT: 18.2 % (ref 11.5–14.5)
ERYTHROCYTE [DISTWIDTH] IN BLOOD BY AUTOMATED COUNT: 18.6 % (ref 11.5–14.5)
EST. GFR  (NO RACE VARIABLE): 42 ML/MIN/1.73 M^2
GLUCOSE SERPL-MCNC: 268 MG/DL (ref 70–110)
HCT VFR BLD AUTO: 26.3 % (ref 40–54)
HCT VFR BLD AUTO: 27.2 % (ref 40–54)
HGB BLD-MCNC: 9.3 G/DL (ref 14–18)
HGB BLD-MCNC: 9.8 G/DL (ref 14–18)
IMM GRANULOCYTES # BLD AUTO: 0.03 K/UL (ref 0–0.04)
IMM GRANULOCYTES # BLD AUTO: 0.06 K/UL (ref 0–0.04)
IMM GRANULOCYTES NFR BLD AUTO: 0.7 % (ref 0–0.5)
IMM GRANULOCYTES NFR BLD AUTO: 1.1 % (ref 0–0.5)
INR PPP: 1.1 (ref 0.8–1.2)
LYMPHOCYTES # BLD AUTO: 0.9 K/UL (ref 1–4.8)
LYMPHOCYTES # BLD AUTO: 1.1 K/UL (ref 1–4.8)
LYMPHOCYTES NFR BLD: 20.2 % (ref 18–48)
LYMPHOCYTES NFR BLD: 21.3 % (ref 18–48)
MCH RBC QN AUTO: 35.4 PG (ref 27–31)
MCH RBC QN AUTO: 36.2 PG (ref 27–31)
MCHC RBC AUTO-ENTMCNC: 35.4 G/DL (ref 32–36)
MCHC RBC AUTO-ENTMCNC: 36 G/DL (ref 32–36)
MCV RBC AUTO: 100 FL (ref 82–98)
MCV RBC AUTO: 100 FL (ref 82–98)
MONOCYTES # BLD AUTO: 0.3 K/UL (ref 0.3–1)
MONOCYTES # BLD AUTO: 0.4 K/UL (ref 0.3–1)
MONOCYTES NFR BLD: 6 % (ref 4–15)
MONOCYTES NFR BLD: 7.6 % (ref 4–15)
NEUTROPHILS # BLD AUTO: 3 K/UL (ref 1.8–7.7)
NEUTROPHILS # BLD AUTO: 3.6 K/UL (ref 1.8–7.7)
NEUTROPHILS NFR BLD: 68 % (ref 38–73)
NEUTROPHILS NFR BLD: 69.5 % (ref 38–73)
NRBC BLD-RTO: 0 /100 WBC
NRBC BLD-RTO: 0 /100 WBC
PLATELET # BLD AUTO: 123 K/UL (ref 150–450)
PLATELET # BLD AUTO: 141 K/UL (ref 150–450)
PMV BLD AUTO: 9.6 FL (ref 9.2–12.9)
PMV BLD AUTO: 9.8 FL (ref 9.2–12.9)
POCT GLUCOSE: 152 MG/DL (ref 70–110)
POCT GLUCOSE: 161 MG/DL (ref 70–110)
POCT GLUCOSE: 225 MG/DL (ref 70–110)
POCT GLUCOSE: 319 MG/DL (ref 70–110)
POTASSIUM SERPL-SCNC: 4.6 MMOL/L (ref 3.5–5.1)
PROT SERPL-MCNC: 6.7 G/DL (ref 6–8.4)
PROTHROMBIN TIME: 11.1 SEC (ref 9–12.5)
RBC # BLD AUTO: 2.63 M/UL (ref 4.6–6.2)
RBC # BLD AUTO: 2.71 M/UL (ref 4.6–6.2)
SODIUM SERPL-SCNC: 132 MMOL/L (ref 136–145)
WBC # BLD AUTO: 4.32 K/UL (ref 3.9–12.7)
WBC # BLD AUTO: 5.24 K/UL (ref 3.9–12.7)

## 2023-05-18 PROCEDURE — 85730 THROMBOPLASTIN TIME PARTIAL: CPT | Performed by: STUDENT IN AN ORGANIZED HEALTH CARE EDUCATION/TRAINING PROGRAM

## 2023-05-18 PROCEDURE — 36415 COLL VENOUS BLD VENIPUNCTURE: CPT | Performed by: STUDENT IN AN ORGANIZED HEALTH CARE EDUCATION/TRAINING PROGRAM

## 2023-05-18 PROCEDURE — 63600175 PHARM REV CODE 636 W HCPCS: Performed by: STUDENT IN AN ORGANIZED HEALTH CARE EDUCATION/TRAINING PROGRAM

## 2023-05-18 PROCEDURE — 25000003 PHARM REV CODE 250: Performed by: STUDENT IN AN ORGANIZED HEALTH CARE EDUCATION/TRAINING PROGRAM

## 2023-05-18 PROCEDURE — 99233 PR SUBSEQUENT HOSPITAL CARE,LEVL III: ICD-10-PCS | Mod: ,,, | Performed by: NURSE PRACTITIONER

## 2023-05-18 PROCEDURE — 85610 PROTHROMBIN TIME: CPT | Performed by: STUDENT IN AN ORGANIZED HEALTH CARE EDUCATION/TRAINING PROGRAM

## 2023-05-18 PROCEDURE — 80053 COMPREHEN METABOLIC PANEL: CPT | Performed by: STUDENT IN AN ORGANIZED HEALTH CARE EDUCATION/TRAINING PROGRAM

## 2023-05-18 PROCEDURE — 99233 SBSQ HOSP IP/OBS HIGH 50: CPT | Mod: ,,, | Performed by: NURSE PRACTITIONER

## 2023-05-18 PROCEDURE — 85025 COMPLETE CBC W/AUTO DIFF WBC: CPT | Mod: 91 | Performed by: STUDENT IN AN ORGANIZED HEALTH CARE EDUCATION/TRAINING PROGRAM

## 2023-05-18 PROCEDURE — 85025 COMPLETE CBC W/AUTO DIFF WBC: CPT | Performed by: STUDENT IN AN ORGANIZED HEALTH CARE EDUCATION/TRAINING PROGRAM

## 2023-05-18 PROCEDURE — 21400001 HC TELEMETRY ROOM

## 2023-05-18 RX ORDER — OXYCODONE AND ACETAMINOPHEN 5; 325 MG/1; MG/1
1 TABLET ORAL EVERY 8 HOURS PRN
Status: DISCONTINUED | OUTPATIENT
Start: 2023-05-18 | End: 2023-05-19 | Stop reason: HOSPADM

## 2023-05-18 RX ORDER — HEPARIN SODIUM,PORCINE/D5W 25000/250
0-40 INTRAVENOUS SOLUTION INTRAVENOUS CONTINUOUS
Status: DISCONTINUED | OUTPATIENT
Start: 2023-05-18 | End: 2023-05-19 | Stop reason: HOSPADM

## 2023-05-18 RX ORDER — MUPIROCIN 20 MG/G
OINTMENT TOPICAL 2 TIMES DAILY
Status: DISCONTINUED | OUTPATIENT
Start: 2023-05-18 | End: 2023-05-19 | Stop reason: HOSPADM

## 2023-05-18 RX ORDER — CEFEPIME HYDROCHLORIDE 1 G/50ML
1 INJECTION, SOLUTION INTRAVENOUS
Status: DISCONTINUED | OUTPATIENT
Start: 2023-05-18 | End: 2023-05-19 | Stop reason: HOSPADM

## 2023-05-18 RX ADMIN — ATORVASTATIN CALCIUM 40 MG: 40 TABLET, FILM COATED ORAL at 08:05

## 2023-05-18 RX ADMIN — Medication 6 MG: at 08:05

## 2023-05-18 RX ADMIN — OXYCODONE AND ACETAMINOPHEN 1 TABLET: 5; 325 TABLET ORAL at 12:05

## 2023-05-18 RX ADMIN — FUROSEMIDE 40 MG: 40 TABLET ORAL at 08:05

## 2023-05-18 RX ADMIN — INSULIN ASPART 4 UNITS: 100 INJECTION, SOLUTION INTRAVENOUS; SUBCUTANEOUS at 11:05

## 2023-05-18 RX ADMIN — ONDANSETRON 4 MG: 2 INJECTION INTRAMUSCULAR; INTRAVENOUS at 04:05

## 2023-05-18 RX ADMIN — INSULIN ASPART 8 UNITS: 100 INJECTION, SOLUTION INTRAVENOUS; SUBCUTANEOUS at 08:05

## 2023-05-18 RX ADMIN — LOSARTAN POTASSIUM 25 MG: 25 TABLET, FILM COATED ORAL at 08:05

## 2023-05-18 RX ADMIN — OXYCODONE AND ACETAMINOPHEN 1 TABLET: 5; 325 TABLET ORAL at 08:05

## 2023-05-18 RX ADMIN — MUPIROCIN: 20 OINTMENT TOPICAL at 08:05

## 2023-05-18 RX ADMIN — CLOPIDOGREL BISULFATE 75 MG: 75 TABLET ORAL at 08:05

## 2023-05-18 RX ADMIN — HEPARIN SODIUM 5000 UNITS: 5000 INJECTION INTRAVENOUS; SUBCUTANEOUS at 05:05

## 2023-05-18 RX ADMIN — HEPARIN SODIUM 5000 UNITS: 5000 INJECTION INTRAVENOUS; SUBCUTANEOUS at 01:05

## 2023-05-18 RX ADMIN — INSULIN ASPART 10 UNITS: 100 INJECTION, SOLUTION INTRAVENOUS; SUBCUTANEOUS at 11:05

## 2023-05-18 RX ADMIN — OXYCODONE AND ACETAMINOPHEN 1 TABLET: 5; 325 TABLET ORAL at 04:05

## 2023-05-18 RX ADMIN — ASPIRIN 81 MG 81 MG: 81 TABLET ORAL at 08:05

## 2023-05-18 RX ADMIN — CEFEPIME HYDROCHLORIDE 1 G: 1 INJECTION, SOLUTION INTRAVENOUS at 04:05

## 2023-05-18 RX ADMIN — OXYCODONE AND ACETAMINOPHEN 1 TABLET: 5; 325 TABLET ORAL at 11:05

## 2023-05-18 RX ADMIN — INSULIN ASPART 2 UNITS: 100 INJECTION, SOLUTION INTRAVENOUS; SUBCUTANEOUS at 05:05

## 2023-05-18 RX ADMIN — INSULIN ASPART 10 UNITS: 100 INJECTION, SOLUTION INTRAVENOUS; SUBCUTANEOUS at 05:05

## 2023-05-18 RX ADMIN — PANTOPRAZOLE SODIUM 40 MG: 40 TABLET, DELAYED RELEASE ORAL at 08:05

## 2023-05-18 RX ADMIN — HEPARIN SODIUM 18 UNITS/KG/HR: 10000 INJECTION, SOLUTION INTRAVENOUS at 05:05

## 2023-05-18 RX ADMIN — MUPIROCIN: 20 OINTMENT TOPICAL at 11:05

## 2023-05-18 RX ADMIN — POLYETHYLENE GLYCOL 3350 17 G: 17 POWDER, FOR SOLUTION ORAL at 08:05

## 2023-05-18 RX ADMIN — INSULIN ASPART 10 UNITS: 100 INJECTION, SOLUTION INTRAVENOUS; SUBCUTANEOUS at 08:05

## 2023-05-18 NOTE — PLAN OF CARE
Patient seen and examined.  Rec optimization of acute on chronic renal disease with Nephrology.  Plan for RUE angiogram, 5/23/23.  Can be performed outpatient if patient stable for discharge prior to that date.  Cont wound care R finger.  Cont ASA and Plavix.  Rec anticoagulation.

## 2023-05-18 NOTE — PLAN OF CARE
Problem: Adult Inpatient Plan of Care  Goal: Plan of Care Review  Outcome: Ongoing, Progressing     Problem: Diabetes Comorbidity  Goal: Blood Glucose Level Within Targeted Range  Outcome: Ongoing, Progressing     Problem: Impaired Wound Healing  Goal: Optimal Wound Healing  Outcome: Ongoing, Progressing     Problem: Skin Injury Risk Increased  Goal: Skin Health and Integrity  Outcome: Ongoing, Progressing     Problem: Fall Injury Risk  Goal: Absence of Fall and Fall-Related Injury  Outcome: Ongoing, Progressing

## 2023-05-18 NOTE — CONSULTS
Unfortunate 62 y o with hx of dm htn hepc hx of back pain who comes in with a necrotic distal R middle finger. Also noted a rising creat from a baseline of 1.4 now up to 1.8. Has been seen by vascular suregry that have suggested a RUE angio     Pt denies use of OTC NSAID's recent exposure to bactrim or iv dye    Denies CNS ENT CP GI  OR DERM SX  Past Medical History:   Diagnosis Date    Arthritis     Back pain     Bulging lumbar disc     C. difficile diarrhea     Chronic back pain 10/14/2014    Cigarette smoker     Diabetes mellitus     Diabetes mellitus type II     DM (diabetes mellitus), type 2, uncontrolled 03/12/2013    Hepatitis C 07/03/2013    MSSA (methicillin susceptible Staphylococcus aureus) septicemia     Neuromuscular disorder     Neuropathy     NSTEMI (non-ST elevated myocardial infarction) 11/28/2022    Staph aureus infection     Status post bilateral below knee amputation      Past Surgical History:   Procedure Laterality Date    ANGIOGRAPHY OF LOWER EXTREMITY Left 06/14/2018    Procedure: Angiogram LEFT LOWER Extremity with US guided access from right side;  Surgeon: Sony Srinivasan MD;  Location: [facility] OR [address] [place] [address];  Service: Peripheral Vascular;  Laterality: Left;  local: 16ml  contrast: 20ml   fluoro: 2.8  427.73mGy    APPENDECTOMY      BELOW KNEE AMPUTATION OF LOWER EXTREMITY Bilateral     CORONARY ANGIOGRAPHY N/A 11/29/2022    Procedure: ANGIOGRAM, CORONARY ARTERY;  Surgeon: Bobby Harding MD;  Location: [facility] CATH LAB;  Service: Cardiology;  Laterality: N/A;    JOINT REPLACEMENT      left knee surgery      left leg surgery      broken bone    LEG SURGERY  right     Right arm boil      Right great toe amputation      TOE AMPUTATION Right      Review of patient's allergies indicates:   Allergen Reactions    Codeine Rash    Lyrica [pregabalin]      Feet turned Red    Vicodin [hydrocodone-acetaminophen] Rash       Current Facility-Administered Medications   Medication    acetaminophen tablet  650 mg    albuterol sulfate nebulizer solution 2.5 mg    aspirin chewable tablet 81 mg    atorvastatin tablet 40 mg    cefepime in dextrose 5 % 1 gram/50 mL IVPB 1 g    clopidogreL tablet 75 mg    dextrose 10% bolus 125 mL 125 mL    dextrose 10% bolus 250 mL 250 mL    dextrose 40 % gel 15,000 mg    dextrose 40 % gel 30,000 mg    furosemide tablet 40 mg    glucagon (human recombinant) injection 1 mg    heparin (porcine) injection 5,000 Units    insulin aspart U-100 pen 1-10 Units    insulin aspart U-100 pen 10 Units    insulin detemir U-100 (Levemir) pen 35 Units    losartan tablet 25 mg    melatonin tablet 6 mg    mupirocin 2 % ointment    naloxone 0.4 mg/mL injection 0.02 mg    ondansetron injection 4 mg    oxyCODONE-acetaminophen 5-325 mg per tablet 1 tablet    pantoprazole EC tablet 40 mg    polyethylene glycol packet 17 g    potassium bicarbonate disintegrating tablet 60 mEq    simethicone chewable tablet 80 mg    sodium chloride 0.9% flush 10 mL    vancomycin (VANCOCIN) 1,750 mg in dextrose 5 % (D5W) 500 mL IVPB    vancomycin - pharmacy to dose       LABS    Recent Results (from the past 24 hour(s))   POCT glucose    Collection Time: 05/17/23  4:07 PM   Result Value Ref Range    POCT Glucose 287 (H) 70 - 110 mg/dL   POCT glucose    Collection Time: 05/17/23  8:00 PM   Result Value Ref Range    POCT Glucose 266 (H) 70 - 110 mg/dL   VANCOMYCIN, TROUGH    Collection Time: 05/17/23 10:17 PM   Result Value Ref Range    Vancomycin-Trough 14.8 10.0 - 22.0 ug/mL   Comprehensive Metabolic Panel (CMP)    Collection Time: 05/18/23  4:31 AM   Result Value Ref Range    Sodium 132 (L) 136 - 145 mmol/L    Potassium 4.6 3.5 - 5.1 mmol/L    Chloride 97 95 - 110 mmol/L    CO2 28 23 - 29 mmol/L    Glucose 268 (H) 70 - 110 mg/dL    BUN 42 (H) 8 - 23 mg/dL    Creatinine 1.8 (H) 0.5 - 1.4 mg/dL    Calcium 8.7 8.7 - 10.5 mg/dL    Total Protein 6.7 6.0 - 8.4 g/dL    Albumin 3.1 (L) 3.5 - 5.2 g/dL    Total Bilirubin 0.7 0.1 - 1.0 mg/dL     Alkaline Phosphatase 78 55 - 135 U/L    AST 19 10 - 40 U/L    ALT 21 10 - 44 U/L    Anion Gap 7 (L) 8 - 16 mmol/L    eGFR 42 (A) >60 mL/min/1.73 m^2   CBC with Automated Differential    Collection Time: 05/18/23  4:31 AM   Result Value Ref Range    WBC 4.32 3.90 - 12.70 K/uL    RBC 2.63 (L) 4.60 - 6.20 M/uL    Hemoglobin 9.3 (L) 14.0 - 18.0 g/dL    Hematocrit 26.3 (L) 40.0 - 54.0 %     (H) 82 - 98 fL    MCH 35.4 (H) 27.0 - 31.0 pg    MCHC 35.4 32.0 - 36.0 g/dL    RDW 18.2 (H) 11.5 - 14.5 %    Platelets 123 (L) 150 - 450 K/uL    MPV 9.6 9.2 - 12.9 fL    Immature Granulocytes 0.7 (H) 0.0 - 0.5 %    Gran # (ANC) 3.0 1.8 - 7.7 K/uL    Immature Grans (Abs) 0.03 0.00 - 0.04 K/uL    Lymph # 0.9 (L) 1.0 - 4.8 K/uL    Mono # 0.3 0.3 - 1.0 K/uL    Eos # 0.1 0.0 - 0.5 K/uL    Baso # 0.01 0.00 - 0.20 K/uL    nRBC 0 0 /100 WBC    Gran % 69.5 38.0 - 73.0 %    Lymph % 21.3 18.0 - 48.0 %    Mono % 6.0 4.0 - 15.0 %    Eosinophil % 2.3 0.0 - 8.0 %    Basophil % 0.2 0.0 - 1.9 %    Differential Method Automated    POCT glucose    Collection Time: 05/18/23  7:45 AM   Result Value Ref Range    POCT Glucose 319 (H) 70 - 110 mg/dL   CV Wrist Brachial Indices Extended    Collection Time: 05/18/23 10:11 AM   Result Value Ref Range    Right Wrist Brachial Index 1.03 WBI    Left Wrist Brachial Index 1.04 WBI    Left Brachial  mmHg    Left Radial  mmHg    Left Ulnar BP 98 mmHg    Right Brachial  mmHg    RIGHT RADIAL  mmHg    Right Ulnar  mmHg   POCT glucose    Collection Time: 05/18/23 11:23 AM   Result Value Ref Range    POCT Glucose 225 (H) 70 - 110 mg/dL   ]    I/O last 3 completed shifts:  In: 480 [P.O.:480]  Out: 3550 [Urine:3550]    Vitals:    05/18/23 0457 05/18/23 0745 05/18/23 1125 05/18/23 1131   BP:  (!) 150/69 (!) 116/59    Pulse:  73 78    Resp: 17 19 18 18   Temp:  97.6 °F (36.4 °C) 98.1 °F (36.7 °C)    TempSrc:       SpO2:  95% 97%    Weight:       Height:           No Jvd, Thyromegaly or  Lymphadenopathy  Lungs: Fairly clear anteriorly and laterally  Cor: RRR no G or rubs  Abd: Soft benign good bowel sounds non tender  Ext: Bilat LE amps  R index finger with cold cyanotic early necrosis distal phalange     A)  LIZ likely secondary to volume issues bp issues meds infection and maybe even bladder dysfx  Nectrtic distal R middle fin nivia  DM  HTn  PAD   Smoker  Hx of proteinuria   Vasculopath    P)    Renal Diet  Home meds  Protect access  HD not needed  Binders prn  Adjust all meds to the degree of renal fx  Close follow up I/O and weights  Maintain Hydration   If needed an angio could be done but prior to it I would pre and post hydrate with NSS would dc vanco use a dif antibiotic would dc diuretics and acei/arbs   Renal US  Check po4

## 2023-05-18 NOTE — PROGRESS NOTES
Pt seen/evaluated. No new issues. Pt notes minimal ldiscomfort and cool feeling to his fingertip  VSS    Rt LF- darkly discolored tissue is present at the radial side of the LF distal phalanx. It appears dry and not fully necrotic, but is worrisome for tissue death. The fingertip is cool to touch and whitish. Theere is some discoloration under the nail. Mild erythema and swelling are present    XRAY Rt hand- no obvious osteo    A/P Rt LF ischemia  No obvious ongoing infection. Main issue appears to be ischemia.  Will allow tissue to declare viability or not  Cont abx and wound care  Will follow

## 2023-05-18 NOTE — NURSING
[facility]  Nurses Note -- 4 Eyes      5/17/2023       Skin assessed on: Q Shift      [] No Pressure Injuries Present    []Prevention Measures Documented    [x] Yes LDA  for Pressure Injury Previously documented     [] Yes New Pressure Injury Discovered   [] LDA for New Pressure Injury Added      Attending RN:  Shivani Arshad RN     Second RN:  Mer Schilling LPN

## 2023-05-18 NOTE — NURSING
Bedside report given to night nurse BRANDEN Parrish. Walking rounds completed. Visualized and assessed patient. NAD noted. Safety precautions maintained and call light within reach.    Chart check completed.

## 2023-05-18 NOTE — PROGRESS NOTES
Pharmacokinetic Assessment Follow Up: IV Vancomycin    Vancomycin serum concentration assessment(s):    The trough level was drawn correctly and can be used to guide therapy at this time. The measurement is within the desired definitive target range of 10 to 20 mcg/mL.    Vancomycin Regimen Plan:    Continue regimen to Vancomycin 1750 mg IV every 24 hours with next serum trough concentration measured at 22:00 prior to 3rd dose on 5/19/23.    Drug levels (last 3 results):  Recent Labs   Lab Result Units 05/17/23  2217   Vancomycin-Trough ug/mL 14.8       Pharmacy will continue to follow and monitor vancomycin.    Please contact pharmacy at extension [phone] for questions regarding this assessment.    Thank you for the consult,   Poonam Li       Patient brief summary:  Carlos Cifuentes Jr. is a 62 y.o. male initiated on antimicrobial therapy with IV Vancomycin for treatment of skin & soft tissue infection        Drug Allergies:   Review of patient's allergies indicates:   Allergen Reactions    Codeine Rash    Lyrica [pregabalin]      Feet turned Red    Vicodin [hydrocodone-acetaminophen] Rash       Actual Body Weight:   109.6 kg    Renal Function:   Estimated Creatinine Clearance: 52.6 mL/min (A) (based on SCr of 1.8 mg/dL (H)).,     Dialysis Method (if applicable):  N/A    CBC (last 72 hours):  Recent Labs   Lab Result Units 05/15/23  2020 05/16/23  0457 05/17/23  0344   WBC K/uL 4.97 4.13 3.76*   Hemoglobin g/dL 9.7* 9.5* 8.9*   Hemoglobin A1C %  --  11.7*  --    Hematocrit % 26.7* 26.3* 25.8*   Platelets K/uL 121* 101* 111*   Gran % % 75.1* 72.7 69.4   Lymph % % 15.1* 16.2* 19.1   Mono % % 6.0 7.3 6.4   Eosinophil % % 2.4 2.9 3.7   Basophil % % 0.4 0.2 0.3   Differential Method  Automated Automated Automated       Metabolic Panel (last 72 hours):  Recent Labs   Lab Result Units 05/15/23  2020 05/15/23  2338 05/16/23 0458 05/16/23  0938 05/17/23  0344   Sodium mmol/L 131*  --  131*  --  132*   Potassium mmol/L  5.0  --  4.9  --  5.0   Chloride mmol/L 98  --  99  --  98   CO2 mmol/L 24  --  24  --  25   Glucose mg/dL 484*  --  424*  --  434*   Glucose, UA   --  4+*  --  4+*  --    BUN mg/dL 41*  --  37*  --  41*   Creatinine mg/dL 1.9*  --  1.6*  --  1.8*   Albumin g/dL 3.4*  --  3.1*  --  3.0*   Total Bilirubin mg/dL 0.5  --  0.6  --  0.6   Alkaline Phosphatase U/L 111  --  78  --  85   AST U/L 16  --  14  --  18   ALT U/L 18  --  18  --  18       Vancomycin Administrations:  vancomycin given in the last 96 hours                     vancomycin (VANCOCIN) 1,750 mg in dextrose 5 % (D5W) 500 mL IVPB (mg) 1,750 mg New Bag 05/17/23 2242     1,750 mg New Bag 05/16/23 2249    vancomycin (VANCOCIN) 1,750 mg in dextrose 5 % (D5W) 500 mL IVPB (mg) 1,750 mg New Bag 05/15/23 2245                    Microbiologic Results:  Microbiology Results (last 7 days)       Procedure Component Value Units Date/Time    Blood Culture #1 **CANNOT BE ORDERED STAT** [[id number]] Collected: 05/15/23 2000    Order Status: Completed Specimen: Blood from Peripheral, Antecubital, Left Updated: 05/17/23 2303     Blood Culture, Routine No Growth to date      No Growth to date      No Growth to date    Blood culture [[id number]] Collected: 05/15/23 2010    Order Status: Completed Specimen: Blood from Peripheral, Hand, Left Updated: 05/17/23 2303     Blood Culture, Routine No Growth to date      No Growth to date      No Growth to date

## 2023-05-18 NOTE — PROGRESS NOTES
Orthopedic Surgery Progress Note    Subjective:  No acute issues. Vascular planning for possible RUE angiogram.     Objective:  Vital signs in last 24 hours:  Temp:  [97.4 °F (36.3 °C)-98.1 °F (36.7 °C)] 97.6 °F (36.4 °C)  Pulse:  [73-92] 73  Resp:  [16-19] 19  SpO2:  [94 %-97 %] 95 %  BP: (113-162)/(51-79) 150/69    Physical Exam:   NAD, AAOx3, nonlabored respirations.   Exam unchanged. R 3rd digit with dry gangrene v eschar to distal medial aspect. No purulence or drainage. Pale and ischemic finger tip, cool to touch. Mild swelling and erythema at the proximal nail fold of the 4th digit. No pus.     Data Review  CBC:   Lab Results   Component Value Date    WBC 4.32 05/18/2023    RBC 2.63 (L) 05/18/2023    HGB 9.3 (L) 05/18/2023    HCT 26.3 (L) 05/18/2023     (L) 05/18/2023       Assessment/Plan: R 3rd and 4th finger ischemia and gangrene.   - No active infection.   - Vascular planning for possible RUE angiogram.   - Will continue to monitor and allow digits to declare viability or not.   - Continue antibiotics and wound care.    Joy Li MD  Orthopedics  [facility]

## 2023-05-18 NOTE — NURSING
[facility]  Nurses Note -- 4 Eyes      5/18/2023       Skin assessed on: Q Shift      [] No Pressure Injuries Present    []Prevention Measures Documented    [x] Yes LDA  for Pressure Injury Previously documented     [] Yes New Pressure Injury Discovered   [] LDA for New Pressure Injury Added      Attending RN:  Amanda Mcclain LPN     Second RN: Shivani Arshad RN

## 2023-05-18 NOTE — PROGRESS NOTES
Vancomycin consult follow-up:    Patient reviewed, renal function stable, no new levels, continue current therapy; Next levels due: trough due 5/19/2023 at 2200

## 2023-05-18 NOTE — CONSULTS
Vascular Surgery  Consult Note    Inpatient consult to Vascular Surgery  Consult performed by: Ana Maria Nogueira NP  Consult ordered by: You Connor MD      Subjective:     Chief Complaint/Reason for Admission: RUE necrotic 3rd and 4th fingers    History of Present Illness: 62-year-old male with medical history significant for type 2 DM,hypertension,hyperlipidemia,peripheral vascular disease,previous bilateral below knee amputation due DM foot ulcer who presented to the ED with 3 days of swelling and gangrene 3rd and 4th right fingers after sustaining a cut across the finger nail curticle,he denied associated fever,chills or rigor,no nausea or vomiting,he decided to return to the ED due to the spreading swelling and redness over the dorsum of his hand. RUE arterial US with monophasic flow in the radial and ulnar artery. Vascular surgery was consulted for further evaluation.     Medications Prior to Admission   Medication Sig Dispense Refill Last Dose    albuterol (VENTOLIN HFA) 90 mcg/actuation inhaler Inhale 2 puffs into the lungs every 6 (six) hours as needed for Wheezing. Rescue 18 g 0     aspirin 81 MG Chew Take 1 tablet (81 mg total) by mouth once daily.  0     atorvastatin (LIPITOR) 40 MG tablet Take 1 tablet (40 mg total) by mouth once daily. 60 tablet 5     clopidogreL (PLAVIX) 75 mg tablet Take 1 tablet (75 mg total) by mouth once daily. 30 tablet 5     furosemide (LASIX) 40 MG tablet Take 1 tablet (40 mg total) by mouth 2 (two) times daily. 60 tablet 3     glimepiride (AMARYL) 2 MG tablet Take 1 tablet (2 mg total) by mouth every morning. 30 tablet 11     HUMALOG U-100 INSULIN 100 unit/mL injection Inject 5 Units into the skin 3 (three) times daily before meals.       LANTUS U-100 INSULIN 100 unit/mL injection INJECT TEN UNITS INTO SKIN AT BEDTIME 15 mL 3     losartan (COZAAR) 25 MG tablet Take 25 mg by mouth once daily.       metFORMIN (GLUCOPHAGE) 1000 MG tablet TAKE ONE TABLET BY MOUTH TWICE DAILY  (Patient not taking: Reported on 4/10/2023) 180 tablet 3     pantoprazole (PROTONIX) 40 MG tablet TAKE ONE TABLET BY MOUTH EVERY DAY 90 tablet 3     TRUE METRIX GLUCOSE METER Misc AS DIRECTED  0     TRUE METRIX GLUCOSE TEST STRIP Strp Twice daily blood sugar checks 100 strip prn        Review of patient's allergies indicates:   Allergen Reactions    Codeine Rash    Lyrica [pregabalin]      Feet turned Red    Vicodin [hydrocodone-acetaminophen] Rash       Past Medical History:   Diagnosis Date    Arthritis     Back pain     Bulging lumbar disc     C. difficile diarrhea     Chronic back pain 10/14/2014    Cigarette smoker     Diabetes mellitus     Diabetes mellitus type II     DM (diabetes mellitus), type 2, uncontrolled 03/12/2013    Hepatitis C 07/03/2013    MSSA (methicillin susceptible Staphylococcus aureus) septicemia     Neuromuscular disorder     Neuropathy     NSTEMI (non-ST elevated myocardial infarction) 11/28/2022    Staph aureus infection     Status post bilateral below knee amputation      Past Surgical History:   Procedure Laterality Date    ANGIOGRAPHY OF LOWER EXTREMITY Left 06/14/2018    Procedure: Angiogram LEFT LOWER Extremity with US guided access from right side;  Surgeon: Sony Srinivasan MD;  Location: [facility] OR [address] [place] [address];  Service: Peripheral Vascular;  Laterality: Left;  local: 16ml  contrast: 20ml   fluoro: 2.8  427.73mGy    APPENDECTOMY      BELOW KNEE AMPUTATION OF LOWER EXTREMITY Bilateral     CORONARY ANGIOGRAPHY N/A 11/29/2022    Procedure: ANGIOGRAM, CORONARY ARTERY;  Surgeon: Bobby Harding MD;  Location: [facility] CATH LAB;  Service: Cardiology;  Laterality: N/A;    JOINT REPLACEMENT      left knee surgery      left leg surgery      broken bone    LEG SURGERY  right     Right arm boil      Right great toe amputation      TOE AMPUTATION Right      Family History       Problem Relation (Age of Onset)    Arthritis Mother, Sister, Brother    Diabetes Sister          Tobacco Use    Smoking  status: Former     Packs/day: 1.00     Types: Cigarettes    Smokeless tobacco: Never   Substance and Sexual Activity    Alcohol use: No    Drug use: No    Sexual activity: Not on file     Review of Systems  Constitutional:  Negative for chills and fever.   HENT:  Negative for sore throat.    Respiratory:  Negative for shortness of breath.    Cardiovascular:  Negative for chest pain.   Gastrointestinal:  Negative for nausea.   Genitourinary:  Negative for dysuria.   Musculoskeletal:  Positive for arthralgias and joint swelling. Negative for back pain.   Skin:  Negative for rash.   Neurological:  Negative for weakness.   Psychiatric/Behavioral:  Negative for confusion.    Objective:     Vital Signs (Most Recent):  Temp: 97.6 °F (36.4 °C) (05/18/23 0745)  Pulse: 73 (05/18/23 0745)  Resp: 19 (05/18/23 0745)  BP: (!) 150/69 (05/18/23 0745)  SpO2: 95 % (05/18/23 0745) Vital Signs (24h Range):  Temp:  [97.4 °F (36.3 °C)-98.1 °F (36.7 °C)] 97.6 °F (36.4 °C)  Pulse:  [73-92] 73  Resp:  [16-19] 19  SpO2:  [94 %-97 %] 95 %  BP: (113-162)/(51-79) 150/69     Weight: 109.6 kg (241 lb 10 oz)  Body mass index is 35.68 kg/m².        Physical Exam  Constitutional: He appears well-developed and well-nourished. He is not diaphoretic. No distress.   HENT:   Head: Normocephalic and atraumatic.   Eyes: Conjunctivae and EOM are normal. Pupils are equal, round, and reactive to light. No scleral icterus.   Neck: Neck supple.   Normal range of motion.  Cardiovascular:  Normal rate, regular rhythm, normal heart sounds and intact distal pulses.     Exam reveals no gallop and no friction rub.       No murmur heard.  Pulmonary/Chest: Breath sounds normal. No stridor. No respiratory distress. He has no wheezes. He has no rhonchi. He has no rales.   Abdominal: Abdomen is soft. Bowel sounds are normal. He exhibits no distension. There is no abdominal tenderness. There is no rebound and no guarding.   Musculoskeletal:         General: Tenderness and  edema present. Normal range of motion.      Cervical back: Normal range of motion and neck supple.      Comments: Pale edematous right fingertips.  Delayed cap refill of right hand.  Doppler distal signal in the radial and ulnar positions. 3rd and 4th with necrotic ischemic appearing tissue on the right distal finger    Neurological: He is alert and oriented to person, place, and time. He has normal strength. No cranial nerve deficit.   Skin: Skin is warm and dry. No rash noted.   Psychiatric: He has a normal mood and affect. His behavior is normal.    Significant Labs:  BMP:   Recent Labs   Lab 05/18/23  0431   *   *   K 4.6   CL 97   CO2 28   BUN 42*   CREATININE 1.8*   CALCIUM 8.7     CBC:   Recent Labs   Lab 05/18/23 0431   WBC 4.32   RBC 2.63*   HGB 9.3*   HCT 26.3*   *   *   MCH 35.4*   MCHC 35.4       Significant Diagnostics:  I have reviewed all pertinent imaging results/findings within the past 24 hours.  FINDINGS:  Right upper extremity arterial peak systolic velocities in cm/sec:     Subclavian: 81, biphasic     Axillary: 122, triphasic     Proximal brachial: 168, triphasic     Mid brachial: 165, triphasic     Distal brachial: 158, triphasic     Proximal radial: 72, monophasic     Proximal ulnar: 89, monophasic     Distal radial: 93, monophasic     Distal ulna: 101, monophasic     Impression:     Monophasic flow in the radial and ulnar arteries suggesting significant disease.     Elevated brachial velocities with normal waveforms.  Assessment/Plan:     Active Diagnoses:    Diagnosis Date Noted POA    Hyperglycemia [R73.9] 04/10/2023 Yes    S/P bilateral BKA (below knee amputation) [Z89.512, Z89.511] 04/10/2023 Not Applicable    Acute on chronic combined systolic and diastolic heart failure [I50.43] 04/10/2023 Yes    Gangrene of finger of right hand [I96] 03/20/2018 Yes    CKD stage 3 due to type 2 diabetes mellitus [E11.22, N18.30] 09/20/2017 Yes     Chronic    History of  hepatitis C; S/p RX with SVR 12 documented 09/2017 [Z86.19] 09/11/2017 Yes    Gastroesophageal reflux disease [K21.9]  Yes    Peripheral vascular disease of extremity [I73.9] 04/08/2017 Yes    Tobacco abuse [Z72.0] 04/08/2017 Yes    Cellulitis of right upper extremity [L03.113] 11/03/2015 Yes    Chronic back pain [M54.9, G89.29] 10/14/2014 Yes    Type II diabetes mellitus with neurological manifestations [E11.49] 10/09/2014 Yes    Obesity [E66.9] 09/30/2014 Yes      Problems Resolved During this Admission:    Diagnosis Date Noted Date Resolved POA    Necrotic hand wound [S61.409A, I96] 05/16/2023 05/16/2023 Yes    Hyperlipidemia associated with type 2 diabetes mellitus [E11.69, E78.5] 12/08/2022 05/16/2023 Yes   #RUE necrotic 3rd and 4th fingertip  - Pt with worsening right 3rd and 4th fingertip necrosis. All imaging reviewed. RUE arterial us with monophasic flow of radial and ulnar arteries. Recommend wrist brachial index to further evaluate. Plan for possible RUE angiogram pending WBI and nephrology clearance. Nephrology consult for angio clearance.   Wound care consult. Continue Ortho recs.     Thank you for your consult. I will follow-up with patient. Please contact us if you have any additional questions.    Ana Maria Nogueira NP  Vascular Surgery

## 2023-05-19 VITALS
HEART RATE: 80 BPM | WEIGHT: 241.63 LBS | SYSTOLIC BLOOD PRESSURE: 146 MMHG | RESPIRATION RATE: 20 BRPM | DIASTOLIC BLOOD PRESSURE: 69 MMHG | HEIGHT: 69 IN | BODY MASS INDEX: 35.79 KG/M2 | TEMPERATURE: 98 F | OXYGEN SATURATION: 94 %

## 2023-05-19 LAB
APTT PPP: 31.6 SEC (ref 21–32)
APTT PPP: 43.1 SEC (ref 21–32)
APTT PPP: 64.2 SEC (ref 21–32)
BACTERIA BLD CULT: NORMAL
BACTERIA BLD CULT: NORMAL
BASOPHILS # BLD AUTO: 0.02 K/UL (ref 0–0.2)
BASOPHILS NFR BLD: 0.4 % (ref 0–1.9)
DIFFERENTIAL METHOD: ABNORMAL
EOSINOPHIL # BLD AUTO: 0.1 K/UL (ref 0–0.5)
EOSINOPHIL NFR BLD: 3 % (ref 0–8)
ERYTHROCYTE [DISTWIDTH] IN BLOOD BY AUTOMATED COUNT: 18.6 % (ref 11.5–14.5)
HCT VFR BLD AUTO: 25.3 % (ref 40–54)
HGB BLD-MCNC: 8.7 G/DL (ref 14–18)
IMM GRANULOCYTES # BLD AUTO: 0.04 K/UL (ref 0–0.04)
IMM GRANULOCYTES NFR BLD AUTO: 0.9 % (ref 0–0.5)
LYMPHOCYTES # BLD AUTO: 0.9 K/UL (ref 1–4.8)
LYMPHOCYTES NFR BLD: 18.4 % (ref 18–48)
MCH RBC QN AUTO: 34.3 PG (ref 27–31)
MCHC RBC AUTO-ENTMCNC: 34.4 G/DL (ref 32–36)
MCV RBC AUTO: 100 FL (ref 82–98)
MONOCYTES # BLD AUTO: 0.3 K/UL (ref 0.3–1)
MONOCYTES NFR BLD: 7.1 % (ref 4–15)
NEUTROPHILS # BLD AUTO: 3.2 K/UL (ref 1.8–7.7)
NEUTROPHILS NFR BLD: 70.2 % (ref 38–73)
NRBC BLD-RTO: 0 /100 WBC
PLATELET # BLD AUTO: 131 K/UL (ref 150–450)
PMV BLD AUTO: 9.8 FL (ref 9.2–12.9)
POCT GLUCOSE: 113 MG/DL (ref 70–110)
POCT GLUCOSE: 151 MG/DL (ref 70–110)
RBC # BLD AUTO: 2.54 M/UL (ref 4.6–6.2)
WBC # BLD AUTO: 4.62 K/UL (ref 3.9–12.7)

## 2023-05-19 PROCEDURE — 63600175 PHARM REV CODE 636 W HCPCS: Performed by: STUDENT IN AN ORGANIZED HEALTH CARE EDUCATION/TRAINING PROGRAM

## 2023-05-19 PROCEDURE — 85025 COMPLETE CBC W/AUTO DIFF WBC: CPT | Performed by: STUDENT IN AN ORGANIZED HEALTH CARE EDUCATION/TRAINING PROGRAM

## 2023-05-19 PROCEDURE — 25000003 PHARM REV CODE 250: Performed by: STUDENT IN AN ORGANIZED HEALTH CARE EDUCATION/TRAINING PROGRAM

## 2023-05-19 PROCEDURE — 36415 COLL VENOUS BLD VENIPUNCTURE: CPT | Performed by: STUDENT IN AN ORGANIZED HEALTH CARE EDUCATION/TRAINING PROGRAM

## 2023-05-19 PROCEDURE — 85730 THROMBOPLASTIN TIME PARTIAL: CPT | Performed by: STUDENT IN AN ORGANIZED HEALTH CARE EDUCATION/TRAINING PROGRAM

## 2023-05-19 RX ORDER — HALOPERIDOL 5 MG/ML
5 INJECTION INTRAMUSCULAR EVERY 6 HOURS PRN
Status: DISCONTINUED | OUTPATIENT
Start: 2023-05-19 | End: 2023-05-19 | Stop reason: HOSPADM

## 2023-05-19 RX ORDER — DOXYCYCLINE 100 MG/1
100 CAPSULE ORAL 2 TIMES DAILY
Qty: 14 CAPSULE | Refills: 0 | Status: SHIPPED | OUTPATIENT
Start: 2023-05-19 | End: 2023-05-26

## 2023-05-19 RX ORDER — AMOXICILLIN AND CLAVULANATE POTASSIUM 875; 125 MG/1; MG/1
1 TABLET, FILM COATED ORAL EVERY 12 HOURS
Qty: 14 TABLET | Refills: 0 | Status: SHIPPED | OUTPATIENT
Start: 2023-05-19 | End: 2023-05-26

## 2023-05-19 RX ADMIN — ONDANSETRON 4 MG: 2 INJECTION INTRAMUSCULAR; INTRAVENOUS at 05:05

## 2023-05-19 RX ADMIN — PANTOPRAZOLE SODIUM 40 MG: 40 TABLET, DELAYED RELEASE ORAL at 09:05

## 2023-05-19 RX ADMIN — INSULIN ASPART 2 UNITS: 100 INJECTION, SOLUTION INTRAVENOUS; SUBCUTANEOUS at 11:05

## 2023-05-19 RX ADMIN — ATORVASTATIN CALCIUM 40 MG: 40 TABLET, FILM COATED ORAL at 09:05

## 2023-05-19 RX ADMIN — INSULIN ASPART 10 UNITS: 100 INJECTION, SOLUTION INTRAVENOUS; SUBCUTANEOUS at 09:05

## 2023-05-19 RX ADMIN — VANCOMYCIN HYDROCHLORIDE 1750 MG: 500 INJECTION, POWDER, LYOPHILIZED, FOR SOLUTION INTRAVENOUS at 01:05

## 2023-05-19 RX ADMIN — HEPARIN SODIUM 18 UNITS/KG/HR: 10000 INJECTION, SOLUTION INTRAVENOUS at 07:05

## 2023-05-19 RX ADMIN — LOSARTAN POTASSIUM 25 MG: 25 TABLET, FILM COATED ORAL at 09:05

## 2023-05-19 RX ADMIN — INSULIN ASPART 10 UNITS: 100 INJECTION, SOLUTION INTRAVENOUS; SUBCUTANEOUS at 11:05

## 2023-05-19 RX ADMIN — ASPIRIN 81 MG 81 MG: 81 TABLET ORAL at 09:05

## 2023-05-19 RX ADMIN — MUPIROCIN: 20 OINTMENT TOPICAL at 09:05

## 2023-05-19 RX ADMIN — ACETAMINOPHEN 650 MG: 325 TABLET ORAL at 03:05

## 2023-05-19 NOTE — PLAN OF CARE
Problem: Adult Inpatient Plan of Care  Goal: Plan of Care Review  5/19/2023 1126 by Lani Santana RN  Outcome: Ongoing, Progressing  5/19/2023 0807 by Lani Santana RN  Outcome: Ongoing, Progressing  Goal: Patient-Specific Goal (Individualized)  5/19/2023 1126 by Lani Santana RN  Outcome: Ongoing, Progressing  5/19/2023 0807 by Lani Santana RN  Outcome: Ongoing, Progressing  Goal: Absence of Hospital-Acquired Illness or Injury  5/19/2023 1126 by Lani Santana RN  Outcome: Ongoing, Progressing  5/19/2023 0807 by Lani Santana RN  Outcome: Ongoing, Progressing  Goal: Optimal Comfort and Wellbeing  5/19/2023 1126 by Lani Santana RN  Outcome: Ongoing, Progressing  5/19/2023 0807 by Lani Santana RN  Outcome: Ongoing, Progressing  Goal: Readiness for Transition of Care  5/19/2023 1126 by Lani Santana RN  Outcome: Ongoing, Progressing  5/19/2023 0807 by Lani Santana RN  Outcome: Ongoing, Progressing     Problem: Diabetes Comorbidity  Goal: Blood Glucose Level Within Targeted Range  5/19/2023 1126 by Lani Santana RN  Outcome: Ongoing, Progressing  5/19/2023 0807 by Lani Santana RN  Outcome: Ongoing, Progressing     Problem: Impaired Wound Healing  Goal: Optimal Wound Healing  5/19/2023 1126 by Lani Santana RN  Outcome: Ongoing, Progressing  5/19/2023 0807 by Lani Santana RN  Outcome: Ongoing, Progressing     Problem: Skin Injury Risk Increased  Goal: Skin Health and Integrity  5/19/2023 1126 by Lani Santana RN  Outcome: Ongoing, Progressing  5/19/2023 0807 by Lani Santana RN  Outcome: Ongoing, Progressing     Problem: Fall Injury Risk  Goal: Absence of Fall and Fall-Related Injury  5/19/2023 1126 by Lani Santana RN  Outcome: Ongoing, Progressing  5/19/2023 0807 by Lani Santana RN  Outcome: Ongoing, Progressing

## 2023-05-19 NOTE — NURSING
Bedside handoff received from nurse Kelechi RN.  Patient lying in bed without any acute distress noted at this time. Safety precautions maintained.

## 2023-05-19 NOTE — PLAN OF CARE
[facility] - Telemetry  Discharge Final Note    Primary Care Provider: Miguel Lux MD    Expected Discharge Date: 5/19/2023    Final Discharge Note (most recent)       Final Note - 05/19/23 0957          Final Note    Assessment Type Final Discharge Note     Anticipated Discharge Disposition Home or Self Care     What phone number can be called within the next 1-3 days to see how you are doing after discharge? [phone]     Hospital Resources/Appts/Education Provided Appointments scheduled and added to AVS;Appointments scheduled by Navigator/Coordinator        Post-Acute Status    Coverage Medicaid     Discharge Delays None known at this time                     Important Message from Medicare             Contact Info       Miguel Lux MD   Specialty: Internal Medicine, Oncology, Hematology and Oncology   Relationship: PCP - General    [address] [place], [place] [address]  [address] [place] [id number]   Phone: [phone]       Next Steps: Follow up on 5/23/2023    Instructions: Appointment scheduled for 11:30am           secure chat nurse Lani that patient cleared from case management standpoint.

## 2023-05-19 NOTE — SIGNIFICANT EVENT
"Called to bedside  by nursing staff. Primary nurse, Kelechi noted to be present in room. Patient on cell phone with 911 per primary nurse. Patient verbalized into phone "I need a tracker on  this phone." Patient stated "she" made him feel uncomfortable and noted to be pointing in there direction where no one was present. Patient verbalized  " this a life or death situation" and that "she and staff are acting funny". Pt verbalized not knowing his location. When asking patient to elaborate on what happened, he verbalized "you are starting to act funny too and I don't want to talk to you anymore." Security notified. Two police officers arrived to unit with security. Dr. Lang notified.   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 Four police officers,  Raul, and Dr. Lang at bedside.  "

## 2023-05-19 NOTE — NURSING
[facility] [place]  Nurses Note -- 4 Eyes      5/18/2023       Skin assessed on: Q Shift      [] No Pressure Injuries Present    []Prevention Measures Documented    [x] Yes LDA  for Pressure Injury Previously documented     [] Yes New Pressure Injury Discovered   [] LDA for New Pressure Injury Added      Attending RN:  Kelechi Elias RN     Second RN:  Amanda Mcclain LPN

## 2023-05-19 NOTE — ASSESSMENT & PLAN NOTE
Random BG was 484 at presentation  Started on basal,bolus and correctional insulin  A1c was elevated at 11%  Encourage lifestyle modification  - patient states his glucose is usually 250-300 at home which I educated him on importance of better control for wound healing  - see DM

## 2023-05-19 NOTE — PROGRESS NOTES
Awaiting vanc trough lab draw and result; once timed and due 5-19-23 at 2200.   Pt's serum creatinine is stable.  Will continue to monitor vancomycin therapy.  Thank you for the consult.

## 2023-05-19 NOTE — ASSESSMENT & PLAN NOTE
Presented with redness and swelling over the dorsum of the right hand  Gangrene 3rd and 4th fingers of the right hand  - unable to obtain MRI of fingers due to patient being claustrophobic. He did not want to try medications to help.  - Orthopedic Surgery consulted, plan to monitor for now  - Abnormal arterial ultrasound - plan to have Vascular Surgery evaluate  Started on broad spectrum antibiotics - vanc and cefepime  - I discussed with Vascular surgery and they are concerned that healing/wound is due to vascular in nature and that he failed home ASA/Plavix (confirmed that he was compliant with taking these medications with his sister)  - stop plavix and start on heparin drip per Vascular recommendations  - Plan is to optimize renal function (suspect progressively worsened due to poor diabetes control, nonetheless Nephrology has been consulted)  - he will need angiogram at some point - possibly outpatient if patient improves to baseline

## 2023-05-19 NOTE — NURSING
Security contacted to pickup patient belongings.    Will, House Supervisor, provided with medication envelope #[id number] and valuables envelope #[id number].

## 2023-05-19 NOTE — NURSING
Dr. Lang spoke with primary provider regarding patient request to go home and follow-up with his provider, discharge order was given with po antibodies.  PEC had patient on hold to complete plan of care due to increasing angry behavior  toward staff.  Patient left via wheelchair   calm and cooperative  with staff.

## 2023-05-19 NOTE — DISCHARGE INSTRUCTIONS
Last dose of Eliquis on Saturday, 5/20 at night  Dr. Luna's office will call you for timing of procedure scheduled for Tuesday  Nothing to eat after midnight Monday night

## 2023-05-19 NOTE — NURSING
"Patient care tech notified RN that patient requested nurse to come see him immediately. RN was in another patient's room and responded to request in a timely manner. Patient then started banging on the wall, floor, and room door during this time until RN came to bedside. Upon entering the room the patient appeared agitated and visibly upset, and disoriented to current situation. Patient stated he "didn't know why that lady didn't come get you when I asked." When RN attempted to ask follow up questions, patient displayed signs of paranoia and began to raise his voice at staff. Patient stated he believed that "something was going on here, and I don't trust you guys." After making claims that he was in a life-or-death situation, patient then called 911 on personal cell phone and stated "I am an FBI agent and I want you ling's to start tracking my phone." Patient continued to tell 911 dispatch he felt unsafe and that he needed his phone tracked. Patient told dispatch he was at the hospital on [facility]. RN attempted to talk to dispatch on patient's phone but patient did not allow this.    Law enforcement responded soon after along with [employer] Staff. Law enforcement and security attempted to re-assure and de-escalate situation with patient, but this was not successful. Staff and law enforcement both educated multiple times that he was not safe to leave at this time due to his current state of health. Attending provider came to bedside and also attempted to educate patient and de-escalate situation. Patient repeatedly stated he wanted to leave and that "the  are gonna take me out of here. You guys are gonna take me out of here. There ain't nothing wrong with me." At this point threats were being conveyed to attending doctor and staff if the patient wasn't going to be allowed to leave. Patient remains in hospital at this time.   "

## 2023-05-19 NOTE — NURSING
Patient refusing additional IV placement at this time. Currently Vancomycin infusion is running, patient is off heparin drip. Service notified and aware. RN will re-attempt administration during the shift.

## 2023-05-19 NOTE — PROGRESS NOTES
[facility] Medicine  Progress Note    Patient Name: Carlos Cifuentes Jr.  MRN: [id number]  Patient Class: IP- Inpatient   Admission Date: 5/15/2023  Length of Stay: 3 days  Attending Physician: You Connor MD  Primary Care Provider: Miguel Lux MD        Subjective:     Principal Problem:Gangrene of finger of right hand        HPI:  62-year-old male with medical history significant for type 2 DM,hypertension,hyperlipidemia,peripheral vascular disease,previous bilateral below knee amputation due DM foot ulcer who presented to the ED with 3 days of swelling and gangrene 3rd and 4th right fingers after sustaining a cut across the finger nail curticle,he denied associated fever,chills or rigor,no nausea or vomiting,he decided to return to the ED due to the spreading swelling and redness over the dorsum of his hand.He denied any urinary symptom of dysuria,frequency,urgency,straining at micturition or hematuria, no change in bowel habit,no diarrhea or constipation reported.He's unsure of his last hemoglobin A1c, takes insulin for his type 2 DM.  He was recently managed here for NSTEMI .      Overview/Hospital Course:  No notes on file    Interval History: appears more confused today, I spoke to his sister and she states that he has gotten confused in the past with pain medications and also with anxiety meds (he got hydroxyzine last night). She states he has taken his medications every day for at least the last 5 weeks as well but he is not as compliant with insulin.    Review of Systems  Objective:     Vital Signs (Most Recent):  Temp: 98.4 °F (36.9 °C) (05/18/23 2016)  Pulse: 80 (05/18/23 2016)  Resp: 18 (05/18/23 2016)  BP: 119/65 (05/18/23 2016)  SpO2: 96 % (05/18/23 2016) Vital Signs (24h Range):  Temp:  [97.4 °F (36.3 °C)-98.4 °F (36.9 °C)] 98.4 °F (36.9 °C)  Pulse:  [73-88] 80  Resp:  [16-19] 18  SpO2:  [94 %-97 %] 96 %  BP: (113-150)/(51-69) 119/65     Weight: 109.6 kg (241 lb 10 oz)  Body  mass index is 35.68 kg/m².    Intake/Output Summary (Last 24 hours) at 5/18/2023 2119  Last data filed at 5/18/2023 2003  Gross per 24 hour   Intake 840 ml   Output 1200 ml   Net -360 ml         Physical Exam  Vitals and nursing note reviewed.   Constitutional:       Appearance: Normal appearance.   Cardiovascular:      Rate and Rhythm: Normal rate and regular rhythm.   Pulmonary:      Effort: Pulmonary effort is normal. No respiratory distress.      Breath sounds: No wheezing.   Abdominal:      General: Bowel sounds are normal. There is no distension.      Palpations: Abdomen is soft.   Musculoskeletal:      Comments: B/l BKA  Right 3rd digit with black eschar to side of nail, pale/white at tip but less swelling/redness   Neurological:      Mental Status: He is alert.           Significant Labs: All pertinent labs within the past 24 hours have been reviewed.    Significant Imaging: I have reviewed all pertinent imaging results/findings within the past 24 hours.      Assessment/Plan:      * Gangrene of finger of right hand  Presented with redness and swelling over the dorsum of the right hand  Gangrene 3rd and 4th fingers of the right hand  - unable to obtain MRI of fingers due to patient being claustrophobic. He did not want to try medications to help.  - Orthopedic Surgery consulted, plan to monitor for now  - Abnormal arterial ultrasound - plan to have Vascular Surgery evaluate  Started on broad spectrum antibiotics - vanc and cefepime  - I discussed with Vascular surgery and they are concerned that healing/wound is due to vascular in nature and that he failed home ASA/Plavix (confirmed that he was compliant with taking these medications with his sister)  - stop plavix and start on heparin drip per Vascular recommendations  - Plan is to optimize renal function (suspect progressively worsened due to poor diabetes control, nonetheless Nephrology has been consulted)  - he will need angiogram at some point - possibly  outpatient if patient improves to baseline        Necrotic wound of right hand  See gangrene      S/P bilateral BKA (below knee amputation)  Complicating his type 2DM and peripheral vascular disease      Chronic combined systolic and diastolic heart failure  - Stable  - Continue furosemide as needed for volume overload  - currently appears euvolemic      Hyperglycemia  Random BG was 484 at presentation  Started on basal,bolus and correctional insulin  A1c was elevated at 11%  Encourage lifestyle modification  - patient states his glucose is usually 250-300 at home which I educated him on importance of better control for wound healing  - see DM    CKD stage 3 due to type 2 diabetes mellitus  His CKD stage 3 likely related to his type 2 DM  HbA1c 11.8  Cont low salt diet  Better glycemic control    Patient's FSGs are uncontrolled due to hyperglycemia on current medication regimen.  Last A1c reviewed-   Lab Results   Component Value Date    LABA1C 11.8 (H) 06/13/2014    HGBA1C 11.7 (H) 05/16/2023     Most recent fingerstick glucose reviewed-   Recent Labs   Lab 05/18/23  0745 05/18/23  1123 05/18/23  1631 05/18/23 2012   POCTGLUCOSE 319* 225* 161* 152*     Current correctional scale  Medium  Maintain anti-hyperglycemic dose as follows-   Antihyperglycemics (From admission, onward)    Start     Stop Route Frequency Ordered    05/17/23 1130  insulin aspart U-100 pen 10 Units         -- SubQ 3 times daily with meals 05/17/23 0825    05/17/23 0830  insulin detemir U-100 (Levemir) pen 35 Units         -- SubQ 2 times daily 05/17/23 0824    05/16/23 0455  insulin aspart U-100 pen 1-10 Units         -- SubQ Before meals & nightly PRN 05/16/23 0357        Hold Oral hypoglycemics while patient is in the hospital.    History of hepatitis C; S/p RX with SVR 12 documented 09/2017  Treated.      Gastroesophageal reflux disease  Stable  Cont pantoprazole for now, taper to famotide as tolerated      Peripheral vascular disease of  extremity  Prior bilateral BKA  Continue statin,and antihypertensive  Frequent skin checks  - plan for vascular evaluation of right upper extremity  - see gangrene  - on heparin drip now      Tobacco abuse  Resently quit tobacco use after his MI  Encouraged him to continue staying away from tobacco products        Cellulitis of right upper extremity  Presented with redness and swelling over the dorsum of the right hand  Gangrene 3rd and 4th fingers of the right hand  - unable to obtain MRI of fingers due to patient being claustrophobic. He did not want to try medications to help.  - Orthopedic Surgery consulted, plan to monitor for now  - Abnormal arterial ultrasound - plan to have Vascular Surgery evaluate  Started on broad spectrum antibiotics - vanc and cefepime    Chronic back pain  No change in intensity and characteristic of back pain  Continue current pain medication    Type II diabetes mellitus with neurological manifestations  Patient's FSGs are uncontrolled due to hyperglycemia on current medication regimen.  Last A1c reviewed-   Lab Results   Component Value Date    LABA1C 11.8 (H) 06/13/2014    HGBA1C 11.7 (H) 05/16/2023     Most recent fingerstick glucose reviewed-   Recent Labs   Lab 05/18/23  0745 05/18/23  1123 05/18/23  1631 05/18/23 2012   POCTGLUCOSE 319* 225* 161* 152*     Current correctional scale  High  Increase anti-hyperglycemic dose as follows-   Antihyperglycemics (From admission, onward)    Start     Stop Route Frequency Ordered    05/17/23 1130  insulin aspart U-100 pen 10 Units         -- SubQ 3 times daily with meals 05/17/23 0825    05/17/23 0830  insulin detemir U-100 (Levemir) pen 35 Units         -- SubQ 2 times daily 05/17/23 0824    05/16/23 0455  insulin aspart U-100 pen 1-10 Units         -- SubQ Before meals & nightly PRN 05/16/23 0357        Hold Oral hypoglycemics while patient is in the hospital.    Obesity  Body mass index is 35.68 kg/m².   Lifestyle modification   Morbid  obesity complicates all aspects of disease management from diagnostic modalities to treatment.   Weight loss encouraged and health benefits explained to patient.           VTE Risk Mitigation (From admission, onward)         Ordered     heparin 25,000 units in dextrose 5% (100 units/ml) IV bolus from bag - ADDITIONAL PRN BOLUS - 60 units/kg  As needed (PRN)        Question:  Heparin Infusion Adjustment (DO NOT MODIFY ANSWER)  Answer:  [email or web address]\epic\Images\Pharmacy\HeparinInfusions\heparin HIGH INTENSITY nomogram for [facility] [id number].pdf    05/18/23 1639     heparin 25,000 units in dextrose 5% (100 units/ml) IV bolus from bag - ADDITIONAL PRN BOLUS - 30 units/kg  As needed (PRN)        Question:  Heparin Infusion Adjustment (DO NOT MODIFY ANSWER)  Answer:  [email or web address]\epic\Images\Pharmacy\HeparinInfusions\heparin HIGH INTENSITY nomogram for [facility] [id number].pdf    05/18/23 1639     heparin 25,000 units in dextrose 5% 250 mL (100 units/mL) infusion HIGH INTENSITY nomogram - [facility]  Continuous        Question Answer Comment   Heparin Infusion Adjustment (DO NOT MODIFY ANSWER) [email or web address]\epic\Images\Pharmacy\HeparinInfusions\heparin HIGH INTENSITY nomogram for [facility] [id number].pdf    Begin at (in units/kg/hr) 18        05/18/23 1639     IP VTE HIGH RISK PATIENT  Once         05/16/23 0356     Place sequential compression device  Until discontinued         05/16/23 0356                Discharge Planning   BEBA:      Code Status: Full Code   Is the patient medically ready for discharge?:     Reason for patient still in hospital (select all that apply): Treatment  Discharge Plan A: Home                  You Connor MD  Department of Hospital Medicine   [facility]

## 2023-05-19 NOTE — NURSING
[facility] [place]  Nurses Note -- 4 Eyes      5/19/2023       Skin assessed on: Q Shift      [] No Pressure Injuries Present    []Prevention Measures Documented    [x] Yes LDA  for Pressure Injury Previously documented     [] Yes New Pressure Injury Discovered   [] LDA for New Pressure Injury Added      Attending RN:  Lani Santana RN     Second RN:  Kelechi Elias RN

## 2023-05-19 NOTE — SIGNIFICANT EVENT
Called to evaluate patient at bedside for confusion/agitation.    This is a 62-year-old male with a past medical history of type 2 diabetes, hypertension, hyperlipidemia s/p bilateral below knee amputation presents with gangrene of 3rd and 4th right hand fingers.  He is currently hospitalized for sepsis, NSTEMI, hyperglycemia, and encephalopathy.    Patient has been confused throughout his hospitalization and tonight he called 911 to help release him from the hospital.  He reports that he feels threatened and keeps referring to an encounter he had with the cleaning lady who allegedly made accusations against him making him feel unsafe.  He is unable to elaborate further in regards to what those accusations were.  Patient is A&O x2 and is unable to tell why he is hospitalized or the harms of leaving against medical advice.  He is delusional and confused with tangential thoughts. I do not believe that the patient has capacity to leave the hospital.  Police authority came to bedside and explained to him that they can not release him out of the hospital without doctors orders.  Attempted to deescalate the situation and explained to the patient that leaving the hospital is not an option for him.  Patient was PEC'd for grave disability and unable to seek voluntary admission.  Primary RN was updated on the plan.

## 2023-05-19 NOTE — ASSESSMENT & PLAN NOTE
Prior bilateral BKA  Continue statin,and antihypertensive  Frequent skin checks  - plan for vascular evaluation of right upper extremity  - see gangrene  - on heparin drip now

## 2023-05-19 NOTE — ASSESSMENT & PLAN NOTE
Patient's FSGs are uncontrolled due to hyperglycemia on current medication regimen.  Last A1c reviewed-   Lab Results   Component Value Date    LABA1C 11.8 (H) 06/13/2014    HGBA1C 11.7 (H) 05/16/2023     Most recent fingerstick glucose reviewed-   Recent Labs   Lab 05/18/23  0745 05/18/23  1123 05/18/23  1631 05/18/23 2012   POCTGLUCOSE 319* 225* 161* 152*     Current correctional scale  High  Increase anti-hyperglycemic dose as follows-   Antihyperglycemics (From admission, onward)    Start     Stop Route Frequency Ordered    05/17/23 1130  insulin aspart U-100 pen 10 Units         -- SubQ 3 times daily with meals 05/17/23 0825    05/17/23 0830  insulin detemir U-100 (Levemir) pen 35 Units         -- SubQ 2 times daily 05/17/23 0824    05/16/23 0455  insulin aspart U-100 pen 1-10 Units         -- SubQ Before meals & nightly PRN 05/16/23 0357        Hold Oral hypoglycemics while patient is in the hospital.

## 2023-05-19 NOTE — ASSESSMENT & PLAN NOTE
His CKD stage 3 likely related to his type 2 DM  HbA1c 11.8  Cont low salt diet  Better glycemic control    Patient's FSGs are uncontrolled due to hyperglycemia on current medication regimen.  Last A1c reviewed-   Lab Results   Component Value Date    LABA1C 11.8 (H) 06/13/2014    HGBA1C 11.7 (H) 05/16/2023     Most recent fingerstick glucose reviewed-   Recent Labs   Lab 05/18/23  0745 05/18/23  1123 05/18/23  1631 05/18/23 2012   POCTGLUCOSE 319* 225* 161* 152*     Current correctional scale  Medium  Maintain anti-hyperglycemic dose as follows-   Antihyperglycemics (From admission, onward)    Start     Stop Route Frequency Ordered    05/17/23 1130  insulin aspart U-100 pen 10 Units         -- SubQ 3 times daily with meals 05/17/23 0825    05/17/23 0830  insulin detemir U-100 (Levemir) pen 35 Units         -- SubQ 2 times daily 05/17/23 0824    05/16/23 0455  insulin aspart U-100 pen 1-10 Units         -- SubQ Before meals & nightly PRN 05/16/23 0357        Hold Oral hypoglycemics while patient is in the hospital.

## 2023-05-19 NOTE — NURSING
Discharge instructions given to patient at bedside. Patient verbalized understanding of instructions. Patient states willingness to comply. Saline lock removed.

## 2023-05-19 NOTE — NURSING
Bedside Report given to night nurse BRANDEN Lorenz. Pt remains off the floor at scheduled testing.   Chart check completed.

## 2023-05-19 NOTE — SUBJECTIVE & OBJECTIVE
Interval History: appears more confused today, I spoke to his sister and she states that he has gotten confused in the past with pain medications and also with anxiety meds (he got hydroxyzine last night). She states he has taken his medications every day for at least the last 5 weeks as well but he is not as compliant with insulin.    Review of Systems  Objective:     Vital Signs (Most Recent):  Temp: 98.4 °F (36.9 °C) (05/18/23 2016)  Pulse: 80 (05/18/23 2016)  Resp: 18 (05/18/23 2016)  BP: 119/65 (05/18/23 2016)  SpO2: 96 % (05/18/23 2016) Vital Signs (24h Range):  Temp:  [97.4 °F (36.3 °C)-98.4 °F (36.9 °C)] 98.4 °F (36.9 °C)  Pulse:  [73-88] 80  Resp:  [16-19] 18  SpO2:  [94 %-97 %] 96 %  BP: (113-150)/(51-69) 119/65     Weight: 109.6 kg (241 lb 10 oz)  Body mass index is 35.68 kg/m².    Intake/Output Summary (Last 24 hours) at 5/18/2023 2119  Last data filed at 5/18/2023 2003  Gross per 24 hour   Intake 840 ml   Output 1200 ml   Net -360 ml         Physical Exam  Vitals and nursing note reviewed.   Constitutional:       Appearance: Normal appearance.   Cardiovascular:      Rate and Rhythm: Normal rate and regular rhythm.   Pulmonary:      Effort: Pulmonary effort is normal. No respiratory distress.      Breath sounds: No wheezing.   Abdominal:      General: Bowel sounds are normal. There is no distension.      Palpations: Abdomen is soft.   Musculoskeletal:      Comments: B/l BKA  Right 3rd digit with black eschar to side of nail, pale/white at tip but less swelling/redness   Neurological:      Mental Status: He is alert.           Significant Labs: All pertinent labs within the past 24 hours have been reviewed.    Significant Imaging: I have reviewed all pertinent imaging results/findings within the past 24 hours.

## 2023-05-19 NOTE — NURSING
0208 PEC called into 's office at [phone].    0215 PEC and facesheet faxed to 's office at [phone] Confirmed with Iva at [facility] 's office PEC and facesheet were received via fax.

## 2023-05-20 NOTE — DISCHARGE SUMMARY
[facility] Medicine  Discharge Summary      Patient Name: Carlos Cifuentes Jr.  MRN: [id number]  [facility]: [id number]  Patient Class: IP- Inpatient  Admission Date: 5/15/2023  Hospital Length of Stay: 4 days  Discharge Date and Time: 5/19/2023  1:15 PM  Attending Physician: No att. providers found   Discharging Provider: You Connor MD  Primary Care Provider: Miguel Lux MD    Primary Care Team: Networked reference to record PCT     HPI:   62-year-old male with medical history significant for type 2 DM,hypertension,hyperlipidemia,peripheral vascular disease,previous bilateral below knee amputation due DM foot ulcer who presented to the ED with 3 days of swelling and gangrene 3rd and 4th right fingers after sustaining a cut across the finger nail curticle,he denied associated fever,chills or rigor,no nausea or vomiting,he decided to return to the ED due to the spreading swelling and redness over the dorsum of his hand.He denied any urinary symptom of dysuria,frequency,urgency,straining at micturition or hematuria, no change in bowel habit,no diarrhea or constipation reported.He's unsure of his last hemoglobin A1c, takes insulin for his type 2 DM.  He was recently managed here for NSTEMI .      * No surgery found *      Hospital Course:   Mr. Cifuentes is a 62-year-old male with extensive past medical history including uncontrolled type 2 diabetes, CKD, PA D status post bilateral BKA, and hypertension who was admitted for concerns for infected 3rd digit of the right hand and gangrene.  He was started on IV antibiotics and orthopedic surgery consulted.  Swelling appears improving but arterial ultrasound concerning for vascular issues.  Patient has uncontrolled diabetes and there was difficulty in controlling them during this hospitalization.  Vascular surgery evaluated patient and recommending WBI which were abnormal.  He was placed on a heparin drip along with aspirin and Plavix and transitioned to  Eliquis at discharge.  Patient will need angiogram but this can be done as an outpatient and patient was scheduled for Tuesday 05/23/2023.  Unfortunately, patient became upset and agitated and attempted to leave against medical advice.  At the time, patient lacked capacity to make this decision and he was placed under pec.  After further discussion, patient calm down and became more alert and oriented and had a supportive family present at the time.  There is a long discussion about plan of care and given patient has no signs of sepsis he was transitioned to oral antibiotics.  He was prescribed Eliquis and per vascular surgery, they would like him to continue taking all 3 medications including aspirin and Plavix until his surgery.  Last dose of Eliquis on Saturday night.  Family at bedside expressed understanding and will make sure patient follows up and continues with medications as prescribed.  Patient doing well and stable for discharge home.       Goals of Care Treatment Preferences:  Code Status: Full Code    Living Will: Yes              Consults:   Consults (From admission, onward)        Status Ordering Provider     Inpatient consult to Vascular Surgery  Once        Provider:  You Connor MD    Completed YOU CONNOR     Inpatient consult to Orthopedic Surgery  Once        Provider:  Joy Li MD    Completed YOU CONNOR          No new Assessment & Plan notes have been filed under this hospital service since the last note was generated.  Service: Hospital Medicine    Final Active Diagnoses:    Diagnosis Date Noted POA    PRINCIPAL PROBLEM:  Gangrene of finger of right hand [I96] 03/20/2018 Yes    Necrotic wound of right hand [S61.401A, I96] 05/16/2023 Yes    Hyperglycemia [R73.9] 04/10/2023 Yes    S/P bilateral BKA (below knee amputation) [Z89.512, Z89.511] 04/10/2023 Not Applicable    Chronic combined systolic and diastolic heart failure [I50.42] 04/10/2023 Yes    CKD stage 3 due to  type 2 diabetes mellitus [E11.22, N18.30] 09/20/2017 Yes     Chronic    History of hepatitis C; S/p RX with SVR 12 documented 09/2017 [Z86.19] 09/11/2017 Yes    Gastroesophageal reflux disease [K21.9]  Yes    Peripheral vascular disease of extremity [I73.9] 04/08/2017 Yes    Tobacco abuse [Z72.0] 04/08/2017 Yes    Cellulitis of right upper extremity [L03.113] 11/03/2015 Yes    Chronic back pain [M54.9, G89.29] 10/14/2014 Yes    Type II diabetes mellitus with neurological manifestations [E11.49] 10/09/2014 Yes    Obesity [E66.9] 09/30/2014 Yes      Problems Resolved During this Admission:    Diagnosis Date Noted Date Resolved POA    Hyperlipidemia associated with type 2 diabetes mellitus [E11.69, E78.5] 12/08/2022 05/16/2023 Yes       Discharged Condition: stable    Disposition: Home or Self Care    Follow Up:   Follow-up Information     Miguel Lux MD Follow up on 5/23/2023.    Specialties: Internal Medicine, Oncology, Hematology and Oncology  Why: Appointment scheduled for 11:30am  Contact information:  [address] [facility]  [address]  [facility] [address]  [phone]                       Patient Instructions:      IR Angiogram Extremity Unilateral   Standing Status: Future Standing Exp. Date: 05/18/24   Order Comments: RUE angiogram, access R fem 90 min     Order Specific Question Answer Comments   Reason for Exam: RUE stenosis    Has the patient had a prior contrast or iodine reaction? No    Is the patient currently on any blood thinners like Aspirin, Coumadin, or Plavix? No    Is the patient on any Metformin drug, such as Glucophage or Glucovance? No    May the Radiologist modify the order per protocol to meet the clinical needs of the patient? Yes    Release to patient Immediate      Diet diabetic     Notify your health care provider if you experience any of the following:  temperature >100.4     Notify your health care provider if you experience any of the following:  persistent nausea and  vomiting or diarrhea     Notify your health care provider if you experience any of the following:  severe uncontrolled pain     Notify your health care provider if you experience any of the following:  difficulty breathing or increased cough     Notify your health care provider if you experience any of the following:  severe persistent headache     Notify your health care provider if you experience any of the following:  worsening rash     Notify your health care provider if you experience any of the following:  persistent dizziness, light-headedness, or visual disturbances     Notify your health care provider if you experience any of the following:  increased confusion or weakness     No dressing needed     Activity as tolerated       Significant Diagnostic Studies: N/A    Pending Diagnostic Studies:     None         Medications:  Reconciled Home Medications:      Medication List      START taking these medications    amoxicillin-clavulanate 875-125mg 875-125 mg per tablet  Commonly known as: AUGMENTIN  Take 1 tablet by mouth every 12 (twelve) hours. for 7 days     doxycycline 100 MG Cap  Commonly known as: VIBRAMYCIN  Take 1 capsule (100 mg total) by mouth 2 (two) times daily. for 7 days     ELIQUIS 5 mg Tab  Generic drug: apixaban  Take 1 tablet (5 mg total) by mouth 2 (two) times daily.        CONTINUE taking these medications    albuterol 90 mcg/actuation inhaler  Commonly known as: VENTOLIN HFA  Inhale 2 puffs into the lungs every 6 (six) hours as needed for Wheezing. Rescue     aspirin 81 MG Chew  Take 1 tablet (81 mg total) by mouth once daily.     atorvastatin 40 MG tablet  Commonly known as: LIPITOR  Take 1 tablet (40 mg total) by mouth once daily.     clopidogreL 75 mg tablet  Commonly known as: PLAVIX  Take 1 tablet (75 mg total) by mouth once daily.     furosemide 40 MG tablet  Commonly known as: LASIX  Take 1 tablet (40 mg total) by mouth 2 (two) times daily.     glimepiride 2 MG tablet  Commonly known  as: AmaryL  Take 1 tablet (2 mg total) by mouth every morning.     HumaLOG U-100 Insulin 100 unit/mL injection  Generic drug: insulin lispro  Inject 5 Units into the skin 3 (three) times daily before meals.     LANTUS U-100 INSULIN 100 unit/mL injection  Generic drug: insulin glargine  INJECT TEN UNITS INTO SKIN AT BEDTIME     losartan 25 MG tablet  Commonly known as: COZAAR  Take 25 mg by mouth once daily.     pantoprazole 40 MG tablet  Commonly known as: PROTONIX  TAKE ONE TABLET BY MOUTH EVERY DAY     TRUE METRIX GLUCOSE METER Misc  Generic drug: blood-glucose meter  AS DIRECTED     TRUE METRIX GLUCOSE TEST STRIP Strp  Generic drug: blood sugar diagnostic  Twice daily blood sugar checks        STOP taking these medications    metFORMIN 1000 MG tablet  Commonly known as: GLUCOPHAGE            Indwelling Lines/Drains at time of discharge:   Lines/Drains/Airways     None                 Time spent on the discharge of patient: >35 minutes         You Connor MD  Department of Hospital Medicine  [facility] [place] - Telemetry

## 2023-05-20 NOTE — HOSPITAL COURSE
Mr. Cifuentes is a 62-year-old male with extensive past medical history including uncontrolled type 2 diabetes, CKD, PA D status post bilateral BKA, and hypertension who was admitted for concerns for infected 3rd digit of the right hand and gangrene.  He was started on IV antibiotics and orthopedic surgery consulted.  Swelling appears improving but arterial ultrasound concerning for vascular issues.  Patient has uncontrolled diabetes and there was difficulty in controlling them during this hospitalization.  Vascular surgery evaluated patient and recommending WBI which were abnormal.  He was placed on a heparin drip along with aspirin and Plavix and transitioned to Eliquis at discharge.  Patient will need angiogram but this can be done as an outpatient and patient was scheduled for Tuesday 05/23/2023.  Unfortunately, patient became upset and agitated and attempted to leave against medical advice.  At the time, patient lacked capacity to make this decision and he was placed under pec.  After further discussion, patient calm down and became more alert and oriented and had a supportive family present at the time.  There is a long discussion about plan of care and given patient has no signs of sepsis he was transitioned to oral antibiotics.  He was prescribed Eliquis and per vascular surgery, they would like him to continue taking all 3 medications including aspirin and Plavix until his surgery.  Last dose of Eliquis on Saturday night.  Family at bedside expressed understanding and will make sure patient follows up and continues with medications as prescribed.  Patient doing well and stable for discharge home.

## 2023-05-22 LAB
LEFT BRACHIAL BP: 136 MMHG
LEFT RADIAL BP CV US: 147 MMHG
LEFT ULNAR BP CV US: 98 MMHG
LEFT WRIST BRACHIAL INDEX: 1.04 WBI
RIGHT BRACHIAL BP CV US: 141 MMHG
RIGHT RADIAL BP CV US: 255 MMHG
RIGHT ULNAR BP CV US: 145 MMHG
RIGHT WRIST BRACHIAL INDEX: 1.03 WBI

## 2023-07-10 PROBLEM — J96.21 ACUTE ON CHRONIC RESPIRATORY FAILURE WITH HYPOXIA: Status: RESOLVED | Noted: 2023-04-10 | Resolved: 2023-07-10

## 2023-08-01 NOTE — ASSESSMENT & PLAN NOTE
Patient's FSGs are uncontrolled due to hyperglycemia on current medication regimen.  Last A1c reviewed-   Lab Results   Component Value Date    LABA1C 11.8 (H) 06/13/2014    HGBA1C 10.9 (H) 11/28/2022     Most recent fingerstick glucose reviewed-   Recent Labs   Lab 11/29/22  2030 11/30/22  0023 11/30/22  0501 11/30/22  0746   POCTGLUCOSE 256* 296* 222* 239*       Antihyperglycemics (From admission, onward)    Start     Stop Route Frequency Ordered    11/30/22 1410  insulin aspart U-100 pen 1-10 Units         -- SubQ Before meals & nightly PRN 11/30/22 1310    11/29/22 2100  insulin detemir U-100 pen 5 Units         -- SubQ Nightly 11/29/22 1224        · Hold Oral hypoglycemics while patient is in the hospital.  SSI and q4h testing   No

## 2023-08-14 NOTE — PROGRESS NOTES
Certificate of WORK      08/14/23       Re:   Milka Dangelo   [address] [address]                          This is to certify that Milka Dangelo  has been under my care pregnancy and post partum.          REMARKS: She may return to work on 8/14/23 with no restrictions      Thank you,         KAYLIN Park  Dept: [phone]  [facility] [place] [address]             [facility] Hospital Medicine  Progress Note    Patient Name: Carlos Cifuentes  MRN: [id number]  Patient Class: IP- Inpatient   Admission Date: 4/7/2017  Length of Stay: 1 days  Attending Physician: Alfreda Almanza MD  Primary Care Provider: Miguel Lux MD    Hospital Medicine Team: [facility] 2 Memo Castillo MD    Subjective:     Principal Problem:Subacute osteomyelitis of right foot    Interval History:  No acute events since admission overnight.  Per podiatry consult, he will have an amputation of his R great toe 2/2 to osteomyelitis confirmed with MRI this afternoon - lovenox held & NPO at midnight.  Touched base with vacular surgery who is planning on an angiogram w/ possible intervention this upcoming Tuesday for PAD (LAILA = 1.57).  Also spoke with ID, so long as the margins of tomorrow's amputation are clear for infection with no SSTI, he will only need Abx coverage for 48 hours, otherwise it will be up to 14 days.    Review of Systems   Constitutional: Negative for chills and fever.   Respiratory: Negative for cough and shortness of breath.    Cardiovascular: Negative for chest pain and palpitations.   Musculoskeletal: Positive for back pain.     Objective:     Vital Signs (Most Recent):  Temp: 98.1 °F (36.7 °C) (04/08/17 1131)  Pulse: 67 (04/08/17 1131)  Resp: 18 (04/08/17 1131)  BP: 104/63 (04/08/17 1131)  SpO2: 96 % (04/08/17 1131) Vital Signs (24h Range):  Temp:  [98 °F (36.7 °C)-98.6 °F (37 °C)] 98.1 °F (36.7 °C)  Pulse:  [64-96] 67  Resp:  [18-20] 18  SpO2:  [95 %-97 %] 96 %  BP: ()/(50-75) 104/63     Weight: 109.5 kg (241 lb 6.5 oz)  Body mass index is 35.65 kg/(m^2).    Intake/Output Summary (Last 24 hours) at 04/08/17 1639  Last data filed at 04/08/17 0213   Gross per 24 hour   Intake               60 ml   Output                0 ml   Net               60 ml      Physical Exam   Constitutional: He is oriented to person, place, and time. No distress.    Cardiovascular: Normal rate and regular rhythm.  Exam reveals decreased pulses (Bilaterally). Exam reveals no gallop and no friction rub.    No murmur heard.  Pulmonary/Chest: Breath sounds normal. No respiratory distress. He has no wheezes. He has no rhonchi. He has no rales.   Abdominal: Soft. Bowel sounds are normal. He exhibits no distension. There is no tenderness.   Neurological: He is alert and oriented to person, place, and time.       Significant Labs:   Blood Culture:   Recent Labs  Lab 04/07/17 2053 04/07/17 2116   LABBLOO No Growth to date No Growth to date     CBC:   Recent Labs  Lab 04/07/17 1258 04/07/17 2139   WBC 9.29 8.73   HGB 12.8* 12.6*   HCT 37.6* 36.8*    176     CMP:   Recent Labs  Lab 04/07/17 1258 04/07/17 2139   * 129*   K 5.3* 4.9   CL 99 97   CO2 21* 22*   * 289*   BUN 29* 27*   CREATININE 1.3 1.2   CALCIUM 9.1 9.1   PROT 7.5 7.7   ALBUMIN 3.2* 2.9*   BILITOT 0.4 0.4   ALKPHOS 89 86   AST 16 15   ALT 14 17   ANIONGAP 11 10   EGFRNONAA >60 >60.0     Lactic Acid:   Recent Labs  Lab 04/07/17  2201   LACTATE 1.1     POCT Glucose:   Recent Labs  Lab 04/08/17  0228 04/08/17  1036 04/08/17  1401   POCTGLUCOSE 239* 211* 217*       Significant Imaging: MRI shows evidence of osteomyelitis in R great toe    Assessment/Plan:      * Subacute osteomyelitis of right foot 2/2 to DM  MRI today shows evidence of early osteo in the R great toe.  -Amputation tomorrow per podiatry's assistance  -Gram stain from ulcer shows polymicrobial infection  -Continue vanc 15 mg/kg pending amputation w/ clear margins will continue for 48 hours postop  -Continue cipro 500 po BID  -BCx pending    DM type 2, uncontrolled, with neuropathy  -Continue with detemir 17 U qhs  -Reduce detemir to 10 U for tonight 2/2 to NPO status for tomorrow's operation & prevent hypoglycemia while NPO  -Resume 17 U tomorrow night  -LD SSI  -POCT glucose checks qac & qhs      Chronic back pain  -Continue oxycontin  10 mg q12h PRN  -Continue IR Oxy 10 po q8h PRN    Neuropathic pain  -Continue home gabapentin 300 TID po    Chronic hepatitis C without hepatic coma  -Continue home ledipasvir-sofosbuvir  mg 1 tab po qd    Tobacco abuse  -Continue nicotine 14 mg/24h patch  -Referral to smoking cessation at discharge    Peripheral arterial disease  -LAILA positive at 1.57  -Currently holding lovenox in anticipation of amputation tomorrow, but will start DAPT after tomorrow's procedure  -Per vascuar surgery, planning on R leg angio w/ revascularization on Tuesday  -IVF w/ LR on Monday in anticipation of angiogram  -Continue asprin 81 mg & simvastatin 20 mg po qhs for the moment w/ plan to increase dose to 40 at discharge for risk factor modification      VTE Risk Mitigation         Ordered     Medium Risk of VTE  Once  - lovenox held 2/2 to OR tomorrow    04/07/17 2228     Place DELVIS hose  Until discontinued      04/07/17 2228          Memo Castillo MD  Department of Hospital Medicine   [facility]

## 2023-10-25 NOTE — PLAN OF CARE
Problem: Adult Inpatient Plan of Care  Goal: Plan of Care Review  Outcome: Ongoing, Progressing  Goal: Patient-Specific Goal (Individualized)  Outcome: Ongoing, Progressing  Goal: Absence of Hospital-Acquired Illness or Injury  Outcome: Ongoing, Progressing  Goal: Optimal Comfort and Wellbeing  Outcome: Ongoing, Progressing     Problem: Fall Injury Risk  Goal: Absence of Fall and Fall-Related Injury  Outcome: Ongoing, Progressing     Problem: Restraint, Nonbehavioral (Nonviolent)  Goal: Absence of Harm or Injury  Outcome: Ongoing, Progressing     Problem: Diabetes Comorbidity  Goal: Blood Glucose Level Within Targeted Range  Outcome: Ongoing, Progressing     Problem: Infection  Goal: Absence of Infection Signs and Symptoms  Outcome: Ongoing, Progressing     Problem: Communication Impairment (Mechanical Ventilation, Invasive)  Goal: Effective Communication  Outcome: Ongoing, Progressing     Problem: Device-Related Complication Risk (Mechanical Ventilation, Invasive)  Goal: Optimal Device Function  Outcome: Ongoing, Progressing     Problem: Skin Injury Risk Increased  Goal: Skin Health and Integrity  Outcome: Ongoing, Progressing      no

## 2024-04-02 NOTE — TELEPHONE ENCOUNTER
F/u and testing scheduled as ordered; reminder notices mailed.   Headache Monitoring: I recommended monitoring the headaches for now. There is no evidence of increased intracranial pressure. They were instructed to call if the headaches are worsening. Hypercholesterolemia Monitoring: I explained this is common when taking isotretinoin. We will monitor closely. Comments: Discussed increasing patients dosage. He would like to maintain current dosage for the next month of therapy. Kilograms Preamble Statement (Weight Entered In Details Tab): Reported Weight in kilograms: Lower Range (In Mg/Kg): 120 Cheilitis Normal Treatment: I recommended application of Vaseline or Aquaphor numerous times a day (as often as every hour) and before going to bed. Months Of Therapy Completed: 1 Retinoid Dermatitis Normal Treatment: I recommended more frequent application of Cetaphil or CeraVe to the areas of dermatitis. Female Completion Statement: After discussing her treatment course we decided to discontinue isotretinoin therapy at this time. I explained that she would need to continue her birth control methods for at least one month after the last dosage. She should also get a pregnancy test one month after the last dose. She shouldn't donate blood for one month after the last dose. She should call with any new symptoms of depression. Upper Range (In Mg/Kg): 150 Hypertriglyceridemia Monitoring: I explained this is common when taking isotretinoin. If this worsens they will contact us. Target Cumulative Dosage (In Mg/Kg): 135 Cheilitis Aggressive Treatment: I recommended application of Vaseline or Aquaphor numerous times a day (as often as every hour) and before going to bed. I also prescribed a topical steroid for twice daily use. Retinoid Dermatitis Aggressive Treatment: I recommended more frequent application of Cetaphil or CeraVe to the areas of dermatitis. I also prescribed a topical steroid for twice daily use until the dermatitis resolves. Male Completion Statement: After discussing his treatment course we decided to discontinue isotretinoin therapy at this time. He shouldn't donate blood for one month after the last dose. He should call with any new symptoms of depression. Dosing Month 1 (Required For Cumulative Dosing): 40mg Daily Is Xerosis Present?: Yes - Normal Treatment Use Enhanced Counseling Feature (Automatic): No Show Text Field For Brand Names Of Contraception?: Yes Myalgia Treatment: I explained this is common when taking isotretinoin. If this worsens they will contact us. They may try OTC ibuprofen. Nosebleeds Normal Treatment: I explained this is common when taking isotretinoin. I recommended saline mist in each nostril multiple times a day. If this worsens they will contact us. Detail Level: Zone Xerosis Normal Treatment: I recommended application of Cetaphil or CeraVe numerous times a day and before going to bed to all dry areas. Xerosis Aggressive Treatment: I recommended application of Cetaphil or CeraVe numerous times a day and before going to bed to all dry areas. I also prescribed a topical steroid for twice daily use. What Is The Patient's Gender: Male Counseling Text: I reviewed the side effect in detail. Patient should get monthly blood tests, not donate blood, not drive at night if vision affected, and not share medication. Female Pregnancy Counseling Text: Female patients should also be on two forms of birth control while taking this medication and for one month after their last dose. Ipledge Number (Optional): [phone] Next Month's Dosage: Continue Current Dosage Xerosis Normal Treatment: I recommended application of Cetaphil or CeraVe numerous times a day going to bed to all dry areas. Weight Units: pounds Use Therapeutic Ranged Or Therapeutic Target: Range Xerosis Aggressive Treatment: I recommended application of Cetaphil or CeraVe numerous times a day going to bed to all dry areas. I also prescribed a topical steroid for twice daily use. Pounds Preamble Statement (Weight Entered In Details Tab): Reported Weight in pounds:

## 2025-04-29 NOTE — SUBJECTIVE & OBJECTIVE
Interval History:  Chest pain-free.  Breathing getting better, although not back to baseline.    Review of Systems   Constitutional: Positive for malaise/fatigue.   HENT: Negative.     Eyes: Negative.    Cardiovascular:  Positive for dyspnea on exertion.   Respiratory: Negative.     Endocrine: Negative.    Hematologic/Lymphatic: Negative.    Skin: Negative.    Musculoskeletal: Negative.    Gastrointestinal: Negative.    Genitourinary: Negative.    Neurological: Negative.    Psychiatric/Behavioral: Negative.     Allergic/Immunologic: Negative.    Objective:     Vital Signs (Most Recent):  Temp: 97.5 °F (36.4 °C) (12/09/22 0757)  Pulse: 64 (12/09/22 0757)  Resp: 19 (12/09/22 0757)  BP: (!) 101/53 (12/09/22 0757)  SpO2: (!) 93 % (12/09/22 0757)   Vital Signs (24h Range):  Temp:  [97.5 °F (36.4 °C)-97.9 °F (36.6 °C)] 97.5 °F (36.4 °C)  Pulse:  [64-76] 64  Resp:  [16-20] 19  SpO2:  [87 %-98 %] 93 %  BP: (101-169)/(53-80) 101/53     Weight: 102.4 kg (225 lb 12 oz)  Body mass index is 33.34 kg/m².     SpO2: (!) 93 %  (RETIRED) O2 Device (Oxygen Therapy): nasal cannula      Intake/Output Summary (Last 24 hours) at 12/9/2022 1025  Last data filed at 12/9/2022 0520  Gross per 24 hour   Intake --   Output 775 ml   Net -775 ml       Lines/Drains/Airways       Peripheral Intravenous Line  Duration                  Peripheral IV - Single Lumen 12/08/22 1213 20 G Right Antecubital <1 day                    Physical Exam  Vitals reviewed.   Constitutional:       General: He is not in acute distress.     Appearance: He is obese. He is ill-appearing. He is not diaphoretic.   Neck:      Vascular: No carotid bruit or JVD.   Cardiovascular:      Rate and Rhythm: Normal rate and regular rhythm.      Pulses: Normal pulses.   Pulmonary:      Effort: Tachypnea present.      Breath sounds: Rales present.   Abdominal:      General: Bowel sounds are normal. There is distension.      Palpations: Abdomen is soft.      Tenderness: There is no  Spoke to pt and let her know that she would need to be seen by a provider here for an examination. Pt confirmed understanding, and is scheduled to see YUNI Rojas today at 1:40 PM.    abdominal tenderness.      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      Left Lower Extremity: Left leg is amputated below knee.   Neurological:      Mental Status: He is alert and oriented to person, place, and time.   Psychiatric:         Speech: Speech normal.         Behavior: Behavior normal.       Significant Labs: BMP:   Recent Labs   Lab 12/08/22  1224 12/08/22  1657 12/09/22  0510   *  --  143*   *  --  135*   K 5.1  --  4.3   CL 98  --  99   CO2 25  --  28   BUN 26*  --  33*   CREATININE 1.6*  --  1.4   CALCIUM 9.3  --  9.3   MG  --  1.7  --    , CMP   Recent Labs   Lab 12/08/22  1224 12/09/22  0510   * 135*   K 5.1 4.3   CL 98 99   CO2 25 28   * 143*   BUN 26* 33*   CREATININE 1.6* 1.4   CALCIUM 9.3 9.3   PROT 7.5  --    ALBUMIN 3.4*  --    BILITOT 0.9  --    ALKPHOS 74  --    AST 15  --    ALT 15  --    ANIONGAP 7* 8   , CBC   Recent Labs   Lab 12/08/22  1224   WBC 5.90   HGB 9.5*   HCT 25.4*      , INR No results for input(s): INR, PROTIME in the last 48 hours., Lipid Panel No results for input(s): CHOL, HDL, LDLCALC, TRIG, CHOLHDL in the last 48 hours., Troponin   Recent Labs   Lab 12/08/22  1224 12/08/22  1657 12/08/22  2140   TROPONINI 0.133* 0.148* 0.191*   , and All pertinent lab results from the last 24 hours have been reviewed.    Significant Imaging: Echocardiogram: Transthoracic echo (TTE) complete (Cupid Only):   Results for orders placed or performed during the hospital encounter of 12/08/22   Echo   Result Value Ref Range    BSA 2.2 m2    TDI SEPTAL 0.06 m/s    LV LATERAL E/E' RATIO 20.33 m/s    LV SEPTAL E/E' RATIO 20.33 m/s    LA WIDTH 5.00 cm    Left Ventricular Outflow Tract Mean Velocity 0.46 cm/s    Left Ventricular Outflow Tract Mean Gradient 0.96 mmHg    TDI LATERAL 0.06 m/s    LVIDd 5.55 3.5 - 6.0 cm    IVS 0.97 0.6 - 1.1 cm    Posterior Wall 1.19 (A) 0.6 - 1.1 cm    Ao root  annulus 1.90 cm    LVIDs 4.00 2.1 - 4.0 cm    FS 28 28 - 44 %    LA volume 121.84 cm3    Sinus 3.20 cm    STJ 2.62 cm    LV mass 239.68 g    LA size 5.49 cm    RVDD 3.47 cm    TAPSE 2.30 cm    Left Ventricle Relative Wall Thickness 0.43 cm    AV Velocity Ratio 0.55     E/A ratio 1.94     Mean e' 0.06 m/s    E wave deceleration time 192.59 msec    IVRT 100.35 msec    LVOT diameter 1.93 cm    LVOT area 2.9 cm2    LVOT peak hector 0.72 m/s    LVOT peak VTI 13.40 cm    Ao peak hector 1.31 m/s    LVOT stroke volume 39.18 cm3    AV peak gradient 7 mmHg    E/E' ratio 20.33 m/s    MV Peak E Hector 1.22 m/s    TR Max Hector 1.94 m/s    MV Peak A Hector 0.63 m/s    LV Systolic Volume 69.97 mL    LV Systolic Volume Index 32.5 mL/m2    LV Diastolic Volume 150.43 mL    LV Diastolic Volume Index 69.97 mL/m2    LA Volume Index 56.7 mL/m2    LV Mass Index 111 g/m2    RA Major Axis 4.77 cm    Left Atrium Minor Axis 4.50 cm    Left Atrium Major Axis 6.22 cm    Triscuspid Valve Regurgitation Peak Gradient 15 mmHg    RA Width 3.50 cm    Right Atrial Pressure (from IVC) 3 mmHg    EF 45 %    TV rest pulmonary artery pressure 18 mmHg    Narrative    · Technically difficult study.  · The left ventricle is normal in size with concentric remodeling and   mildly decreased systolic function.  · The estimated ejection fraction is 45%.  · Severe left atrial enlargement.  · Grade II left ventricular diastolic dysfunction.  · Normal right ventricular size with normal right ventricular systolic   function.  · Normal central venous pressure (3 mmHg).  · The estimated PA systolic pressure is 18 mmHg.  · There is no pulmonary hypertension.

## 2025-06-03 DIAGNOSIS — R22.0 HEAD MASS: Primary | ICD-10-CM

## 2025-06-03 DIAGNOSIS — R22.0 MASS OF HEAD: Primary | ICD-10-CM

## (undated) DEVICE — DEVICE CLOSURE MYNX GRIP 5FR

## (undated) DEVICE — VISE RADIFOCUS MULTI TORQUE

## (undated) DEVICE — CONTRAST OMNIPAQUE 240 150ML

## (undated) DEVICE — SOL IRR NACL .9% 3000ML

## (undated) DEVICE — SET BASIN 48X48IN 6000ML RING

## (undated) DEVICE — SUT 3/0 18IN COATED VICRYLP

## (undated) DEVICE — PAD ABD 8X10 STERILE

## (undated) DEVICE — CATH ANGIO SOFT VU 5FR 65CM

## (undated) DEVICE — SOL NS 1000CC

## (undated) DEVICE — SEE MEDLINE ITEM 146270

## (undated) DEVICE — UNDERGLOVES BIOGEL PI SIZE 8

## (undated) DEVICE — SUT ETHILON 3-0 PS2 18 BLK

## (undated) DEVICE — PAD PREP 50/CA

## (undated) DEVICE — PACK ARTHROSCOPY

## (undated) DEVICE — SPONGE DERMACEA GAUZE 4X4

## (undated) DEVICE — SEE MEDLINE ITEM 152530

## (undated) DEVICE — COVERS PROBE NR-48 STERILE

## (undated) DEVICE — GUIDEWIRE SAFE T J TIP 145X2.5

## (undated) DEVICE — PACK ARTHROSCOPY W/ISO BAC

## (undated) DEVICE — PADDING CAST SOFT-ROLL 6 X 4

## (undated) DEVICE — STOCKINET 4INX48

## (undated) DEVICE — TOURNIQUET SB QC DP 34X4IN

## (undated) DEVICE — SHOE POST-OP MEN LARGE

## (undated) DEVICE — GAUZE SPONGE 4X4 12PLY

## (undated) DEVICE — SEE MEDLINE ITEM 152529

## (undated) DEVICE — SHEATH 6FR

## (undated) DEVICE — GUIDEWIRE STD .035X180CM ANG

## (undated) DEVICE — UNDERGLOVE BIOGEL PI SZ 6.5 LF

## (undated) DEVICE — SPONGE LAP 18X18 PREWASHED

## (undated) DEVICE — NDL HYPO REG 25G X 1 1/2

## (undated) DEVICE — ELECTRODE REM PLYHSV RETURN 9

## (undated) DEVICE — PULSAVAC ZIMMER

## (undated) DEVICE — SUT ETHILON 4-0 PS2 18 BLK

## (undated) DEVICE — SUT SILK 0 SH 30IN BLK BR

## (undated) DEVICE — FORCEP STRAIGHT DISP

## (undated) DEVICE — DRESSING N ADH OIL EMUL 3X3

## (undated) DEVICE — PACKING STRIP IDOFORM 1X5YD

## (undated) DEVICE — CATH DXTERITY JL40 100CM 6FR

## (undated) DEVICE — CATH SEEKER .014IN 150CM

## (undated) DEVICE — INTRODUCER VASC RADPQ 5FRX10CM

## (undated) DEVICE — SEE MEDLINE ITEM 146345

## (undated) DEVICE — BLADE SURG CARBON STEEL #10

## (undated) DEVICE — DRAPE EXTREMITY W/ABC NON-SLIP

## (undated) DEVICE — SEE MEDLINE ITEM 146298

## (undated) DEVICE — SHEATH FLEXOR ANSEL 5FR 90M

## (undated) DEVICE — SEE MEDLINE ITEM 157216

## (undated) DEVICE — BLADE MEDIUM 9MM X 25MM

## (undated) DEVICE — SUT 0 18IN SILK BLK BRAIDE

## (undated) DEVICE — DRESSING INFOVAC MED RND BLK

## (undated) DEVICE — BANDAGE DERMACEA STRETCH 4X1IN

## (undated) DEVICE — CANISTER SUCTION 2 LTR

## (undated) DEVICE — PADDING CAST SOFT-ROLL 4 X 4

## (undated) DEVICE — KIT INTRO MICRO NIT VSI 4FR

## (undated) DEVICE — Device

## (undated) DEVICE — NDL HYPO A BEVEL 22X1 1/2

## (undated) DEVICE — SEE MEDLINE ITEM 152622

## (undated) DEVICE — BAG COLL CLR TUBE 48IN 1400ML

## (undated) DEVICE — SET MICROPUNCTURE

## (undated) DEVICE — SYR ORAL ENTERAL PUR 12ML

## (undated) DEVICE — DRESSING TELFA STRL 4X3 LF

## (undated) DEVICE — BLANKET UPPER BODY 78.7X29.9IN

## (undated) DEVICE — SYR 10CC LUER LOCK

## (undated) DEVICE — SUT PROLENE 3-0 SH DA 36 BL

## (undated) DEVICE — GUIDEWIRE ADVNTG 035X260CM ANG

## (undated) DEVICE — SEE MEDLINE ITEM 152523

## (undated) DEVICE — PACK CATH LAB

## (undated) DEVICE — KIT HAND CONTROL HIGH PRESSUR

## (undated) DEVICE — KIT SYR REUSABLE

## (undated) DEVICE — CORD BIPOLAR 12 FOOT

## (undated) DEVICE — CHLORAPREP W TINT 26ML APPL

## (undated) DEVICE — SEE MEDLINE ITEM 152515

## (undated) DEVICE — DRESSING TRANS 4X4 TEGADERM

## (undated) DEVICE — GUIDEWIRE AMPLATZ 180 46-525

## (undated) DEVICE — BLADE SAW OSC ME-92 COATED

## (undated) DEVICE — CANISTER INFOV.A.C WOUND 500ML

## (undated) DEVICE — GLOVE BIOGEL ORTHOPEDIC 8

## (undated) DEVICE — COVER INSTR ELASTIC BAND 40X20

## (undated) DEVICE — GAUZE PACKING STRIP PLN 1/2X5

## (undated) DEVICE — UNDERGLOVES BIOGEL PI SIZE 7.5

## (undated) DEVICE — BLADE SAGITTA 5/BX

## (undated) DEVICE — PAD CAST SPECIALIST STRL 6

## (undated) DEVICE — SOL 9P NACL IRR PIC IL

## (undated) DEVICE — SHOE POST OP FEMALE MED BLACK

## (undated) DEVICE — CATH DIAG ANG 4FX120 SLIP

## (undated) DEVICE — SYR CNTRL MALE LL 10ML

## (undated) DEVICE — ADHESIVE MASTISOL VIAL 48/BX

## (undated) DEVICE — SEE MEDLINE ITEM 154981

## (undated) DEVICE — SEE ITEM #81378

## (undated) DEVICE — SEE MEDLINE ITEM 157117

## (undated) DEVICE — SET DECANTER MEDICHOICE

## (undated) DEVICE — VALVE CONTROL COPILOT

## (undated) DEVICE — SHOE POST OP FEMALE LG BLACK

## (undated) DEVICE — TOURNIQUET SB QC DP 18X4IN

## (undated) DEVICE — DRAPE PLASTIC U 60X72

## (undated) DEVICE — SUT 2/0 36IN COATED VICRYL

## (undated) DEVICE — GLOVE SURGICAL LATEX SZ 6.5

## (undated) DEVICE — INFLATION DEVICE ENCORE 26

## (undated) DEVICE — SEE MEDLINE ITEM 157131

## (undated) DEVICE — CATH ANGIO PRO FLO XT PGTL 6F

## (undated) DEVICE — GLOVE SURGICAL LATEX SZ 8

## (undated) DEVICE — PAD CAST SPECIALIST STRL 4

## (undated) DEVICE — CATH ANGIO EXPO AR1 6F

## (undated) DEVICE — SUT CTD VICRYL 0 UND BR CT

## (undated) DEVICE — CUFF TOURNIQUET DL PRT

## (undated) DEVICE — SEE MEDLINE ITEM 146271

## (undated) DEVICE — TAPE SILK 3IN

## (undated) DEVICE — GOWN ISOLATION IMPERVIOUS

## (undated) DEVICE — DRESSING TELFA PAD N ADH 2X3

## (undated) DEVICE — KIT MANIFOLD LOW PRESS TUBING

## (undated) DEVICE — TRAY MINOR ORTHO

## (undated) DEVICE — KIT IRR SUCTION HND PIECE

## (undated) DEVICE — STAPLER SKIN PROXIMATE WIDE

## (undated) DEVICE — DRESSING TRANS 6X8 TEGADERM

## (undated) DEVICE — SPONGE DERMACEA 4X4IN 12PLY

## (undated) DEVICE — SEE L#120831

## (undated) DEVICE — APPLICATOR CHLORAPREP ORN 26ML

## (undated) DEVICE — CLOSURE SKIN STERI STRIP 1/2X4

## (undated) DEVICE — SUPPORT ULNA NERVE PROTECTOR

## (undated) DEVICE — FLOWSWITCH HP 1-W W/O LL

## (undated) DEVICE — MANIFOLD WIDE 2 PORT 200PSI

## (undated) DEVICE — DRAPE STERI-DRAPE 1000 17X11IN

## (undated) DEVICE — NDL 18GA X1 1/2 REG BEVEL

## (undated) DEVICE — SUT VICRYL 3-0 27 SH

## (undated) DEVICE — SEE MEDLINE ITEM 152522

## (undated) DEVICE — BANDAGE ACE ELASTIC 6"

## (undated) DEVICE — BLADE SURG #15 CARBON STEEL

## (undated) DEVICE — WIRE CHOICE PT X SUPP 014X300

## (undated) DEVICE — SYS LABEL CORRECT MED

## (undated) DEVICE — SET FLUID ADMIN 72 L SPK LG

## (undated) DEVICE — SUT ETHILON 2-0 FSLX 30 BLK

## (undated) DEVICE — BLLN VIATRAC 6X30 135CM